# Patient Record
Sex: FEMALE | Race: WHITE | Employment: OTHER | URBAN - METROPOLITAN AREA
[De-identification: names, ages, dates, MRNs, and addresses within clinical notes are randomized per-mention and may not be internally consistent; named-entity substitution may affect disease eponyms.]

---

## 2020-08-04 NOTE — PROGRESS NOTES
Dorise Quirk    Age 76 y.o.    female    1945    [facility] [id number]    8/12/2020  Arrival Time_____________  OR Time____________40 Ranelle Semen     Procedure(s):  PHACOEMULSIFICATION OF CATARACT LEFT EYE WITH INTRAOCULAR LENS IMPLANT                      Monitor Anesthesia Care    Surgeon(s):  Lisset Velazquez, MD       Phone [phone] ([place])     [address] [place] [address] House Mario Alberto  Cell Work  _________________________________________________________________  _________________________________________________________________  _________________________________________________________________  _________________________________________________________________  _________________________________________________________________      PCP _____________________________ Phone_________________       H&P__________________Bringing    Chart            Epic  DOS     Called_______  EKG__________________Bringing    Chart            Epic  DOS     Called_______  LAB__________________ Bringing    Chart            Epic  DOS     Called_______  Cardiac Clearance_______Bringing    Chart            Epic      DOS       Called_______    Cardiologist________________________ Phone___________________________      ? Cheondoism concerns / Waiver on Chart            PAT Communications_____________  ? Pre-op Instructions Given [place]          ______________________________  ? Directions to Surgery Center                          ______________________________  ? Transportation Home_______________      _______________________________  ?  Crutches/Walker__________________        _______________________________      ________Pre-op Orders   _______Transcribed    _______Wt.  ________Pharmacy          _______SCD  ______VTE     ______Beta Blocker  ________Consent             ________TED Mart Skiff

## 2020-08-06 ENCOUNTER — OFFICE VISIT (OUTPATIENT)
Dept: PRIMARY CARE CLINIC | Age: 75
End: 2020-08-06
Payer: COMMERCIAL

## 2020-08-06 PROCEDURE — G8421 BMI NOT CALCULATED: HCPCS | Performed by: NURSE PRACTITIONER

## 2020-08-06 PROCEDURE — 99211 OFF/OP EST MAY X REQ PHY/QHP: CPT | Performed by: NURSE PRACTITIONER

## 2020-08-06 PROCEDURE — G8428 CUR MEDS NOT DOCUMENT: HCPCS | Performed by: NURSE PRACTITIONER

## 2020-08-06 NOTE — PATIENT INSTRUCTIONS

## 2020-08-06 NOTE — PROGRESS NOTES
Prosper Hodge received a viral test for COVID-19. They were educated on isolation and quarantine as appropriate. For any symptoms, they were directed to seek care from their PCP, given contact information to establish with a doctor, directed to an urgent care or the emergency room.

## 2020-08-07 RX ORDER — OLMESARTAN MEDOXOMIL 40 MG/1
40 TABLET ORAL DAILY
Status: ON HOLD | COMMUNITY
End: 2020-08-26

## 2020-08-07 RX ORDER — FOLIC ACID/MULTIVIT,IRON,MINER 0.4MG-18MG
TABLET ORAL DAILY
COMMUNITY

## 2020-08-07 RX ORDER — FLUTICASONE PROPIONATE 50 MCG
1 SPRAY, SUSPENSION (ML) NASAL DAILY
COMMUNITY

## 2020-08-07 RX ORDER — BENAZEPRIL HYDROCHLORIDE 40 MG/1
40 TABLET, FILM COATED ORAL DAILY
COMMUNITY

## 2020-08-07 RX ORDER — HYDROCHLOROTHIAZIDE 12.5 MG/1
12.5 CAPSULE, GELATIN COATED ORAL DAILY
COMMUNITY

## 2020-08-07 RX ORDER — CELECOXIB 200 MG/1
200 CAPSULE ORAL 2 TIMES DAILY
COMMUNITY

## 2020-08-07 RX ORDER — CETIRIZINE HYDROCHLORIDE 10 MG/1
10 TABLET ORAL DAILY
COMMUNITY

## 2020-08-07 RX ORDER — ASCORBIC ACID 500 MG
500 TABLET ORAL DAILY
COMMUNITY

## 2020-08-07 RX ORDER — M-VIT,TX,IRON,MINS/CALC/FOLIC 27MG-0.4MG
1 TABLET ORAL DAILY
COMMUNITY

## 2020-08-07 RX ORDER — AMLODIPINE BESYLATE 5 MG/1
5 TABLET ORAL DAILY
COMMUNITY

## 2020-08-07 RX ORDER — BUPROPION HYDROCHLORIDE 300 MG/1
300 TABLET ORAL EVERY MORNING
COMMUNITY

## 2020-08-07 RX ORDER — FLUOXETINE HYDROCHLORIDE 40 MG/1
40 CAPSULE ORAL DAILY
COMMUNITY

## 2020-08-07 NOTE — PROGRESS NOTES
Obstructive Sleep Apnea (HAMIDA) Screening     Patient:  Kari Reid    YOB: 1945      Medical Record #:  [id number]                     Date:  8/7/2020     1. Are you a loud and/or regular snorer? []  Yes       [x] No    2. Have you been observed to gasp or stop breathing during sleep? []  Yes       [x] No    3. Do you feel tired or groggy upon awakening or do you awaken with a headache?           []  Yes       [x] No    4. Are you often tired or fatigued during the wake time hours? []  Yes       [x] No    5. Do you fall asleep sitting, reading, watching TV or driving? []  Yes       [x] No    6. Do you often have problems with memory or concentration? []  Yes       [x] No    **If patient's score is ? 3 they are considered high risk for HAMIDA. An Anesthesia provider will evaluate the patient and develop a plan of care the day of surgery. Note:  If the patient's BMI is more than 35 kg m¯² , has neck circumference > 40 cm, and/or high blood pressure the risk is greater (© American Sleep Apnea Association, 2006).

## 2020-08-11 ENCOUNTER — ANESTHESIA EVENT (OUTPATIENT)
Dept: OPERATING ROOM | Age: 75
End: 2020-08-11
Payer: MEDICARE

## 2020-08-12 ENCOUNTER — ANESTHESIA (OUTPATIENT)
Dept: OPERATING ROOM | Age: 75
End: 2020-08-12
Payer: MEDICARE

## 2020-08-12 ENCOUNTER — HOSPITAL ENCOUNTER (OUTPATIENT)
Age: 75
Setting detail: OUTPATIENT SURGERY
Discharge: HOME | End: 2020-08-12
Attending: OPHTHALMOLOGY | Admitting: OPHTHALMOLOGY
Payer: MEDICARE

## 2020-08-12 VITALS
DIASTOLIC BLOOD PRESSURE: 77 MMHG | RESPIRATION RATE: 16 BRPM | WEIGHT: 192 LBS | HEART RATE: 61 BPM | TEMPERATURE: 97 F | OXYGEN SATURATION: 97 % | HEIGHT: 64 IN | SYSTOLIC BLOOD PRESSURE: 140 MMHG | BODY MASS INDEX: 32.78 KG/M2

## 2020-08-12 VITALS — DIASTOLIC BLOOD PRESSURE: 66 MMHG | SYSTOLIC BLOOD PRESSURE: 118 MMHG | OXYGEN SATURATION: 97 %

## 2020-08-12 LAB — SARS-COV-2, NAA: NOT DETECTED

## 2020-08-12 PROCEDURE — 7100000010 HC PHASE II RECOVERY - FIRST 15 MIN: Performed by: OPHTHALMOLOGY

## 2020-08-12 PROCEDURE — 3700000001 HC ADD 15 MINUTES (ANESTHESIA): Performed by: OPHTHALMOLOGY

## 2020-08-12 PROCEDURE — 6370000000 HC RX 637 (ALT 250 FOR IP): Performed by: OPHTHALMOLOGY

## 2020-08-12 PROCEDURE — 3600000003 HC SURGERY LEVEL 3 BASE: Performed by: OPHTHALMOLOGY

## 2020-08-12 PROCEDURE — 6360000002 HC RX W HCPCS: Performed by: NURSE ANESTHETIST, CERTIFIED REGISTERED

## 2020-08-12 PROCEDURE — 7100000011 HC PHASE II RECOVERY - ADDTL 15 MIN: Performed by: OPHTHALMOLOGY

## 2020-08-12 PROCEDURE — 2580000003 HC RX 258: Performed by: OPHTHALMOLOGY

## 2020-08-12 PROCEDURE — V2632 POST CHMBR INTRAOCULAR LENS: HCPCS | Performed by: OPHTHALMOLOGY

## 2020-08-12 PROCEDURE — 2709999900 HC NON-CHARGEABLE SUPPLY: Performed by: OPHTHALMOLOGY

## 2020-08-12 PROCEDURE — 2500000003 HC RX 250 WO HCPCS: Performed by: NURSE ANESTHETIST, CERTIFIED REGISTERED

## 2020-08-12 PROCEDURE — 3700000000 HC ANESTHESIA ATTENDED CARE: Performed by: OPHTHALMOLOGY

## 2020-08-12 PROCEDURE — 3600000013 HC SURGERY LEVEL 3 ADDTL 15MIN: Performed by: OPHTHALMOLOGY

## 2020-08-12 PROCEDURE — 6360000002 HC RX W HCPCS: Performed by: OPHTHALMOLOGY

## 2020-08-12 PROCEDURE — 2580000003 HC RX 258: Performed by: ANESTHESIOLOGY

## 2020-08-12 PROCEDURE — 2500000003 HC RX 250 WO HCPCS: Performed by: OPHTHALMOLOGY

## 2020-08-12 DEVICE — ACRYSOF(R) IQ ASPHERIC IOL SP ACRYLIC FOLDABLELENS WULTRASERT(TM) DELIVERY SYSTEM UV WBLUE LIGHT FILTER. 13.0MM LENGTH 6.0MM ANTERIORASYMMETRIC BICONVEX OPTIC PLANAR HAPTICS.
Type: IMPLANTABLE DEVICE | Site: EYE | Status: FUNCTIONAL
Brand: ACRYSOF ULTRASERT

## 2020-08-12 RX ORDER — MOXIFLOXACIN 5 MG/ML
SOLUTION/ DROPS OPHTHALMIC PRN
Status: DISCONTINUED | OUTPATIENT
Start: 2020-08-12 | End: 2020-08-12 | Stop reason: ALTCHOICE

## 2020-08-12 RX ORDER — TETRACAINE HYDROCHLORIDE 5 MG/ML
SOLUTION OPHTHALMIC PRN
Status: DISCONTINUED | OUTPATIENT
Start: 2020-08-12 | End: 2020-08-12 | Stop reason: ALTCHOICE

## 2020-08-12 RX ORDER — TIMOLOL MALEATE 5 MG/ML
SOLUTION/ DROPS OPHTHALMIC PRN
Status: DISCONTINUED | OUTPATIENT
Start: 2020-08-12 | End: 2020-08-12 | Stop reason: ALTCHOICE

## 2020-08-12 RX ORDER — MAGNESIUM HYDROXIDE 1200 MG/15ML
LIQUID ORAL PRN
Status: DISCONTINUED | OUTPATIENT
Start: 2020-08-12 | End: 2020-08-12 | Stop reason: ALTCHOICE

## 2020-08-12 RX ORDER — LIDOCAINE HYDROCHLORIDE 10 MG/ML
INJECTION, SOLUTION INFILTRATION; PERINEURAL PRN
Status: DISCONTINUED | OUTPATIENT
Start: 2020-08-12 | End: 2020-08-12 | Stop reason: SDUPTHER

## 2020-08-12 RX ORDER — PROPOFOL 10 MG/ML
INJECTION, EMULSION INTRAVENOUS PRN
Status: DISCONTINUED | OUTPATIENT
Start: 2020-08-12 | End: 2020-08-12 | Stop reason: SDUPTHER

## 2020-08-12 RX ORDER — SODIUM CHLORIDE, SODIUM LACTATE, POTASSIUM CHLORIDE, CALCIUM CHLORIDE 600; 310; 30; 20 MG/100ML; MG/100ML; MG/100ML; MG/100ML
INJECTION, SOLUTION INTRAVENOUS CONTINUOUS
Status: DISCONTINUED | OUTPATIENT
Start: 2020-08-12 | End: 2020-08-12 | Stop reason: HOSPADM

## 2020-08-12 RX ADMIN — SODIUM CHLORIDE, POTASSIUM CHLORIDE, SODIUM LACTATE AND CALCIUM CHLORIDE: 600; 310; 30; 20 INJECTION, SOLUTION INTRAVENOUS at 08:14

## 2020-08-12 RX ADMIN — PROPOFOL 80 MG: 10 INJECTION, EMULSION INTRAVENOUS at 09:10

## 2020-08-12 RX ADMIN — LIDOCAINE HYDROCHLORIDE 20 MG: 10 INJECTION, SOLUTION INFILTRATION; PERINEURAL at 09:10

## 2020-08-12 RX ADMIN — Medication 0.3 ML: at 08:16

## 2020-08-12 RX ADMIN — Medication 0.3 ML: at 08:25

## 2020-08-12 RX ADMIN — Medication 0.3 ML: at 08:07

## 2020-08-12 ASSESSMENT — PULMONARY FUNCTION TESTS
PIF_VALUE: 0

## 2020-08-12 ASSESSMENT — PAIN - FUNCTIONAL ASSESSMENT: PAIN_FUNCTIONAL_ASSESSMENT: 0-10

## 2020-08-12 NOTE — ANESTHESIA POSTPROCEDURE EVALUATION
Department of Anesthesiology  Postprocedure Note    Patient: Ji Marroquin  MRN: [id number]  YOB: 1945  Date of evaluation: 8/12/2020  Time:  10:05 AM     Procedure Summary     Date:  08/12/20 Room / Location:  [facility] OR [address] [place] [address]    Anesthesia Start:  3896 Anesthesia Stop:  4926    Procedure:  PHACOEMULSIFICATION OF CATARACT LEFT EYE WITH INTRAOCULAR LENS IMPLANT (Left Eye) Diagnosis:       Age-related nuclear cataract of left eye      (Age-related nuclear cataract of left eye [H25.12])    Surgeon:  Tona Sutton MD Responsible Provider:  Lawyer Estiven MD    Anesthesia Type:  general ASA Status:  3          Anesthesia Type: general    Xin Phase I: Xin Score: 10    Xin Phase II: Xin Score: 10    Last vitals: Reviewed and per EMR flowsheets. Anesthesia Post Evaluation    Comments: Postoperative Anesthesia Note    Name:    Ji Marroquin  MRN:      [id number]    Patient Vitals in the past 12 hrs:  08/12/20 0940, BP:(!) 140/77, Temp:97 °F (36.1 °C), Pulse:61, Resp:16, SpO2:97 %  08/12/20 0805, BP:120/60, Temp:97 °F (36.1 °C), Temp src:Temporal, Pulse:62, Resp:16, SpO2:98 %, Height:5' 4\" (1.626 m), Weight:192 lb (87.1 kg)     LABS:    CBC  No results found for: WBC, HGB, HCT, PLT  RENAL  No results found for: NA, K, CL, CO2, BUN, CREATININE, GLUCOSE  COAGS  No results found for: PROTIME, INR, APTT    Intake & Output:  @54GBSQ@    Nausea & Vomiting:  No    Level of Consciousness:  Awake    Pain Assessment:  Adequate analgesia    Anesthesia Complications:  No apparent anesthetic complications    SUMMARY      Vital signs stable  OK to discharge from Stage I post anesthesia care.   Care transferred from Anesthesiology department on discharge from perioperative area

## 2020-08-12 NOTE — ANESTHESIA PRE PROCEDURE
Department of Anesthesiology  Preprocedure Note       Name:  Isa Meza   Age:  76 y.o.  :  1945                                          MRN:  [id number]         Date:  2020      Surgeon: Sailaja Degree):  Renate Tomlin MD    Procedure: Procedure(s):  PHACOEMULSIFICATION OF CATARACT LEFT EYE WITH INTRAOCULAR LENS IMPLANT    Medications prior to admission:   Prior to Admission medications    Medication Sig Start Date End Date Taking?  Authorizing Provider   hydroCHLOROthiazide (MICROZIDE) 12.5 MG capsule Take 12.5 mg by mouth daily   Yes Historical Provider, MD   FLUoxetine (PROZAC) 40 MG capsule Take 40 mg by mouth daily   Yes Historical Provider, MD   buPROPion (WELLBUTRIN XL) 300 MG extended release tablet Take 300 mg by mouth every morning   Yes Historical Provider, MD   benazepril (LOTENSIN) 40 MG tablet Take 40 mg by mouth daily   Yes Historical Provider, MD   amLODIPine (NORVASC) 5 MG tablet Take 5 mg by mouth daily   Yes Historical Provider, MD   olmesartan (BENICAR) 40 MG tablet Take 40 mg by mouth daily   Yes Historical Provider, MD   celecoxib (CELEBREX) 200 MG capsule Take 200 mg by mouth 2 times daily   Yes Historical Provider, MD   fluticasone (FLONASE) 50 MCG/ACT nasal spray 1 spray by Nasal route daily   Yes Historical Provider, MD   Omega-3 Fatty Acids (OMEGA-3 FISH OIL) 1200 MG CAPS Take by mouth daily   Yes Historical Provider, MD   ascorbic acid (VITAMIN C) 500 MG tablet Take 500 mg by mouth daily   Yes Historical Provider, MD   cetirizine (ZYRTEC) 10 MG tablet Take 10 mg by mouth daily   Yes Historical Provider, MD   Multiple Vitamins-Minerals (THERAPEUTIC MULTIVITAMIN-MINERALS) tablet Take 1 tablet by mouth daily   Yes Historical Provider, MD       Current medications:    Current Facility-Administered Medications   Medication Dose Route Frequency Provider Last Rate Last Dose    bupivacaine 0.75%, phenylephrine 10%, tropicamide 1%, cyclopentolate 1%, moxifloxacin 0.5%, ketorolac 0.5% in lidocaine 2% jelly ophthalmic solution  0.3 mL Left Eye Q10 Min PRN Lory Castro MD        lactated ringers infusion   Intravenous Continuous Isaac Guillermo MD        lidocaine 1 % (PF) injection 0.1 mL  0.1 mL Intradermal Once PRN Isaac Giullermo MD         Current Outpatient Medications   Medication Sig Dispense Refill    hydroCHLOROthiazide (MICROZIDE) 12.5 MG capsule Take 12.5 mg by mouth daily      FLUoxetine (PROZAC) 40 MG capsule Take 40 mg by mouth daily      buPROPion (WELLBUTRIN XL) 300 MG extended release tablet Take 300 mg by mouth every morning      benazepril (LOTENSIN) 40 MG tablet Take 40 mg by mouth daily      amLODIPine (NORVASC) 5 MG tablet Take 5 mg by mouth daily      olmesartan (BENICAR) 40 MG tablet Take 40 mg by mouth daily      celecoxib (CELEBREX) 200 MG capsule Take 200 mg by mouth 2 times daily      fluticasone (FLONASE) 50 MCG/ACT nasal spray 1 spray by Nasal route daily      Omega-3 Fatty Acids (OMEGA-3 FISH OIL) 1200 MG CAPS Take by mouth daily      ascorbic acid (VITAMIN C) 500 MG tablet Take 500 mg by mouth daily      cetirizine (ZYRTEC) 10 MG tablet Take 10 mg by mouth daily      Multiple Vitamins-Minerals (THERAPEUTIC MULTIVITAMIN-MINERALS) tablet Take 1 tablet by mouth daily         Allergies:  No Known Allergies    Problem List:  There is no problem list on file for this patient. Past Medical History:        Diagnosis Date    Anxiety     Hypertension        Past Surgical History:        Procedure Laterality Date    BREAST ENHANCEMENT SURGERY      HAND SURGERY Right     HYSTERECTOMY      RHINOPLASTY      ROTATOR CUFF REPAIR Bilateral     TOTAL HIP ARTHROPLASTY Bilateral        Social History:    Social History     Tobacco Use    Smoking status: Never Smoker    Smokeless tobacco: Never Used   Substance Use Topics    Alcohol use:  Yes     Alcohol/week: 14.0 standard drinks     Types: 14 Glasses of wine per week     Comment: 2 glasses/night                                Counseling given: Not Answered      Vital Signs (Current):   Vitals:    08/07/20 1359   Weight: 192 lb (87.1 kg)   Height: 5' 4\" (1.626 m)                                              BP Readings from Last 3 Encounters:   No data found for BP       NPO Status:                                                                                 BMI:   Wt Readings from Last 3 Encounters:   No data found for Wt     Body mass index is 32.96 kg/m². CBC: No results found for: WBC, RBC, HGB, HCT, MCV, RDW, PLT    CMP: No results found for: NA, K, CL, CO2, BUN, CREATININE, GFRAA, AGRATIO, LABGLOM, GLUCOSE, PROT, CALCIUM, BILITOT, ALKPHOS, AST, ALT    POC Tests: No results for input(s): POCGLU, POCNA, POCK, POCCL, POCBUN, POCHEMO, POCHCT in the last 72 hours.     Coags: No results found for: PROTIME, INR, APTT    HCG (If Applicable): No results found for: PREGTESTUR, PREGSERUM, HCG, HCGQUANT     ABGs: No results found for: PHART, PO2ART, OUH3IRO, AEO6XNL, BEART, Q7IEBBLK     Type & Screen (If Applicable):  No results found for: LABABO, LABRH    Drug/Infectious Status (If Applicable):  No results found for: HIV, HEPCAB    COVID-19 Screening (If Applicable):   Lab Results   Component Value Date    COVID19 NOT DETECTED 08/06/2020         Anesthesia Evaluation  Patient summary reviewed and Nursing notes reviewed no history of anesthetic complications:   Airway: Mallampati: II     Neck ROM: full   Dental:          Pulmonary:Negative Pulmonary ROS and normal exam                               Cardiovascular:Negative CV ROS    (+) hypertension:,                   Neuro/Psych:   Negative Neuro/Psych ROS  (+) depression/anxiety             GI/Hepatic/Renal: Neg GI/Hepatic/Renal ROS       (-) hiatal hernia and GERD       Endo/Other: Negative Endo/Other ROS                    Abdominal:           Vascular:                                        Anesthesia Plan      general     ASA 3     (I discussed with the patient the risks and benefits of PIV, general anesthesia, IV Narcotics, PACU. All questions were answered the patient agrees with the plan and wishes to proceed.  )  Induction: intravenous.                         Joel Burton MD   8/12/2020

## 2020-08-12 NOTE — OP NOTE
[address] [place] [address]                                OPERATIVE REPORT    PATIENT NAME: Jag Sharif                      :        1945  MED REC NO:   [id number]                          ROOM:  ACCOUNT NO:   [de-identified]                           ADMIT DATE: 2020  PROVIDER:     Vilma Sarabia MD    DATE OF PROCEDURE:  2020    PREOPERATIVE DIAGNOSIS:  Cataract, left eye. POSTOPERATIVE DIAGNOSIS:  Cataract, left eye. OPERATION:  Phacoemulsification of the cataract of the left eye with a  posterior chamber lens implant. ANESTHESIA:  General / Monitored Anesthesia Care / Retrobulbar. A 2 mL  retrobulbar block and 10 mL modified Terrell block using a 1:1 mixture  of 0.75% Marcaine, 4% Xylocaine with epinephrine, and hyaluronidase. SURGICAL INDICATIONS:  The patient has had a painless progressive loss  of vision due to the cataractous degeneration of the lens and for that  reason, surgery is indicated. The patient is having visual problems  with current lifestyle. A new eyeglasses prescription was unable to  adequately improve functional vision. DETAILS OF PROCEDURE:  The patient was taken to the operating room and  was prepped and draped in the usual manner after obtaining satisfactory  local anesthesia. Attention was turned towards the operative eye and a lid speculum was  inserted. A 2.5 mm keratome was used to make a limbal incision at 180  degrees. Viscoat was then placed in the eye to fill the anterior  chamber and a side port incision was made with a Superblade through the  clear cornea. The incision was located about 2 o'clock to the left of  the original incision. A bent needle was then used to make a cut in the  anterior capsule and start a capsulorrhexis tear. This was then grasped  with Utrata forceps and a 360 degree tear was completed.   Mey[place] Chuck  dissection was then done with BSS to separate the capsule and the  cataract. The cataract was seen to be spinning easily within the  capsular bag and at this point, the phacoemulsification hand piece was  called for. Using a divide and conquer technique, the phacoemulsifier cut the lens  into four pieces approximately following the pattern of a cross. The  grooves were cut deeply and then the lens was cracked along these axes. The lens quarters were then rotated into the mid pupillary space and  phacoemulsified. The IA hand piece removed all the cortical material.   A Urban Taylor was used to polish the posterior capsule. Viscoat and  Provisc were used to fill the capsular bag. The incision was opened  slightly with a crescent knife and an Willis acrylic foldable lens of the  appropriate power was called for. The lens was loaded into the injector  and injected into the eye. The lead haptic was injected into the  capsular bag with the trailing haptic left in the pupillary space. Using a Sinskey hook, the lens was gently nudged into the capsular bag  and rotated into position. After noting that the alignment and  centration was adequate, the IA hand piece was called for and used to  remove all the viscoelastic material.  Miostat was then injected through  the side port incision to bring the pupil down. The wound was checked  to make sure it was water tight. At this time, 2-3 drops of timolol and  2-3 drops of Vigamox were placed on the eye. The eye was taped closed,  patched and shielded. The patient went to the recovery room in good  condition. ADDENDUM:  The phaco time was 11.07 CDE with 200 mL of fluid. The  patient had a near-clear, no-stitch surgery, axis 180. Tolerated the  procedure well and went to the recovery room in good condition.         Neville Restrepo MD    D: 08/12/2020 9:50:56       T: 08/12/2020 10:19:43     [email or web address]  Job#: [id number]     Doc#: [id number]    CC:

## 2020-08-12 NOTE — H&P
I have examined the patient and reviewed the history and physical and find no relevant changes. I have reviewed with the patient and/or family the risks, benefits, and alternatives to the procedure and they have agreed to proceed.     Ravi Boss.

## 2020-08-18 NOTE — PROGRESS NOTES
Desiree Powell    Age 76 y.o.    female    1945    [facility] [id number]    8/26/2020  Arrival Time_____________  OR Time____________40 Therese Vasquez     Procedure(s):  PHACOEMULSIFICATION OF CATARACT RIGHT EYE WITH INTRAOCULAR LENS IMPLANT                      Monitor Anesthesia Care    Surgeon(s):  Sergo Nails, MD       Phone [phone] ([place])     [address] [place] [address] House Mario Alberto  Cell Work  _________________________________________________________________  _________________________________________________________________  _________________________________________________________________  _________________________________________________________________  _________________________________________________________________      PCP _____________________________ Phone_________________       H&P__________________Bringing    Chart            Epic  DOS     Called_______  EKG__________________Bringing    Chart            Epic  DOS     Called_______  LAB__________________ Bringing    Chart            Epic  DOS     Called_______  Cardiac Clearance_______Bringing    Chart            Epic      DOS       Called_______    Cardiologist________________________ Phone___________________________      ? Christianity concerns / Waiver on Chart            PAT Communications_____________  ? Pre-op Instructions Given [place]          ______________________________  ? Directions to Surgery Center                          ______________________________  ? Transportation Home_______________      _______________________________  ?  Crutches/Walker__________________        _______________________________      ________Pre-op Orders   _______Transcribed    _______Wt.  ________Pharmacy          _______SCD  ______VTE     ______Beta Blocker  ________Consent             ________TED Deretha Trenton

## 2020-08-20 NOTE — PROGRESS NOTES
Obstructive Sleep Apnea (HAMIDA) Screening     Patient:  Inez Mccormick    YOB: 1945      Medical Record #:  [id number]                     Date:  8/20/2020     1. Are you a loud and/or regular snorer? []  Yes       [x] No    2. Have you been observed to gasp or stop breathing during sleep? []  Yes       [x] No    3. Do you feel tired or groggy upon awakening or do you awaken with a headache?           []  Yes       [] No    4. Are you often tired or fatigued during the wake time hours? []  Yes       [] No    5. Do you fall asleep sitting, reading, watching TV or driving? []  Yes       [] No    6. Do you often have problems with memory or concentration? []  Yes       [] No    **If patient's score is ? 3 they are considered high risk for HAMIDA. An Anesthesia provider will evaluate the patient and develop a plan of care the day of surgery. Note:  If the patient's BMI is more than 35 kg m¯² , has neck circumference > 40 cm, and/or high blood pressure the risk is greater (© American Sleep Apnea Association, 2006).

## 2020-08-21 ENCOUNTER — OFFICE VISIT (OUTPATIENT)
Dept: PRIMARY CARE CLINIC | Age: 75
End: 2020-08-21

## 2020-08-21 NOTE — PATIENT INSTRUCTIONS

## 2020-08-22 LAB — SARS-COV-2, NAA: NOT DETECTED

## 2020-08-26 ENCOUNTER — HOSPITAL ENCOUNTER (OUTPATIENT)
Age: 75
Setting detail: OUTPATIENT SURGERY
Discharge: HOME | End: 2020-08-26
Attending: OPHTHALMOLOGY | Admitting: OPHTHALMOLOGY
Payer: MEDICARE

## 2020-08-26 ENCOUNTER — ANESTHESIA (OUTPATIENT)
Dept: OPERATING ROOM | Age: 75
End: 2020-08-26
Payer: MEDICARE

## 2020-08-26 ENCOUNTER — ANESTHESIA EVENT (OUTPATIENT)
Dept: OPERATING ROOM | Age: 75
End: 2020-08-26
Payer: MEDICARE

## 2020-08-26 VITALS
TEMPERATURE: 97.4 F | RESPIRATION RATE: 16 BRPM | OXYGEN SATURATION: 98 % | DIASTOLIC BLOOD PRESSURE: 66 MMHG | HEIGHT: 64 IN | HEART RATE: 65 BPM | WEIGHT: 192 LBS | SYSTOLIC BLOOD PRESSURE: 129 MMHG | BODY MASS INDEX: 32.78 KG/M2

## 2020-08-26 VITALS — OXYGEN SATURATION: 100 % | DIASTOLIC BLOOD PRESSURE: 54 MMHG | SYSTOLIC BLOOD PRESSURE: 105 MMHG

## 2020-08-26 PROCEDURE — 2580000003 HC RX 258: Performed by: ANESTHESIOLOGY

## 2020-08-26 PROCEDURE — 6370000000 HC RX 637 (ALT 250 FOR IP): Performed by: OPHTHALMOLOGY

## 2020-08-26 PROCEDURE — 6360000002 HC RX W HCPCS: Performed by: NURSE ANESTHETIST, CERTIFIED REGISTERED

## 2020-08-26 PROCEDURE — 3600000003 HC SURGERY LEVEL 3 BASE: Performed by: OPHTHALMOLOGY

## 2020-08-26 PROCEDURE — 3700000001 HC ADD 15 MINUTES (ANESTHESIA): Performed by: OPHTHALMOLOGY

## 2020-08-26 PROCEDURE — 2709999900 HC NON-CHARGEABLE SUPPLY: Performed by: OPHTHALMOLOGY

## 2020-08-26 PROCEDURE — 2580000003 HC RX 258: Performed by: OPHTHALMOLOGY

## 2020-08-26 PROCEDURE — 3600000013 HC SURGERY LEVEL 3 ADDTL 15MIN: Performed by: OPHTHALMOLOGY

## 2020-08-26 PROCEDURE — 2500000003 HC RX 250 WO HCPCS: Performed by: OPHTHALMOLOGY

## 2020-08-26 PROCEDURE — 3700000000 HC ANESTHESIA ATTENDED CARE: Performed by: OPHTHALMOLOGY

## 2020-08-26 PROCEDURE — V2632 POST CHMBR INTRAOCULAR LENS: HCPCS | Performed by: OPHTHALMOLOGY

## 2020-08-26 PROCEDURE — 6360000002 HC RX W HCPCS: Performed by: OPHTHALMOLOGY

## 2020-08-26 PROCEDURE — 7100000011 HC PHASE II RECOVERY - ADDTL 15 MIN: Performed by: OPHTHALMOLOGY

## 2020-08-26 PROCEDURE — 7100000010 HC PHASE II RECOVERY - FIRST 15 MIN: Performed by: OPHTHALMOLOGY

## 2020-08-26 DEVICE — ACRYSOF(R) IQ ASPHERIC IOL SP ACRYLIC FOLDABLELENS WULTRASERT(TM) DELIVERY SYSTEM UV WBLUE LIGHT FILTER. 13.0MM LENGTH 6.0MM ANTERIORASYMMETRIC BICONVEX OPTIC PLANAR HAPTICS.
Type: IMPLANTABLE DEVICE | Site: EYE | Status: FUNCTIONAL
Brand: ACRYSOF ULTRASERT

## 2020-08-26 RX ORDER — SODIUM CHLORIDE, SODIUM LACTATE, POTASSIUM CHLORIDE, CALCIUM CHLORIDE 600; 310; 30; 20 MG/100ML; MG/100ML; MG/100ML; MG/100ML
INJECTION, SOLUTION INTRAVENOUS CONTINUOUS
Status: DISCONTINUED | OUTPATIENT
Start: 2020-08-26 | End: 2020-08-26 | Stop reason: HOSPADM

## 2020-08-26 RX ORDER — TIMOLOL MALEATE 5 MG/ML
SOLUTION/ DROPS OPHTHALMIC PRN
Status: DISCONTINUED | OUTPATIENT
Start: 2020-08-26 | End: 2020-08-26 | Stop reason: ALTCHOICE

## 2020-08-26 RX ORDER — MAGNESIUM HYDROXIDE 1200 MG/15ML
LIQUID ORAL PRN
Status: DISCONTINUED | OUTPATIENT
Start: 2020-08-26 | End: 2020-08-26 | Stop reason: ALTCHOICE

## 2020-08-26 RX ORDER — LIDOCAINE HYDROCHLORIDE 10 MG/ML
2 INJECTION, SOLUTION INFILTRATION; PERINEURAL
Status: DISCONTINUED | OUTPATIENT
Start: 2020-08-26 | End: 2020-08-26 | Stop reason: HOSPADM

## 2020-08-26 RX ORDER — MOXIFLOXACIN 5 MG/ML
SOLUTION/ DROPS OPHTHALMIC PRN
Status: DISCONTINUED | OUTPATIENT
Start: 2020-08-26 | End: 2020-08-26 | Stop reason: ALTCHOICE

## 2020-08-26 RX ORDER — PROPOFOL 10 MG/ML
INJECTION, EMULSION INTRAVENOUS PRN
Status: DISCONTINUED | OUTPATIENT
Start: 2020-08-26 | End: 2020-08-26 | Stop reason: SDUPTHER

## 2020-08-26 RX ORDER — TETRACAINE HYDROCHLORIDE 5 MG/ML
SOLUTION OPHTHALMIC PRN
Status: DISCONTINUED | OUTPATIENT
Start: 2020-08-26 | End: 2020-08-26 | Stop reason: ALTCHOICE

## 2020-08-26 RX ADMIN — Medication 0.3 ML: at 10:11

## 2020-08-26 RX ADMIN — Medication 0.3 ML: at 10:03

## 2020-08-26 RX ADMIN — PROPOFOL 100 MG: 10 INJECTION, EMULSION INTRAVENOUS at 11:03

## 2020-08-26 RX ADMIN — Medication 0.3 ML: at 09:56

## 2020-08-26 RX ADMIN — SODIUM CHLORIDE, POTASSIUM CHLORIDE, SODIUM LACTATE AND CALCIUM CHLORIDE: 600; 310; 30; 20 INJECTION, SOLUTION INTRAVENOUS at 10:01

## 2020-08-26 ASSESSMENT — PULMONARY FUNCTION TESTS
PIF_VALUE: 0
PIF_VALUE: 0
PIF_VALUE: 1
PIF_VALUE: 1
PIF_VALUE: 0
PIF_VALUE: 1
PIF_VALUE: 0
PIF_VALUE: 1
PIF_VALUE: 0

## 2020-08-26 ASSESSMENT — PAIN - FUNCTIONAL ASSESSMENT: PAIN_FUNCTIONAL_ASSESSMENT: 0-10

## 2020-08-26 NOTE — ANESTHESIA PRE PROCEDURE
Department of Anesthesiology  Preprocedure Note       Name:  Corwin Epley   Age:  76 y.o.  :  1945                                          MRN:  [id number]         Date:  2020      Surgeon: Shannon [place]):  Carlitos Fowler MD    Procedure: Procedure(s):  PHACOEMULSIFICATION OF CATARACT RIGHT EYE WITH INTRAOCULAR LENS IMPLANT    Medications prior to admission:   Prior to Admission medications    Medication Sig Start Date End Date Taking?  Authorizing Provider   hydroCHLOROthiazide (MICROZIDE) 12.5 MG capsule Take 12.5 mg by mouth daily   Yes Historical Provider, MD   FLUoxetine (PROZAC) 40 MG capsule Take 40 mg by mouth daily   Yes Historical Provider, MD   buPROPion (WELLBUTRIN XL) 300 MG extended release tablet Take 300 mg by mouth every morning   Yes Historical Provider, MD   benazepril (LOTENSIN) 40 MG tablet Take 40 mg by mouth daily   Yes Historical Provider, MD   amLODIPine (NORVASC) 5 MG tablet Take 5 mg by mouth daily   Yes Historical Provider, MD   celecoxib (CELEBREX) 200 MG capsule Take 200 mg by mouth 2 times daily   Yes Historical Provider, MD   fluticasone (FLONASE) 50 MCG/ACT nasal spray 1 spray by Nasal route daily   Yes Historical Provider, MD   Omega-3 Fatty Acids (OMEGA-3 FISH OIL) 1200 MG CAPS Take by mouth daily   Yes Historical Provider, MD   ascorbic acid (VITAMIN C) 500 MG tablet Take 500 mg by mouth daily   Yes Historical Provider, MD   cetirizine (ZYRTEC) 10 MG tablet Take 10 mg by mouth daily   Yes Historical Provider, MD   Multiple Vitamins-Minerals (THERAPEUTIC MULTIVITAMIN-MINERALS) tablet Take 1 tablet by mouth daily   Yes Historical Provider, MD       Current medications:    Current Facility-Administered Medications   Medication Dose Route Frequency Provider Last Rate Last Dose    bupivacaine 0.75%, phenylephrine 10%, tropicamide 1%, cyclopentolate 1%, moxifloxacin 0.5%, ketorolac 0.5% in lidocaine 2% jelly ophthalmic solution  0.3 mL Right Eye Q10 Min PRN Carlitos Fowler MD   0.3 mL at 08/26/20 1003    lidocaine 1 % injection 2 mL  2 mL Intradermal Once PRN Latonia Vincent MD        lactated ringers infusion   Intravenous Continuous Latonia Vincent  mL/hr at 08/26/20 1001         Allergies: Allergies   Allergen Reactions    Other Other (See Comments)     Tylenol-3 - GI upset        Problem List:  There is no problem list on file for this patient.       Past Medical History:        Diagnosis Date    Anxiety     Hypertension        Past Surgical History:        Procedure Laterality Date    BREAST ENHANCEMENT SURGERY      COLONOSCOPY      HAND SURGERY Right     HYSTERECTOMY      INTRACAPSULAR CATARACT EXTRACTION Left 8/12/2020    PHACOEMULSIFICATION OF CATARACT LEFT EYE WITH INTRAOCULAR LENS IMPLANT performed by Magda [facility] MD at [facility] Bilateral     TOTAL HIP ARTHROPLASTY Bilateral        Social History:    Social History     Tobacco Use    Smoking status: Never Smoker    Smokeless tobacco: Never Used   Substance Use Topics    Alcohol use: Yes     Comment: couple times a week                                Counseling given: Not Answered      Vital Signs (Current):   Vitals:    08/20/20 1027 08/26/20 0958   BP:  (!) 110/47   Pulse:  68   Resp:  16   Temp:  97.4 °F (36.3 °C)   TempSrc:  Temporal   SpO2:  97%   Weight: 192 lb (87.1 kg) 192 lb (87.1 kg)   Height: 5' 4\" (1.626 m) 5' 4\" (1.626 m)                                              BP Readings from Last 3 Encounters:   08/26/20 (!) 110/47   08/12/20 118/66   08/12/20 (!) 140/77       NPO Status: Time of last liquid consumption: 2130                        Time of last solid consumption: 2130                        Date of last liquid consumption: 08/25/20                        Date of last solid food consumption: 08/25/20    BMI:   Wt Readings from Last 3 Encounters:   08/26/20 192 lb (87.1 kg)   08/12/20 192 lb (87.1 kg)     Body mass index is 32.96 kg/m². CBC: No results found for: WBC, RBC, HGB, HCT, MCV, RDW, PLT    CMP: No results found for: NA, K, CL, CO2, BUN, CREATININE, GFRAA, AGRATIO, LABGLOM, GLUCOSE, PROT, CALCIUM, BILITOT, ALKPHOS, AST, ALT    POC Tests: No results for input(s): POCGLU, POCNA, POCK, POCCL, POCBUN, POCHEMO, POCHCT in the last 72 hours. Coags: No results found for: PROTIME, INR, APTT    HCG (If Applicable): No results found for: PREGTESTUR, PREGSERUM, HCG, HCGQUANT     ABGs: No results found for: PHART, PO2ART, ZZP3TRM, TDO1OUO, BEART, A0LZOKSN     Type & Screen (If Applicable):  No results found for: LABABO, LABRH    Drug/Infectious Status (If Applicable):  No results found for: HIV, HEPCAB    COVID-19 Screening (If Applicable):   Lab Results   Component Value Date    COVID19 NOT DETECTED 08/21/2020         Anesthesia Evaluation  Patient summary reviewed and Nursing notes reviewed no history of anesthetic complications:   Airway: Mallampati: II  TM distance: >3 FB   Neck ROM: full  Mouth opening: > = 3 FB Dental: normal exam         Pulmonary:Negative Pulmonary ROS                              Cardiovascular:    (+) hypertension:,                   Neuro/Psych:   Negative Neuro/Psych ROS              GI/Hepatic/Renal: Neg GI/Hepatic/Renal ROS       (-) GERD, liver disease and no renal disease       Endo/Other: Negative Endo/Other ROS       (-) diabetes mellitus               Abdominal:           Vascular: negative vascular ROS. Anesthesia Plan      MAC     ASA 2       Induction: intravenous. Anesthetic plan and risks discussed with patient. Plan discussed with CRNA.                   Bobby Orozco MD   8/26/2020

## 2020-08-26 NOTE — OP NOTE
BSS to separate the capsule and the  cataract. The cataract was seen to be spinning easily within the  capsular bag and at this point, the phacoemulsification hand piece was  called for. Using a divide and conquer technique, the phacoemulsifier cut the lens  into four pieces approximately following the pattern of a cross. The  grooves were cut deeply and then the lens was cracked along these axes. The lens quarters were then rotated into the mid pupillary space and  phacoemulsified. The IA hand piece removed all the cortical material.   A Claudia [place] was used to polish the posterior capsule. Viscoat and  Provisc were used to fill the capsular bag. The incision was opened  slightly with a crescent knife and an Willis acrylic foldable lens of the  appropriate power was called for. The lens was loaded into the injector  and injected into the eye. The lead haptic was injected into the  capsular bag with the trailing haptic left in the pupillary space. Using a Sinskey hook, the lens was gently nudged into the capsular bag  and rotated into position. After noting that the alignment and  centration was adequate, the IA hand piece was called for and used to  remove all the viscoelastic material.  Miostat was then injected through  the side port incision to bring the pupil down. The wound was checked  to make sure it was water tight. At this time, 2-3 drops of timolol and  2-3 drops of Vigamox were placed on the eye. The eye was taped closed,  patched and shielded. The patient went to the recovery room in good  condition. ADDENDUM:  The phaco time was 9.42 CDE with 200 mL of fluid. The  patient had a near-clear, no-stitch surgery, axis 180. Tolerated the  procedure well and went to the recovery room in good condition.         Haydee Florez MD    D: 08/26/2020 11:47:18       T: 08/26/2020 12:54:42     [email or web address]  Job#: [id number]     Doc#: [id number]    CC:

## 2020-08-26 NOTE — ANESTHESIA POSTPROCEDURE EVALUATION
Department of Anesthesiology  Postprocedure Note    Patient: Esther Contreras  MRN: [id number]  YOB: 1945  Date of evaluation: 8/26/2020  Time:  12:46 PM     Procedure Summary     Date:  08/26/20 Room / Location:  [address]    Anesthesia Start:  1103 Anesthesia Stop:  1134    Procedure:  PHACOEMULSIFICATION OF CATARACT RIGHT EYE WITH INTRAOCULAR LENS IMPLANT (Right Eye) Diagnosis:       Age-related nuclear cataract of right eye      (Age-related nuclear cataract of right eye [H25.11])    Surgeon:  Renate Tomlin MD Responsible Provider:  Urbano Mclaughlin MD    Anesthesia Type:  MAC ASA Status:  2          Anesthesia Type: MAC    Xin Phase I: Xin Score: 10    Xin Phase II: Xni Score: 10    Last vitals: Reviewed and per EMR flowsheets.        Anesthesia Post Evaluation    Patient location during evaluation: PACU  Level of consciousness: awake  Airway patency: patent  Nausea & Vomiting: no nausea  Complications: no  Cardiovascular status: blood pressure returned to baseline  Respiratory status: acceptable  Hydration status: euvolemic

## 2024-05-30 ENCOUNTER — APPOINTMENT (OUTPATIENT)
Dept: GENERAL RADIOLOGY | Age: 79
End: 2024-05-30
Payer: MEDICARE

## 2024-05-30 ENCOUNTER — HOSPITAL ENCOUNTER (INPATIENT)
Age: 79
LOS: 21 days | Discharge: NURSING, REHAB OR LONG-TERM CARE | End: 2024-06-21
Attending: EMERGENCY MEDICINE | Admitting: INTERNAL MEDICINE
Payer: MEDICARE

## 2024-05-30 DIAGNOSIS — S82.892A CLOSED FRACTURE OF LEFT ANKLE, INITIAL ENCOUNTER: Primary | ICD-10-CM

## 2024-05-30 DIAGNOSIS — K55.9 ISCHEMIC BOWEL DISEASE (HCC): ICD-10-CM

## 2024-05-30 DIAGNOSIS — D72.829 LEUKOCYTOSIS, UNSPECIFIED TYPE: ICD-10-CM

## 2024-05-30 DIAGNOSIS — R55 SYNCOPE, UNSPECIFIED SYNCOPE TYPE: ICD-10-CM

## 2024-05-30 DIAGNOSIS — S93.05XA DISLOCATION OF LEFT ANKLE JOINT, INITIAL ENCOUNTER: ICD-10-CM

## 2024-05-30 DIAGNOSIS — J96.01 ACUTE RESPIRATORY FAILURE WITH HYPOXIA (HCC): ICD-10-CM

## 2024-05-30 DIAGNOSIS — R09.02 HYPOXEMIA: ICD-10-CM

## 2024-05-30 DIAGNOSIS — K56.609 LARGE BOWEL OBSTRUCTION (HCC): ICD-10-CM

## 2024-05-30 PROCEDURE — 99285 EMERGENCY DEPT VISIT HI MDM: CPT

## 2024-05-30 PROCEDURE — 73610 X-RAY EXAM OF ANKLE: CPT

## 2024-05-30 PROCEDURE — 96374 THER/PROPH/DIAG INJ IV PUSH: CPT

## 2024-05-30 PROCEDURE — 6360000002 HC RX W HCPCS: Performed by: EMERGENCY MEDICINE

## 2024-05-30 RX ORDER — KETOROLAC TROMETHAMINE 30 MG/ML
15 INJECTION, SOLUTION INTRAMUSCULAR; INTRAVENOUS ONCE
Status: COMPLETED | OUTPATIENT
Start: 2024-05-30 | End: 2024-05-30

## 2024-05-30 RX ADMIN — KETOROLAC TROMETHAMINE 15 MG: 30 INJECTION, SOLUTION INTRAMUSCULAR at 22:56

## 2024-05-30 ASSESSMENT — LIFESTYLE VARIABLES
HOW MANY STANDARD DRINKS CONTAINING ALCOHOL DO YOU HAVE ON A TYPICAL DAY: 1 OR 2
HOW OFTEN DO YOU HAVE A DRINK CONTAINING ALCOHOL: 4 OR MORE TIMES A WEEK

## 2024-05-30 ASSESSMENT — PAIN DESCRIPTION - ORIENTATION: ORIENTATION: LEFT

## 2024-05-30 ASSESSMENT — PAIN DESCRIPTION - LOCATION
LOCATION: ANKLE
LOCATION: ANKLE

## 2024-05-30 ASSESSMENT — PAIN SCALES - GENERAL
PAINLEVEL_OUTOF10: 9
PAINLEVEL_OUTOF10: 9

## 2024-05-30 ASSESSMENT — PAIN - FUNCTIONAL ASSESSMENT: PAIN_FUNCTIONAL_ASSESSMENT: 0-10

## 2024-05-31 ENCOUNTER — APPOINTMENT (OUTPATIENT)
Dept: CT IMAGING | Age: 79
End: 2024-05-31
Payer: MEDICARE

## 2024-05-31 ENCOUNTER — APPOINTMENT (OUTPATIENT)
Dept: GENERAL RADIOLOGY | Age: 79
End: 2024-05-31
Payer: MEDICARE

## 2024-05-31 ENCOUNTER — APPOINTMENT (OUTPATIENT)
Age: 79
End: 2024-05-31
Payer: MEDICARE

## 2024-05-31 PROBLEM — R79.89 ELEVATED LACTIC ACID LEVEL: Status: ACTIVE | Noted: 2024-05-31

## 2024-05-31 PROBLEM — R57.9 SHOCK CIRCULATORY (HCC): Status: ACTIVE | Noted: 2024-05-31

## 2024-05-31 PROBLEM — R09.02 HYPOXEMIA: Status: ACTIVE | Noted: 2024-05-31

## 2024-05-31 PROBLEM — E87.1 HYPONATREMIA: Status: ACTIVE | Noted: 2024-05-31

## 2024-05-31 PROBLEM — K21.9 GERD (GASTROESOPHAGEAL REFLUX DISEASE): Status: ACTIVE | Noted: 2024-05-31

## 2024-05-31 PROBLEM — N17.9 AKI (ACUTE KIDNEY INJURY) (HCC): Status: ACTIVE | Noted: 2024-05-31

## 2024-05-31 PROBLEM — D72.829 LEUKOCYTOSIS: Status: ACTIVE | Noted: 2024-05-31

## 2024-05-31 PROBLEM — S82.892A CLOSED LEFT ANKLE FRACTURE, INITIAL ENCOUNTER: Status: ACTIVE | Noted: 2024-05-31

## 2024-05-31 LAB
ALBUMIN SERPL-MCNC: 2.9 G/DL (ref 3.4–5)
ALBUMIN SERPL-MCNC: 3 G/DL (ref 3.4–5)
ALBUMIN SERPL-MCNC: 3.1 G/DL (ref 3.4–5)
ALBUMIN/GLOB SERPL: 1.1 {RATIO} (ref 1.1–2.2)
ALBUMIN/GLOB SERPL: 1.2 {RATIO} (ref 1.1–2.2)
ALBUMIN/GLOB SERPL: 1.4 {RATIO} (ref 1.1–2.2)
ALP SERPL-CCNC: 43 U/L (ref 40–129)
ALP SERPL-CCNC: 44 U/L (ref 40–129)
ALP SERPL-CCNC: 50 U/L (ref 40–129)
ALT SERPL-CCNC: 24 U/L (ref 10–40)
ALT SERPL-CCNC: 27 U/L (ref 10–40)
ALT SERPL-CCNC: 40 U/L (ref 10–40)
AMORPH SED URNS QL MICRO: ABNORMAL /HPF
ANION GAP SERPL CALCULATED.3IONS-SCNC: 14 MMOL/L (ref 3–16)
ANION GAP SERPL CALCULATED.3IONS-SCNC: 17 MMOL/L (ref 3–16)
ANION GAP SERPL CALCULATED.3IONS-SCNC: 19 MMOL/L (ref 3–16)
ANION GAP SERPL CALCULATED.3IONS-SCNC: 21 MMOL/L (ref 3–16)
ANISOCYTOSIS BLD QL SMEAR: ABNORMAL
AST SERPL-CCNC: 101 U/L (ref 15–37)
AST SERPL-CCNC: 60 U/L (ref 15–37)
AST SERPL-CCNC: 70 U/L (ref 15–37)
BACTERIA URNS QL MICRO: ABNORMAL /HPF
BASOPHILS # BLD: 0 K/UL (ref 0–0.2)
BASOPHILS # BLD: 0 K/UL (ref 0–0.2)
BASOPHILS # BLD: 0.1 K/UL (ref 0–0.2)
BASOPHILS NFR BLD: 0.2 %
BASOPHILS NFR BLD: 0.4 %
BASOPHILS NFR BLD: 1.8 %
BILIRUB SERPL-MCNC: 0.8 MG/DL (ref 0–1)
BILIRUB SERPL-MCNC: 1.1 MG/DL (ref 0–1)
BILIRUB SERPL-MCNC: 1.2 MG/DL (ref 0–1)
BILIRUB UR QL STRIP.AUTO: NEGATIVE
BUN SERPL-MCNC: 14 MG/DL (ref 7–20)
BUN SERPL-MCNC: 20 MG/DL (ref 7–20)
BUN SERPL-MCNC: 25 MG/DL (ref 7–20)
BUN SERPL-MCNC: 33 MG/DL (ref 7–20)
CALCIUM SERPL-MCNC: 8.2 MG/DL (ref 8.3–10.6)
CALCIUM SERPL-MCNC: 8.5 MG/DL (ref 8.3–10.6)
CALCIUM SERPL-MCNC: 8.6 MG/DL (ref 8.3–10.6)
CALCIUM SERPL-MCNC: 9.2 MG/DL (ref 8.3–10.6)
CHLORIDE SERPL-SCNC: 89 MMOL/L (ref 99–110)
CHLORIDE SERPL-SCNC: 93 MMOL/L (ref 99–110)
CHLORIDE SERPL-SCNC: 94 MMOL/L (ref 99–110)
CHLORIDE SERPL-SCNC: 95 MMOL/L (ref 99–110)
CLARITY UR: CLEAR
CO2 SERPL-SCNC: 16 MMOL/L (ref 21–32)
CO2 SERPL-SCNC: 17 MMOL/L (ref 21–32)
CO2 SERPL-SCNC: 18 MMOL/L (ref 21–32)
CO2 SERPL-SCNC: 23 MMOL/L (ref 21–32)
COLOR UR: ABNORMAL
CORTIS AM PEAK SERPL-MCNC: 55.4 UG/DL (ref 4.3–22.4)
CREAT SERPL-MCNC: 0.9 MG/DL (ref 0.6–1.2)
CREAT SERPL-MCNC: 1.4 MG/DL (ref 0.6–1.2)
CREAT SERPL-MCNC: 1.8 MG/DL (ref 0.6–1.2)
CREAT SERPL-MCNC: 2.5 MG/DL (ref 0.6–1.2)
CREAT UR-MCNC: 53.2 MG/DL (ref 28–259)
D-DIMER QUANTITATIVE: >20 UG/ML FEU (ref 0–0.6)
DEPRECATED RDW RBC AUTO: 13.1 % (ref 12.4–15.4)
DEPRECATED RDW RBC AUTO: 13.4 % (ref 12.4–15.4)
DEPRECATED RDW RBC AUTO: 13.5 % (ref 12.4–15.4)
DEPRECATED RDW RBC AUTO: 13.6 % (ref 12.4–15.4)
ECHO AV PEAK GRADIENT: 10 MMHG
ECHO AV PEAK VELOCITY: 1.6 M/S
ECHO BSA: 1.93 M2
ECHO LA DIAMETER INDEX: 1.44 CM/M2
ECHO LA DIAMETER: 2.7 CM
ECHO LV FRACTIONAL SHORTENING: 36 % (ref 28–44)
ECHO LV INTERNAL DIMENSION DIASTOLE INDEX: 1.93 CM/M2
ECHO LV INTERNAL DIMENSION DIASTOLIC: 3.6 CM (ref 3.9–5.3)
ECHO LV INTERNAL DIMENSION SYSTOLIC INDEX: 1.23 CM/M2
ECHO LV INTERNAL DIMENSION SYSTOLIC: 2.3 CM
ECHO LV IVSD: 0.8 CM (ref 0.6–0.9)
ECHO LV MASS 2D: 85.6 G (ref 67–162)
ECHO LV MASS INDEX 2D: 45.8 G/M2 (ref 43–95)
ECHO LV POSTERIOR WALL DIASTOLIC: 0.9 CM (ref 0.6–0.9)
ECHO LV RELATIVE WALL THICKNESS RATIO: 0.5
ECHO RV FREE WALL PEAK S': 19 CM/S
ECHO RV TAPSE: 2.7 CM (ref 1.7–?)
EKG ATRIAL RATE: 112 BPM
EKG DIAGNOSIS: NORMAL
EKG P AXIS: 28 DEGREES
EKG P-R INTERVAL: 170 MS
EKG Q-T INTERVAL: 318 MS
EKG QRS DURATION: 72 MS
EKG QTC CALCULATION (BAZETT): 434 MS
EKG R AXIS: -10 DEGREES
EKG T AXIS: 49 DEGREES
EKG VENTRICULAR RATE: 112 BPM
EOSINOPHIL # BLD: 0 K/UL (ref 0–0.6)
EOSINOPHIL # BLD: 0 K/UL (ref 0–0.6)
EOSINOPHIL # BLD: 0.1 K/UL (ref 0–0.6)
EOSINOPHIL NFR BLD: 0.2 %
EOSINOPHIL NFR BLD: 0.3 %
EOSINOPHIL NFR BLD: 0.9 %
EPI CELLS #/AREA URNS HPF: ABNORMAL /HPF (ref 0–5)
GFR SERPLBLD CREATININE-BSD FMLA CKD-EPI: 19 ML/MIN/{1.73_M2}
GFR SERPLBLD CREATININE-BSD FMLA CKD-EPI: 28 ML/MIN/{1.73_M2}
GFR SERPLBLD CREATININE-BSD FMLA CKD-EPI: 38 ML/MIN/{1.73_M2}
GFR SERPLBLD CREATININE-BSD FMLA CKD-EPI: 65 ML/MIN/{1.73_M2}
GLUCOSE SERPL-MCNC: 101 MG/DL (ref 70–99)
GLUCOSE SERPL-MCNC: 101 MG/DL (ref 70–99)
GLUCOSE SERPL-MCNC: 106 MG/DL (ref 70–99)
GLUCOSE SERPL-MCNC: 99 MG/DL (ref 70–99)
GLUCOSE UR STRIP.AUTO-MCNC: NEGATIVE MG/DL
HCT VFR BLD AUTO: 26.8 % (ref 36–48)
HCT VFR BLD AUTO: 37.8 % (ref 36–48)
HCT VFR BLD AUTO: 38.3 % (ref 36–48)
HCT VFR BLD AUTO: 39.5 % (ref 36–48)
HGB BLD-MCNC: 12.6 G/DL (ref 12–16)
HGB BLD-MCNC: 12.6 G/DL (ref 12–16)
HGB BLD-MCNC: 13 G/DL (ref 12–16)
HGB BLD-MCNC: 9.2 G/DL (ref 12–16)
HGB UR QL STRIP.AUTO: NEGATIVE
INR PPP: 0.96 (ref 0.85–1.15)
KETONES UR STRIP.AUTO-MCNC: NEGATIVE MG/DL
LACTATE BLDV-SCNC: 5 MMOL/L (ref 0.4–2)
LACTATE BLDV-SCNC: 5.9 MMOL/L (ref 0.4–2)
LACTATE BLDV-SCNC: 6.6 MMOL/L (ref 0.4–2)
LEUKOCYTE ESTERASE UR QL STRIP.AUTO: ABNORMAL
LYMPHOCYTES # BLD: 0.3 K/UL (ref 1–5.1)
LYMPHOCYTES # BLD: 0.9 K/UL (ref 1–5.1)
LYMPHOCYTES # BLD: 1.2 K/UL (ref 1–5.1)
LYMPHOCYTES NFR BLD: 10.6 %
LYMPHOCYTES NFR BLD: 6.1 %
LYMPHOCYTES NFR BLD: 6.2 %
MCH RBC QN AUTO: 31 PG (ref 26–34)
MCH RBC QN AUTO: 31.1 PG (ref 26–34)
MCH RBC QN AUTO: 31.2 PG (ref 26–34)
MCH RBC QN AUTO: 31.3 PG (ref 26–34)
MCHC RBC AUTO-ENTMCNC: 33 G/DL (ref 31–36)
MCHC RBC AUTO-ENTMCNC: 33 G/DL (ref 31–36)
MCHC RBC AUTO-ENTMCNC: 33.4 G/DL (ref 31–36)
MCHC RBC AUTO-ENTMCNC: 34.2 G/DL (ref 31–36)
MCV RBC AUTO: 91.1 FL (ref 80–100)
MCV RBC AUTO: 93.5 FL (ref 80–100)
MCV RBC AUTO: 93.9 FL (ref 80–100)
MCV RBC AUTO: 94.5 FL (ref 80–100)
MICROCYTES BLD QL SMEAR: ABNORMAL
MONOCYTES # BLD: 0.5 K/UL (ref 0–1.3)
MONOCYTES # BLD: 1.3 K/UL (ref 0–1.3)
MONOCYTES # BLD: 1.4 K/UL (ref 0–1.3)
MONOCYTES NFR BLD: 12.6 %
MONOCYTES NFR BLD: 9.7 %
MONOCYTES NFR BLD: 9.8 %
MUCOUS THREADS #/AREA URNS LPF: ABNORMAL /LPF
NEUTROPHILS # BLD: 11.6 K/UL (ref 1.7–7.7)
NEUTROPHILS # BLD: 4.4 K/UL (ref 1.7–7.7)
NEUTROPHILS # BLD: 8.6 K/UL (ref 1.7–7.7)
NEUTROPHILS NFR BLD: 75.5 %
NEUTROPHILS NFR BLD: 82 %
NEUTROPHILS NFR BLD: 83.7 %
NITRITE UR QL STRIP.AUTO: NEGATIVE
OSMOLALITY SERPL: 282 MOSM/KG (ref 280–301)
PATH INTERP BLD-IMP: YES
PH UR STRIP.AUTO: 5.5 [PH] (ref 5–8)
PLATELET # BLD AUTO: 231 K/UL (ref 135–450)
PLATELET # BLD AUTO: 302 K/UL (ref 135–450)
PLATELET # BLD AUTO: 316 K/UL (ref 135–450)
PLATELET # BLD AUTO: 328 K/UL (ref 135–450)
PMV BLD AUTO: 7.6 FL (ref 5–10.5)
PMV BLD AUTO: 7.7 FL (ref 5–10.5)
PMV BLD AUTO: 7.7 FL (ref 5–10.5)
PMV BLD AUTO: 7.8 FL (ref 5–10.5)
POTASSIUM SERPL-SCNC: 3.9 MMOL/L (ref 3.5–5.1)
POTASSIUM SERPL-SCNC: 4.1 MMOL/L (ref 3.5–5.1)
PROCALCITONIN SERPL IA-MCNC: 17.31 NG/ML (ref 0–0.15)
PROT SERPL-MCNC: 5.3 G/DL (ref 6.4–8.2)
PROT SERPL-MCNC: 5.3 G/DL (ref 6.4–8.2)
PROT SERPL-MCNC: 5.7 G/DL (ref 6.4–8.2)
PROT UR STRIP.AUTO-MCNC: NEGATIVE MG/DL
PROTHROMBIN TIME: 13 SEC (ref 11.9–14.9)
RBC # BLD AUTO: 2.94 M/UL (ref 4–5.2)
RBC # BLD AUTO: 4.04 M/UL (ref 4–5.2)
RBC # BLD AUTO: 4.05 M/UL (ref 4–5.2)
RBC # BLD AUTO: 4.21 M/UL (ref 4–5.2)
RBC #/AREA URNS HPF: ABNORMAL /HPF (ref 0–4)
SLIDE REVIEW: ABNORMAL
SODIUM SERPL-SCNC: 126 MMOL/L (ref 136–145)
SODIUM SERPL-SCNC: 127 MMOL/L (ref 136–145)
SODIUM SERPL-SCNC: 131 MMOL/L (ref 136–145)
SODIUM SERPL-SCNC: 132 MMOL/L (ref 136–145)
SODIUM UR-SCNC: 43 MMOL/L
SP GR UR STRIP.AUTO: 1.01 (ref 1–1.03)
TROPONIN, HIGH SENSITIVITY: 134 NG/L (ref 0–14)
TROPONIN, HIGH SENSITIVITY: 53 NG/L (ref 0–14)
TSH SERPL DL<=0.005 MIU/L-ACNC: 2.84 UIU/ML (ref 0.27–4.2)
TSH SERPL DL<=0.005 MIU/L-ACNC: 3.59 UIU/ML (ref 0.27–4.2)
UA COMPLETE W REFLEX CULTURE PNL UR: ABNORMAL
UA DIPSTICK W REFLEX MICRO PNL UR: YES
URN SPEC COLLECT METH UR: ABNORMAL
UROBILINOGEN UR STRIP-ACNC: 0.2 E.U./DL
WBC # BLD AUTO: 11.4 K/UL (ref 4–11)
WBC # BLD AUTO: 13.9 K/UL (ref 4–11)
WBC # BLD AUTO: 5.3 K/UL (ref 4–11)
WBC # BLD AUTO: 8 K/UL (ref 4–11)
WBC #/AREA URNS HPF: ABNORMAL /HPF (ref 0–5)

## 2024-05-31 PROCEDURE — 73700 CT LOWER EXTREMITY W/O DYE: CPT

## 2024-05-31 PROCEDURE — 85610 PROTHROMBIN TIME: CPT

## 2024-05-31 PROCEDURE — 84300 ASSAY OF URINE SODIUM: CPT

## 2024-05-31 PROCEDURE — 80048 BASIC METABOLIC PNL TOTAL CA: CPT

## 2024-05-31 PROCEDURE — 84145 PROCALCITONIN (PCT): CPT

## 2024-05-31 PROCEDURE — 87040 BLOOD CULTURE FOR BACTERIA: CPT

## 2024-05-31 PROCEDURE — 74018 RADEX ABDOMEN 1 VIEW: CPT

## 2024-05-31 PROCEDURE — 2580000003 HC RX 258: Performed by: INTERNAL MEDICINE

## 2024-05-31 PROCEDURE — C9113 INJ PANTOPRAZOLE SODIUM, VIA: HCPCS | Performed by: INTERNAL MEDICINE

## 2024-05-31 PROCEDURE — 94761 N-INVAS EAR/PLS OXIMETRY MLT: CPT

## 2024-05-31 PROCEDURE — 93325 DOPPLER ECHO COLOR FLOW MAPG: CPT | Performed by: INTERNAL MEDICINE

## 2024-05-31 PROCEDURE — 99291 CRITICAL CARE FIRST HOUR: CPT | Performed by: INTERNAL MEDICINE

## 2024-05-31 PROCEDURE — 82570 ASSAY OF URINE CREATININE: CPT

## 2024-05-31 PROCEDURE — 36415 COLL VENOUS BLD VENIPUNCTURE: CPT

## 2024-05-31 PROCEDURE — 73610 X-RAY EXAM OF ANKLE: CPT

## 2024-05-31 PROCEDURE — 96375 TX/PRO/DX INJ NEW DRUG ADDON: CPT

## 2024-05-31 PROCEDURE — 80053 COMPREHEN METABOLIC PANEL: CPT

## 2024-05-31 PROCEDURE — 93308 TTE F-UP OR LMTD: CPT

## 2024-05-31 PROCEDURE — 84443 ASSAY THYROID STIM HORMONE: CPT

## 2024-05-31 PROCEDURE — 93308 TTE F-UP OR LMTD: CPT | Performed by: INTERNAL MEDICINE

## 2024-05-31 PROCEDURE — 83605 ASSAY OF LACTIC ACID: CPT

## 2024-05-31 PROCEDURE — 2500000003 HC RX 250 WO HCPCS: Performed by: INTERNAL MEDICINE

## 2024-05-31 PROCEDURE — 84484 ASSAY OF TROPONIN QUANT: CPT

## 2024-05-31 PROCEDURE — 87641 MR-STAPH DNA AMP PROBE: CPT

## 2024-05-31 PROCEDURE — 85027 COMPLETE CBC AUTOMATED: CPT

## 2024-05-31 PROCEDURE — 85379 FIBRIN DEGRADATION QUANT: CPT

## 2024-05-31 PROCEDURE — 6360000002 HC RX W HCPCS: Performed by: INTERNAL MEDICINE

## 2024-05-31 PROCEDURE — 2700000000 HC OXYGEN THERAPY PER DAY

## 2024-05-31 PROCEDURE — 81001 URINALYSIS AUTO W/SCOPE: CPT

## 2024-05-31 PROCEDURE — 71045 X-RAY EXAM CHEST 1 VIEW: CPT

## 2024-05-31 PROCEDURE — 74150 CT ABDOMEN W/O CONTRAST: CPT

## 2024-05-31 PROCEDURE — 6360000002 HC RX W HCPCS: Performed by: EMERGENCY MEDICINE

## 2024-05-31 PROCEDURE — 2580000003 HC RX 258

## 2024-05-31 PROCEDURE — 83930 ASSAY OF BLOOD OSMOLALITY: CPT

## 2024-05-31 PROCEDURE — 93321 DOPPLER ECHO F-UP/LMTD STD: CPT | Performed by: INTERNAL MEDICINE

## 2024-05-31 PROCEDURE — 85025 COMPLETE CBC W/AUTO DIFF WBC: CPT

## 2024-05-31 PROCEDURE — 93005 ELECTROCARDIOGRAM TRACING: CPT

## 2024-05-31 PROCEDURE — 93010 ELECTROCARDIOGRAM REPORT: CPT | Performed by: INTERNAL MEDICINE

## 2024-05-31 PROCEDURE — 2000000000 HC ICU R&B

## 2024-05-31 PROCEDURE — 83935 ASSAY OF URINE OSMOLALITY: CPT

## 2024-05-31 PROCEDURE — 2580000003 HC RX 258: Performed by: EMERGENCY MEDICINE

## 2024-05-31 PROCEDURE — 82533 TOTAL CORTISOL: CPT

## 2024-05-31 PROCEDURE — 2500000003 HC RX 250 WO HCPCS

## 2024-05-31 PROCEDURE — 76377 3D RENDER W/INTRP POSTPROCES: CPT

## 2024-05-31 PROCEDURE — 6370000000 HC RX 637 (ALT 250 FOR IP): Performed by: INTERNAL MEDICINE

## 2024-05-31 RX ORDER — FLUOXETINE HYDROCHLORIDE 20 MG/1
40 CAPSULE ORAL DAILY
Status: DISCONTINUED | OUTPATIENT
Start: 2024-05-31 | End: 2024-06-21 | Stop reason: HOSPADM

## 2024-05-31 RX ORDER — BUPROPION HYDROCHLORIDE 150 MG/1
300 TABLET ORAL EVERY MORNING
Status: DISCONTINUED | OUTPATIENT
Start: 2024-05-31 | End: 2024-06-21 | Stop reason: HOSPADM

## 2024-05-31 RX ORDER — OXYCODONE HYDROCHLORIDE 5 MG/1
5 TABLET ORAL EVERY 4 HOURS PRN
Status: DISCONTINUED | OUTPATIENT
Start: 2024-05-31 | End: 2024-06-02

## 2024-05-31 RX ORDER — MORPHINE SULFATE 2 MG/ML
2 INJECTION, SOLUTION INTRAMUSCULAR; INTRAVENOUS EVERY 4 HOURS PRN
Status: DISCONTINUED | OUTPATIENT
Start: 2024-05-31 | End: 2024-05-31

## 2024-05-31 RX ORDER — SODIUM CHLORIDE 9 MG/ML
INJECTION, SOLUTION INTRAVENOUS PRN
Status: DISCONTINUED | OUTPATIENT
Start: 2024-05-31 | End: 2024-06-21 | Stop reason: HOSPADM

## 2024-05-31 RX ORDER — 0.9 % SODIUM CHLORIDE 0.9 %
1000 INTRAVENOUS SOLUTION INTRAVENOUS ONCE
Status: COMPLETED | OUTPATIENT
Start: 2024-05-31 | End: 2024-05-31

## 2024-05-31 RX ORDER — PANTOPRAZOLE SODIUM 40 MG/10ML
40 INJECTION, POWDER, LYOPHILIZED, FOR SOLUTION INTRAVENOUS DAILY
Status: DISCONTINUED | OUTPATIENT
Start: 2024-05-31 | End: 2024-06-21 | Stop reason: HOSPADM

## 2024-05-31 RX ORDER — MORPHINE SULFATE 2 MG/ML
2 INJECTION, SOLUTION INTRAMUSCULAR; INTRAVENOUS PRN
Status: DISCONTINUED | OUTPATIENT
Start: 2024-05-31 | End: 2024-05-31

## 2024-05-31 RX ORDER — ONDANSETRON 4 MG/1
4 TABLET, ORALLY DISINTEGRATING ORAL EVERY 8 HOURS PRN
Status: DISCONTINUED | OUTPATIENT
Start: 2024-05-31 | End: 2024-06-21 | Stop reason: HOSPADM

## 2024-05-31 RX ORDER — ACETAMINOPHEN 325 MG/1
650 TABLET ORAL EVERY 6 HOURS PRN
Status: DISCONTINUED | OUTPATIENT
Start: 2024-05-31 | End: 2024-06-21 | Stop reason: HOSPADM

## 2024-05-31 RX ORDER — ONDANSETRON 2 MG/ML
4 INJECTION INTRAMUSCULAR; INTRAVENOUS EVERY 6 HOURS PRN
Status: DISCONTINUED | OUTPATIENT
Start: 2024-05-31 | End: 2024-06-21 | Stop reason: HOSPADM

## 2024-05-31 RX ORDER — POTASSIUM CHLORIDE 7.45 MG/ML
10 INJECTION INTRAVENOUS PRN
Status: DISCONTINUED | OUTPATIENT
Start: 2024-05-31 | End: 2024-05-31

## 2024-05-31 RX ORDER — 0.9 % SODIUM CHLORIDE 0.9 %
500 INTRAVENOUS SOLUTION INTRAVENOUS ONCE
Status: COMPLETED | OUTPATIENT
Start: 2024-05-31 | End: 2024-05-31

## 2024-05-31 RX ORDER — POLYETHYLENE GLYCOL 3350 17 G/17G
17 POWDER, FOR SOLUTION ORAL DAILY PRN
Status: DISCONTINUED | OUTPATIENT
Start: 2024-05-31 | End: 2024-06-01

## 2024-05-31 RX ORDER — SODIUM CHLORIDE 9 MG/ML
INJECTION, SOLUTION INTRAVENOUS CONTINUOUS
Status: DISCONTINUED | OUTPATIENT
Start: 2024-05-31 | End: 2024-05-31

## 2024-05-31 RX ORDER — ACETAMINOPHEN 650 MG/1
650 SUPPOSITORY RECTAL EVERY 6 HOURS PRN
Status: DISCONTINUED | OUTPATIENT
Start: 2024-05-31 | End: 2024-06-21 | Stop reason: HOSPADM

## 2024-05-31 RX ORDER — SODIUM CHLORIDE 0.9 % (FLUSH) 0.9 %
5-40 SYRINGE (ML) INJECTION EVERY 12 HOURS SCHEDULED
Status: DISCONTINUED | OUTPATIENT
Start: 2024-05-31 | End: 2024-06-21 | Stop reason: HOSPADM

## 2024-05-31 RX ORDER — SODIUM CHLORIDE 0.9 % (FLUSH) 0.9 %
5-40 SYRINGE (ML) INJECTION PRN
Status: DISCONTINUED | OUTPATIENT
Start: 2024-05-31 | End: 2024-06-21 | Stop reason: HOSPADM

## 2024-05-31 RX ORDER — KETOROLAC TROMETHAMINE 30 MG/ML
15 INJECTION, SOLUTION INTRAMUSCULAR; INTRAVENOUS EVERY 6 HOURS PRN
Status: DISPENSED | OUTPATIENT
Start: 2024-05-31 | End: 2024-06-02

## 2024-05-31 RX ORDER — MORPHINE SULFATE 4 MG/ML
4 INJECTION, SOLUTION INTRAMUSCULAR; INTRAVENOUS PRN
Status: DISCONTINUED | OUTPATIENT
Start: 2024-05-31 | End: 2024-05-31

## 2024-05-31 RX ORDER — BISACODYL 10 MG
10 SUPPOSITORY, RECTAL RECTAL DAILY PRN
Status: DISCONTINUED | OUTPATIENT
Start: 2024-05-31 | End: 2024-06-02

## 2024-05-31 RX ORDER — POTASSIUM CHLORIDE 20 MEQ/1
40 TABLET, EXTENDED RELEASE ORAL PRN
Status: DISCONTINUED | OUTPATIENT
Start: 2024-05-31 | End: 2024-05-31

## 2024-05-31 RX ORDER — METRONIDAZOLE 500 MG/100ML
500 INJECTION, SOLUTION INTRAVENOUS EVERY 8 HOURS
Status: DISCONTINUED | OUTPATIENT
Start: 2024-05-31 | End: 2024-06-02

## 2024-05-31 RX ORDER — AMLODIPINE BESYLATE 5 MG/1
5 TABLET ORAL DAILY
Status: DISCONTINUED | OUTPATIENT
Start: 2024-05-31 | End: 2024-05-31

## 2024-05-31 RX ORDER — MAGNESIUM SULFATE IN WATER 40 MG/ML
2000 INJECTION, SOLUTION INTRAVENOUS PRN
Status: DISCONTINUED | OUTPATIENT
Start: 2024-05-31 | End: 2024-05-31

## 2024-05-31 RX ORDER — ENOXAPARIN SODIUM 100 MG/ML
40 INJECTION SUBCUTANEOUS DAILY
Status: DISCONTINUED | OUTPATIENT
Start: 2024-05-31 | End: 2024-06-01

## 2024-05-31 RX ADMIN — KETOROLAC TROMETHAMINE 15 MG: 30 INJECTION, SOLUTION INTRAMUSCULAR at 19:13

## 2024-05-31 RX ADMIN — METRONIDAZOLE 500 MG: 500 INJECTION, SOLUTION INTRAVENOUS at 18:58

## 2024-05-31 RX ADMIN — SODIUM CHLORIDE 5 MCG/MIN: 9 INJECTION, SOLUTION INTRAVENOUS at 15:55

## 2024-05-31 RX ADMIN — SODIUM CHLORIDE: 9 INJECTION, SOLUTION INTRAVENOUS at 04:16

## 2024-05-31 RX ADMIN — SODIUM CHLORIDE 500 ML: 9 INJECTION, SOLUTION INTRAVENOUS at 10:12

## 2024-05-31 RX ADMIN — OXYCODONE HYDROCHLORIDE 5 MG: 5 TABLET ORAL at 21:46

## 2024-05-31 RX ADMIN — SODIUM CHLORIDE, PRESERVATIVE FREE 10 ML: 5 INJECTION INTRAVENOUS at 12:32

## 2024-05-31 RX ADMIN — MORPHINE SULFATE 2 MG: 2 INJECTION, SOLUTION INTRAMUSCULAR; INTRAVENOUS at 01:12

## 2024-05-31 RX ADMIN — SODIUM BICARBONATE: 84 INJECTION, SOLUTION INTRAVENOUS at 20:50

## 2024-05-31 RX ADMIN — SODIUM CHLORIDE 500 ML: 9 INJECTION, SOLUTION INTRAVENOUS at 17:45

## 2024-05-31 RX ADMIN — PROPOFOL 100 MG: 10 INJECTION, EMULSION INTRAVENOUS at 00:52

## 2024-05-31 RX ADMIN — ONDANSETRON 4 MG: 2 INJECTION INTRAMUSCULAR; INTRAVENOUS at 04:22

## 2024-05-31 RX ADMIN — MORPHINE SULFATE 2 MG: 2 INJECTION, SOLUTION INTRAMUSCULAR; INTRAVENOUS at 04:22

## 2024-05-31 RX ADMIN — SODIUM CHLORIDE 1000 ML: 9 INJECTION, SOLUTION INTRAVENOUS at 00:43

## 2024-05-31 RX ADMIN — PANTOPRAZOLE SODIUM 40 MG: 40 INJECTION, POWDER, FOR SOLUTION INTRAVENOUS at 18:55

## 2024-05-31 RX ADMIN — ONDANSETRON 4 MG: 2 INJECTION INTRAMUSCULAR; INTRAVENOUS at 12:30

## 2024-05-31 RX ADMIN — SODIUM CHLORIDE 1000 ML: 9 INJECTION, SOLUTION INTRAVENOUS at 12:32

## 2024-05-31 RX ADMIN — CEFEPIME 2000 MG: 2 INJECTION, POWDER, FOR SOLUTION INTRAVENOUS at 17:47

## 2024-05-31 ASSESSMENT — PAIN DESCRIPTION - DESCRIPTORS
DESCRIPTORS: ACHING
DESCRIPTORS: CRAMPING
DESCRIPTORS: ACHING;TENDER;SHARP
DESCRIPTORS: ACHING
DESCRIPTORS: ACHING

## 2024-05-31 ASSESSMENT — PAIN DESCRIPTION - LOCATION
LOCATION: ANKLE;LEG;KNEE
LOCATION: ABDOMEN
LOCATION: ANKLE;KNEE

## 2024-05-31 ASSESSMENT — PAIN SCALES - GENERAL
PAINLEVEL_OUTOF10: 10
PAINLEVEL_OUTOF10: 1
PAINLEVEL_OUTOF10: 10
PAINLEVEL_OUTOF10: 5
PAINLEVEL_OUTOF10: 10
PAINLEVEL_OUTOF10: 3

## 2024-05-31 ASSESSMENT — PAIN DESCRIPTION - ORIENTATION
ORIENTATION: RIGHT;LEFT;MID
ORIENTATION: RIGHT;LEFT
ORIENTATION: RIGHT;LEFT

## 2024-05-31 ASSESSMENT — PAIN DESCRIPTION - FREQUENCY: FREQUENCY: CONTINUOUS

## 2024-05-31 NOTE — PROGRESS NOTES
Rapid response was called around 2.20 p    Low BP/ syncope     H/o TKR 5/30/2024  Mechanical fall this morning  Fractured left fibula  Admitted to A1  Seen by orthopedic-external fixation and outpatient follow-up no procedure planned  Nephrology was consulted for hyponatremia  Patient was independent at home before surgery  History of hypertension denies congestive heart failure or CAD    She had episode of near syncope while taking her to the bathroom  Blood pressure has been low since morning  Denies chest pain, shortness of breath nausea vomiting or diarrhea now  She has been having chronic abdominal pain  Her oxygen was borderline and currently on 2 L oxygen  Has low-grade fever-100.7  No apparent cough dysuria, headache or any focal neurological symptoms    Systolic blood pressure is 70 heart rate 122 Tmax 100.7 saturating 91% 2 L oxygen    Obese  Alert oriented communicating  Bilateral air entry diminished no wheezes and rhonchi's  S1-S2 regular  Obese bowel sounds present soft diffusely tender  S/p knee replacement right lower extremity dressed  Left lower extremity splinted  Able to move all toes  No color changes    Labs reviewed  No drop in hemoglobin  Creatinine 1.4-worsened since admission  Chest x-ray-my reading no apparent congestion or pneumonia  UA pending  Stat EKG-sinus tachycardia no ST elevation-my reading      Assessment  Shock/syncope  ?  Hypovolemic less likely  ?  Cardiac versus sepsis rule out PE?    AIRAM-possibly secondary to shock    Mild anion gap acidosis-possibly secondary to AIRAM and low blood pressure for perfusion    History of right total knee replacement and left fibula fracture    Obesity    History of hypertension      Plan   Transfer to ICU Levophed critical care  Discussed with critical care-updated the condition  stat labs ordered  CBC BMP troponin proCalcitonin  Lactic acid  Echocardiogram  Urine culture pending  Blood culture  Patient received thousand 500 mL of IV fluids  since morning  Continue another 500 mL of normal saline  Official reading of chest x-ray  CTA-pulmonary embolism-pending BMP-deferred to intensivist  Fever can be due to PE as well   empirical antibiotics    Karine was updated    Spent 47 minutes of critical care time    Total CC time caring  for this pt with life threatening , unstable organ failure including direct pt contact ,management of life support system,review of data including imaging s and labs  , discussion with other team members / phys and family atleast 47 minutes excluding procedures

## 2024-05-31 NOTE — CONSULTS
Called ortho evon @ 0107  PER:  Deloris Biswas MD  RE:  ankle fracture  Responded @ 0115- MD Demond

## 2024-05-31 NOTE — CONSULTS
05/31/24  0022 05/31/24  1017   WBC 11.4* 13.9*   HGB 9.2* 12.6    328     BMP:    Recent Labs     05/31/24  0001 05/31/24  1017   * 127*   K 4.1 3.9   CL 89* 93*   CO2 23 17*   BUN 14 20   CREATININE 0.9 1.4*   GLUCOSE 99 101*     INR:   Recent Labs     05/31/24  0001   INR 0.96       Radiology:   CT ANKLE LEFT WO CONTRAST   Preliminary Result   Medial and lateral malleolar fractures as detailed above.         CT ABDOMEN WO CONTRAST Additional Contrast? None   Final Result   Moderately distended fluid-filled large bowel loops, presumably secondary to   the reported history of diarrhea.  Distal colon not evaluated on this   abdominal study.      Mild abdominal ascites, either reactive or due to mild fluid overload.      Small hiatal hernia is seen.  Fluid is seen in the distal esophagus   suggesting reflux.         XR ANKLE LEFT (MIN 3 VIEWS)   Preliminary Result   1. Interval reduction of the tibiotalar joint.   2. Redemonstration of an acute fracture of the distal tibia.   3. Possible nondisplaced fracture of the distal fibula.  Evaluation is   suboptimal due to overlying cast.         XR ANKLE LEFT (MIN 3 VIEWS)   Final Result   Anterior tibiotalar dislocation with associated comminuted appearing fracture   along the posterior aspect of the distal tibia.      Questionable bony irregularity or artifact along the neck of the talus.   Recommend follow-up views to better delineate the area      Moderate soft tissue swelling around         CT-3D RENDRING ON 3D WORKSTATN    (Results Pending)      CT OF THE LEFT ANKLE WITHOUT CONTRAST 5/31/2024 12:19 pm     TECHNIQUE:  CT of the left ankle was performed without the administration of intravenous  contrast.  Multiplanar reformatted images are provided for review. Automated  exposure control, iterative reconstruction, and/or weight based adjustment of  the mA/kV was utilized to reduce the radiation dose to as low as reasonably  achievable.    ankle dislocation.  We have gone over both surgical and nonsurgical options.  Patient and family wish to proceed with nonsurgical options which I think is very reasonable currently.  We will see her back as an outpatient to repeat x-rays.  I may have her see my foot and ankle colleague, Dr. Tonny Prajapati to see if open reduction internal fixation with ligament reconstruction is an option versus continued nonsurgical treatment.  Recommend rest ice compression elevation for bilateral lower extremities.  DVT prophylaxis  Activity level, she can be weightbearing as tolerated on the right lower extremity, nonweightbearing on the left lower extremity.  Most probably will need acute rehab versus SNF.  Okay for regular diet.        Thank you for the opportunity to consult on this patient.    Aftab Moreno MD   temporal Statement Selected

## 2024-05-31 NOTE — PROGRESS NOTES
Patient seen and evaluated    BP low following administration of 2 mg of IV morphine at 4:22 AM    Complaining of abdominal pain and distention  Agree with assessment and plan of my Colleague    NAD  S1-S2, RRR  CTA bilaterally  Abdominal distention with hypoactive bowel sounds  Awake alert, answering appropriately    CT of the abdomen pelvis without contrast ordered stat to rule out SBO versus ileus  Morphine discontinued  For ankle pain IV Toradol 15 mg every 6 hours as needed  Bolus of 500 cc normal saline given with improvement of BP, will give another bolus of 1000 cc normal saline at 999 mL/h  Nephrology consulted for hyponatremia and oliguria  Will check lactate level although does not look septic  Continue present care as ordered  Labs in a.m.

## 2024-05-31 NOTE — PROGRESS NOTES
Pt transferred to room 102 via stretcher from ER. Pt alert and oriented x 4. Oriented to room, call light. Rates her pain in her left and ankle and right leg/knee at a 1. Denies any sob, chest pain. Left piv intact and flushes well. VSS. Right knee is bandage and ace wrap. Left ankle in ortho cast. No skin issues noted on assessment. Pt questions answered. Instructed to call out for any needs. Assessment completed. Bed alarm in place. Purwick applied. Will continue to monitor.

## 2024-05-31 NOTE — CONSULTS
Yes     Alcohol/week: 2.0 standard drinks of alcohol     Types: 1 Glasses of wine, 1 Shots of liquor per week     Comment: Patient drinks two drinks a night.        Allergies   Allergen Reactions    Other Other (See Comments)     Tylenol-3 - GI upset     Thiazide-Type Diuretics Other (See Comments)     Hyponatremia               Physical Exam:  Blood pressure (!) 86/58, pulse (!) 106, temperature 98.4 °F (36.9 °C), temperature source Oral, resp. rate 25, height 1.6 m (5' 3\"), weight 83.9 kg (185 lb), SpO2 (!) 83 %.'   Constitutional:  No acute distress.   HENT:  Oropharynx is clear and moist. No thyromegaly.  Eyes:  Conjunctivae are normal. Pupils equal, round, and reactive to light. No scleral icterus.   Neck: . No tracheal deviation present. No obvious thyroid mass.   Cardiovascular: Sinus tachycardia, normal heart sounds.  No right ventricular heave. No lower extremity edema.  Pulmonary/Chest: No wheezes.  No rales.  Chest wall is not dull to percussion.  No accessory muscle usage or stridor.  Decreased breath sound intensity  Abdominal: Soft. Bowel sounds present. No distension or hernia.  Distended with distinct tenderness and some rebound in the left lower quadrant  Musculoskeletal: No cyanosis. No clubbing. No obvious joint deformity.   Lymphadenopathy: No cervical or supraclavicular adenopathy.   Skin: Skin is warm and dry. No rash or nodules on the exposed extremities.  Psychiatric: Normal mood and affect. Behavior is normal.  No anxiety.   Neurologic: Alert, awake and oriented. PERRL.  Speech fluent        Results:  CBC:   Recent Labs     05/31/24  0022 05/31/24  1017 05/31/24  1520   WBC 11.4* 13.9* 5.3   HGB 9.2* 12.6 13.0   HCT 26.8* 38.3 39.5   MCV 91.1 94.5 93.9    328 316     BMP:   Recent Labs     05/31/24  0001 05/31/24  1017 05/31/24  1520   * 127* 132*   K 4.1 3.9 3.9   CL 89* 93* 95*   CO2 23 17* 16*   BUN 14 20 25*   CREATININE 0.9 1.4* 1.8*     LIVER PROFILE:   Recent Labs      Peritoneum/Retroperitoneum: Atherosclerotic change seen in abdominal aorta. Bones/Soft Tissues: Spurring is seen in the spine     Moderately distended fluid-filled large bowel loops, presumably secondary to the reported history of diarrhea.  Distal colon not evaluated on this abdominal study. Mild abdominal ascites, either reactive or due to mild fluid overload. Small hiatal hernia is seen.  Fluid is seen in the distal esophagus suggesting reflux.     XR ANKLE LEFT (MIN 3 VIEWS)    Result Date: 5/31/2024  EXAMINATION: THREE XRAY VIEWS OF THE LEFT ANKLE 5/31/2024 12:55 am COMPARISON: May 30, 2024 HISTORY: ORDERING SYSTEM PROVIDED HISTORY: post reduction TECHNOLOGIST PROVIDED HISTORY: Reason for exam:->post reduction Reason for Exam: post redux FINDINGS: Two views of the left ankle demonstrate interval reduction of the tibiotalar joint.  Redemonstration of an acute fracture of the distal tibia.  There may be a nondisplaced fracture of the distal fibula.  Ankle mortise is grossly congruent on limited views. Overlying cast obscures fine bony details.     1. Interval reduction of the tibiotalar joint. 2. Redemonstration of an acute fracture of the distal tibia. 3. Possible nondisplaced fracture of the distal fibula.  Evaluation is suboptimal due to overlying cast.     XR ANKLE LEFT (MIN 3 VIEWS)    Result Date: 5/30/2024  EXAMINATION: THREE XRAY VIEWS OF THE LEFT ANKLE 5/30/2024 10:46 pm COMPARISON: None HISTORY: ORDERING SYSTEM PROVIDED HISTORY: injury TECHNOLOGIST PROVIDED HISTORY: Reason for exam:->injury Reason for Exam: Ankle Pain FINDINGS: The tibia is dislocated anterior to the dome of the talus.  There is an ill-defined mildly displaced cortical fracture along the posterior aspect of the distal tibia which may be comminuted.  The fibula appears intact.  There is questionable cortical irregularity along the neck of the talus.  There is moderate soft tissue swelling around the ankle.     Anterior tibiotalar

## 2024-05-31 NOTE — CONSULTS
Ortho Consult  Full note to follow  Patient admitted with acute fall s/p Rt TKR with acute left ankle fracture dislocation   Reduced in the ER  X-rays show well reduced ankle joint with small avulsion fracutres around the ankle  Will get CT of Left ankle to see if she needs ORIF   Pt comfortable with respect to her TKR and ankle pain  Mainly concerned about Abd pain and N/V  Also has had 2 episodes of syncope.    She may be Weight bearing as tolerated on the Rt LE and non weight bearing on the left LE    Aftab Moreno MD   [facility]    Cell [phone]

## 2024-05-31 NOTE — ED PROVIDER NOTES
EMERGENCY DEPARTMENT ENCOUNTER     [facility] A1 REMOTE TELEMETRY     Pt Name: Sudha Lopez   MRN: [id number]   Birthdate 1945   Date of evaluation: 5/30/2024   Provider: Deloris Biswas MD   PCP: Perla Leyva MD   Note Started: 7:24 AM EDT 5/31/24     CHIEF COMPLAINT     Chief Complaint   Patient presents with    Ankle Pain     Patient had right knee surgery yesterday and fell getting into bed. Patient is c/o left ankle pain with deformity.        HISTORY OF PRESENT ILLNESS:          Sudha Lopez is a 78 y.o. female who presents with a chief complaint of left ankle injury.  The patient had a right knee replacement surgery yesterday.  Tonight she was trying to scoot into the bed when she slid.  She immediately noticed deformity of her left lower extremity.  She denies any other injuries.    Nursing Notes were all reviewed and agreed with or any disagreements were addressed in the HPI.     ROS: Positives and Pertinent negatives as per HPI.    PAST MEDICAL HISTORY     Past medical history:  has a past medical history of Anxiety and Hypertension.    Past surgical history:  has a past surgical history that includes Hysterectomy; Breast enhancement surgery; rhinoplasty; Rotator cuff repair (Bilateral); Hand surgery (Right); Total hip arthroplasty (Bilateral); Intracapsular cataract extraction (Left, 8/12/2020); Colonoscopy; and Intracapsular cataract extraction (Right, 8/26/2020).      PHYSICAL EXAM:  ED Triage Vitals [05/30/24 2239]   BP Temp Temp Source Pulse Respirations SpO2 Height Weight - Scale   127/62 98.8 °F (37.1 °C) Oral 74 18 95 % 1.6 m (5' 3\") 83.9 kg (185 lb)        Physical Exam  Vitals and nursing note reviewed.   Constitutional:       Appearance: Normal appearance. She is well-developed. She is not ill-appearing.   HENT:      Head: Normocephalic and atraumatic.      Right Ear: External ear normal.      Left Ear: External ear normal.      Nose: Nose normal.   Eyes:      General: No  the patient's condition which required my urgent intervention.       Complications: None   CONSULTS: None   Social Determinants: None   Disposition Considerations: None      I am the primary physician of Record.     FINAL IMPRESSION    1. Closed fracture of left ankle, initial encounter    2. Dislocation of left ankle joint, initial encounter         DISPOSITION/PLAN   DISPOSITION Admitted 05/31/2024 01:55:40 AM       PATIENT REFERRED TO:   No follow-up provider specified.   DISCHARGE MEDICATIONS:   Current Discharge Medication List         DISCONTINUED MEDICATIONS:   Current Discharge Medication List               (Please note that portions of this note were completed with a voice recognition program.  Efforts were made to edit the dictations but occasionally words are mis-transcribed.)     Deloris Biswas MD (electronically signed)        Deloris Biswas MD  05/31/24 0792

## 2024-05-31 NOTE — CONSULTS
Consult received.  Full note to follow           [facility] would like to thank you for the opportunity to serve this patient. Please call with any questions or concerns.    [facility]  [address] [place] [address]  Fax: [phone]  Office: [phone]

## 2024-05-31 NOTE — H&P
V2.0  History and Physical      Name:  Sudha Lopez /Age/Sex: 1945  (78 y.o. female)   MRN & CSN:  [id number] & [id number] Encounter Date/Time: 2024 1:54 AM EDT   Location:   PCP: Perla Leyva MD       Hospital Day: 2    Assessment and Plan:   Sudha Lopez is a 78 y.o. female  presents with Closed left ankle fracture, initial encounter    Hospital Problems             Last Modified POA    * (Principal) Closed left ankle fracture, initial encounter 2024 Yes       Plan:    Mechanical fall at home.  PT OT may need placement  Left ankle fracture now reduced.  Ortho consulted for any surgical/operative needs  Right knee replacement on 2024.  No trauma no increasing pain.  Close monitoring.    Hyponatemia, acute on chronic.  Will check urine osmolarity, urine sodium, TSH, patient is on HCTZ.  Hold HCTZ, if no improvement will consult nephrology. Also on Prozac.   HTN.       DVT prophylaxis Lovenox  Full code    Disposition:     Home versus ECF in 2 to 3 days    History from:     patient    History of Present Illness:     Chief Complaint: Left ankle pain    Sudha Lopez is a 78 y.o. female with pmh of hypertension, chronic hyponatremia who presents with left ankle pain after a fall earlier today.    Patient had right knee replacement on 2024 and was recently discharged home.  Today when she tried to stand up she could not keep her balance and  fell on left side hurting her Left ankle.      In the ER, x-ray ankle showed anterior tibiotalar dislocation with associated ischemic appearing fracture along the posterior aspect of distal tibia.  This was reduced in the ER and Ortho was consulted.       Review of Systems:        Pertinent positives and negatives discussed in HPI     Objective:   No intake or output data in the 24 hours ending 24 0304   Vitals:   Vitals:    24 0107 24 0137 24 0207 24 0237   BP: (!) 115/55 (!) 129/56 (!) 130/59 (!) 131/59  for: \"ORG\"    Imaging/Diagnostics Last 24 Hours   XR ANKLE LEFT (MIN 3 VIEWS)    Result Date: 5/31/2024  1. Interval reduction of the tibiotalar joint. 2. Redemonstration of an acute fracture of the distal tibia. 3. Possible nondisplaced fracture of the distal fibula.  Evaluation is suboptimal due to overlying cast.     XR ANKLE LEFT (MIN 3 VIEWS)    Result Date: 5/30/2024  EXAMINATION: THREE XRAY VIEWS OF THE LEFT ANKLE 5/30/2024 10:46 pm COMPARISON: None HISTORY: ORDERING SYSTEM PROVIDED HISTORY: injury TECHNOLOGIST PROVIDED HISTORY: Reason for exam:->injury Reason for Exam: Ankle Pain FINDINGS: The tibia is dislocated anterior to the dome of the talus.  There is an ill-defined mildly displaced cortical fracture along the posterior aspect of the distal tibia which may be comminuted.  The fibula appears intact.  There is questionable cortical irregularity along the neck of the talus.  There is moderate soft tissue swelling around the ankle.     Anterior tibiotalar dislocation with associated comminuted appearing fracture along the posterior aspect of the distal tibia. Questionable bony irregularity or artifact along the neck of the talus. Recommend follow-up views to better delineate the area Moderate soft tissue swelling around         Electronically signed by Ehsan Shabbir, MD on 5/31/2024 at 3:04 AM

## 2024-05-31 NOTE — CONSULTS
Inpatient Note    CC: fall  Summary:   Sudha Lopez is being seen by nephrology for hyponatremia. She is a 78 y.o. female with a PMH significant for hypertension, osteoarthritis who presented to the ED 5/30/2024 after a fall. She was noted to have a displaced left ankle fracture s/p reduction in the ED. She is also s/p recent right total knee replacement 5/29/2024. She is noted to have acute on chronic hyponatremia with Na down to 126 at time of consult.    Interval History  Seen at bedside  BP 92/60, has been soft today  Cr 0.9  Na 126.  Complains of abdominal pain. Last BM was several days ago    Plan:   - check urine Na, urine osm  - check TSH, AM cortisol.  - she was on HCTZ PTA, hyponatremia likely due to HCTZ. Would stop.  - ok to trial IVF due to hypotension. Will re-check Na in the evening.    Assessment:   Hyponatremia:  - Na 126 at time of consult.  - Na in the last year has ranged 129-135  - on HCTZ 12.5 mg, celecoxib, bupropion PTA as possible contributors  - acute drop in Na due to pain from fracture and recent surgery.  - urine studies pending.    Hypertension:  - home regimen: HCTZ 12.5 mg daily, benazepril 40 mg daily, amlodipine 5 mg daily  - stop HCTZ. Would add HCTZ to allergies.    Fall:  - left ankle fracuture  - per ortho.      Ben Arevalo MD  [facility]  Office: [phone]          PE:   Vitals:    05/31/24 1007   BP: 92/60   Pulse:    Resp:    Temp:    SpO2:        General appearance: in NAD  Respiratory: Respiratory effort appears normal, bilateral equal chest rise, no wheeze, no crackles  Cardiovascular: Ausculation shows RRR mp edema  Abdomen: No visible mass, + tenderness, non distended.

## 2024-05-31 NOTE — PROGRESS NOTES
Manual BP obtained 90/62. 500 ml bolus started per orders. Patient complaining of 10/10 abdomen pain at this time. Primary RN made aware. Electronically signed by Natasha Leal RN on 5/31/2024 at 10:23 AM

## 2024-05-31 NOTE — ED NOTES
82 19 93 % -- --   05/31/24 0207 (!) 130/59 -- -- 77 14 92 % -- --   05/31/24 0137 (!) 129/56 -- -- 77 15 92 % -- --   05/31/24 0107 (!) 115/55 -- -- 78 15 98 % -- --   05/31/24 0101 (!) 111/53 -- -- 75 16 98 % -- --   05/31/24 0056 (!) 109/51 -- -- 81 20 97 % -- --   05/31/24 0051 (!) 127/52 -- -- 84 16 99 % -- --   05/31/24 0049 139/66 -- -- 84 18 100 % -- --   05/31/24 0045 124/63 -- -- 84 21 100 % -- --   05/31/24 0044 124/63 -- -- 86 16 100 % -- --   05/31/24 0038 (!) 131/48 -- -- 83 15 100 % -- --   05/31/24 0024 130/61 -- -- 81 19 97 % -- --   05/30/24 2341 (!) 125/58 98.5 °F (36.9 °C) Oral 81 16 96 % -- --   05/30/24 2239 127/62 98.8 °F (37.1 °C) Oral 74 18 95 % 1.6 m (5' 3\") 83.9 kg (185 lb)        She has the following lines:    Peripheral IV 05/30/24 Left Forearm (Active)   Site Assessment Clean, dry & intact 05/31/24 0006   Line Status Flushed;Capped;Normal saline locked 05/31/24 0006   Line Care Connections checked and tightened 05/31/24 0006   Phlebitis Assessment No symptoms 05/31/24 0006   Infiltration Assessment 0 05/31/24 0006   Dressing Status Clean, dry & intact 05/31/24 0006   Dressing Type Transparent 05/31/24 0006       External Urinary Catheter (Active)   Site Assessment Pink;Intact 05/31/24 0000   Placement Initiated 05/31/24 0000   Securement Method Other (Comment) 05/31/24 0000   Catheter Care Catheter/Wick replaced;Suction Canister/Tubing changed 05/31/24 0000   Perineal Care Yes 05/31/24 0000   Suction 40 mmgHg continuous 05/31/24 0000       She has received the following medications:    Medications   morphine (PF) injection 2 mg (2 mg IntraVENous Given 5/31/24 0112)   ketorolac (TORADOL) injection 15 mg (15 mg IntraVENous Given 5/30/24 2256)   propofol bolus 83.9 mg (100 mg IntraVENous Given 5/31/24 0052)   sodium chloride 0.9 % bolus 1,000 mL (0 mLs IntraVENous Stopped 5/31/24 0114)       She had the following images with impressions:    XR ANKLE LEFT (MIN 3 VIEWS)    Result Date:  5/31/2024  1. Interval reduction of the tibiotalar joint. 2. Redemonstration of an acute fracture of the distal tibia. 3. Possible nondisplaced fracture of the distal fibula.  Evaluation is suboptimal due to overlying cast.     XR ANKLE LEFT (MIN 3 VIEWS)    Result Date: 5/30/2024  EXAMINATION: THREE XRAY VIEWS OF THE LEFT ANKLE 5/30/2024 10:46 pm COMPARISON: None HISTORY: ORDERING SYSTEM PROVIDED HISTORY: injury TECHNOLOGIST PROVIDED HISTORY: Reason for exam:->injury Reason for Exam: Ankle Pain FINDINGS: The tibia is dislocated anterior to the dome of the talus.  There is an ill-defined mildly displaced cortical fracture along the posterior aspect of the distal tibia which may be comminuted.  The fibula appears intact.  There is questionable cortical irregularity along the neck of the talus.  There is moderate soft tissue swelling around the ankle.     Anterior tibiotalar dislocation with associated comminuted appearing fracture along the posterior aspect of the distal tibia. Questionable bony irregularity or artifact along the neck of the talus. Recommend follow-up views to better delineate the area Moderate soft tissue swelling around

## 2024-05-31 NOTE — PROGRESS NOTES
4 Eyes Skin Assessment     NAME:  Sudha Lopez  YOB: 1945  MEDICAL RECORD NUMBER:  [id number]    The patient is being assessed for  Head, Face, Ears, Shoulders, Back, Chest, Arms, Elbows, Hands, Sacrum. Buttock, Coccyx, Ischium, Legs. Feet and Heels, and Under Medical Devices     I agree that at least one RN has performed a thorough Head to Toe Skin Assessment on the patient. ALL assessment sites listed below have been assessed.      Areas assessed by both nurses: Sandra Sheriff RN and Liudmila KEY RN.    Head, Face, Ears, Shoulders, Back, Chest, Arms, Elbows, Hands, Sacrum. Buttock, Coccyx, Ischium, Legs. Feet and Heels, and Under Medical Devices         Does the Patient have a Wound? No noted wound(s)       Kirill Prevention initiated by RN: No  Wound Care Orders initiated by RN: No    Pressure Injury (Stage 3,4, Unstageable, DTI, NWPT, and Complex wounds) if present, place Wound referral order by RN under : No    New Ostomies, if present place, Ostomy referral order under : No     Nurse 1 eSignature: Electronically signed by Sandra Sheriff RN on 5/31/24 at 5:07 AM EDT    **SHARE this note so that the co-signing nurse can place an eSignature**    Nurse 2 eSignature: {Esignature:[id number]}

## 2024-05-31 NOTE — CARE COORDINATION
Case Management Assessment  Initial Evaluation    Date/Time of Evaluation: 5/31/2024 2:41 PM  Assessment Completed by: MIKE Fulton    If patient is discharged prior to next notation, then this note serves as note for discharge by case management.    Patient Name: Sudha Lopez                   YOB: 1945  Diagnosis: Closed left ankle fracture, initial encounter [S82.892A]                   Date / Time: 5/30/2024 10:33 PM    Patient Admission Status: Inpatient   Readmission Risk (Low < 19, Mod (19-27), High > 27): Readmission Risk Score: 11.5    Current PCP: Perla Leyva MD  PCP verified by CM? (P) Yes    Chart Reviewed: Yes      History Provided by: (P) Patient  Patient Orientation: (P) Alert and Oriented    Patient Cognition: (P) Alert    Hospitalization in the last 30 days (Readmission):  No    If yes, Readmission Assessment in  Navigator will be completed.    Advance Directives:      Code Status: Full Code   Patient's Primary Decision Maker is: (P) Legal Next of Kin      Discharge Planning:    Patient lives with: Alone Type of Home: House  Primary Care Giver: (P) Self  Patient Support Systems include: Children, Family Members, Friends/Neighbors   Current Financial resources: (P) None  Current community resources: (P) ECF/Home Care  Current services prior to admission: None            Current DME:              Type of Home Care services:  None    ADLS  Prior functional level: (P) Independent in ADLs/IADLs  Current functional level: (P) Assistance with the following:, Bathing, Dressing, Toileting, Cooking, Housework, Shopping, Mobility    PT AM-PAC:   /24  OT AM-PAC:   /24    Family can provide assistance at DC: (P) No  Would you like Case Management to discuss the discharge plan with any other family members/significant others, and if so, who? (P) No  Plans to Return to Present Housing: (P) Unknown at present  Other Identified Issues/Barriers to RETURNING to current housing:  weakness  Potential Assistance needed at discharge: (P) Skilled Nursing Facility            Potential DME:    Patient expects to discharge to: House  Plan for transportation at discharge: (P) Family    Financial    Payor: [facility] MEDICARE / Plan: [facility] MEDICARE ADVANTAGE / Product Type: *No Product type* /     Does insurance require precert for SNF: Yes    Potential assistance Purchasing Medications: (P) No  Meds-to-Beds request:        [id number] - [place], [place] - [address] - P [phone] - F [phone]  [address]  [facility] [address]  Phone: [phone] Fax: [phone]    [facility] [id number] [place], [place] - [address] - P [phone] - F [phone] [address]  [facility] [address]  Phone: [phone] Fax: [phone]      Notes:    Factors facilitating achievement of predicted outcomes: Motivated, Cooperative, and Pleasant    Barriers to discharge: Medical complications    Additional Case Management Notes: Referred to patient for d/c planning.  Spoke to patient.  Patient currently vomiting and upset.  Patient usually lives at home alone.  Patient just had TKR and now with ankle fx.  Discussed SNF.  List of providers in network with insurance provided including: [facility], [facility] and [facility].  CM to follow up with patient.      The Plan for Transition of Care is related to the following treatment goals of Closed left ankle fracture, initial encounter [Z87.530E]    IF APPLICABLE: The Patient and/or patient representative Sudha and her family were provided with a choice of provider and agrees with the discharge plan. Freedom of choice list with basic dialogue that supports the patient's individualized plan of care/goals and shares the quality data associated with the providers was provided to: (P) Patient   Patient Representative Name:       The Patient and/or Patient Representative Agree with the Discharge Plan?      Leilani

## 2024-06-01 ENCOUNTER — APPOINTMENT (OUTPATIENT)
Dept: CT IMAGING | Age: 79
End: 2024-06-01
Payer: MEDICARE

## 2024-06-01 ENCOUNTER — ANESTHESIA EVENT (OUTPATIENT)
Dept: ENDOSCOPY | Age: 79
End: 2024-06-01
Payer: MEDICARE

## 2024-06-01 ENCOUNTER — ANESTHESIA (OUTPATIENT)
Dept: ENDOSCOPY | Age: 79
End: 2024-06-01
Payer: MEDICARE

## 2024-06-01 ENCOUNTER — APPOINTMENT (OUTPATIENT)
Dept: GENERAL RADIOLOGY | Age: 79
End: 2024-06-01
Payer: MEDICARE

## 2024-06-01 LAB
ALBUMIN SERPL-MCNC: 2.7 G/DL (ref 3.4–5)
ANION GAP SERPL CALCULATED.3IONS-SCNC: 15 MMOL/L (ref 3–16)
ANION GAP SERPL CALCULATED.3IONS-SCNC: 17 MMOL/L (ref 3–16)
ANION GAP SERPL CALCULATED.3IONS-SCNC: 18 MMOL/L (ref 3–16)
ANTI-XA UNFRAC HEPARIN: 0.15 IU/ML (ref 0.3–0.7)
ANTI-XA UNFRAC HEPARIN: 0.21 IU/ML (ref 0.3–0.7)
APTT BLD: 47.4 SEC (ref 22.1–36.4)
BASE EXCESS BLDA CALC-SCNC: -4 MMOL/L (ref -3–3)
BASE EXCESS BLDA CALC-SCNC: -5 MMOL/L (ref -3–3)
BASE EXCESS BLDA CALC-SCNC: -8 MMOL/L (ref -3–3)
BASOPHILS # BLD: 0 K/UL (ref 0–0.2)
BASOPHILS NFR BLD: 0.1 %
BUN SERPL-MCNC: 39 MG/DL (ref 7–20)
BUN SERPL-MCNC: 48 MG/DL (ref 7–20)
BUN SERPL-MCNC: 50 MG/DL (ref 7–20)
CA-I BLD-SCNC: 0.9 MMOL/L (ref 1.12–1.32)
CA-I BLD-SCNC: 0.9 MMOL/L (ref 1.12–1.32)
CA-I BLD-SCNC: 0.97 MMOL/L (ref 1.12–1.32)
CALCIUM SERPL-MCNC: 7.6 MG/DL (ref 8.3–10.6)
CALCIUM SERPL-MCNC: 7.7 MG/DL (ref 8.3–10.6)
CALCIUM SERPL-MCNC: 8.1 MG/DL (ref 8.3–10.6)
CHLORIDE SERPL-SCNC: 94 MMOL/L (ref 99–110)
CHLORIDE SERPL-SCNC: 95 MMOL/L (ref 99–110)
CHLORIDE SERPL-SCNC: 99 MMOL/L (ref 99–110)
CK SERPL-CCNC: 1724 U/L (ref 26–192)
CO2 BLDA-SCNC: 20 MMOL/L
CO2 BLDA-SCNC: 23 MMOL/L
CO2 BLDA-SCNC: 23 MMOL/L
CO2 SERPL-SCNC: 15 MMOL/L (ref 21–32)
CO2 SERPL-SCNC: 22 MMOL/L (ref 21–32)
CO2 SERPL-SCNC: 23 MMOL/L (ref 21–32)
CREAT SERPL-MCNC: 2.5 MG/DL (ref 0.6–1.2)
CREAT SERPL-MCNC: 2.8 MG/DL (ref 0.6–1.2)
CREAT SERPL-MCNC: 2.9 MG/DL (ref 0.6–1.2)
DEPRECATED RDW RBC AUTO: 14.1 % (ref 12.4–15.4)
EOSINOPHIL # BLD: 0 K/UL (ref 0–0.6)
EOSINOPHIL NFR BLD: 0 %
GFR SERPLBLD CREATININE-BSD FMLA CKD-EPI: 16 ML/MIN/{1.73_M2}
GFR SERPLBLD CREATININE-BSD FMLA CKD-EPI: 17 ML/MIN/{1.73_M2}
GFR SERPLBLD CREATININE-BSD FMLA CKD-EPI: 19 ML/MIN/{1.73_M2}
GLUCOSE BLD-MCNC: 56 MG/DL (ref 70–99)
GLUCOSE BLD-MCNC: 66 MG/DL (ref 70–99)
GLUCOSE BLD-MCNC: 72 MG/DL (ref 70–99)
GLUCOSE BLD-MCNC: 76 MG/DL (ref 70–99)
GLUCOSE SERPL-MCNC: 61 MG/DL (ref 70–99)
GLUCOSE SERPL-MCNC: 83 MG/DL (ref 70–99)
GLUCOSE SERPL-MCNC: 89 MG/DL (ref 70–99)
HCO3 BLDA-SCNC: 18.9 MMOL/L (ref 21–29)
HCO3 BLDA-SCNC: 21.2 MMOL/L (ref 21–29)
HCO3 BLDA-SCNC: 22 MMOL/L (ref 21–29)
HCT VFR BLD AUTO: 33 % (ref 36–48)
HCT VFR BLD AUTO: 33 % (ref 36–48)
HCT VFR BLD AUTO: 34 % (ref 36–48)
HCT VFR BLD AUTO: 37.6 % (ref 36–48)
HGB BLD CALC-MCNC: 11.3 GM/DL (ref 12–16)
HGB BLD CALC-MCNC: 11.3 GM/DL (ref 12–16)
HGB BLD CALC-MCNC: 11.7 GM/DL (ref 12–16)
HGB BLD-MCNC: 12.4 G/DL (ref 12–16)
INR PPP: 2.01 (ref 0.85–1.15)
LACTATE BLD-SCNC: 2.74 MMOL/L (ref 0.4–2)
LACTATE BLD-SCNC: 3.03 MMOL/L (ref 0.4–2)
LACTATE BLD-SCNC: 3.4 MMOL/L (ref 0.4–2)
LACTATE BLDV-SCNC: 3.4 MMOL/L (ref 0.4–2)
LACTATE BLDV-SCNC: 3.6 MMOL/L (ref 0.4–2)
LACTATE BLDV-SCNC: 4.1 MMOL/L (ref 0.4–2)
LYMPHOCYTES # BLD: 0.5 K/UL (ref 1–5.1)
LYMPHOCYTES NFR BLD: 8.8 %
MCH RBC QN AUTO: 31.2 PG (ref 26–34)
MCHC RBC AUTO-ENTMCNC: 33.1 G/DL (ref 31–36)
MCV RBC AUTO: 94.3 FL (ref 80–100)
MONOCYTES # BLD: 0.5 K/UL (ref 0–1.3)
MONOCYTES NFR BLD: 8.8 %
NEUTROPHILS # BLD: 4.5 K/UL (ref 1.7–7.7)
NEUTROPHILS NFR BLD: 82.3 %
OSMOLALITY UR: 288 MOSM/KG (ref 390–1070)
PCO2 BLDA: 38.4 MM HG (ref 35–45)
PCO2 BLDA: 39.4 MM HG (ref 35–45)
PCO2 BLDA: 39.6 MM HG (ref 35–45)
PERFORMED ON: ABNORMAL
PH BLDA: 7.3 [PH] (ref 7.35–7.45)
PH BLDA: 7.34 [PH] (ref 7.35–7.45)
PH BLDA: 7.36 [PH] (ref 7.35–7.45)
PHOSPHATE SERPL-MCNC: 8.3 MG/DL (ref 2.5–4.9)
PLATELET # BLD AUTO: 247 K/UL (ref 135–450)
PMV BLD AUTO: 8.2 FL (ref 5–10.5)
PO2 BLDA: 151.2 MM HG (ref 75–108)
PO2 BLDA: 76.2 MM HG (ref 75–108)
PO2 BLDA: 93.9 MM HG (ref 75–108)
POC SAMPLE TYPE: ABNORMAL
POTASSIUM BLD-SCNC: 5 MMOL/L (ref 3.5–5.1)
POTASSIUM BLD-SCNC: 5 MMOL/L (ref 3.5–5.1)
POTASSIUM BLD-SCNC: 5.2 MMOL/L (ref 3.5–5.1)
POTASSIUM SERPL-SCNC: 4.2 MMOL/L (ref 3.5–5.1)
POTASSIUM SERPL-SCNC: 5.1 MMOL/L (ref 3.5–5.1)
POTASSIUM SERPL-SCNC: 5.1 MMOL/L (ref 3.5–5.1)
POTASSIUM SERPL-SCNC: 5.2 MMOL/L (ref 3.5–5.1)
PROTHROMBIN TIME: 22.8 SEC (ref 11.9–14.9)
RBC # BLD AUTO: 3.99 M/UL (ref 4–5.2)
SAO2 % BLDA: 94 % (ref 93–100)
SAO2 % BLDA: 97 % (ref 93–100)
SAO2 % BLDA: 99 % (ref 93–100)
SODIUM BLD-SCNC: 132 MMOL/L (ref 136–145)
SODIUM BLD-SCNC: 134 MMOL/L (ref 136–145)
SODIUM BLD-SCNC: 134 MMOL/L (ref 136–145)
SODIUM SERPL-SCNC: 132 MMOL/L (ref 136–145)
SODIUM SERPL-SCNC: 133 MMOL/L (ref 136–145)
SODIUM SERPL-SCNC: 133 MMOL/L (ref 136–145)
WBC # BLD AUTO: 5.4 K/UL (ref 4–11)

## 2024-06-01 PROCEDURE — 36620 INSERTION CATHETER ARTERY: CPT

## 2024-06-01 PROCEDURE — 3609155600 HC COLONOSCOPY WITH DECOMPRESSION: Performed by: INTERNAL MEDICINE

## 2024-06-01 PROCEDURE — 2500000003 HC RX 250 WO HCPCS: Performed by: INTERNAL MEDICINE

## 2024-06-01 PROCEDURE — 2580000003 HC RX 258: Performed by: HOSPITALIST

## 2024-06-01 PROCEDURE — C9113 INJ PANTOPRAZOLE SODIUM, VIA: HCPCS | Performed by: INTERNAL MEDICINE

## 2024-06-01 PROCEDURE — 2700000000 HC OXYGEN THERAPY PER DAY

## 2024-06-01 PROCEDURE — 6360000002 HC RX W HCPCS: Performed by: HOSPITALIST

## 2024-06-01 PROCEDURE — 36556 INSERT NON-TUNNEL CV CATH: CPT | Performed by: INTERNAL MEDICINE

## 2024-06-01 PROCEDURE — 82803 BLOOD GASES ANY COMBINATION: CPT

## 2024-06-01 PROCEDURE — 03HY32Z INSERTION OF MONITORING DEVICE INTO UPPER ARTERY, PERCUTANEOUS APPROACH: ICD-10-PCS | Performed by: INTERNAL MEDICINE

## 2024-06-01 PROCEDURE — 2500000003 HC RX 250 WO HCPCS

## 2024-06-01 PROCEDURE — 85610 PROTHROMBIN TIME: CPT

## 2024-06-01 PROCEDURE — 84132 ASSAY OF SERUM POTASSIUM: CPT

## 2024-06-01 PROCEDURE — 99222 1ST HOSP IP/OBS MODERATE 55: CPT | Performed by: SURGERY

## 2024-06-01 PROCEDURE — 2580000003 HC RX 258: Performed by: INTERNAL MEDICINE

## 2024-06-01 PROCEDURE — 85520 HEPARIN ASSAY: CPT

## 2024-06-01 PROCEDURE — 6360000002 HC RX W HCPCS: Performed by: INTERNAL MEDICINE

## 2024-06-01 PROCEDURE — 85730 THROMBOPLASTIN TIME PARTIAL: CPT

## 2024-06-01 PROCEDURE — 83605 ASSAY OF LACTIC ACID: CPT

## 2024-06-01 PROCEDURE — 6360000002 HC RX W HCPCS: Performed by: SURGERY

## 2024-06-01 PROCEDURE — 3E033XZ INTRODUCTION OF VASOPRESSOR INTO PERIPHERAL VEIN, PERCUTANEOUS APPROACH: ICD-10-PCS | Performed by: INTERNAL MEDICINE

## 2024-06-01 PROCEDURE — 85025 COMPLETE CBC W/AUTO DIFF WBC: CPT

## 2024-06-01 PROCEDURE — 5A0945A ASSISTANCE WITH RESPIRATORY VENTILATION, 24-96 CONSECUTIVE HOURS, HIGH NASAL FLOW/VELOCITY: ICD-10-PCS | Performed by: INTERNAL MEDICINE

## 2024-06-01 PROCEDURE — 2580000003 HC RX 258

## 2024-06-01 PROCEDURE — 2000000000 HC ICU R&B

## 2024-06-01 PROCEDURE — 6370000000 HC RX 637 (ALT 250 FOR IP): Performed by: INTERNAL MEDICINE

## 2024-06-01 PROCEDURE — 2580000003 HC RX 258: Performed by: ANESTHESIOLOGY

## 2024-06-01 PROCEDURE — 3700000001 HC ADD 15 MINUTES (ANESTHESIA): Performed by: INTERNAL MEDICINE

## 2024-06-01 PROCEDURE — 74176 CT ABD & PELVIS W/O CONTRAST: CPT

## 2024-06-01 PROCEDURE — 3700000000 HC ANESTHESIA ATTENDED CARE: Performed by: INTERNAL MEDICINE

## 2024-06-01 PROCEDURE — 94761 N-INVAS EAR/PLS OXIMETRY MLT: CPT

## 2024-06-01 PROCEDURE — 85014 HEMATOCRIT: CPT

## 2024-06-01 PROCEDURE — 36556 INSERT NON-TUNNEL CV CATH: CPT

## 2024-06-01 PROCEDURE — 94660 CPAP INITIATION&MGMT: CPT

## 2024-06-01 PROCEDURE — 0DJD8ZZ INSPECTION OF LOWER INTESTINAL TRACT, VIA NATURAL OR ARTIFICIAL OPENING ENDOSCOPIC: ICD-10-PCS | Performed by: INTERNAL MEDICINE

## 2024-06-01 PROCEDURE — 99291 CRITICAL CARE FIRST HOUR: CPT | Performed by: INTERNAL MEDICINE

## 2024-06-01 PROCEDURE — 36600 WITHDRAWAL OF ARTERIAL BLOOD: CPT

## 2024-06-01 PROCEDURE — 84295 ASSAY OF SERUM SODIUM: CPT

## 2024-06-01 PROCEDURE — 80069 RENAL FUNCTION PANEL: CPT

## 2024-06-01 PROCEDURE — 2709999900 HC NON-CHARGEABLE SUPPLY: Performed by: INTERNAL MEDICINE

## 2024-06-01 PROCEDURE — 36415 COLL VENOUS BLD VENIPUNCTURE: CPT

## 2024-06-01 PROCEDURE — 71045 X-RAY EXAM CHEST 1 VIEW: CPT

## 2024-06-01 PROCEDURE — 82330 ASSAY OF CALCIUM: CPT

## 2024-06-01 PROCEDURE — 82947 ASSAY GLUCOSE BLOOD QUANT: CPT

## 2024-06-01 PROCEDURE — 82550 ASSAY OF CK (CPK): CPT

## 2024-06-01 RX ORDER — THIAMINE HYDROCHLORIDE 100 MG/ML
100 INJECTION, SOLUTION INTRAMUSCULAR; INTRAVENOUS 2 TIMES DAILY
Status: DISCONTINUED | OUTPATIENT
Start: 2024-06-01 | End: 2024-06-21 | Stop reason: HOSPADM

## 2024-06-01 RX ORDER — DEXTROSE MONOHYDRATE 100 MG/ML
INJECTION, SOLUTION INTRAVENOUS CONTINUOUS
Status: DISCONTINUED | OUTPATIENT
Start: 2024-06-02 | End: 2024-06-02

## 2024-06-01 RX ORDER — HEPARIN SODIUM 10000 [USP'U]/100ML
5-30 INJECTION, SOLUTION INTRAVENOUS CONTINUOUS
Status: DISCONTINUED | OUTPATIENT
Start: 2024-06-01 | End: 2024-06-01

## 2024-06-01 RX ORDER — MORPHINE SULFATE 4 MG/ML
4 INJECTION, SOLUTION INTRAMUSCULAR; INTRAVENOUS EVERY 4 HOURS PRN
Status: DISCONTINUED | OUTPATIENT
Start: 2024-06-01 | End: 2024-06-01

## 2024-06-01 RX ORDER — DEXMEDETOMIDINE HYDROCHLORIDE 4 UG/ML
.1-1.5 INJECTION, SOLUTION INTRAVENOUS CONTINUOUS
Status: DISCONTINUED | OUTPATIENT
Start: 2024-06-01 | End: 2024-06-03

## 2024-06-01 RX ORDER — PROPOFOL 10 MG/ML
INJECTION, EMULSION INTRAVENOUS PRN
Status: DISCONTINUED | OUTPATIENT
Start: 2024-06-01 | End: 2024-06-01 | Stop reason: SDUPTHER

## 2024-06-01 RX ORDER — CALCIUM GLUCONATE 20 MG/ML
2000 INJECTION, SOLUTION INTRAVENOUS ONCE
Status: COMPLETED | OUTPATIENT
Start: 2024-06-01 | End: 2024-06-02

## 2024-06-01 RX ORDER — DEXTROSE MONOHYDRATE 100 MG/ML
INJECTION, SOLUTION INTRAVENOUS
Status: COMPLETED
Start: 2024-06-01 | End: 2024-06-01

## 2024-06-01 RX ORDER — SODIUM CHLORIDE, SODIUM LACTATE, POTASSIUM CHLORIDE, CALCIUM CHLORIDE 600; 310; 30; 20 MG/100ML; MG/100ML; MG/100ML; MG/100ML
INJECTION, SOLUTION INTRAVENOUS CONTINUOUS PRN
Status: DISCONTINUED | OUTPATIENT
Start: 2024-06-01 | End: 2024-06-01 | Stop reason: SDUPTHER

## 2024-06-01 RX ORDER — DICYCLOMINE HYDROCHLORIDE 10 MG/1
20 CAPSULE ORAL 3 TIMES DAILY PRN
Status: DISCONTINUED | OUTPATIENT
Start: 2024-06-01 | End: 2024-06-02

## 2024-06-01 RX ORDER — LIDOCAINE HYDROCHLORIDE 20 MG/ML
INJECTION, SOLUTION INFILTRATION; PERINEURAL PRN
Status: DISCONTINUED | OUTPATIENT
Start: 2024-06-01 | End: 2024-06-01 | Stop reason: SDUPTHER

## 2024-06-01 RX ORDER — HEPARIN SODIUM 10000 [USP'U]/100ML
780 INJECTION, SOLUTION INTRAVENOUS CONTINUOUS
Status: DISCONTINUED | OUTPATIENT
Start: 2024-06-01 | End: 2024-06-02

## 2024-06-01 RX ORDER — HEPARIN SODIUM 1000 [USP'U]/ML
2000 INJECTION, SOLUTION INTRAVENOUS; SUBCUTANEOUS PRN
Status: DISCONTINUED | OUTPATIENT
Start: 2024-06-01 | End: 2024-06-02

## 2024-06-01 RX ORDER — HYDROMORPHONE HYDROCHLORIDE 1 MG/ML
1 INJECTION, SOLUTION INTRAMUSCULAR; INTRAVENOUS; SUBCUTANEOUS
Status: DISCONTINUED | OUTPATIENT
Start: 2024-06-01 | End: 2024-06-02

## 2024-06-01 RX ORDER — HEPARIN SODIUM 1000 [USP'U]/ML
3900 INJECTION, SOLUTION INTRAVENOUS; SUBCUTANEOUS PRN
Status: DISCONTINUED | OUTPATIENT
Start: 2024-06-01 | End: 2024-06-02

## 2024-06-01 RX ORDER — DEXTROSE MONOHYDRATE 100 MG/ML
INJECTION, SOLUTION INTRAVENOUS PRN
Status: DISCONTINUED | OUTPATIENT
Start: 2024-06-01 | End: 2024-06-01

## 2024-06-01 RX ORDER — POLYETHYLENE GLYCOL 3350 17 G/17G
17 POWDER, FOR SOLUTION ORAL 2 TIMES DAILY
Status: DISCONTINUED | OUTPATIENT
Start: 2024-06-01 | End: 2024-06-02

## 2024-06-01 RX ADMIN — METRONIDAZOLE 500 MG: 500 INJECTION, SOLUTION INTRAVENOUS at 11:06

## 2024-06-01 RX ADMIN — KETOROLAC TROMETHAMINE 15 MG: 30 INJECTION, SOLUTION INTRAMUSCULAR at 01:06

## 2024-06-01 RX ADMIN — POLYETHYLENE GLYCOL 3350 17 G: 17 POWDER, FOR SOLUTION ORAL at 11:54

## 2024-06-01 RX ADMIN — HYDROMORPHONE HYDROCHLORIDE 0.5 MG: 1 INJECTION, SOLUTION INTRAMUSCULAR; INTRAVENOUS; SUBCUTANEOUS at 18:01

## 2024-06-01 RX ADMIN — HYDROMORPHONE HYDROCHLORIDE 0.5 MG: 1 INJECTION, SOLUTION INTRAMUSCULAR; INTRAVENOUS; SUBCUTANEOUS at 12:00

## 2024-06-01 RX ADMIN — PROPOFOL 10 MG: 10 INJECTION, EMULSION INTRAVENOUS at 15:00

## 2024-06-01 RX ADMIN — THIAMINE HYDROCHLORIDE 100 MG: 100 INJECTION, SOLUTION INTRAMUSCULAR; INTRAVENOUS at 19:55

## 2024-06-01 RX ADMIN — Medication 0.2 MCG/KG/HR: at 19:04

## 2024-06-01 RX ADMIN — VASOPRESSIN 0.03 UNITS/MIN: 20 INJECTION INTRAVENOUS at 21:41

## 2024-06-01 RX ADMIN — PROPOFOL 25 MG: 10 INJECTION, EMULSION INTRAVENOUS at 14:42

## 2024-06-01 RX ADMIN — POLYETHYLENE GLYCOL 3350 17 G: 17 POWDER, FOR SOLUTION ORAL at 19:55

## 2024-06-01 RX ADMIN — SODIUM CHLORIDE, PRESERVATIVE FREE 10 ML: 5 INJECTION INTRAVENOUS at 19:37

## 2024-06-01 RX ADMIN — THIAMINE HYDROCHLORIDE 100 MG: 100 INJECTION, SOLUTION INTRAMUSCULAR; INTRAVENOUS at 17:54

## 2024-06-01 RX ADMIN — PANTOPRAZOLE SODIUM 40 MG: 40 INJECTION, POWDER, FOR SOLUTION INTRAVENOUS at 09:23

## 2024-06-01 RX ADMIN — MORPHINE SULFATE 4 MG: 4 INJECTION, SOLUTION INTRAMUSCULAR; INTRAVENOUS at 09:23

## 2024-06-01 RX ADMIN — ENOXAPARIN SODIUM 40 MG: 100 INJECTION SUBCUTANEOUS at 11:07

## 2024-06-01 RX ADMIN — SODIUM CHLORIDE, SODIUM LACTATE, POTASSIUM CHLORIDE, AND CALCIUM CHLORIDE: .6; .31; .03; .02 INJECTION, SOLUTION INTRAVENOUS at 14:35

## 2024-06-01 RX ADMIN — SODIUM BICARBONATE 100 MEQ: 84 INJECTION, SOLUTION INTRAVENOUS at 17:46

## 2024-06-01 RX ADMIN — SODIUM BICARBONATE: 84 INJECTION, SOLUTION INTRAVENOUS at 11:45

## 2024-06-01 RX ADMIN — SODIUM BICARBONATE 100 MEQ: 84 INJECTION INTRAVENOUS at 16:45

## 2024-06-01 RX ADMIN — CEFEPIME 1000 MG: 1 INJECTION, POWDER, FOR SOLUTION INTRAMUSCULAR; INTRAVENOUS at 06:12

## 2024-06-01 RX ADMIN — DEXTROSE MONOHYDRATE 250 ML: 100 INJECTION, SOLUTION INTRAVENOUS at 21:37

## 2024-06-01 RX ADMIN — METRONIDAZOLE 500 MG: 500 INJECTION, SOLUTION INTRAVENOUS at 19:55

## 2024-06-01 RX ADMIN — SODIUM BICARBONATE: 84 INJECTION, SOLUTION INTRAVENOUS at 04:30

## 2024-06-01 RX ADMIN — CEFEPIME 1000 MG: 1 INJECTION, POWDER, FOR SOLUTION INTRAMUSCULAR; INTRAVENOUS at 18:48

## 2024-06-01 RX ADMIN — Medication 100 MEQ: at 17:46

## 2024-06-01 RX ADMIN — HEPARIN SODIUM AND DEXTROSE 780 UNITS/HR: 10000; 5 INJECTION INTRAVENOUS at 19:54

## 2024-06-01 RX ADMIN — Medication 0.4 MCG/KG/HR: at 17:16

## 2024-06-01 RX ADMIN — LIDOCAINE HYDROCHLORIDE 2 ML: 20 INJECTION, SOLUTION INFILTRATION; PERINEURAL at 14:42

## 2024-06-01 RX ADMIN — SODIUM CHLORIDE 14 MCG/MIN: 9 INJECTION, SOLUTION INTRAVENOUS at 11:44

## 2024-06-01 RX ADMIN — METHOCARBAMOL 250 MG: 100 INJECTION INTRAMUSCULAR; INTRAVENOUS at 02:20

## 2024-06-01 RX ADMIN — DICYCLOMINE HYDROCHLORIDE 20 MG: 10 CAPSULE ORAL at 06:38

## 2024-06-01 RX ADMIN — KETOROLAC TROMETHAMINE 15 MG: 30 INJECTION, SOLUTION INTRAMUSCULAR at 06:38

## 2024-06-01 RX ADMIN — SODIUM CHLORIDE: 9 INJECTION, SOLUTION INTRAVENOUS at 11:04

## 2024-06-01 RX ADMIN — METRONIDAZOLE 500 MG: 500 INJECTION, SOLUTION INTRAVENOUS at 03:06

## 2024-06-01 RX ADMIN — SODIUM CHLORIDE, PRESERVATIVE FREE 10 ML: 5 INJECTION INTRAVENOUS at 09:27

## 2024-06-01 ASSESSMENT — PAIN DESCRIPTION - DESCRIPTORS
DESCRIPTORS: ACHING;SHARP
DESCRIPTORS: SHARP;OTHER (COMMENT)
DESCRIPTORS: SHARP

## 2024-06-01 ASSESSMENT — PAIN SCALES - GENERAL
PAINLEVEL_OUTOF10: 9
PAINLEVEL_OUTOF10: 10
PAINLEVEL_OUTOF10: 9
PAINLEVEL_OUTOF10: 10
PAINLEVEL_OUTOF10: 10

## 2024-06-01 ASSESSMENT — PAIN DESCRIPTION - LOCATION
LOCATION: ABDOMEN

## 2024-06-01 ASSESSMENT — PAIN DESCRIPTION - ORIENTATION
ORIENTATION: RIGHT;LEFT;LOWER;MID;UPPER
ORIENTATION: OTHER (COMMENT)
ORIENTATION: RIGHT;LEFT;MID
ORIENTATION: RIGHT;LEFT;MID

## 2024-06-01 ASSESSMENT — PAIN DESCRIPTION - ONSET: ONSET: ON-GOING

## 2024-06-01 ASSESSMENT — PAIN DESCRIPTION - FREQUENCY
FREQUENCY: CONTINUOUS
FREQUENCY: CONTINUOUS

## 2024-06-01 NOTE — PROGRESS NOTES
compared to 05/31/2024 Pleural-parenchymal disease at the lung bases.     XR ABDOMEN (KUB) (SINGLE AP VIEW)    Result Date: 6/1/2024  EXAMINATION: ONE SUPINE XRAY VIEW(S) OF THE ABDOMEN 5/31/2024 10:04 pm COMPARISON: None. HISTORY: ORDERING SYSTEM PROVIDED HISTORY: ILEU S TECHNOLOGIST PROVIDED HISTORY: PORTABLE Reason for exam:->ILEU S Reason for Exam: ileus, abdominal pain FINDINGS: Monroe catheter is in place. No gas distended loops of the small bowel are appreciated.  Gas distended loops of colon are seen, worst in the left upper quadrant measuring up to 13 mm.  No obvious free air or pneumatosis. No acute osseous abnormality.     1. Gas distended loops of colon, worst in the left upper quadrant measuring up to 13 mm.  Findings may represent distal obstruction versus ileus. 2. No gas distended loops of small bowel appreciated.     XR ANKLE LEFT (MIN 3 VIEWS)    Result Date: 6/1/2024  EXAMINATION: THREE XRAY VIEWS OF THE LEFT ANKLE 5/31/2024 12:55 am COMPARISON: May 30, 2024 HISTORY: ORDERING SYSTEM PROVIDED HISTORY: post reduction TECHNOLOGIST PROVIDED HISTORY: Reason for exam:->post reduction Reason for Exam: post redux FINDINGS: Two views of the left ankle demonstrate interval reduction of the tibiotalar joint.  Redemonstration of an acute fracture of the distal tibia.  There may be a nondisplaced fracture of the distal fibula.  Ankle mortise is grossly congruent on limited views. Overlying cast obscures fine bony details.     1. Interval reduction of the tibiotalar joint. 2. Redemonstration of an acute fracture of the distal tibia. 3. Possible nondisplaced fracture of the distal fibula.  Evaluation is suboptimal due to overlying cast.     Echo (TTE) limited (PRN contrast/bubble/strain/3D)    Result Date: 5/31/2024    Left Ventricle: Hyperdynamic left ventricular systolic function with a visually estimated EF >65%. Left ventricle is smaller than normal. Normal wall thickness. Normal wall motion.   Right  extends slightly towards the posterior malleolus on page 39 of series 602 otherwise no large posterior malleolar component. Maintained alignment of the subtalar joint and tibiotalar joint. Soft Tissue:  Soft tissue edema compatible with fracture.   No well-defined loculated fluid collections.  Visualized flexor and extensor tendons appear grossly intact on CT. Joint:  Multilevel joint space narrowing with subchondral cysts and small osteophytes most notable at the midfoot and minimally at the tibiotalar joint.  Some chronic appearing osseous densities near the tibiotalar joint. Mild calcaneal spurring.     Medial and lateral malleolar fractures as detailed above.     CT ABDOMEN WO CONTRAST Additional Contrast? None    Result Date: 5/31/2024  EXAMINATION: CT ABDOMEN WITHOUT CONTRAST 5/31/2024 12:18 pm; 5/31/2024 12:19 pm TECHNIQUE: CT of the abdomen was performed without the administration of intravenous contrast. Multiplanar reformatted images are provided for review. Automated exposure control, iterative reconstruction, and/or weight based adjustment of the mA/kV was utilized to reduce the radiation dose to as low as reasonably achievable. COMPARISON: None. HISTORY: ORDERING SYSTEM PROVIDED HISTORY: Abdominal pain and distention TECHNOLOGIST PROVIDED HISTORY: Reason for exam:->Abdominal pain and distention Additional Contrast?->None Reason for Exam: Low abd pain with swelling. Relevant Medical/Surgical History: hysterectomy; ORDERING SYSTEM PROVIDED HISTORY: fx TECHNOLOGIST PROVIDED HISTORY: With 3D reconstruction Reason for exam:->fx Reason for Exam: fall injury. Lt ankle pain. FINDINGS: Lower Chest: Calcified breast implants are seen.  Small - moderate hiatal hernia is seen.  Fluid is seen in the distal esophagus, suggesting reflux. No pericardial effusion.  No pericardial calcification noted. De pendant opacity seen at the lung bases, likely atelectasis. Organs: Trace perisplenic fluid is seen.  Calcified

## 2024-06-01 NOTE — CONSULTS
Pharmacy to Manage Heparin Infusion per Hospital Nomogram    Dx: ACS  Weight: 65 kg AdjBW (actual body weight > 120% IBW)  Baseline aPTT: ordered    Labs:  Recent Labs     05/31/24  0001 05/31/24  0022 05/31/24  1520 05/31/24  2249 06/01/24  0417 06/01/24  1629   HGB  --    < > 13.0 12.6 12.4 11.7*   HCT  --    < > 39.5 37.8 37.6  --    PLT  --    < > 316 302 247  --    INR 0.96  --   --   --   --   --     < > = values in this interval not displayed.     Heparin (weight-based) Infusion: CAD/STEMI/NSTEMI/UA/AFib)   Will initiate heparin therapy without bolus (as ordered by MD). Will start heparin infusion at 780 units/hr (12 units/kg/hr).  Plan to check anti-Xa in 6 hours.  Goal anti-Xa 0.3 - 0.7 units/mL    Thank you for the consult!   Jeanne Stiles, PharmD, [facility] [id number]    6/2 at 0210  Anti-Xa: 0.59  Plan: Continue current heparin infusion rate of 780 units/hr  Recheck anti-Xa at 0800  Javier Jain PharmD 6/2/2024  6:14 AM    Resume Heparin per dr. Paez  Dx: AFib  Heparin 780 units/hr.  Anti-Xa at 1500  [facility] Cantu [facility], [facility]   6/3/2024 8:46 AM    6/3 at 2215  Anti-Xa: 0.10  Plan: Give 2000 units of heparin as bolus  Increase heparin infusion rate to 960 units/hr  Recheck anti-Xa at 0500  Javier Barreran PharmD 6/3/2024  10:47 PM    6/4 AT 0420  Anti-Xa: 0.28  Plan: Give 2000 units of heparin as bolus  Increase heparin infusion rate to 1140 units/hr  Recheck anti-Xa at 1130  Al Cristobal PharmD 6/4/2024  5:06 AM    Heparin infusion to resume.  Heparin 4000 units IVP bolus x 1 then Heparin at 1000 units/hr.  Anti-Xa in 6 hours.  Orders reviewed with Romi Bennett NP.  A[facility] Cantu [facility], [facility]   6/4/2024 2:21 PM    6/4 @ 2220  Anti-Xa 0.37 at 2030  No change to Heparin at this time, continue Heparin infusion at 1000 units/hr.  Recheck anti-Xa in 6 hours.  Gabriele Beck PharmD 6/4/2024 10:20 PM    6/5 at 0423  Anti-Xa: 0.30  Plan: Continue current heparin infusion rate of 1000 units/hr  Recheck anti-Xa 6/6/24  Al  Cristobal PharmD 6/5/2024  5:03 AM    6/6/2024 at 0414  Anti-Xa Unfrac Heparin   0.24   IU/mL  - Heparin _2000_ units IVP bolus and then increase Heparin infusion to _1180_ units/hr.   - Recheck Anti-Xa in 6 hours at 1100  Ruslan Contreras Pharm D.6/6/2024 4:50 AM       Restarting heparin  Recommend to restart at previous rate of 1180 units/hr  No bolus given recent hgb drop  Anti Xa 1600  Plan for DOAC tomorrow  Elizabeth Capellan, PharmD  6/7/2024 at 8:46 AM    6/7 1626  Anti-Xa = 0.29  Will increase infusion rate slightly and not give bolus given close to goal range.  Increase infusion to 1280 units/hr  Next anti-Xa 6/7 2300  Tonny Crowe, PharmD 4:57 PM EDT 6/7/24 6/8/2024 at 0034  Anti-Xa Unfrac Heparin   0.24   IU/mL  - Heparin _2000_ units IVP bolus and then increase Heparin infusion to _1460_ units/hr.   - Recheck Anti-Xa in 6 hours at 0700  Ruslan Contreras Pharm D.6/8/2024 12:56 AM    6/8/2024 at 0527  Anti-Xa Unfrac Heparin   0.93   IU/mL  - Decrease Heparin infusion to _1370_ units/hr.   - Recheck Anti-Xa in 6 hours at 1200  Ruslan Contreras Pharm D.6/8/2024 6:26 AM

## 2024-06-01 NOTE — PROGRESS NOTES
INPATIENT PULMONARY CRITICAL CARE PROGRESS NOTE      Reason for visit      hypotension       SUBJECTIVE: Patient continues to be critically ill when seen this morning, patient's requirement for pressors have gone up overnight, patient is complaining of significant abdominal distention and pain is not bearable, patient has NG tube in place, patient has a Tmax of 99.6 Fahrenheit, patient has sinus rhythm on the monitor, patient's oxygen requirements have gone up and patient was on 5 L of nasal oxygen with saturation 93% when seen, patient urine output is on the lower side, patient has a cumulative balance of +3 L, patient's glycemic control was acceptable, no other pertinent review of system of concern       Physical Exam:  Blood pressure 97/67, pulse 98, temperature 98.7 °F (37.1 °C), temperature source Bladder, resp. rate (!) 31, height 1.6 m (5' 3\"), weight 83.9 kg (185 lb), SpO2 93 %.'     Constitutional:  No acute distress.   HENT:  Oropharynx is clear and moist. No thyromegaly.  Eyes:  Conjunctivae are normal. Pupils equal, round, and reactive to light. No scleral icterus.   Neck: . No tracheal deviation present. No obvious thyroid mass.   Cardiovascular: Sinus tachycardia, normal heart sounds.  No right ventricular heave. No lower extremity edema.  Pulmonary/Chest: No wheezes.  No rales.  Chest wall is not dull to percussion.  No accessory muscle usage or stridor.  Decreased breath sound intensity  Abdominal: Soft. Bowel sounds not significantly present.  Distended distended with distinct tenderness and some rebound in the left lower quadrant  Musculoskeletal: No cyanosis. No clubbing. No obvious joint deformity.   Lymphadenopathy: No cervical or supraclavicular adenopathy.   Skin: Skin is warm and dry. No rash or nodules on the exposed extremities.  Psychiatric: Normal mood and affect. Behavior is normal.  No anxiety.   Neurologic: Alert, awake and oriented. PERRL.  Speech fluent    Results:  CBC:   Recent

## 2024-06-01 NOTE — PROGRESS NOTES
NAME:  Sudha Lopez  YOB: 1945  MEDICAL RECORD NUMBER:  [id number]    Shift Summary: Beginning of shift pt restless in bed, having R/L lower quadrant pain. Pt states no pain in either legs. Hospitalist updated on pain, urine output and questions from family and a family doctor Morgan Cha, whom requested a call from the doctor (see previous note)? Dr. Shabbir ordered doppler of LE, KUB and updated labs, updated labs below. Pt continued to have uncontrolled pain and very restless throughout the night, Dr. Shabbir aware and attempted to alleviate with different options that would not affect her already low BP d/t being on pressure support. Pt was given soap and water/CHG bath, roach care complete. Pt remained alert and oriented all night, with no sleep. POC to continue until goals met.     Family updated: Yes:  Daughter Lazara and grandson bedside at shift change, daughter Victorina/Morgan Del Toroe (who this RN was told that person was a cousin, pt ok'd update to Morgan) called for update, all questions answered.     Rhythm:  NSR/ST      Respiratory support needed (if any):  - O2 - NC - 5 lpm    Admission weight Weight - Scale: 83.9 kg (185 lb) (05/30/24 2239)    Today's weight    Wt Readings from Last 1 Encounters:   05/31/24 83.9 kg (185 lb)        Roach need assessed each shift: YES -  - continue roach r/t - strict I&O's  UOP >30ml/hr: NO - Dayshift/nightshift doctors aware.  Last documented BM (in last 48 hrs):  Patient Vitals for the past 48 hrs:   Last BM (including prior to admit) Stool Occurrence   05/31/24 0000 -- 0   05/31/24 0813 05/28/24 --                 Lines/Drains reviewed @ bedside.  Peripheral IV 05/30/24 Left Forearm (Active)   Number of days: 1       Peripheral IV 05/31/24 Right Forearm (Active)   Number of days: 0       Peripheral IV 05/31/24 Distal;Left Forearm (Active)   Number of days: 0       Urinary Catheter 05/31/24 (Active)   Number of days: 0         Drip rates at handoff:     sodium chloride      norepinephrine 14 mcg/min (05/31/24 2254)    sodium bicarbonate 75 mEq in sodium chloride 0.45 % 1,000 mL infusion 150 mL/hr at 05/31/24 2050       Lab Data:   CBC:   Recent Labs     05/31/24  1520 05/31/24 2249   WBC 5.3 8.0   HGB 13.0 12.6   HCT 39.5 37.8   MCV 93.9 93.5    302     BMP:    Recent Labs     05/31/24  1520 05/31/24 2249   * 131*   K 3.9 3.9   CO2 16* 18*   BUN 25* 33*   CREATININE 1.8* 2.5*     LIVR:   Recent Labs     05/31/24 1520 05/31/24 2249   AST 70* 101*   ALT 27 40     PT/INR:   Recent Labs     05/31/24  0001   INR 0.96       4 Eyes Skin Assessment       The patient is being assessed for  Other Shift assessment    I agree that at least one RN has performed a thorough Head to Toe Skin Assessment on the patient. ALL assessment sites listed below have been assessed.      Areas assessed by both nurses: Head, Face, Ears, Shoulders, Back, Chest, Arms, Elbows, Hands, Sacrum. Buttock, Coccyx, Ischium, Legs. Feet and Heels, and Under Medical Devices   Pt has soft cast present from fall/fx on L leg, thaddeus stocking on R leg with bandage over knee replacement site.         Does the Patient have a Wound? No noted wound(s)    Wound Care Orders : No       Kirill Prevention initiated by RN: No    Pressure Injury (Stage 3,4, Unstageable, DTI, NWPT, and Complex wounds) if present, place Wound referral order by RN under : No    New Ostomies, if present place, Ostomy referral order under : No     Nurse 1 eSignature: Electronically signed by Femi Garcia RN on 6/1/24

## 2024-06-01 NOTE — ANESTHESIA PRE PROCEDURE
last 72 hours.    Coags:   Lab Results   Component Value Date/Time    PROTIME 13.0 05/31/2024 12:01 AM    INR 0.96 05/31/2024 12:01 AM       HCG (If Applicable): No results found for: \"PREGTESTUR\", \"PREGSERUM\", \"HCG\", \"HCGQUANT\"     ABGs: No results found for: \"PHART\", \"PO2ART\", \"WGA0QCV\", \"OCD3ELG\", \"BEART\", \"Q7DNVNDG\"     Type & Screen (If Applicable):  No results found for: \"LABABO\"    Drug/Infectious Status (If Applicable):  No results found for: \"HIV\", \"HEPCAB\"    COVID-19 Screening (If Applicable):   Lab Results   Component Value Date/Time    COVID19 NOT DETECTED 08/21/2020 10:28 AM           Anesthesia Evaluation     no history of anesthetic complications:   Airway: Mallampati: III  TM distance: <3 FB   Neck ROM: limited  Mouth opening: > = 3 FB   Dental:          Pulmonary:                             ROS comment: 6 lpm n/cO2   Cardiovascular:    (+) hypertension:               ROS comment: shock     Neuro/Psych:                ROS comment: Co\nfused GI/Hepatic/Renal:   (+) hiatal hernia, GERD:, renal disease: ARF         ROS comment: Partial bowel obstruction.   Endo/Other:                     Abdominal:             Vascular: negative vascular ROS.         Other Findings:       Anesthesia Plan      MAC     ASA 4 - emergent     (Risks, benefits and alternatives of MAC anesthesia discussed with pt's daughter.  PLan for light sedation. INcreased risk of aspiration, requiring intubation and mechanical ventilation.  Questions answered.  Willing to proceed.)  Induction: intravenous.      Anesthetic plan and risks discussed with patient and child/children.                    BRAYDEN ROBINS MD   6/1/2024

## 2024-06-01 NOTE — PROCEDURES
Colonoscopy Procedure  Note          Patient: Sudha Lopez  : 1945  CSN:     Procedure: Colonoscopy with decompression.     Date:  2024    Surgeon:  YASMINE JENNINGS MD, MD    Referring Provider:      Preoperative Diagnosis:  Colonic distention.    Postoperative Diagnosis:    -Sigmoid diverticulosis/impacted stool  -Purple/dark color mucosa lining of the colon mainly in the descending colon.  Could be melanosis coli but cannot exclude underlying ischemia.    Anesthesia: Propofol sedation    EBL: <5 mL    Indications: This is a 78 y.o. year old female who presents today with colonic distention.here for decompression colonoscopy.    Procedure:   An informed consent was obtained from the patient after explanation of indications, benefits, possible risks and complications of the procedure.  The patient was then taken to the endoscopy suite, placed in the left lateral decubitus position, and the above IV anesthesia was administered.      With the patient in left lateral decubitus position the endoscope was inserted through the anorectal area into the rectum. The scope was then advanced through the length of the colon to the transverse colon.  As soon as the scope was advanced into the rectum solid stool noted.  With a lot of suction and irrigation could slowly advance the scope beyond the solid stool.  Severe diverticulosis noted in the sigmoid colon.  The scope was further advanced into the descending and then to mid transverse colon.  Unable to advance further.  Using a lot of suction and irrigation the stools were partly suctioned out and decompression was performed.  In addition also noted that the mucosal lining in the transverse and descending colon was dark purple in color.  Cannot exclude underlying ischemia.  This also could be related to melanosis coli.  The scope was withdrawn with continuous suction and irrigation and decompression was performed.  Could see some improvement in abdominal  distention.  On withdrawal of the scope Flexi-Seal was placed.  .     The patient tolerated the procedure well and was taken to the PACU in good condition.    Impression:    -Sigmoid diverticulosis/impacted stool  -Purple/dark color mucosa lining of the colon mainly in the descending colon.  Could be melanosis coli but cannot exclude underlying ischemia.    Recommendations:    -MiraLAX twice daily  -Tapwater enemas twice daily and as needed to Flexi-Seal  -Turn patient to either left or right side every 3-4 hours  -Trend lactic acid  -Discussed the findings with Dr. Jackson  -Close monitoring  -Will continue to follow    YASMINE JENNINGS MD, MD   [facility]  6/1/2024

## 2024-06-01 NOTE — PROGRESS NOTES
Daughter from [place] Lopez who is pt POA called for updates, had Morgan Cha MD on the phone as well, daughter stated Morgan was a cousin. RN bedside and confirmed with pt ok to speak to Morgan Cha, she stated yes. Updated Hospitalist Dr. Shabbir of concerns and sent Morgan's phone number [phone] as well.

## 2024-06-01 NOTE — PROGRESS NOTES
06/01/24 1636   NIV Type   NIV Started/Stopped On   Equipment Type V60   Mode Bilevel   Mask Type Full face mask   Mask Size Medium   Assessment   Pulse 89   Respirations 24   BP (!) 90/50   SpO2 94 %   Comfort Level Fair   Using Accessory Muscles No   Mask Compliance Good   Skin Assessment Clean, dry, & intact   Skin Protection for O2 Device Yes   Orientation Middle   Location Nose   Breath Sounds   Respiratory Pattern Tachypneic   Breath Sounds Bilateral Diminished;Crackles    Settings/Measurements   PIP Observed 24 cm H20   IPAP 20 cmH20   CPAP/EPAP 10 cmH2O   Vt (Measured) 650 mL   Rate Ordered 16   FiO2  100 %   I Time/ I Time % 1 s   Minute Volume (L/min) 13 Liters   Mask Leak (lpm) 27 lpm   Alarm Settings   Alarms On Y   Low Pressure (cmH2O) 8 cmH2O   High Pressure (cmH2O) 35 cmH2O   Delay Alarm 20 sec(s)   RR Low (bpm) 6   RR High (bpm) 40 br/min

## 2024-06-01 NOTE — CONSULTS
Department of General Surgery Consult    PATIENT NAME: Sudha Lopez   YOB: 1945    ADMISSION DATE: 5/30/2024 10:33 PM      TODAY'S DATE: 6/1/2024    Reason for Consult:  abd pain    Chief Complaint: same    Historian: patient    Requesting Physician:  Lacho Koehler    HISTORY OF PRESENT ILLNESS:              The patient is a 78 y.o. female who underwent right total knee replacement on 5/29/24.  At home fell on evening of 5/30 and broke her left ankle.  Was reduced in ER and admitted.  Following the ankle fracture her family reports developing abdominal pain, distention, nausea and emesis (yesterday).  Imaging showed dilated proximal colon and ascites but did not image pelvis.  No BMs since her initial operation.  Reports typically daily bms.  Has had diverticulitis in the past.  Last colonoscopy a few months ago ([facility]).  Currently on ICU in septic shock with persistent lactic acidosis and pressor requirement.    Past Medical History:        Diagnosis Date    Anxiety     Hypertension        Past Surgical History:        Procedure Laterality Date    BREAST ENHANCEMENT SURGERY      COLONOSCOPY      HAND SURGERY Right     HYSTERECTOMY (CERVIX STATUS UNKNOWN)      INTRACAPSULAR CATARACT EXTRACTION Left 8/12/2020    PHACOEMULSIFICATION OF CATARACT LEFT EYE WITH INTRAOCULAR LENS IMPLANT performed by Anuel Hay MD at [facility] OR    INTRACAPSULAR CATARACT EXTRACTION Right 8/26/2020    PHACOEMULSIFICATION OF CATARACT RIGHT EYE WITH INTRAOCULAR LENS IMPLANT performed by Anuel Hay MD at [facility] OR    RHINOPLASTY      ROTATOR CUFF REPAIR Bilateral     TOTAL HIP ARTHROPLASTY Bilateral        Current Medications:   Current Facility-Administered Medications: dicyclomine (BENTYL) capsule 20 mg, 20 mg, Oral, TID PRN  morphine sulfate (PF) injection 4 mg, 4 mg, IntraVENous, Q4H PRN  [Held by provider] buPROPion (WELLBUTRIN XL) extended release tablet 300 mg, 300 mg, Oral, QAM  [Held by    K 3.9 3.9 4.2   CL 95* 94* 99   CO2 16* 18* 15*   BUN 25* 33* 39*   CREATININE 1.8* 2.5* 2.5*   GLUCOSE 106* 101* 83     Hepatic:   Recent Labs     05/31/24  1017 05/31/24  1520 05/31/24  2249   AST 60* 70* 101*   ALT 24 27 40   BILITOT 1.2* 1.1* 0.8   ALKPHOS 50 44 43     Mag:    No results for input(s): \"MG\" in the last 72 hours.   Phos:   No results for input(s): \"PHOS\" in the last 72 hours.   INR:   Recent Labs     05/31/24  0001   INR 0.96       Radiology Review: Images personally reviewed by me.   CT - dilated fluid filled colon, pelvis not imaged, no pneumatosis or free air  AXR - increased colonic dilatation (13cm)      IMPRESSION/RECOMMENDATIONS:  79 yo with abdominal pain and colonic dilatation  Will get CT of abd/pelvis to see if due to obstruction, constipation, [place]'s, diverticulitis, volvulus.  Pending results could need operation, colonoscopic decompression or more vigorous bowel regimen.  Discussed with patient and family.  Continue abx, ng, volume resuscitation.      Electronically signed by Bertin Jackson MD     [facility]  [id number]    Addendum:  CT resulted with dilatation of colon but no obstruction or volvulus.  No pneumatosis or pneumoperitoneum.  Given recent orthopaedic operation, additional ankle fracture and need for narcotic pain meds this seems consistent with [place]'s syndrome.  Continue bowel regimen and will consult GI for input on possible decompressive colonoscopy.  Will also repeat lactic acid as elevated last night.     Addi Jackson MD

## 2024-06-01 NOTE — PROGRESS NOTES
06/01/24 1926   NIV Type   NIV Started/Stopped On   Equipment Type V60   Mode Bilevel   Mask Type Full face mask   Mask Size Medium   Assessment   Pulse 86   Respirations 16   SpO2 92 %   Comfort Level Good   Using Accessory Muscles No   Mask Compliance Good   Skin Assessment Clean, dry, & intact   Skin Protection for O2 Device Yes   Location Nose   Intervention(s) Skin Barrier   Settings/Measurements   IPAP 20 cmH20   CPAP/EPAP 10 cmH2O   Vt (Measured) 745 mL   Rate Ordered 16   FiO2  100 %   Minute Volume (L/min) 13 Liters   Mask Leak (lpm) 22 lpm   Patient's Home Machine No   Alarm Settings   Alarms On Y

## 2024-06-01 NOTE — PROGRESS NOTES
06/01/24 1548   NIV Type   $NIV $Daily Charge   NIV Started/Stopped On   Assessment   Pulse 93   Respirations 28   BP (!) 92/32   SpO2 (!) 88 %   Settings/Measurements   IPAP 20 cmH20   CPAP/EPAP 20 cmH2O   Vt (Set, mL) 10 mL   Vt (Measured) 555 mL   Rate Ordered 16   FiO2  100 %   I Time/ I Time % 1 s   Minute Volume (L/min) 20 Liters   Mask Leak (lpm) 60 lpm

## 2024-06-01 NOTE — PROGRESS NOTES
PULM/CCM     Patient underwent a CT of the abdomen/pelvis with following findings-IMPRESSION:  Diffuse colonic distention.  Cecum is dilated up to 8.3 cm.  Scattered  colonic diverticula are seen.  No discrete transition point seen.  There is  scattered nonspecific abdominal and pelvic ascites.  As instructed patient underwent urgent colonoscopy with decompression with following findings-    Impression:    -Sigmoid diverticulosis/impacted stool  -Purple/dark color mucosa lining of the colon mainly in the descending colon.  Could be melanosis coli but cannot exclude underlying ischemia.    Patient had postprocedure hypoxemia not improving with nonrebreather facemask and further recent patient was put on BiPAP with oxygen supplementation to support the breathing, nursing was requested to start the patient on pressor infusion if the patient has increased agitation with BiPAP  Patient's clinical status and options were discussed with patient's family in detail    Patient is being supported at this time with BiPAP/oxygen supplementation/IV fluids/pressors/antibiotics  Patient does not have any significant urine output at this time  Patient's NG tube output was 1.1 L in last 24 hours time      X-ray chest ordered for the patient  Lovenox changed to heparin infusion for now  Patient to have a repeat lactic acid levels to assess for any worsening hypoperfusion which will warrant the patient to require urgent evaluation for surgery  Nephrology follow is pending    Case discussed with GI/surgery/ICU team/patient's family in detail    Patient's clinical status remains precarious and critical with potential for further decompensation history monitor closely    Lacho Koehler MD      Addendum-patient's overall clinical status discussed with general surgery in detail along with possible options    Lacho Koehler MD     Isotretinoin Pregnancy And Lactation Text: This medication is Pregnancy Category X and is considered extremely dangerous during pregnancy. It is unknown if it is excreted in breast milk.

## 2024-06-01 NOTE — CONSULTS
Consultation Note    Patient Name: Sudha Lopez  : 1945  Age: 78 y.o.     Admitting Physician: Cal Clay MD   Date of Admission: 2024 10:33 PM   Primary Care Physician: Perla Leyva MD        Sudha Lopez is being seen at the request of Cal Clay MD for colonic distention.    History of Present Illness:  78 year old female with PNH for anxiety/depression. She had RTK replacement and post surgery with in 48 hrs she had a fall resulting in left ankle fracture. She was noted to abdominal pain, distention and imaging study showed colonic distention with cecum of 8.3 cm.  Patient asked    Plan diffuse abdominal pain, lactic acid is elevated.  She had recent colonoscopy in 2024 by Dr. Diego at [facility]        Past Medical History:  Past Medical History:   Diagnosis Date    Anxiety     Hypertension         Past Surgical History:  Past Surgical History:   Procedure Laterality Date    BREAST ENHANCEMENT SURGERY      COLONOSCOPY      HAND SURGERY Right     HYSTERECTOMY (CERVIX STATUS UNKNOWN)      INTRACAPSULAR CATARACT EXTRACTION Left 2020    PHACOEMULSIFICATION OF CATARACT LEFT EYE WITH INTRAOCULAR LENS IMPLANT performed by Anuel Hay MD at [facility] OR    INTRACAPSULAR CATARACT EXTRACTION Right 2020    PHACOEMULSIFICATION OF CATARACT RIGHT EYE WITH INTRAOCULAR LENS IMPLANT performed by Anuel Hay MD at [facility] OR    RHINOPLASTY      ROTATOR CUFF REPAIR Bilateral     TOTAL HIP ARTHROPLASTY Bilateral         Historical Medications:  Prior to Visit Medications    Medication Sig Taking? Authorizing Provider   FLUoxetine (PROZAC) 40 MG capsule Take 1 capsule by mouth daily  Yocasta Latham MD   buPROPion (WELLBUTRIN XL) 300 MG extended release tablet Take 1 tablet by mouth every morning  ProviderYocasta MD   benazepril (LOTENSIN) 40 MG tablet Take 1 tablet by mouth daily  Yocasta Latham MD   amLODIPine (NORVASC) 5 MG tablet Take 1 tablet by  liver anteriorly, most likely cyst or hemangioma.    GI/Bowel:    Moderate stool is seen throughout the colon. Scattered colonic diverticula  are seen. Colon is redundant..  There is diffuse colonic distention seen.  Cecum is dilated up to 8.3 cm.    No significant small bowel distention noted.    There is ill-defined edema seen within the mesentery. Scattered fluid is seen  in the mesentery.    Pelvis: Small amount of free fluid is seen within the pelvis.  No pelvic  adenopathy.  Atherosclerotic change seen in iliacs.  Overall, pelvis not well  evaluated overall secondary to streak artifact    Peritoneum/Retroperitoneum: No aortic aneurysm. Atherosclerotic change seen  in aorta.    Bones/Soft Tissues: There is mild body wall anasarca..  Spurring is seen in  the spine and hips.  Impression: Diffuse colonic distention.  Cecum is dilated up to 8.3 cm.  Scattered  colonic diverticula are seen.  No discrete transition point seen.  There is  scattered nonspecific abdominal and pelvic ascites.  No obvious change  compared to 05/31/2024    Pleural-parenchymal disease at the lung bases.  XR ABDOMEN (KUB) (SINGLE AP VIEW)  Narrative: EXAMINATION:  ONE SUPINE XRAY VIEW(S) OF THE ABDOMEN    5/31/2024 10:04 pm    COMPARISON:  None.    HISTORY:  ORDERING SYSTEM PROVIDED HISTORY: ILEU S  TECHNOLOGIST PROVIDED HISTORY:  PORTABLE  Reason for exam:->ILEU S  Reason for Exam: ileus, abdominal pain    FINDINGS:  [place] catheter is in place.    No gas distended loops of the small bowel are appreciated.  Gas distended  loops of colon are seen, worst in the left upper quadrant measuring up to 13  mm.  No obvious free air or pneumatosis.    No acute osseous abnormality.  Impression: 1. Gas distended loops of colon, worst in the left upper quadrant measuring  up to 13 mm.  Findings may represent distal obstruction versus ileus.  2. No gas distended loops of small bowel appreciated.  XR ANKLE LEFT (MIN 3 VIEWS)  Narrative: EXAMINATION:  THREE

## 2024-06-01 NOTE — PROCEDURES
Central Venous Catheter Insertion Procedure Note    Consent:    Informed consent was obtained for the procedure, including sedation.  Risks of  hemorrhage, , infection, pneumothorax and adverse drug reaction were discussed.    Indication:  Hypotension, Shock, Limited Access     Procedure:        A time out was performed. Patient positioned then site prepared with chlorhexidine, draped. Sterile technique was utilized including gown, mask, drape and gloves. Lidocaine was administered via 25 gauge needle.  18 gauge needle inserted with access of rightfemoral vein .  Wire inserted to 20 cm (  Small incision made in skin, dilator inserted over wire then removed.  Triple lumen inserted to 20 cm, secured and sterile dressing applied. Blood return from all 3 lumens confirmed.     Complications:  None  There were no changes to vital signs. Patient tolerated procedure well.       No immedicate complications.    Estimated blood loss was less than 5 mL    Lacho Koehler MD

## 2024-06-01 NOTE — PROGRESS NOTES
0755 RN notified Dr. Madrigal via perfect serve. When patient got up to go to RR at shift change using the steady after refusing the bed pan and fell forward due to being tired and weak per patient. Patient was Aox4 and conscious during the event, but keep falling/slouching forward. Will continue plan of care. CN notified and aware of ongoing situation.     0810 RN requested MD to come and assess patient. Patient had another syncopal episode and urinated during episode. No further orders at this time. Will continue plan of care.     0951 RN notified Dr. Madrigal patient's BP was 87/53 after recheck. Patient is in a lot of pain. Please advise. MD requested manual bp see chart for results and asked if BP was due to narcotics. Last dose of morphine was given at 0422. Patient then got up at shift change with first syncopal episode at 0730. Last BP check was without additional pain meds and patient is complaining of severe abdominal pain.     1022: RN notified MD patient is complaining of vomiting brown emesis and abdomen is distended. Per family patient is not at her norm. RN requested abdominal scans. MD ordered CT scan abdomen.       Will continue plan of care.     1443 RN called rapid response due to patient's change in vitals and mental status. Manager on floor and aware of ongoing RR. BP was 60/44 after 1500 ml fluid bolus. Patient transferred to 222 after RR. RN gave report to Alexsandra. Will continue plan of care.     Britney Braswell RN

## 2024-06-01 NOTE — SIGNIFICANT EVENT
Patient seen and examined at bedside per RN request.    Lab's and events from earlier in the day reviewed.  Abdomen more distended compared to yesterday, patient in mild to moderate distress.  NG tube currently clamped as patient received medicines.      Blood pressure 99/55, heart rate 108.  Had a lengthy discussion with RN, will repeat labs.  KUB with ileus/distal obstruction.  Consulted general surgery.  Lactic acid still elevated    Per family's request I spoke with family friend Dr. Morgan Cha at [phone], updated him regarding labs,  imaging and management plan.    ADDENDUM.  Repeat labs with WBC 8, downtrending lactic acid of 5, worsening kidney function with creatinine 2.5 and BUN 33.  Blood pressure remains soft with slight increase in Levophed dosing.   Patient with abdominal discomfort, I am worried she just has peripheral lines with increasing Levophed requirements.  Will avoid IV narcotics, will give dose of IV Robaxin. NG to intermittent suction, Close monitoring. Discussed with RN, will order Bentyl.

## 2024-06-01 NOTE — PROGRESS NOTES
Inpatient Note    CC: fall  Summary:   Sudha Lopez is being seen by nephrology for hyponatremia. She is a 78 y.o. female with a PMH significant for hypertension, osteoarthritis who presented to the ED 5/30/2024 after a fall. She was noted to have a displaced left ankle fracture s/p reduction in the ED. She is also s/p recent right total knee replacement 5/29/2024. She is noted to have acute on chronic hyponatremia with Na down to 126 at time of consult. She also complained of severe abdominal pain and constipation. She became hypotensive and was noted to have stool imapction and possible ischemic bowel. Due to hypotension she developed an Acute kidney injury (AIRAM).    Interval History  Seen at bedside  BP 90/50, on levo  S/p colonoscopy today, possible ischemic gut. Stool impaction.  Has NGT, 1150 mL output from NGT  Hyponatremia improved, Na 132 but now developed AIRAM  Cr 2.5, only 155 mL UO documented.  UO minimal at this point.  On bicarb infusion 125 mL/h  O2 needs worsening.    Plan:   - critically ill at this point.  - reduce IVF to 100 mL/h since she is anuric at this time,  - pressors to support circulation  - trend lactate and renal panel. May need abdominal surgery if she truly has ischemic bowel.  - at this time no acute indication for RRT but if electrolytes, fluid status worsen she may require CRRT.    Assessment:   AIRAM:  - baseline Cr 0.9  - Cr was at baseline on admission but began trending up due to hypotension and abdominal pain  - required pressors, endoscopic bowel decompression.  - AIRAM 2/2 hemodynamic insult.    Acute metabolic acidosis:  - 2/2 lactic acidosis and sepsis. Possible ischemic gut    Hyponatremia:  - Na 126 at time of consult.  - Na in the last year has ranged 129-135  - on HCTZ 12.5 mg, celecoxib, bupropion PTA as possible contributors  - acute drop in Na due to pain from fracture and recent surgery.  - urine studies

## 2024-06-01 NOTE — PROGRESS NOTES
Discussed colonoscopy findings with gen surgery: bowel ischemia is not clear. Gen surgery recommends: resuscitation with iv fluids bowel regiment to get cleaned out and decompressed. And recommended to trend labs.

## 2024-06-01 NOTE — PROGRESS NOTES
06/01/24 1548   NIV Type   $NIV $Daily Charge   NIV Started/Stopped On   Assessment   Pulse 93   Respirations 28   BP (!) 92/32   SpO2 (!) 88 %   Settings/Measurements   IPAP 20 cmH20   CPAP/EPAP 20 cmH2O   Vt (Set, mL) 10 mL   Vt (Measured) 555 mL   Rate Ordered 16   FiO2  100 %   I Time/ I Time % 1 s   Minute Volume (L/min) 20 Liters   Mask Leak (lpm) 60 lpm   Alarm Settings   Alarms On Y   Low Pressure (cmH2O) 8 cmH2O   High Pressure (cmH2O) 30 cmH2O   Delay Alarm 20 sec(s)   RR Low (bpm) 6   RR High (bpm) 40 br/min     Patient not responsive moaning on nrb mask post procedure with spo2's in 80-85%  Placed on the bipap

## 2024-06-02 ENCOUNTER — ANESTHESIA EVENT (OUTPATIENT)
Dept: OPERATING ROOM | Age: 79
End: 2024-06-02
Payer: MEDICARE

## 2024-06-02 ENCOUNTER — APPOINTMENT (OUTPATIENT)
Dept: GENERAL RADIOLOGY | Age: 79
End: 2024-06-02
Payer: MEDICARE

## 2024-06-02 ENCOUNTER — ANESTHESIA (OUTPATIENT)
Dept: OPERATING ROOM | Age: 79
End: 2024-06-02
Payer: MEDICARE

## 2024-06-02 PROBLEM — K56.609 LARGE BOWEL OBSTRUCTION (HCC): Status: ACTIVE | Noted: 2024-05-30

## 2024-06-02 PROBLEM — J95.2 ACUTE POSTOPERATIVE PULMONARY INSUFFICIENCY (HCC): Status: ACTIVE | Noted: 2024-06-02

## 2024-06-02 PROBLEM — R79.89 ELEVATED LFTS: Status: ACTIVE | Noted: 2024-06-02

## 2024-06-02 PROBLEM — Z90.49 STATUS POST LAPAROSCOPIC COLECTOMY: Status: ACTIVE | Noted: 2024-06-02

## 2024-06-02 PROBLEM — K55.9 ISCHEMIC BOWEL DISEASE (HCC): Status: ACTIVE | Noted: 2024-06-02

## 2024-06-02 LAB
ABO + RH BLD: NORMAL
ALBUMIN SERPL-MCNC: 2.1 G/DL (ref 3.4–5)
ALBUMIN SERPL-MCNC: 2.5 G/DL (ref 3.4–5)
ALBUMIN SERPL-MCNC: 2.6 G/DL (ref 3.4–5)
ANION GAP SERPL CALCULATED.3IONS-SCNC: 15 MMOL/L (ref 3–16)
ANION GAP SERPL CALCULATED.3IONS-SCNC: 15 MMOL/L (ref 3–16)
ANION GAP SERPL CALCULATED.3IONS-SCNC: 16 MMOL/L (ref 3–16)
ANION GAP SERPL CALCULATED.3IONS-SCNC: 16 MMOL/L (ref 3–16)
ANION GAP SERPL CALCULATED.3IONS-SCNC: 17 MMOL/L (ref 3–16)
ANISOCYTOSIS BLD QL SMEAR: ABNORMAL
ANTI-XA UNFRAC HEPARIN: 0.59 IU/ML (ref 0.3–0.7)
BASE EXCESS BLDA CALC-SCNC: -2 MMOL/L (ref -3–3)
BASE EXCESS BLDA CALC-SCNC: -3 MMOL/L (ref -3–3)
BASE EXCESS BLDA CALC-SCNC: -4 MMOL/L (ref -3–3)
BASE EXCESS BLDA CALC-SCNC: -4 MMOL/L (ref -3–3)
BASE EXCESS BLDA CALC-SCNC: -6 MMOL/L (ref -3–3)
BASOPHILS # BLD: 0 K/UL (ref 0–0.2)
BASOPHILS NFR BLD: 0 %
BLD GP AB SCN SERPL QL: NORMAL
BUN SERPL-MCNC: 49 MG/DL (ref 7–20)
BUN SERPL-MCNC: 52 MG/DL (ref 7–20)
BUN SERPL-MCNC: 53 MG/DL (ref 7–20)
BUN SERPL-MCNC: 54 MG/DL (ref 7–20)
BUN SERPL-MCNC: 56 MG/DL (ref 7–20)
BURR CELLS BLD QL SMEAR: ABNORMAL
CA-I BLD-SCNC: 0.91 MMOL/L (ref 1.12–1.32)
CA-I BLD-SCNC: 0.95 MMOL/L (ref 1.12–1.32)
CA-I BLD-SCNC: 0.96 MMOL/L (ref 1.12–1.32)
CA-I BLD-SCNC: 1.01 MMOL/L (ref 1.12–1.32)
CALCIUM SERPL-MCNC: 6.7 MG/DL (ref 8.3–10.6)
CALCIUM SERPL-MCNC: 7.1 MG/DL (ref 8.3–10.6)
CALCIUM SERPL-MCNC: 7.2 MG/DL (ref 8.3–10.6)
CALCIUM SERPL-MCNC: 7.3 MG/DL (ref 8.3–10.6)
CALCIUM SERPL-MCNC: 7.9 MG/DL (ref 8.3–10.6)
CHLORIDE SERPL-SCNC: 94 MMOL/L (ref 99–110)
CHLORIDE SERPL-SCNC: 97 MMOL/L (ref 99–110)
CHLORIDE SERPL-SCNC: 98 MMOL/L (ref 99–110)
CHLORIDE SERPL-SCNC: 99 MMOL/L (ref 99–110)
CHLORIDE SERPL-SCNC: 99 MMOL/L (ref 99–110)
CO2 BLDA-SCNC: 21 MMOL/L
CO2 BLDA-SCNC: 23 MMOL/L
CO2 BLDA-SCNC: 23 MMOL/L
CO2 BLDA-SCNC: 25 MMOL/L
CO2 BLDA-SCNC: 25 MMOL/L
CO2 SERPL-SCNC: 18 MMOL/L (ref 21–32)
CO2 SERPL-SCNC: 19 MMOL/L (ref 21–32)
CO2 SERPL-SCNC: 19 MMOL/L (ref 21–32)
CO2 SERPL-SCNC: 21 MMOL/L (ref 21–32)
CO2 SERPL-SCNC: 22 MMOL/L (ref 21–32)
CREAT SERPL-MCNC: 2.3 MG/DL (ref 0.6–1.2)
CREAT SERPL-MCNC: 2.5 MG/DL (ref 0.6–1.2)
CREAT SERPL-MCNC: 2.5 MG/DL (ref 0.6–1.2)
CREAT SERPL-MCNC: 2.6 MG/DL (ref 0.6–1.2)
CREAT SERPL-MCNC: 2.8 MG/DL (ref 0.6–1.2)
DEPRECATED RDW RBC AUTO: 13.7 % (ref 12.4–15.4)
EOSINOPHIL # BLD: 0 K/UL (ref 0–0.6)
EOSINOPHIL NFR BLD: 0 %
GFR SERPLBLD CREATININE-BSD FMLA CKD-EPI: 17 ML/MIN/{1.73_M2}
GFR SERPLBLD CREATININE-BSD FMLA CKD-EPI: 18 ML/MIN/{1.73_M2}
GFR SERPLBLD CREATININE-BSD FMLA CKD-EPI: 19 ML/MIN/{1.73_M2}
GFR SERPLBLD CREATININE-BSD FMLA CKD-EPI: 19 ML/MIN/{1.73_M2}
GFR SERPLBLD CREATININE-BSD FMLA CKD-EPI: 21 ML/MIN/{1.73_M2}
GLUCOSE BLD-MCNC: 102 MG/DL (ref 70–99)
GLUCOSE BLD-MCNC: 103 MG/DL (ref 70–99)
GLUCOSE BLD-MCNC: 57 MG/DL (ref 70–99)
GLUCOSE BLD-MCNC: 87 MG/DL (ref 70–99)
GLUCOSE BLD-MCNC: 92 MG/DL (ref 70–99)
GLUCOSE SERPL-MCNC: 106 MG/DL (ref 70–99)
GLUCOSE SERPL-MCNC: 192 MG/DL (ref 70–99)
GLUCOSE SERPL-MCNC: 67 MG/DL (ref 70–99)
GLUCOSE SERPL-MCNC: 75 MG/DL (ref 70–99)
GLUCOSE SERPL-MCNC: 96 MG/DL (ref 70–99)
HCO3 BLDA-SCNC: 19.7 MMOL/L (ref 21–29)
HCO3 BLDA-SCNC: 21.9 MMOL/L (ref 21–29)
HCO3 BLDA-SCNC: 22 MMOL/L (ref 21–29)
HCO3 BLDA-SCNC: 23.1 MMOL/L (ref 21–29)
HCO3 BLDA-SCNC: 23.7 MMOL/L (ref 21–29)
HCT VFR BLD AUTO: 25.9 % (ref 36–48)
HCT VFR BLD AUTO: 26 % (ref 36–48)
HCT VFR BLD AUTO: 29 % (ref 36–48)
HCT VFR BLD AUTO: 33 % (ref 36–48)
HCT VFR BLD AUTO: 33 % (ref 36–48)
HGB BLD CALC-MCNC: 11.1 GM/DL (ref 12–16)
HGB BLD CALC-MCNC: 11.3 GM/DL (ref 12–16)
HGB BLD CALC-MCNC: 8.7 GM/DL (ref 12–16)
HGB BLD CALC-MCNC: 9.8 GM/DL (ref 12–16)
HGB BLD-MCNC: 8.9 G/DL (ref 12–16)
HYPOCHROMIA BLD QL SMEAR: ABNORMAL
INR PPP: 2.19 (ref 0.85–1.15)
INR PPP: 2.27 (ref 0.85–1.15)
LACTATE BLD-SCNC: 2.33 MMOL/L (ref 0.4–2)
LACTATE BLD-SCNC: 2.41 MMOL/L (ref 0.4–2)
LACTATE BLD-SCNC: 2.52 MMOL/L (ref 0.4–2)
LACTATE BLD-SCNC: 2.63 MMOL/L (ref 0.4–2)
LACTATE BLDV-SCNC: 2.7 MMOL/L (ref 0.4–2)
LACTATE BLDV-SCNC: 3.4 MMOL/L (ref 0.4–2)
LYMPHOCYTES # BLD: 1.6 K/UL (ref 1–5.1)
LYMPHOCYTES NFR BLD: 22 %
MACROCYTES BLD QL SMEAR: ABNORMAL
MCH RBC QN AUTO: 31.4 PG (ref 26–34)
MCHC RBC AUTO-ENTMCNC: 34.3 G/DL (ref 31–36)
MCV RBC AUTO: 91.6 FL (ref 80–100)
MICROCYTES BLD QL SMEAR: ABNORMAL
MONOCYTES # BLD: 1 K/UL (ref 0–1.3)
MONOCYTES NFR BLD: 18 %
MRSA DNA SPEC QL NAA+PROBE: NORMAL
NEUTROPHILS # BLD: 2.7 K/UL (ref 1.7–7.7)
NEUTROPHILS NFR BLD: 32 %
NEUTS BAND NFR BLD MANUAL: 19 % (ref 0–7)
NEUTS VAC BLD QL SMEAR: PRESENT
OVALOCYTES BLD QL SMEAR: ABNORMAL
PATH INTERP BLD-IMP: NO
PCO2 BLDA: 32.8 MM HG (ref 35–45)
PCO2 BLDA: 38 MM HG (ref 35–45)
PCO2 BLDA: 38.4 MM HG (ref 35–45)
PCO2 BLDA: 50.6 MM HG (ref 35–45)
PCO2 BLDA: 54.2 MM HG (ref 35–45)
PERFORMED ON: ABNORMAL
PERFORMED ON: NORMAL
PH BLDA: 7.25 [PH] (ref 7.35–7.45)
PH BLDA: 7.27 [PH] (ref 7.35–7.45)
PH BLDA: 7.32 [PH] (ref 7.35–7.45)
PH BLDA: 7.37 [PH] (ref 7.35–7.45)
PH BLDA: 7.43 [PH] (ref 7.35–7.45)
PHOSPHATE SERPL-MCNC: 5.4 MG/DL (ref 2.5–4.9)
PHOSPHATE SERPL-MCNC: 6.7 MG/DL (ref 2.5–4.9)
PHOSPHATE SERPL-MCNC: 7.8 MG/DL (ref 2.5–4.9)
PLATELET # BLD AUTO: 175 K/UL (ref 135–450)
PLATELET BLD QL SMEAR: ADEQUATE
PMV BLD AUTO: 8.6 FL (ref 5–10.5)
PO2 BLDA: 127.2 MM HG (ref 75–108)
PO2 BLDA: 176.7 MM HG (ref 75–108)
PO2 BLDA: 60.4 MM HG (ref 75–108)
PO2 BLDA: 72.2 MM HG (ref 75–108)
PO2 BLDA: 90.8 MM HG (ref 75–108)
POC SAMPLE TYPE: ABNORMAL
POIKILOCYTOSIS BLD QL SMEAR: ABNORMAL
POLYCHROMASIA BLD QL SMEAR: ABNORMAL
POTASSIUM BLD-SCNC: 4.4 MMOL/L (ref 3.5–5.1)
POTASSIUM BLD-SCNC: 5 MMOL/L (ref 3.5–5.1)
POTASSIUM SERPL-SCNC: 4.1 MMOL/L (ref 3.5–5.1)
POTASSIUM SERPL-SCNC: 4.5 MMOL/L (ref 3.5–5.1)
POTASSIUM SERPL-SCNC: 4.6 MMOL/L (ref 3.5–5.1)
POTASSIUM SERPL-SCNC: 4.6 MMOL/L (ref 3.5–5.1)
POTASSIUM SERPL-SCNC: 4.9 MMOL/L (ref 3.5–5.1)
PROTHROMBIN TIME: 24.4 SEC (ref 11.9–14.9)
PROTHROMBIN TIME: 25.1 SEC (ref 11.9–14.9)
RBC # BLD AUTO: 2.83 M/UL (ref 4–5.2)
SAO2 % BLDA: 100 % (ref 93–100)
SAO2 % BLDA: 86 % (ref 93–100)
SAO2 % BLDA: 94 % (ref 93–100)
SAO2 % BLDA: 97 % (ref 93–100)
SAO2 % BLDA: 98 % (ref 93–100)
SLIDE REVIEW: ABNORMAL
SODIUM BLD-SCNC: 133 MMOL/L (ref 136–145)
SODIUM BLD-SCNC: 133 MMOL/L (ref 136–145)
SODIUM BLD-SCNC: 134 MMOL/L (ref 136–145)
SODIUM BLD-SCNC: 136 MMOL/L (ref 136–145)
SODIUM SERPL-SCNC: 131 MMOL/L (ref 136–145)
SODIUM SERPL-SCNC: 133 MMOL/L (ref 136–145)
SODIUM SERPL-SCNC: 135 MMOL/L (ref 136–145)
TARGETS BLD QL SMEAR: ABNORMAL
TOXIC GRANULES BLD QL SMEAR: PRESENT
VARIANT LYMPHS NFR BLD MANUAL: 9 % (ref 0–6)
WBC # BLD AUTO: 5.3 K/UL (ref 4–11)

## 2024-06-02 PROCEDURE — 82947 ASSAY GLUCOSE BLOOD QUANT: CPT

## 2024-06-02 PROCEDURE — 2580000003 HC RX 258: Performed by: SURGERY

## 2024-06-02 PROCEDURE — 6360000002 HC RX W HCPCS: Performed by: SURGERY

## 2024-06-02 PROCEDURE — 3600000014 HC SURGERY LEVEL 4 ADDTL 15MIN: Performed by: SURGERY

## 2024-06-02 PROCEDURE — 80069 RENAL FUNCTION PANEL: CPT

## 2024-06-02 PROCEDURE — 2580000003 HC RX 258: Performed by: ANESTHESIOLOGY

## 2024-06-02 PROCEDURE — 99291 CRITICAL CARE FIRST HOUR: CPT | Performed by: INTERNAL MEDICINE

## 2024-06-02 PROCEDURE — 83605 ASSAY OF LACTIC ACID: CPT

## 2024-06-02 PROCEDURE — 82330 ASSAY OF CALCIUM: CPT

## 2024-06-02 PROCEDURE — 2720000010 HC SURG SUPPLY STERILE: Performed by: SURGERY

## 2024-06-02 PROCEDURE — 74018 RADEX ABDOMEN 1 VIEW: CPT

## 2024-06-02 PROCEDURE — 2580000003 HC RX 258

## 2024-06-02 PROCEDURE — 99232 SBSQ HOSP IP/OBS MODERATE 35: CPT | Performed by: SURGERY

## 2024-06-02 PROCEDURE — 6360000002 HC RX W HCPCS: Performed by: ANESTHESIOLOGY

## 2024-06-02 PROCEDURE — C9113 INJ PANTOPRAZOLE SODIUM, VIA: HCPCS | Performed by: SURGERY

## 2024-06-02 PROCEDURE — 84132 ASSAY OF SERUM POTASSIUM: CPT

## 2024-06-02 PROCEDURE — 84295 ASSAY OF SERUM SODIUM: CPT

## 2024-06-02 PROCEDURE — 0BH17EZ INSERTION OF ENDOTRACHEAL AIRWAY INTO TRACHEA, VIA NATURAL OR ARTIFICIAL OPENING: ICD-10-PCS | Performed by: INTERNAL MEDICINE

## 2024-06-02 PROCEDURE — 94002 VENT MGMT INPAT INIT DAY: CPT

## 2024-06-02 PROCEDURE — 37799 UNLISTED PX VASCULAR SURGERY: CPT

## 2024-06-02 PROCEDURE — 2500000003 HC RX 250 WO HCPCS: Performed by: ANESTHESIOLOGY

## 2024-06-02 PROCEDURE — 2500000003 HC RX 250 WO HCPCS: Performed by: SURGERY

## 2024-06-02 PROCEDURE — 2000000000 HC ICU R&B

## 2024-06-02 PROCEDURE — 2500000003 HC RX 250 WO HCPCS: Performed by: INTERNAL MEDICINE

## 2024-06-02 PROCEDURE — P9045 ALBUMIN (HUMAN), 5%, 250 ML: HCPCS | Performed by: INTERNAL MEDICINE

## 2024-06-02 PROCEDURE — 2709999900 HC NON-CHARGEABLE SUPPLY: Performed by: SURGERY

## 2024-06-02 PROCEDURE — 85014 HEMATOCRIT: CPT

## 2024-06-02 PROCEDURE — 85520 HEPARIN ASSAY: CPT

## 2024-06-02 PROCEDURE — 6370000000 HC RX 637 (ALT 250 FOR IP): Performed by: INTERNAL MEDICINE

## 2024-06-02 PROCEDURE — 5A1945Z RESPIRATORY VENTILATION, 24-96 CONSECUTIVE HOURS: ICD-10-PCS | Performed by: INTERNAL MEDICINE

## 2024-06-02 PROCEDURE — 3600000004 HC SURGERY LEVEL 4 BASE: Performed by: SURGERY

## 2024-06-02 PROCEDURE — 6360000002 HC RX W HCPCS: Performed by: INTERNAL MEDICINE

## 2024-06-02 PROCEDURE — 2500000003 HC RX 250 WO HCPCS

## 2024-06-02 PROCEDURE — 3700000000 HC ANESTHESIA ATTENDED CARE: Performed by: SURGERY

## 2024-06-02 PROCEDURE — 2580000003 HC RX 258: Performed by: INTERNAL MEDICINE

## 2024-06-02 PROCEDURE — 86901 BLOOD TYPING SEROLOGIC RH(D): CPT

## 2024-06-02 PROCEDURE — 94761 N-INVAS EAR/PLS OXIMETRY MLT: CPT

## 2024-06-02 PROCEDURE — 88309 TISSUE EXAM BY PATHOLOGIST: CPT

## 2024-06-02 PROCEDURE — 2580000003 HC RX 258: Performed by: HOSPITALIST

## 2024-06-02 PROCEDURE — 71045 X-RAY EXAM CHEST 1 VIEW: CPT

## 2024-06-02 PROCEDURE — 0DTH0ZZ RESECTION OF CECUM, OPEN APPROACH: ICD-10-PCS | Performed by: SURGERY

## 2024-06-02 PROCEDURE — 85610 PROTHROMBIN TIME: CPT

## 2024-06-02 PROCEDURE — 31500 INSERT EMERGENCY AIRWAY: CPT

## 2024-06-02 PROCEDURE — 85025 COMPLETE CBC W/AUTO DIFF WBC: CPT

## 2024-06-02 PROCEDURE — A4217 STERILE WATER/SALINE, 500 ML: HCPCS | Performed by: SURGERY

## 2024-06-02 PROCEDURE — 82803 BLOOD GASES ANY COMBINATION: CPT

## 2024-06-02 PROCEDURE — 2500000003 HC RX 250 WO HCPCS: Performed by: STUDENT IN AN ORGANIZED HEALTH CARE EDUCATION/TRAINING PROGRAM

## 2024-06-02 PROCEDURE — 6360000002 HC RX W HCPCS: Performed by: HOSPITALIST

## 2024-06-02 PROCEDURE — 2700000000 HC OXYGEN THERAPY PER DAY

## 2024-06-02 PROCEDURE — 3700000001 HC ADD 15 MINUTES (ANESTHESIA): Performed by: SURGERY

## 2024-06-02 PROCEDURE — 86850 RBC ANTIBODY SCREEN: CPT

## 2024-06-02 PROCEDURE — 0D1B0Z4 BYPASS ILEUM TO CUTANEOUS, OPEN APPROACH: ICD-10-PCS | Performed by: SURGERY

## 2024-06-02 PROCEDURE — 86900 BLOOD TYPING SEROLOGIC ABO: CPT

## 2024-06-02 PROCEDURE — 44150 REMOVAL OF COLON: CPT | Performed by: SURGERY

## 2024-06-02 PROCEDURE — 2580000003 HC RX 258: Performed by: STUDENT IN AN ORGANIZED HEALTH CARE EDUCATION/TRAINING PROGRAM

## 2024-06-02 PROCEDURE — 86923 COMPATIBILITY TEST ELECTRIC: CPT

## 2024-06-02 RX ORDER — ROCURONIUM BROMIDE 10 MG/ML
INJECTION, SOLUTION INTRAVENOUS PRN
Status: DISCONTINUED | OUTPATIENT
Start: 2024-06-02 | End: 2024-06-02 | Stop reason: SDUPTHER

## 2024-06-02 RX ORDER — MAGNESIUM HYDROXIDE 1200 MG/15ML
LIQUID ORAL CONTINUOUS PRN
Status: COMPLETED | OUTPATIENT
Start: 2024-06-02 | End: 2024-06-02

## 2024-06-02 RX ORDER — POLYVINYL ALCOHOL 14 MG/ML
1 SOLUTION/ DROPS OPHTHALMIC EVERY 4 HOURS
Status: DISCONTINUED | OUTPATIENT
Start: 2024-06-02 | End: 2024-06-02

## 2024-06-02 RX ORDER — CHLORHEXIDINE GLUCONATE ORAL RINSE 1.2 MG/ML
15 SOLUTION DENTAL 2 TIMES DAILY
Status: DISCONTINUED | OUTPATIENT
Start: 2024-06-02 | End: 2024-06-03

## 2024-06-02 RX ORDER — SODIUM CHLORIDE 9 MG/ML
INJECTION, SOLUTION INTRAVENOUS CONTINUOUS PRN
Status: DISCONTINUED | OUTPATIENT
Start: 2024-06-02 | End: 2024-06-02 | Stop reason: SDUPTHER

## 2024-06-02 RX ORDER — FENTANYL CITRATE-0.9 % NACL/PF 20 MCG/2ML
50 SYRINGE (ML) INTRAVENOUS EVERY 30 MIN PRN
Status: DISCONTINUED | OUTPATIENT
Start: 2024-06-02 | End: 2024-06-03

## 2024-06-02 RX ORDER — ETOMIDATE 2 MG/ML
INJECTION INTRAVENOUS
Status: COMPLETED | OUTPATIENT
Start: 2024-06-02 | End: 2024-06-02

## 2024-06-02 RX ORDER — PROPOFOL 10 MG/ML
5-50 INJECTION, EMULSION INTRAVENOUS
Status: DISCONTINUED | OUTPATIENT
Start: 2024-06-02 | End: 2024-06-03

## 2024-06-02 RX ORDER — SODIUM CHLORIDE 9 MG/ML
INJECTION, SOLUTION INTRAVENOUS PRN
Status: COMPLETED | OUTPATIENT
Start: 2024-06-02 | End: 2024-06-15

## 2024-06-02 RX ORDER — MINERAL OIL AND WHITE PETROLATUM 150; 830 MG/G; MG/G
OINTMENT OPHTHALMIC EVERY 4 HOURS
Status: DISCONTINUED | OUTPATIENT
Start: 2024-06-02 | End: 2024-06-03

## 2024-06-02 RX ORDER — DEXTROSE MONOHYDRATE 25 G/50ML
25 INJECTION, SOLUTION INTRAVENOUS EVERY 6 HOURS PRN
Status: DISCONTINUED | OUTPATIENT
Start: 2024-06-02 | End: 2024-06-03

## 2024-06-02 RX ORDER — EPINEPHRINE 1 MG/ML
INJECTION, SOLUTION, CONCENTRATE INTRAVENOUS PRN
Status: DISCONTINUED | OUTPATIENT
Start: 2024-06-02 | End: 2024-06-02 | Stop reason: SDUPTHER

## 2024-06-02 RX ORDER — ALBUMIN, HUMAN INJ 5% 5 %
25 SOLUTION INTRAVENOUS ONCE
Status: COMPLETED | OUTPATIENT
Start: 2024-06-02 | End: 2024-06-02

## 2024-06-02 RX ORDER — PHENYLEPHRINE HCL IN 0.9% NACL 1 MG/10 ML
SYRINGE (ML) INTRAVENOUS PRN
Status: DISCONTINUED | OUTPATIENT
Start: 2024-06-02 | End: 2024-06-02 | Stop reason: SDUPTHER

## 2024-06-02 RX ORDER — FENTANYL CITRATE-0.9 % NACL/PF 10 MCG/ML
25-200 PLASTIC BAG, INJECTION (ML) INTRAVENOUS CONTINUOUS
Status: DISCONTINUED | OUTPATIENT
Start: 2024-06-02 | End: 2024-06-03

## 2024-06-02 RX ORDER — ROCURONIUM BROMIDE 10 MG/ML
INJECTION, SOLUTION INTRAVENOUS
Status: COMPLETED | OUTPATIENT
Start: 2024-06-02 | End: 2024-06-02

## 2024-06-02 RX ORDER — FENTANYL CITRATE-0.9 % NACL/PF 20 MCG/2ML
50 SYRINGE (ML) INTRAVENOUS EVERY 30 MIN PRN
Status: DISCONTINUED | OUTPATIENT
Start: 2024-06-02 | End: 2024-06-02

## 2024-06-02 RX ORDER — CALCIUM CHLORIDE 100 MG/ML
INJECTION INTRAVENOUS; INTRAVENTRICULAR PRN
Status: DISCONTINUED | OUTPATIENT
Start: 2024-06-02 | End: 2024-06-02 | Stop reason: SDUPTHER

## 2024-06-02 RX ORDER — FENTANYL CITRATE-0.9 % NACL/PF 10 MCG/ML
25-200 PLASTIC BAG, INJECTION (ML) INTRAVENOUS
Status: DISCONTINUED | OUTPATIENT
Start: 2024-06-02 | End: 2024-06-02

## 2024-06-02 RX ORDER — SODIUM CHLORIDE, SODIUM LACTATE, POTASSIUM CHLORIDE, CALCIUM CHLORIDE 600; 310; 30; 20 MG/100ML; MG/100ML; MG/100ML; MG/100ML
INJECTION, SOLUTION INTRAVENOUS CONTINUOUS PRN
Status: DISCONTINUED | OUTPATIENT
Start: 2024-06-02 | End: 2024-06-02

## 2024-06-02 RX ORDER — FUROSEMIDE 10 MG/ML
60 INJECTION INTRAMUSCULAR; INTRAVENOUS ONCE
Status: COMPLETED | OUTPATIENT
Start: 2024-06-02 | End: 2024-06-02

## 2024-06-02 RX ADMIN — ETOMIDATE 20 MG: 2 INJECTION INTRAVENOUS at 01:42

## 2024-06-02 RX ADMIN — ALBUMIN (HUMAN) 25 G: 12.5 INJECTION, SOLUTION INTRAVENOUS at 05:24

## 2024-06-02 RX ADMIN — SODIUM CHLORIDE, PRESERVATIVE FREE 10 ML: 5 INJECTION INTRAVENOUS at 19:46

## 2024-06-02 RX ADMIN — POLYVINYL ALCOHOL, POVIDONE 1 DROP: 14; 6 SOLUTION/ DROPS OPHTHALMIC at 20:45

## 2024-06-02 RX ADMIN — MINERAL OIL AND WHITE PETROLATUM: 30; 940 OINTMENT OPHTHALMIC at 20:45

## 2024-06-02 RX ADMIN — SODIUM CHLORIDE 22 MCG/MIN: 9 INJECTION, SOLUTION INTRAVENOUS at 14:34

## 2024-06-02 RX ADMIN — Medication 100 MCG: at 07:59

## 2024-06-02 RX ADMIN — THIAMINE HYDROCHLORIDE 100 MG: 100 INJECTION, SOLUTION INTRAMUSCULAR; INTRAVENOUS at 11:04

## 2024-06-02 RX ADMIN — CALCIUM GLUCONATE 2000 MG: 20 INJECTION, SOLUTION INTRAVENOUS at 00:04

## 2024-06-02 RX ADMIN — SODIUM CHLORIDE 30 MCG/MIN: 9 INJECTION, SOLUTION INTRAVENOUS at 03:12

## 2024-06-02 RX ADMIN — SODIUM CHLORIDE: 9 INJECTION, SOLUTION INTRAVENOUS at 08:04

## 2024-06-02 RX ADMIN — MEROPENEM 1000 MG: 1 INJECTION, POWDER, FOR SOLUTION INTRAVENOUS at 12:17

## 2024-06-02 RX ADMIN — SODIUM BICARBONATE 50 MEQ: 84 INJECTION, SOLUTION INTRAVENOUS at 07:45

## 2024-06-02 RX ADMIN — CALCIUM CHLORIDE 1 G: 100 INJECTION, SOLUTION INTRAVENOUS at 07:14

## 2024-06-02 RX ADMIN — MEROPENEM 500 MG: 500 INJECTION, POWDER, FOR SOLUTION INTRAVENOUS at 22:27

## 2024-06-02 RX ADMIN — Medication 75 MCG/HR: at 20:44

## 2024-06-02 RX ADMIN — CHLORHEXIDINE GLUCONATE 15 ML: 1.2 RINSE ORAL at 19:51

## 2024-06-02 RX ADMIN — EPINEPHRINE 20 MCG: 1 INJECTION, SOLUTION, CONCENTRATE INTRAVENOUS at 08:00

## 2024-06-02 RX ADMIN — MINERAL OIL AND WHITE PETROLATUM: 30; 940 OINTMENT OPHTHALMIC at 16:48

## 2024-06-02 RX ADMIN — SODIUM CHLORIDE: 9 INJECTION, SOLUTION INTRAVENOUS at 12:16

## 2024-06-02 RX ADMIN — SODIUM CHLORIDE: 9 INJECTION, SOLUTION INTRAVENOUS at 07:00

## 2024-06-02 RX ADMIN — POLYVINYL ALCOHOL, POVIDONE 1 DROP: 14; 6 SOLUTION/ DROPS OPHTHALMIC at 18:27

## 2024-06-02 RX ADMIN — PANTOPRAZOLE SODIUM 40 MG: 40 INJECTION, POWDER, FOR SOLUTION INTRAVENOUS at 11:04

## 2024-06-02 RX ADMIN — Medication 100 MCG: at 07:57

## 2024-06-02 RX ADMIN — HYDROMORPHONE HYDROCHLORIDE 1 MG: 1 INJECTION, SOLUTION INTRAMUSCULAR; INTRAVENOUS; SUBCUTANEOUS at 00:37

## 2024-06-02 RX ADMIN — DEXTROSE MONOHYDRATE: 100 INJECTION, SOLUTION INTRAVENOUS at 05:40

## 2024-06-02 RX ADMIN — SODIUM BICARBONATE 50 MEQ: 84 INJECTION INTRAVENOUS at 19:51

## 2024-06-02 RX ADMIN — CEFEPIME 1000 MG: 1 INJECTION, POWDER, FOR SOLUTION INTRAMUSCULAR; INTRAVENOUS at 04:30

## 2024-06-02 RX ADMIN — PROPOFOL 20 MCG/KG/MIN: 10 INJECTION, EMULSION INTRAVENOUS at 10:59

## 2024-06-02 RX ADMIN — SODIUM CHLORIDE: 9 INJECTION, SOLUTION INTRAVENOUS at 08:42

## 2024-06-02 RX ADMIN — VASOPRESSIN 0.03 UNITS/MIN: 20 INJECTION INTRAVENOUS at 18:20

## 2024-06-02 RX ADMIN — VASOPRESSIN 0.03 UNITS/MIN: 20 INJECTION INTRAVENOUS at 04:10

## 2024-06-02 RX ADMIN — ROCURONIUM BROMIDE 100 MG: 50 INJECTION, SOLUTION INTRAVENOUS at 01:38

## 2024-06-02 RX ADMIN — VANCOMYCIN HYDROCHLORIDE 1750 MG: 10 INJECTION, POWDER, LYOPHILIZED, FOR SOLUTION INTRAVENOUS at 06:15

## 2024-06-02 RX ADMIN — PROPOFOL 20 MCG/KG/MIN: 10 INJECTION, EMULSION INTRAVENOUS at 19:52

## 2024-06-02 RX ADMIN — SODIUM CHLORIDE, PRESERVATIVE FREE 10 ML: 5 INJECTION INTRAVENOUS at 11:05

## 2024-06-02 RX ADMIN — EPINEPHRINE 10 MCG: 1 INJECTION, SOLUTION, CONCENTRATE INTRAVENOUS at 08:12

## 2024-06-02 RX ADMIN — Medication 75 MCG/HR: at 16:50

## 2024-06-02 RX ADMIN — ROCURONIUM BROMIDE 50 MG: 50 INJECTION, SOLUTION INTRAVENOUS at 07:12

## 2024-06-02 RX ADMIN — SODIUM BICARBONATE: 84 INJECTION, SOLUTION INTRAVENOUS at 04:10

## 2024-06-02 RX ADMIN — Medication 0.4 MCG/KG/HR: at 04:09

## 2024-06-02 RX ADMIN — Medication 50 MCG/HR: at 10:58

## 2024-06-02 RX ADMIN — FUROSEMIDE 60 MG: 10 INJECTION, SOLUTION INTRAMUSCULAR; INTRAVENOUS at 16:53

## 2024-06-02 RX ADMIN — DEXTROSE MONOHYDRATE 25 G: 25 INJECTION, SOLUTION INTRAVENOUS at 22:27

## 2024-06-02 RX ADMIN — METRONIDAZOLE 500 MG: 500 INJECTION, SOLUTION INTRAVENOUS at 00:38

## 2024-06-02 RX ADMIN — SODIUM CHLORIDE 18 MCG/MIN: 9 INJECTION, SOLUTION INTRAVENOUS at 18:16

## 2024-06-02 RX ADMIN — THIAMINE HYDROCHLORIDE 100 MG: 100 INJECTION, SOLUTION INTRAMUSCULAR; INTRAVENOUS at 19:51

## 2024-06-02 RX ADMIN — SODIUM CHLORIDE: 9 INJECTION, SOLUTION INTRAVENOUS at 08:27

## 2024-06-02 ASSESSMENT — PULMONARY FUNCTION TESTS
PIF_VALUE: 26
PIF_VALUE: 26
PIF_VALUE: 27
PIF_VALUE: 21
PIF_VALUE: 32
PIF_VALUE: 27
PIF_VALUE: 39
PIF_VALUE: 24
PIF_VALUE: 23
PIF_VALUE: 35
PIF_VALUE: 23
PIF_VALUE: 26
PIF_VALUE: 24
PIF_VALUE: 26
PIF_VALUE: 24
PIF_VALUE: 29
PIF_VALUE: 23
PIF_VALUE: 26
PIF_VALUE: 23
PIF_VALUE: 24
PIF_VALUE: 21
PIF_VALUE: 23
PIF_VALUE: 29
PIF_VALUE: 25
PIF_VALUE: 32
PIF_VALUE: 22
PIF_VALUE: 27
PIF_VALUE: 23
PIF_VALUE: 21
PIF_VALUE: 21
PIF_VALUE: 23
PIF_VALUE: 28
PIF_VALUE: 23
PIF_VALUE: 21

## 2024-06-02 NOTE — PROGRESS NOTES
06/02/24 1109   Patient Observation   Pulse 64   Respirations 22   SpO2 96 %   Vent Information   Vent Mode AC/VC   Ventilator Settings   Vt (Set, mL) 450 mL   Resp Rate (Set) 22 bpm   PEEP/CPAP (cmH2O) 5   FiO2  50 %   Vent Patient Data (Readings)   Vt (Measured) 455 mL   Peak Inspiratory Pressure (cmH2O) 23 cmH2O   Rate Measured 22 br/min   Minute Volume (L/min) 10 Liters   Mean Airway Pressure (cmH2O) 10 cmH20   Plateau Pressure (cm H2O) 0 cm H2O   Driving Pressure -5   I:E Ratio 1:2.60   Flow Sensitivity 3 L/min   Vent Alarm Settings   High Pressure (cmH2O) 40 cmH2O   Low Minute Volume (lpm) 2 L/min   High Minute Volume (lpm) 20 L/min   Low Exhaled Vt (ml) 200 mL   High Exhaled Vt (ml) 1000 mL   RR High (bpm) 40 br/min   Apnea (secs) 20 secs   Additional Respiratoray Assessments   Humidification Source HME   Circuit Condensation Not drained   Ambu Bag With Mask At Bedside Yes   ETT    Placement Date/Time: 06/02/24 0147   Present on Admission/Arrival: No  Location: Oral   Secured At 22 cm   Measured From Lips   ETT Placement Center   Secured By Commercial tube tran   Site Assessment Dry   Cuff Pressure 30 cm H2O        Detail Level: Detailed Depth Of Biopsy: dermis Was A Bandage Applied: Yes Size Of Lesion In Cm: 0.4 X Size Of Lesion In Cm: 0 Biopsy Type: H and E Biopsy Method: Personna blade Anesthesia Type: 1% lidocaine with epinephrine Anesthesia Volume In Cc (Will Not Render If 0): 0.5 Hemostasis: Calin's Wound Care: Bacitracin Dressing: bandage Destruction After The Procedure: No Type Of Destruction Used: Curettage Curettage Text: The wound bed was treated with curettage after the biopsy was performed. Cryotherapy Text: The wound bed was treated with cryotherapy after the biopsy was performed. Electrodesiccation Text: The wound bed was treated with electrodesiccation after the biopsy was performed. Electrodesiccation And Curettage Text: The wound bed was treated with electrodesiccation and curettage after the biopsy was performed. Silver Nitrate Text: The wound bed was treated with silver nitrate after the biopsy was performed. Lab: 6 Lab Facility: 3 Consent: Written consent was obtained and risks were reviewed including but not limited to scarring, infection, bleeding, scabbing, incomplete removal, nerve damage and allergy to anesthesia. Post-Care Instructions: I reviewed with the patient in detail post-care instructions. Patient is to keep the biopsy site dry overnight, and then apply Vaseline twice daily and keep covered with band-aid until healed. Notification Instructions: Patient will be notified of biopsy results. However, patient instructed to call the office if not contacted within 2-3 weeks. Billing Type: Third-Party Bill Information: Selecting Yes will display possible errors in your note based on the variables you have selected. This validation is only offered as a suggestion for you. PLEASE NOTE THAT THE VALIDATION TEXT WILL BE REMOVED WHEN YOU FINALIZE YOUR NOTE. IF YOU WANT TO FAX A PRELIMINARY NOTE YOU WILL NEED TO TOGGLE THIS TO 'NO' IF YOU DO NOT WANT IT IN YOUR FAXED NOTE. Size Of Lesion In Cm: 0.3

## 2024-06-02 NOTE — PROGRESS NOTES
06/02/24 0153   Patient Observation   Pulse 88   Respirations 16   SpO2 96 %   Breath Sounds   Right Upper Lobe Clear   Right Middle Lobe Diminished   Right Lower Lobe Diminished   Left Upper Lobe Clear   Left Lower Lobe Diminished   Vent Information   Vent Mode AC/VC   Ventilator Settings   Vt (Set, mL) 450 mL   Resp Rate (Set) 16 bpm   PEEP/CPAP (cmH2O) 8   FiO2  100 %   Vent Patient Data (Readings)   Vt (Measured) 449 mL   Peak Inspiratory Pressure (cmH2O) 26 cmH2O   Rate Measured 16 br/min   Minute Volume (L/min) 7.17 Liters   Mean Airway Pressure (cmH2O) 12 cmH20   Plateau Pressure (cm H2O) 0 cm H2O   Driving Pressure -8   I:E Ratio 1:3.10   Flow Sensitivity 3 L/min   Backup Apnea On   Vent Alarm Settings   High Pressure (cmH2O) 40 cmH2O   Low Minute Volume (lpm) 2 L/min   High Minute Volume (lpm) 22 L/min   Low Exhaled Vt (ml) 200 mL   RR High (bpm) 40 br/min   Apnea (secs) 20 secs   Additional Respiratoray Assessments   Humidification Source HME   Ambu Bag With Mask At Bedside Yes   ETT    Placement Date/Time: 06/02/24 0147   Present on Admission/Arrival: No  Location: Oral   Secured At 22 cm   Measured From Lips   ETT Placement Center   Secured By Commercial tube tran

## 2024-06-02 NOTE — PROGRESS NOTES
Report given to OR RN at this time - all questions answered. Consents signed per eldest daughter Victorina Lopez and are present with the patient. Both daughters at bedside at this time and patient is alert, on the vent, calm, able to nod yes and no to questions - denies pain. ABD still extremely distended and hard. Levo attempted to be titrated down but had to titrate back up to 30mcg bc patient did not tolerate. Albumin given, Vanc to be given per OR.   Electronically signed by Arlene Marcelino RN on 6/2/2024 at 6:08 AM

## 2024-06-02 NOTE — PROGRESS NOTES
06/02/24 0334   Patient Observation   Pulse 74   Respirations 14   SpO2 95 %   Breath Sounds   Right Upper Lobe Clear   Right Middle Lobe Diminished   Right Lower Lobe Diminished   Left Upper Lobe Clear   Left Lower Lobe Diminished   Vent Information   Vent Mode AC/VC   Ventilator Settings   Vt (Set, mL) 450 mL   Resp Rate (Set) 16 bpm   PEEP/CPAP (cmH2O) 8   FiO2  100 %   Vent Patient Data (Readings)   Vt (Measured) 452 mL   Peak Inspiratory Pressure (cmH2O) 26 cmH2O   Rate Measured 16 br/min   Minute Volume (L/min) 7.23 Liters   Mean Airway Pressure (cmH2O) 12 cmH20   Plateau Pressure (cm H2O) 0 cm H2O   Driving Pressure -8   I:E Ratio 1:3.10   Flow Sensitivity 3 L/min   Backup Apnea On   Vent Alarm Settings   High Pressure (cmH2O) 40 cmH2O   Low Minute Volume (lpm) 2 L/min   High Minute Volume (lpm) 22 L/min   Low Exhaled Vt (ml) 200 mL   RR High (bpm) 40 br/min   Apnea (secs) 20 secs   Additional Respiratoray Assessments   Humidification Source HME   Ambu Bag With Mask At Bedside Yes   ETT    Placement Date/Time: 06/02/24 0147   Present on Admission/Arrival: No  Location: Oral   Secured At 22 cm   Measured From Lips   ETT Placement Left   Secured By Commercial tube tran   Site Assessment Dry   Cuff Pressure 30 cm H2O

## 2024-06-02 NOTE — PROGRESS NOTES
Patient given warm tap water enema around 0030. About 400 tap water instilled, 400 mL of light brown water came out of rectal tube. Patient combative, screaming prior to enema - dilaudid given but did not seem to make patient more comfortable. Patient bathed, linens changed. After that, she became very obtunded, not oxygenating well at all on vapo therm. Patient minimally responsive to sternal rub - only a slight grimace. Dr. Proctor notified via perfect serve. Called a rapid response - Dr. Proctor to bedside at that time to intubate patient. Patient intubated and CXR and KUB obtained. Levo requirements increased.     Started monitoring intraABD pressure via Monroe and CVP setup - reading is 21.     Dr. Proctor is going to speak with gen surgery and GI - general surgery paged first then will page GI to Esthela's cell phone number     Electronically signed by Arlene Marcelino RN on 6/2/2024 at 3:04 AM

## 2024-06-02 NOTE — PROGRESS NOTES
06/02/24 1123   Encounter Summary   Encounter Overview/Reason Family Care;Follow-up   Service Provided For Patient and family together  (Sudha appeared to be sleeping at time of encounter. Sudha's nurse was attending to her needs at time of encounter. Sudha's daughter was at bedside. AW)   Referral/Consult From Nurse  (A member of the nursing team asked  to check on Sudha's family. AW)   Support System Family members  (Sudha's daughter was at bedside. AW)   Last Encounter  06/02/24   Complexity of Encounter Low   Begin Time 1102   End Time  1108   Total Time Calculated 6 min   Assessment/Intervention/Outcome   Assessment Concerns with suffering;Stress overload   Intervention Active listening;Sustaining Presence/Ministry of presence   Outcome Engaged in conversation  ( offered ongoing support as needed for daughter as she cares for her mother. AW)

## 2024-06-02 NOTE — PROGRESS NOTES
Shift: [id number]    Admitting diagnosis: Closed fx left ankle     Presentation to hospital: 5/30 - presents with a chief complaint of left ankle injury. The patient had a right knee replacement surgery 5/29.     Surgery: yes    Nursing assessment at handoff  stable    Emergency Contact/POA: Victorina (daughter), Lazara (daughter), Dr. Mora   Family updated: yes at bedside throughout shift     Most recent vitals: BP (!) 130/48   Pulse 63   Temp 97.1 °F (36.2 °C)   Resp 14   Ht 1.6 m (5' 3\")   Wt 83.9 kg (185 lb)   SpO2 92%   BMI 32.77 kg/m²      Rhythm: NSR     NC/HFNC- Vent  Respiratory support: - No ventilator support  - Ventilator Settings:    Vt (Set, mL): 450 mL  Resp Rate (Set): 22 bpm  FiO2 : 40 %    PEEP/CPAP (cmH2O): 5       Vent days: Day 1    Increased O2 requirements: yes     Admission weight Weight - Scale: 83.9 kg (185 lb)  Today's weight   Wt Readings from Last 1 Encounters:   05/31/24 83.9 kg (185 lb)         UOP >30ml/hr: no    Monroe need assessed each shift: yes    Restraints: yes  Order current and documentation up to date?    Lines/Drains  LDA Insertion Date Discontinued Date Dressing Changes   PIV  5/31     TLC  6/1     Arterial  6/1     Monroe  5/31     Vas Cath      ETT  6/2     Surgical drains 6/2       Night Shift Hospitalist Interventions    Problem(Brief) Date Time Intervention Physician contacted                                               Drip rates at handoff:    sodium chloride      fentaNYL 50 mcg/hr (06/02/24 1222)    propofol 15 mcg/kg/min (06/02/24 1222)    dexmedeTOMIDine Stopped (06/02/24 1055)    VASOpressin 20 Units in sodium chloride 0.9 % 100 mL infusion 0.03 Units/min (06/02/24 1222)    dextrose Stopped (06/02/24 1058)    sodium chloride Stopped (06/02/24 1217)    norepinephrine 22 mcg/min (06/02/24 1434)    sodium bicarbonate 75 mEq in sodium chloride 0.45 % 1,000 mL infusion 100 mL/hr at 06/02/24 1222       Hospital Course Daily Updates:    Admit Day 3:   - a/o x4  critical care, general surgery, GI

## 2024-06-02 NOTE — PROGRESS NOTES
separation measures up to 3 mm of inferior displacement.  The medial malleolar fracture extends slightly towards the posterior malleolus on page 39 of series 602 otherwise no large posterior malleolar component. Maintained alignment of the subtalar joint and tibiotalar joint. Soft Tissue: Soft tissue edema compatible with fracture.  No well-defined loculated fluid collections.  Visualized flexor and extensor tendons appear grossly intact on CT. Joint: Multilevel joint space narrowing with subchondral cysts and small osteophytes most notable at the midfoot and minimally at the tibiotalar joint.  Some chronic appearing osseous densities near the tibiotalar joint. Mild calcaneal spurring.     Medial and lateral malleolar fractures as detailed above.     CT-3D RENDRING ON 3D WORKSTATN    Result Date: 5/31/2024  EXAMINATION: CT OF THE LEFT ANKLE WITHOUT CONTRAST   5/31/2024 12:19 pm TECHNIQUE: CT of the left ankle was performed without the administration of intravenous contrast.  Multiplanar reformatted images are provided for review. Automated exposure control, iterative reconstruction, and/or weight based adjustment of the mA/kV was utilized to reduce the radiation dose to as low as reasonably achievable. COMPARISON: None. HISTORY: ORDERING SYSTEM PROVIDED HISTORY: left ankle fracutre/dislocation TECHNOLOGIST PROVIDED HISTORY: With 3D reconstruction Reason for exam:->left ankle fracutre/dislocation Reason for exam:->with 3D reconstructions Reason for Exam: fall injury. Lt ankle pain. FINDINGS: Bones:  Acute fracture of the lateral malleolus with mild comminution. Fragment separation measures up to 8 mm with inferior displacement and anterior displacement.  Acute fracture of the medial malleolus with small comminuted fragments.  Intra-articular extension.  Fragment separation measures up to 3 mm of inferior displacement.  The medial malleolar fracture extends slightly towards the posterior malleolus on page 39 of series  change in status will be called.     Organism: No results found for: \"ORG\"      Electronically signed by Cal Clay MD on 6/2/2024 at 10:51 AM

## 2024-06-02 NOTE — SIGNIFICANT EVENT
Patient was placed on NIPPV for hypoxia increased work of breathing however early in the night she was symptomatic with nausea and grew more concerned that she will vomit in the mask, so she was switched to HFNC 40/100% in spite of being on high setting oxygen sat was low 90 subsequently became more altered and have to be intubated.  She is still requiring high vent setting FiO2 80% PEEP of 8, 2 pressors (levo and vaso) shock , oliguria AIRAM and metabolic acidosis  After intubation intra-abdominal pressure was high 21 mmHg  Her lactate is trending down however clinically her abdomen is more distended now, writer updated GI and surgery service about recent development, no further procedures planned from GI , recommend continue with conservative measurement with resuscitations enema pending further plan from the general surgery, writer spoke with the surgery endorsed that in spite of conservative measurement there is no remarkable improvement in her bowel distention, after their assessment plan to proceed with the OR after family consent  Case discussed with /patient's family in details  Patient's clinical status remains precarious and critical with potential for further decompensation

## 2024-06-02 NOTE — PROGRESS NOTES
Inpatient Note    CC: fall  Summary:   Sudha Lopez is being seen by nephrology for hyponatremia. She is a 78 y.o. female with a PMH significant for hypertension, osteoarthritis who presented to the ED 5/30/2024 after a fall. She was noted to have a displaced left ankle fracture s/p reduction in the ED. She is also s/p recent right total knee replacement 5/29/2024. She is noted to have acute on chronic hyponatremia with Na down to 126 at time of consult. She also complained of severe abdominal pain and constipation. She became hypotensive and was noted to have stool imapction and possible ischemic bowel. Due to hypotension she developed an Acute kidney injury (AIRAM).    Interval History  Seen at bedside  Worsening respiratory status overnight, was eventually intubated.  Lactate still up  Now on levo and vaso.  Some urine output 340 mL + 900 mL NGT output  Underwent emergent subtotal colectomy and ileostomy creation this morning.  Cr overall stable over the last 24 hours, 2.3 today from 2.5 yesterday but she is grossly volume overloaded.  Minimal UO since this AM  Mild acidosis. On bicarb infusion    Plan:   - quite fluid overloaded now. Will stop IVF  - challenge with lasix IV 60 mg to try to increase UO  - if Creatinine continues to worsen and UO remains low, will plan on starting CRRT by tomorrow for volume management.  - follow output from NGT and ileostomy.  - discussed with Dr. Koehler.    Assessment:   AIRAM:  - baseline Cr 0.9  - Cr was at baseline on admission but began trending up due to hypotension and abdominal pain  - required pressors, endoscopic bowel decompression.  - AIRAM 2/2 hemodynamic insult.    Acute metabolic acidosis:  - 2/2 lactic acidosis and sepsis. Possible ischemic gut    Hyponatremia:  - Na 126 at time of consult.  - Na in the last year has ranged 129-135  - on HCTZ 12.5 mg, celecoxib, bupropion PTA as possible contributors  - acute drop in Na due  to pain from fracture and recent surgery.  - urine studies pending.    Hypertension:  - home regimen: HCTZ 12.5 mg daily, benazepril 40 mg daily, amlodipine 5 mg daily  - stop HCTZ. Would add HCTZ to allergies.    Fall:  - left ankle fracuture  - recent right TKR 5/28/2024  - per ortho.    Ischemic bowel:  - s/p subtotal colectomy and ileostomy 6/2/2024      Ben Arevalo MD  [facility]  Office: [phone]          PE:   Vitals:    06/02/24 1109   BP:    Pulse: 64   Resp: 22   Temp:    SpO2: 96%       General appearance: in NAD  Respiratory: bilateral equal chest rise, no wheeze, no crackles  Cardiovascular: Ausculation shows RRR mp edema  Abdomen: No visible mass, + tenderness, + distended.

## 2024-06-02 NOTE — PROGRESS NOTES
06/02/24 0006   Oxygen Therapy/Pulse Ox   O2 Therapy Oxygen humidified   O2 Device Heated high flow cannula   O2 Flow Rate (L/min) 40 L/min   FiO2  100 %   Pulse 82   Respirations 19   SpO2 93 %   Pulse Oximeter Device Mode Continuous

## 2024-06-02 NOTE — PROGRESS NOTES
06/02/24 1515   Patient Observation   Pulse 63   Respirations 14   SpO2 92 %   Vent Information   Vent Mode AC/VC   Ventilator Settings   Vt (Set, mL) 450 mL   Resp Rate (Set) 22 bpm   PEEP/CPAP (cmH2O) 5   FiO2  40 %   Vent Patient Data (Readings)   Vt (Measured) 453 mL   Peak Inspiratory Pressure (cmH2O) 35 cmH2O   Rate Measured 22 br/min   Minute Volume (L/min) 10.1 Liters   Mean Airway Pressure (cmH2O) 12 cmH20   Plateau Pressure (cm H2O) 0 cm H2O   Driving Pressure -5   I:E Ratio 1:2.60   Flow Sensitivity 3 L/min   Vent Alarm Settings   High Pressure (cmH2O) 40 cmH2O   Low Minute Volume (lpm) 2 L/min   RR High (bpm) 40 br/min   Additional Respiratoray Assessments   Humidification Source HME   Circuit Condensation Not drained   Ambu Bag With Mask At Bedside Yes   ETT    Placement Date/Time: 06/02/24 0147   Present on Admission/Arrival: No  Location: Oral   Secured At 22 cm   Measured From Lips   ETT Placement Left   Secured By Commercial tube tran   Site Assessment Dry

## 2024-06-02 NOTE — CONSULTS
Pharmacy Note  Vancomycin Consult    Sudha Lopez is a 78 y.o. female started on Vancomycin for VAP; consult received from Dr. Proctor to manage therapy.   Allergies:  Other and Thiazide-type diuretics     Recent Labs     06/01/24 2005 06/02/24  0210   CREATININE 2.9* 2.8*       Recent Labs     05/31/24  2249 06/01/24  0417   WBC 8.0 5.4       Estimated Creatinine Clearance: 17 mL/min (A) (based on SCr of 2.8 mg/dL (H)).      Intake/Output Summary (Last 24 hours) at 6/2/2024 0551  Last data filed at 6/2/2024 0410  Gross per 24 hour   Intake 4837.46 ml   Output 440 ml   Net 4397.46 ml       Wt Readings from Last 1 Encounters:   05/31/24 83.9 kg (185 lb)         Body mass index is 32.77 kg/m².    Loading dose (critically ill or in ICU, require dialysis or renal replacement therapy): Vancomycin 25 mg/kg IVPB x 1 (maximum 2500 mg).  Maintenance dose: 10-20 mg/kg (maximum: 2000 mg/dose and 4500 mg/day) starting at the next dosing interval determined by renal function  Pulse dose: fluctuating renal function, AIRAM, ESRD  CRRT: 7.5-10 mg/kg q12h   Goal Vancomycin trough: 15-20 mcg/mL   Goal Vancomycin AUC: 400-600     Assessment/Plan:  Will initiate Vancomycin with a one time loading dose of 1750 mg x1, followed by pulse dosing. Timing of trough level will be determined based on culture results, renal function, and clinical response.   Vancomycin random level 6/3/24 0600  Thank you for the consult.  Javier Jain PharmD 6/2/2024  5:51 AM

## 2024-06-02 NOTE — PROGRESS NOTES
06/02/24 0954   Patient Observation   Pulse 68   Respirations 22   SpO2 100 %   Vent Information   Vent Mode AC/VC   Ventilator Settings   Vt (Set, mL) 450 mL   Resp Rate (Set) 22 bpm   PEEP/CPAP (cmH2O) 10   FiO2  80 %   Vent Patient Data (Readings)   Vt (Measured) 460 mL   Peak Inspiratory Pressure (cmH2O) 28 cmH2O   Rate Measured 22 br/min   Minute Volume (L/min) 10.1 Liters   Mean Airway Pressure (cmH2O) 15 cmH20   Plateau Pressure (cm H2O) 0 cm H2O   Driving Pressure -10   I:E Ratio 1:2.60   Flow Sensitivity 3 L/min   Vent Alarm Settings   High Pressure (cmH2O) 40 cmH2O   Low Minute Volume (lpm) 2 L/min   RR High (bpm) 40 br/min   Additional Respiratoray Assessments   Humidification Source HME   Circuit Condensation Not drained   Ambu Bag With Mask At Bedside Yes   ETT    Placement Date/Time: 06/02/24 0147   Present on Admission/Arrival: No  Location: Oral   Secured At 22 cm   Measured From Lips   ETT Placement Left   Secured By Commercial tube tran   Site Assessment Dry   Cuff Pressure 30 cm H2O

## 2024-06-02 NOTE — OP NOTE
Operative Note      Patient: Sudha Lopez  YOB: 1945  MRN: [id number]    Date of Procedure: 6/2/2024    Pre-Op Diagnosis Codes:     * Large bowel obstruction (HCC) [K56.609]         Post-Op Diagnosis: Same       Procedure(s):  SUBTOTAL COLECTOMY, ILEOSTOMY    Surgeon(s):  Bertin Jackson MD    Assistant:   Surgical Assistant: Ching Greenwood    Anesthesia: General    Estimated Blood Loss (mL): less than 100     Complications: None    Specimens:   ID Type Source Tests Collected by Time Destination   A : COLON Tissue Tissue SURGICAL PATHOLOGY Bertin Jackson MD 6/2/2024 0819        Implants:  * No implants in log *      Drains:   Closed/Suction Drain Left Abdomen Bulb (Active)       NG/OG/NJ/NE Tube Nasogastric Right nostril (Active)   Surrounding Skin Clean, dry & intact 06/01/24 2000   Securement device Bridle 06/01/24 2000   Status Clamped 06/01/24 2000   Placement Verified External Catheter Length;Respiratory Status 06/01/24 1600   NG/OG/NJ/NE External Measurement (cm) 50 cm 06/01/24 1600   Drainage Appearance Other (Comment) 06/01/24 1600   Free Water/Flush (mL) 180 mL 06/01/24 2000   Output (mL) 900 ml 06/02/24 0549       Ileostomy/Jejunostomy RUQ Ileostomy (Active)       Rectal Tube (Active)   Site Assessment Clean, dry & intact 06/01/24 1830   Stool Appearance Loose 06/01/24 1830   Stool Color Brown 06/01/24 1830   Stool Amount Smear 06/01/24 1830       Urinary Catheter 05/31/24 (Active)   Catheter Indications Need for fluid volume management of the critically ill patient in a critical care setting 06/01/24 2000   Site Assessment No urethral drainage 06/01/24 2000   Urine Color Brown 06/01/24 2000   Urine Appearance Cloudy 06/01/24 2000   Collection Container Standard 06/01/24 2000   Securement Method Securing device (Describe) 06/01/24 2000   Catheter Care  Perineal wipes 06/01/24 2000   Catheter Best Practices  Drainage tube clipped to bed;Catheter secured to thigh;Tamper seal intact;Bag

## 2024-06-02 NOTE — PROCEDURES
PROCEDURE NOTE  Date: 6/1/2024   Name: Sudha Lopez  YOB: 1945    Insert Arterial Line    Date/Time: 6/1/2024 11:53 PM    Performed by: Minnie Proctor MD  Authorized by: Minnie Proctor MD  Consent: Verbal consent obtained.  Consent given by: solis Lopez (Child)  Patient identity confirmed: arm band  Time out: Immediately prior to procedure a \"time out\" was called to verify the correct patient, procedure, equipment, support staff and site/side marked as required.  Indications: hemodynamic monitoring  Location: left radial  Anesthesia: local infiltration    Anesthesia:  Local Anesthetic: lidocaine 1% without epinephrine    Sedation:  Patient sedated: no    Donny's test normal: yes  Needle gauge: 20  Seldinger technique: Seldinger technique used  Number of attempts: 2  Post-procedure: line sutured  Post-procedure CMS: normal  Patient tolerance: patient tolerated the procedure well with no immediate complications

## 2024-06-02 NOTE — PROGRESS NOTES
Dr. Proctor paged GI and spoke with them - they requested patient be turned to Right side to help get air out. Patient turned to right side at this time. Dr. Proctor stated he is still awaiting a response from gen surgery. I also reviewed patient's renal labs with Dr. Proctor at this time.   Electronically signed by Arlene Marcelino RN on 6/2/2024 at 3:25 AM

## 2024-06-02 NOTE — PROGRESS NOTES
INPATIENT PULMONARY CRITICAL CARE PROGRESS NOTE      Reason for visit      hypotension       SUBJECTIVE: Patient apparently decompensated last night, patient was having increased abdominal pain and distention, patient was having worsening hypotension and patient was on IV Levophed and vasopressin infusion to maintain hemodynamics, patient was taken to the OR this morning and patient was found to have large bowel obstruction and ischemic colitis like picture and for that reason patient underwent subtotal colectomy and end ileostomy placement and was transferred back to the ICU on mechanical vent support, patient continues to be critically ill on mechanical vent support, patient was on 80% oxygen and PEEP of 10 to maintain oxygen saturation when the patient arrived from the PACU, patient was afebrile, patient continues to be on IV pressors to maintain hemodynamics, patient's glycemic control was variable and patient was having some episodes of hypoglycemia, patient urine output has been on the lower side overnight, patient was continued to be on bicarb drip when seen this morning, no other pertinent review of system could be obtained because of patient    Which was precarious and critical       Physical Exam:  Blood pressure (!) 109/37, pulse 67, temperature 97.8 °F (36.6 °C), temperature source Bladder, resp. rate 13, height 1.6 m (5' 3\"), weight 83.9 kg (185 lb), SpO2 91 %.'     Constitutional:  No acute distress on mechanical ventilator support.   HENT: Endotracheal tube present no thyromegaly.  Eyes:  Conjunctivae are normal. Pupils equal, round, and reactive to light. No scleral icterus.   Neck: . No tracheal deviation present. No obvious thyroid mass.   Cardiovascular: Sinus tachycardia, normal heart sounds.  No right ventricular heave. No lower extremity edema.  Pulmonary/Chest: No wheezes.  Scattered bilateral rales.  Chest wall is not dull to percussion.  No accessory muscle usage or stridor.  Decreased  oxygen to maintain oxygen Patient does have significant AIRAM along with hyponatremia  Patient has been given fluid bolus as per sepsis protocol  Patient was still hypotensive and for that reason IV pressors were started in the form of Levophed which can be titrated to keep mean arterial pressure more than 65 mm, patient  Patient was started on meropenem after the surgery by surgical team  No need for any vancomycin for now  Titration of antimicrobials as per clinical status and cultures  Monitor input output and BMP  Correct electrolytes on whenever necessary basis  Strict input output charting  Monitor cardiac rhythm and hemodynamics closely  Monitor for any autonomic insufficiency trend the lactic acid levels  Trend the WBC count  NG tube to low intermittent suction ordered to decompress abdomen  PUD and DVT prophylaxis      Case discussed with ICU team and general surgery    Patient clinical status remains precarious and critical and needs to monitor closely in the ICU    Critical care time spent with patient was 50 minutes exclusive of any procedures              Electronically signed by:  MODESTO CRAIG MD    6/2/2024    7:09 PM.

## 2024-06-02 NOTE — PROGRESS NOTES
06/01/24 2240   Oxygen Therapy/Pulse Ox   O2 Therapy Oxygen humidified   O2 Device Heated high flow cannula   O2 Flow Rate (L/min) 40 L/min   FiO2  100 %   Pulse 85   Respirations 17   SpO2 94 %   Pulse Oximeter Device Mode Continuous

## 2024-06-02 NOTE — PROCEDURES
PROCEDURE NOTE  Date: 6/2/2024   Name: Sudha Lopez  YOB: 1945    Intubation    Date/Time: 6/2/2024 3:07 AM    Performed by: Minnie Proctor MD  Authorized by: Minnie Proctor MD  Consent: The procedure was performed in an emergent situation.  Patient identity confirmed: arm band  Indications: respiratory failure and airway protection  Intubation method: video-assisted  Patient status: paralyzed (RSI)  Preoxygenation: BVM  Sedatives: etomidate  Paralytic: rocuronium  Laryngoscope size: You 3 and Mac 3  Tube size: 8.0 mm  Tube type: cuffed  Number of attempts: 1  Post-procedure assessment: ETCO2 monitor  Breath sounds: equal  ETT to lip: 23 cm  Tube secured with: ETT tran  Chest x-ray interpreted by me.  Comments: Patient was placed on NIPPV for hypoxia increased work of breathing however early in the night she was symptomatic with nausea and grew more concerned that she will vomit in the mask, so she was switched to HFNC 40/100% in spite of being on high setting oxygen sat was low 90 subsequently became more altered and have to be intubated

## 2024-06-02 NOTE — PROGRESS NOTES
06/02/24 1909   Patient Observation   Pulse 68   Respirations 22   SpO2 93 %   Breath Sounds   Right Upper Lobe Diminished   Right Middle Lobe Diminished   Right Lower Lobe Diminished   Left Upper Lobe Diminished   Left Lower Lobe Diminished   Vent Information   Vent Mode AC/VC   Ventilator Settings   Vt (Set, mL) 450 mL   Resp Rate (Set) 22 bpm   PEEP/CPAP (cmH2O) 5   FiO2  50 %   Vent Patient Data (Readings)   Vt (Measured) 405 mL   Peak Inspiratory Pressure (cmH2O) 21 cmH2O   Rate Measured 22 br/min   Minute Volume (L/min) 8.87 Liters   Mean Airway Pressure (cmH2O) 10 cmH20   Plateau Pressure (cm H2O) 0 cm H2O   Driving Pressure -5   I:E Ratio 1:2.60   Flow Sensitivity 3 L/min   Backup Apnea On   Vent Alarm Settings   High Pressure (cmH2O) 40 cmH2O   Low Minute Volume (lpm) 2 L/min   High Minute Volume (lpm) 20 L/min   Low Exhaled Vt (ml) 200 mL   High Exhaled Vt (ml) 1000 mL   RR High (bpm) 40 br/min   Apnea (secs) 20 secs   Additional Respiratoray Assessments   Humidification Source HME   Ambu Bag With Mask At Bedside Yes   ETT    Placement Date/Time: 06/02/24 0147   Present on Admission/Arrival: No  Location: Oral   Secured At 21 cm   Measured From Lips   ETT Placement Right   Secured By Commercial tube tran   Site Assessment Dry   Cuff Pressure 32 cm H2O

## 2024-06-02 NOTE — PROGRESS NOTES
[facility]    PATIENT NAME: Sudha Lopez     TODAY'S DATE: 6/2/2024    CHIEF COMPLAINT: abd pain    INTERVAL HISTORY/HPI:    Pt with reportedly some improvement after colonoscopic decompression with less distention and lactic acid trending down;  overnight with worse pain and distention per nursing and recently with need for urgent intubation and increased pressor requirement.  Most recent enema without significant return.  Continued low uop and ARF.     REVIEW OF SYSTEMS:  Pertinent positives and negatives as per interval history section    OBJECTIVE:  VITALS:  /80   Pulse 76   Temp 97.8 °F (36.6 °C) (Bladder)   Resp 16   Ht 1.6 m (5' 3\")   Wt 83.9 kg (185 lb)   SpO2 95%   BMI 32.77 kg/m²     INTAKE/OUTPUT:    I/O last 3 completed shifts:  In: 4664.5 [I.V.:2662.2; NG/GT:100; IV Piggyback:1902.3]  Out: 1395 [Urine:245; Emesis/NG output:1150]  I/O this shift:  In: 180 [NG/GT:180]  Out: 200 [Urine:200]    CONSTITUTIONAL:  sedated   LUNGS:  Respirations easy and unlabored,ventilated  CARD:  regular rate  ABDOMEN:  hypoactive bowel sounds, firm, distended, diffusely tender     Data:  CBC:   Recent Labs     05/31/24  1520 05/31/24  2249 06/01/24  0417 06/01/24  1629 06/01/24  2223 06/02/24  0122 06/02/24  0240   WBC 5.3 8.0 5.4  --   --   --   --    HGB 13.0 12.6 12.4   < > 11.3* 11.3* 11.1*   HCT 39.5 37.8 37.6  --   --   --   --     302 247  --   --   --   --     < > = values in this interval not displayed.     BMP:    Recent Labs     06/01/24  1843 06/01/24 2005 06/02/24  0210   * 133* 131*   K 5.2* 5.1  5.1 4.9   CL 95* 94* 94*   CO2 23 22 22   BUN 48* 50* 54*   CREATININE 2.8* 2.9* 2.8*   GLUCOSE 89 61* 96     Hepatic:   Recent Labs     05/31/24  1017 05/31/24  1520 05/31/24  2249   AST 60* 70* 101*   ALT 24 27 40   BILITOT 1.2* 1.1* 0.8   ALKPHOS 50 44 43     Mag:    No results for input(s): \"MG\" in the last 72 hours.   Phos:     Recent Labs     06/01/24 2005  06/02/24  0210   PHOS 8.3* 7.8*      INR:   Recent Labs     05/31/24  0001 06/01/24  1843   INR 0.96 2.01*       Radiology Review:  *Imaging personally reviewed by me.   AXR - dilated air filled portions of colon      ASSESSMENT AND PLAN:  79 yo with colonic pseudo obstruction vs severe constipation  With worsening septic shock, need for intubation and increased pressor requirement, along with lack of effectiveness of enema, further intervention needed to address the problem.  Discussed with GI and given possible worsened ischemia given clinical parameters and findings at prior colonoscopy, repeat attempt at colonoscopic decompression unlikely to be successful.  Given all the above recommend urgent operative intervention with possible colonic resection if ischemia encountered and decompression with ostomy.  This recommendation along with risks of operation (including bleeding, infection, and further decline despite an operation), as well as likely risk of further decline without operative intervention were discussed with her family.  Questions addressed and agreed to proceed to OR.     Electronically signed by Bertin Jackson MD     [id number]

## 2024-06-03 ENCOUNTER — HOSPITAL ENCOUNTER (INPATIENT)
Dept: VASCULAR LAB | Age: 79
Discharge: HOME | End: 2024-06-05
Attending: SURGERY
Payer: MEDICARE

## 2024-06-03 ENCOUNTER — APPOINTMENT (OUTPATIENT)
Dept: CT IMAGING | Age: 79
End: 2024-06-03
Payer: MEDICARE

## 2024-06-03 LAB
ALBUMIN SERPL-MCNC: 2.2 G/DL (ref 3.4–5)
ALBUMIN SERPL-MCNC: 2.3 G/DL (ref 3.4–5)
AMMONIA PLAS-SCNC: 31 UMOL/L (ref 11–51)
ANION GAP SERPL CALCULATED.3IONS-SCNC: 18 MMOL/L (ref 3–16)
ANION GAP SERPL CALCULATED.3IONS-SCNC: 18 MMOL/L (ref 3–16)
ANTI-XA UNFRAC HEPARIN: 0.1 IU/ML (ref 0.3–0.7)
BASE EXCESS BLDA CALC-SCNC: -1 MMOL/L (ref -3–3)
BASE EXCESS BLDA CALC-SCNC: 1 MMOL/L (ref -3–3)
BUN SERPL-MCNC: 56 MG/DL (ref 7–20)
BUN SERPL-MCNC: 56 MG/DL (ref 7–20)
CA-I BLD-SCNC: 0.92 MMOL/L (ref 1.12–1.32)
CA-I BLD-SCNC: 1.05 MMOL/L (ref 1.12–1.32)
CALCIUM SERPL-MCNC: 7.2 MG/DL (ref 8.3–10.6)
CALCIUM SERPL-MCNC: 7.5 MG/DL (ref 8.3–10.6)
CHLORIDE SERPL-SCNC: 96 MMOL/L (ref 99–110)
CHLORIDE SERPL-SCNC: 96 MMOL/L (ref 99–110)
CO2 BLDA-SCNC: 24 MMOL/L
CO2 BLDA-SCNC: 27 MMOL/L
CO2 SERPL-SCNC: 21 MMOL/L (ref 21–32)
CO2 SERPL-SCNC: 23 MMOL/L (ref 21–32)
CREAT SERPL-MCNC: 2.7 MG/DL (ref 0.6–1.2)
CREAT SERPL-MCNC: 2.8 MG/DL (ref 0.6–1.2)
DEPRECATED RDW RBC AUTO: 13.5 % (ref 12.4–15.4)
ECHO BSA: 1.93 M2
EKG ATRIAL RATE: 122 BPM
EKG DIAGNOSIS: NORMAL
EKG Q-T INTERVAL: 294 MS
EKG QRS DURATION: 76 MS
EKG QTC CALCULATION (BAZETT): 456 MS
EKG R AXIS: 29 DEGREES
EKG T AXIS: -82 DEGREES
EKG VENTRICULAR RATE: 145 BPM
GFR SERPLBLD CREATININE-BSD FMLA CKD-EPI: 17 ML/MIN/{1.73_M2}
GFR SERPLBLD CREATININE-BSD FMLA CKD-EPI: 17 ML/MIN/{1.73_M2}
GLUCOSE BLD-MCNC: 113 MG/DL (ref 70–99)
GLUCOSE BLD-MCNC: 132 MG/DL (ref 70–99)
GLUCOSE BLD-MCNC: 76 MG/DL (ref 70–99)
GLUCOSE BLD-MCNC: 85 MG/DL (ref 70–99)
GLUCOSE BLD-MCNC: 89 MG/DL (ref 70–99)
GLUCOSE SERPL-MCNC: 68 MG/DL (ref 70–99)
GLUCOSE SERPL-MCNC: 82 MG/DL (ref 70–99)
HCO3 BLDA-SCNC: 22.6 MMOL/L (ref 21–29)
HCO3 BLDA-SCNC: 25.8 MMOL/L (ref 21–29)
HCT VFR BLD AUTO: 23 % (ref 36–48)
HCT VFR BLD AUTO: 24.5 % (ref 36–48)
HCT VFR BLD AUTO: 27 % (ref 36–48)
HGB BLD CALC-MCNC: 7.9 GM/DL (ref 12–16)
HGB BLD CALC-MCNC: 9.3 GM/DL (ref 12–16)
HGB BLD-MCNC: 8.5 G/DL (ref 12–16)
LACTATE BLD-SCNC: 2.08 MMOL/L (ref 0.4–2)
LACTATE BLD-SCNC: 2.33 MMOL/L (ref 0.4–2)
MCH RBC QN AUTO: 31.5 PG (ref 26–34)
MCHC RBC AUTO-ENTMCNC: 34.6 G/DL (ref 31–36)
MCV RBC AUTO: 90.9 FL (ref 80–100)
PCO2 BLDA: 32.1 MM HG (ref 35–45)
PCO2 BLDA: 44.1 MM HG (ref 35–45)
PERFORMED ON: ABNORMAL
PERFORMED ON: NORMAL
PERFORMED ON: NORMAL
PH BLDA: 7.37 [PH] (ref 7.35–7.45)
PH BLDA: 7.46 [PH] (ref 7.35–7.45)
PHOSPHATE SERPL-MCNC: 4.7 MG/DL (ref 2.5–4.9)
PHOSPHATE SERPL-MCNC: 5 MG/DL (ref 2.5–4.9)
PLATELET # BLD AUTO: 145 K/UL (ref 135–450)
PMV BLD AUTO: 8.9 FL (ref 5–10.5)
PO2 BLDA: 104.5 MM HG (ref 75–108)
PO2 BLDA: 304.5 MM HG (ref 75–108)
POC SAMPLE TYPE: ABNORMAL
POC SAMPLE TYPE: ABNORMAL
POTASSIUM BLD-SCNC: 3.6 MMOL/L (ref 3.5–5.1)
POTASSIUM BLD-SCNC: 4.1 MMOL/L (ref 3.5–5.1)
POTASSIUM SERPL-SCNC: 4.1 MMOL/L (ref 3.5–5.1)
POTASSIUM SERPL-SCNC: 4.2 MMOL/L (ref 3.5–5.1)
RBC # BLD AUTO: 2.7 M/UL (ref 4–5.2)
SAO2 % BLDA: 100 % (ref 93–100)
SAO2 % BLDA: 98 % (ref 93–100)
SODIUM BLD-SCNC: 136 MMOL/L (ref 136–145)
SODIUM BLD-SCNC: 138 MMOL/L (ref 136–145)
SODIUM SERPL-SCNC: 135 MMOL/L (ref 136–145)
SODIUM SERPL-SCNC: 137 MMOL/L (ref 136–145)
WBC # BLD AUTO: 16.1 K/UL (ref 4–11)

## 2024-06-03 PROCEDURE — 2580000003 HC RX 258: Performed by: NURSE PRACTITIONER

## 2024-06-03 PROCEDURE — 82947 ASSAY GLUCOSE BLOOD QUANT: CPT

## 2024-06-03 PROCEDURE — 2580000003 HC RX 258: Performed by: SURGERY

## 2024-06-03 PROCEDURE — 93010 ELECTROCARDIOGRAM REPORT: CPT | Performed by: INTERNAL MEDICINE

## 2024-06-03 PROCEDURE — 82140 ASSAY OF AMMONIA: CPT

## 2024-06-03 PROCEDURE — 6360000002 HC RX W HCPCS: Performed by: STUDENT IN AN ORGANIZED HEALTH CARE EDUCATION/TRAINING PROGRAM

## 2024-06-03 PROCEDURE — 6370000000 HC RX 637 (ALT 250 FOR IP): Performed by: NURSE PRACTITIONER

## 2024-06-03 PROCEDURE — C9113 INJ PANTOPRAZOLE SODIUM, VIA: HCPCS | Performed by: SURGERY

## 2024-06-03 PROCEDURE — 83605 ASSAY OF LACTIC ACID: CPT

## 2024-06-03 PROCEDURE — 6370000000 HC RX 637 (ALT 250 FOR IP): Performed by: SURGERY

## 2024-06-03 PROCEDURE — 99291 CRITICAL CARE FIRST HOUR: CPT | Performed by: STUDENT IN AN ORGANIZED HEALTH CARE EDUCATION/TRAINING PROGRAM

## 2024-06-03 PROCEDURE — 2500000003 HC RX 250 WO HCPCS: Performed by: STUDENT IN AN ORGANIZED HEALTH CARE EDUCATION/TRAINING PROGRAM

## 2024-06-03 PROCEDURE — 2700000000 HC OXYGEN THERAPY PER DAY

## 2024-06-03 PROCEDURE — 2000000000 HC ICU R&B

## 2024-06-03 PROCEDURE — 6360000002 HC RX W HCPCS: Performed by: NURSE PRACTITIONER

## 2024-06-03 PROCEDURE — 84295 ASSAY OF SERUM SODIUM: CPT

## 2024-06-03 PROCEDURE — 94761 N-INVAS EAR/PLS OXIMETRY MLT: CPT

## 2024-06-03 PROCEDURE — 85027 COMPLETE CBC AUTOMATED: CPT

## 2024-06-03 PROCEDURE — 94003 VENT MGMT INPAT SUBQ DAY: CPT

## 2024-06-03 PROCEDURE — 6370000000 HC RX 637 (ALT 250 FOR IP): Performed by: INTERNAL MEDICINE

## 2024-06-03 PROCEDURE — 2500000003 HC RX 250 WO HCPCS: Performed by: INTERNAL MEDICINE

## 2024-06-03 PROCEDURE — 2580000003 HC RX 258: Performed by: STUDENT IN AN ORGANIZED HEALTH CARE EDUCATION/TRAINING PROGRAM

## 2024-06-03 PROCEDURE — 80069 RENAL FUNCTION PANEL: CPT

## 2024-06-03 PROCEDURE — 93005 ELECTROCARDIOGRAM TRACING: CPT | Performed by: INTERNAL MEDICINE

## 2024-06-03 PROCEDURE — 93970 EXTREMITY STUDY: CPT | Performed by: INTERNAL MEDICINE

## 2024-06-03 PROCEDURE — 84132 ASSAY OF SERUM POTASSIUM: CPT

## 2024-06-03 PROCEDURE — 93970 EXTREMITY STUDY: CPT

## 2024-06-03 PROCEDURE — 70450 CT HEAD/BRAIN W/O DYE: CPT

## 2024-06-03 PROCEDURE — 6360000002 HC RX W HCPCS: Performed by: SURGERY

## 2024-06-03 PROCEDURE — 82803 BLOOD GASES ANY COMBINATION: CPT

## 2024-06-03 PROCEDURE — 2500000003 HC RX 250 WO HCPCS: Performed by: NURSE PRACTITIONER

## 2024-06-03 PROCEDURE — 2500000003 HC RX 250 WO HCPCS: Performed by: HOSPITALIST

## 2024-06-03 PROCEDURE — 85520 HEPARIN ASSAY: CPT

## 2024-06-03 PROCEDURE — 85014 HEMATOCRIT: CPT

## 2024-06-03 PROCEDURE — 94660 CPAP INITIATION&MGMT: CPT

## 2024-06-03 PROCEDURE — 82330 ASSAY OF CALCIUM: CPT

## 2024-06-03 PROCEDURE — 99024 POSTOP FOLLOW-UP VISIT: CPT | Performed by: SURGERY

## 2024-06-03 RX ORDER — DEXTROSE MONOHYDRATE 100 MG/ML
INJECTION, SOLUTION INTRAVENOUS CONTINUOUS PRN
Status: DISCONTINUED | OUTPATIENT
Start: 2024-06-03 | End: 2024-06-21 | Stop reason: HOSPADM

## 2024-06-03 RX ORDER — HEPARIN SODIUM 1000 [USP'U]/ML
4000 INJECTION, SOLUTION INTRAVENOUS; SUBCUTANEOUS PRN
Status: DISCONTINUED | OUTPATIENT
Start: 2024-06-03 | End: 2024-06-04

## 2024-06-03 RX ORDER — HEPARIN SODIUM 1000 [USP'U]/ML
2000 INJECTION, SOLUTION INTRAVENOUS; SUBCUTANEOUS ONCE
Status: COMPLETED | OUTPATIENT
Start: 2024-06-03 | End: 2024-06-03

## 2024-06-03 RX ORDER — CALCIUM GLUCONATE 20 MG/ML
1000 INJECTION, SOLUTION INTRAVENOUS ONCE
Status: COMPLETED | OUTPATIENT
Start: 2024-06-03 | End: 2024-06-03

## 2024-06-03 RX ORDER — HEPARIN SODIUM 1000 [USP'U]/ML
3900 INJECTION, SOLUTION INTRAVENOUS; SUBCUTANEOUS PRN
Status: DISCONTINUED | OUTPATIENT
Start: 2024-06-03 | End: 2024-06-03

## 2024-06-03 RX ORDER — FENTANYL CITRATE-0.9 % NACL/PF 20 MCG/2ML
50 SYRINGE (ML) INTRAVENOUS EVERY 30 MIN PRN
Status: DISCONTINUED | OUTPATIENT
Start: 2024-06-03 | End: 2024-06-03

## 2024-06-03 RX ORDER — GLUCAGON 1 MG/ML
1 KIT INJECTION PRN
Status: DISCONTINUED | OUTPATIENT
Start: 2024-06-03 | End: 2024-06-21 | Stop reason: HOSPADM

## 2024-06-03 RX ORDER — CALCIUM GLUCONATE 20 MG/ML
2000 INJECTION, SOLUTION INTRAVENOUS ONCE
Status: COMPLETED | OUTPATIENT
Start: 2024-06-03 | End: 2024-06-03

## 2024-06-03 RX ORDER — FENTANYL CITRATE-0.9 % NACL/PF 10 MCG/ML
25-200 PLASTIC BAG, INJECTION (ML) INTRAVENOUS CONTINUOUS
Status: DISCONTINUED | OUTPATIENT
Start: 2024-06-03 | End: 2024-06-03

## 2024-06-03 RX ORDER — HEPARIN SODIUM 1000 [USP'U]/ML
2000 INJECTION, SOLUTION INTRAVENOUS; SUBCUTANEOUS PRN
Status: DISCONTINUED | OUTPATIENT
Start: 2024-06-03 | End: 2024-06-04

## 2024-06-03 RX ORDER — METOPROLOL TARTRATE 1 MG/ML
5 INJECTION, SOLUTION INTRAVENOUS EVERY 6 HOURS PRN
Status: DISCONTINUED | OUTPATIENT
Start: 2024-06-03 | End: 2024-06-21 | Stop reason: HOSPADM

## 2024-06-03 RX ORDER — HEPARIN SODIUM 10000 [USP'U]/100ML
1140 INJECTION, SOLUTION INTRAVENOUS CONTINUOUS
Status: DISCONTINUED | OUTPATIENT
Start: 2024-06-03 | End: 2024-06-04

## 2024-06-03 RX ADMIN — MINERAL OIL AND WHITE PETROLATUM: 30; 940 OINTMENT OPHTHALMIC at 01:18

## 2024-06-03 RX ADMIN — HEPARIN SODIUM AND DEXTROSE 780 UNITS/HR: 10000; 5 INJECTION INTRAVENOUS at 09:46

## 2024-06-03 RX ADMIN — HEPARIN SODIUM 2000 UNITS: 1000 INJECTION INTRAVENOUS; SUBCUTANEOUS at 22:50

## 2024-06-03 RX ADMIN — AMIODARONE HYDROCHLORIDE 150 MG: 50 INJECTION, SOLUTION INTRAVENOUS at 09:26

## 2024-06-03 RX ADMIN — MEROPENEM 500 MG: 500 INJECTION, POWDER, FOR SOLUTION INTRAVENOUS at 22:56

## 2024-06-03 RX ADMIN — AMIODARONE HYDROCHLORIDE 1 MG/MIN: 50 INJECTION, SOLUTION INTRAVENOUS at 09:41

## 2024-06-03 RX ADMIN — MEROPENEM 500 MG: 500 INJECTION, POWDER, FOR SOLUTION INTRAVENOUS at 11:46

## 2024-06-03 RX ADMIN — PANTOPRAZOLE SODIUM 40 MG: 40 INJECTION, POWDER, FOR SOLUTION INTRAVENOUS at 08:04

## 2024-06-03 RX ADMIN — MUPIROCIN: 20 OINTMENT TOPICAL at 08:04

## 2024-06-03 RX ADMIN — POLYVINYL ALCOHOL, POVIDONE 1 DROP: 14; 6 SOLUTION/ DROPS OPHTHALMIC at 09:28

## 2024-06-03 RX ADMIN — CHLORHEXIDINE GLUCONATE 15 ML: 1.2 RINSE ORAL at 08:04

## 2024-06-03 RX ADMIN — DEXTROSE MONOHYDRATE 125 ML: 100 INJECTION, SOLUTION INTRAVENOUS at 12:40

## 2024-06-03 RX ADMIN — AMIODARONE HYDROCHLORIDE 0.5 MG/MIN: 50 INJECTION, SOLUTION INTRAVENOUS at 15:16

## 2024-06-03 RX ADMIN — CALCIUM GLUCONATE 1000 MG: 20 INJECTION, SOLUTION INTRAVENOUS at 14:51

## 2024-06-03 RX ADMIN — PROPOFOL 30 MCG/KG/MIN: 10 INJECTION, EMULSION INTRAVENOUS at 03:10

## 2024-06-03 RX ADMIN — THIAMINE HYDROCHLORIDE 100 MG: 100 INJECTION, SOLUTION INTRAMUSCULAR; INTRAVENOUS at 20:19

## 2024-06-03 RX ADMIN — Medication 100 MCG/HR: at 07:18

## 2024-06-03 RX ADMIN — METOPROLOL TARTRATE 5 MG: 1 INJECTION, SOLUTION INTRAVENOUS at 22:50

## 2024-06-03 RX ADMIN — MINERAL OIL AND WHITE PETROLATUM: 30; 940 OINTMENT OPHTHALMIC at 09:28

## 2024-06-03 RX ADMIN — SODIUM CHLORIDE, PRESERVATIVE FREE 10 ML: 5 INJECTION INTRAVENOUS at 08:05

## 2024-06-03 RX ADMIN — MUPIROCIN: 20 OINTMENT TOPICAL at 20:09

## 2024-06-03 RX ADMIN — SODIUM CHLORIDE, PRESERVATIVE FREE 10 ML: 5 INJECTION INTRAVENOUS at 20:09

## 2024-06-03 RX ADMIN — POLYVINYL ALCOHOL, POVIDONE 1 DROP: 14; 6 SOLUTION/ DROPS OPHTHALMIC at 04:07

## 2024-06-03 RX ADMIN — CALCIUM GLUCONATE 2000 MG: 20 INJECTION, SOLUTION INTRAVENOUS at 04:03

## 2024-06-03 RX ADMIN — POLYVINYL ALCOHOL, POVIDONE 1 DROP: 14; 6 SOLUTION/ DROPS OPHTHALMIC at 00:53

## 2024-06-03 RX ADMIN — THIAMINE HYDROCHLORIDE 100 MG: 100 INJECTION, SOLUTION INTRAMUSCULAR; INTRAVENOUS at 08:04

## 2024-06-03 RX ADMIN — ACETAMINOPHEN 650 MG: 650 SUPPOSITORY RECTAL at 18:08

## 2024-06-03 RX ADMIN — MINERAL OIL AND WHITE PETROLATUM: 30; 940 OINTMENT OPHTHALMIC at 04:07

## 2024-06-03 RX ADMIN — SODIUM CHLORIDE: 9 INJECTION, SOLUTION INTRAVENOUS at 11:44

## 2024-06-03 RX ADMIN — VASOPRESSIN 0.03 UNITS/MIN: 20 INJECTION INTRAVENOUS at 03:57

## 2024-06-03 ASSESSMENT — PULMONARY FUNCTION TESTS
PIF_VALUE: 24
PIF_VALUE: 21
PIF_VALUE: 16
PIF_VALUE: 14
PIF_VALUE: 23
PIF_VALUE: 16
PIF_VALUE: 26
PIF_VALUE: 25
PIF_VALUE: 21
PIF_VALUE: 16
PIF_VALUE: 21
PIF_VALUE: 22
PIF_VALUE: 16
PIF_VALUE: 21
PIF_VALUE: 22
PIF_VALUE: 24

## 2024-06-03 ASSESSMENT — PAIN SCALES - WONG BAKER: WONGBAKER_NUMERICALRESPONSE: HURTS LITTLE MORE

## 2024-06-03 ASSESSMENT — PAIN SCALES - GENERAL: PAINLEVEL_OUTOF10: 3

## 2024-06-03 NOTE — CARE COORDINATION
CM update- Pt is extubated, on Bipap, off IVF and all pressors, u/o is good today. Pt is POD 1  subtotal colectomy w/ ileostomy- IV Merrem Q 12hrs for 7 days - NG w/ TF- On Amio, Heparin gtts. Will follow for DC needs

## 2024-06-03 NOTE — PROGRESS NOTES
06/03/24 0351   Patient Observation   Pulse 72   Respirations 22   SpO2 93 %   Breath Sounds   Right Upper Lobe Diminished   Right Middle Lobe Diminished   Right Lower Lobe Diminished   Left Upper Lobe Diminished   Left Lower Lobe Diminished   Vent Information   Vent Mode AC/VC   Ventilator Settings   Vt (Set, mL) 450 mL   Resp Rate (Set) 22 bpm   PEEP/CPAP (cmH2O) 5   FiO2  50 %   Vent Patient Data (Readings)   Vt (Measured) 448 mL   Peak Inspiratory Pressure (cmH2O) 21 cmH2O   Rate Measured 22 br/min   Minute Volume (L/min) 9.85 Liters   Mean Airway Pressure (cmH2O) 9.8 cmH20   Plateau Pressure (cm H2O) 0 cm H2O   Driving Pressure -5   I:E Ratio 1:2.60   Flow Sensitivity 3 L/min   Backup Apnea On   Vent Alarm Settings   High Pressure (cmH2O) 40 cmH2O   Low Minute Volume (lpm) 2 L/min   High Minute Volume (lpm) 20 L/min   Low Exhaled Vt (ml) 200 mL   High Exhaled Vt (ml) 1000 mL   RR High (bpm) 40 br/min   Apnea (secs) 20 secs   Additional Respiratoray Assessments   Humidification Source HME   Ambu Bag With Mask At Bedside Yes   ETT    Placement Date/Time: 06/02/24 0147   Present on Admission/Arrival: No  Location: Oral   Secured At 21 cm   Measured From Lips   ETT Placement Left   Secured By Commercial tube tran   Site Assessment Dry   Cuff Pressure 32 cm H2O

## 2024-06-03 NOTE — PROGRESS NOTES
Inpatient Note    CC: fall  Summary:   Sudha Lopez is being seen by nephrology for hyponatremia. She is a 78 y.o. female with a PMH significant for hypertension, osteoarthritis who presented to the ED 5/30/2024 after a fall. She was noted to have a displaced left ankle fracture s/p reduction in the ED. She is also s/p recent right total knee replacement 5/29/2024. She is noted to have acute on chronic hyponatremia with Na down to 126 at time of consult. She also complained of severe abdominal pain and constipation. She became hypotensive and was noted to have stool imapction and possible ischemic bowel. Due to hypotension she developed an Acute kidney injury (AIRAM).    Interval History  Seen at bedside  Extubated this AM  A lasix challenge of 60 mg IV was administered last evening with excellent results.  Made 2353 mL urine yesterday  Has some output from drains/NGT  Off all pressors today  /87  On 10 LPM O2  Cr stable at 2.8 over the last 24 hours  Lactate slowly improving.    Plan:   - fluid overload is improving.  - off IVF. Off all pressors today  - excellent urine output today  - creatinine pleateaued today. Expect to begin to see improvement soon  - continue supportive care    Assessment:   AIRAM:  - baseline Cr 0.9  - Cr was at baseline on admission but began trending up due to hypotension and abdominal pain  - required pressors, endoscopic bowel decompression.  - AIRAM 2/2 hemodynamic insult.    Acute metabolic acidosis:  - 2/2 lactic acidosis and sepsis. Possible ischemic gut    Hyponatremia:  - Na 126 at time of consult.  - Na in the last year has ranged 129-135  - on HCTZ 12.5 mg, celecoxib, bupropion PTA as possible contributors  - acute drop in Na due to pain from fracture and recent surgery.  - urine studies pending.    Hypertension:  - home regimen: HCTZ 12.5 mg daily, benazepril 40 mg daily, amlodipine 5 mg daily  - stop HCTZ. Would add HCTZ to

## 2024-06-03 NOTE — PROGRESS NOTES
Hospital Medicine Progress Note      Date of Admission: 5/30/2024  Hospital Day: 5    Chief Admission Complaint:  Left ankle pain     Subjective:  Weaned and extubated from vent this afternoon. She is still waking up from sedation and minimally interactive.     Telemetry (reviewed by me):  New onset Atrial Fibrillation with RVR as of 0700 today.    Presenting Admission History:       a 78-year-old female past medical history of hypertension, recent left ankle fracture now reduced, status post right knee replacement who was admitted after a mechanical fall.  Patient had a rapid response called and was transferred to ICU for hypotension, abdominal distention, lactic acidosis.     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 General Surgery recommended OR evaluation on 6/2 and she was found to have gangrenous colon, now s/p subtotal colectomy and ileostomy creation.  Post op care per gen surgery      Severe Sepsis with Septic shock 2/2 Colonic Ischemia  On mulitple prossors  Continue broad spectrum antibiotics  Renal function improving     Acute hypoxic and hypercapnic respiratory failure  Weaned and extubated  Sedation still washing out  Monitor.      Afib RVR: New onset 6/3 0700. Continue Amiodarone and Heparin drip for now. Monitor.     Recent Right TKR (Josh) followed by post-operative mechanical fall at home resulting in Left Ankle Fracture/Dislocation: Orthopedics following. Continue splint with outpatient management.       Physical Exam Performed:      General appearance: Lethargic  Respiratory:  Normal respiratory effort.   Cardiovascular:  Regular rate and rhythm.  Abdomen:  Incisions and ostomy noted, non-distended.  Musculoskelatal:  No edema  Neurologic:   Limited but non-focal  Psychiatric:  Awake but not oriented    /65   Pulse (!) 116   Temp 99.1 °F (37.3 °C) (Bladder)   Resp (!) 6   Ht 1.6 m (5' 3\")   Wt 90.2 kg (198 lb 13.7 oz)   SpO2 95%   BMI 35.23 kg/m²     Diet: Diet NPO  DVT Prophylaxis: []PPx LMWH  []SQ Heparin  []IPC/SCDs  []Eliquis  []Xarelto  []Coumadin  [x] Heparin Drip  []Other -      Code status: Full Code  PT/OT Eval Status:   [x]NOT yet ordered  []Ordered and Pending   []Seen with Recommendations for:  []Home independently  []Home w/ assist  []HHC  []SNF  []Acute Rehab    Anticipated Discharge Day/Date:  ICU    Anticipated Discharge Location: []Home  []HHC  []SNF  []Acute Rehab  []ECF  []LTAC  []Hospice  []Other -      Consults:      IP CONSULT TO ORTHOPEDIC SURGERY  IP CONSULT TO HOSPITALIST  IP CONSULT TO NEPHROLOGY  IP CONSULT TO CRITICAL CARE  IP CONSULT TO GENERAL SURGERY  IP CONSULT TO GI  IP CONSULT TO PHARMACY  IP CONSULT TO ORTHOPEDIC SURGERY      This patient has a high likelihood of being discharged tomorrow and is appropriate for A1 Discharge Unit in AM pending clinical course overnight: []Yes  [x]No    ------------------------------------------------------------------------------------------------------------------------------------------------------------------------    MDM    [x] High (any 2)    A. Problems (any 1)  [x] Acute/Chronic Illness/injury posing threat to life or bodily function:    [] Severe exacerbation of chronic illness:    ---------------------------------------------------------------------  B. Risk of Treatment (any 1)   [x] Drugs/treatments that require intensive monitoring for toxicity include:    [] IV ABX requiring serial renal monitoring for nephrotoxicity:     [] IV Narcotic analgesia for adverse drug reaction  [] IV diuresis requiring serial monitoring for renal impairment and electrolyte derangements  [] Critical electrolyte abnormalities requiring IV replacement and close serial monitoring  []

## 2024-06-03 NOTE — PROGRESS NOTES
Nephro paged at this time for orders. Running another EPOC ABG     Electronically signed by Arlene Marcelino RN on 6/3/2024 at 3:12 AM

## 2024-06-03 NOTE — CONSULTS
[facility] Ost[facility] Contin[facility] Nurse  Consult Note       NAME:  Sudha Lopez  MEDICAL RECORD NUMBER:  [id number]  AGE: 78 y.o.   GENDER: female  : 1945  TODAY'S DATE:  6/3/2024    Subjective patient intubated.  2 daughters (Diana, Lazara) and son (Paresh) present. Dtr (Lazara) willing to learn ostomy care   Reason for WOCN Evaluation and Assessment: New Ileostomy      Sudha Lopez is a 78 y.o. female referred by:   [x] Physician  [] Nursing  [] Other:     Wound Identification:  Wound Type: Surgical mid line abd staple line  Contributing Factors: edema, decreased mobility, shear force, obesity, and decreased tissue oxygenation    Stoma size:  25 = 1 inch mm x 30 mm 1 3/16 mm  Appliance size:  Coloplast 2 pience GREEN flat flange # [id number] with drainable bag # [id number] paste ring # [id number] placed around stoma.     Wound History: Went to OR for large bowel obstruction on 24.  Per Dr Jackson subtotal colectomy with ileotomy    Admitted after falling getting into bed C/O dislocated left ankle.  Recent Right knee replacement surgery prior to admission. Once admitted found bowel obstruction.     Current Wound Care Treatment:  Foam dressing in place    Patient Goal of Care:  [x] Wound Healing  [] Odor Control  [] Palliative Care  [] Pain Control   [x] Other: New ileostomy care.        PAST MEDICAL HISTORY        Diagnosis Date    Anxiety     Hypertension        PAST SURGICAL HISTORY    Past Surgical History:   Procedure Laterality Date    BREAST ENHANCEMENT SURGERY      COLONOSCOPY      HAND SURGERY Right     HYSTERECTOMY (CERVIX STATUS UNKNOWN)      INTRACAPSULAR CATARACT EXTRACTION Left 2020    PHACOEMULSIFICATION OF CATARACT LEFT EYE WITH INTRAOCULAR LENS IMPLANT performed by Anuel aHy MD at [facility] OR    INTRACAPSULAR CATARACT EXTRACTION Right 2020    PHACOEMULSIFICATION OF CATARACT RIGHT EYE WITH INTRAOCULAR LENS IMPLANT performed by Anuel Hay MD at [facility] OR    RHINOPLASTY      ROTATOR

## 2024-06-03 NOTE — PROGRESS NOTES
Pt lethargic, nonverbal, and intermittently following commands. CT head complete for AMS. Heparin gtt held.     1545: CT head results shared with family. Heparin gtt restarted at previously ordered rate.

## 2024-06-03 NOTE — CARE COORDINATION
CM was asked to speak with the family. The pt is trying SBT off of the vent. The 3 children, 2 live out of town are asking for a list of SF facilities.covered by insurance.  THey are very concerned about her getting enough therpay.  List was given.We did discuss if she qualifies the possibility of LTAC. WIll follow pt progress   Tele monitor #[id number] registered and placed on patient. Patient transport requested.

## 2024-06-03 NOTE — PROGRESS NOTES
06/03/24 1200   NIV Type   $NIV $Daily Charge   Suction Setup and Functional Yes   Mode Bilevel   Bonnet size Medium   Assessment   Pulse (!) 126   Heart Rate Source Monitor   Respirations 23   BP (!) 173/78   SpO2 94 %   Level of Consciousness 1   Comfort Level Good   Using Accessory Muscles No   Mask Compliance Good   Skin Assessment Clean, dry, & intact   Settings/Measurements   PIP Observed 12 cm H20   IPAP 12 cmH20   CPAP/EPAP 6 cmH2O   Vt (Measured) 331 mL   Rate Ordered 12   Insp Rise Time (%) 2 %   FiO2  100 %   Minute Volume (L/min) 7.1 Liters   Mask Leak (lpm) 13 lpm   Alarm Settings   Alarms On Y   Low Pressure (cmH2O) 6 cmH2O   High Pressure (cmH2O) 40 cmH2O   Delay Alarm 20 sec(s)   Apnea (secs) 20 secs   RR High (bpm) 40 br/min   Patient Observation   Observations pt extubated to bipap per order

## 2024-06-03 NOTE — PROGRESS NOTES
Contacted hospitalist Dr. Israel again - rad next set of labs and calcium is still low. Renal labs worsening. Awaiting a response.   Electronically signed by Arlene Marcelino RN on 6/3/2024 at 2:38 AM

## 2024-06-03 NOTE — PROGRESS NOTES
06/03/24 0742   Patient Observation   Pulse (!) 145   Respirations 30   SpO2 93 %   Breath Sounds   Breath Sounds Bilateral Crackles    Ventilator Settings   Vt (Set, mL) 450 mL   Resp Rate (Set) 22 bpm   PEEP/CPAP (cmH2O) 5   FiO2  50 %   Vent Patient Data (Readings)   Vt (Measured) 457 mL   Peak Inspiratory Pressure (cmH2O) 24 cmH2O   Rate Measured 22 br/min   Minute Volume (L/min) 10 Liters   Mean Airway Pressure (cmH2O) 10 cmH20   Plateau Pressure (cm H2O) 0 cm H2O   Driving Pressure -5   I:E Ratio 1:2.60   Backup Apnea On   Vent Alarm Settings   High Pressure (cmH2O) 40 cmH2O   Low Minute Volume (lpm) 2 L/min   Low Exhaled Vt (ml) 200 mL   RR High (bpm) 40 br/min   Apnea (secs) 20 secs   Additional Respiratoray Assessments   Humidification Source HME   Circuit Condensation Drained   Ambu Bag With Mask At Bedside Yes   Airway Clearance   Suction Oral   Suction Device Tom   Sputum Method Obtained Oral   Sputum Amount Small   Sputum Color/Odor Tan   Sputum Consistency Thick   ETT    Placement Date/Time: 06/02/24 0147   Present on Admission/Arrival: No  Location: Oral   Secured At 21 cm   Measured From Lips   ETT Placement Center   Secured By Commercial tube tran   Site Assessment Dry

## 2024-06-03 NOTE — PROGRESS NOTES
Multiple perfect serve messages to NP and physician - no new orders received.     Patient continues to rest, son and daughter at bedside. Stoma looking Wnl, pink, no output yet. Good UO.     Electronically signed by Arlene Marcelino RN on 6/3/2024 at 12:55 AM

## 2024-06-03 NOTE — PROGRESS NOTES
Comprehensive Nutrition Assessment    Type and Reason for Visit:  Initial    Nutrition Recommendations/Plan:   NPO     If unable to extubate and medically feasible, recommend TF initiation. Order: \"Diet: Tube feed continuous/ NPO\".  Initiate Vital AF (peptide based formula) at 10 mL/hr and as tolerated, increase by 10 mL/hr q 4 hours until goal of 40 mL/hr is met via N/G  Recommend 30 mL H20 flush q 4 hours.  Monitor IVF infusion, Na labs and need for adjustments in water flush  Recommend 1 Bottle Proteinex P2Go daily via syringe. Flush with 30 mL H20 before and after  Monitor TF tolerance (abd distention, bowel habits, N/V, cramping)  Monitor nutrition adequacy, pertinent labs, bowel habits, wt changes, and clinical progress     Malnutrition Assessment:  Malnutrition Status:  At risk for malnutrition (Comment) (06/03/24 1040)    Context:  Acute Illness     Findings of the 6 clinical characteristics of malnutrition:  Energy Intake:  Mild decrease in energy intake (Comment)  Fluid Accumulation:  Mild Generalized    Nutrition Assessment:    Initial: 77 yo F w pmh HTN p/w fall. Rapid response called d/t hypotension, abdominal distention, lactic acidosis, pt transferred to ICU and s/p emergent colectomy with ileostomy 6/2. Pt remains intubated post op. New vent. Sedated on fentanyl and propofol at 7.6 ml/hr to provide 201 kcals from lipids. Hypotensive on levo. Plan for SBT today per MD. No current plans to feed, NG in place to suction. If unable to extubate and medically feasible, recommend TF initiation. Recommendations provided as needed, will monitor.    Nutrition Related Findings:    NG. BG 57-87 x24 hrs. +22.7 L since admit. +BM 5/28, absent BS. +1 generalized edema. Na 135. Phos 5. Wound Type: Surgical Incision       Current Nutrition Intake & Therapies:    Average Meal Intake: NPO  Average Supplements Intake: NPO  Diet NPO  Current Tube Feeding (TF) Orders:  Feeding Route: Nasogastric  Formula: Peptide  Office: [phone]; Adolfo: [phone]

## 2024-06-03 NOTE — CONSULTS
Dr. Jackson caught Wound Care in [place] of ICU and updated that he placed consult for 222 new Ileostomy surgery June 2, 2024.     Wound Care introduced self to children of patient, Lazara Yoder, & Diana.  Explained she would bring down information packet shortly.   Presently patient has 23979 pouch with wafer [id number] intact with good seal.

## 2024-06-03 NOTE — PROGRESS NOTES
06/03/24 1003   Patient Observation   Pulse (!) 120   Respirations 22   SpO2 94 %   Vent Information   Vent Mode (S)  CPAP/PS   Ventilator Settings   PEEP/CPAP (cmH2O) 5   FiO2  50 %   Pressure Support (cm H2O) 10 cm H2O   Vent Patient Data (Readings)   Vt (Measured) 483 mL   Peak Inspiratory Pressure (cmH2O) 16 cmH2O   Rate Measured 15 br/min   Minute Volume (L/min) 7.01 Liters   Mean Airway Pressure (cmH2O) 7.6 cmH20   Plateau Pressure (cm H2O) 0 cm H2O   Driving Pressure -5   I:E Ratio 1:4.00   Vent Alarm Settings   High Pressure (cmH2O) 40 cmH2O   Low Minute Volume (lpm) 2 L/min   RR High (bpm) 40 br/min

## 2024-06-03 NOTE — PROGRESS NOTES
Pt in SBT since 1000. Sedation off. Following commands. Orders to extubate.     RT notified. Pt extubated to BiPAP at 1200. O2 sat 95%.

## 2024-06-03 NOTE — PROGRESS NOTES
Ortho  Patient seen and examined. Patient, chart and radiographs reviewed.  Right and left LE intact  No signs of infection or compartment syndrome  Left leg with posterior splint in place  Right LE knee incision intact w/o signs of infection   No signs of infection in either hips (both have THR in place)  Will follow peripherally   Appreciate the expert care that pt has received from the entire team   СЕРГЕЙ ALEXANDRA MD   Cell [phone]

## 2024-06-03 NOTE — PROGRESS NOTES
06/03/24 1142   Patient Observation   Pulse (!) 131   Respirations 16   SpO2 91 %   Observations ambu @ beside   Vent Information   Vent Mode CPAP/PS   Ventilator Settings   PEEP/CPAP (cmH2O) 5   FiO2  50 %   Vent Patient Data (Readings)   Vt (Measured) 514 mL   Peak Inspiratory Pressure (cmH2O) 16 cmH2O   Rate Measured 19 br/min   Minute Volume (L/min) 9.52 Liters   Mean Airway Pressure (cmH2O) 7.6 cmH20   Plateau Pressure (cm H2O) 0 cm H2O   Driving Pressure -5   I:E Ratio 1:3.20   Vent Alarm Settings   High Pressure (cmH2O) 40 cmH2O   Low Minute Volume (lpm) 2 L/min   RR High (bpm) 40 br/min   ETT    Placement Date/Time: 06/02/24 0147   Present on Admission/Arrival: No  Location: Oral   Secured At 21 cm   Measured From Lips   ETT Placement Right   Secured By Commercial tube tran   Site Assessment Dry   Cuff Pressure 30 cm H2O

## 2024-06-03 NOTE — PROGRESS NOTES
Dr. Israel notified at this time of abnormal labs - EPOC and renal panel resulted and BG was low, which I corrected with an amp of D50. Effective - repeat BG in 190's. Ca low, asked Dr. Israel for replacement and asked him to review all recent labs. Filled him in on patient's events of the day.     Dr. Moreno in earlier to see patient - requested that we keep the right knee and left ankle iced - so I filled 4 ice bags and put two on each side. One on each side of knee and ankle.     Patient's two daughters, son, and grandson at bedside earlier, all went home except eldest daughter. Had many questions - all questions answered, encouraged to call if needed anything.     Patient seems to be resting comfortably - did have to go up on sedation earlier due to patient waking up and biting tube, bucking vent. Titrations effective.     Electronically signed by Arlene Marcelino RN on 6/2/2024 at 11:09 PM

## 2024-06-03 NOTE — PROGRESS NOTES
Pulmonary & Critical Care Medicine ICU Progress Note      Events of Last 24 hours:   Pt intubated and following commands on vent.       Invasive Lines: PICC D#None   CVC D#1  Art Line D#1            Vitals:  /64   Pulse 75   Temp 98.9 °F (37.2 °C) (Bladder)   Resp 18   Ht 1.6 m (5' 3\")   Wt 83.9 kg (185 lb)   SpO2 95%   BMI 32.77 kg/m²   Tmax:  CVP: CVP (Mean): 0 mmHg      Intake/Output Summary (Last 24 hours) at 6/3/2024 0705  Last data filed at 6/3/2024 0702  Gross per 24 hour   Intake 92328.13 ml   Output 2988 ml   Net 21214.13 ml       EXAM:  Physical Exam  Constitutional:       Appearance: She is ill-appearing.      Comments: intubated   HENT:      Head: Normocephalic and atraumatic.      Nose: Nose normal.      Mouth/Throat:      Pharynx: No oropharyngeal exudate.   Eyes:      General: No scleral icterus.        Right eye: No discharge.         Left eye: No discharge.   Cardiovascular:      Rate and Rhythm: Tachycardia present. Rhythm irregular.      Heart sounds: No murmur heard.  Pulmonary:      Effort: Pulmonary effort is normal. No respiratory distress.      Breath sounds: No wheezing or rales.   Abdominal:      General: Abdomen is flat. Bowel sounds are normal. There is no distension.      Tenderness: There is no abdominal tenderness.   Musculoskeletal:         General: No swelling.      Cervical back: Normal range of motion.   Skin:     General: Skin is warm and dry.   Neurological:      Mental Status: She is alert.      Comments: Following commands on vent          Medications:  Scheduled Meds:   meropenem  500 mg IntraVENous Q12H    chlorhexidine  15 mL Mouth/Throat BID    artificial tears   Both Eyes Q4H    Polyvinyl Alcohol-Povidone PF  1 drop Both Eyes Q4H    thiamine  100 mg IntraVENous BID    [Held by provider] buPROPion  300 mg Oral QAM    [Held by provider] FLUoxetine  40 mg Oral Daily    sodium chloride flush  5-40 mL IntraVENous 2 times per day    pantoprazole  40 mg IntraVENous

## 2024-06-03 NOTE — PROGRESS NOTES
Dr. Chávez of Nephrology called me back - Calcium Gluconate ordered.     Electronically signed by Arlene Marcelino RN on 6/3/2024 at 3:55 AM

## 2024-06-03 NOTE — PROGRESS NOTES
[facility] GENERAL SURGERY    Surgery Progress Note           POD # 1    PATIENT NAME: Sudha Lopez     TODAY'S DATE: 6/3/2024    INTERVAL HISTORY:    Pt making some urine, decreased pressor requirement, afib now.     OBJECTIVE:   VITALS:  BP (!) 127/54   Pulse (!) 126   Temp 99.1 °F (37.3 °C) (Bladder)   Resp 17   Ht 1.6 m (5' 3\")   Wt 90.2 kg (198 lb 13.7 oz)   SpO2 91%   BMI 35.23 kg/m²     INTAKE/OUTPUT:    I/O last 3 completed shifts:  In: 19109.1 [I.V.:60434.2; NG/GT:180; IV Piggyback:402.9]  Out: 3988 [Urine:2603; Emesis/NG output:900; Drains:485]  I/O this shift:  In: 2988.7 [I.V.:2988.7]  Out: 825 [Urine:735; Drains:90]              CONSTITUTIONAL:  sedated  LUNGS:  ventilated  ABDOMEN:   hypoactive bowel sounds, soft, non-distended, ostomy viable, sachin serosanguinous  INCISION: clean    Data:  CBC:   Recent Labs     06/01/24  0417 06/01/24  1629 06/02/24  1000 06/02/24  2220 06/03/24  0311 06/03/24  0555   WBC 5.4  --  5.3  --   --  16.1*   HGB 12.4   < > 8.9* 8.7* 9.3* 8.5*   HCT 37.6  --  25.9*  --   --  24.5*     --  175  --   --  145    < > = values in this interval not displayed.     BMP:    Recent Labs     06/02/24  2236 06/03/24  0158 06/03/24  0922   * 135* 137   K 4.1 4.2 4.1   CL 97* 96* 96*   CO2 21 21 23   BUN 56* 56* 56*   CREATININE 2.6* 2.7* 2.8*   GLUCOSE 192* 82 68*     Hepatic:   Recent Labs     05/31/24  1520 05/31/24  2249   AST 70* 101*   ALT 27 40   BILITOT 1.1* 0.8   ALKPHOS 44 43     Mag:    No results for input(s): \"MG\" in the last 72 hours.   Phos:     Recent Labs     06/02/24  2236 06/03/24  0158 06/03/24  0922   PHOS 5.4* 5.0* 4.7      INR:   Recent Labs     06/01/24  1843 06/02/24  0515 06/02/24  1000   INR 2.01* 2.27* 2.19*         Radiology Review:  NA    ASSESSMENT AND PLAN:  78 y.o. female status post subtotal colectomy and end ileostomy  Vent wean and possible extubation  Wean pressor support as able  Continue abx one week postop  Ok to try trophic tube

## 2024-06-03 NOTE — PROGRESS NOTES
06/02/24 2351   Patient Observation   Pulse 71   Respirations 22   SpO2 93 %   Breath Sounds   Right Upper Lobe Diminished   Right Middle Lobe Diminished   Right Lower Lobe Diminished   Left Upper Lobe Diminished   Left Lower Lobe Diminished   Vent Information   Vent Mode AC/VC   Ventilator Settings   Vt (Set, mL) 450 mL   Resp Rate (Set) 22 bpm   PEEP/CPAP (cmH2O) 5   FiO2  50 %   Vent Patient Data (Readings)   Vt (Measured) 445 mL   Peak Inspiratory Pressure (cmH2O) 21 cmH2O   Rate Measured 22 br/min   Minute Volume (L/min) 9.75 Liters   Mean Airway Pressure (cmH2O) 9.9 cmH20   Plateau Pressure (cm H2O) 0 cm H2O   Driving Pressure -5   I:E Ratio 1:2.60   Flow Sensitivity 3 L/min   Backup Apnea On   Vent Alarm Settings   High Pressure (cmH2O) 40 cmH2O   Low Minute Volume (lpm) 2 L/min   High Minute Volume (lpm) 20 L/min   Low Exhaled Vt (ml) 200 mL   High Exhaled Vt (ml) 1000 mL   RR High (bpm) 40 br/min   Apnea (secs) 20 secs   Additional Respiratoray Assessments   Humidification Source HME   Ambu Bag With Mask At Bedside Yes   ETT    Placement Date/Time: 06/02/24 0147   Present on Admission/Arrival: No  Location: Oral   Secured At 21 cm   Measured From Lips   ETT Placement Center   Secured By Commercial tube tran   Site Assessment Dry   Cuff Pressure 32 cm H2O

## 2024-06-04 ENCOUNTER — APPOINTMENT (OUTPATIENT)
Dept: GENERAL RADIOLOGY | Age: 79
End: 2024-06-04
Payer: MEDICARE

## 2024-06-04 LAB
ALBUMIN SERPL-MCNC: 2.2 G/DL (ref 3.4–5)
ANION GAP SERPL CALCULATED.3IONS-SCNC: 13 MMOL/L (ref 3–16)
ANTI-XA UNFRAC HEPARIN: 0.28 IU/ML (ref 0.3–0.7)
ANTI-XA UNFRAC HEPARIN: 0.37 IU/ML (ref 0.3–0.7)
BACTERIA BLD CULT ORG #2: NORMAL
BACTERIA BLD CULT: NORMAL
BASE EXCESS BLDA CALC-SCNC: 2.3 MMOL/L (ref -3–3)
BUN SERPL-MCNC: 59 MG/DL (ref 7–20)
CALCIUM SERPL-MCNC: 7.9 MG/DL (ref 8.3–10.6)
CHLORIDE SERPL-SCNC: 97 MMOL/L (ref 99–110)
CO2 BLDA-SCNC: 28.2 MMOL/L
CO2 SERPL-SCNC: 26 MMOL/L (ref 21–32)
COHGB MFR BLDA: 3.6 % (ref 0–1.5)
CREAT SERPL-MCNC: 2.9 MG/DL (ref 0.6–1.2)
DEPRECATED RDW RBC AUTO: 14.1 % (ref 12.4–15.4)
GFR SERPLBLD CREATININE-BSD FMLA CKD-EPI: 16 ML/MIN/{1.73_M2}
GLUCOSE BLD-MCNC: 100 MG/DL (ref 70–99)
GLUCOSE SERPL-MCNC: 85 MG/DL (ref 70–99)
HCO3 BLDA-SCNC: 26.9 MMOL/L (ref 21–29)
HCT VFR BLD AUTO: 24 % (ref 36–48)
HGB BLD-MCNC: 7.7 G/DL (ref 12–16)
HGB BLDA-MCNC: 11.8 G/DL (ref 12–16)
INR PPP: 1.36 (ref 0.85–1.15)
MCH RBC QN AUTO: 29.5 PG (ref 26–34)
MCHC RBC AUTO-ENTMCNC: 32.1 G/DL (ref 31–36)
MCV RBC AUTO: 91.9 FL (ref 80–100)
METHGB MFR BLDA: 0.1 %
O2 THERAPY: ABNORMAL
PCO2 BLDA: 41.8 MMHG (ref 35–45)
PERFORMED ON: ABNORMAL
PH BLDA: 7.43 [PH] (ref 7.35–7.45)
PHOSPHATE SERPL-MCNC: 4.5 MG/DL (ref 2.5–4.9)
PLATELET # BLD AUTO: 121 K/UL (ref 135–450)
PMV BLD AUTO: 8.6 FL (ref 5–10.5)
PO2 BLDA: 106.7 MMHG (ref 75–108)
POTASSIUM SERPL-SCNC: 3.7 MMOL/L (ref 3.5–5.1)
PROTHROMBIN TIME: 16.9 SEC (ref 11.9–14.9)
RBC # BLD AUTO: 2.61 M/UL (ref 4–5.2)
SAO2 % BLDA: 99.1 %
SODIUM SERPL-SCNC: 136 MMOL/L (ref 136–145)
WBC # BLD AUTO: 37.8 K/UL (ref 4–11)

## 2024-06-04 PROCEDURE — 36415 COLL VENOUS BLD VENIPUNCTURE: CPT

## 2024-06-04 PROCEDURE — 80069 RENAL FUNCTION PANEL: CPT

## 2024-06-04 PROCEDURE — 6370000000 HC RX 637 (ALT 250 FOR IP): Performed by: SURGERY

## 2024-06-04 PROCEDURE — 2500000003 HC RX 250 WO HCPCS: Performed by: STUDENT IN AN ORGANIZED HEALTH CARE EDUCATION/TRAINING PROGRAM

## 2024-06-04 PROCEDURE — 94761 N-INVAS EAR/PLS OXIMETRY MLT: CPT

## 2024-06-04 PROCEDURE — 99291 CRITICAL CARE FIRST HOUR: CPT | Performed by: STUDENT IN AN ORGANIZED HEALTH CARE EDUCATION/TRAINING PROGRAM

## 2024-06-04 PROCEDURE — 71045 X-RAY EXAM CHEST 1 VIEW: CPT

## 2024-06-04 PROCEDURE — 94660 CPAP INITIATION&MGMT: CPT

## 2024-06-04 PROCEDURE — 2580000003 HC RX 258: Performed by: NURSE PRACTITIONER

## 2024-06-04 PROCEDURE — 82803 BLOOD GASES ANY COMBINATION: CPT

## 2024-06-04 PROCEDURE — 85027 COMPLETE CBC AUTOMATED: CPT

## 2024-06-04 PROCEDURE — 2700000000 HC OXYGEN THERAPY PER DAY

## 2024-06-04 PROCEDURE — C9113 INJ PANTOPRAZOLE SODIUM, VIA: HCPCS | Performed by: SURGERY

## 2024-06-04 PROCEDURE — 85610 PROTHROMBIN TIME: CPT

## 2024-06-04 PROCEDURE — 6360000002 HC RX W HCPCS: Performed by: NURSE PRACTITIONER

## 2024-06-04 PROCEDURE — 6360000002 HC RX W HCPCS: Performed by: SURGERY

## 2024-06-04 PROCEDURE — 85520 HEPARIN ASSAY: CPT

## 2024-06-04 PROCEDURE — 2580000003 HC RX 258: Performed by: SURGERY

## 2024-06-04 PROCEDURE — 2000000000 HC ICU R&B

## 2024-06-04 PROCEDURE — 02HV33Z INSERTION OF INFUSION DEVICE INTO SUPERIOR VENA CAVA, PERCUTANEOUS APPROACH: ICD-10-PCS | Performed by: INTERNAL MEDICINE

## 2024-06-04 PROCEDURE — 6360000002 HC RX W HCPCS: Performed by: INTERNAL MEDICINE

## 2024-06-04 PROCEDURE — 99024 POSTOP FOLLOW-UP VISIT: CPT | Performed by: SURGERY

## 2024-06-04 RX ORDER — HEPARIN SODIUM 1000 [USP'U]/ML
2000 INJECTION, SOLUTION INTRAVENOUS; SUBCUTANEOUS PRN
Status: DISCONTINUED | OUTPATIENT
Start: 2024-06-04 | End: 2024-06-21 | Stop reason: HOSPADM

## 2024-06-04 RX ORDER — HEPARIN SODIUM 1000 [USP'U]/ML
2000 INJECTION, SOLUTION INTRAVENOUS; SUBCUTANEOUS ONCE
Status: COMPLETED | OUTPATIENT
Start: 2024-06-04 | End: 2024-06-04

## 2024-06-04 RX ORDER — HEPARIN SODIUM 10000 [USP'U]/100ML
1370 INJECTION, SOLUTION INTRAVENOUS CONTINUOUS
Status: DISCONTINUED | OUTPATIENT
Start: 2024-06-04 | End: 2024-06-08

## 2024-06-04 RX ORDER — HEPARIN SODIUM 5000 [USP'U]/ML
5000 INJECTION, SOLUTION INTRAVENOUS; SUBCUTANEOUS EVERY 8 HOURS SCHEDULED
Status: DISCONTINUED | OUTPATIENT
Start: 2024-06-04 | End: 2024-06-04

## 2024-06-04 RX ORDER — HEPARIN SODIUM 1000 [USP'U]/ML
4000 INJECTION, SOLUTION INTRAVENOUS; SUBCUTANEOUS ONCE
Status: COMPLETED | OUTPATIENT
Start: 2024-06-04 | End: 2024-06-04

## 2024-06-04 RX ORDER — HEPARIN SODIUM 1000 [USP'U]/ML
4000 INJECTION, SOLUTION INTRAVENOUS; SUBCUTANEOUS PRN
Status: DISCONTINUED | OUTPATIENT
Start: 2024-06-04 | End: 2024-06-21 | Stop reason: HOSPADM

## 2024-06-04 RX ADMIN — AMIODARONE HYDROCHLORIDE 0.5 MG/MIN: 50 INJECTION, SOLUTION INTRAVENOUS at 07:19

## 2024-06-04 RX ADMIN — THIAMINE HYDROCHLORIDE 100 MG: 100 INJECTION, SOLUTION INTRAMUSCULAR; INTRAVENOUS at 20:22

## 2024-06-04 RX ADMIN — SODIUM CHLORIDE, PRESERVATIVE FREE 10 ML: 5 INJECTION INTRAVENOUS at 20:23

## 2024-06-04 RX ADMIN — MEROPENEM 500 MG: 500 INJECTION, POWDER, FOR SOLUTION INTRAVENOUS at 11:03

## 2024-06-04 RX ADMIN — SODIUM CHLORIDE, PRESERVATIVE FREE 10 ML: 5 INJECTION INTRAVENOUS at 08:22

## 2024-06-04 RX ADMIN — MUPIROCIN: 20 OINTMENT TOPICAL at 20:27

## 2024-06-04 RX ADMIN — HEPARIN SODIUM 4000 UNITS: 1000 INJECTION INTRAVENOUS; SUBCUTANEOUS at 15:01

## 2024-06-04 RX ADMIN — MEROPENEM 500 MG: 500 INJECTION, POWDER, FOR SOLUTION INTRAVENOUS at 22:48

## 2024-06-04 RX ADMIN — HEPARIN SODIUM AND DEXTROSE 1000 UNITS/HR: 10000; 5 INJECTION INTRAVENOUS at 15:01

## 2024-06-04 RX ADMIN — MUPIROCIN: 20 OINTMENT TOPICAL at 08:25

## 2024-06-04 RX ADMIN — METOPROLOL TARTRATE 5 MG: 1 INJECTION, SOLUTION INTRAVENOUS at 14:08

## 2024-06-04 RX ADMIN — PANTOPRAZOLE SODIUM 40 MG: 40 INJECTION, POWDER, FOR SOLUTION INTRAVENOUS at 08:21

## 2024-06-04 RX ADMIN — HEPARIN SODIUM 2000 UNITS: 1000 INJECTION INTRAVENOUS; SUBCUTANEOUS at 05:15

## 2024-06-04 RX ADMIN — THIAMINE HYDROCHLORIDE 100 MG: 100 INJECTION, SOLUTION INTRAMUSCULAR; INTRAVENOUS at 08:21

## 2024-06-04 RX ADMIN — ACETAMINOPHEN 650 MG: 325 TABLET ORAL at 23:19

## 2024-06-04 ASSESSMENT — PAIN DESCRIPTION - ORIENTATION: ORIENTATION: MID

## 2024-06-04 ASSESSMENT — PAIN DESCRIPTION - LOCATION: LOCATION: ABDOMEN

## 2024-06-04 ASSESSMENT — PAIN SCALES - GENERAL: PAINLEVEL_OUTOF10: 3

## 2024-06-04 ASSESSMENT — PAIN DESCRIPTION - DESCRIPTORS: DESCRIPTORS: PATIENT UNABLE TO DESCRIBE

## 2024-06-04 NOTE — CARE COORDINATION
CM update- PT remains extubated. AMio, HEparin d/c'd. Mentation in question , does not floow commands. CT head is negative, may need MRI. IV anbx for ischemic bowel. New ostomy, TF to begin per NG. Follow for recs for DC when pt is ready , need to renew orders for PT/OT.

## 2024-06-04 NOTE — PROGRESS NOTES
Pulmonary & Critical Care Medicine ICU Progress Note      Events of Last 24 hours:   Pt tracking but still not answering questions. She nods her head to pain when asked.       Invasive Lines: PICC D#None   CVC D#2  Art Line D#None            Vitals:  BP (!) 122/51   Pulse 75   Temp 98.3 °F (36.8 °C) (Bladder)   Resp 12   Ht 1.6 m (5' 3\")   Wt 89.3 kg (196 lb 13.9 oz)   SpO2 94%   BMI 34.87 kg/m²    Tmax:  CVP: CVP (Mean): 0 mmHg      Intake/Output Summary (Last 24 hours) at 6/4/2024 0706  Last data filed at 6/4/2024 0630  Gross per 24 hour   Intake 3504.45 ml   Output 3245 ml   Net 259.45 ml       EXAM:  Physical Exam  Constitutional:       Appearance: She is obese. She is ill-appearing.   HENT:      Head: Normocephalic and atraumatic.      Nose: Nose normal.      Mouth/Throat:      Pharynx: No oropharyngeal exudate.   Eyes:      General: No scleral icterus.        Right eye: No discharge.         Left eye: No discharge.   Cardiovascular:      Rate and Rhythm: Normal rate.      Heart sounds: No murmur heard.     No gallop.   Pulmonary:      Effort: Pulmonary effort is normal. No respiratory distress.      Breath sounds: No wheezing or rales.   Abdominal:      General: Abdomen is flat. There is no distension.      Tenderness: There is abdominal tenderness.   Musculoskeletal:         General: No swelling.      Cervical back: Normal range of motion.   Skin:     General: Skin is warm and dry.   Neurological:      Mental Status: She is alert.      Comments: Alert and nods head to question for pain          Medications:  Scheduled Meds:   mupirocin   Each Nostril BID    meropenem  500 mg IntraVENous Q12H    thiamine  100 mg IntraVENous BID    [Held by provider] buPROPion  300 mg Oral QAM    [Held by provider] FLUoxetine  40 mg Oral Daily    sodium chloride flush  5-40 mL IntraVENous 2 times per day    pantoprazole  40 mg IntraVENous Daily       PRN Meds:  glucose, dextrose bolus **OR** dextrose bolus, glucagon

## 2024-06-04 NOTE — PROCEDURES
PROCEDURE NOTE  Date: 6/4/2024   Name: Sudha Lopez  YOB: 1945    Central Line    Date/Time: 6/4/2024 4:07 PM    Performed by: Romi Bennett APRN - CNP  Authorized by: Romi Bennett APRN - CNP  Consent: Written consent obtained.  Risks and benefits: risks, benefits and alternatives were discussed  Consent given by: power of   Patient identity confirmed: arm band  Time out: Immediately prior to procedure a \"time out\" was called to verify the correct patient, procedure, equipment, support staff and site/side marked as required.  Indications: malfunctioning existing CVC-will not draw blood.  Anesthesia: local infiltration    Anesthesia:  Local Anesthetic: lidocaine 1% without epinephrine    Sedation:  Patient sedated: no    Preparation: skin prepped with ChloraPrep  Skin prep agent dried: skin prep agent completely dried prior to procedure  Sterile barriers: all five maximum sterile barriers used - cap, mask, sterile gown, sterile gloves, and large sterile sheet  Hand hygiene: hand hygiene performed prior to central venous catheter insertion  Location details: right femoral  Site selection rationale: attempt at right IJ unsuccessful, line was exchanged over guidewire of existing right femoral  Patient position: flat  Catheter type: triple lumen  Catheter size: 7.5 Fr  Ultrasound guidance: no  Number of attempts: 1  Successful placement: yes  Post-procedure: line sutured  Assessment: free fluid flow and blood return through all ports  Patient tolerance: patient tolerated the procedure well with no immediate complications  Comments: Patient tolerated well. EBL less than 5cc

## 2024-06-04 NOTE — PROGRESS NOTES
Physical Therapy/Occupational Therapy    PT/OT orders received, chart reviewed.  Per ICU nurse practitioner, Romi Bennett, hold PT/OT today due to pt's medical status and level of participation.  NP reports pt is very weak and lethargic, not following commands at present.  Will re-attempt PT/OT on 6/05/24.    Thank you.    Elma Valdez  PT, DPT #[id number]  Carmen Ramos, OTR/L

## 2024-06-04 NOTE — PROGRESS NOTES
06/04/24 0349   NIV Type   Mode Bilevel   Assessment   Pulse 75   Respirations 12   SpO2 93 %   Comfort Level Good   Using Accessory Muscles No   Mask Compliance Good   Skin Assessment Clean, dry, & intact   Skin Protection for O2 Device Yes   Settings/Measurements   PIP Observed 12 cm H20   IPAP 12 cmH20   CPAP/EPAP 5 cmH2O   Vt (Measured) 526 mL   Rate Ordered 12   Insp Rise Time (%) 2 %   FiO2  60 %   I Time/ I Time % 1 s   Minute Volume (L/min) 6.8 Liters   Mask Leak (lpm) 16 lpm   Patient's Home Machine No   Alarm Settings   Alarms On Y

## 2024-06-04 NOTE — PLAN OF CARE
Problem: Discharge Planning  Goal: Discharge to home or other facility with appropriate resources  Outcome: Progressing     Problem: Pain  Goal: Verbalizes/displays adequate comfort level or baseline comfort level  Outcome: Progressing     Problem: Safety - Adult  Goal: Free from fall injury  Outcome: Progressing     Problem: Safety - Medical Restraint  Goal: Remains free of injury from restraints (Restraint for Interference with Medical Device)  Description: INTERVENTIONS:  1. Determine that other, less restrictive measures have been tried or would not be effective before applying the restraint  2. Evaluate the patient's condition at the time of restraint application  3. Inform patient/family regarding the reason for restraint  4. Q2H: Monitor safety, psychosocial status, comfort, nutrition and hydration  Outcome: Progressing     Problem: Nutrition Deficit:  Goal: Optimize nutritional status  Outcome: Progressing     Problem: ABCDS Injury Assessment  Goal: Absence of physical injury  Outcome: Progressing

## 2024-06-04 NOTE — PROGRESS NOTES
06/03/24 4142   NIV Type   Mode Bilevel   Assessment   Pulse 70   Respirations 16   SpO2 97 %   Comfort Level Good   Using Accessory Muscles No   Mask Compliance Good   Skin Assessment Clean, dry, & intact   Skin Protection for O2 Device Yes   Location Nose   Breath Sounds   Right Upper Lobe Clear;Diminished   Right Middle Lobe Diminished   Right Lower Lobe Diminished   Left Upper Lobe Crackles   Left Lower Lobe Crackles   Settings/Measurements   PIP Observed 9 cm H20   IPAP 12 cmH20   CPAP/EPAP 6 cmH2O   Vt (Measured) 502 mL   Rate Ordered 12   Insp Rise Time (%) 2 %   FiO2  65 %   I Time/ I Time % 1 s   Minute Volume (L/min) 7.6 Liters   Mask Leak (lpm) 9 lpm   Patient's Home Machine No   Alarm Settings   Alarms On Y   Low Pressure (cmH2O) 6 cmH2O   High Pressure (cmH2O) 30 cmH2O   Delay Alarm 20 sec(s)   RR Low (bpm) 6   RR High (bpm) 40 br/min

## 2024-06-04 NOTE — PROGRESS NOTES
Ortho  Pt extubated  opening eyes but not following commands     Latest Reference Range & Units 06/04/24 04:20 06/04/24 08:18   Sodium 136 - 145 mmol/L 136    Potassium 3.5 - 5.1 mmol/L 3.7    Chloride 99 - 110 mmol/L 97 (L)    CARBON DIOXIDE 21 - 32 mmol/L 26    BUN,BUNPL 7 - 20 mg/dL 59 (H)    Creatinine 0.6 - 1.2 mg/dL 2.9 (H)    Anion Gap 3 - 16  13    Est, Glom Filt Rate >60  16 !    Glucose 70 - 99 mg/dL 85    Calcium 8.3 - 10.6 mg/dL 7.9 (L)    Phosphorus 2.5 - 4.9 mg/dL 4.5    Albumin 3.4 - 5.0 g/dL 2.2 (L)    WBC 4.0 - 11.0 K/uL  37.8 (H)   RBC 4.00 - 5.20 M/uL  2.61 (L)   Hemoglobin Quant 12.0 - 16.0 g/dL  7.7 (L)   Hematocrit 36.0 - 48.0 %  24.0 (L)   MCV 80.0 - 100.0 fL  91.9   MCH 26.0 - 34.0 pg  29.5   MCHC 31.0 - 36.0 g/dL  32.1   MPV 5.0 - 10.5 fL  8.6   RDW 12.4 - 15.4 %  14.1   Platelet Count 135 - 450 K/uL  121 (L)     Rt TKR   Incision without signs of infection   NVI 2 + pulses    Left LE  Spint in place   Ankle well located   NVI    Will attempt to get Continuous Passive Motion (CPM) machine for Rt TKR  Via PT    Discussed with nursing and family/daughter    Aftab Moreno MD

## 2024-06-04 NOTE — CONSULTS
Brief Nutrition Assessment    RD received consult for TF recommendations only - provider to manage. Pt extubated yesterday but not responding well. NG remains in place. Ok to start trickle TF per general surgery note. Jevity 1.5 ordered today at 10 ml/hr with 30 ml water flushes q 6 hrs per MD. TF recommendations provided. Dietitians following with nutrition assessment and recommendations in RD progress notes. Will continue to monitor per nutrition standards of care.     Nutrition Recommendations/Plan:   NPO  Continue Jevity 1.5 (standard with fiber formula) at trophic rate of 10 ml/hr via NG  As approved by MD and as tolerated, increase TF by 10 mL/hr q 4 hours until goal of 50 mL/hr is met.  Water flushes 30 mL q 6 hours per MD.  Recommend reevaluate IV fluids at this time.  Increase flush if Na+ increases greater than 145 mEq/L.  Monitor closely and correct lytes (K, Phos, Mg) before progressing TF to goal d/t risk of refeeding syndrome (hx extended inadequate nutritional intake).  Monitor for tolerance (bowel habits, N/V, cramping, abdominal exam findings: distended, firm, tense, guarded, discomfort).     Current Nutrition Intake & Therapies:    Average Meal Intake: NPO  Average Supplements Intake: NPO  Diet NPO  ADULT TUBE FEEDING; Nasogastric; Standard with Fiber; Continuous; 10; No; 30; Q 6 hours  Current Tube Feeding (TF) Orders:  Feeding Route: Nasogastric  Formula: Standard with Fiber  Schedule: Continuous  Goal TF & Flush Orders Provides: Jevity 1.5 at goal rate of 50 ml/hr x20 hrs to provide 1000 ml TV, 1500 kcals, 64 g protein, 760 ml free water. Water flushes 30 ml q 6 hrs per MD.     Anthropometric Measures:  Height: 160 cm (5' 3\")  Ideal Body Weight (IBW): 115 lbs (52 kg)       Current Body Weight: 90.2 kg (198 lb 13.7 oz),   IBW. Weight Source: Bed Scale  Current BMI (kg/m2): 35.2  Usual Body Weight: 83.9 kg (184 lb 15.5 oz) (3/11/24)  % Weight Change (Calculated): 7.5  Weight Adjustment For: No

## 2024-06-04 NOTE — PROGRESS NOTES
Patient now extubated.  NG remains.  [place].  No longer on IV infusions.  Patients eyes open, not focusing.  Unable to hear her speak.  Son in room states she has not been talking.  Current ostomy pouch intact.  Loose brown stool in ostomy bag. Mid line abd dressing intact.  No plans to change appliance today.  Will plan to change again tomorrow.       06/04/24 1117   Ileostomy/Jejunostomy RUQ Ileostomy   Placement Date: 06/02/24   Present on Admission/Arrival: No  Location: RUQ  Ostomy Type: Ileostomy   Stomal Appliance 2 piece;Flat;Clean, dry & intact   Flange Size (inches) 1.75 Inches   Stoma  Assessment Moist;Red;Swelling   Peristomal Assessment OMAIRA   Mucocutaneous Junction   (unable to assess)   Treatment   (no treatment at this time)   Stool Appearance Loose   Stool Color Brown   Stool Amount Small        06/04/24 1119   Incision 06/02/24 Abdomen Medial;Upper;Anterior   Date First Assessed/Time First Assessed: 06/02/24 0721   Present on Original Admission: No  Location: Abdomen  Incision Location Orientation: Medial;Upper;Anterior   Dressing Status Clean;Dry;Intact   Dressing Change Due 06/06/24   Incision Cleansed Not Cleansed   Dressing/Treatment Foam   Closure Staples   Incision Assessment   (not assessed, dressing intact)   Drainage Amount None (dry)   Odor None   Darlyn-incision Assessment Other (Comment)  (not assessed, dressing intact)

## 2024-06-04 NOTE — PROGRESS NOTES
Hospital Medicine Progress Note      Date of Admission: 5/30/2024  Hospital Day: 6    Chief Admission Complaint:  Left ankle pain     Subjective: Stable off vent but remains lethargic. Responds briefly to voice but doesn't really follow commands. Exam is very limited but does not appear to have any asymmetric findings.     Telemetry (reviewed by me):  Continues to have paroxysmal episodes of Atrial Fibrillation with RVR throughout the day today.     Presenting Admission History:       a 78-year-old female past medical history of hypertension, recent left ankle fracture now reduced, status post right knee replacement who was admitted after a mechanical fall.  Patient had a rapid response called and was transferred to ICU for hypotension, abdominal distention, lactic acidosis.     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 General Surgery recommended OR evaluation on 6/2 and she was found to have gangrenous colon, now s/p subtotal colectomy and ileostomy creation.  Post op care per gen surgery   NGT, trophic feeds  Wound Care     Severe Sepsis with Septic shock 2/2 Colonic Ischemia  On mulitple prossors  Continue broad spectrum antibiotics  Renal function improving     Acute hypoxic and hypercapnic respiratory failure  Weaned and extubated  Remains lethargic, sedation may still washing out  Monitor.     Acute Metabolic Encephalopathy: Suspect this could be related to slow metabolism of sedatives after intubation. However, in light of new onset PAF with frequent paroxysms, cannot fully exclude acute CVA at this time. Her exam is very limited but non-focal which would suggest sedative effect. Head CT was negative 6/3. Agree with continue observation for clinical

## 2024-06-04 NOTE — PROGRESS NOTES
Inpatient Note    CC: fall  Summary:   Sudha Lopez is being seen by nephrology for hyponatremia. She is a 78 y.o. female with a PMH significant for hypertension, osteoarthritis who presented to the ED 5/30/2024 after a fall. She was noted to have a displaced left ankle fracture s/p reduction in the ED. She is also s/p recent right total knee replacement 5/29/2024. She is noted to have acute on chronic hyponatremia with Na down to 126 at time of consult. She also complained of severe abdominal pain and constipation. She became hypotensive and was noted to have stool imapction and possible ischemic bowel. Due to hypotension she developed an Acute kidney injury (ARIAM).    Interval History  Seen at bedside  On 7 LPM O2 today, improving.  Made 2660 mL urine yesterday without diuretics  Has some output from drains/NGT  Off all pressors  /59  Cr stable at 2.9 over the last 24 hours  Lactate slowly improving.    Plan:   - fluid overload is improving.  - good urine output. Creatinine remains plateaued today.   - BP at goal  - edema stable to slowly improving.  - continue supportive care    Assessment:   AIRAM:  - baseline Cr 0.9  - Cr was at baseline on admission but began trending up due to hypotension and abdominal pain  - required pressors, endoscopic bowel decompression.  - AIRAM 2/2 hemodynamic insult.    Acute metabolic acidosis:  - 2/2 lactic acidosis and sepsis. Possible ischemic gut    Hyponatremia:  - Na 126 at time of consult.  - Na in the last year has ranged 129-135  - on HCTZ 12.5 mg, celecoxib, bupropion PTA as possible contributors  - acute drop in Na due to pain from fracture and recent surgery.  - urine studies pending.    Hypertension:  - home regimen: HCTZ 12.5 mg daily, benazepril 40 mg daily, amlodipine 5 mg daily  - stop HCTZ. Would add HCTZ to allergies.    Fall:  - left ankle fracuture  - recent right TKR 5/28/2024  - per ortho.    Ischemic bowel:  -  s/p subtotal colectomy and ileostomy 6/2/2024      Ben Arevalo MD  [facility]  Office: [phone]          PE:   Vitals:    06/04/24 1030   BP: (!) 130/59   Pulse: 78   Resp: 13   Temp:    SpO2: 96%       General appearance: in NAD  Respiratory: bilateral equal chest rise, no wheeze, no crackles  Cardiovascular: Ausculation shows RRR mp edema  Abdomen: No visible mass, + tenderness, + distended.

## 2024-06-04 NOTE — PROGRESS NOTES
[facility] GENERAL SURGERY    Surgery Progress Note           POD # 2  PATIENT NAME: Sudha Lopez     TODAY'S DATE: 6/4/2024    INTERVAL HISTORY:    Pt extubated, awake, but not responding well.   UO good. Off pressors. Amio gtt discontinued.   Ngt output remains bilious, ostomy working     OBJECTIVE:   VITALS:  BP (!) 130/59   Pulse 78   Temp 98.7 °F (37.1 °C) (Bladder)   Resp 13   Ht 1.6 m (5' 3\")   Wt 89.3 kg (196 lb 13.9 oz)   SpO2 96%   BMI 34.87 kg/m²     INTAKE/OUTPUT:    I/O last 3 completed shifts:  In: 6664.5 [I.V.:6334.9; IV Piggyback:329.5]  Out: 5455 [Urine:4545; Emesis/NG output:500; Drains:410]  I/O this shift:  In: -   Out: 150 [Stool:150]              CONSTITUTIONAL:  awake  LUNGS:  resp unlabored  ABDOMEN:   , soft, non-distended, ostomy viable - with stool, sachin serosanguinous  INCISION: clean    Data:  CBC:   Recent Labs     06/02/24  1000 06/02/24  2220 06/03/24  0555 06/03/24  1312 06/04/24  0818   WBC 5.3  --  16.1*  --  37.8*   HGB 8.9*   < > 8.5* 7.9* 7.7*   HCT 25.9*  --  24.5*  --  24.0*     --  145  --  121*    < > = values in this interval not displayed.       BMP:    Recent Labs     06/03/24  0158 06/03/24  0922 06/04/24  0420   * 137 136   K 4.2 4.1 3.7   CL 96* 96* 97*   CO2 21 23 26   BUN 56* 56* 59*   CREATININE 2.7* 2.8* 2.9*   GLUCOSE 82 68* 85       Hepatic:   No results for input(s): \"AST\", \"ALT\", \"BILITOT\", \"ALKPHOS\" in the last 72 hours.    Invalid input(s): \"ALB\"    Mag:    No results for input(s): \"MG\" in the last 72 hours.   Phos:     Recent Labs     06/03/24  0158 06/03/24  0922 06/04/24  0420   PHOS 5.0* 4.7 4.5        INR:   Recent Labs     06/01/24  1843 06/02/24  0515 06/02/24  1000   INR 2.01* 2.27* 2.19*           Radiology Review:  NA    ASSESSMENT AND PLAN:  78 y.o. female status post subtotal colectomy and end ileostomy  GI: ok for trickle tube fees  : making urine, continue with roach  Pulm: extubated  CVD: amio gtt stopped  ID: continue

## 2024-06-05 ENCOUNTER — APPOINTMENT (OUTPATIENT)
Dept: GENERAL RADIOLOGY | Age: 79
End: 2024-06-05
Payer: MEDICARE

## 2024-06-05 LAB
ALBUMIN SERPL-MCNC: 2.2 G/DL (ref 3.4–5)
ALBUMIN SERPL-MCNC: 2.4 G/DL (ref 3.4–5)
ALP SERPL-CCNC: 190 U/L (ref 40–129)
ALT SERPL-CCNC: 311 U/L (ref 10–40)
ANION GAP SERPL CALCULATED.3IONS-SCNC: 14 MMOL/L (ref 3–16)
ANTI-XA UNFRAC HEPARIN: 0.3 IU/ML (ref 0.3–0.7)
AST SERPL-CCNC: 442 U/L (ref 15–37)
BILIRUB DIRECT SERPL-MCNC: 0.4 MG/DL (ref 0–0.3)
BILIRUB INDIRECT SERPL-MCNC: 0.2 MG/DL (ref 0–1)
BILIRUB SERPL-MCNC: 0.6 MG/DL (ref 0–1)
BUN SERPL-MCNC: 62 MG/DL (ref 7–20)
CALCIUM SERPL-MCNC: 8.4 MG/DL (ref 8.3–10.6)
CHLORIDE SERPL-SCNC: 98 MMOL/L (ref 99–110)
CO2 SERPL-SCNC: 28 MMOL/L (ref 21–32)
CREAT SERPL-MCNC: 2.5 MG/DL (ref 0.6–1.2)
DEPRECATED RDW RBC AUTO: 14.9 % (ref 12.4–15.4)
GFR SERPLBLD CREATININE-BSD FMLA CKD-EPI: 19 ML/MIN/{1.73_M2}
GLUCOSE BLD-MCNC: 103 MG/DL (ref 70–99)
GLUCOSE SERPL-MCNC: 107 MG/DL (ref 70–99)
HCT VFR BLD AUTO: 22.9 % (ref 36–48)
HGB BLD-MCNC: 7.3 G/DL (ref 12–16)
LACTATE BLDV-SCNC: 1.5 MMOL/L (ref 0.4–2)
MAGNESIUM SERPL-MCNC: 2.3 MG/DL (ref 1.8–2.4)
MCH RBC QN AUTO: 29.1 PG (ref 26–34)
MCHC RBC AUTO-ENTMCNC: 32 G/DL (ref 31–36)
MCV RBC AUTO: 90.8 FL (ref 80–100)
PERFORMED ON: ABNORMAL
PHOSPHATE SERPL-MCNC: 3.8 MG/DL (ref 2.5–4.9)
PLATELET # BLD AUTO: 122 K/UL (ref 135–450)
PMV BLD AUTO: 8.1 FL (ref 5–10.5)
POTASSIUM SERPL-SCNC: 3.2 MMOL/L (ref 3.5–5.1)
PROT SERPL-MCNC: 4.9 G/DL (ref 6.4–8.2)
RBC # BLD AUTO: 2.52 M/UL (ref 4–5.2)
SODIUM SERPL-SCNC: 140 MMOL/L (ref 136–145)
WBC # BLD AUTO: 45.3 K/UL (ref 4–11)

## 2024-06-05 PROCEDURE — 6360000002 HC RX W HCPCS: Performed by: INTERNAL MEDICINE

## 2024-06-05 PROCEDURE — 6370000000 HC RX 637 (ALT 250 FOR IP): Performed by: NURSE PRACTITIONER

## 2024-06-05 PROCEDURE — 6360000002 HC RX W HCPCS: Performed by: STUDENT IN AN ORGANIZED HEALTH CARE EDUCATION/TRAINING PROGRAM

## 2024-06-05 PROCEDURE — 97530 THERAPEUTIC ACTIVITIES: CPT

## 2024-06-05 PROCEDURE — 51798 US URINE CAPACITY MEASURE: CPT

## 2024-06-05 PROCEDURE — 97535 SELF CARE MNGMENT TRAINING: CPT

## 2024-06-05 PROCEDURE — 94660 CPAP INITIATION&MGMT: CPT

## 2024-06-05 PROCEDURE — 2580000003 HC RX 258: Performed by: INTERNAL MEDICINE

## 2024-06-05 PROCEDURE — 99024 POSTOP FOLLOW-UP VISIT: CPT | Performed by: SURGERY

## 2024-06-05 PROCEDURE — 97167 OT EVAL HIGH COMPLEX 60 MIN: CPT

## 2024-06-05 PROCEDURE — 87040 BLOOD CULTURE FOR BACTERIA: CPT

## 2024-06-05 PROCEDURE — 6360000002 HC RX W HCPCS: Performed by: SURGERY

## 2024-06-05 PROCEDURE — 94761 N-INVAS EAR/PLS OXIMETRY MLT: CPT

## 2024-06-05 PROCEDURE — 83605 ASSAY OF LACTIC ACID: CPT

## 2024-06-05 PROCEDURE — 80069 RENAL FUNCTION PANEL: CPT

## 2024-06-05 PROCEDURE — 80076 HEPATIC FUNCTION PANEL: CPT

## 2024-06-05 PROCEDURE — 94669 MECHANICAL CHEST WALL OSCILL: CPT

## 2024-06-05 PROCEDURE — C1751 CATH, INF, PER/CENT/MIDLINE: HCPCS

## 2024-06-05 PROCEDURE — 2700000000 HC OXYGEN THERAPY PER DAY

## 2024-06-05 PROCEDURE — 97163 PT EVAL HIGH COMPLEX 45 MIN: CPT

## 2024-06-05 PROCEDURE — 99291 CRITICAL CARE FIRST HOUR: CPT | Performed by: STUDENT IN AN ORGANIZED HEALTH CARE EDUCATION/TRAINING PROGRAM

## 2024-06-05 PROCEDURE — 99223 1ST HOSP IP/OBS HIGH 75: CPT | Performed by: INTERNAL MEDICINE

## 2024-06-05 PROCEDURE — 2580000003 HC RX 258: Performed by: SURGERY

## 2024-06-05 PROCEDURE — 83735 ASSAY OF MAGNESIUM: CPT

## 2024-06-05 PROCEDURE — 74018 RADEX ABDOMEN 1 VIEW: CPT

## 2024-06-05 PROCEDURE — 2580000003 HC RX 258: Performed by: STUDENT IN AN ORGANIZED HEALTH CARE EDUCATION/TRAINING PROGRAM

## 2024-06-05 PROCEDURE — 85027 COMPLETE CBC AUTOMATED: CPT

## 2024-06-05 PROCEDURE — 6360000002 HC RX W HCPCS: Performed by: NURSE PRACTITIONER

## 2024-06-05 PROCEDURE — C9113 INJ PANTOPRAZOLE SODIUM, VIA: HCPCS | Performed by: SURGERY

## 2024-06-05 PROCEDURE — 36569 INSJ PICC 5 YR+ W/O IMAGING: CPT

## 2024-06-05 PROCEDURE — 94640 AIRWAY INHALATION TREATMENT: CPT

## 2024-06-05 PROCEDURE — 2000000000 HC ICU R&B

## 2024-06-05 PROCEDURE — 85520 HEPARIN ASSAY: CPT

## 2024-06-05 RX ORDER — POTASSIUM CHLORIDE 7.45 MG/ML
10 INJECTION INTRAVENOUS
Status: COMPLETED | OUTPATIENT
Start: 2024-06-05 | End: 2024-06-05

## 2024-06-05 RX ORDER — QUETIAPINE FUMARATE 25 MG/1
25 TABLET, FILM COATED ORAL NIGHTLY
Status: DISCONTINUED | OUTPATIENT
Start: 2024-06-05 | End: 2024-06-07

## 2024-06-05 RX ADMIN — MEROPENEM 500 MG: 500 INJECTION, POWDER, FOR SOLUTION INTRAVENOUS at 10:55

## 2024-06-05 RX ADMIN — MUPIROCIN: 20 OINTMENT TOPICAL at 10:41

## 2024-06-05 RX ADMIN — MEROPENEM 500 MG: 500 INJECTION, POWDER, FOR SOLUTION INTRAVENOUS at 22:33

## 2024-06-05 RX ADMIN — POTASSIUM CHLORIDE 10 MEQ: 7.46 INJECTION, SOLUTION INTRAVENOUS at 13:58

## 2024-06-05 RX ADMIN — SODIUM CHLORIDE, PRESERVATIVE FREE 10 ML: 5 INJECTION INTRAVENOUS at 22:28

## 2024-06-05 RX ADMIN — PANTOPRAZOLE SODIUM 40 MG: 40 INJECTION, POWDER, FOR SOLUTION INTRAVENOUS at 10:41

## 2024-06-05 RX ADMIN — HEPARIN SODIUM AND DEXTROSE 1000 UNITS/HR: 10000; 5 INJECTION INTRAVENOUS at 14:00

## 2024-06-05 RX ADMIN — AMIODARONE HYDROCHLORIDE 1 MG/MIN: 50 INJECTION, SOLUTION INTRAVENOUS at 19:14

## 2024-06-05 RX ADMIN — THIAMINE HYDROCHLORIDE 100 MG: 100 INJECTION, SOLUTION INTRAMUSCULAR; INTRAVENOUS at 10:41

## 2024-06-05 RX ADMIN — THIAMINE HYDROCHLORIDE 100 MG: 100 INJECTION, SOLUTION INTRAMUSCULAR; INTRAVENOUS at 22:27

## 2024-06-05 RX ADMIN — QUETIAPINE FUMARATE 25 MG: 25 TABLET ORAL at 22:27

## 2024-06-05 RX ADMIN — SODIUM CHLORIDE, PRESERVATIVE FREE 10 ML: 5 INJECTION INTRAVENOUS at 10:41

## 2024-06-05 RX ADMIN — MUPIROCIN: 20 OINTMENT TOPICAL at 22:28

## 2024-06-05 RX ADMIN — POTASSIUM CHLORIDE 10 MEQ: 7.46 INJECTION, SOLUTION INTRAVENOUS at 15:41

## 2024-06-05 ASSESSMENT — PAIN SCALES - GENERAL
PAINLEVEL_OUTOF10: 0

## 2024-06-05 ASSESSMENT — PAIN SCALES - WONG BAKER: WONGBAKER_NUMERICALRESPONSE: HURTS LITTLE MORE

## 2024-06-05 NOTE — PROGRESS NOTES
[facility] Nurse  Follow-up Progress Note       NAME:  Sudha Lopez  MEDICAL RECORD NUMBER:  [id number]  AGE:  78 y.o.   GENDER:  female  :  1945  TODAY'S DATE:  2024    Subjective: non verbal at this time    Wound Identification:  Wound Type: Surgical mid line abd staple line  Contributing Factors: edema, decreased mobility, shear force, obesity, and decreased tissue oxygenation    New ileostomy: Subtotal colectomy with ileostomy on 24 for bowel obstruction by Dr Dung Jackson.      Stoma size:  25 = 1 inch mm x 28 mm 1 1/8 inch  Appliance size:  Coloplast 2 pience GREEN flat flange # [id number] with drainable bag # [id number] paste ring # [id number] placed around stoma.      Patient Goal of Care:  [x] Wound Healing  [] Odor Control   [] Palliative Care  [] Pain Control   [x] Other: new ileostomy teaching    Objective: lying in bed.  Brace on right knee and splint on left ankle.  PT/OT here to lift patient to chair.  NG in place with tube feedings infusing.  [place]. Current ostomy pouch leaking.     BP (!) 154/63   Pulse 84   Temp 98.6 °F (37 °C) (Bladder)   Resp 14   Ht 1.6 m (5' 3\")   Wt 89.3 kg (196 lb 13.9 oz)   SpO2 91%   BMI 34.87 kg/m²   Kirill Risk Score: Kirill Scale Score: 21  Assessment: Ostomy stoma protrudes, moist, swollen.  Stool black brown loose.  Darlyn stomal skin intact.  Mid line staple line intact.   Measurements:     Incision 24 Abdomen Medial;Upper;Anterior (Active)   Wound Image    24 1140   Dressing Status New dressing applied 24   Dressing Change Due 24   Incision Cleansed Cleansed with saline 24   Dressing/Treatment Foam 24 09   Incision Length (cm) 15 24 09   Incision Width (cm) 0 cm 24   Incision Depth (cm) 0 cm 24   Incision Volume (cm^3) 0 cm^3 24   Closure Staples 24   Margins Approximated 24 1143    Incision Assessment Other (Comment) 06/05/24 0949   Drainage Amount None (dry) 06/05/24 0949   Drainage Description Thin;Serosanguinous 06/03/24 1140   Odor None 06/05/24 0949   Darlyn-incision Assessment Dry/flaky;Intact 06/05/24 0949   Number of days: 3         Ileostomy/Jejunostomy RUQ Ileostomy (Active)   Stomal Appliance Changed;2 piece;Flat 06/05/24 0949   Flange Size (inches) 1.75 Inches 06/05/24 0949   Stoma  Assessment Moist;Red;Swelling 06/05/24 0949   Peristomal Assessment Clean, dry & intact 06/05/24 0949   Mucocutaneous Junction Intact 06/05/24 0949   Treatment Pouch change;Site care;Barrier ring;Heat applied 06/05/24 0949   Stool Appearance Watery;Loose 06/05/24 0949   Stool Color Brown;Black 06/05/24 0949   Stool Amount Small 06/05/24 0949   Output (mL) 150 ml 06/05/24 0600   Number of days: 3     Intake/Output Summary (Last 24 hours) at 6/5/2024 0954  Last data filed at 6/5/2024 0600  Gross per 24 hour   Intake 535.28 ml   Output 1470 ml   Net -934.72 ml     Response to treatment:  Well tolerated by patient.     Pain Assessment:  Severity:  0 / 10  Quality of pain: N/A  Wound Pain Timing/Severity: none  Premedicated: No  Plan:   Plan of Care:    Mid abd suture line and HALLIE dressing changed. Sile cleansed with normal saline.  Foam dressing reapplied.       Plan for Ostomy Care:  Daughter here and some what observing ostomy appliance change. Current appliance removed.  SIte cleansed with warm water.  Stoma measured.  Flange cut.  Paste ring placed around stoma.  Flange placed around stoma.  Bag attached.  Warm compress applied.       Stoma Care - New ileostomy - Patient to empty appliance when 1/3 to 1/2 full with assistance of the staff. Cleanse inside and outside of the drain spout prior to rolling closed. Change appliance every 3-5 days or 1-2 times a week.  Call for problems with seal [phone]    Specialty Bed Required : Yes Isolibrium gel therapy pressure redistribution mattress with low air

## 2024-06-05 NOTE — PROGRESS NOTES
(nephro) returned call & gave permission for PICC placement, and IV K+ replacement. Pls see orders. Spoke to José Manuel in IR, will be up place PICC line in pt. Consent obtained.

## 2024-06-05 NOTE — CARE COORDINATION
[facility] - Acute Rehab Unit   After review, this patient is felt to be:       []  Appropriate for Acute Inpatient Rehab    []  Appropriate for Acute Inpatient Rehab Pending Insurance Authorization    []  Not appropriate for Acute Inpatient Rehab    [x]  Referral received and ARU reviewing patient; Evaluation ongoing.    will follow for medical and therapy progress.    Will notify DCP with further updates. Thank you for the referral.  Ching Hay RN

## 2024-06-05 NOTE — PROGRESS NOTES
06/04/24 2332   NIV Type   Mode Bilevel   Assessment   Pulse (!) 112   Respirations 16   SpO2 94 %   Comfort Level Good   Using Accessory Muscles No   Mask Compliance Good   Skin Assessment Clean, dry, & intact   Skin Protection for O2 Device Yes   Location Nose   Intervention(s) Skin Barrier   Settings/Measurements   IPAP 12 cmH20   CPAP/EPAP 5 cmH2O   Vt (Measured) 455 mL   Rate Ordered 12   FiO2  60 %   Minute Volume (L/min) 7.9 Liters   Mask Leak (lpm) 10 lpm   Patient's Home Machine No   Alarm Settings   Alarms On Y

## 2024-06-05 NOTE — PROGRESS NOTES
[facility] GENERAL SURGERY    Surgery Progress Note           POD # 3  PATIENT NAME: Sudha Lopez     TODAY'S DATE: 6/5/2024    INTERVAL HISTORY:    Pt more alert     OBJECTIVE:   VITALS:  BP (!) 154/63   Pulse 84   Temp 98.6 °F (37 °C) (Bladder)   Resp 14   Ht 1.6 m (5' 3\")   Wt 89.3 kg (196 lb 13.9 oz)   SpO2 91%   BMI 34.87 kg/m²     INTAKE/OUTPUT:    I/O last 3 completed shifts:  In: 977 [I.V.:595.4; NG/GT:182; IV Piggyback:199.6]  Out: 2880 [Urine:2250; Emesis/NG output:200; Drains:130; Stool:300]  No intake/output data recorded.              CONSTITUTIONAL:  awake  LUNGS:  resp unlabored  ABDOMEN:   , soft, non-distended, ostomy viable - with output, sachin serosanguinous  INCISION: clean    Data:  CBC:   Recent Labs     06/03/24  0555 06/03/24  1312 06/04/24  0818 06/05/24  0423   WBC 16.1*  --  37.8* 45.3*   HGB 8.5* 7.9* 7.7* 7.3*   HCT 24.5*  --  24.0* 22.9*     --  121* 122*       BMP:    Recent Labs     06/03/24 0922 06/04/24 0420 06/05/24  0828    136 140   K 4.1 3.7 3.2*   CL 96* 97* 98*   CO2 23 26 28   BUN 56* 59* 62*   CREATININE 2.8* 2.9* 2.5*   GLUCOSE 68* 85 107*       Hepatic:   Recent Labs     06/05/24 0423   *   *   BILITOT 0.6   ALKPHOS 190*       Mag:      Recent Labs     06/05/24 0423   MG 2.30      Phos:     Recent Labs     06/03/24  0922 06/04/24  0420 06/05/24  0828   PHOS 4.7 4.5 3.8        INR:   Recent Labs     06/04/24  2030   INR 1.36*           Radiology Review:  NA    ASSESSMENT AND PLAN:  78 y.o. female status post subtotal colectomy and end ileostomy  GI: increase tube feeds slowly  : making urine, continue with roach  Pulm: extubated  CVD: amio gtt stopped  ID: continue with current antibiotics - monitor leukocytosis.       Electronically signed by Bertin Jackson MD

## 2024-06-05 NOTE — CONSULTS
Electrophysiology Consultation   Date: 6/5/2024  Admit Date:  5/30/2024  Reason for Consultation: Paroxysmal atrial fibrillation  Consult Requesting Physician: Tru Carrera MD     Chief Complaint   Patient presents with    Ankle Pain     Patient had right knee surgery yesterday and fell getting into bed. Patient is c/o left ankle pain with deformity.     HPI:   Mrs. Lopez is a pleasant 78 year old female with a medical history significant for hypertension who presented from home with ankle fracture now complicated by bowel ischemia, paroxysmal atrial fibrillation/flutter, multifocal atrial tachycardia, leukocytosis, and multiorgan failure.  Patient unable to give history so her history was provided by her daughters and family friend.  Patient underwent knee replacement with Dr. Clarke and presented to [facility] on 05/31/2024 with ankle fracture the following day as she fell due to some weakness.  She complained of abdominal pain and so underwent CT scan that showed colonic dilation.  She was evaluated by GI for decompression.  Patient's clinical condition worsened and she began to suffer from respiratory distress and shock.  She underwent emergent subtotal colectomy with ileostomy.  Her post operative course has been complicated by worsening leukocytosis, renal failure and atrial arrhythmias including atrial fibrillation/flutter and multifocal atrial tachycardia.  Was was temporarily placed on amiodarone and is now on a heparin drip and off amiodarone drone. Electrophysiology was consulted to assist with atrial arrhythmia management.    Unfortunately, family was overwhelmed.  I was able to speak with family friend, who is a provider, and explained condition.    Current rhythm: Multifocal atrial tachycardia  Known history of atrial fibrillation: no  Valvular disease: No  Associated symptoms:  Unknown  Heart rate is  controlled  Previous cardioversion and/or ablation: no  History of CAD: No  History of

## 2024-06-05 NOTE — PROGRESS NOTES
06/05/24 0424   NIV Type   NIV Started/Stopped On   Equipment Type V60   Mode Bilevel   Mask Type Full face mask   Mask Size Medium   Assessment   Pulse (!) 117   Respirations 17   SpO2 94 %   Comfort Level Good   Using Accessory Muscles No   Mask Compliance Good   Skin Assessment Clean, dry, & intact   Skin Protection for O2 Device Yes   Location Nose   Intervention(s) Skin Barrier   Breath Sounds   Right Upper Lobe Diminished   Right Middle Lobe Diminished   Right Lower Lobe Diminished   Left Upper Lobe Diminished   Left Lower Lobe Diminished   Settings/Measurements   IPAP 12 cmH20   CPAP/EPAP 5 cmH2O   Vt (Measured) 557 mL   Rate Ordered 12   FiO2  60 %   Minute Volume (L/min) 9.6 Liters   Mask Leak (lpm) 5 lpm   Patient's Home Machine No   Alarm Settings   Alarms On Y

## 2024-06-05 NOTE — CARE COORDINATION
LOS 5.  Care managed by Hosp Med, Pulm, Ortho, ID, GI and Gen Surg. S/P TKR as OP, fall leading to Ankle Fx, Isch bowel w ileos. More alert today. FIDELIA kaye TF. From home alone. Will need ARU vs SNF (has list)-await recs. Long discussion regarding dispo w izabel Haile at bedside- states dtr Victorina [from [place]] is actually the POA. Continue to follow. Hellen Servin RN

## 2024-06-05 NOTE — PROGRESS NOTES
Occupational Therapy  Facility/Department: [facility] C2 CARD TELEMETRY  Occupational Therapy Initial Assessment    Name: Sudha Lopez  : 1945  MRN: [id number]  Date of Service: 2024    Discharge Recommendations:  IP Rehab, Subacute/Skilled Nursing Facility (IPR vs SNF pending progress)  OT Equipment Recommendations  Equipment Needed: No       Patient Diagnosis(es): The primary encounter diagnosis was Closed fracture of left ankle, initial encounter. Diagnoses of Dislocation of left ankle joint, initial encounter, Syncope, unspecified syncope type, Hypoxemia, Acute respiratory failure with hypoxia (HCC), Large bowel obstruction (HCC), and Leukocytosis, unspecified type were also pertinent to this visit.  Past Medical History:  has a past medical history of Anxiety and Hypertension.  Past Surgical History:  has a past surgical history that includes Hysterectomy; Breast enhancement surgery; rhinoplasty; Rotator cuff repair (Bilateral); Hand surgery (Right); Total hip arthroplasty (Bilateral); Intracapsular cataract extraction (Left, 2020); Colonoscopy; Intracapsular cataract extraction (Right, 2020); Colonoscopy (N/A, 2024); and laparotomy (N/A, 2024).       Therapy discharge recommendations are subject to collaboration from the patient’s interdisciplinary healthcare team, including MD and case management recommendations.    Barriers to Home Discharge:   [x] Steps to access home entry or bed/bath:   [x] Unable to transfer, ambulate, or propel wheelchair household distances without assist   [x] Limited available assist at home upon discharge    [] Patient or family requests d/c to post-acute facility    [x] Poor cognition/safety awareness for d/c to home alone    [x] Unable to maintain ordered weight bearing status    [] Patient with salient signs of long-standing immobility   [x] Decreased independence with ADLs   [x] Increased risk for falls   [] Other:    If pt is unable to be seen after  HEP 15x reps each by 6/10  Short Term Goal 4: Pt will complete seated grooming tasks (wash face, brush teeth) with CGA       Therapy Time   Individual Concurrent Group Co-treatment   Time In 0904         Time Out 0953         Minutes 49         Timed Code Treatment Minutes: 39 Minutes (10 minute eval)       Carmen Ramos, OT

## 2024-06-05 NOTE — PROGRESS NOTES
Pulmonary & Critical Care Medicine ICU Progress Note      Events of Last 24 hours:   Pt more awake and alert. She is answering questions appropriately. When asked how she feels, she said \"peachy.\"      Invasive Lines: PICC D#None   CVC D#3  Art Line D#None        Vitals:  BP (!) 135/50   Pulse 84   Temp 98.6 °F (37 °C) (Bladder)   Resp 14   Ht 1.6 m (5' 3\")   Wt 89.3 kg (196 lb 13.9 oz)   SpO2 91%   BMI 34.87 kg/m²    Tmax:  CVP: CVP (Mean): 0 mmHg      Intake/Output Summary (Last 24 hours) at 6/5/2024 0712  Last data filed at 6/5/2024 0600  Gross per 24 hour   Intake 535.28 ml   Output 1620 ml   Net -1084.72 ml       EXAM:  Physical Exam  Constitutional:       Appearance: She is obese. She is ill-appearing.   HENT:      Head: Normocephalic and atraumatic.      Nose: Nose normal.      Mouth/Throat:      Pharynx: No oropharyngeal exudate.   Eyes:      General: No scleral icterus.        Right eye: No discharge.         Left eye: No discharge.   Cardiovascular:      Rate and Rhythm: Normal rate.      Heart sounds: No murmur heard.     No gallop.   Pulmonary:      Effort: Pulmonary effort is normal.      Breath sounds: No wheezing.      Comments: Diminished breath sounds in all lung fields  Abdominal:      General: Abdomen is flat. Bowel sounds are normal. There is no distension.      Tenderness: There is abdominal tenderness.   Musculoskeletal:         General: No swelling.      Cervical back: Normal range of motion.   Skin:     General: Skin is warm and dry.   Neurological:      Mental Status: She is alert and oriented to person, place, and time.          Medications:  Scheduled Meds:   mupirocin   Each Nostril BID    meropenem  500 mg IntraVENous Q12H    thiamine  100 mg IntraVENous BID    [Held by provider] buPROPion  300 mg Oral QAM    [Held by provider] FLUoxetine  40 mg Oral Daily    sodium chloride flush  5-40 mL IntraVENous 2 times per day    pantoprazole  40 mg IntraVENous Daily       PRN

## 2024-06-05 NOTE — PROGRESS NOTES
Arrived to place PICC line with bedside RN Lars. Pre-procedure and timeout done with RN, discussed limitations of placement and allergies. Cleaned with chloraprep, catheter trimmed and  guidewire inserted and advanced smoothly blood was free flowing and non-pulsatile from introducer. G PICC line verified with 3CG technology with peaked P-waves (please see image below). PICC tip terminates in the SVC according to dotloop 3CG tip confirmation system. PICC was seen dropping into SVC with tip tracking technology and discernable peaked p waves were noted without negative deflection.   Please use new IV tubing when connecting to the newly placed central line.  Post procedure - reorganized pt table, placed pt in lowest position, with call light and educated on line care. Reported off to bedside RN.      Please call if you have any questions about the PICC or ML. The  will direct you to the PICC RN that is on call.      [phone]

## 2024-06-05 NOTE — PROGRESS NOTES
Inpatient Note    CC: fall  Summary:   Sudha Lopez is being seen by nephrology for hyponatremia. She is a 78 y.o. female with a PMH significant for hypertension, osteoarthritis who presented to the ED 5/30/2024 after a fall. She was noted to have a displaced left ankle fracture s/p reduction in the ED. She is also s/p recent right total knee replacement 5/29/2024. She is noted to have acute on chronic hyponatremia with Na down to 126 at time of consult. She also complained of severe abdominal pain and constipation. She became hypotensive and was noted to have stool imapction and possible ischemic bowel. Due to hypotension she developed an Acute kidney injury (AIRAM).    Interval History  Made 1.1 L urine yesterday  Creatinine slightly higher than yesterday at 2.9  Electrolytes are overall is stable  Potassium is low  Making urine  Has no roach  Intubated, lethargic, has NG tube      Plan:   Continue supportive care  Keep blood pressure 1 40-1 50 systolic in presence of AIRAM  Supplement potassium  Since she has  AIRAM okay to place PICC line    Assessment:   AIRAM:  - baseline Cr 0.9  - Cr was at baseline on admission but began trending up due to hypotension and abdominal pain  - required pressors, endoscopic bowel decompression.  - AIRAM 2/2 hemodynamic insult.    Acute metabolic acidosis:  - 2/2 lactic acidosis and sepsis. Possible ischemic gut    Hyponatremia:  - Na 126 at time of consult.  - Na in the last year has ranged 129-135  - on HCTZ 12.5 mg, celecoxib, bupropion PTA as possible contributors  - acute drop in Na due to pain from fracture and recent surgery.  - urine studies pending.    Hypertension:  - home regimen: HCTZ 12.5 mg daily, benazepril 40 mg daily, amlodipine 5 mg daily  - stop HCTZ. Would add HCTZ to allergies.    Fall:  - left ankle fracuture  - recent right TKR 5/28/2024  - per ortho.    Ischemic bowel:  - s/p subtotal colectomy and ileostomy  6/2/2024      Macario Parada MD [facility]  Office: [phone]          PE:   Vitals:    06/05/24 0445   BP:    Pulse: 79   Resp: 14   Temp:    SpO2: 94%       General appearance: in NAD  Respiratory: bilateral equal chest rise, no wheeze, no crackles  Cardiovascular: Ausculation shows RRR mp edema  Abdomen: No visible mass, + tenderness, + distended.

## 2024-06-05 NOTE — PROGRESS NOTES
Called [facility] re: K 3.2; and need for access. PIV & IJ attempted multiple times w/no success. IR requesting nephro clearance for placement of midline or PICC.

## 2024-06-05 NOTE — PROGRESS NOTES
Extremity Brace: Knee Immobilizer  Left Lower Extremity Brace:  (Splint)  Position Activity Restriction  Other position/activity restrictions: Monroe, ostomy, NG tube, R IJ, IV. R TKA 5/29, came in with L ankle fx with NWB orders     Subjective   General  Chart Reviewed: Yes  Patient assessed for rehabilitation services?: Yes  Family / Caregiver Present: Yes (daughter)  Referring Practitioner: Larry Paez MD  Referral Date : 06/04/24  General Comment  Comments: Pt in bed on arrival, agreeable to therapy.  Subjective  Subjective: Pt asked her daughter to be quiet.         Social/Functional History  Social/Functional History  Lives With: Alone  Type of Home: Condo  Home Layout: Two level, Bed/Bath upstairs  Home Access:  (1 LIBERTY)  Bathroom Shower/Tub: Tub/Shower unit  Bathroom Toilet: Standard  Bathroom Equipment: Grab bars in shower  Bathroom Accessibility: Walker accessible  Home Equipment: Walker - Rolling, Crutches (walking stick)  Has the patient had two or more falls in the past year or any fall with injury in the past year?: No (1 fall prior to admission)  ADL Assistance: Independent  Homemaking Assistance: Independent  Ambulation Assistance: Independent  Transfer Assistance: Independent  Active : Yes  Type of Occupation: nanny, part time  Leisure & Hobbies: interior design, gardening  Vision/Hearing  Vision  Vision: Impaired  Vision Exceptions: Wears glasses for reading  Hearing  Hearing: Within functional limits    Cognition   Orientation  Orientation Level: Unable to assess  Cognition  Overall Cognitive Status: Exceptions  Arousal/Alertness: Appropriate responses to stimuli  Following Commands: Impaired  Cognition Comment: Difficult to assess cognition; pt verbalizes a few words and head nods, inconsistently     Objective   Pulse: (!) 111  Respirations: 14  SpO2: 93 %  O2 Device: High flow nasal cannula (5L)  BP: (!) 154/63  MAP (Calculated): 93  BP Location: Left upper arm  BP Method:  Automatic  Patient Position: Semi fowlers        Gross Assessment  Strength: Grossly decreased, non-functional        Bed Mobility Training  Bed Mobility Training: Yes  Rolling: Total assistance;Assist X2  Transfer Training  Transfer Training: Yes  Bed to Chair: Total assistance (kerrie lift)    Exercise Treatment: Pt completes a few ankle pumps in limited range on RLE.        AM-[facility] - Mobility    AM-PAC Basic Mobility - Inpatient   How much help is needed turning from your back to your side while in a flat bed without using bedrails?: Total  How much help is needed moving from lying on your back to sitting on the side of a flat bed without using bedrails?: Total  How much help is needed moving to and from a bed to a chair?: Total  How much help is needed standing up from a chair using your arms?: Total  How much help is needed walking in hospital room?: Total  How much help is needed climbing 3-5 steps with a railing?: Total  AM-PAC Inpatient Mobility Raw Score : 6  AM-PAC Inpatient T-Scale Score : 23.55  Mobility Inpatient CMS 0-100% Score: 100  Mobility Inpatient CMS G-Code Modifier : CN    Goals  Short Term Goals  Time Frame for Short Term Goals: 1 week (6/12)  Short Term Goal 1: Pt will perform supine <> sit with MOD A.  Short Term Goal 2: Pt will perform all transfers with LRAD and MOD A.  Short Term Goal 3: Assess standing and pre-gait as appropriate and make goal.  Short Term Goal 4: Pt will perform 10 reps of BLE exercises by 6/3 (R TKA, L ankle NWB)  Patient Goals   Patient Goals : \"I want to go home\"       Education  Patient Education  Education Given To: Patient;Family  Education Provided: Role of Therapy;Orientation;Plan of Care  Education Method: Verbal  Barriers to Learning: None  Education Outcome: Verbalized understanding;Continued education needed      Therapy Time   Individual Concurrent Group Co-treatment   Time In       0904   Time Out       0954   Minutes       50   Timed Code Treatment Minutes:

## 2024-06-05 NOTE — PLAN OF CARE
Problem: Discharge Planning  Goal: Discharge to home or other facility with appropriate resources  6/4/2024 1750 by Kerry Mittal RN  Outcome: Progressing     Problem: Pain  Goal: Verbalizes/displays adequate comfort level or baseline comfort level  6/4/2024 2128 by Kinsey Patterson RN  Outcome: Progressing  6/4/2024 1750 by Kerry Mittal RN  Outcome: Progressing     Problem: Safety - Adult  Goal: Free from fall injury  6/4/2024 2128 by Kinsey Patterson RN  Outcome: Progressing  6/4/2024 1750 by Kerry Mittal RN  Outcome: Progressing     Problem: Safety - Medical Restraint  Goal: Remains free of injury from restraints (Restraint for Interference with Medical Device)  Description: INTERVENTIONS:  1. Determine that other, less restrictive measures have been tried or would not be effective before applying the restraint  2. Evaluate the patient's condition at the time of restraint application  3. Inform patient/family regarding the reason for restraint  4. Q2H: Monitor safety, psychosocial status, comfort, nutrition and hydration  6/4/2024 1750 by Kerry Mittal RN  Outcome: Progressing     Problem: Nutrition Deficit:  Goal: Optimize nutritional status  6/4/2024 1750 by Kerry Mittal RN  Outcome: Progressing     Problem: ABCDS Injury Assessment  Goal: Absence of physical injury  6/4/2024 1750 by Kerry Mittal RN  Outcome: Progressing

## 2024-06-05 NOTE — PROGRESS NOTES
While emptying patient's ostomy bag, small leak visualized around seal. Will pass onto day team to notify wound care. Will continue to monitor.

## 2024-06-05 NOTE — PROGRESS NOTES
Hospital Medicine Progress Note      Date of Admission: 5/30/2024  Hospital Day: 7    Chief Admission Complaint:  Left ankle pain     Subjective: She is more alert and responsive to questioning today. She follows some commands. Exam remains limited but non-focal. Afebrile. WBC up to 45K. She does not appear to have any abdominal pain.     Telemetry (reviewed by me):  Continues to have paroxysmal episodes of Atrial Fibrillation/Atrial tachycardias.      Presenting Admission History:       a 78-year-old female past medical history of hypertension, recent left ankle fracture now reduced, status post right knee replacement who was admitted after a mechanical fall.  Patient had a rapid response called and was transferred to ICU for hypotension, abdominal distention, lactic acidosis.     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 General Surgery recommended OR evaluation on 6/2 and she was found to have gangrenous colon, now s/p subtotal colectomy and ileostomy creation.  Post op care per gen surgery   NGT, trophic feeds  Wound Care/Ostomy care  Empiric Abx continued  In retrospect, she appears to have previously unrecognized Paroxysmal Atrial Fibrillation, thus thromboembolic event cannot be excluded as an underlying cause. Pathology from colectomy showed Ischemic Colitis, no malignancy.   Worsening Leukocytosis to 45K noted; in absence of fevers or other clinical findings, will defer repeat CT imaging for now per General Surgery. Will re-consider CT imaging pending clinical course and leukocytosis trend.     Severe Sepsis with Septic shock 2/2 Colonic Ischemia  On mulitple prossors  Continue broad spectrum antibiotics  Renal function improving  WBC monitoring as above.     []HHC  []SNF  []Acute Rehab    Anticipated Discharge Day/Date:  ICU    Anticipated Discharge Location: []Home  []HHC  []SNF  []Acute Rehab  []ECF  []LTAC  []Hospice  []Other -      Consults:      IP CONSULT TO ORTHOPEDIC SURGERY  IP CONSULT TO HOSPITALIST  IP CONSULT TO NEPHROLOGY  IP CONSULT TO CRITICAL CARE  IP CONSULT TO GENERAL SURGERY  IP CONSULT TO GI  IP CONSULT TO PHARMACY  IP CONSULT TO ORTHOPEDIC SURGERY  IP CONSULT TO DIETITIAN      This patient has a high likelihood of being discharged tomorrow and is appropriate for A1 Discharge Unit in AM pending clinical course overnight: []Yes  [x]No    ------------------------------------------------------------------------------------------------------------------------------------------------------------------------    MDM    [x] High (any 2)    A. Problems (any 1)  [x] Acute/Chronic Illness/injury posing threat to life or bodily function:    [] Severe exacerbation of chronic illness:    ---------------------------------------------------------------------  B. Risk of Treatment (any 1)   [x] Drugs/treatments that require intensive monitoring for toxicity include:    [] IV ABX requiring serial renal monitoring for nephrotoxicity:     [] IV Narcotic analgesia for adverse drug reaction  [] IV diuresis requiring serial monitoring for renal impairment and electrolyte derangements  [] Critical electrolyte abnormalities requiring IV replacement and close serial monitoring  [] Insulin - monitoring serial FSBS for Hypoglycemic adverse drug reaction  [x] Anticoagulation requiring serial monitoring of coagulation factors  [] Other  [] Change in code status:    [] Decision to escalate care:    [] Major surgery/procedure with associated risk factors:    ----------------------------------------------------------------------  C. Data (any 2)  [x] Discussed current management and discharge planning options with Case Management.  [x] Discussed management of the case with:

## 2024-06-05 NOTE — PROGRESS NOTES
Received updated call from IR, they will be unable to place PICC today. Referred to DIT.     DIT called at this time & will dispatch out to available RN

## 2024-06-06 ENCOUNTER — APPOINTMENT (OUTPATIENT)
Dept: CT IMAGING | Age: 79
End: 2024-06-06
Payer: MEDICARE

## 2024-06-06 ENCOUNTER — APPOINTMENT (OUTPATIENT)
Dept: GENERAL RADIOLOGY | Age: 79
End: 2024-06-06
Payer: MEDICARE

## 2024-06-06 LAB
ABO + RH BLD: NORMAL
ALBUMIN SERPL-MCNC: 2.3 G/DL (ref 3.4–5)
ANION GAP SERPL CALCULATED.3IONS-SCNC: 7 MMOL/L (ref 3–16)
ANTI-XA UNFRAC HEPARIN: 0.24 IU/ML (ref 0.3–0.7)
BLD GP AB SCN SERPL QL: NORMAL
BLOOD BANK DISPENSE STATUS: NORMAL
BLOOD BANK PRODUCT CODE: NORMAL
BPU ID: NORMAL
BUN SERPL-MCNC: 61 MG/DL (ref 7–20)
CALCIUM SERPL-MCNC: 7.9 MG/DL (ref 8.3–10.6)
CHLORIDE SERPL-SCNC: 103 MMOL/L (ref 99–110)
CO2 SERPL-SCNC: 31 MMOL/L (ref 21–32)
CREAT SERPL-MCNC: 1.8 MG/DL (ref 0.6–1.2)
DEPRECATED RDW RBC AUTO: 14.4 % (ref 12.4–15.4)
DESCRIPTION BLOOD BANK: NORMAL
GFR SERPLBLD CREATININE-BSD FMLA CKD-EPI: 28 ML/MIN/{1.73_M2}
GLUCOSE SERPL-MCNC: 117 MG/DL (ref 70–99)
HCT VFR BLD AUTO: 21 % (ref 36–48)
HCT VFR BLD AUTO: 21 % (ref 36–48)
HGB BLD-MCNC: 6.7 G/DL (ref 12–16)
HGB BLD-MCNC: 6.9 G/DL (ref 12–16)
MAGNESIUM SERPL-MCNC: 2 MG/DL (ref 1.8–2.4)
MCH RBC QN AUTO: 28.8 PG (ref 26–34)
MCHC RBC AUTO-ENTMCNC: 31.9 G/DL (ref 31–36)
MCV RBC AUTO: 90.4 FL (ref 80–100)
PHOSPHATE SERPL-MCNC: 3.1 MG/DL (ref 2.5–4.9)
PLATELET # BLD AUTO: 122 K/UL (ref 135–450)
PMV BLD AUTO: 8.2 FL (ref 5–10.5)
POTASSIUM SERPL-SCNC: 3.2 MMOL/L (ref 3.5–5.1)
RBC # BLD AUTO: 2.33 M/UL (ref 4–5.2)
SODIUM SERPL-SCNC: 141 MMOL/L (ref 136–145)
WBC # BLD AUTO: 39.9 K/UL (ref 4–11)

## 2024-06-06 PROCEDURE — P9016 RBC LEUKOCYTES REDUCED: HCPCS

## 2024-06-06 PROCEDURE — 6360000004 HC RX CONTRAST MEDICATION: Performed by: NURSE PRACTITIONER

## 2024-06-06 PROCEDURE — 2700000000 HC OXYGEN THERAPY PER DAY

## 2024-06-06 PROCEDURE — 2000000000 HC ICU R&B

## 2024-06-06 PROCEDURE — 86900 BLOOD TYPING SEROLOGIC ABO: CPT

## 2024-06-06 PROCEDURE — 85027 COMPLETE CBC AUTOMATED: CPT

## 2024-06-06 PROCEDURE — 85018 HEMOGLOBIN: CPT

## 2024-06-06 PROCEDURE — 36430 TRANSFUSION BLD/BLD COMPNT: CPT

## 2024-06-06 PROCEDURE — 99024 POSTOP FOLLOW-UP VISIT: CPT | Performed by: SURGERY

## 2024-06-06 PROCEDURE — 80069 RENAL FUNCTION PANEL: CPT

## 2024-06-06 PROCEDURE — C9113 INJ PANTOPRAZOLE SODIUM, VIA: HCPCS | Performed by: SURGERY

## 2024-06-06 PROCEDURE — 6360000002 HC RX W HCPCS: Performed by: SURGERY

## 2024-06-06 PROCEDURE — 6360000002 HC RX W HCPCS: Performed by: STUDENT IN AN ORGANIZED HEALTH CARE EDUCATION/TRAINING PROGRAM

## 2024-06-06 PROCEDURE — 6360000002 HC RX W HCPCS: Performed by: INTERNAL MEDICINE

## 2024-06-06 PROCEDURE — 2580000003 HC RX 258: Performed by: INTERNAL MEDICINE

## 2024-06-06 PROCEDURE — 6370000000 HC RX 637 (ALT 250 FOR IP)

## 2024-06-06 PROCEDURE — 83735 ASSAY OF MAGNESIUM: CPT

## 2024-06-06 PROCEDURE — 6370000000 HC RX 637 (ALT 250 FOR IP): Performed by: NURSE PRACTITIONER

## 2024-06-06 PROCEDURE — 85014 HEMATOCRIT: CPT

## 2024-06-06 PROCEDURE — 2580000003 HC RX 258: Performed by: STUDENT IN AN ORGANIZED HEALTH CARE EDUCATION/TRAINING PROGRAM

## 2024-06-06 PROCEDURE — 99291 CRITICAL CARE FIRST HOUR: CPT | Performed by: STUDENT IN AN ORGANIZED HEALTH CARE EDUCATION/TRAINING PROGRAM

## 2024-06-06 PROCEDURE — 2580000003 HC RX 258: Performed by: SURGERY

## 2024-06-06 PROCEDURE — 86923 COMPATIBILITY TEST ELECTRIC: CPT

## 2024-06-06 PROCEDURE — 86901 BLOOD TYPING SEROLOGIC RH(D): CPT

## 2024-06-06 PROCEDURE — 74176 CT ABD & PELVIS W/O CONTRAST: CPT

## 2024-06-06 PROCEDURE — 85520 HEPARIN ASSAY: CPT

## 2024-06-06 PROCEDURE — 86850 RBC ANTIBODY SCREEN: CPT

## 2024-06-06 PROCEDURE — 94761 N-INVAS EAR/PLS OXIMETRY MLT: CPT

## 2024-06-06 PROCEDURE — 71045 X-RAY EXAM CHEST 1 VIEW: CPT

## 2024-06-06 RX ORDER — HEPARIN SODIUM 1000 [USP'U]/ML
2000 INJECTION, SOLUTION INTRAVENOUS; SUBCUTANEOUS ONCE
Status: DISCONTINUED | OUTPATIENT
Start: 2024-06-06 | End: 2024-06-08

## 2024-06-06 RX ORDER — TRAZODONE HYDROCHLORIDE 50 MG/1
75 TABLET ORAL NIGHTLY
Status: ON HOLD | COMMUNITY

## 2024-06-06 RX ORDER — ASPIRIN 81 MG/1
81 TABLET ORAL 2 TIMES DAILY
Status: ON HOLD | COMMUNITY

## 2024-06-06 RX ORDER — SODIUM CHLORIDE 9 MG/ML
INJECTION, SOLUTION INTRAVENOUS PRN
Status: DISCONTINUED | OUTPATIENT
Start: 2024-06-06 | End: 2024-06-07

## 2024-06-06 RX ORDER — LANOLIN ALCOHOL/MO/W.PET/CERES
6 CREAM (GRAM) TOPICAL NIGHTLY PRN
Status: DISCONTINUED | OUTPATIENT
Start: 2024-06-06 | End: 2024-06-21 | Stop reason: HOSPADM

## 2024-06-06 RX ORDER — ATORVASTATIN CALCIUM 10 MG/1
10 TABLET, FILM COATED ORAL DAILY
Status: ON HOLD | COMMUNITY

## 2024-06-06 RX ORDER — TERBINAFINE HYDROCHLORIDE 250 MG/1
250 TABLET ORAL DAILY
Status: ON HOLD | COMMUNITY
End: 2024-06-20 | Stop reason: HOSPADM

## 2024-06-06 RX ORDER — LATANOPROST 50 UG/ML
1 SOLUTION/ DROPS OPHTHALMIC NIGHTLY
Status: ON HOLD | COMMUNITY

## 2024-06-06 RX ADMIN — MEROPENEM 1000 MG: 1 INJECTION, POWDER, FOR SOLUTION INTRAVENOUS at 12:32

## 2024-06-06 RX ADMIN — MEROPENEM 1000 MG: 1 INJECTION, POWDER, FOR SOLUTION INTRAVENOUS at 23:21

## 2024-06-06 RX ADMIN — Medication 6 MG: at 23:09

## 2024-06-06 RX ADMIN — SODIUM CHLORIDE, PRESERVATIVE FREE 10 ML: 5 INJECTION INTRAVENOUS at 21:54

## 2024-06-06 RX ADMIN — IOHEXOL 50 ML: 240 INJECTION, SOLUTION INTRATHECAL; INTRAVASCULAR; INTRAVENOUS; ORAL at 14:19

## 2024-06-06 RX ADMIN — PANTOPRAZOLE SODIUM 40 MG: 40 INJECTION, POWDER, FOR SOLUTION INTRAVENOUS at 08:13

## 2024-06-06 RX ADMIN — THIAMINE HYDROCHLORIDE 100 MG: 100 INJECTION, SOLUTION INTRAMUSCULAR; INTRAVENOUS at 21:54

## 2024-06-06 RX ADMIN — SODIUM CHLORIDE, PRESERVATIVE FREE 10 ML: 5 INJECTION INTRAVENOUS at 08:13

## 2024-06-06 RX ADMIN — THIAMINE HYDROCHLORIDE 100 MG: 100 INJECTION, SOLUTION INTRAMUSCULAR; INTRAVENOUS at 08:13

## 2024-06-06 RX ADMIN — MUPIROCIN: 20 OINTMENT TOPICAL at 21:54

## 2024-06-06 RX ADMIN — AMIODARONE HYDROCHLORIDE 0.5 MG/MIN: 50 INJECTION, SOLUTION INTRAVENOUS at 02:14

## 2024-06-06 RX ADMIN — HYDROMORPHONE HYDROCHLORIDE 0.5 MG: 1 INJECTION, SOLUTION INTRAMUSCULAR; INTRAVENOUS; SUBCUTANEOUS at 23:08

## 2024-06-06 RX ADMIN — QUETIAPINE FUMARATE 25 MG: 25 TABLET ORAL at 21:54

## 2024-06-06 ASSESSMENT — PAIN DESCRIPTION - LOCATION
LOCATION: LEG
LOCATION: LEG

## 2024-06-06 ASSESSMENT — PAIN DESCRIPTION - FREQUENCY: FREQUENCY: CONTINUOUS

## 2024-06-06 ASSESSMENT — PAIN SCALES - GENERAL
PAINLEVEL_OUTOF10: 0
PAINLEVEL_OUTOF10: 8
PAINLEVEL_OUTOF10: 0
PAINLEVEL_OUTOF10: 8
PAINLEVEL_OUTOF10: 8

## 2024-06-06 ASSESSMENT — PAIN DESCRIPTION - ORIENTATION
ORIENTATION: LEFT
ORIENTATION: LEFT

## 2024-06-06 ASSESSMENT — PAIN DESCRIPTION - ONSET: ONSET: ON-GOING

## 2024-06-06 ASSESSMENT — PAIN DESCRIPTION - DESCRIPTORS
DESCRIPTORS: HEAVINESS
DESCRIPTORS: HEAVINESS

## 2024-06-06 ASSESSMENT — PAIN DESCRIPTION - PAIN TYPE: TYPE: ACUTE PAIN

## 2024-06-06 NOTE — PROGRESS NOTES
[facility] Nurse  Follow-up Progress Note       NAME:  Sudha Lopez  MEDICAL RECORD NUMBER:  [id number]  AGE:  78 y.o.   GENDER:  female  :  1945  TODAY'S DATE:  2024    Subjective: My granddaughters name is Amelie   Wound Identification:  Wound Type: Surgical mid line abd staple line  Contributing Factors: edema, decreased mobility, shear force, obesity, and decreased tissue oxygenation     New ileostomy: Subtotal colectomy with ileostomy on 24 for bowel obstruction by Dr Dung Jackson.      Stoma size:  25 = 1 inch mm x 28 mm 1 1/8 inch  Appliance size:  Patient leaking due to soft abd and creases.  Changed to convex wafer and had to go up a size to fit convex wafer. Coloplast 2 pience RED convex flange # [id number] with drainable bag # [id number]. paste # [id number] placed around stoma.       Patient Goal of Care:  [x] Wound Healing  [] Odor Control   [] Palliative Care  [] Pain Control   [x] Other: new ileostomy teaching     Objective: lying in bed. HALLIE to bulb suction.  NG with bridal in place. Monroe.  Ostomy leaking.  NG leaking. More awake and bright today.    BP (!) 161/64   Pulse 81   Temp 98.1 °F (36.7 °C) (Axillary)   Resp 14   Ht 1.6 m (5' 3\")   Wt 89.7 kg (197 lb 12 oz)   SpO2 98%   BMI 35.03 kg/m²   Kirill Risk Score: Kirill Scale Score: 17  Assessment:  Stoma less swollen but measures same size. Stoma moist, red, protrudes. Stool liquid green.  Staple line intact.  HALLIE intact.   Measurements:     Incision 24 Abdomen Medial;Upper;Anterior (Active)   Wound Image    24 1140   Dressing Status New dressing applied 24 1520   Dressing Change Due 06/10/24 06/06/24 1520   Incision Cleansed Cleansed with saline 24 1520   Dressing/Treatment Foam 24 1520   Incision Length (cm) 15 24 0949   Incision Width (cm) 0 cm 24 0949   Incision Depth (cm) 0 cm 24 0949   Incision Volume (cm^3) 0 cm^3 24 0949

## 2024-06-06 NOTE — PROGRESS NOTES
Ortho  Subjective:     Post-Operative Day: 8 Status Post right Total Knee Arthroplasty and closed management of left ankle fracture dislocation   Systemic or Specific Complaints:No Complaints  Much more lucid today    Objective:     Patient Vitals for the past 24 hrs:   BP Temp Temp src Pulse Resp SpO2 Weight   06/06/24 1400 (!) 161/64 -- -- 81 14 -- --   06/06/24 1300 (!) 152/102 -- -- 82 12 -- --   06/06/24 1252 -- -- -- 79 -- 98 % --   06/06/24 1200 (!) 156/63 98.1 °F (36.7 °C) Axillary 79 12 98 % --   06/06/24 1000 (!) 150/59 -- -- 79 12 -- --   06/06/24 0900 (!) 157/65 -- -- 82 15 90 % --   06/06/24 0800 (!) 174/70 98.1 °F (36.7 °C) Axillary 76 10 95 % --   06/06/24 0643 (!) 156/58 -- -- 76 12 93 % --   06/06/24 0642 -- -- -- 76 10 -- --   06/06/24 0641 -- 98.4 °F (36.9 °C) Axillary 77 12 94 % --   06/06/24 0640 -- -- -- 79 13 -- --   06/06/24 0625 (!) 148/93 98.1 °F (36.7 °C) Axillary 79 17 95 % --   06/06/24 0600 124/78 -- -- 75 10 94 % --   06/06/24 0500 (!) 152/56 -- -- 76 10 95 % --   06/06/24 0454 -- -- -- 77 11 95 % --   06/06/24 0400 (!) 137/51 98.2 °F (36.8 °C) Oral 77 11 92 % 89.7 kg (197 lb 12 oz)   06/06/24 0300 (!) 152/62 -- -- 78 12 93 % --   06/06/24 0200 (!) 157/61 -- -- 80 10 93 % --   06/06/24 0110 (!) 129/58 -- -- 78 10 -- --   06/06/24 0105 -- -- -- 78 10 -- --   06/06/24 0100 (!) 125/55 -- -- 79 10 93 % --   06/06/24 0055 -- -- -- 80 11 -- --   06/06/24 0050 -- -- -- (!) 110 12 -- --   06/06/24 0045 -- -- -- 96 12 -- --   06/06/24 0040 -- -- -- (!) 103 13 -- --   06/06/24 0035 -- -- -- (!) 107 12 -- --   06/06/24 0030 -- -- -- (!) 103 12 -- --   06/06/24 0025 -- -- -- (!) 118 12 -- --   06/06/24 0020 -- -- -- (!) 107 13 -- --   06/06/24 0015 -- -- -- (!) 112 12 -- --   06/06/24 0010 -- -- -- (!) 118 12 -- --   06/06/24 0005 -- -- -- (!) 106 13 -- --   06/06/24 0000 (!) 151/61 98.9 °F (37.2 °C) Oral (!) 106 13 92 % --   06/05/24 2355 -- -- -- (!) 123 13 -- --   06/05/24 2350 (!) 156/72 -- --  (!) 120 12 91 % --   06/05/24 2300 (!) 151/58 -- -- 85 17 -- --   06/05/24 2200 -- -- -- 82 17 98 % --   06/05/24 2100 -- -- -- 85 15 -- --   06/05/24 2000 -- 98.8 °F (37.1 °C) Oral 82 15 95 % --   06/05/24 1902 (!) 158/68 -- -- 83 13 94 % --       General: alert, appears stated age, and cooperative   Wound: Wound clean and dry no evidence of infection.   Motion: Extension: Full Extension   DVT Exam: No evidence of DVT seen on physical exam.   Left ankle with splint in place, 2+ pulses no evidence of compartment syndrome    Data Review  CBC:   Lab Results   Component Value Date/Time    WBC 39.9 06/06/2024 04:14 AM    RBC 2.33 06/06/2024 04:14 AM    HGB 6.9 06/06/2024 05:00 AM    HCT 21.0 06/06/2024 05:00 AM     06/06/2024 04:14 AM       Assessment:     Status Post right Total Knee Arthroplasty.  Left ankle fracture dislocation (closed treatment)    Plan:     Continues current post-op course  Will obtain CPM machine-- on order..  Continue ice elevation of left LE  Followup as outpatient    Aftab Moreno MD

## 2024-06-06 NOTE — PROGRESS NOTES
Comprehensive Nutrition Assessment    Type and Reason for Visit:  Reassess    Nutrition Recommendations/Plan:   NPO   Continue TF of Jevity 1.5 (standard with fiber formula) at trophic rate of 20 ml/hr via NG per general surgery  As approved by MD and as tolerated, increase by 10 mL/hr q 4 hours until goal of 50 mL/hr.  Water flushes 30 mL q 6 hours per MD.  Recommend reevaluate IV fluids at this time.  Increase flush if Na+ increases greater than 145 mEq/L.  Monitor closely and correct lytes (K, Phos, Mg) before progressing TF to goal d/t risk of refeeding syndrome (hx extended inadequate nutritional intake).  Monitor for tolerance (bowel habits, N/V, cramping, abdominal exam findings: distended, firm, tense, guarded, discomfort).     Malnutrition Assessment:  Malnutrition Status:  At risk for malnutrition (Comment) (06/06/24 9457)    Context:  Acute Illness     Findings of the 6 clinical characteristics of malnutrition:  Energy Intake:  Mild decrease in energy intake (Comment)  Fluid Accumulation:  Moderate to Severe Extremities, Generalized    Nutrition Assessment:    Follow up: Pt extubated 6/4, remains in restraints. NG kept after extubation and trophic TF started 6/4. TF increased to 20 ml/hr yesterday however pt pulled out NG and once NG was replaced TF were resumed at 10 ml/hr. Plan to increase back to 20 ml/hr today per MD, okay for slow advance per general surgery note yesterday. Recommend advancing TF as soon as medically feasible d/t poor intakes since admit, x1 week. Recommendations provided. Will continue to monitor.    Nutrition Related Findings:    NG. BG WNL. Na 141. K 3.2. -125 ml ostomy output 6/5, +active BS. +1 generalized/RUE/LLE edema. +2 pitting weeping RLE edema. +4 pitting LUE edema. +18.9 L since admit. Wound Type: Surgical Incision       Current Nutrition Intake & Therapies:    Average Meal Intake: NPO  Average Supplements Intake: NPO  Diet NPO  ADULT TUBE FEEDING; Nasogastric; Standard

## 2024-06-06 NOTE — PLAN OF CARE
Problem: Discharge Planning  Goal: Discharge to home or other facility with appropriate resources  Outcome: Progressing  Flowsheets (Taken 6/5/2024 0800)  Discharge to home or other facility with appropriate resources:   Identify barriers to discharge with patient and caregiver   Arrange for needed discharge resources and transportation as appropriate     Problem: Pain  Goal: Verbalizes/displays adequate comfort level or baseline comfort level  Outcome: Progressing     Problem: Safety - Adult  Goal: Free from fall injury  Outcome: Progressing     Problem: Safety - Medical Restraint  Goal: Remains free of injury from restraints (Restraint for Interference with Medical Device)  Description: INTERVENTIONS:  1. Determine that other, less restrictive measures have been tried or would not be effective before applying the restraint  2. Evaluate the patient's condition at the time of restraint application  3. Inform patient/family regarding the reason for restraint  4. Q2H: Monitor safety, psychosocial status, comfort, nutrition and hydration  Outcome: Progressing  Flowsheets  Taken 6/5/2024 1800  Remains free of injury from restraints (restraint for interference with medical device):   Determine that other, less restrictive measures have been tried or would not be effective before applying the restraint   Evaluate the patient's condition at the time of restraint application  Taken 6/5/2024 1600  Remains free of injury from restraints (restraint for interference with medical device):   Determine that other, less restrictive measures have been tried or would not be effective before applying the restraint   Evaluate the patient's condition at the time of restraint application  Taken 6/5/2024 1517  Remains free of injury from restraints (restraint for interference with medical device):   Determine that other, less restrictive measures have been tried or would not be effective before applying the restraint   Evaluate the  patient's condition at the time of restraint application     Problem: Nutrition Deficit:  Goal: Optimize nutritional status  Outcome: Progressing     Problem: ABCDS Injury Assessment  Goal: Absence of physical injury  Outcome: Progressing     Problem: Skin/Tissue Integrity  Goal: Absence of new skin breakdown  Description: 1.  Monitor for areas of redness and/or skin breakdown  2.  Assess vascular access sites hourly  3.  Every 4-6 hours minimum:  Change oxygen saturation probe site  4.  Every 4-6 hours:  If on nasal continuous positive airway pressure, respiratory therapy assess nares and determine need for appliance change or resting period.  Outcome: Progressing

## 2024-06-06 NOTE — PROGRESS NOTES
New PICC line placed in left brachial arm. Explained procedure of removing the central line to patient. Right Femoral Central Line removed. Held pressure for 10-15 minutes with DStat in place, increased amount of bleeding at site. Reinforced with gauze and pressure dressing. Patient is on heparin drip. Will continue to monitor patient's groin site closely.

## 2024-06-06 NOTE — PROGRESS NOTES
Occupational/Physical Therapy      Attempted to see pt for follow up OT/PT tx. Per chart review, pt w/ critical H&H values and in need of transfusion. Hold pt this AM. Will follow up as pt condition and therapy schedule permit.       Amelie Vinson, OTR/COLLIN Cano, PT

## 2024-06-06 NOTE — CONSULTS
Patient: Sudha Lopez  [id number]  Date: 6/6/2024      Chief Complaint: ankle pain    History of Present Illness/Hospital Course:  Sudha Lopez is a 78 year old female with a past medical history significant for HTN, anxiety, and right TKA on 5/28/24 who presented to [facility] on 5/30/24 after a fall with left ankle pain. XR ankle revealed anterior tibiotalar dislocation with associate comminuted appearing fracture along the posterior aspect of the distal tibia. Orthopedic Surgery was consulted and her ankle was reduced in the ED. CT showed well reduced ankle mortise with a nondisplaced medial malleolus fracture as well as minimally displaced fibular fractures distally. Surgical versus nonsurgical options reviewed and family decided to proceed with nonsurgical option. Nephrology was consulted due to hyponatremia. On 6/1 she was noted to have a distended abdomen. KUB showed ileus/distal obstruction. NG tube was placed. General Surgery and GI were consulted. On 6/1 she underwent colonoscopy with decompression. On 6/2 she developed worsening respiratory failure and was intubated. She developed worsening abdominal distension and underwent subtotal colectomy with ileostomy. Course further notable for atrial tachycardia. Cardiology is consulted. Course also notable for leukocytosis, hypokalemia, AIRAM, and HTN. She continues to have functional deficits below her baseline. Today Sudha is seen in her room with granddaughter Amelie present and daughters via [facility]. History is limited from patient. Discussed with family that our team will follow along with her medical course and for progress with therapies.      has a past medical history of Anxiety and Hypertension.     has a past surgical history that includes Hysterectomy; Breast enhancement surgery; rhinoplasty; Rotator cuff repair (Bilateral); Hand surgery (Right); Total hip arthroplasty (Bilateral); Intracapsular cataract extraction (Left, 8/12/2020); Colonoscopy;

## 2024-06-06 NOTE — PROGRESS NOTES
Patient has not voided since pulling the roach catheter during the day. Notified Attending, verbal order given to straight cath patient. Straight Cath'd patient with another RN to assist patient tolerated well. Will continue to monitor. Vitals stable.

## 2024-06-06 NOTE — PLAN OF CARE
Problem: Discharge Planning  Goal: Discharge to home or other facility with appropriate resources  Outcome: Progressing     Problem: Pain  Goal: Verbalizes/displays adequate comfort level or baseline comfort level  Outcome: Progressing     Problem: Safety - Adult  Goal: Free from fall injury  Outcome: Progressing     Problem: Safety - Medical Restraint  Goal: Remains free of injury from restraints (Restraint for Interference with Medical Device)  Description: INTERVENTIONS:  1. Determine that other, less restrictive measures have been tried or would not be effective before applying the restraint  2. Evaluate the patient's condition at the time of restraint application  3. Inform patient/family regarding the reason for restraint  4. Q2H: Monitor safety, psychosocial status, comfort, nutrition and hydration  Outcome: Progressing  Flowsheets  Taken 6/6/2024 0800 by Yolie Leach RN  Remains free of injury from restraints (restraint for interference with medical device):   Determine that other, less restrictive measures have been tried or would not be effective before applying the restraint   Evaluate the patient's condition at the time of restraint application   Inform patient/family regarding the reason for restraint   Every 2 hours: Monitor safety, psychosocial status, comfort, nutrition and hydration  Taken 6/6/2024 0600 by Lars Mcgee RN  Remains free of injury from restraints (restraint for interference with medical device): Determine that other, less restrictive measures have been tried or would not be effective before applying the restraint  Taken 6/6/2024 0000 by Lars Mcgee RN  Remains free of injury from restraints (restraint for interference with medical device): Determine that other, less restrictive measures have been tried or would not be effective before applying the restraint

## 2024-06-06 NOTE — PROGRESS NOTES
98* 103   CO2 26  --  28 31   BUN 59*  --  62* 61*   CREATININE 2.9*  --  2.5* 1.8*   CALCIUM 7.9*  --  8.4 7.9*   MG  --  2.30  --  2.00   PHOS 4.5  --  3.8 3.1       No results for input(s): \"PROBNP\", \"TROPHS\" in the last 72 hours.    No results for input(s): \"LABA1C\" in the last 72 hours.  Recent Labs     06/05/24  0423   *   *   BILIDIR 0.4*   BILITOT 0.6   ALKPHOS 190*       Recent Labs     06/04/24 2030 06/05/24  0815   INR 1.36*  --    LACTA  --  1.5         Urine Cultures: No results found for: \"LABURIN\"  Blood Cultures:   Lab Results   Component Value Date/Time    BC No Growth after 4 days of incubation. 05/31/2024 05:29 PM     Lab Results   Component Value Date/Time    BLOODCULT2 No Growth after 4 days of incubation. 05/31/2024 05:41 PM     Organism: No results found for: \"ORG\"      Tru Carrera MD

## 2024-06-06 NOTE — PROGRESS NOTES
Inpatient Note    CC: fall  Summary:   Sudha Lopez is being seen by nephrology for hyponatremia. She is a 78 y.o. female with a PMH significant for hypertension, osteoarthritis who presented to the ED 5/30/2024 after a fall. She was noted to have a displaced left ankle fracture s/p reduction in the ED. She is also s/p recent right total knee replacement 5/29/2024. She is noted to have acute on chronic hyponatremia with Na down to 126 at time of consult. She also complained of severe abdominal pain and constipation. She became hypotensive and was noted to have stool imapction and possible ischemic bowel. Due to hypotension she developed an Acute kidney injury (AIRAM).    Interval History  Has NG tube  Extubated  Remains extubated  Has had A-fib with very high blood pressure coming down heart rate controlled now  Urine output excellent at 1.4 L      Plan:   Blood pressure high okay to bring down gradually  Given that she has A-fib and significant hemodynamics and needing medication for A-fib no other medication changes from my perspective for today      Assessment:   AIRAM:  - baseline Cr 0.9  - Cr was at baseline on admission but began trending up due to hypotension and abdominal pain  - required pressors, endoscopic bowel decompression.  - AIRAM 2/2 hemodynamic insult.    Acute metabolic acidosis:  - 2/2 lactic acidosis and sepsis. Possible ischemic gut    Hyponatremia:  - Na 126 at time of consult.  - Na in the last year has ranged 129-135  - on HCTZ 12.5 mg, celecoxib, bupropion PTA as possible contributors  - acute drop in Na due to pain from fracture and recent surgery.  - urine studies pending.    Hypertension:  - home regimen: HCTZ 12.5 mg daily, benazepril 40 mg daily, amlodipine 5 mg daily  - stop HCTZ. Would add HCTZ to allergies.    Fall:  - left ankle fracuture  - recent right TKR 5/28/2024  - per ortho.    Ischemic bowel:  - s/p subtotal colectomy and ileostomy

## 2024-06-06 NOTE — CONSENT
Informed Consent for Blood Component Transfusion Note    I have discussed with the patient and POA  the rationale for blood component transfusion; its benefits in treating or preventing fatigue, organ damage, or death; and its risk which includes mild transfusion reactions, rare risk of blood borne infection, or more serious but rare reactions. I have discussed the alternatives to transfusion, including the risk and consequences of not receiving transfusion. The patient and POA  had an opportunity to ask questions and had agreed to proceed with transfusion of blood components.    Electronically signed by Tru Carrera MD on 6/6/24 at 12:35 PM EDT

## 2024-06-06 NOTE — PROGRESS NOTES
[facility] GENERAL SURGERY    Surgery Progress Note           POD # 4  PATIENT NAME: Sudha Lopez     TODAY'S DATE: 6/6/2024    INTERVAL HISTORY:    Pt more alert, grandson at bedside. Sly TF at low rate     OBJECTIVE:   VITALS:  BP (!) 156/63   Pulse 79   Temp 98.1 °F (36.7 °C) (Axillary)   Resp 12   Ht 1.6 m (5' 3\")   Wt 89.7 kg (197 lb 12 oz)   SpO2 98%   BMI 35.03 kg/m²     INTAKE/OUTPUT:    I/O last 3 completed shifts:  In: 813 [I.V.:219.5; NG/GT:498; IV Piggyback:95.4]  Out: 2410 [Urine:2015; Drains:120; Stool:275]  I/O this shift:  In: 185 [Blood:185]  Out: 200 [Stool:200]              CONSTITUTIONAL:  awake  LUNGS:  resp unlabored  ABDOMEN: soft, non-distended, ostomy viable - with output, sachin serosanguinous  INCISION: clean    Data:  CBC:   Recent Labs     06/04/24 0818 06/05/24 0423 06/06/24  0414 06/06/24  0500   WBC 37.8* 45.3* 39.9*  --    HGB 7.7* 7.3* 6.7* 6.9*   HCT 24.0* 22.9* 21.0* 21.0*   * 122* 122*  --        BMP:    Recent Labs     06/04/24 0420 06/05/24 0828 06/06/24  0414    140 141   K 3.7 3.2* 3.2*   CL 97* 98* 103   CO2 26 28 31   BUN 59* 62* 61*   CREATININE 2.9* 2.5* 1.8*   GLUCOSE 85 107* 117*       Hepatic:   Recent Labs     06/05/24 0423   *   *   BILITOT 0.6   ALKPHOS 190*       Mag:      Recent Labs     06/05/24 0423 06/06/24  0414   MG 2.30 2.00        Phos:     Recent Labs     06/04/24 0420 06/05/24 0828 06/06/24 0414   PHOS 4.5 3.8 3.1        INR:   Recent Labs     06/04/24 2030   INR 1.36*           Radiology Review:    CT scan pending    ASSESSMENT AND PLAN:  78 y.o. female status post subtotal colectomy and end ileostomy  GI: increase tube feeds slowly - CT scan today given persistent leukocytosis without obvious source  : making urine, continue with roach  Pulm: extubated  CVD: stable  ID: continue with current antibiotics - CT scan      Electronically signed by CASPER Marquis - CHIDI     Surgery Staff    I have examined

## 2024-06-06 NOTE — PROGRESS NOTES
Pulmonary & Critical Care Medicine ICU Progress Note      Events of Last 24 hours:   Pt still answering some questions but also lethargic this morning. She is receiving 1 unit of pRBC.         Invasive Lines: PICC D#1      CVC D#None  Art Line D#None            Vitals:  BP (!) 156/58   Pulse 76   Temp 98.4 °F (36.9 °C) (Axillary)   Resp 12   Ht 1.6 m (5' 3\")   Wt 89.7 kg (197 lb 12 oz)   SpO2 93%   BMI 35.03 kg/m²    Tmax:  CVP: CVP (Mean): 0 mmHg      Intake/Output Summary (Last 24 hours) at 6/6/2024 0713  Last data filed at 6/6/2024 0600  Gross per 24 hour   Intake 630.98 ml   Output 1605 ml   Net -974.02 ml       EXAM:  Physical Exam  Constitutional:       Appearance: She is ill-appearing.   HENT:      Head: Normocephalic and atraumatic.      Nose: Nose normal.      Mouth/Throat:      Pharynx: No oropharyngeal exudate.   Eyes:      General: No scleral icterus.        Right eye: No discharge.         Left eye: No discharge.   Cardiovascular:      Rate and Rhythm: Normal rate.      Heart sounds: No murmur heard.     No gallop.   Pulmonary:      Effort: Pulmonary effort is normal.      Breath sounds: Rales present. No wheezing.   Abdominal:      General: Abdomen is flat. Bowel sounds are normal. There is no distension.      Tenderness: There is abdominal tenderness.   Musculoskeletal:         General: Swelling present.      Cervical back: Normal range of motion.   Skin:     General: Skin is warm and dry.   Neurological:      Mental Status: She is alert.      Comments: Alert to voice and answering some questions          Medications:  Scheduled Meds:   heparin (porcine)  2,000 Units IntraVENous Once    meropenem  500 mg IntraVENous Q12H    QUEtiapine  25 mg Oral Nightly    mupirocin   Each Nostril BID    thiamine  100 mg IntraVENous BID    [Held by provider] buPROPion  300 mg Oral QAM    [Held by provider] FLUoxetine  40 mg Oral Daily    sodium chloride flush  5-40 mL IntraVENous 2 times per day

## 2024-06-06 NOTE — PROGRESS NOTES
Pt became increasingly confused/agitated and pulled Ngt out. Ngt replaced, KUB obtained, critical care NP-Romi/ made aware.

## 2024-06-06 NOTE — PROGRESS NOTES
Patient's morning H/H 6.7. Notified Attending. 1 unit of PRBCs ordered. Type and Screen drawn. Consent verified in patient's chart. Patient has been in the NSR and is on amiodarone. Verbal to hold patient's heparin due to lack of access and patient no longer being in Afib.

## 2024-06-07 LAB
ALBUMIN SERPL-MCNC: 2.2 G/DL (ref 3.4–5)
ANION GAP SERPL CALCULATED.3IONS-SCNC: 8 MMOL/L (ref 3–16)
ANTI-XA UNFRAC HEPARIN: 0.29 IU/ML (ref 0.3–0.7)
BUN SERPL-MCNC: 52 MG/DL (ref 7–20)
CALCIUM SERPL-MCNC: 7.8 MG/DL (ref 8.3–10.6)
CHLORIDE SERPL-SCNC: 106 MMOL/L (ref 99–110)
CO2 SERPL-SCNC: 32 MMOL/L (ref 21–32)
CREAT SERPL-MCNC: 1.3 MG/DL (ref 0.6–1.2)
DEPRECATED RDW RBC AUTO: 14.6 % (ref 12.4–15.4)
GFR SERPLBLD CREATININE-BSD FMLA CKD-EPI: 42 ML/MIN/{1.73_M2}
GLUCOSE SERPL-MCNC: 110 MG/DL (ref 70–99)
HCT VFR BLD AUTO: 24.5 % (ref 36–48)
HCT VFR BLD AUTO: 25.6 % (ref 36–48)
HGB BLD-MCNC: 8 G/DL (ref 12–16)
HGB BLD-MCNC: 8.3 G/DL (ref 12–16)
MAGNESIUM SERPL-MCNC: 1.8 MG/DL (ref 1.8–2.4)
MCH RBC QN AUTO: 29.1 PG (ref 26–34)
MCHC RBC AUTO-ENTMCNC: 32.5 G/DL (ref 31–36)
MCV RBC AUTO: 89.5 FL (ref 80–100)
PHOSPHATE SERPL-MCNC: 2.9 MG/DL (ref 2.5–4.9)
PLATELET # BLD AUTO: 138 K/UL (ref 135–450)
PMV BLD AUTO: 8 FL (ref 5–10.5)
POTASSIUM SERPL-SCNC: 3.3 MMOL/L (ref 3.5–5.1)
RBC # BLD AUTO: 2.74 M/UL (ref 4–5.2)
REASON FOR REJECTION: NORMAL
REJECTED TEST: NORMAL
SODIUM SERPL-SCNC: 146 MMOL/L (ref 136–145)
WBC # BLD AUTO: 31.3 K/UL (ref 4–11)

## 2024-06-07 PROCEDURE — 2580000003 HC RX 258: Performed by: SURGERY

## 2024-06-07 PROCEDURE — 85018 HEMOGLOBIN: CPT

## 2024-06-07 PROCEDURE — 6360000002 HC RX W HCPCS: Performed by: SURGERY

## 2024-06-07 PROCEDURE — 6370000000 HC RX 637 (ALT 250 FOR IP): Performed by: SURGERY

## 2024-06-07 PROCEDURE — 92526 ORAL FUNCTION THERAPY: CPT

## 2024-06-07 PROCEDURE — 85520 HEPARIN ASSAY: CPT

## 2024-06-07 PROCEDURE — 92610 EVALUATE SWALLOWING FUNCTION: CPT

## 2024-06-07 PROCEDURE — 2000000000 HC ICU R&B

## 2024-06-07 PROCEDURE — 6370000000 HC RX 637 (ALT 250 FOR IP): Performed by: NURSE PRACTITIONER

## 2024-06-07 PROCEDURE — C9113 INJ PANTOPRAZOLE SODIUM, VIA: HCPCS | Performed by: SURGERY

## 2024-06-07 PROCEDURE — 6360000002 HC RX W HCPCS: Performed by: STUDENT IN AN ORGANIZED HEALTH CARE EDUCATION/TRAINING PROGRAM

## 2024-06-07 PROCEDURE — 97530 THERAPEUTIC ACTIVITIES: CPT

## 2024-06-07 PROCEDURE — 97110 THERAPEUTIC EXERCISES: CPT

## 2024-06-07 PROCEDURE — 2580000003 HC RX 258: Performed by: STUDENT IN AN ORGANIZED HEALTH CARE EDUCATION/TRAINING PROGRAM

## 2024-06-07 PROCEDURE — 85027 COMPLETE CBC AUTOMATED: CPT

## 2024-06-07 PROCEDURE — 85014 HEMATOCRIT: CPT

## 2024-06-07 PROCEDURE — 80069 RENAL FUNCTION PANEL: CPT

## 2024-06-07 PROCEDURE — 6360000002 HC RX W HCPCS: Performed by: NURSE PRACTITIONER

## 2024-06-07 PROCEDURE — 36592 COLLECT BLOOD FROM PICC: CPT

## 2024-06-07 PROCEDURE — 6370000000 HC RX 637 (ALT 250 FOR IP)

## 2024-06-07 PROCEDURE — 6370000000 HC RX 637 (ALT 250 FOR IP): Performed by: STUDENT IN AN ORGANIZED HEALTH CARE EDUCATION/TRAINING PROGRAM

## 2024-06-07 PROCEDURE — 2500000003 HC RX 250 WO HCPCS: Performed by: STUDENT IN AN ORGANIZED HEALTH CARE EDUCATION/TRAINING PROGRAM

## 2024-06-07 PROCEDURE — 83735 ASSAY OF MAGNESIUM: CPT

## 2024-06-07 PROCEDURE — 99024 POSTOP FOLLOW-UP VISIT: CPT | Performed by: SURGERY

## 2024-06-07 PROCEDURE — 6360000002 HC RX W HCPCS: Performed by: INTERNAL MEDICINE

## 2024-06-07 PROCEDURE — 97112 NEUROMUSCULAR REEDUCATION: CPT

## 2024-06-07 PROCEDURE — 99291 CRITICAL CARE FIRST HOUR: CPT | Performed by: STUDENT IN AN ORGANIZED HEALTH CARE EDUCATION/TRAINING PROGRAM

## 2024-06-07 RX ORDER — MAGNESIUM SULFATE IN WATER 40 MG/ML
2000 INJECTION, SOLUTION INTRAVENOUS ONCE
Status: COMPLETED | OUTPATIENT
Start: 2024-06-07 | End: 2024-06-07

## 2024-06-07 RX ORDER — POTASSIUM CHLORIDE 29.8 MG/ML
20 INJECTION INTRAVENOUS ONCE
Status: COMPLETED | OUTPATIENT
Start: 2024-06-07 | End: 2024-06-07

## 2024-06-07 RX ORDER — TRAZODONE HYDROCHLORIDE 50 MG/1
50 TABLET ORAL NIGHTLY
Status: DISCONTINUED | OUTPATIENT
Start: 2024-06-07 | End: 2024-06-21 | Stop reason: HOSPADM

## 2024-06-07 RX ORDER — POTASSIUM CHLORIDE 20 MEQ/1
40 TABLET, EXTENDED RELEASE ORAL PRN
Status: DISCONTINUED | OUTPATIENT
Start: 2024-06-07 | End: 2024-06-19

## 2024-06-07 RX ORDER — POTASSIUM CHLORIDE 7.45 MG/ML
10 INJECTION INTRAVENOUS PRN
Status: DISCONTINUED | OUTPATIENT
Start: 2024-06-07 | End: 2024-06-19

## 2024-06-07 RX ADMIN — MAGNESIUM SULFATE HEPTAHYDRATE 2000 MG: 40 INJECTION, SOLUTION INTRAVENOUS at 09:34

## 2024-06-07 RX ADMIN — MEROPENEM 1000 MG: 1 INJECTION, POWDER, FOR SOLUTION INTRAVENOUS at 22:55

## 2024-06-07 RX ADMIN — SODIUM CHLORIDE, PRESERVATIVE FREE 10 ML: 5 INJECTION INTRAVENOUS at 09:34

## 2024-06-07 RX ADMIN — Medication 6 MG: at 21:55

## 2024-06-07 RX ADMIN — SODIUM CHLORIDE, PRESERVATIVE FREE 10 ML: 5 INJECTION INTRAVENOUS at 20:25

## 2024-06-07 RX ADMIN — ACETAMINOPHEN 650 MG: 325 TABLET ORAL at 21:55

## 2024-06-07 RX ADMIN — HEPARIN SODIUM AND DEXTROSE 1180 UNITS/HR: 10000; 5 INJECTION INTRAVENOUS at 09:49

## 2024-06-07 RX ADMIN — HYDROMORPHONE HYDROCHLORIDE 0.5 MG: 1 INJECTION, SOLUTION INTRAMUSCULAR; INTRAVENOUS; SUBCUTANEOUS at 18:07

## 2024-06-07 RX ADMIN — THIAMINE HYDROCHLORIDE 100 MG: 100 INJECTION, SOLUTION INTRAMUSCULAR; INTRAVENOUS at 09:34

## 2024-06-07 RX ADMIN — THIAMINE HYDROCHLORIDE 100 MG: 100 INJECTION, SOLUTION INTRAMUSCULAR; INTRAVENOUS at 20:24

## 2024-06-07 RX ADMIN — HYDROMORPHONE HYDROCHLORIDE 0.5 MG: 1 INJECTION, SOLUTION INTRAMUSCULAR; INTRAVENOUS; SUBCUTANEOUS at 04:14

## 2024-06-07 RX ADMIN — POTASSIUM CHLORIDE 20 MEQ: 29.8 INJECTION, SOLUTION INTRAVENOUS at 09:30

## 2024-06-07 RX ADMIN — MEROPENEM 1000 MG: 1 INJECTION, POWDER, FOR SOLUTION INTRAVENOUS at 11:38

## 2024-06-07 RX ADMIN — ONDANSETRON 4 MG: 2 INJECTION INTRAMUSCULAR; INTRAVENOUS at 16:44

## 2024-06-07 RX ADMIN — PANTOPRAZOLE SODIUM 40 MG: 40 INJECTION, POWDER, FOR SOLUTION INTRAVENOUS at 09:34

## 2024-06-07 RX ADMIN — MUPIROCIN: 20 OINTMENT TOPICAL at 20:24

## 2024-06-07 RX ADMIN — TRAZODONE HYDROCHLORIDE 50 MG: 50 TABLET ORAL at 20:25

## 2024-06-07 RX ADMIN — METOPROLOL TARTRATE 5 MG: 1 INJECTION, SOLUTION INTRAVENOUS at 22:57

## 2024-06-07 RX ADMIN — MUPIROCIN: 20 OINTMENT TOPICAL at 09:35

## 2024-06-07 RX ADMIN — BUPROPION HYDROCHLORIDE 300 MG: 150 TABLET, EXTENDED RELEASE ORAL at 09:34

## 2024-06-07 RX ADMIN — POTASSIUM BICARBONATE 40 MEQ: 782 TABLET, EFFERVESCENT ORAL at 05:09

## 2024-06-07 RX ADMIN — FLUOXETINE HYDROCHLORIDE 40 MG: 20 CAPSULE ORAL at 09:34

## 2024-06-07 RX ADMIN — HYDROMORPHONE HYDROCHLORIDE 0.5 MG: 1 INJECTION, SOLUTION INTRAMUSCULAR; INTRAVENOUS; SUBCUTANEOUS at 22:51

## 2024-06-07 ASSESSMENT — PAIN SCALES - GENERAL
PAINLEVEL_OUTOF10: 8
PAINLEVEL_OUTOF10: 5
PAINLEVEL_OUTOF10: 7

## 2024-06-07 ASSESSMENT — PAIN DESCRIPTION - DESCRIPTORS: DESCRIPTORS: ACHING

## 2024-06-07 ASSESSMENT — PAIN DESCRIPTION - ORIENTATION: ORIENTATION: LEFT

## 2024-06-07 ASSESSMENT — PAIN DESCRIPTION - PAIN TYPE: TYPE: ACUTE PAIN

## 2024-06-07 ASSESSMENT — PAIN DESCRIPTION - LOCATION
LOCATION: ABDOMEN
LOCATION: ABDOMEN
LOCATION: BACK;LEG

## 2024-06-07 ASSESSMENT — PAIN SCALES - WONG BAKER
WONGBAKER_NUMERICALRESPONSE: HURTS A LITTLE BIT
WONGBAKER_NUMERICALRESPONSE: HURTS A LITTLE BIT

## 2024-06-07 ASSESSMENT — PAIN DESCRIPTION - FREQUENCY: FREQUENCY: CONTINUOUS

## 2024-06-07 ASSESSMENT — PAIN DESCRIPTION - ONSET: ONSET: ON-GOING

## 2024-06-07 NOTE — FLOWSHEET NOTE
CPM was delivered. Paperwork with phone number to call to have returned when discharged in in chart.  It is to be used for 2 hrs at a time 3x a day.

## 2024-06-07 NOTE — PROGRESS NOTES
Oriented to person;Oriented to place;Oriented to situation;Oriented to time  Pain: stomach pain 7-8/10, described as \"discomfort\"  Cognition  Overall Cognitive Status: WFL  Arousal/Alertness: Appropriate responses to stimuli  Following Commands: Follows one step commands with repetition  Attention Span: Attends with cues to redirect  Memory: Appears intact  Safety Judgement: Decreased awareness of need for assistance;Decreased awareness of need for safety  Problem Solving: Assistance required to generate solutions  Insights: Impaired  Initiation: Requires cues for some  Sequencing: Requires cues for all        Objective    Vitals  Vitals  Pulse: 90  Heart Rate Source: Monitor  SpO2: 93 %  O2 Device: High flow nasal cannula (4L)  BP: (!) 160/76  MAP (Calculated): 104  BP Location: Right upper arm  BP Method: Automatic  Patient Position: Semi fowlers  Bed Mobility Training  Bed Mobility Training: Yes  Rolling: Assist X2;Maximum assistance (in bed and recliner)  Supine to Sit: Maximum assistance;Assist X2 (pt manages LEs; max A x 2 for trunk)  Scooting: Maximum assistance;Assist X2 (fwd/back in chair & bed)  Balance  Sitting: Impaired  Sitting - Static: Poor (constant support)  Sitting - Dynamic: Poor (constant support)  Standing: Impaired  Standing - Static: Constant support;Poor (total A x 2)  Transfer Training  Transfer Training: Yes  Overall Level of Assistance: Assist X2;Total assistance  Interventions: Verbal cues;Safety awareness training  Sit to Stand: Maximum assistance;Assist X2  Stand to Sit: Maximum assistance;Assist X2  Bed to Chair: Total assistance;Assist X2  Gait  Gait Training: No  Wheelchair Management  Wheelchair Management: No     ADL  LE Dressing: Dependent/Total  LE Dressing Skilled Clinical Factors: don R sock at bed level  Toileting: Dependent/Total  Toileting Skilled Clinical Factors: use of purewick  Functional Mobility: Dependent/Total  Functional Mobility Skilled Clinical Factors: max Ax2  for squat pivot to chair  OT Exercises  Static Sitting Balance Exercises: seated EOB w/ CGA for 15 mins     Safety Devices  Type of Devices: Nurse notified;Left in chair;Chair alarm in place;Call light within reach;Heels elevated for pressure relief;All fall risk precautions in place  Restraints  Restraints Initially in Place: No     Patient Education  Education Given To: Patient  Education Provided: Role of Therapy;Transfer Training;Plan of Care;Precautions;Orientation;Mobility Training  Education Method: Demonstration;Verbal  Barriers to Learning: Cognition  Education Outcome: Verbalized understanding;Continued education needed    Goals  Short Term Goals  Time Frame for Short Term Goals: 6/12, unless otherwise noted- goals ongoing 6/07  Short Term Goal 1: Pt will complete seated EOB for ~ 5 minutes with Min A for balance in prep for seated ADLs  Short Term Goal 2: Pt will complete bed>chair or bed>BSC with Mod A x 2  Short Term Goal 3: Pt will complete UE HEP 15x reps each by 6/10  Short Term Goal 4: Pt will complete seated grooming tasks (wash face, brush teeth) with CGA    AM-PAC - ADL  AM-PAC Daily Activity - Inpatient   How much help is needed for putting on and taking off regular lower body clothing?: Total  How much help is needed for bathing (which includes washing, rinsing, drying)?: A Lot  How much help is needed for toileting (which includes using toilet, bedpan, or urinal)?: Total  How much help is needed for putting on and taking off regular upper body clothing?: A Lot  How much help is needed for taking care of personal grooming?: A Little  How much help for eating meals?: A Lot  AM-[facility] Inpatient Daily Activity Raw Score: 11  AM-PAC Inpatient ADL T-Scale Score : 29.04  ADL Inpatient CMS 0-100% Score: 70.42  ADL Inpatient CMS G-Code Modifier : CL    Therapy Time   Individual Concurrent Group Co-treatment   Time In       1034   Time Out       1137   Minutes       63   Timed Code Treatment Minutes: 63

## 2024-06-07 NOTE — PROGRESS NOTES
[facility] GENERAL SURGERY    Surgery Progress Note           POD # 5  PATIENT NAME: Sudha Lopez     TODAY'S DATE: 6/7/2024    INTERVAL HISTORY:    Pt up in chair, much more alert and communicative.   Only mild complaints of abd pain     OBJECTIVE:   VITALS:  BP (!) 160/76   Pulse 90   Temp 98 °F (36.7 °C) (Axillary)   Resp 18   Ht 1.6 m (5' 3\")   Wt 89.7 kg (197 lb 12 oz)   SpO2 93%   BMI 35.03 kg/m²     INTAKE/OUTPUT:    I/O last 3 completed shifts:  In: 999.7 [I.V.:208.7; Blood:185; NG/GT:606]  Out: 4155 [Urine:3000; Drains:220; Stool:935]  I/O this shift:  In: -   Out: 150 [Stool:150]              CONSTITUTIONAL:  awake, alert  LUNGS:  resp unlabored  ABDOMEN: soft, non-distended, ostomy viable - with liquid output, sachin serosanguinous, minimal abd pain - and min focal RUQ tenderness  INCISION: scant drainage on dressing    Data:  CBC:   Recent Labs     06/05/24 0423 06/06/24 0414 06/06/24  0500 06/07/24  0356   WBC 45.3* 39.9*  --  31.3*   HGB 7.3* 6.7* 6.9* 8.0*   HCT 22.9* 21.0* 21.0* 24.5*   * 122*  --  138       BMP:    Recent Labs     06/05/24 0828 06/06/24 0414 06/07/24  0356    141 146*   K 3.2* 3.2* 3.3*   CL 98* 103 106   CO2 28 31 32   BUN 62* 61* 52*   CREATININE 2.5* 1.8* 1.3*   GLUCOSE 107* 117* 110*       Hepatic:   Recent Labs     06/05/24 0423   *   *   BILITOT 0.6   ALKPHOS 190*       Mag:      Recent Labs     06/05/24 0423 06/06/24 0414 06/07/24  0356   MG 2.30 2.00 1.80        Phos:     Recent Labs     06/05/24 0828 06/06/24 0414 06/07/24  0356   PHOS 3.8 3.1 2.9        INR:   Recent Labs     06/04/24 2030   INR 1.36*           Radiology Review:    EXAMINATION:  CT OF THE ABDOMEN AND PELVIS WITHOUT CONTRAST 6/6/2024 3:50 pm    TECHNIQUE:  CT of the abdomen and pelvis was performed without the administration of  intravenous contrast. Multiplanar reformatted images are provided for review.  Automated exposure control, iterative reconstruction, and/or

## 2024-06-07 NOTE — PROGRESS NOTES
Patient: Sudha Lopez  [id number]  Date: 6/7/2024      Chief Complaint: ankle pain    History of Present Illness/Hospital Course:  Sudha Lopez is a 78 year old female with a past medical history significant for HTN, anxiety, and right TKA on 5/28/24 who presented to [facility] on 5/30/24 after a fall with left ankle pain. XR ankle revealed anterior tibiotalar dislocation with associate comminuted appearing fracture along the posterior aspect of the distal tibia. Orthopedic Surgery was consulted and her ankle was reduced in the ED. CT showed well reduced ankle mortise with a nondisplaced medial malleolus fracture as well as minimally displaced fibular fractures distally. Surgical versus nonsurgical options reviewed and family decided to proceed with nonsurgical option. Nephrology was consulted due to hyponatremia. On 6/1 she was noted to have a distended abdomen. KUB showed ileus/distal obstruction. NG tube was placed. General Surgery and GI were consulted. On 6/1 she underwent colonoscopy with decompression. On 6/2 she developed worsening respiratory failure and was intubated. She developed worsening abdominal distension and underwent subtotal colectomy with ileostomy. Course further notable for atrial tachycardia. Cardiology is consulted. Course also notable for leukocytosis, hypokalemia, AIRAM, and HTN. She continues to have functional deficits below her baseline.     Interval History:   No acute events overnight. Today Sudha is seen with daughter and friend present. She appears better and endorses feeling improved. Discussed that our team is following for rehab needs.      has a past medical history of Anxiety and Hypertension.     has a past surgical history that includes Hysterectomy; Breast enhancement surgery; rhinoplasty; Rotator cuff repair (Bilateral); Hand surgery (Right); Total hip arthroplasty (Bilateral); Intracapsular cataract extraction (Left, 8/12/2020); Colonoscopy; Intracapsular cataract  noted.       Lab Results   Component Value Date    WBC 31.3 (H) 06/07/2024    HGB 8.0 (L) 06/07/2024    HCT 24.5 (L) 06/07/2024    MCV 89.5 06/07/2024     06/07/2024     Lab Results   Component Value Date    INR 1.36 (H) 06/04/2024    INR 2.19 (H) 06/02/2024    INR 2.27 (H) 06/02/2024    PROTIME 16.9 (H) 06/04/2024    PROTIME 24.4 (H) 06/02/2024    PROTIME 25.1 (H) 06/02/2024     Lab Results   Component Value Date    CREATININE 1.3 (H) 06/07/2024    BUN 52 (H) 06/07/2024     (H) 06/07/2024    K 3.3 (L) 06/07/2024     06/07/2024    CO2 32 06/07/2024     Lab Results   Component Value Date     (H) 06/05/2024     (H) 06/05/2024    ALKPHOS 190 (H) 06/05/2024    BILITOT 0.6 06/05/2024       Most recent echocardiogram revealed EF >65%.    Most recent EKG revealed atrial fibrillation with RVR.     IMAGING    XR Chest 6/6/24  Increasing pattern of pulmonary edema with small bilateral pleural effusions.     XR ABD 6/5/24  Enteric tube terminates at the level of the body of the stomach.  Nonobstructive bowel gas pattern.  Mild dependent opacities and likely small Effusions.    CT Head WO contrast 6/3/24  No acute intracranial abnormality.  Scattered areas of small vessel ischemic change    Assessment:  1. Severe sepsis with septic shock due to colonic ischemia s/p colectomy and ileostomy  2. Acute hypoxic and hypercapnic respiratory failure   3. Acute metabolic encephalopathy    4. Atrial fibrillation with RVR  5. Recent R TKR    Recommendations:  Will follow for medical course and progress with therapies.     Thank you for this consult. Please contact me with any questions or concerns.   Toya Neal MD 6/7/2024, 2:39 PM

## 2024-06-07 NOTE — PROGRESS NOTES
No significant events to note overnight. Pt rested well. O2 weaned from 9L to 4L HFNC. Good output per EUD and ileostomy. Tolerating trophic feeds. K+ replacement given. Much more alert and oriented, remains forgetful. Bath given.

## 2024-06-07 NOTE — PLAN OF CARE
Problem: Pain  Goal: Verbalizes/displays adequate comfort level or baseline comfort level  6/7/2024 0049 by Brittany Crowder RN  Outcome: Progressing     Problem: Safety - Adult  Goal: Free from fall injury  6/7/2024 0049 by Brittany Crowder RN  Outcome: Progressing     Problem: Nutrition Deficit:  Goal: Optimize nutritional status  6/7/2024 0049 by Brittany Crowder RN  Outcome: Progressing     Problem: ABCDS Injury Assessment  Goal: Absence of physical injury  6/7/2024 0049 by Brittany Crowder RN  Outcome: Progressing     Problem: Skin/Tissue Integrity  Goal: Absence of new skin breakdown  Description: 1.  Monitor for areas of redness and/or skin breakdown  2.  Assess vascular access sites hourly  3.  Every 4-6 hours minimum:  Change oxygen saturation probe site  4.  Every 4-6 hours:  If on nasal continuous positive airway pressure, respiratory therapy assess nares and determine need for appliance change or resting period.  6/7/2024 0049 by Brittany Crowder RN  Outcome: Progressing     Problem: Safety - Medical Restraint  Goal: Remains free of injury from restraints (Restraint for Interference with Medical Device)  Description: INTERVENTIONS:  1. Determine that other, less restrictive measures have been tried or would not be effective before applying the restraint  2. Evaluate the patient's condition at the time of restraint application  3. Inform patient/family regarding the reason for restraint  4. Q2H: Monitor safety, psychosocial status, comfort, nutrition and hydration  6/7/2024 0049 by Brittany Crowder RN  Outcome: Completed

## 2024-06-07 NOTE — PLAN OF CARE
Problem: Discharge Planning  Goal: Discharge to home or other facility with appropriate resources  Outcome: Progressing     Problem: Pain  Goal: Verbalizes/displays adequate comfort level or baseline comfort level  Outcome: Progressing     Problem: Safety - Adult  Goal: Free from fall injury  Outcome: Progressing     Problem: Nutrition Deficit:  Goal: Optimize nutritional status  Outcome: Progressing     Problem: ABCDS Injury Assessment  Goal: Absence of physical injury  Outcome: Progressing     Problem: Skin/Tissue Integrity  Goal: Absence of new skin breakdown  Description: 1.  Monitor for areas of redness and/or skin breakdown  2.  Assess vascular access sites hourly  3.  Every 4-6 hours minimum:  Change oxygen saturation probe site  4.  Every 4-6 hours:  If on nasal continuous positive airway pressure, respiratory therapy assess nares and determine need for appliance change or resting period.  Outcome: Progressing

## 2024-06-07 NOTE — PROGRESS NOTES
Inpatient Note    CC: fall  Summary:   Sudha Lopez is being seen by nephrology for hyponatremia. She is a 78 y.o. female with a PMH significant for hypertension, osteoarthritis who presented to the ED 5/30/2024 after a fall. She was noted to have a displaced left ankle fracture s/p reduction in the ED. She is also s/p recent right total knee replacement 5/29/2024. She is noted to have acute on chronic hyponatremia with Na down to 126 at time of consult. She also complained of severe abdominal pain and constipation. She became hypotensive and was noted to have stool imapction and possible ischemic bowel. Due to hypotension she developed an Acute kidney injury (AIRAM).    Interval History  Seen at bedside  /99  2050 mL urine yesterday  Cr down to 1.3 today  Mildly hypernatremic today with Na 146  935 mL ostomy output.  Hemodynamically stable.      Plan:   Would benefit from enteric free water replacement. Will increase free water to 100 mL q3h to address hypernatremia  Renal function otherwise improving  Continue current management.    Assessment:   AIRAM:  - baseline Cr 0.9  - Cr was at baseline on admission but began trending up due to hypotension and abdominal pain  - required pressors, endoscopic bowel decompression.  - AIRAM 2/2 hemodynamic insult.    Acute metabolic acidosis:  - 2/2 lactic acidosis and sepsis. Possible ischemic gut    Hyponatremia:  - Na 126 at time of consult.  - Na in the last year has ranged 129-135  - on HCTZ 12.5 mg, celecoxib, bupropion PTA as possible contributors  - acute drop in Na due to pain from fracture and recent surgery.  - urine studies pending.    Hypertension:  - home regimen: HCTZ 12.5 mg daily, benazepril 40 mg daily, amlodipine 5 mg daily  - stop HCTZ. Would add HCTZ to allergies.    Fall:  - left ankle fracuture  - recent right TKR 5/28/2024  - per ortho.    Ischemic bowel:  - s/p subtotal colectomy and ileostomy  6/2/2024      Ben Arevalo MD  [facility]  Office: [phone]          PE:   Vitals:    06/07/24 0600   BP: (!) 124/99   Pulse: 85   Resp: 16   Temp:    SpO2: 97%       General appearance: in NAD  Respiratory: bilateral equal chest rise, no wheeze, no crackles  Cardiovascular: Ausculation shows RRR mp edema  Abdomen: No visible mass, + tenderness, + distended.

## 2024-06-07 NOTE — PROGRESS NOTES
Pulmonary & Critical Care Medicine ICU Progress Note      Events of Last 24 hours:   Pt's mental status much better this morning. No acute events overnight.       Invasive Lines: PICC D#1   CVC D#None  Art Line D#None            Vitals:  BP (!) 124/99   Pulse 85   Temp 98.2 °F (36.8 °C) (Axillary)   Resp 16   Ht 1.6 m (5' 3\")   Wt 89.7 kg (197 lb 12 oz)   SpO2 97%   BMI 35.03 kg/m²    Tmax:  CVP: CVP (Mean): 0 mmHg      Intake/Output Summary (Last 24 hours) at 6/7/2024 0705  Last data filed at 6/7/2024 0403  Gross per 24 hour   Intake 863.7 ml   Output 3160 ml   Net -2296.3 ml       EXAM:  Physical Exam  Constitutional:       Appearance: She is obese. She is ill-appearing.   HENT:      Head: Normocephalic and atraumatic.      Nose: Nose normal.      Mouth/Throat:      Pharynx: No oropharyngeal exudate.   Eyes:      General: No scleral icterus.        Right eye: No discharge.         Left eye: No discharge.   Cardiovascular:      Rate and Rhythm: Normal rate.      Heart sounds: No murmur heard.     No gallop.   Pulmonary:      Effort: Pulmonary effort is normal.      Breath sounds: Rales present. No wheezing.   Abdominal:      General: Abdomen is flat. Bowel sounds are normal. There is no distension.      Tenderness: There is abdominal tenderness.   Musculoskeletal:         General: Swelling present.      Cervical back: Normal range of motion.   Skin:     General: Skin is warm and dry.   Neurological:      Mental Status: She is alert and oriented to person, place, and time.          Medications:  Scheduled Meds:   heparin (porcine)  2,000 Units IntraVENous Once    meropenem  1,000 mg IntraVENous Q12H    QUEtiapine  25 mg Oral Nightly    mupirocin   Each Nostril BID    thiamine  100 mg IntraVENous BID    [Held by provider] buPROPion  300 mg Oral QAM    [Held by provider] FLUoxetine  40 mg Oral Daily    sodium chloride flush  5-40 mL IntraVENous 2 times per day    pantoprazole  40 mg IntraVENous Daily  Completed Specimen: Blood Updated: 06/01/24 1815     Blood Culture, Routine No Growth to date.  Any change in status will be called.   Narrative:     ORDER#: [id number]                          ORDERED BY: MODESTO CRAIG  SOURCE: Blood right forearm                COLLECTED:  05/31/24 17:29  ANTIBIOTICS AT MOHIT.:                      RECEIVED :  05/31/24 23:27  If child <=2 yrs old please draw pediatric bottle.~Blood Culture 1   Culture, Blood 2 [[id number]] Collected: 05/31/24 1741   Order Status: Completed Specimen: Blood Updated: 06/01/24 1815     Culture, Blood 2 No Growth to date.  Any change in status will be called.   Narrative:     ORDER#: [id number]                          ORDERED BY: MODESTO CRAIG  SOURCE: Blood Hand, Right                  COLLECTED:  05/31/24 17:41  ANTIBIOTICS AT MOHIT.:                      RECEIVED :  05/31/24 23:27  If child <=2 yrs old please draw pediatric bottle.~Blood Culture #2      Films:  CXR 6/3/24  COMPARISON:  6/1/2024     HISTORY:  ORDERING SYSTEM PROVIDED HISTORY: Intubation  TECHNOLOGIST PROVIDED HISTORY:  Reason for exam:->Intubation  Reason for Exam: intubation     FINDINGS:  Endotracheal tube tip terminates 1.1 cm above the ambar.  The nasogastric  tube is coiled within a hiatal hernia.  Stable cardiomediastinal silhouette.  Airspace opacities are identified bilaterally with small effusions.  No  pneumothorax.     No new osseous abnormality.     IMPRESSION:  1. Endotracheal tube tip terminates 1.1 cm above the ambar.  Retraction by  1-1.5 cm is recommended.  2. Nasogastric tube is coiled within a hiatal hernia.  3. Bilateral airspace opacities with small effusions.     Assessment:  Obstructive shock  Acute blood loss anemia  ICU Delirium   Large bowel obstruction s/p colectomy and ileostomy  AIRAM  A-fib   Acute hypoxic respiratory failure  Bowel ischemia  Transaminitis  Lactic acidosis  Thrombocytopenia  Ankle fracture  Mechanical fall     Plan:  - Mental status much

## 2024-06-07 NOTE — PROGRESS NOTES
Hospital Medicine Progress Note      Date of Admission: 5/30/2024  Hospital Day: 9    Chief Admission Complaint:  Left ankle pain     Subjective: More alert and oriented. NGT being removed and SLP attempted. Denies abdominal pain. .     Telemetry (reviewed by me):  Mostly SR, with brief paroxysmal Afib this AM (only 1 minute).     Presenting Admission History:       a 78-year-old female past medical history of hypertension, recent left ankle fracture now reduced, status post right knee replacement who was admitted after a mechanical fall.  Patient had a rapid response called and was transferred to ICU for hypotension, abdominal distention, lactic acidosis.     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 General Surgery recommended OR evaluation on 6/2 and she was found to have gangrenous colon, now s/p subtotal colectomy and ileostomy creation.  Post op care per gen surgery   NGT, trophic feeds  Wound Care/Ostomy care  Empiric Abx continued  In retrospect, she appears to have previously unrecognized Paroxysmal Atrial Fibrillation, thus thromboembolic event cannot be excluded as an underlying cause. Pathology from colectomy showed Ischemic Colitis, no malignancy.   Leukocytosis 45K--> 39K --> 31K. No fevers. General Surgery recommended repeat CT Abd/Pelvis which was stable other than some questionable gallbladder edema.  Discussed with GenSurg - lower suspicion for acalculous cholecystitis clinically. OK to remove NGT and advance to clears.     Severe Sepsis with Septic shock 2/2 Colonic Ischemia  On mulitple prossors  Continue broad spectrum antibiotics  Renal function improving  WBC monitoring as above.      Acute hypoxic and hypercapnic respiratory failure  Weaned

## 2024-06-07 NOTE — PROGRESS NOTES
Here to check on patient.  She is sitting up in chair (up via lift).  Still with NG tube with bridal and tube feedings.  Dr Jackson and daughter here during visit.  Updated them on stoma.  In convex wafer and now seal secured.  No need to change appliance today.  Will change again on next visit.  Patient more awake and bright.  Talking to daughter.

## 2024-06-07 NOTE — PROGRESS NOTES
Physical Therapy  Facility/Department: [facility] C2 CARD TELEMETRY  Daily Treatment Note  NAME: Sudha Lopez  : 1945  MRN: [id number]    Date of Service: 2024    Discharge Recommendations:  Patient able to tolerate 3 hours of therapy per day, IP Rehab (once medically ready)   PT Equipment Recommendations  Equipment Needed:  (continue to assess)  Other: w/c likely, possible AD.    Therapy discharge recommendations are subject to collaboration from the patient’s interdisciplinary healthcare team, including MD and case management recommendations.    Barriers to Home Discharge:   [x] Steps to access home entry or bed/bath:   [x] Unable to transfer, ambulate, or propel wheelchair household distances without assist   [] Limited available assist at home upon discharge    [] Patient or family requests d/c to post-acute facility    [] Poor cognition/safety awareness for d/c to home alone    [x] Unable to maintain ordered weight bearing status    [x] Patient with salient signs of long-standing immobility   [x] Decreased independence with ADLs   [x] Increased risk for falls   [] Other:    If pt is unable to be seen after this session, please let this note serve as discharge summary.  Please see case management note for discharge disposition.  Thank you..    Patient Diagnosis(es): The primary encounter diagnosis was Closed fracture of left ankle, initial encounter. Diagnoses of Dislocation of left ankle joint, initial encounter, Syncope, unspecified syncope type, Hypoxemia, Acute respiratory failure with hypoxia (HCC), Large bowel obstruction (HCC), and Leukocytosis, unspecified type were also pertinent to this visit.    Assessment   Assessment: Pt in bed on arrival, agreeable to therapy. Pt alert and interactive this session; CPM donned 0-90* & tolerating well. Pt educated on R TkA ther-ex & has 0-90 ext-flex actively. Attempted standing on RLE for transfer; however pt w/ difficulty achieving trunk upright for a safe

## 2024-06-07 NOTE — PROGRESS NOTES
Pt assessment completed. Alert, oriented to self and place, disoriented to time and occasionally to situation. Able to follow commands and respond appropriately to yes/no questions. SBP sustaining 170's, home norvasc not restarted, no PRN antihypertensives. Pt also endorses 8/10 pain to LLE- only analgesic ordered is tylenol for pain level 1-3. Pt also requesting sleep aid. Hospitalist notified, order obtained for PRN melatonin and dilaudid. SR per monitor, afebrile. Tolerating trophic feeds per NGT. Ileostomy draining watery black/green stool. Adequate UOP per EUD. Extremities edematous. Abdominal incision with dressing c/d/I, HALLIE to bulb suction with scant serosanguinous output. Daughter at bedside and interactive in care. Call obtained from pt's son Paresh for updates, however unable to provide information as he is not on contact list. Repositioned Q2H. Will cont to monitor.

## 2024-06-07 NOTE — PROGRESS NOTES
Speech Language Pathology  Clinical Bedside Swallow Assessment  Facility/Department: [facility] C2 CARD TELEMETRY        Recommendations:  Diet recommendation: NPO; Ice chips with SLP/RN only (oral care must be completed prior); Meds via alt means of nutrition  Instrumentation: Not clinically indicated at this time. Will continue to monitor  Risk management: upright for all intake, stay upright for at least 30 mins after intake, small bites/sips, 1:1 supervision with intake, oral care q4 hrs to reduce adverse affects in the event of aspiration, increase physical mobility as able, slow rate of intake, general GERD precautions, general aspiration precautions, and hold PO and contact SLP if s/s of aspiration or worsening respiratory status develop.      NAME:Sudha Lopez  : 1945 (78 y.o.)   MRN: [id number]  ROOM:   ADMISSION DATE: 2024  PATIENT DIAGNOSIS(ES): Closed left ankle fracture, initial encounter [S82.892A]  Chief Complaint   Patient presents with    Ankle Pain     Patient had right knee surgery yesterday and fell getting into bed. Patient is c/o left ankle pain with deformity.     Patient Active Problem List    Diagnosis Date Noted    Acute postoperative pulmonary insufficiency (HCC) 2024    Status post laparoscopic colectomy 2024    Ischemic bowel disease (HCC) 2024    Elevated LFTs 2024    Closed fracture of left ankle 2024    AIRAM (acute kidney injury) (HCC) 2024    Elevated lactic acid level 2024    Hyponatremia 2024    Shock circulatory (HCC) 2024    Leukocytosis 2024    Hypoxemia 2024    GERD (gastroesophageal reflux disease) 2024    Large bowel obstruction (HCC) 2024     Past Medical History:   Diagnosis Date    Anxiety     Hypertension      Past Surgical History:   Procedure Laterality Date    BREAST ENHANCEMENT SURGERY      COLONOSCOPY      COLONOSCOPY N/A 2024    COLONOSCOPY FLEXIBLE DECOMPRESSION  Time out Total Time / units   Swallow Eval/Tx Time  1055 1125 30 min / 2 units (DE/DT)       Signature:  Victorina Beltran M.S. CCC-SLP  Speech-language pathologist  [id number]

## 2024-06-07 NOTE — PLAN OF CARE
Bedside swallow evaluation completed this date.    Victorina Beltran M.S. CCC-SLP  Speech-language pathologist  [id number]

## 2024-06-08 LAB
ALBUMIN SERPL-MCNC: 2.5 G/DL (ref 3.4–5)
ANION GAP SERPL CALCULATED.3IONS-SCNC: 9 MMOL/L (ref 3–16)
ANTI-XA UNFRAC HEPARIN: 0.24 IU/ML (ref 0.3–0.7)
ANTI-XA UNFRAC HEPARIN: 0.93 IU/ML (ref 0.3–0.7)
BUN SERPL-MCNC: 35 MG/DL (ref 7–20)
CALCIUM SERPL-MCNC: 7.9 MG/DL (ref 8.3–10.6)
CHLORIDE SERPL-SCNC: 102 MMOL/L (ref 99–110)
CO2 SERPL-SCNC: 33 MMOL/L (ref 21–32)
CREAT SERPL-MCNC: 0.9 MG/DL (ref 0.6–1.2)
DEPRECATED RDW RBC AUTO: 14.4 % (ref 12.4–15.4)
GFR SERPLBLD CREATININE-BSD FMLA CKD-EPI: 65 ML/MIN/{1.73_M2}
GLUCOSE SERPL-MCNC: 108 MG/DL (ref 70–99)
HCT VFR BLD AUTO: 25.3 % (ref 36–48)
HCT VFR BLD AUTO: 25.3 % (ref 36–48)
HGB BLD-MCNC: 8.2 G/DL (ref 12–16)
HGB BLD-MCNC: 8.5 G/DL (ref 12–16)
MAGNESIUM SERPL-MCNC: 1.6 MG/DL (ref 1.8–2.4)
MCH RBC QN AUTO: 30.3 PG (ref 26–34)
MCHC RBC AUTO-ENTMCNC: 33.5 G/DL (ref 31–36)
MCV RBC AUTO: 90.6 FL (ref 80–100)
PHOSPHATE SERPL-MCNC: 3 MG/DL (ref 2.5–4.9)
PLATELET # BLD AUTO: 211 K/UL (ref 135–450)
PMV BLD AUTO: 8.2 FL (ref 5–10.5)
POTASSIUM SERPL-SCNC: 3.4 MMOL/L (ref 3.5–5.1)
RBC # BLD AUTO: 2.79 M/UL (ref 4–5.2)
SODIUM SERPL-SCNC: 144 MMOL/L (ref 136–145)
WBC # BLD AUTO: 31.4 K/UL (ref 4–11)

## 2024-06-08 PROCEDURE — 6370000000 HC RX 637 (ALT 250 FOR IP): Performed by: STUDENT IN AN ORGANIZED HEALTH CARE EDUCATION/TRAINING PROGRAM

## 2024-06-08 PROCEDURE — 6360000002 HC RX W HCPCS: Performed by: INTERNAL MEDICINE

## 2024-06-08 PROCEDURE — 99291 CRITICAL CARE FIRST HOUR: CPT | Performed by: STUDENT IN AN ORGANIZED HEALTH CARE EDUCATION/TRAINING PROGRAM

## 2024-06-08 PROCEDURE — 6360000002 HC RX W HCPCS: Performed by: STUDENT IN AN ORGANIZED HEALTH CARE EDUCATION/TRAINING PROGRAM

## 2024-06-08 PROCEDURE — 85018 HEMOGLOBIN: CPT

## 2024-06-08 PROCEDURE — 83550 IRON BINDING TEST: CPT

## 2024-06-08 PROCEDURE — 99024 POSTOP FOLLOW-UP VISIT: CPT | Performed by: SURGERY

## 2024-06-08 PROCEDURE — 6370000000 HC RX 637 (ALT 250 FOR IP)

## 2024-06-08 PROCEDURE — 85027 COMPLETE CBC AUTOMATED: CPT

## 2024-06-08 PROCEDURE — 82728 ASSAY OF FERRITIN: CPT

## 2024-06-08 PROCEDURE — 6370000000 HC RX 637 (ALT 250 FOR IP): Performed by: SURGERY

## 2024-06-08 PROCEDURE — 83735 ASSAY OF MAGNESIUM: CPT

## 2024-06-08 PROCEDURE — 85520 HEPARIN ASSAY: CPT

## 2024-06-08 PROCEDURE — 6370000000 HC RX 637 (ALT 250 FOR IP): Performed by: NURSE PRACTITIONER

## 2024-06-08 PROCEDURE — 2000000000 HC ICU R&B

## 2024-06-08 PROCEDURE — 6360000002 HC RX W HCPCS: Performed by: HOSPITALIST

## 2024-06-08 PROCEDURE — 94761 N-INVAS EAR/PLS OXIMETRY MLT: CPT

## 2024-06-08 PROCEDURE — 83540 ASSAY OF IRON: CPT

## 2024-06-08 PROCEDURE — 92526 ORAL FUNCTION THERAPY: CPT

## 2024-06-08 PROCEDURE — 6360000002 HC RX W HCPCS: Performed by: SURGERY

## 2024-06-08 PROCEDURE — 80069 RENAL FUNCTION PANEL: CPT

## 2024-06-08 PROCEDURE — 2500000003 HC RX 250 WO HCPCS: Performed by: STUDENT IN AN ORGANIZED HEALTH CARE EDUCATION/TRAINING PROGRAM

## 2024-06-08 PROCEDURE — 85014 HEMATOCRIT: CPT

## 2024-06-08 PROCEDURE — 2700000000 HC OXYGEN THERAPY PER DAY

## 2024-06-08 PROCEDURE — 2580000003 HC RX 258: Performed by: HOSPITALIST

## 2024-06-08 PROCEDURE — C9113 INJ PANTOPRAZOLE SODIUM, VIA: HCPCS | Performed by: SURGERY

## 2024-06-08 PROCEDURE — 2580000003 HC RX 258: Performed by: SURGERY

## 2024-06-08 RX ORDER — MAGNESIUM SULFATE IN WATER 40 MG/ML
2000 INJECTION, SOLUTION INTRAVENOUS PRN
Status: DISCONTINUED | OUTPATIENT
Start: 2024-06-08 | End: 2024-06-19

## 2024-06-08 RX ORDER — PROCHLORPERAZINE EDISYLATE 5 MG/ML
10 INJECTION INTRAMUSCULAR; INTRAVENOUS EVERY 6 HOURS PRN
Status: DISCONTINUED | OUTPATIENT
Start: 2024-06-08 | End: 2024-06-21 | Stop reason: HOSPADM

## 2024-06-08 RX ORDER — HEPARIN SODIUM 1000 [USP'U]/ML
2000 INJECTION, SOLUTION INTRAVENOUS; SUBCUTANEOUS ONCE
Status: COMPLETED | OUTPATIENT
Start: 2024-06-08 | End: 2024-06-08

## 2024-06-08 RX ORDER — AMLODIPINE BESYLATE 5 MG/1
10 TABLET ORAL DAILY
Status: DISCONTINUED | OUTPATIENT
Start: 2024-06-08 | End: 2024-06-21 | Stop reason: HOSPADM

## 2024-06-08 RX ADMIN — PROCHLORPERAZINE EDISYLATE 10 MG: 5 INJECTION INTRAMUSCULAR; INTRAVENOUS at 14:58

## 2024-06-08 RX ADMIN — HYDROMORPHONE HYDROCHLORIDE 0.5 MG: 1 INJECTION, SOLUTION INTRAMUSCULAR; INTRAVENOUS; SUBCUTANEOUS at 09:15

## 2024-06-08 RX ADMIN — PANTOPRAZOLE SODIUM 40 MG: 40 INJECTION, POWDER, FOR SOLUTION INTRAVENOUS at 09:16

## 2024-06-08 RX ADMIN — THIAMINE HYDROCHLORIDE 100 MG: 100 INJECTION, SOLUTION INTRAMUSCULAR; INTRAVENOUS at 09:14

## 2024-06-08 RX ADMIN — APIXABAN 5 MG: 5 TABLET, FILM COATED ORAL at 20:57

## 2024-06-08 RX ADMIN — APIXABAN 5 MG: 5 TABLET, FILM COATED ORAL at 09:13

## 2024-06-08 RX ADMIN — METOPROLOL TARTRATE 5 MG: 1 INJECTION, SOLUTION INTRAVENOUS at 05:20

## 2024-06-08 RX ADMIN — MEROPENEM 1000 MG: 1 INJECTION, POWDER, FOR SOLUTION INTRAVENOUS at 09:27

## 2024-06-08 RX ADMIN — ONDANSETRON 4 MG: 2 INJECTION INTRAMUSCULAR; INTRAVENOUS at 05:30

## 2024-06-08 RX ADMIN — MEROPENEM 1000 MG: 1 INJECTION, POWDER, FOR SOLUTION INTRAVENOUS at 16:11

## 2024-06-08 RX ADMIN — BUPROPION HYDROCHLORIDE 300 MG: 150 TABLET, EXTENDED RELEASE ORAL at 09:14

## 2024-06-08 RX ADMIN — MAGNESIUM SULFATE HEPTAHYDRATE 2000 MG: 40 INJECTION, SOLUTION INTRAVENOUS at 06:56

## 2024-06-08 RX ADMIN — Medication 6 MG: at 21:51

## 2024-06-08 RX ADMIN — PROCHLORPERAZINE EDISYLATE 10 MG: 5 INJECTION INTRAMUSCULAR; INTRAVENOUS at 21:03

## 2024-06-08 RX ADMIN — SODIUM CHLORIDE, PRESERVATIVE FREE 10 ML: 5 INJECTION INTRAVENOUS at 09:16

## 2024-06-08 RX ADMIN — HEPARIN SODIUM AND DEXTROSE 1460 UNITS/HR: 10000; 5 INJECTION INTRAVENOUS at 05:18

## 2024-06-08 RX ADMIN — POTASSIUM BICARBONATE 40 MEQ: 782 TABLET, EFFERVESCENT ORAL at 06:40

## 2024-06-08 RX ADMIN — FLUOXETINE HYDROCHLORIDE 40 MG: 20 CAPSULE ORAL at 09:13

## 2024-06-08 RX ADMIN — AMLODIPINE BESYLATE 10 MG: 5 TABLET ORAL at 09:13

## 2024-06-08 RX ADMIN — HYDROMORPHONE HYDROCHLORIDE 0.5 MG: 1 INJECTION, SOLUTION INTRAMUSCULAR; INTRAVENOUS; SUBCUTANEOUS at 21:53

## 2024-06-08 RX ADMIN — PROCHLORPERAZINE EDISYLATE 10 MG: 5 INJECTION INTRAMUSCULAR; INTRAVENOUS at 09:14

## 2024-06-08 RX ADMIN — HYDROMORPHONE HYDROCHLORIDE 0.5 MG: 1 INJECTION, SOLUTION INTRAMUSCULAR; INTRAVENOUS; SUBCUTANEOUS at 14:57

## 2024-06-08 RX ADMIN — TRAZODONE HYDROCHLORIDE 50 MG: 50 TABLET ORAL at 20:57

## 2024-06-08 RX ADMIN — HEPARIN SODIUM 2000 UNITS: 1000 INJECTION INTRAVENOUS; SUBCUTANEOUS at 01:16

## 2024-06-08 RX ADMIN — SODIUM CHLORIDE, PRESERVATIVE FREE 10 ML: 5 INJECTION INTRAVENOUS at 20:57

## 2024-06-08 RX ADMIN — THIAMINE HYDROCHLORIDE 100 MG: 100 INJECTION, SOLUTION INTRAMUSCULAR; INTRAVENOUS at 20:57

## 2024-06-08 ASSESSMENT — PAIN DESCRIPTION - DESCRIPTORS
DESCRIPTORS: ACHING
DESCRIPTORS: ACHING;DISCOMFORT

## 2024-06-08 ASSESSMENT — PAIN DESCRIPTION - LOCATION
LOCATION: ABDOMEN
LOCATION: GENERALIZED
LOCATION: ABDOMEN;BACK

## 2024-06-08 ASSESSMENT — PAIN SCALES - GENERAL
PAINLEVEL_OUTOF10: 7
PAINLEVEL_OUTOF10: 0
PAINLEVEL_OUTOF10: 9
PAINLEVEL_OUTOF10: 0
PAINLEVEL_OUTOF10: 7
PAINLEVEL_OUTOF10: 0
PAINLEVEL_OUTOF10: 3
PAINLEVEL_OUTOF10: 5

## 2024-06-08 ASSESSMENT — PAIN SCALES - WONG BAKER: WONGBAKER_NUMERICALRESPONSE: HURTS A LITTLE BIT

## 2024-06-08 ASSESSMENT — PAIN - FUNCTIONAL ASSESSMENT: PAIN_FUNCTIONAL_ASSESSMENT: PREVENTS OR INTERFERES WITH ALL ACTIVE AND SOME PASSIVE ACTIVITIES

## 2024-06-08 NOTE — PROGRESS NOTES
Hospitalist Progress Note      PCP: Perla Leyva MD    Date of Admission: 5/30/2024  Current Hospital Day: 10    Chief Admission Complaint: Left ankle pain    Presenting History:    78-year-old female past medical history of hypertension, recent left ankle fracture now reduced, status post right knee replacement who was admitted after a mechanical fall. Patient had a rapid response called and was transferred to ICU for hypotension, abdominal distention, lactic acidosis.     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 General Surgery recommended OR evaluation on 6/2 and she was found to have gangrenous colon, now s/p subtotal colectomy and ileostomy creation.  Post op care per gen surgery   NGT, trophic feeds  Wound Care/Ostomy care  Empiric Abx continued  In retrospect, she appears to have previously unrecognized Paroxysmal Atrial Fibrillation, thus thromboembolic event cannot be excluded as an underlying cause. Pathology from colectomy showed Ischemic Colitis, no malignancy.   Leukocytosis 45K--> 39K --> 31K. No fevers. General Surgery recommended repeat CT Abd/Pelvis which was stable other than some questionable gallbladder edema.  GenSurg - lower suspicion for acalculous cholecystitis clinically. OK to remove NGT and advance to clears.   2.  Severe sepsis with septic shock secondary to colonic ischemia on multiple pressors, continue broad-spectrum antibiotic.  3.  Acute hypoxic and hypercarbic respiratory failure wean as tolerated now patient is on nasal cannula oxygen pulmonary critical care consulted and following.  4.  Acute blood loss anemia-no signs of GI bleed  heparin on hold transfuse  prochlorperazine, potassium chloride **OR** potassium alternative oral replacement **OR** potassium chloride, diatrizoate meglumine-sodium, melatonin, HYDROmorphone, heparin (porcine), heparin (porcine), glucose, dextrose bolus **OR** dextrose bolus, glucagon (rDNA), dextrose, metoprolol, sodium chloride, sodium chloride flush, sodium chloride, ondansetron **OR** ondansetron, acetaminophen **OR** acetaminophen      Intake/Output Summary (Last 24 hours) at 6/8/2024 0851  Last data filed at 6/8/2024 0600  Gross per 24 hour   Intake 712.61 ml   Output 4400 ml   Net -3687.39 ml       Physical Exam Performed:    BP (!) 168/73   Pulse 78   Temp 98.4 °F (36.9 °C) (Oral)   Resp 14   Ht 1.6 m (5' 3\")   Wt 88.2 kg (194 lb 7.1 oz)   SpO2 95%   BMI 34.44 kg/m²     General appearance: No apparent distress, appears stated age and cooperative.  HEENT: Pupils equal, round, and reactive to light. Conjunctivae/corneas clear.  Neck: Supple, with full range of motion. No jugular venous distention. Trachea midline.  Respiratory:  Normal respiratory effort. Clear to auscultation, bilaterally without Rales/Wheezes/Rhonchi.  Cardiovascular: Regular rate and rhythm with normal S1/S2 without murmurs, rubs or gallops.  Abdomen: Soft, non-tender, non-distended with normal bowel sounds.  Musculoskeletal: No clubbing, cyanosis or edema bilaterally.  Full range of motion without deformity.  Skin: Skin color, texture, turgor normal.  No rashes or lesions.  Neurologic:  Neurovascularly intact without any focal sensory/motor deficits. Cranial nerves: II-XII intact, grossly non-focal.  Psychiatric: Alert and oriented, thought content appropriate, normal insight  Capillary Refill: Brisk,< 3 seconds   Peripheral Pulses: +2 palpable, equal bilaterally       Labs: Personally reviewed and interpreted for clinical significance    Recent Labs     06/06/24  0414 06/06/24  0500 06/07/24  0356 06/07/24 2011 06/08/24  0527   WBC 39.9*  --  31.3*  --

## 2024-06-08 NOTE — PLAN OF CARE
Problem: Discharge Planning  Goal: Discharge to home or other facility with appropriate resources  6/7/2024 2218 by Pepe King RN  Outcome: Progressing  6/7/2024 1810 by Kerry Mittal RN  Outcome: Progressing     Problem: Pain  Goal: Verbalizes/displays adequate comfort level or baseline comfort level  6/7/2024 2218 by Pepe King RN  Outcome: Progressing  6/7/2024 1810 by Kerry Mittal RN  Outcome: Progressing     Problem: Safety - Adult  Goal: Free from fall injury  6/7/2024 2218 by Pepe King RN  Outcome: Progressing  6/7/2024 1810 by Kerry Mittal RN  Outcome: Progressing     Problem: Nutrition Deficit:  Goal: Optimize nutritional status  6/7/2024 2218 by Pepe King RN  Outcome: Progressing  6/7/2024 1810 by Kerry Mittal RN  Outcome: Progressing     Problem: ABCDS Injury Assessment  Goal: Absence of physical injury  6/7/2024 2218 by Pepe King RN  Outcome: Progressing  6/7/2024 1810 by Kerry Mittal RN  Outcome: Progressing     Problem: Skin/Tissue Integrity  Goal: Absence of new skin breakdown  Description: 1.  Monitor for areas of redness and/or skin breakdown  2.  Assess vascular access sites hourly  3.  Every 4-6 hours minimum:  Change oxygen saturation probe site  4.  Every 4-6 hours:  If on nasal continuous positive airway pressure, respiratory therapy assess nares and determine need for appliance change or resting period.  6/7/2024 2218 by Pepe King RN  Outcome: Progressing  6/7/2024 1810 by Kerry Mittal RN  Outcome: Progressing

## 2024-06-08 NOTE — PROGRESS NOTES
Speech Language Pathology  Dysphagia Treatment/Follow-Up Note  Facility/Department: [facility] C2 CARD TELEMETRY    Recommendations:  Solid Consistency: N/A  Liquid Consistency: IDDSI 0 Thin Liquids(Full liquids ordered per medical team)  Medication: Meds whole with thin liquids  Instrumentation: Not clinically indicated at this time. Will continue to monitor   Risk Management: upright for all intake, stay upright for at least 30 mins after intake, small bites/sips, close supervision, oral care 2-3x/day to reduce adverse affects in the event of aspiration, increase physical mobility as able, slow rate of intake, general GERD precautions, general aspiration precautions, and hold PO and contact SLP if s/s of aspiration or worsening respiratory status develop..     NAME:Sudha Lopez  : 1945 (78 y.o.)   MRN: [id number]  ROOM: [facility]/[id number]  ADMISSION DATE: 2024  PATIENT DIAGNOSIS(ES): Closed left ankle fracture, initial encounter [O02.648V]  Allergies   Allergen Reactions    Other Other (See Comments)     Tylenol-3 - GI upset     Thiazide-Type Diuretics Other (See Comments)     Hyponatremia       DATE ONSET: 2024    Pain: The patient does not complain of pain       Current Diet: ADULT DIET; Full Liquid; Low Fat (less than or equal to 50 gm/day)      Diet Tolerance:  Patient tolerating current diet level without signs/symptoms of aspiration.     Dysphagia Treatment and Impressions:  Subjective: Pt seen in room at bedside with RN permission   Behavior / Cognition: alert and pleasant; Family present.  RN Report/Chart Review: RN reports pt took pills whole with thin liquids this day. Per surgery, pt is cleared to advance diet slowly and as nausea allows  Patient tolerance: Pt currently has no complaints re: swallow. No nausea at this time.  Pt completes oral care with pasted toothbrush after setup by SLP  Pt encouraged to assume upright position for all po intake, however no overt clinical signs of aspiration

## 2024-06-08 NOTE — PROGRESS NOTES
Pulmonary & Critical Care Medicine ICU Progress Note      Events of Last 24 hours:   Pt c/o nausea but mentation much better. Her UOP has increased.       Invasive Lines: PICC D#2      CVC D#None  Art Line D#None            Vitals:  BP (!) 168/73   Pulse 78   Temp 98.4 °F (36.9 °C) (Oral)   Resp 14   Ht 1.6 m (5' 3\")   Wt 88.2 kg (194 lb 7.1 oz)   SpO2 95%   BMI 34.44 kg/m²    Tmax:  CVP: CVP (Mean): 0 mmHg      Intake/Output Summary (Last 24 hours) at 6/8/2024 0750  Last data filed at 6/8/2024 0600  Gross per 24 hour   Intake 712.61 ml   Output 4400 ml   Net -3687.39 ml       EXAM:  Physical Exam  Constitutional:       Appearance: She is ill-appearing.   HENT:      Head: Normocephalic and atraumatic.      Nose: Nose normal.      Mouth/Throat:      Pharynx: No oropharyngeal exudate.   Eyes:      General: No scleral icterus.        Right eye: No discharge.         Left eye: No discharge.   Cardiovascular:      Rate and Rhythm: Normal rate.      Heart sounds: No murmur heard.     No gallop.   Pulmonary:      Effort: Pulmonary effort is normal. No respiratory distress.      Breath sounds: Rales present. No wheezing.   Abdominal:      General: Abdomen is flat. Bowel sounds are normal. There is no distension.      Tenderness: There is no abdominal tenderness.   Musculoskeletal:         General: No swelling.      Cervical back: Normal range of motion.   Skin:     General: Skin is warm and dry.   Neurological:      Mental Status: She is alert and oriented to person, place, and time.          Medications:  Scheduled Meds:   traZODone  50 mg Oral Nightly    heparin (porcine)  2,000 Units IntraVENous Once    meropenem  1,000 mg IntraVENous Q12H    mupirocin   Each Nostril BID    thiamine  100 mg IntraVENous BID    buPROPion  300 mg Oral QAM    FLUoxetine  40 mg Oral Daily    sodium chloride flush  5-40 mL IntraVENous 2 times per day    pantoprazole  40 mg IntraVENous Daily       PRN Meds:  magnesium sulfate,  RECEIVED :  05/31/24 23:55   Culture, Blood 1 [[id number]] Collected: 05/31/24 1729   Order Status: Completed Specimen: Blood Updated: 06/01/24 1815     Blood Culture, Routine No Growth to date.  Any change in status will be called.   Narrative:     ORDER#: [id number]                          ORDERED BY: MODESTO CRAIG  SOURCE: Blood right forearm                COLLECTED:  05/31/24 17:29  ANTIBIOTICS AT MOHIT.:                      RECEIVED :  05/31/24 23:27  If child <=2 yrs old please draw pediatric bottle.~Blood Culture 1   Culture, Blood 2 [[id number]] Collected: 05/31/24 1741   Order Status: Completed Specimen: Blood Updated: 06/01/24 1815     Culture, Blood 2 No Growth to date.  Any change in status will be called.   Narrative:     ORDER#: [id number]                          ORDERED BY: MODESTO CRAIG  SOURCE: Blood Hand, Right                  COLLECTED:  05/31/24 17:41  ANTIBIOTICS AT MOHIT.:                      RECEIVED :  05/31/24 23:27  If child <=2 yrs old please draw pediatric bottle.~Blood Culture #2      Films:  CXR 6/3/24  COMPARISON:  6/1/2024     HISTORY:  ORDERING SYSTEM PROVIDED HISTORY: Intubation  TECHNOLOGIST PROVIDED HISTORY:  Reason for exam:->Intubation  Reason for Exam: intubation     FINDINGS:  Endotracheal tube tip terminates 1.1 cm above the ambar.  The nasogastric  tube is coiled within a hiatal hernia.  Stable cardiomediastinal silhouette.  Airspace opacities are identified bilaterally with small effusions.  No  pneumothorax.     No new osseous abnormality.     IMPRESSION:  1. Endotracheal tube tip terminates 1.1 cm above the ambar.  Retraction by  1-1.5 cm is recommended.  2. Nasogastric tube is coiled within a hiatal hernia.  3. Bilateral airspace opacities with small effusions.     Assessment:  Obstructive shock  Acute blood loss anemia  ICU Delirium   Large bowel obstruction s/p colectomy and ileostomy  AIRAM  A-fib   Acute hypoxic respiratory failure  Bowel

## 2024-06-08 NOTE — PROGRESS NOTES
Inpatient Note    CC: fall  Summary:   Sudha Lopez is being seen by nephrology for hyponatremia. She is a 78 y.o. female with a PMH significant for hypertension, osteoarthritis who presented to the ED 5/30/2024 after a fall. She was noted to have a displaced left ankle fracture s/p reduction in the ED. She is also s/p recent right total knee replacement 5/29/2024. She is noted to have acute on chronic hyponatremia with Na down to 126 at time of consult. She also complained of severe abdominal pain and constipation. She became hypotensive and was noted to have stool imapction and possible ischemic bowel. Due to hypotension she developed an Acute kidney injury (AIRAM).    Interval History  The patient was seen and examined in her room  She was resting calmly in her bed  She required supplemental oxygen via nasal cannula and according to the patient nurse, she is now on 4 L/min  She appears to be quite comfortable  She also denies any new discomfort  Her urine output for yesterday was 4.1 L  Her most recent set of vital signs showed blood pressure 168/73 heart rate 78 and pulse ox 95%  She is nearly replaced on the right knee and she had a fracture on her left tibia  She might have some dependent edema  Lab work from this morning showed sodium 144 potassium 3.4 bicarb 33 BUN 35 creatinine 0.9 glucose 108 calcium 7.9 phosphorus 3    WBC 31.4 hemoglobin 8.5 and platelet was      Plan:   The patient's creatinine was 0.9 potassium of 3.4 and bicarb of 33  The patient neuropathy for yesterday was 4.1 L  According the patient, her usual body weight is 185 and her current body weight is 194.5  She does not require oxygen at home  Her blood pressure is higher  Discussed case with Dr. Paez  Now the patient on amlodipine 10 mg p.o. once daily    Given the patient had an excellent urine output, the patient's weight is still about 10 pounds heavier  Therefore, over the next few

## 2024-06-08 NOTE — PROGRESS NOTES
[facility] GENERAL SURGERY    Surgery Progress Note           POD # 6    PATIENT NAME: Sudha Lopez     TODAY'S DATE: 6/8/2024    INTERVAL HISTORY:    Pt awake alert and states she feels okay.  Denies incisional pain or right upper quadrant pain.     OBJECTIVE:   VITALS:  BP (!) 155/66   Pulse 81   Temp 98.4 °F (36.9 °C) (Oral)   Resp 13   Ht 1.6 m (5' 3\")   Wt 88.2 kg (194 lb 7.1 oz)   SpO2 95%   BMI 34.44 kg/m²     INTAKE/OUTPUT:    I/O last 3 completed shifts:  In: 1289.3 [I.V.:528.5; NG/GT:368; IV Piggyback:392.8]  Out: 6300 [Urine:5150; Drains:175; Stool:975]  I/O this shift:  In: 480 [P.O.:480]  Out: 960 [Urine:950; Drains:10]              CONSTITUTIONAL:  awake and alert  LUNGS: Unlabored  ABDOMEN:   normal bowel sounds, soft, non-distended, ostomy functional.  Nontender in the right upper quadrant  INCISION: Scant drainage from incision    Data:  CBC:   Recent Labs     06/06/24  0414 06/06/24  0500 06/07/24  0356 06/07/24 2011 06/08/24  0527   WBC 39.9*  --  31.3*  --  31.4*   HGB 6.7*   < > 8.0* 8.3* 8.5*   HCT 21.0*   < > 24.5* 25.6* 25.3*   *  --  138  --  211    < > = values in this interval not displayed.     BMP:    Recent Labs     06/06/24  0414 06/07/24  0356 06/08/24  0527    146* 144   K 3.2* 3.3* 3.4*    106 102   CO2 31 32 33*   BUN 61* 52* 35*   CREATININE 1.8* 1.3* 0.9   GLUCOSE 117* 110* 108*     Hepatic: No results for input(s): \"AST\", \"ALT\", \"BILITOT\", \"ALKPHOS\" in the last 72 hours.    Invalid input(s): \"ALB\"  Mag:      Recent Labs     06/06/24  0414 06/07/24  0356 06/08/24 0527   MG 2.00 1.80 1.60*      Phos:     Recent Labs     06/06/24  0414 06/07/24  0356 06/08/24 0527   PHOS 3.1 2.9 3.0      INR: No results for input(s): \"INR\" in the last 72 hours.      Radiology Review: N/A    ASSESSMENT AND PLAN:  78 y.o. female status post subtotal colectomy with end ileostomy.  Slowly advance diet.  Continue antibiotics and supportive care.  Given the lack of pain in

## 2024-06-08 NOTE — PLAN OF CARE
Problem: Discharge Planning  Goal: Discharge to home or other facility with appropriate resources  Outcome: Progressing  Flowsheets  Taken 6/8/2024 1800  Discharge to home or other facility with appropriate resources: Refer to discharge planning if patient needs post-hospital services based on physician order or complex needs related to functional status, cognitive ability or social support system    Problem: Pain  Goal: Verbalizes/displays adequate comfort level or baseline comfort level  Outcome: Progressing  Flowsheets (Taken 6/8/2024 1800)  Verbalizes/displays adequate comfort level or baseline comfort level:   Encourage patient to monitor pain and request assistance   Administer analgesics based on type and severity of pain and evaluate response   Consider cultural and social influences on pain and pain management   Notify Licensed Independent Practitioner if interventions unsuccessful or patient reports new pain   Implement non-pharmacological measures as appropriate and evaluate response   Assess pain using appropriate pain scale     Problem: Safety - Adult  Goal: Free from fall injury  Outcome: Progressing  Flowsheets (Taken 6/8/2024 1800)  Free From Fall Injury:   Instruct family/caregiver on patient safety   Based on caregiver fall risk screen, instruct family/caregiver to ask for assistance with transferring infant if caregiver noted to have fall risk factors     Problem: ABCDS Injury Assessment  Goal: Absence of physical injury  Outcome: Progressing  Flowsheets (Taken 6/8/2024 1800)  Absence of Physical Injury: Implement safety measures based on patient assessment     Problem: Skin/Tissue Integrity  Goal: Absence of new skin breakdown  Description: 1.  Monitor for areas of redness and/or skin breakdown  2.  Assess vascular access sites hourly  3.  Every 4-6 hours minimum:  Change oxygen saturation probe site  4.  Every 4-6 hours:  If on nasal continuous positive airway pressure, respiratory therapy

## 2024-06-09 LAB
ALBUMIN SERPL-MCNC: 2.6 G/DL (ref 3.4–5)
ANION GAP SERPL CALCULATED.3IONS-SCNC: 10 MMOL/L (ref 3–16)
BACTERIA BLD CULT ORG #2: NORMAL
BACTERIA BLD CULT: NORMAL
BUN SERPL-MCNC: 21 MG/DL (ref 7–20)
CALCIUM SERPL-MCNC: 7.4 MG/DL (ref 8.3–10.6)
CHLORIDE SERPL-SCNC: 96 MMOL/L (ref 99–110)
CO2 SERPL-SCNC: 31 MMOL/L (ref 21–32)
CREAT SERPL-MCNC: 0.7 MG/DL (ref 0.6–1.2)
DEPRECATED RDW RBC AUTO: 14.2 % (ref 12.4–15.4)
FERRITIN SERPL IA-MCNC: 736.3 NG/ML (ref 15–150)
GFR SERPLBLD CREATININE-BSD FMLA CKD-EPI: 88 ML/MIN/{1.73_M2}
GLUCOSE BLD-MCNC: 107 MG/DL (ref 70–99)
GLUCOSE SERPL-MCNC: 109 MG/DL (ref 70–99)
HCT VFR BLD AUTO: 24.6 % (ref 36–48)
HGB BLD-MCNC: 8.3 G/DL (ref 12–16)
IRON SATN MFR SERPL: 19 % (ref 15–50)
IRON SERPL-MCNC: 33 UG/DL (ref 37–145)
MAGNESIUM SERPL-MCNC: 1.4 MG/DL (ref 1.8–2.4)
MCH RBC QN AUTO: 30.1 PG (ref 26–34)
MCHC RBC AUTO-ENTMCNC: 33.7 G/DL (ref 31–36)
MCV RBC AUTO: 89.3 FL (ref 80–100)
PERFORMED ON: ABNORMAL
PHOSPHATE SERPL-MCNC: 2 MG/DL (ref 2.5–4.9)
PLATELET # BLD AUTO: 309 K/UL (ref 135–450)
PMV BLD AUTO: 7.6 FL (ref 5–10.5)
POTASSIUM SERPL-SCNC: 3.1 MMOL/L (ref 3.5–5.1)
RBC # BLD AUTO: 2.75 M/UL (ref 4–5.2)
SODIUM SERPL-SCNC: 137 MMOL/L (ref 136–145)
TIBC SERPL-MCNC: 172 UG/DL (ref 260–445)
WBC # BLD AUTO: 26.6 K/UL (ref 4–11)

## 2024-06-09 PROCEDURE — 6360000002 HC RX W HCPCS: Performed by: HOSPITALIST

## 2024-06-09 PROCEDURE — 6360000002 HC RX W HCPCS: Performed by: STUDENT IN AN ORGANIZED HEALTH CARE EDUCATION/TRAINING PROGRAM

## 2024-06-09 PROCEDURE — 2060000000 HC ICU INTERMEDIATE R&B

## 2024-06-09 PROCEDURE — 99024 POSTOP FOLLOW-UP VISIT: CPT | Performed by: SURGERY

## 2024-06-09 PROCEDURE — 6370000000 HC RX 637 (ALT 250 FOR IP): Performed by: STUDENT IN AN ORGANIZED HEALTH CARE EDUCATION/TRAINING PROGRAM

## 2024-06-09 PROCEDURE — 2700000000 HC OXYGEN THERAPY PER DAY

## 2024-06-09 PROCEDURE — 6370000000 HC RX 637 (ALT 250 FOR IP): Performed by: SURGERY

## 2024-06-09 PROCEDURE — 2580000003 HC RX 258: Performed by: SURGERY

## 2024-06-09 PROCEDURE — 2500000003 HC RX 250 WO HCPCS: Performed by: STUDENT IN AN ORGANIZED HEALTH CARE EDUCATION/TRAINING PROGRAM

## 2024-06-09 PROCEDURE — 2580000003 HC RX 258: Performed by: HOSPITALIST

## 2024-06-09 PROCEDURE — 83735 ASSAY OF MAGNESIUM: CPT

## 2024-06-09 PROCEDURE — 2580000003 HC RX 258: Performed by: STUDENT IN AN ORGANIZED HEALTH CARE EDUCATION/TRAINING PROGRAM

## 2024-06-09 PROCEDURE — 6360000002 HC RX W HCPCS: Performed by: SURGERY

## 2024-06-09 PROCEDURE — 99291 CRITICAL CARE FIRST HOUR: CPT | Performed by: STUDENT IN AN ORGANIZED HEALTH CARE EDUCATION/TRAINING PROGRAM

## 2024-06-09 PROCEDURE — 6370000000 HC RX 637 (ALT 250 FOR IP): Performed by: INTERNAL MEDICINE

## 2024-06-09 PROCEDURE — C9113 INJ PANTOPRAZOLE SODIUM, VIA: HCPCS | Performed by: SURGERY

## 2024-06-09 PROCEDURE — 80069 RENAL FUNCTION PANEL: CPT

## 2024-06-09 PROCEDURE — 6370000000 HC RX 637 (ALT 250 FOR IP): Performed by: NURSE PRACTITIONER

## 2024-06-09 PROCEDURE — 85027 COMPLETE CBC AUTOMATED: CPT

## 2024-06-09 PROCEDURE — 94761 N-INVAS EAR/PLS OXIMETRY MLT: CPT

## 2024-06-09 RX ORDER — LANOLIN ALCOHOL/MO/W.PET/CERES
400 CREAM (GRAM) TOPICAL 2 TIMES DAILY
Status: DISCONTINUED | OUTPATIENT
Start: 2024-06-09 | End: 2024-06-21 | Stop reason: HOSPADM

## 2024-06-09 RX ORDER — OXYCODONE HYDROCHLORIDE AND ACETAMINOPHEN 5; 325 MG/1; MG/1
1 TABLET ORAL EVERY 4 HOURS PRN
Status: DISCONTINUED | OUTPATIENT
Start: 2024-06-09 | End: 2024-06-21 | Stop reason: HOSPADM

## 2024-06-09 RX ADMIN — HYDROMORPHONE HYDROCHLORIDE 0.5 MG: 1 INJECTION, SOLUTION INTRAMUSCULAR; INTRAVENOUS; SUBCUTANEOUS at 08:19

## 2024-06-09 RX ADMIN — MEROPENEM 1000 MG: 1 INJECTION, POWDER, FOR SOLUTION INTRAVENOUS at 18:16

## 2024-06-09 RX ADMIN — AMLODIPINE BESYLATE 10 MG: 5 TABLET ORAL at 08:19

## 2024-06-09 RX ADMIN — SODIUM CHLORIDE, PRESERVATIVE FREE 10 ML: 5 INJECTION INTRAVENOUS at 21:14

## 2024-06-09 RX ADMIN — POTASSIUM CHLORIDE 40 MEQ: 1500 TABLET, EXTENDED RELEASE ORAL at 06:46

## 2024-06-09 RX ADMIN — SODIUM CHLORIDE: 9 INJECTION, SOLUTION INTRAVENOUS at 08:15

## 2024-06-09 RX ADMIN — MEROPENEM 1000 MG: 1 INJECTION, POWDER, FOR SOLUTION INTRAVENOUS at 01:21

## 2024-06-09 RX ADMIN — OXYCODONE AND ACETAMINOPHEN 1 TABLET: 5; 325 TABLET ORAL at 21:14

## 2024-06-09 RX ADMIN — HYDROMORPHONE HYDROCHLORIDE 0.5 MG: 1 INJECTION, SOLUTION INTRAMUSCULAR; INTRAVENOUS; SUBCUTANEOUS at 02:25

## 2024-06-09 RX ADMIN — Medication 400 MG: at 10:50

## 2024-06-09 RX ADMIN — THIAMINE HYDROCHLORIDE 100 MG: 100 INJECTION, SOLUTION INTRAMUSCULAR; INTRAVENOUS at 08:18

## 2024-06-09 RX ADMIN — OXYCODONE AND ACETAMINOPHEN 1 TABLET: 5; 325 TABLET ORAL at 14:12

## 2024-06-09 RX ADMIN — APIXABAN 5 MG: 5 TABLET, FILM COATED ORAL at 21:15

## 2024-06-09 RX ADMIN — TRAZODONE HYDROCHLORIDE 50 MG: 50 TABLET ORAL at 21:14

## 2024-06-09 RX ADMIN — THIAMINE HYDROCHLORIDE 100 MG: 100 INJECTION, SOLUTION INTRAMUSCULAR; INTRAVENOUS at 21:14

## 2024-06-09 RX ADMIN — APIXABAN 5 MG: 5 TABLET, FILM COATED ORAL at 08:18

## 2024-06-09 RX ADMIN — PROCHLORPERAZINE EDISYLATE 10 MG: 5 INJECTION INTRAMUSCULAR; INTRAVENOUS at 03:00

## 2024-06-09 RX ADMIN — Medication 400 MG: at 21:14

## 2024-06-09 RX ADMIN — FLUOXETINE HYDROCHLORIDE 40 MG: 20 CAPSULE ORAL at 08:18

## 2024-06-09 RX ADMIN — POTASSIUM PHOSPHATE, MONOBASIC POTASSIUM PHOSPHATE, DIBASIC 15 MMOL: 224; 236 INJECTION, SOLUTION, CONCENTRATE INTRAVENOUS at 10:04

## 2024-06-09 RX ADMIN — MEROPENEM 1000 MG: 1 INJECTION, POWDER, FOR SOLUTION INTRAVENOUS at 08:17

## 2024-06-09 RX ADMIN — MAGNESIUM SULFATE HEPTAHYDRATE 2000 MG: 40 INJECTION, SOLUTION INTRAVENOUS at 06:53

## 2024-06-09 RX ADMIN — METOPROLOL TARTRATE 5 MG: 1 INJECTION, SOLUTION INTRAVENOUS at 05:08

## 2024-06-09 RX ADMIN — PROCHLORPERAZINE EDISYLATE 10 MG: 5 INJECTION INTRAMUSCULAR; INTRAVENOUS at 10:03

## 2024-06-09 RX ADMIN — BUPROPION HYDROCHLORIDE 300 MG: 150 TABLET, EXTENDED RELEASE ORAL at 08:19

## 2024-06-09 RX ADMIN — SODIUM CHLORIDE, PRESERVATIVE FREE 10 ML: 5 INJECTION INTRAVENOUS at 08:18

## 2024-06-09 RX ADMIN — PANTOPRAZOLE SODIUM 40 MG: 40 INJECTION, POWDER, FOR SOLUTION INTRAVENOUS at 08:18

## 2024-06-09 ASSESSMENT — PAIN DESCRIPTION - PAIN TYPE
TYPE: ACUTE PAIN
TYPE: ACUTE PAIN;SURGICAL PAIN

## 2024-06-09 ASSESSMENT — PAIN DESCRIPTION - LOCATION
LOCATION: ABDOMEN
LOCATION: ABDOMEN;BACK

## 2024-06-09 ASSESSMENT — PAIN DESCRIPTION - ONSET
ONSET: ON-GOING
ONSET: ON-GOING

## 2024-06-09 ASSESSMENT — PAIN - FUNCTIONAL ASSESSMENT
PAIN_FUNCTIONAL_ASSESSMENT: PREVENTS OR INTERFERES SOME ACTIVE ACTIVITIES AND ADLS
PAIN_FUNCTIONAL_ASSESSMENT: ACTIVITIES ARE NOT PREVENTED
PAIN_FUNCTIONAL_ASSESSMENT: ACTIVITIES ARE NOT PREVENTED

## 2024-06-09 ASSESSMENT — PAIN SCALES - GENERAL
PAINLEVEL_OUTOF10: 2
PAINLEVEL_OUTOF10: 5
PAINLEVEL_OUTOF10: 5
PAINLEVEL_OUTOF10: 3
PAINLEVEL_OUTOF10: 8
PAINLEVEL_OUTOF10: 7
PAINLEVEL_OUTOF10: 1
PAINLEVEL_OUTOF10: 4
PAINLEVEL_OUTOF10: 3
PAINLEVEL_OUTOF10: 0

## 2024-06-09 ASSESSMENT — PAIN DESCRIPTION - DESCRIPTORS
DESCRIPTORS: BURNING
DESCRIPTORS: ACHING
DESCRIPTORS: BURNING

## 2024-06-09 ASSESSMENT — PAIN DESCRIPTION - ORIENTATION
ORIENTATION: MID

## 2024-06-09 ASSESSMENT — PAIN DESCRIPTION - FREQUENCY
FREQUENCY: CONTINUOUS
FREQUENCY: CONTINUOUS

## 2024-06-09 ASSESSMENT — PAIN SCALES - WONG BAKER: WONGBAKER_NUMERICALRESPONSE: NO HURT

## 2024-06-09 NOTE — PROGRESS NOTES
Hospitalist Progress Note      PCP: Perla Leyva MD    Date of Admission: 5/30/2024  Current Hospital Day: 11    Chief Admission Complaint:  Left ankle pain     Presenting History:    78-year-old female past medical history of hypertension, recent left ankle fracture now reduced, status post right knee replacement who was admitted after a mechanical fall. Patient had a rapid response called and was transferred to ICU for hypotension, abdominal distention, lactic acidosis.     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 General Surgery recommended OR evaluation on 6/2 and she was found to have gangrenous colon, now s/p subtotal colectomy and ileostomy creation.  Post op care per gen surgery   NGT, trophic feeds  Wound Care/Ostomy care  Empiric Abx continued  In retrospect, she appears to have previously unrecognized Paroxysmal Atrial Fibrillation, thus thromboembolic event cannot be excluded as an underlying cause. Pathology from colectomy showed Ischemic Colitis, no malignancy.   Leukocytosis 45K--> 39K --> 31K. No fevers. General Surgery recommended repeat CT Abd/Pelvis which was stable other than some questionable gallbladder edema.  GenSurg - lower suspicion for acalculous cholecystitis clinically. OK to remove NGT and advance to clears.   2.  Severe sepsis with septic shock secondary to colonic ischemia on multiple pressors, continue broad-spectrum antibiotic.  3.  Acute hypoxic and hypercarbic respiratory failure wean as tolerated now patient is on nasal cannula oxygen pulmonary critical care consulted and following.  4.  Acute blood loss anemia-no signs of GI bleed  heparin on hold

## 2024-06-09 NOTE — PROGRESS NOTES
MD Rounding Communication    With Dejan Stallings MD     Situation/ Action  Possible over sedation on current pain management plan.  Patient opioid-induced sedation scale is a 3.    Medications adjusted.  Patient informed of changes.       11:04 AM

## 2024-06-09 NOTE — PROGRESS NOTES
[facility] GENERAL SURGERY    Surgery Progress Note           POD # 7    PATIENT NAME: Sudha Lopez     TODAY'S DATE: 6/9/2024    INTERVAL HISTORY:    Pt states she feels okay.  Denies right upper quadrant pain.     OBJECTIVE:   VITALS:  BP (!) 125/53   Pulse 77   Temp 98.1 °F (36.7 °C) (Oral)   Resp 15   Ht 1.6 m (5' 3\")   Wt 85.1 kg (187 lb 9.8 oz)   SpO2 93%   BMI 33.23 kg/m²     INTAKE/OUTPUT:    I/O last 3 completed shifts:  In: 2226.3 [P.O.:720; I.V.:660.3; IV Piggyback:845.9]  Out: 5715 [Urine:5450; Drains:40; Stool:225]  I/O this shift:  In: 589.3 [P.O.:300; I.V.:23.9; IV Piggyback:265.4]  Out: 303 [Urine:300; Drains:3]              CONSTITUTIONAL:  awake and alert  LUNGS: Unlabored  ABDOMEN:   normal bowel sounds, soft, non-distended, ostomy functional  INCISION: Scant drainage from incision    Data:  CBC:   Recent Labs     06/07/24  0356 06/07/24 2011 06/08/24  0527 06/08/24  1225 06/09/24  0502   WBC 31.3*  --  31.4*  --  26.6*   HGB 8.0*   < > 8.5* 8.2* 8.3*   HCT 24.5*   < > 25.3* 25.3* 24.6*     --  211  --  309    < > = values in this interval not displayed.     BMP:    Recent Labs     06/07/24  0356 06/08/24  0527 06/09/24  0502   * 144 137   K 3.3* 3.4* 3.1*    102 96*   CO2 32 33* 31   BUN 52* 35* 21*   CREATININE 1.3* 0.9 0.7   GLUCOSE 110* 108* 109*     Hepatic: No results for input(s): \"AST\", \"ALT\", \"BILITOT\", \"ALKPHOS\" in the last 72 hours.    Invalid input(s): \"ALB\"  Mag:      Recent Labs     06/07/24  0356 06/08/24  0527 06/09/24  0502   MG 1.80 1.60* 1.40*      Phos:     Recent Labs     06/07/24  0356 06/08/24  0527 06/09/24  0502   PHOS 2.9 3.0 2.0*      INR: No results for input(s): \"INR\" in the last 72 hours.      Radiology Review: N/A    ASSESSMENT AND PLAN:  78 y.o. female status post subtotal colectomy with ileostomy.  Continue IV antibiotics and supportive care.  Advance diet.Given the lack of pain in the right upper quadrant, I think she may just have a

## 2024-06-09 NOTE — PROGRESS NOTES
Inpatient Note    CC: fall  Summary:   Sudha Lopez is being seen by nephrology for hyponatremia. She is a 78 y.o. female with a PMH significant for hypertension, osteoarthritis who presented to the ED 5/30/2024 after a fall. She was noted to have a displaced left ankle fracture s/p reduction in the ED. She is also s/p recent right total knee replacement 5/29/2024. She is noted to have acute on chronic hyponatremia with Na down to 126 at time of consult. She also complained of severe abdominal pain and constipation. She became hypotensive and was noted to have stool imapction and possible ischemic bowel. Due to hypotension she developed an Acute kidney injury (AIRAM).    Interval History  The patient was seen and examined in her room  She was awake, alert and conversant  She complains of not feeling well in general  She complains of abdominal discomfort  She looked up pale and frail  She W was not under any acute distress  Her most recent set of vital signs showed temp 98.1 blood pressure 157/67  Her urine output for yesterday was 3550 mL  Sodium 137 potassium 3.1 bicarb 31 BUN 21 creatinine 0.7 magnesium 1.4 glucose 109 calcium 7.4 phosphorus 2    WBC 26.6 hemoglobin 8.43 and platelet was 309      Plan:     The patient with multiple electrolyte abnormalities, including hypokalemia, hypomagnesemia, hypocalcemia and hypophosphatemia    This is probably mainly from poor p.o. intake  I will check the patient's 25-hydroxy vitamin D level because vitamin D deficiency will be the most common cause of low calcium and low phosphate.    The patient will need to get the magnesium for the purpose of replenish potassium improved.    The patient got 15 mmol of potassium phos   I will start patient on mag oxide at 400 mg p.o. once daily          Assessment:   AIRAM:  - baseline Cr 0.9  - Cr was at baseline on admission but began trending up due to hypotension and abdominal pain  -

## 2024-06-09 NOTE — PROGRESS NOTES
Pulmonary & Critical Care Medicine ICU Progress Note      Events of Last 24 hours:   Pt says she is not sleeping well. She has no other complaints.       Invasive Lines: PICC D#3      CVC D#None  Art Line D#None            Vitals:  BP (!) 157/67   Pulse 85   Temp 98 °F (36.7 °C) (Oral)   Resp 17   Ht 1.6 m (5' 3\")   Wt 85.1 kg (187 lb 9.8 oz)   SpO2 90%   BMI 33.23 kg/m²   Tmax:  CVP: CVP (Mean): 0 mmHg      Intake/Output Summary (Last 24 hours) at 6/9/2024 0744  Last data filed at 6/9/2024 0100  Gross per 24 hour   Intake 1353.59 ml   Output 3635 ml   Net -2281.41 ml       EXAM:  Physical Exam  Constitutional:       Appearance: She is obese. She is ill-appearing.   HENT:      Head: Normocephalic and atraumatic.      Nose: Nose normal.      Mouth/Throat:      Pharynx: No oropharyngeal exudate.   Eyes:      General: No scleral icterus.        Right eye: No discharge.         Left eye: No discharge.   Cardiovascular:      Rate and Rhythm: Normal rate.      Heart sounds: No murmur heard.     No gallop.   Pulmonary:      Effort: Pulmonary effort is normal. No respiratory distress.      Breath sounds: No wheezing or rales.   Abdominal:      General: Abdomen is flat. Bowel sounds are normal. There is no distension.      Tenderness: There is no abdominal tenderness.   Musculoskeletal:         General: No swelling.      Cervical back: Normal range of motion.   Skin:     General: Skin is warm and dry.   Neurological:      Mental Status: She is alert and oriented to person, place, and time.          Medications:  Scheduled Meds:   apixaban  5 mg Oral BID    amLODIPine  10 mg Oral Daily    meropenem  1,000 mg IntraVENous Q8H    traZODone  50 mg Oral Nightly    thiamine  100 mg IntraVENous BID    buPROPion  300 mg Oral QAM    FLUoxetine  40 mg Oral Daily    sodium chloride flush  5-40 mL IntraVENous 2 times per day    pantoprazole  40 mg IntraVENous Daily       PRN Meds:  magnesium sulfate, prochlorperazine, potassium  05/31/24 23:55   Culture, Blood 1 [[id number]] Collected: 05/31/24 1729   Order Status: Completed Specimen: Blood Updated: 06/01/24 1815     Blood Culture, Routine No Growth to date.  Any change in status will be called.   Narrative:     ORDER#: [id number]                          ORDERED BY: MODESTO CRAIG  SOURCE: Blood right forearm                COLLECTED:  05/31/24 17:29  ANTIBIOTICS AT MOHIT.:                      RECEIVED :  05/31/24 23:27  If child <=2 yrs old please draw pediatric bottle.~Blood Culture 1   Culture, Blood 2 [[id number]] Collected: 05/31/24 1741   Order Status: Completed Specimen: Blood Updated: 06/01/24 1815     Culture, Blood 2 No Growth to date.  Any change in status will be called.   Narrative:     ORDER#: [id number]                          ORDERED BY: MODESTO CRAIG  SOURCE: Blood Hand, Right                  COLLECTED:  05/31/24 17:41  ANTIBIOTICS AT MOHIT.:                      RECEIVED :  05/31/24 23:27  If child <=2 yrs old please draw pediatric bottle.~Blood Culture #2      Films:  CXR 6/3/24  COMPARISON:  6/1/2024     HISTORY:  ORDERING SYSTEM PROVIDED HISTORY: Intubation  TECHNOLOGIST PROVIDED HISTORY:  Reason for exam:->Intubation  Reason for Exam: intubation     FINDINGS:  Endotracheal tube tip terminates 1.1 cm above the ambar.  The nasogastric  tube is coiled within a hiatal hernia.  Stable cardiomediastinal silhouette.  Airspace opacities are identified bilaterally with small effusions.  No  pneumothorax.     No new osseous abnormality.     IMPRESSION:  1. Endotracheal tube tip terminates 1.1 cm above the ambar.  Retraction by  1-1.5 cm is recommended.  2. Nasogastric tube is coiled within a hiatal hernia.  3. Bilateral airspace opacities with small effusions.     Assessment:  Obstructive shock  Acute blood loss anemia  ICU Delirium   Large bowel obstruction s/p colectomy and ileostomy  AIRAM  A-fib   Acute hypoxic respiratory failure  Bowel ischemia  Transaminitis  Lactic

## 2024-06-09 NOTE — PLAN OF CARE
Problem: Discharge Planning  Goal: Discharge to home or other facility with appropriate resources  6/8/2024 2343 by Pepe King RN  Outcome: Progressing  6/8/2024 1928 by Kina Heller RN  Outcome: Progressing  Flowsheets  Taken 6/8/2024 1800  Discharge to home or other facility with appropriate resources: Refer to discharge planning if patient needs post-hospital services based on physician order or complex needs related to functional status, cognitive ability or social support system  Taken 6/8/2024 0800  Discharge to home or other facility with appropriate resources:   Refer to discharge planning if patient needs post-hospital services based on physician order or complex needs related to functional status, cognitive ability or social support system   Identify barriers to discharge with patient and caregiver     Problem: Pain  Goal: Verbalizes/displays adequate comfort level or baseline comfort level  6/8/2024 2343 by Pepe King RN  Outcome: Progressing  6/8/2024 1928 by Kina Heller RN  Outcome: Progressing  Flowsheets (Taken 6/8/2024 1800)  Verbalizes/displays adequate comfort level or baseline comfort level:   Encourage patient to monitor pain and request assistance   Administer analgesics based on type and severity of pain and evaluate response   Consider cultural and social influences on pain and pain management   Notify Licensed Independent Practitioner if interventions unsuccessful or patient reports new pain   Implement non-pharmacological measures as appropriate and evaluate response   Assess pain using appropriate pain scale     Problem: Safety - Adult  Goal: Free from fall injury  6/8/2024 2343 by Pepe King RN  Outcome: Progressing  6/8/2024 1928 by Kina Heller RN  Outcome: Progressing  Flowsheets (Taken 6/8/2024 1800)  Free From Fall Injury:   Instruct family/caregiver on patient safety   Based on caregiver fall risk screen, instruct

## 2024-06-09 NOTE — PROGRESS NOTES
Speech Therapy  Attempt Note      The pt was cleared by surgery to advance her diet. Dysphagia treatment was attempted. However, the pt declined PO trials today. Will re-attempt at another time.    Fidel Hoyt MA, CCC-SLP  SP[facility][id number]  Speech-Language Pathologist  Phone: [phone]  Speech Desk: [phone]

## 2024-06-09 NOTE — PLAN OF CARE
Problem: Discharge Planning  Goal: Discharge to home or other facility with appropriate resources  Outcome: Progressing   Planning in progress  Problem: Safety - Adult  Goal: Free from fall injury  Outcome: Progressing   Fall precautions in place  Problem: Nutrition Deficit:  Goal: Optimize nutritional status  Outcome: Progressing  Flowsheets (Taken 6/6/2024 4107 by May Dillard RD)  Nutrient intake appropriate for improving, restoring, or maintaining nutritional needs:   Assess nutritional status and recommend course of action   Recommend, monitor, and adjust tube feedings and TPN/PPN based on assessed needs   Advanced diet today.     Problem: Skin/Tissue Integrity  Goal: Absence of new skin breakdown  Description: 1.  Monitor for areas of redness and/or skin breakdown  2.  Assess vascular access sites hourly  3.  Every 4-6 hours minimum:  Change oxygen saturation probe site  4.  Every 4-6 hours:  If on nasal continuous positive airway pressure, respiratory therapy assess nares and determine need for appliance change or resting period.  Outcome: Progressing

## 2024-06-09 NOTE — FLOWSHEET NOTE
Downgrade to PCU level of care.  Transferred to C4 unit, ROOM: 0438    Report give to:  Shikha Love RN     Belongings gathered and confirmed by patient and/or family.  Sent during transfer.  Family at bedside during transfer     06/09/24 1300   Vitals   Temp 98.3 °F (36.8 °C)   Temp Source Oral   Pulse 86   Heart Rate Source Monitor   Respirations 19   /64   MAP (Calculated) 87   MAP (mmHg) 85   BP Location Right upper arm   BP Upper/Lower Upper   BP Method Automatic   Patient Position Semi fowlers   Cardiac Rhythm Sinus rhythm with PVC   Ectopy PVC   Ectopy Frequency Rare   Pain Assessment   Pain Level 5   Patient's Stated Pain Goal 3;4   Pain Location Abdomen   Pain Orientation Mid   Pain Descriptors Burning   Functional Pain Assessment Activities are not prevented   Pain Type Acute pain   Pain Frequency Continuous   Pain Onset On-going   Non-Pharmaceutical Pain Intervention(s) Rest   Multiple Pain Sites No   Opioid-Induced Sedation   POSS Score 1   RASS   Siegel Agitation Sedation Scale (RASS) 0   Oxygen Therapy   SpO2 93 %   Pulse Oximetry Type Continuous   SPO2 High Alarm Limit 100   SPO2 Low Alarm Limit POX 88   Pulse Oximeter Device Mode Continuous   Pulse Oximeter Device Location Finger   O2 Device Nasal cannula   O2 Flow Rate (L/min) 1 L/min         Left the unit at:  1440

## 2024-06-10 LAB
ALBUMIN SERPL-MCNC: 2.5 G/DL (ref 3.4–5)
ANION GAP SERPL CALCULATED.3IONS-SCNC: 11 MMOL/L (ref 3–16)
BUN SERPL-MCNC: 17 MG/DL (ref 7–20)
CALCIUM SERPL-MCNC: 7.5 MG/DL (ref 8.3–10.6)
CHLORIDE SERPL-SCNC: 98 MMOL/L (ref 99–110)
CO2 SERPL-SCNC: 29 MMOL/L (ref 21–32)
CREAT SERPL-MCNC: 0.6 MG/DL (ref 0.6–1.2)
DEPRECATED RDW RBC AUTO: 14.3 % (ref 12.4–15.4)
GFR SERPLBLD CREATININE-BSD FMLA CKD-EPI: >90 ML/MIN/{1.73_M2}
GLUCOSE SERPL-MCNC: 87 MG/DL (ref 70–99)
HCT VFR BLD AUTO: 23 % (ref 36–48)
HGB BLD-MCNC: 7.6 G/DL (ref 12–16)
MAGNESIUM SERPL-MCNC: 1.5 MG/DL (ref 1.8–2.4)
MCH RBC QN AUTO: 29.5 PG (ref 26–34)
MCHC RBC AUTO-ENTMCNC: 33.1 G/DL (ref 31–36)
MCV RBC AUTO: 89.1 FL (ref 80–100)
PHOSPHATE SERPL-MCNC: 2.1 MG/DL (ref 2.5–4.9)
PLATELET # BLD AUTO: 360 K/UL (ref 135–450)
PMV BLD AUTO: 7.6 FL (ref 5–10.5)
POTASSIUM SERPL-SCNC: 3 MMOL/L (ref 3.5–5.1)
RBC # BLD AUTO: 2.58 M/UL (ref 4–5.2)
SODIUM SERPL-SCNC: 138 MMOL/L (ref 136–145)
WBC # BLD AUTO: 20.2 K/UL (ref 4–11)

## 2024-06-10 PROCEDURE — 6360000002 HC RX W HCPCS: Performed by: SURGERY

## 2024-06-10 PROCEDURE — 97530 THERAPEUTIC ACTIVITIES: CPT

## 2024-06-10 PROCEDURE — 2580000003 HC RX 258: Performed by: HOSPITALIST

## 2024-06-10 PROCEDURE — C9113 INJ PANTOPRAZOLE SODIUM, VIA: HCPCS | Performed by: SURGERY

## 2024-06-10 PROCEDURE — 6360000002 HC RX W HCPCS: Performed by: HOSPITALIST

## 2024-06-10 PROCEDURE — 85027 COMPLETE CBC AUTOMATED: CPT

## 2024-06-10 PROCEDURE — 6370000000 HC RX 637 (ALT 250 FOR IP): Performed by: SURGERY

## 2024-06-10 PROCEDURE — 83735 ASSAY OF MAGNESIUM: CPT

## 2024-06-10 PROCEDURE — 97535 SELF CARE MNGMENT TRAINING: CPT

## 2024-06-10 PROCEDURE — 2060000000 HC ICU INTERMEDIATE R&B

## 2024-06-10 PROCEDURE — 6370000000 HC RX 637 (ALT 250 FOR IP): Performed by: STUDENT IN AN ORGANIZED HEALTH CARE EDUCATION/TRAINING PROGRAM

## 2024-06-10 PROCEDURE — 80069 RENAL FUNCTION PANEL: CPT

## 2024-06-10 PROCEDURE — 6370000000 HC RX 637 (ALT 250 FOR IP): Performed by: INTERNAL MEDICINE

## 2024-06-10 PROCEDURE — 2580000003 HC RX 258: Performed by: SURGERY

## 2024-06-10 PROCEDURE — 6370000000 HC RX 637 (ALT 250 FOR IP): Performed by: NURSE PRACTITIONER

## 2024-06-10 PROCEDURE — 94761 N-INVAS EAR/PLS OXIMETRY MLT: CPT

## 2024-06-10 PROCEDURE — 97110 THERAPEUTIC EXERCISES: CPT

## 2024-06-10 PROCEDURE — 2700000000 HC OXYGEN THERAPY PER DAY

## 2024-06-10 PROCEDURE — 99024 POSTOP FOLLOW-UP VISIT: CPT | Performed by: SURGERY

## 2024-06-10 PROCEDURE — 6360000002 HC RX W HCPCS: Performed by: INTERNAL MEDICINE

## 2024-06-10 RX ORDER — MAGNESIUM SULFATE IN WATER 40 MG/ML
2000 INJECTION, SOLUTION INTRAVENOUS ONCE
Status: COMPLETED | OUTPATIENT
Start: 2024-06-10 | End: 2024-06-10

## 2024-06-10 RX ADMIN — PANTOPRAZOLE SODIUM 40 MG: 40 INJECTION, POWDER, FOR SOLUTION INTRAVENOUS at 09:27

## 2024-06-10 RX ADMIN — DIBASIC SODIUM PHOSPHATE, MONOBASIC POTASSIUM PHOSPHATE AND MONOBASIC SODIUM PHOSPHATE 1 TABLET: 852; 155; 130 TABLET ORAL at 20:23

## 2024-06-10 RX ADMIN — DIBASIC SODIUM PHOSPHATE, MONOBASIC POTASSIUM PHOSPHATE AND MONOBASIC SODIUM PHOSPHATE 1 TABLET: 852; 155; 130 TABLET ORAL at 09:24

## 2024-06-10 RX ADMIN — THIAMINE HYDROCHLORIDE 100 MG: 100 INJECTION, SOLUTION INTRAMUSCULAR; INTRAVENOUS at 09:25

## 2024-06-10 RX ADMIN — POTASSIUM BICARBONATE 40 MEQ: 782 TABLET, EFFERVESCENT ORAL at 09:23

## 2024-06-10 RX ADMIN — APIXABAN 5 MG: 5 TABLET, FILM COATED ORAL at 20:23

## 2024-06-10 RX ADMIN — AMLODIPINE BESYLATE 10 MG: 5 TABLET ORAL at 09:25

## 2024-06-10 RX ADMIN — Medication 400 MG: at 09:24

## 2024-06-10 RX ADMIN — FLUOXETINE HYDROCHLORIDE 40 MG: 20 CAPSULE ORAL at 09:24

## 2024-06-10 RX ADMIN — MEROPENEM 1000 MG: 1 INJECTION, POWDER, FOR SOLUTION INTRAVENOUS at 18:41

## 2024-06-10 RX ADMIN — OXYCODONE AND ACETAMINOPHEN 1 TABLET: 5; 325 TABLET ORAL at 17:17

## 2024-06-10 RX ADMIN — MAGNESIUM SULFATE HEPTAHYDRATE 2000 MG: 40 INJECTION, SOLUTION INTRAVENOUS at 09:38

## 2024-06-10 RX ADMIN — Medication 400 MG: at 20:23

## 2024-06-10 RX ADMIN — SODIUM CHLORIDE, PRESERVATIVE FREE 10 ML: 5 INJECTION INTRAVENOUS at 09:26

## 2024-06-10 RX ADMIN — TRAZODONE HYDROCHLORIDE 50 MG: 50 TABLET ORAL at 20:23

## 2024-06-10 RX ADMIN — THIAMINE HYDROCHLORIDE 100 MG: 100 INJECTION, SOLUTION INTRAMUSCULAR; INTRAVENOUS at 20:22

## 2024-06-10 RX ADMIN — MEROPENEM 1000 MG: 1 INJECTION, POWDER, FOR SOLUTION INTRAVENOUS at 09:34

## 2024-06-10 RX ADMIN — OXYCODONE AND ACETAMINOPHEN 1 TABLET: 5; 325 TABLET ORAL at 03:32

## 2024-06-10 RX ADMIN — APIXABAN 5 MG: 5 TABLET, FILM COATED ORAL at 09:32

## 2024-06-10 RX ADMIN — BUPROPION HYDROCHLORIDE 300 MG: 150 TABLET, EXTENDED RELEASE ORAL at 09:25

## 2024-06-10 RX ADMIN — MEROPENEM 1000 MG: 1 INJECTION, POWDER, FOR SOLUTION INTRAVENOUS at 00:57

## 2024-06-10 ASSESSMENT — PAIN DESCRIPTION - LOCATION
LOCATION: ABDOMEN

## 2024-06-10 ASSESSMENT — PAIN SCALES - GENERAL
PAINLEVEL_OUTOF10: 7
PAINLEVEL_OUTOF10: 6
PAINLEVEL_OUTOF10: 5
PAINLEVEL_OUTOF10: 5

## 2024-06-10 ASSESSMENT — PAIN DESCRIPTION - ORIENTATION
ORIENTATION: MID;UPPER
ORIENTATION: UPPER

## 2024-06-10 ASSESSMENT — PAIN DESCRIPTION - DESCRIPTORS: DESCRIPTORS: ACHING

## 2024-06-10 NOTE — PROGRESS NOTES
Comprehensive Nutrition Assessment    Type and Reason for Visit:  Reassess    Nutrition Recommendations/Plan:   Continue low fat diet and encourage po intakes  Add ensure BID - chocolate  Monitor nutrition adequacy, pertinent labs, bowel habits, wt changes, and clinical progress     Malnutrition Assessment:  Malnutrition Status:  At risk for malnutrition (Comment) (06/10/24 7818)    Context:  Acute Illness     Findings of the 6 clinical characteristics of malnutrition:  Energy Intake:  50% or less of estimated energy requirements for 5 or more days  Fluid Accumulation:  Mild Generalized, Extremities    Nutrition Assessment:    Follow up: Trophic TF d/c 6/7 and NG removed. Diet advanced from NPO to clear liquids 6/7, full liquids 6/8, and regular low fat 6/9. SLP following. No po intakes recorded yet per EMR. Pt reports poor appetite and trying to eat a few bites of her meals. Reports nothing tastes good. RD offered ONS to promote intakes, pt agreeable to try. Pt nutritionally at risk AEB poor intakes since admit, x11 days. If po intakes do not improve, may consider alternative nutrition. Encourage intakes as tolerated, will monitor.    Nutrition Related Findings:    -375 ml ostomy output 6/9. +hypoactive BS. +1 RUE edema. +1 pitting generalized, LUE, BLE edema. K 3. Mg 1.5. Phos 2.1. Wound Type: Surgical Incision       Current Nutrition Intake & Therapies:    Average Meal Intake: Unable to assess, 1-25% (per pt)  Average Supplements Intake: None Ordered  ADULT DIET; Regular; Low Fat (less than or equal to 50 gm/day)    Anthropometric Measures:  Height: 160 cm (5' 3\")  Ideal Body Weight (IBW): 115 lbs (52 kg)       Current Body Weight: 90.6 kg (199 lb 11.8 oz),   IBW. Weight Source: Bed Scale  Current BMI (kg/m2): 35.4  Usual Body Weight: 83.9 kg (184 lb 15.5 oz) (3/11/24)  % Weight Change (Calculated): 7.5  Weight Adjustment For: No Adjustment                 BMI Categories: Obese Class 2 (BMI 35.0  -39.9)    Estimated Daily Nutrient Needs:  Energy Requirements Based On: Kcal/kg (25-30)  Weight Used for Energy Requirements: Ideal  Energy (kcal/day): 6139-2832  Weight Used for Protein Requirements: Ideal (1-1.2)  Protein (g/day): 52-62  Method Used for Fluid Requirements: 1 ml/kcal  Fluid (ml/day):      Nutrition Diagnosis:   Inadequate oral intake related to inadequate protein-energy intake as evidenced by GI abnormality, intake 0-25%    Nutrition Interventions:   Food and/or Nutrient Delivery: Continue Current Diet, Start Oral Nutrition Supplement  Nutrition Education/Counseling: No recommendation at this time  Coordination of Nutrition Care: Continue to monitor while inpatient       Goals:  Previous Goal Met: No Progress toward Goal(s)  Goals: PO intake 50% or greater, prior to discharge       Nutrition Monitoring and Evaluation:   Behavioral-Environmental Outcomes: None Identified  Food/Nutrient Intake Outcomes: Food and Nutrient Intake, Supplement Intake  Physical Signs/Symptoms Outcomes: Biochemical Data, Meal Time Behavior, Weight    Discharge Planning:    Continue current diet     SANDRA CORREA RD  Contact: Office: [phone]; Adolfo: [phone]

## 2024-06-10 NOTE — PROGRESS NOTES
[facility] GENERAL SURGERY    Surgery Progress Note           POD # 8    PATIENT NAME: Sudha Lopez     TODAY'S DATE: 6/10/2024    INTERVAL HISTORY:    Pt with improving pain, no nausea or emesis, no dysphagia     OBJECTIVE:   VITALS:  BP (!) 166/68   Pulse 98   Temp 98.3 °F (36.8 °C) (Oral)   Resp 16   Ht 1.6 m (5' 3\")   Wt 90.6 kg (199 lb 11.8 oz)   SpO2 92%   BMI 35.38 kg/m²     INTAKE/OUTPUT:    I/O last 3 completed shifts:  In: 2258.9 [P.O.:1020; I.V.:396.4; IV Piggyback:842.6]  Out: 3978 [Urine:3600; Drains:3; Stool:375]  No intake/output data recorded.              CONSTITUTIONAL:  awake and alert  LUNGS: Unlabored  ABDOMEN:   normal bowel sounds, soft, non-distended, ostomy functional, sachin serous  INCISION: Scant drainage from incision    Data:  CBC:   Recent Labs     06/08/24  0527 06/08/24  1225 06/09/24  0502 06/10/24  0625   WBC 31.4*  --  26.6* 20.2*   HGB 8.5* 8.2* 8.3* 7.6*   HCT 25.3* 25.3* 24.6* 23.0*     --  309 360       BMP:    Recent Labs     06/08/24 0527 06/09/24  0502 06/10/24  0625    137 138   K 3.4* 3.1* 3.0*    96* 98*   CO2 33* 31 29   BUN 35* 21* 17   CREATININE 0.9 0.7 0.6   GLUCOSE 108* 109* 87       Hepatic: No results for input(s): \"AST\", \"ALT\", \"BILITOT\", \"ALKPHOS\" in the last 72 hours.    Invalid input(s): \"ALB\"  Mag:      Recent Labs     06/08/24  0527 06/09/24  0502 06/10/24  0625   MG 1.60* 1.40* 1.50*        Phos:     Recent Labs     06/08/24  0527 06/09/24  0502 06/10/24  0625   PHOS 3.0 2.0* 2.1*        INR: No results for input(s): \"INR\" in the last 72 hours.      Radiology Review: N/A    ASSESSMENT AND PLAN:  78 y.o. female status post subtotal colectomy with ileostomy.    Diet as tolerated  Pain controlled  PT/OT  Leukocytosis conitnues to trend down  SNF possible this week        Electronically signed by Bertin Jackson MD

## 2024-06-10 NOTE — PROGRESS NOTES
Physical Therapy  Facility/Department: [facility] C4 PCU  Daily Treatment Note  NAME: Sudha Lopez  : 1945  MRN: [id number]    Date of Service: 6/10/2024    Discharge Recommendations:  Patient able to tolerate 3 hours of therapy per day, IP Rehab (once medically ready)      Therapy discharge recommendations are subject to collaboration from the patient’s interdisciplinary healthcare team, including MD and case management recommendations.    Barriers to Home Discharge:   [] Steps to access home entry or bed/bath:   [x] Unable to transfer, ambulate, or propel wheelchair household distances without assist   [x] Limited available assist at home upon discharge    [] Patient or family requests d/c to post-acute facility    [x] Poor cognition/safety awareness for d/c to home alone    [] Unable to maintain ordered weight bearing status    [x] Patient with salient signs of long-standing immobility   [x] Decreased independence with ADLs   [x] Increased risk for falls   [] Other:    If pt is unable to be seen after this session, please let this note serve as discharge summary.  Please see case management note for discharge disposition.  Thank you.    Patient Diagnosis(es): The primary encounter diagnosis was Closed fracture of left ankle, initial encounter. Diagnoses of Dislocation of left ankle joint, initial encounter, Syncope, unspecified syncope type, Hypoxemia, Acute respiratory failure with hypoxia (HCC), Large bowel obstruction (HCC), and Leukocytosis, unspecified type were also pertinent to this visit.    Assessment   Assessment: During this session, pt required mod assist x2 to max assist x2 for bed mobility and max assist x2 to total assist with a slide board and squat pivot for transfers. She was able to perform 2 sit to stands in a row going from the EOB to RW. Her static sitting balance was good without support, and dynamic sitting balance was fair with occasional support. Her standing balance was poor and

## 2024-06-10 NOTE — PROGRESS NOTES
Occupational Therapy  Facility/Department: [place] [address] PCU  Daily Treatment Note  NAME: Sudha Lopez  : 1945  MRN: [id number]    Date of Service: 6/10/2024    Discharge Recommendations:  IP Rehab, Patient able to tolerate 3 hours of therapy per day         Patient Diagnosis(es): The primary encounter diagnosis was Closed fracture of left ankle, initial encounter. Diagnoses of Dislocation of left ankle joint, initial encounter, Syncope, unspecified syncope type, Hypoxemia, Acute respiratory failure with hypoxia (HCC), Large bowel obstruction (HCC), and Leukocytosis, unspecified type were also pertinent to this visit.     Assessment    Assessment: Pt pleasant, motivated and agreeable. Pt Mod A x2 for bed mobility this day. completed x2 stands to RW with Max A x2 and limited ability to extend hips and trunk. Pt used slide board with Mod+Max A x2 EOB>chair. Pt became orthostatic (see vitals section) and performed squat pivot back to bed with Max A x2. Pt demos good progress with mobility. Cont per POC-- IPR requirements discussed with PT and pt agreeable to the intensity of rehab stating \"I'll do whatever I need to, to get better\"  Activity Tolerance: Patient tolerated treatment well;Patient limited by endurance  Discharge Recommendations: IP Rehab;Patient able to tolerate 3 hours of therapy per day      Plan   Occupational Therapy Plan  Times Per Week: 4-6x/week  Current Treatment Recommendations: Strengthening;ROM;Patient/Caregiver education & training;Self-Care / ADL;Equipment evaluation, education, & procurement;Functional mobility training;Safety education & training;Endurance training;Cognitive reorientation     Restrictions  Restrictions/Precautions  Restrictions/Precautions: Up as Tolerated;General Precautions;Surgical Protocols;Weight Bearing  Required Braces or Orthoses?: Yes  Lower Extremity Weight Bearing Restrictions  Right Lower Extremity Weight Bearing: Weight Bearing As Tolerated  Left Lower  connorck  Functional Mobility: Dependent/Total  Functional Mobility Skilled Clinical Factors: max Ax2 for squat pivot from chair to bed, Mod + Max A x2 for slide board EOB>chair  Skin Care: Bath wipes;Chlorhexidine wipes        Safety Devices  Type of Devices: Nurse notified;Call light within reach;Heels elevated for pressure relief;All fall risk precautions in place;Left in bed;Bed alarm in place     Patient Education  Education Given To: Patient  Education Provided: Role of Therapy;Transfer Training;Plan of Care;Precautions;Orientation;Mobility Training  Education Method: Demonstration;Verbal  Barriers to Learning: Cognition  Education Outcome: Verbalized understanding;Continued education needed    Goals  Short Term Goals  Time Frame for Short Term Goals: 6/12, unless otherwise noted- goals ongoing 6/08  Short Term Goal 1: Pt will complete seated EOB for ~ 5 minutes with Min A for balance in prep for seated ADLs  Short Term Goal 2: Pt will complete bed>chair or bed>BSC with Mod A x 2  Short Term Goal 3: Pt will complete UE HEP 15x reps each by 6/10  Short Term Goal 4: Pt will complete seated grooming tasks (wash face, brush teeth) with CGA-- GOAL MET 6/08    AM-PAC - ADL  AM-PAC Daily Activity - Inpatient   AM-PAC Inpatient Daily Activity Raw Score: 11  AM-PAC Inpatient ADL T-Scale Score : 29.04  ADL Inpatient CMS 0-100% Score: 70.42  ADL Inpatient CMS G-Code Modifier : CL    Therapy Time   Individual Concurrent Group Co-treatment   Time In       1400   Time Out       1508   Minutes       68   Timed Code Treatment Minutes: 68 Minutes       Ceci Peña OT  If pt is unable to be seen after this session, please let this note serve as discharge summary.  Please see case management note for discharge disposition.  Thank you.

## 2024-06-10 NOTE — PROGRESS NOTES
Patient: Sudha Lopez  [id number]  Date: 6/10/2024      Chief Complaint: ankle pain    History of Present Illness/Hospital Course:  Sudha Lopez is a 78 year old female with a past medical history significant for HTN, anxiety, and right TKA on 5/28/24 who presented to [facility] on 5/30/24 after a fall with left ankle pain. XR ankle revealed anterior tibiotalar dislocation with associate comminuted appearing fracture along the posterior aspect of the distal tibia. Orthopedic Surgery was consulted and her ankle was reduced in the ED. CT showed well reduced ankle mortise with a nondisplaced medial malleolus fracture as well as minimally displaced fibular fractures distally. Surgical versus nonsurgical options reviewed and family decided to proceed with nonsurgical option. Nephrology was consulted due to hyponatremia. On 6/1 she was noted to have a distended abdomen. KUB showed ileus/distal obstruction. NG tube was placed. General Surgery and GI were consulted. On 6/1 she underwent colonoscopy with decompression. On 6/2 she developed worsening respiratory failure and was intubated. She developed worsening abdominal distension and underwent subtotal colectomy with ileostomy. Course further notable for atrial tachycardia. Cardiology is consulted. Course also notable for leukocytosis, hypokalemia, AIRAM, and HTN. She continues to have functional deficits below her baseline.     Interval History:   No acute events overnight. She has been transferred out of the ICU. Today she is seen with nursing present. She appears improved. She denies acute complaints at this time. Discussed that we are following for her medical course and progress with therapies.      has a past medical history of Anxiety and Hypertension.     has a past surgical history that includes Hysterectomy; Breast enhancement surgery; rhinoplasty; Rotator cuff repair (Bilateral); Hand surgery (Right); Total hip arthroplasty (Bilateral); Intracapsular cataract

## 2024-06-10 NOTE — CARE COORDINATION
Therapy eval completed. Pt maintains IPR recs. Spoke with [facility] who states that they will start pre cert today.     Called and spoke to the Pt's Dtr Lazara via TC who is updated on plan. Will follow up tomorrow, awaiting insurance approval.

## 2024-06-10 NOTE — PROGRESS NOTES
Hospital Medicine Progress Note      Date of Admission: 5/30/2024  Hospital Day: 12    Chief Admission Complaint:  Left ankle pain     Subjective: Tolerating diet. Working with therapy.     Telemetry (reviewed by me):      Presenting Admission History:       a 78-year-old female past medical history of hypertension, recent left ankle fracture now reduced, status post right knee replacement who was admitted after a mechanical fall.  Patient had a rapid response called and was transferred to ICU for hypotension, abdominal distention, lactic acidosis.     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 General Surgery recommended OR evaluation on 6/2 and she was found to have gangrenous colon, now s/p subtotal colectomy and ileostomy creation.  In retrospect, she appears to have previously unrecognized Paroxysmal Atrial Fibrillation, thus thromboembolic event cannot be excluded as an underlying cause. Pathology from colectomy showed Ischemic Colitis, no malignancy.   Wound Care/Ostomy care  Empiric Abx continued  Leukocytosis improving  Diet as tolerated    Severe Sepsis with Septic shock 2/2 Colonic Ischemia  On mulitple prossors  Continue broad spectrum antibiotics  Renal function improved  WBC monitoring as above.      Acute hypoxic and hypercapnic respiratory failure  Weaned and extubated  Wean O2 as tolerated  Monitor.     Acute Blood Loss Anemia: No signs of GI bleeding. Holding Heparin drip. Transfused  PRBCs 6/6. Monitor.     Acute Metabolic Encephalopathy: Suspect this was related to slow metabolism of sedatives after intubation. Improved.  Monitor.      Afib RVR: New onset 6/3.  ECHO 5/31/24 showed normal LVEF without any significant valvular disease. Given  frequent paroxysms EP was consulted. AC is recommended. Continue Eliquis. Not requiring any rate control at this time.  Monitor.     Recent Right TKR (Josh) followed by post-operative mechanical fall at home resulting in Left Ankle Fracture/Dislocation: Orthopedics following. Continue splint with outpatient management.       Physical Exam Performed:      General appearance: NAD  Respiratory:  Normal respiratory effort. Clear.   Cardiovascular: Regular rate and rhythm.   Abdomen:  Incisions and ostomy noted, non-distended.  Musculoskelatal:  No edema  Neurologic:  Non-focal.   Psychiatric:  Alert and better oriented. Improved insight.     BP (!) 172/62   Pulse 95   Temp 98 °F (36.7 °C) (Oral)   Resp 16   Ht 1.6 m (5' 3\")   Wt 90.6 kg (199 lb 11.8 oz)   SpO2 95%   BMI 35.38 kg/m²     Diet: ADULT DIET; Regular; Low Fat (less than or equal to 50 gm/day)  DVT Prophylaxis: []PPx LMWH  []SQ Heparin  []IPC/SCDs  [x]Eliquis  []Xarelto  []Coumadin  [] Heparin Drip  []Other -      Code status: Full Code  PT/OT Eval Status:   [x]NOT yet ordered  []Ordered and Pending   []Seen with Recommendations for:  []Home independently  []Home w/ assist  []HHC  []SNF  []Acute Rehab    Anticipated Discharge Day/Date:  ?2 days. Possibly to ARU    Anticipated Discharge Location: []Home  []HHC  []SNF  [x]Acute Rehab  []ECF  []LTAC  []Hospice  []Other -      Consults:      IP CONSULT TO ORTHOPEDIC SURGERY  IP CONSULT TO HOSPITALIST  IP CONSULT TO NEPHROLOGY  IP CONSULT TO CRITICAL CARE  IP CONSULT TO GENERAL SURGERY  IP CONSULT TO GI  IP CONSULT TO PHARMACY  IP CONSULT TO ORTHOPEDIC SURGERY  IP CONSULT TO DIETITIAN  IP CONSULT TO CARDIOLOGY  IP CONSULT TO PHYSICAL MEDICINE REHAB      This patient has a high likelihood of being discharged tomorrow and is appropriate for A1 Discharge Unit in AM pending clinical course overnight: []Yes

## 2024-06-10 NOTE — CARE COORDINATION
[facility] - Acute Rehab Unit   After review, this patient is felt to be:       []  Appropriate for Acute Inpatient Rehab    [x]  Appropriate for Acute Inpatient Rehab Pending Insurance Authorization    []  Not appropriate for Acute Inpatient Rehab    []  Referral received and ARU reviewing patient; Evaluation ongoing.      Precert initiated 6/10 for ARU admission. Pt in agreement per  conversation with pt this afternoon. Ref#: [id number]     Will notify DCP with further updates. Thank you for the referral.     Thank you.   Mary Johnson M.A, [facility]  Clinical Liaison

## 2024-06-10 NOTE — CARE COORDINATION
Spoke with pt's Dtr Lazara via TC to discuss dc plan. Pt will likely be ready for d/c  by end of week. Pt with current IPR recs as of 6/7. Therapy to re eval today. Advised Lazara that this RN CM will call back to discuss recs once updated therapy notes are complete. We can discuss SNF placement as back up if needed. Will follow.

## 2024-06-10 NOTE — PROGRESS NOTES
Inpatient Note    CC: fall  Summary:   Sudha Lopez is being seen by nephrology for hyponatremia. She is a 78 y.o. female with a PMH significant for hypertension, osteoarthritis who presented to the ED 5/30/2024 after a fall. She was noted to have a displaced left ankle fracture s/p reduction in the ED. She is also s/p recent right total knee replacement 5/29/2024. She is noted to have acute on chronic hyponatremia with Na down to 126 at time of consult. She also complained of severe abdominal pain and constipation. She became hypotensive and was noted to have stool imapction and possible ischemic bowel. Due to hypotension she developed an Acute kidney injury (AIRAM).    Interval History    Potassium is low  Rest of the electrolytes are stable  Creatinine now normalized  NG tube is out she is alert and oriented  Magnesium low at 1.5 phosphorus 2.1    Plan:     Supplement iv mg in addition to po  K supplement  Also phosphorus supplement started        Assessment:   AIRAM:  - baseline Cr 0.9  - Cr was at baseline on admission but began trending up due to hypotension and abdominal pain  - required pressors, endoscopic bowel decompression.  - AIRAM 2/2 hemodynamic insult.    Acute metabolic acidosis:  - 2/2 lactic acidosis and sepsis. Possible ischemic gut    Hyponatremia:  - Na 126 at time of consult.  - Na in the last year has ranged 129-135  - on HCTZ 12.5 mg, celecoxib, bupropion PTA as possible contributors  - acute drop in Na due to pain from fracture and recent surgery.  - urine studies pending.    Hypertension:  - home regimen: HCTZ 12.5 mg daily, benazepril 40 mg daily, amlodipine 5 mg daily  - stop HCTZ. Would add HCTZ to allergies.    Fall:  - left ankle fracuture  - recent right TKR 5/28/2024  - per ortho.    Ischemic bowel:  - s/p subtotal colectomy and ileostomy 6/2/2024      Macario Parada MD  [facility]  Office: [phone]          PE:   Vitals:     06/10/24 0402   BP:    Pulse:    Resp: 16   Temp:    SpO2:        General appearance: in NAD  Respiratory: bilateral equal chest rise, no wheeze, no crackles  Cardiovascular: Ausculation shows RRR mp edema  Abdomen: No visible mass, + tenderness, + distended.

## 2024-06-11 LAB
ALBUMIN SERPL-MCNC: 2.5 G/DL (ref 3.4–5)
ANION GAP SERPL CALCULATED.3IONS-SCNC: 9 MMOL/L (ref 3–16)
BUN SERPL-MCNC: 18 MG/DL (ref 7–20)
CALCIUM SERPL-MCNC: 7.8 MG/DL (ref 8.3–10.6)
CHLORIDE SERPL-SCNC: 98 MMOL/L (ref 99–110)
CO2 SERPL-SCNC: 29 MMOL/L (ref 21–32)
CREAT SERPL-MCNC: 0.7 MG/DL (ref 0.6–1.2)
DEPRECATED RDW RBC AUTO: 14.5 % (ref 12.4–15.4)
GFR SERPLBLD CREATININE-BSD FMLA CKD-EPI: 88 ML/MIN/{1.73_M2}
GLUCOSE SERPL-MCNC: 93 MG/DL (ref 70–99)
HCT VFR BLD AUTO: 23.2 % (ref 36–48)
HGB BLD-MCNC: 7.5 G/DL (ref 12–16)
MAGNESIUM SERPL-MCNC: 1.6 MG/DL (ref 1.8–2.4)
MCH RBC QN AUTO: 29.1 PG (ref 26–34)
MCHC RBC AUTO-ENTMCNC: 32.4 G/DL (ref 31–36)
MCV RBC AUTO: 89.9 FL (ref 80–100)
PHOSPHATE SERPL-MCNC: 2.6 MG/DL (ref 2.5–4.9)
PLATELET # BLD AUTO: 477 K/UL (ref 135–450)
PMV BLD AUTO: 7.6 FL (ref 5–10.5)
POTASSIUM SERPL-SCNC: 3.2 MMOL/L (ref 3.5–5.1)
RBC # BLD AUTO: 2.59 M/UL (ref 4–5.2)
SODIUM SERPL-SCNC: 136 MMOL/L (ref 136–145)
WBC # BLD AUTO: 18.7 K/UL (ref 4–11)

## 2024-06-11 PROCEDURE — 97530 THERAPEUTIC ACTIVITIES: CPT

## 2024-06-11 PROCEDURE — APPNB45 APP NON BILLABLE 31-45 MINUTES: Performed by: CLINICAL NURSE SPECIALIST

## 2024-06-11 PROCEDURE — 99024 POSTOP FOLLOW-UP VISIT: CPT | Performed by: SURGERY

## 2024-06-11 PROCEDURE — 6370000000 HC RX 637 (ALT 250 FOR IP)

## 2024-06-11 PROCEDURE — 6360000002 HC RX W HCPCS: Performed by: INTERNAL MEDICINE

## 2024-06-11 PROCEDURE — 6360000002 HC RX W HCPCS: Performed by: SURGERY

## 2024-06-11 PROCEDURE — 2580000003 HC RX 258: Performed by: HOSPITALIST

## 2024-06-11 PROCEDURE — 97110 THERAPEUTIC EXERCISES: CPT

## 2024-06-11 PROCEDURE — 6370000000 HC RX 637 (ALT 250 FOR IP): Performed by: INTERNAL MEDICINE

## 2024-06-11 PROCEDURE — C9113 INJ PANTOPRAZOLE SODIUM, VIA: HCPCS | Performed by: SURGERY

## 2024-06-11 PROCEDURE — 2060000000 HC ICU INTERMEDIATE R&B

## 2024-06-11 PROCEDURE — 80069 RENAL FUNCTION PANEL: CPT

## 2024-06-11 PROCEDURE — 6370000000 HC RX 637 (ALT 250 FOR IP): Performed by: SURGERY

## 2024-06-11 PROCEDURE — 2580000003 HC RX 258: Performed by: SURGERY

## 2024-06-11 PROCEDURE — 85027 COMPLETE CBC AUTOMATED: CPT

## 2024-06-11 PROCEDURE — 6370000000 HC RX 637 (ALT 250 FOR IP): Performed by: NURSE PRACTITIONER

## 2024-06-11 PROCEDURE — 6360000002 HC RX W HCPCS: Performed by: HOSPITALIST

## 2024-06-11 PROCEDURE — 6370000000 HC RX 637 (ALT 250 FOR IP): Performed by: STUDENT IN AN ORGANIZED HEALTH CARE EDUCATION/TRAINING PROGRAM

## 2024-06-11 PROCEDURE — 83735 ASSAY OF MAGNESIUM: CPT

## 2024-06-11 RX ORDER — IBUPROFEN 400 MG/1
400 TABLET ORAL EVERY 12 HOURS PRN
Status: DISCONTINUED | OUTPATIENT
Start: 2024-06-11 | End: 2024-06-21 | Stop reason: HOSPADM

## 2024-06-11 RX ORDER — MAGNESIUM SULFATE IN WATER 40 MG/ML
4000 INJECTION, SOLUTION INTRAVENOUS ONCE
Status: COMPLETED | OUTPATIENT
Start: 2024-06-11 | End: 2024-06-11

## 2024-06-11 RX ADMIN — AMLODIPINE BESYLATE 10 MG: 5 TABLET ORAL at 08:30

## 2024-06-11 RX ADMIN — MEROPENEM 1000 MG: 1 INJECTION, POWDER, FOR SOLUTION INTRAVENOUS at 00:29

## 2024-06-11 RX ADMIN — TRAZODONE HYDROCHLORIDE 50 MG: 50 TABLET ORAL at 21:39

## 2024-06-11 RX ADMIN — OXYCODONE AND ACETAMINOPHEN 1 TABLET: 5; 325 TABLET ORAL at 00:36

## 2024-06-11 RX ADMIN — FLUOXETINE HYDROCHLORIDE 40 MG: 20 CAPSULE ORAL at 08:30

## 2024-06-11 RX ADMIN — Medication 6 MG: at 23:38

## 2024-06-11 RX ADMIN — DIBASIC SODIUM PHOSPHATE, MONOBASIC POTASSIUM PHOSPHATE AND MONOBASIC SODIUM PHOSPHATE 1 TABLET: 852; 155; 130 TABLET ORAL at 21:39

## 2024-06-11 RX ADMIN — MEROPENEM 1000 MG: 1 INJECTION, POWDER, FOR SOLUTION INTRAVENOUS at 16:33

## 2024-06-11 RX ADMIN — Medication 400 MG: at 21:39

## 2024-06-11 RX ADMIN — THIAMINE HYDROCHLORIDE 100 MG: 100 INJECTION, SOLUTION INTRAMUSCULAR; INTRAVENOUS at 21:39

## 2024-06-11 RX ADMIN — MEROPENEM 1000 MG: 1 INJECTION, POWDER, FOR SOLUTION INTRAVENOUS at 08:49

## 2024-06-11 RX ADMIN — IBUPROFEN 400 MG: 400 TABLET, FILM COATED ORAL at 13:24

## 2024-06-11 RX ADMIN — SODIUM CHLORIDE, PRESERVATIVE FREE 10 ML: 5 INJECTION INTRAVENOUS at 08:29

## 2024-06-11 RX ADMIN — Medication 400 MG: at 08:30

## 2024-06-11 RX ADMIN — APIXABAN 5 MG: 5 TABLET, FILM COATED ORAL at 08:30

## 2024-06-11 RX ADMIN — DIBASIC SODIUM PHOSPHATE, MONOBASIC POTASSIUM PHOSPHATE AND MONOBASIC SODIUM PHOSPHATE 1 TABLET: 852; 155; 130 TABLET ORAL at 08:30

## 2024-06-11 RX ADMIN — MAGNESIUM SULFATE IN WATER FOR 4000 MG: 40 INJECTION INTRAVENOUS at 08:47

## 2024-06-11 RX ADMIN — BUPROPION HYDROCHLORIDE 300 MG: 150 TABLET, EXTENDED RELEASE ORAL at 08:30

## 2024-06-11 RX ADMIN — PANTOPRAZOLE SODIUM 40 MG: 40 INJECTION, POWDER, FOR SOLUTION INTRAVENOUS at 08:29

## 2024-06-11 RX ADMIN — APIXABAN 5 MG: 5 TABLET, FILM COATED ORAL at 21:39

## 2024-06-11 RX ADMIN — THIAMINE HYDROCHLORIDE 100 MG: 100 INJECTION, SOLUTION INTRAMUSCULAR; INTRAVENOUS at 08:29

## 2024-06-11 RX ADMIN — OXYCODONE AND ACETAMINOPHEN 1 TABLET: 5; 325 TABLET ORAL at 23:38

## 2024-06-11 RX ADMIN — SODIUM CHLORIDE, PRESERVATIVE FREE 10 ML: 5 INJECTION INTRAVENOUS at 21:39

## 2024-06-11 RX ADMIN — POTASSIUM CHLORIDE 40 MEQ: 1500 TABLET, EXTENDED RELEASE ORAL at 08:29

## 2024-06-11 ASSESSMENT — PAIN SCALES - GENERAL
PAINLEVEL_OUTOF10: 6
PAINLEVEL_OUTOF10: 6
PAINLEVEL_OUTOF10: 5
PAINLEVEL_OUTOF10: 7

## 2024-06-11 ASSESSMENT — PAIN DESCRIPTION - LOCATION
LOCATION: ABDOMEN
LOCATION: BACK;GENERALIZED;ABDOMEN

## 2024-06-11 ASSESSMENT — PAIN DESCRIPTION - DESCRIPTORS: DESCRIPTORS: BURNING;SHARP

## 2024-06-11 ASSESSMENT — PAIN DESCRIPTION - PAIN TYPE: TYPE: ACUTE PAIN;SURGICAL PAIN

## 2024-06-11 NOTE — PROGRESS NOTES
Physical Therapy  Facility/Department: [facility] C4 PCU  Daily Treatment Note  NAME: Sudha Lopez  : 1945  MRN: [id number]    Date of Service: 2024    Discharge Recommendations:  Patient able to tolerate 3 hours of therapy per day, IP Rehab (once medically ready)      Therapy discharge recommendations are subject to collaboration from the patient’s interdisciplinary healthcare team, including MD and case management recommendations.    Barriers to Home Discharge:   [] Steps to access home entry or bed/bath:   [x] Unable to transfer, ambulate, or propel wheelchair household distances without assist   [x] Limited available assist at home upon discharge    [] Patient or family requests d/c to post-acute facility    [x] Poor cognition/safety awareness for d/c to home alone    [] Unable to maintain ordered weight bearing status    [x] Patient with salient signs of long-standing immobility   [x] Decreased independence with ADLs   [x] Increased risk for falls   [] Other:    If pt is unable to be seen after this session, please let this note serve as discharge summary.  Please see case management note for discharge disposition.  Thank you.    Patient Diagnosis(es): The primary encounter diagnosis was Closed fracture of left ankle, initial encounter. Diagnoses of Dislocation of left ankle joint, initial encounter, Syncope, unspecified syncope type, Hypoxemia, Acute respiratory failure with hypoxia (HCC), Large bowel obstruction (HCC), and Leukocytosis, unspecified type were also pertinent to this visit.    Assessment   Assessment: Pt required min assist x2 for bed mobility, and min assist x2 for slideboard transfer from bed to chair. She was orthostatic upon sitting in the recliner, but she recovered to 122/59 after 10 minutes. She would continue to benefit from skilled PT intervention to address her impairments and increase her functional mobility toward baseline. PT recommends discharge to [facility].   Activity  Tolerance: Patient tolerated treatment well;Treatment limited secondary to medical complications;Patient limited by endurance     Plan    Physical Therapy Plan  General Plan: 5-7 times per week  Current Treatment Recommendations: Strengthening;Balance training;Functional mobility training;Transfer training;Gait training;Wheelchair mobility training;Endurance training;Therapeutic activities;Co-Treatment;Pain management;Home exercise program;Patient/Caregiver education & training;Safety education & training     Restrictions  Restrictions/Precautions  Restrictions/Precautions: Up as Tolerated, General Precautions, Surgical Protocols, Weight Bearing  Required Braces or Orthoses?: Yes  Lower Extremity Weight Bearing Restrictions  Right Lower Extremity Weight Bearing: Weight Bearing As Tolerated  Left Lower Extremity Weight Bearing: Non Weight Bearing  Required Braces or Orthoses  Right Lower Extremity Brace: Knee Immobilizer  Left Lower Extremity Brace:  (ankle splint)  Position Activity Restriction  Other position/activity restrictions: Iliostomy, RTKA , PICC (LUE), L ankle fx (NWB orders), HALLIE drain, O2, purewick     Subjective    Subjective  Subjective: Pt is in semifowlers position in bed upon PT arrival. She is agreeable to PT Tx.  Pain: 4-5/10 R knee pain  Orientation  Overall Orientation Status: Within Functional Limits  Orientation Level: Oriented X4  Cognition  Overall Cognitive Status: WFL     Objective   Vitals  Pulse: 91  BP: 138/70  BP Location: Right upper arm  BP Method: Automatic  Patient Position: Semi fowlers  MAP (Calculated): 93  SpO2: 94 %  O2 Device: None (Room air)    BP sittin/62  BP after transfer: 99/61  BP after 10min rest: 122/59    Bed Mobility Training  Bed Mobility Training: Yes  Supine to Sit: Assist X2;Minimum assistance  Scooting: Assist X2;Minimum assistance  Balance  Sitting: Impaired  Sitting - Static: Good (unsupported)  Sitting - Dynamic: Fair (occasional)  Standing:  no

## 2024-06-11 NOTE — PROGRESS NOTES
Patient: Sudha Lopez  [id number]  Date: 6/11/2024      Chief Complaint: ankle pain    History of Present Illness/Hospital Course:  Sudha Lopez is a 78 year old female with a past medical history significant for HTN, anxiety, and right TKA on 5/28/24 who presented to [facility] on 5/30/24 after a fall with left ankle pain. XR ankle revealed anterior tibiotalar dislocation with associate comminuted appearing fracture along the posterior aspect of the distal tibia. Orthopedic Surgery was consulted and her ankle was reduced in the ED. CT showed well reduced ankle mortise with a nondisplaced medial malleolus fracture as well as minimally displaced fibular fractures distally. Surgical versus nonsurgical options reviewed and family decided to proceed with nonsurgical option. Nephrology was consulted due to hyponatremia. On 6/1 she was noted to have a distended abdomen. KUB showed ileus/distal obstruction. NG tube was placed. General Surgery and GI were consulted. On 6/1 she underwent colonoscopy with decompression. On 6/2 she developed worsening respiratory failure and was intubated. She developed worsening abdominal distension and underwent subtotal colectomy with ileostomy. Course further notable for atrial tachycardia. Cardiology is consulted. Course also notable for leukocytosis, hypokalemia, AIRAM, and HTN. She continues to have functional deficits below her baseline.     Interval History:   No acute events overnight. Today Sudha is seen without family present. She reports feeling tired from therapies today. Discussed that we are waiting on insurance approval for ARU.      has a past medical history of Anxiety and Hypertension.     has a past surgical history that includes Hysterectomy; Breast enhancement surgery; rhinoplasty; Rotator cuff repair (Bilateral); Hand surgery (Right); Total hip arthroplasty (Bilateral); Intracapsular cataract extraction (Left, 8/12/2020); Colonoscopy; Intracapsular cataract extraction

## 2024-06-11 NOTE — PROGRESS NOTES
[facility] GENERAL SURGERY    Surgery Progress Note           POD # 9    PATIENT NAME: Sudha Lopez     TODAY'S DATE: 6/11/2024    INTERVAL HISTORY:    Pt eating some breakfast this AM. With abd pain - however discussed with nursing and pt refusing pain medicine she has ordered.   Ostomy functioning     OBJECTIVE:   VITALS:  BP (!) 166/69   Pulse 88   Temp 99.1 °F (37.3 °C) (Oral)   Resp 16   Ht 1.6 m (5' 3\")   Wt 91.5 kg (201 lb 11.5 oz)   SpO2 92%   BMI 35.73 kg/m²     INTAKE/OUTPUT:    I/O last 3 completed shifts:  In: 440 [P.O.:440]  Out: 4770 [Urine:3600; Drains:20; Stool:1150]  No intake/output data recorded.              CONSTITUTIONAL:  awake and alert  LUNGS: Unlabored  ABDOMEN:   normal bowel sounds, soft, non-distended, ostomy functional, sachin serous  INCISION: dressing in tact    Data:  CBC:   Recent Labs     06/09/24  0502 06/10/24  0625 06/11/24  0605   WBC 26.6* 20.2* 18.7*   HGB 8.3* 7.6* 7.5*   HCT 24.6* 23.0* 23.2*    360 477*       BMP:    Recent Labs     06/09/24  0502 06/10/24  0625 06/11/24  0605    138 136   K 3.1* 3.0* 3.2*   CL 96* 98* 98*   CO2 31 29 29   BUN 21* 17 18   CREATININE 0.7 0.6 0.7   GLUCOSE 109* 87 93       Hepatic: No results for input(s): \"AST\", \"ALT\", \"BILITOT\", \"ALKPHOS\" in the last 72 hours.    Invalid input(s): \"ALB\"  Mag:      Recent Labs     06/09/24  0502 06/10/24  0625 06/11/24  0605   MG 1.40* 1.50* 1.60*        Phos:     Recent Labs     06/09/24  0502 06/10/24  0625 06/11/24  0605   PHOS 2.0* 2.1* 2.6       ASSESSMENT AND PLAN:  78 y.o. female status post subtotal colectomy with ileostomy.    Diet as tolerated  PT/OT  Leukocytosis conitnues to trend down    ARU looking into precert. Ok from surgery perspective to go to ARU if approved.       Electronically signed by CASPER Marquis CNP     Patient seen and agree with above and more than half of the total time was spent by me on the encounter.     Addi Jackson MD

## 2024-06-11 NOTE — PROGRESS NOTES
Occupational Therapy  Facility/Department: [facility] C4 PCU  Daily Treatment Note  NAME: Sudha Lopez  : 1945  MRN: [id number]    Date of Service: 2024    Discharge Recommendations:  IP Rehab, Patient able to tolerate 3 hours of therapy per day  OT Equipment Recommendations  Equipment Needed: No      Patient Diagnosis(es): The primary encounter diagnosis was Closed fracture of left ankle, initial encounter. Diagnoses of Dislocation of left ankle joint, initial encounter, Syncope, unspecified syncope type, Hypoxemia, Acute respiratory failure with hypoxia (HCC), Large bowel obstruction (HCC), and Leukocytosis, unspecified type were also pertinent to this visit.     Assessment    Assessment: Pt pleasant, motivated and agreeable. Pt Min A x2 for bed mobility this day.Pt used slide board with Mod+Max A x2 EOB>chair. Pt slightly orthostatic (see vitals section-- RN aware and BP stable after ~ 5 minutes in chair). Pt demos good progress with mobility. Pt needing Total A for LE dressing. Pt engaged in BUE and BLE therex and ROM. did not attempt stands this day d/t BP however per RN, she will attempt to get pt MILO hose order to assist in BP maintenance for goal progression. Cont per POC-- IPR requirements discussed with PT and pt agreeable to the intensity of rehab stating \"I'll do whatever I need to, to get better\"  Activity Tolerance: Patient tolerated treatment well;Patient limited by endurance  Discharge Recommendations: IP Rehab;Patient able to tolerate 3 hours of therapy per day  Equipment Needed: No      Plan   Occupational Therapy Plan  Times Per Week: 4-6x/week  Current Treatment Recommendations: Strengthening;ROM;Patient/Caregiver education & training;Self-Care / ADL;Equipment evaluation, education, & procurement;Functional mobility training;Safety education & training;Endurance training;Cognitive reorientation     Restrictions  Restrictions/Precautions  Restrictions/Precautions: Up as Tolerated;General

## 2024-06-11 NOTE — PROGRESS NOTES
Hospital Medicine Progress Note      Date of Admission: 5/30/2024  Hospital Day: 13    Chief Admission Complaint:  Left ankle pain     Subjective: Tolerating diet. Reports some ongoing abdominal discomfort, not well controlled with Tylenol and doesn't like the effect of Percocet. Discussed with daughter Victorina.     Telemetry (reviewed by me):  SR with no recent recurrences of Afib.     Presenting Admission History:       a 78-year-old female past medical history of hypertension, recent left ankle fracture now reduced, status post right knee replacement who was admitted after a mechanical fall.  Patient had a rapid response called and was transferred to ICU for hypotension, abdominal distention, lactic acidosis.     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 General Surgery recommended OR evaluation on 6/2 and she was found to have gangrenous colon, now s/p subtotal colectomy and ileostomy creation.  In retrospect, she appears to have previously unrecognized Paroxysmal Atrial Fibrillation, thus thromboembolic event cannot be excluded as an underlying cause. Pathology from colectomy showed Ischemic Colitis, no malignancy.   Wound Care/Ostomy care  Empiric Abx continued  Leukocytosis improving  Diet as tolerated  Discussed pain management. Not well controlled with Tylenol and doesn't like the effect of Percocet. Obviously NSAIDs are not ideal, but she is aware of the risks and adamant about trying low dose Ibuprofen. AIRAM and bleeding issues resolved so not unreasonable to try. Will give trial of Ibuprofen 400 mg to be used sparingly. Otherwise, she will need to stick with Tylenol or maybe consider Tramadol.     Severe Sepsis with Septic shock 2/2 Colonic Ischemia  On

## 2024-06-11 NOTE — PROGRESS NOTES
Inpatient Note    CC: fall  Summary:   Sudha Lopez is being seen by nephrology for hyponatremia. She is a 78 y.o. female with a PMH significant for hypertension, osteoarthritis who presented to the ED 5/30/2024 after a fall. She was noted to have a displaced left ankle fracture s/p reduction in the ED. She is also s/p recent right total knee replacement 5/29/2024. She is noted to have acute on chronic hyponatremia with Na down to 126 at time of consult. She also complained of severe abdominal pain and constipation. She became hypotensive and was noted to have stool imapction and possible ischemic bowel. Due to hypotension she developed an Acute kidney injury (AIRAM).    Interval History    Creatinine normalized  Potassium continues to be low at 3.2  Magnesium is still low at 1.6  Phosphorus getting better to 2.6    Plan:     Will supplement IV magnesium in addition to p.o. magnesium is also potassium        Assessment:   AIRAM:  - baseline Cr 0.9  - Cr was at baseline on admission but began trending up due to hypotension and abdominal pain  - required pressors, endoscopic bowel decompression.  - AIRAM 2/2 hemodynamic insult.    Acute metabolic acidosis:  - 2/2 lactic acidosis and sepsis. Possible ischemic gut    Hyponatremia:  - Na 126 at time of consult.  - Na in the last year has ranged 129-135  - on HCTZ 12.5 mg, celecoxib, bupropion PTA as possible contributors  - acute drop in Na due to pain from fracture and recent surgery.  - urine studies pending.    Hypertension:  - home regimen: HCTZ 12.5 mg daily, benazepril 40 mg daily, amlodipine 5 mg daily  - stop HCTZ. Would add HCTZ to allergies.    Fall:  - left ankle fracuture  - recent right TKR 5/28/2024  - per ortho.    Ischemic bowel:  - s/p subtotal colectomy and ileostomy 6/2/2024      Macario Parada MD  [facility] Nephrology  Office: [phone]          PE:   Vitals:    06/11/24 0600   BP: (!) 158/69   Pulse:

## 2024-06-12 LAB
ALBUMIN SERPL-MCNC: 2.4 G/DL (ref 3.4–5)
ANION GAP SERPL CALCULATED.3IONS-SCNC: 10 MMOL/L (ref 3–16)
BUN SERPL-MCNC: 21 MG/DL (ref 7–20)
CALCIUM SERPL-MCNC: 7.9 MG/DL (ref 8.3–10.6)
CHLORIDE SERPL-SCNC: 97 MMOL/L (ref 99–110)
CO2 SERPL-SCNC: 28 MMOL/L (ref 21–32)
CREAT SERPL-MCNC: 0.8 MG/DL (ref 0.6–1.2)
DEPRECATED RDW RBC AUTO: 14.2 % (ref 12.4–15.4)
GFR SERPLBLD CREATININE-BSD FMLA CKD-EPI: 75 ML/MIN/{1.73_M2}
GLUCOSE SERPL-MCNC: 89 MG/DL (ref 70–99)
HCT VFR BLD AUTO: 22.1 % (ref 36–48)
HGB BLD-MCNC: 7.3 G/DL (ref 12–16)
MCH RBC QN AUTO: 29.7 PG (ref 26–34)
MCHC RBC AUTO-ENTMCNC: 33.1 G/DL (ref 31–36)
MCV RBC AUTO: 89.9 FL (ref 80–100)
PHOSPHATE SERPL-MCNC: 3.2 MG/DL (ref 2.5–4.9)
PLATELET # BLD AUTO: 536 K/UL (ref 135–450)
PMV BLD AUTO: 7.5 FL (ref 5–10.5)
POTASSIUM SERPL-SCNC: 3.5 MMOL/L (ref 3.5–5.1)
RBC # BLD AUTO: 2.46 M/UL (ref 4–5.2)
SODIUM SERPL-SCNC: 135 MMOL/L (ref 136–145)
WBC # BLD AUTO: 15.9 K/UL (ref 4–11)

## 2024-06-12 PROCEDURE — 6370000000 HC RX 637 (ALT 250 FOR IP): Performed by: INTERNAL MEDICINE

## 2024-06-12 PROCEDURE — 97530 THERAPEUTIC ACTIVITIES: CPT

## 2024-06-12 PROCEDURE — 99024 POSTOP FOLLOW-UP VISIT: CPT | Performed by: SURGERY

## 2024-06-12 PROCEDURE — 80069 RENAL FUNCTION PANEL: CPT

## 2024-06-12 PROCEDURE — 6370000000 HC RX 637 (ALT 250 FOR IP): Performed by: SURGERY

## 2024-06-12 PROCEDURE — 6370000000 HC RX 637 (ALT 250 FOR IP): Performed by: STUDENT IN AN ORGANIZED HEALTH CARE EDUCATION/TRAINING PROGRAM

## 2024-06-12 PROCEDURE — 2580000003 HC RX 258: Performed by: SURGERY

## 2024-06-12 PROCEDURE — 6360000002 HC RX W HCPCS: Performed by: SURGERY

## 2024-06-12 PROCEDURE — 6370000000 HC RX 637 (ALT 250 FOR IP): Performed by: NURSE PRACTITIONER

## 2024-06-12 PROCEDURE — 6370000000 HC RX 637 (ALT 250 FOR IP)

## 2024-06-12 PROCEDURE — 85027 COMPLETE CBC AUTOMATED: CPT

## 2024-06-12 PROCEDURE — C9113 INJ PANTOPRAZOLE SODIUM, VIA: HCPCS | Performed by: SURGERY

## 2024-06-12 PROCEDURE — 97110 THERAPEUTIC EXERCISES: CPT

## 2024-06-12 PROCEDURE — 2060000000 HC ICU INTERMEDIATE R&B

## 2024-06-12 RX ADMIN — BUPROPION HYDROCHLORIDE 300 MG: 150 TABLET, EXTENDED RELEASE ORAL at 09:05

## 2024-06-12 RX ADMIN — TRAZODONE HYDROCHLORIDE 50 MG: 50 TABLET ORAL at 20:39

## 2024-06-12 RX ADMIN — PANTOPRAZOLE SODIUM 40 MG: 40 INJECTION, POWDER, FOR SOLUTION INTRAVENOUS at 09:05

## 2024-06-12 RX ADMIN — APIXABAN 5 MG: 5 TABLET, FILM COATED ORAL at 20:39

## 2024-06-12 RX ADMIN — Medication 400 MG: at 20:39

## 2024-06-12 RX ADMIN — APIXABAN 5 MG: 5 TABLET, FILM COATED ORAL at 10:05

## 2024-06-12 RX ADMIN — THIAMINE HYDROCHLORIDE 100 MG: 100 INJECTION, SOLUTION INTRAMUSCULAR; INTRAVENOUS at 20:39

## 2024-06-12 RX ADMIN — Medication 400 MG: at 09:05

## 2024-06-12 RX ADMIN — IBUPROFEN 400 MG: 400 TABLET, FILM COATED ORAL at 01:34

## 2024-06-12 RX ADMIN — DIBASIC SODIUM PHOSPHATE, MONOBASIC POTASSIUM PHOSPHATE AND MONOBASIC SODIUM PHOSPHATE 1 TABLET: 852; 155; 130 TABLET ORAL at 20:39

## 2024-06-12 RX ADMIN — FLUOXETINE HYDROCHLORIDE 40 MG: 20 CAPSULE ORAL at 09:05

## 2024-06-12 RX ADMIN — DIBASIC SODIUM PHOSPHATE, MONOBASIC POTASSIUM PHOSPHATE AND MONOBASIC SODIUM PHOSPHATE 1 TABLET: 852; 155; 130 TABLET ORAL at 09:05

## 2024-06-12 RX ADMIN — IBUPROFEN 400 MG: 400 TABLET, FILM COATED ORAL at 12:47

## 2024-06-12 RX ADMIN — THIAMINE HYDROCHLORIDE 100 MG: 100 INJECTION, SOLUTION INTRAMUSCULAR; INTRAVENOUS at 09:05

## 2024-06-12 RX ADMIN — AMLODIPINE BESYLATE 10 MG: 5 TABLET ORAL at 09:05

## 2024-06-12 RX ADMIN — SODIUM CHLORIDE, PRESERVATIVE FREE 10 ML: 5 INJECTION INTRAVENOUS at 20:41

## 2024-06-12 RX ADMIN — Medication 6 MG: at 20:39

## 2024-06-12 RX ADMIN — SODIUM CHLORIDE, PRESERVATIVE FREE 20 ML: 5 INJECTION INTRAVENOUS at 09:06

## 2024-06-12 ASSESSMENT — PAIN SCALES - GENERAL
PAINLEVEL_OUTOF10: 2
PAINLEVEL_OUTOF10: 2
PAINLEVEL_OUTOF10: 3
PAINLEVEL_OUTOF10: 2
PAINLEVEL_OUTOF10: 4

## 2024-06-12 ASSESSMENT — PAIN DESCRIPTION - DESCRIPTORS: DESCRIPTORS: ACHING

## 2024-06-12 ASSESSMENT — PAIN DESCRIPTION - LOCATION
LOCATION: ABDOMEN
LOCATION: GENERALIZED

## 2024-06-12 NOTE — PROGRESS NOTES
Occupational Therapy  Facility/Department: [facility] C4 PCU  Daily Treatment Note  NAME: Sudha Lopez  : 1945  MRN: [id number]    Date of Service: 2024    Discharge Recommendations:  IP Rehab, Patient able to tolerate 3 hours of therapy per day  OT Equipment Recommendations  Equipment Needed: No      Patient Diagnosis(es): The primary encounter diagnosis was Closed fracture of left ankle, initial encounter. Diagnoses of Dislocation of left ankle joint, initial encounter, Syncope, unspecified syncope type, Hypoxemia, Acute respiratory failure with hypoxia (HCC), Large bowel obstruction (HCC), and Leukocytosis, unspecified type were also pertinent to this visit.     Therapy discharge recommendations are subject to collaboration from the patient’s interdisciplinary healthcare team, including MD and case management recommendations.    Barriers to Home Discharge:   [] Steps to access home entry or bed/bath:   [x] Unable to transfer, ambulate, or propel wheelchair household distances without assist   [x] Limited available assist at home upon discharge    [] Patient or family requests d/c to post-acute facility    [] Poor cognition/safety awareness for d/c to home alone    [] Unable to maintain ordered weight bearing status    [] Patient with salient signs of long-standing immobility   [x] Decreased independence with ADLs   [x] Increased risk for falls   [] Other:    If pt is unable to be seen after this session, please let this note serve as discharge summary.  Please see case management note for discharge disposition.  Thank you.    Assessment    Assessment: Pt pleasant and agreeable to therapy. Co-tx collaboration this date to safely meet goals and will have better occupational performance outcomes with in a co-treatment than 1:1 session. Limited by pain, WBing status, and overall weakness. Able to complete bed mobility w/ min A + mod A, SB transfer w/ mod A of  2, and sit>stand w/ max A of 2. Attempted  standing 2x, then pt declining further activity. Required frequent rest breaks throughout. Continue POC and recommend IPR.   Activity Tolerance: Patient tolerated treatment well;Patient limited by endurance  Discharge Recommendations: IP Rehab;Patient able to tolerate 3 hours of therapy per day  Equipment Needed: No      Plan   Occupational Therapy Plan  Times Per Week: 4-6x/week  Current Treatment Recommendations: Strengthening;ROM;Patient/Caregiver education & training;Self-Care / ADL;Equipment evaluation, education, & procurement;Functional mobility training;Safety education & training;Endurance training;Cognitive reorientation     Restrictions  Restrictions/Precautions  Restrictions/Precautions: Up as Tolerated;General Precautions;Surgical Protocols;Weight Bearing  Required Braces or Orthoses?: Yes  Lower Extremity Weight Bearing Restrictions  Right Lower Extremity Weight Bearing: Weight Bearing As Tolerated  Left Lower Extremity Weight Bearing: Non Weight Bearing  Required Braces or Orthoses  Left Lower Extremity Brace:  (ankle splint)  Position Activity Restriction  Other position/activity restrictions: Iliostomy, RTKA 5/29, PICC (LUE), L ankle fx (NWB orders), HALLIE drain, purewick, tele    Subjective   Subjective  Subjective: Pt in bed upon arrival, agreeable to OT/PT cotx. Reports discomfort in abdomen.  Orientation  Overall Orientation Status: Within Functional Limits  Orientation Level: Oriented X4  Pain: 2/10 pain in lower stomach. More feeling in R anterior thigh.           Objective    Vitals     Bed Mobility Training  Bed Mobility Training: Yes  Interventions: Safety awareness training;Tactile cues;Verbal cues  Rolling: Minimum assistance;Moderate assistance;Assist X2 (HOB elevated, use of L bedrail. Cues for hand placement)  Supine to Sit: Minimum assistance;Moderate assistance;Assist X2 (HOB elevated. Use of L bedrail. Cues for hand placement.)  Sit to Supine:  (pt left up in chair at end of

## 2024-06-12 NOTE — PLAN OF CARE
Problem: Discharge Planning  Goal: Discharge to home or other facility with appropriate resources  Outcome: Progressing     Problem: Pain  Goal: Verbalizes/displays adequate comfort level or baseline comfort level  Outcome: Progressing     Problem: Safety - Adult  Goal: Free from fall injury  Outcome: Progressing     Problem: Nutrition Deficit:  Goal: Optimize nutritional status  Outcome: Progressing     Problem: ABCDS Injury Assessment  Goal: Absence of physical injury  Outcome: Progressing     Problem: Skin/Tissue Integrity  Goal: Absence of new skin breakdown  Outcome: Progressing

## 2024-06-12 NOTE — PROGRESS NOTES
Physical Therapy  Facility/Department: [place] [address] PCU  Daily Treatment Note  NAME: Sudha Lopez  : 1945  MRN: [id number]    Date of Service: 2024    Discharge Recommendations:  Patient able to tolerate 3 hours of therapy per day, IP Rehab      Therapy discharge recommendations are subject to collaboration from the patient’s interdisciplinary healthcare team, including MD and case management recommendations.    Barriers to Home Discharge:   [] Steps to access home entry or bed/bath:   [x] Unable to transfer, ambulate, or propel wheelchair household distances without assist   [x] Limited available assist at home upon discharge    [] Patient or family requests d/c to post-acute facility    [x] Poor cognition/safety awareness for d/c to home alone    [] Unable to maintain ordered weight bearing status    [x] Patient with salient signs of long-standing immobility   [x] Decreased independence with ADLs   [x] Increased risk for falls   [] Other:    If pt is unable to be seen after this session, please let this note serve as discharge summary.  Please see case management note for discharge disposition.  Thank you.    Patient Diagnosis(es): The primary encounter diagnosis was Closed fracture of left ankle, initial encounter. Diagnoses of Dislocation of left ankle joint, initial encounter, Syncope, unspecified syncope type, Hypoxemia, Acute respiratory failure with hypoxia (HCC), Large bowel obstruction (HCC), and Leukocytosis, unspecified type were also pertinent to this visit.    Assessment   Assessment: On this date, pt required min-mod assist x2 for bed mobility and mod-max assist x2 for transfers. She stated that she felt stronger today all over her body and has been doing her LE exercises. She required more cueing throughout treatment as compared to last session. She was treated as PT/OT co-treat today to safely mobilize and increase her functional mobility. She would benefit from skilled PT intervention to

## 2024-06-12 NOTE — PROGRESS NOTES
Hospital Medicine Progress Note      Date of Admission: 5/30/2024  Hospital Day: 14    Chief Admission Complaint:  Left ankle pain     Subjective: Tolerating diet. Less abdominal pain.     Telemetry (reviewed by me):  SR with no Afib.     Presenting Admission History:       a 78-year-old female past medical history of hypertension, recent left ankle fracture now reduced, status post right knee replacement who was admitted after a mechanical fall.  Patient had a rapid response called and was transferred to ICU for hypotension, abdominal distention, lactic acidosis.     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 General Surgery recommended OR evaluation on 6/2 and she was found to have gangrenous colon, now s/p subtotal colectomy and ileostomy creation.  In retrospect, she appears to have previously unrecognized Paroxysmal Atrial Fibrillation, thus thromboembolic event cannot be excluded as an underlying cause. Pathology from colectomy showed Ischemic Colitis, no malignancy.   Wound Care/Ostomy care  HALLIE drain removed  Empiric Abx continued  Leukocytosis continues to improve  Diet as tolerated  Discussed pain management. Not well controlled with Tylenol and doesn't like the effect of Percocet. Obviously NSAIDs are not ideal, but she is aware of the risks and adamant about trying low dose Ibuprofen. AIRAM and bleeding issues resolved so not unreasonable to try. Will give trial of Ibuprofen 400 mg to be used sparingly. Otherwise, she will need to stick with Tylenol or maybe consider Tramadol.     Severe Sepsis with Septic shock 2/2 Colonic Ischemia  On mulitple prossors  Continue broad spectrum antibiotics  Renal function improved  WBC monitoring as above.      Acute hypoxic

## 2024-06-12 NOTE — PROGRESS NOTES
Patient: Sudha Lopez  [id number]  Date: 6/12/2024      Chief Complaint: ankle pain    History of Present Illness/Hospital Course:  Sudha Lopez is a 78 year old female with a past medical history significant for HTN, anxiety, and right TKA on 5/28/24 who presented to [facility] on 5/30/24 after a fall with left ankle pain. XR ankle revealed anterior tibiotalar dislocation with associate comminuted appearing fracture along the posterior aspect of the distal tibia. Orthopedic Surgery was consulted and her ankle was reduced in the ED. CT showed well reduced ankle mortise with a nondisplaced medial malleolus fracture as well as minimally displaced fibular fractures distally. Surgical versus nonsurgical options reviewed and family decided to proceed with nonsurgical option. Nephrology was consulted due to hyponatremia. On 6/1 she was noted to have a distended abdomen. KUB showed ileus/distal obstruction. NG tube was placed. General Surgery and GI were consulted. On 6/1 she underwent colonoscopy with decompression. On 6/2 she developed worsening respiratory failure and was intubated. She developed worsening abdominal distension and underwent subtotal colectomy with ileostomy. Course further notable for atrial tachycardia. Cardiology is consulted. Course also notable for leukocytosis, hypokalemia, AIRAM, and HTN. She continues to have functional deficits below her baseline.     Interval History:   No acute events overnight. Today Sudha is seen without family present. She reports that she is feeling better every day. Discussed that we have started the appeal process.      has a past medical history of Anxiety and Hypertension.     has a past surgical history that includes Hysterectomy; Breast enhancement surgery; rhinoplasty; Rotator cuff repair (Bilateral); Hand surgery (Right); Total hip arthroplasty (Bilateral); Intracapsular cataract extraction (Left, 8/12/2020); Colonoscopy; Intracapsular cataract extraction (Right,  revealed EF >65%.    Most recent EKG revealed atrial fibrillation with RVR.     IMAGING    XR Chest 6/6/24  Increasing pattern of pulmonary edema with small bilateral pleural effusions.     XR ABD 6/5/24  Enteric tube terminates at the level of the body of the stomach.  Nonobstructive bowel gas pattern.  Mild dependent opacities and likely small Effusions.    CT Head WO contrast 6/3/24  No acute intracranial abnormality.  Scattered areas of small vessel ischemic change    Assessment:  1. Severe sepsis with septic shock due to colonic ischemia s/p colectomy and ileostomy  2. Acute hypoxic and hypercapnic respiratory failure   3. Acute metabolic encephalopathy, resolved  4. Atrial fibrillation with RVR  5. Recent R TKR  6. Left ankle fracture    Recommendations:  Appeal started for ARU.     Thank you for this consult. Please contact me with any questions or concerns.   Toya Neal MD 6/12/2024, 3:54 PM

## 2024-06-12 NOTE — PROGRESS NOTES
[facility] GENERAL SURGERY    Surgery Progress Note           POD # 10    PATIENT NAME: Sudha Lopez     TODAY'S DATE: 6/12/2024    INTERVAL HISTORY:    Pain improving, no nausea     OBJECTIVE:   VITALS:  /72   Pulse 80   Temp 98.4 °F (36.9 °C) (Oral)   Resp 16   Ht 1.6 m (5' 3\")   Wt 89.2 kg (196 lb 10.4 oz)   SpO2 95%   BMI 34.84 kg/m²     INTAKE/OUTPUT:    I/O last 3 completed shifts:  In: 1840 [P.O.:1840]  Out: 3580 [Urine:1675; Drains:30; Stool:1875]  No intake/output data recorded.              CONSTITUTIONAL:  awake and alert  LUNGS: Unlabored  ABDOMEN:   normal bowel sounds, soft, non-distended, ostomy functional, sachin serous  INCISION: dressing in tact    Data:  CBC:   Recent Labs     06/10/24  0625 06/11/24  0605 06/12/24 0612   WBC 20.2* 18.7* 15.9*   HGB 7.6* 7.5* 7.3*   HCT 23.0* 23.2* 22.1*    477* 536*       BMP:    Recent Labs     06/10/24  0625 06/11/24  0605 06/12/24 0612    136 135*   K 3.0* 3.2* 3.5   CL 98* 98* 97*   CO2 29 29 28   BUN 17 18 21*   CREATININE 0.6 0.7 0.8   GLUCOSE 87 93 89       Hepatic: No results for input(s): \"AST\", \"ALT\", \"BILITOT\", \"ALKPHOS\" in the last 72 hours.    Invalid input(s): \"ALB\"  Mag:      Recent Labs     06/10/24  0625 06/11/24 0605   MG 1.50* 1.60*        Phos:     Recent Labs     06/10/24  0625 06/11/24  0605 06/12/24 0612   PHOS 2.1* 2.6 3.2       ASSESSMENT AND PLAN:  78 y.o. female status post subtotal colectomy with ileostomy.    Diet as tolerated  PT/OT  Leukocytosis conitnues to trend down  Sachin removed    ARU looking into precert. Ok from surgery perspective to go to ARU if approved.       Electronically signed by Bertin Jackson MD

## 2024-06-12 NOTE — PROGRESS NOTES
Quiet day no complaints pain fairly well controlled.appetite fair progressing with physical therapy.

## 2024-06-12 NOTE — CARE COORDINATION
Fast appeal faxed to [facility] with pending case # [id number]. Was informed they have 72 hours to make decision. Ching Hay RN

## 2024-06-12 NOTE — CARE COORDINATION
Will start appeal when [facility] calls to say the claim has been officially denied. Ching Hay RN

## 2024-06-12 NOTE — CARE COORDINATION
Call received from insurance that they are offering P2P option to be scheduled by 6/12/24 at 12 noon. Phone number [phone] option 5. VM left for CM Ching Hay RN

## 2024-06-12 NOTE — DISCHARGE INSTRUCTIONS
[facility]    Anuel Mejia M.D.   [facility] Office      [facility] Office        [facility]               [address]                [address]  Addi Jackson M.D.              [address]           [address]          [place] [address]         [place] [address]  Dejan Stallings M.D                         [phone]        [facility]                   Austin Marques M.D.          [facility]                                                          POST-OPERATIVE INSTRUCTIONS FOLLOWING A COLON RESECTION    Call the office to schedule your post-operative appointment with your surgeon for two (2) weeks.   You will have pain medicine ordered. Take as directed.    Your incision is closed with:   White steri-strips; these will peel away in 7-10 days.   Surgical glue   Staples, these will be removed in the office during your post-op  appointment.      Midline wound care orders:  three days per week change aquacel packing to open area and cover with dry gauze.    You may shower.  Wash incisions gently, and pat them dry. Do not rub your incisions.    General guidelines for activity:   Avoid strenuous activity or lifting anything heavier than 15 pounds.    It is OK to be up walking around, and up and down stairs.   Do what is comfortable: stop and rest when you feel tired.     Drink plenty of fluids and stay on a bland diet for 2-3 days after surgery.     Do NOT drive until after your first post-operative appointment and while taking your narcotic pain medicine     Watch for signs of infection:  Excessive warmth or bright redness around your incisions  Leakage of bloody or cloudy fluid from you incisions  Fever over 100.5    If you experience constipation:  Increase your water intake  Increase your activity, walking is best.  A stool softener or mild laxative may be necessary if you

## 2024-06-13 ENCOUNTER — APPOINTMENT (OUTPATIENT)
Dept: GENERAL RADIOLOGY | Age: 79
End: 2024-06-13
Payer: MEDICARE

## 2024-06-13 LAB
ALBUMIN SERPL-MCNC: 2.5 G/DL (ref 3.4–5)
ANION GAP SERPL CALCULATED.3IONS-SCNC: 10 MMOL/L (ref 3–16)
BUN SERPL-MCNC: 18 MG/DL (ref 7–20)
CALCIUM SERPL-MCNC: 8 MG/DL (ref 8.3–10.6)
CHLORIDE SERPL-SCNC: 96 MMOL/L (ref 99–110)
CO2 SERPL-SCNC: 27 MMOL/L (ref 21–32)
CREAT SERPL-MCNC: 0.8 MG/DL (ref 0.6–1.2)
DEPRECATED RDW RBC AUTO: 14.2 % (ref 12.4–15.4)
GFR SERPLBLD CREATININE-BSD FMLA CKD-EPI: 75 ML/MIN/{1.73_M2}
GLUCOSE SERPL-MCNC: 97 MG/DL (ref 70–99)
HCT VFR BLD AUTO: 28.2 % (ref 36–48)
HGB BLD-MCNC: 9.3 G/DL (ref 12–16)
MCH RBC QN AUTO: 29.4 PG (ref 26–34)
MCHC RBC AUTO-ENTMCNC: 32.9 G/DL (ref 31–36)
MCV RBC AUTO: 89.4 FL (ref 80–100)
PHOSPHATE SERPL-MCNC: 3.7 MG/DL (ref 2.5–4.9)
PLATELET # BLD AUTO: 535 K/UL (ref 135–450)
PMV BLD AUTO: 7 FL (ref 5–10.5)
POTASSIUM SERPL-SCNC: 3.7 MMOL/L (ref 3.5–5.1)
RBC # BLD AUTO: 3.16 M/UL (ref 4–5.2)
SODIUM SERPL-SCNC: 133 MMOL/L (ref 136–145)
WBC # BLD AUTO: 12.3 K/UL (ref 4–11)

## 2024-06-13 PROCEDURE — 6370000000 HC RX 637 (ALT 250 FOR IP): Performed by: STUDENT IN AN ORGANIZED HEALTH CARE EDUCATION/TRAINING PROGRAM

## 2024-06-13 PROCEDURE — 6370000000 HC RX 637 (ALT 250 FOR IP): Performed by: NURSE PRACTITIONER

## 2024-06-13 PROCEDURE — C9113 INJ PANTOPRAZOLE SODIUM, VIA: HCPCS | Performed by: SURGERY

## 2024-06-13 PROCEDURE — 6370000000 HC RX 637 (ALT 250 FOR IP): Performed by: SURGERY

## 2024-06-13 PROCEDURE — 6360000002 HC RX W HCPCS: Performed by: SURGERY

## 2024-06-13 PROCEDURE — 97530 THERAPEUTIC ACTIVITIES: CPT

## 2024-06-13 PROCEDURE — 6370000000 HC RX 637 (ALT 250 FOR IP): Performed by: INTERNAL MEDICINE

## 2024-06-13 PROCEDURE — 80069 RENAL FUNCTION PANEL: CPT

## 2024-06-13 PROCEDURE — 85027 COMPLETE CBC AUTOMATED: CPT

## 2024-06-13 PROCEDURE — 73610 X-RAY EXAM OF ANKLE: CPT

## 2024-06-13 PROCEDURE — 2580000003 HC RX 258: Performed by: SURGERY

## 2024-06-13 PROCEDURE — 2060000000 HC ICU INTERMEDIATE R&B

## 2024-06-13 PROCEDURE — 97110 THERAPEUTIC EXERCISES: CPT

## 2024-06-13 PROCEDURE — 6370000000 HC RX 637 (ALT 250 FOR IP)

## 2024-06-13 RX ADMIN — Medication 400 MG: at 10:01

## 2024-06-13 RX ADMIN — DIBASIC SODIUM PHOSPHATE, MONOBASIC POTASSIUM PHOSPHATE AND MONOBASIC SODIUM PHOSPHATE 1 TABLET: 852; 155; 130 TABLET ORAL at 10:01

## 2024-06-13 RX ADMIN — SODIUM CHLORIDE, PRESERVATIVE FREE 10 ML: 5 INJECTION INTRAVENOUS at 10:02

## 2024-06-13 RX ADMIN — TRAZODONE HYDROCHLORIDE 50 MG: 50 TABLET ORAL at 20:22

## 2024-06-13 RX ADMIN — THIAMINE HYDROCHLORIDE 100 MG: 100 INJECTION, SOLUTION INTRAMUSCULAR; INTRAVENOUS at 10:02

## 2024-06-13 RX ADMIN — SODIUM CHLORIDE, PRESERVATIVE FREE 10 ML: 5 INJECTION INTRAVENOUS at 20:22

## 2024-06-13 RX ADMIN — THIAMINE HYDROCHLORIDE 100 MG: 100 INJECTION, SOLUTION INTRAMUSCULAR; INTRAVENOUS at 20:22

## 2024-06-13 RX ADMIN — FLUOXETINE HYDROCHLORIDE 40 MG: 20 CAPSULE ORAL at 10:01

## 2024-06-13 RX ADMIN — PANTOPRAZOLE SODIUM 40 MG: 40 INJECTION, POWDER, FOR SOLUTION INTRAVENOUS at 10:01

## 2024-06-13 RX ADMIN — APIXABAN 5 MG: 5 TABLET, FILM COATED ORAL at 20:22

## 2024-06-13 RX ADMIN — DIBASIC SODIUM PHOSPHATE, MONOBASIC POTASSIUM PHOSPHATE AND MONOBASIC SODIUM PHOSPHATE 1 TABLET: 852; 155; 130 TABLET ORAL at 20:22

## 2024-06-13 RX ADMIN — IBUPROFEN 400 MG: 400 TABLET, FILM COATED ORAL at 00:28

## 2024-06-13 RX ADMIN — Medication 6 MG: at 20:22

## 2024-06-13 RX ADMIN — BUPROPION HYDROCHLORIDE 300 MG: 150 TABLET, EXTENDED RELEASE ORAL at 10:01

## 2024-06-13 RX ADMIN — IBUPROFEN 400 MG: 400 TABLET, FILM COATED ORAL at 12:41

## 2024-06-13 RX ADMIN — Medication 400 MG: at 20:22

## 2024-06-13 RX ADMIN — APIXABAN 5 MG: 5 TABLET, FILM COATED ORAL at 10:01

## 2024-06-13 RX ADMIN — AMLODIPINE BESYLATE 10 MG: 5 TABLET ORAL at 10:01

## 2024-06-13 ASSESSMENT — PAIN SCALES - GENERAL
PAINLEVEL_OUTOF10: 7
PAINLEVEL_OUTOF10: 4
PAINLEVEL_OUTOF10: 3

## 2024-06-13 ASSESSMENT — PAIN DESCRIPTION - FREQUENCY: FREQUENCY: CONTINUOUS

## 2024-06-13 ASSESSMENT — PAIN DESCRIPTION - LOCATION
LOCATION: ABDOMEN
LOCATION: BACK
LOCATION: BACK

## 2024-06-13 ASSESSMENT — PAIN DESCRIPTION - ORIENTATION
ORIENTATION: LOWER
ORIENTATION: OTHER (COMMENT)

## 2024-06-13 ASSESSMENT — PAIN DESCRIPTION - ONSET: ONSET: ON-GOING

## 2024-06-13 ASSESSMENT — PAIN DESCRIPTION - DESCRIPTORS
DESCRIPTORS: ACHING

## 2024-06-13 NOTE — PROGRESS NOTES
[facility]  Follow-up Progress Note       NAME:  Sudha Lopez  MEDICAL RECORD NUMBER:  [id number]  AGE:  78 y.o.   GENDER:  female  :  1945  TODAY'S DATE:  2024    Subjective: I don't remember anything that happened in the ICU.  I do not remember you.   Wound Identification:  Wound Type: Surgical mid line abd staple line  Contributing Factors: edema, decreased mobility, shear force, obesity, and decreased tissue oxygenation     New ileostomy: Subtotal colectomy with ileostomy on 24 for bowel obstruction by Dr Dung Jackson.      Stoma size:  25 = 1 inch mm x 28 mm 1 1/8 inch. DO NOT THROW AWAY PATTERN.  Appliance size:  Patient leaking due to soft abd and creases and currently wrong flange.  In a flat flange and ordered convex flange.  Re-oreded staff to place convex wafer/flange. Coloplast 2 pience RED convex flange # [id number] with drainable bag # [id number]. paste # [id number] placed around stoma. Crust with light dusting of powder, rub in, seal with dabbing barrier wipe over powder. Stoma paste under stoma in crease and in naval to make a flat pouching surface. Barrier extenders until redness subsides around stoma.  Belt added to help secure pouch and prevent leaking. DO NOT THROW AWAY BELT.  If soiled it can be washed.    Patient Goal of Care:  [x] Wound Healing  [] Odor Control   [] Palliative Care  [] Pain Control   [] Other:     Objective: Patient now awake, alert, oriented.   OT just finished treatment.  Stoma flange leaking.  Abd dressing with stool on it.    BP (!) 143/71   Pulse 82   Temp 98.1 °F (36.7 °C) (Oral)   Resp 20   Ht 1.6 m (5' 3\")   Wt 81.4 kg (179 lb 7.3 oz)   SpO2 95%   BMI 31.79 kg/m²   Kirill Risk Score: Kirill Scale Score: 17  Assessment: staple line intact.  Redness around naval as stool was leaking onto suture line.  Stoma red, protrudes, moist. Stool Loose green brown. Junction intact.     Measurements:     Incision

## 2024-06-13 NOTE — PLAN OF CARE
Problem: Pain  Goal: Verbalizes/displays adequate comfort level or baseline comfort level  Outcome: Progressing  Note: Pt able to use pain scale 0-10, with complaints of chronic back pain and acute abdominal pain this shift, medicated with prn ibuprofen per pt request.       Problem: Safety - Adult  Goal: Free from fall injury  Outcome: Progressing  Note: Pt is free from falls this shift.  Bed in lowest position and locked.  Side rails up x2, non skid footwear in place.  Bed alarm activated on bed.  Call light within reach.  Pt calls out for needs and assistance.       Problem: Nutrition Deficit:  Goal: Optimize nutritional status  Outcome: Progressing  Note: Pt ate some of each meal this shift, see doc flow sheet.       Problem: Skin/Tissue Integrity  Goal: Absence of new skin breakdown  Outcome: Progressing  Note: Pt assisted with turning and repositioning.  Sacral heart in place on coccyx.  BLE elevated off bed onto pillow with heels floated off pillow.

## 2024-06-13 NOTE — CARE COORDINATION
Clinical updates faxed to [facility] fast appeals and spoke with representative who said case was received and under review. Ching Hay RN

## 2024-06-13 NOTE — CARE COORDINATION
Cm update; LOS#13; Followed by IM, Wound Care, Physical Medicine, PT/OT. Per ARU: Clinical updates faxed to [facility] fast appeals and spoke with representative who said case was received and under review .Kristen Hamilton RN

## 2024-06-13 NOTE — PROGRESS NOTES
8/26/2020); Colonoscopy (N/A, 6/1/2024); and laparotomy (N/A, 6/2/2024).     reports that she has never smoked. She has never used smokeless tobacco. She reports current alcohol use of about 2.0 standard drinks of alcohol per week. She reports that she does not use drugs.    family history is not on file.      REVIEW OF SYSTEMS:   Denies f/c, n/v, cp    Physical Examination:  Vitals: Patient Vitals for the past 24 hrs:   BP Temp Temp src Pulse Resp SpO2 Weight   06/13/24 1124 -- 98.1 °F (36.7 °C) Oral -- -- -- --   06/13/24 1102 (!) 143/71 -- -- 82 -- 95 % --   06/13/24 0928 139/74 98.2 °F (36.8 °C) Oral 80 20 97 % --   06/13/24 0436 132/63 98.2 °F (36.8 °C) Oral 79 20 93 % 81.4 kg (179 lb 7.3 oz)   06/13/24 0028 (!) 154/83 98.1 °F (36.7 °C) Oral 81 20 94 % --   06/12/24 2002 (!) 154/60 98.1 °F (36.7 °C) Oral 86 18 96 % --   06/12/24 1434 (!) 127/57 -- -- 88 16 96 % --     Mood: Stable  Const: No distress, supine in bed  ENT: Oral mucosa moist  Eyes: No discharge or injection  CV: RRR  Resp: no respiratory distress, lungs CTAB  GI: Soft, nondistended.   Neuro: Alert, appropriate. Speech fluent  Skin: No lesions or rashes noted. Clean dressing in place over right knee incision   MSK: Left foot splinted      Lab Results   Component Value Date    WBC 12.3 (H) 06/13/2024    HGB 9.3 (L) 06/13/2024    HCT 28.2 (L) 06/13/2024    MCV 89.4 06/13/2024     (H) 06/13/2024     Lab Results   Component Value Date    INR 1.36 (H) 06/04/2024    INR 2.19 (H) 06/02/2024    INR 2.27 (H) 06/02/2024    PROTIME 16.9 (H) 06/04/2024    PROTIME 24.4 (H) 06/02/2024    PROTIME 25.1 (H) 06/02/2024     Lab Results   Component Value Date    CREATININE 0.8 06/13/2024    BUN 18 06/13/2024     (L) 06/13/2024    K 3.7 06/13/2024    CL 96 (L) 06/13/2024    CO2 27 06/13/2024     Lab Results   Component Value Date     (H) 06/05/2024     (H) 06/05/2024    ALKPHOS 190 (H) 06/05/2024    BILITOT 0.6 06/05/2024       Most recent  echocardiogram revealed EF >65%.    Most recent EKG revealed atrial fibrillation with RVR.     IMAGING    XR Chest 6/6/24  Increasing pattern of pulmonary edema with small bilateral pleural effusions.     XR ABD 6/5/24  Enteric tube terminates at the level of the body of the stomach.  Nonobstructive bowel gas pattern.  Mild dependent opacities and likely small Effusions.    CT Head WO contrast 6/3/24  No acute intracranial abnormality.  Scattered areas of small vessel ischemic change    Assessment:  1. Severe sepsis with septic shock due to colonic ischemia s/p colectomy and ileostomy  2. Acute hypoxic and hypercapnic respiratory failure, resolved  3. Acute metabolic encephalopathy, resolved  4. Atrial fibrillation with RVR  5. Recent R TKR  6. Left ankle fracture    Recommendations:  Appeal pending for ARU.     Thank you for this consult. Please contact me with any questions or concerns.   Toya Neal MD 6/13/2024, 11:51 AM

## 2024-06-13 NOTE — PROGRESS NOTES
Comprehensive Nutrition Assessment    Type and Reason for Visit:  Reassess    Nutrition Recommendations/Plan:   Continue low fat diet and encourage po intakes  Continue ensures BID  - chocolate  Monitor nutrition adequacy, pertinent labs, bowel habits, wt changes, and clinical progress     Malnutrition Assessment:  Malnutrition Status:  At risk for malnutrition (Comment) (06/13/24 2116)    Context:  Acute Illness     Findings of the 6 clinical characteristics of malnutrition:  Energy Intake:  Mild decrease in energy intake (Comment)  Fluid Accumulation:  Mild Generalized, Extremities    Nutrition Assessment:    Follow up: Pt nutritionally improving AEB increased po intake from last RD assessment. Continues on low fat diet with ensure BID. PO intakes 1-100% and x1 26-50% ONS intake per EMR. Pt receiving care at time of visit. Will continue current diet and ONS and encourage intakes as tolerated. Possible d/c to ARU pending appeal approval.    Nutrition Related Findings:    -525 mL ostomy output 6/12. +active BS. +1 generalized and BUE and BLE edema. Na 133. Wound Type: Surgical Incision       Current Nutrition Intake & Therapies:    Average Meal Intake: 1-25%, 26-50%, 51-75%, %  Average Supplements Intake: 26-50%  ADULT DIET; Regular; Low Fat (less than or equal to 50 gm/day)  ADULT ORAL NUTRITION SUPPLEMENT; Breakfast, Dinner; Standard High Calorie/High Protein Oral Supplement    Anthropometric Measures:  Height: 160 cm (5' 3\")  Ideal Body Weight (IBW): 115 lbs (52 kg)       Current Body Weight: 81.4 kg (179 lb 7.3 oz),   IBW. Weight Source: Bed Scale  Current BMI (kg/m2): 31.8  Usual Body Weight: 83.9 kg (184 lb 15.5 oz) (3/11/24)  % Weight Change (Calculated): 7.5  Weight Adjustment For: No Adjustment                 BMI Categories: Obese Class 1 (BMI 30.0-34.9)    Estimated Daily Nutrient Needs:  Energy Requirements Based On: Kcal/kg (25-30)  Weight Used for Energy Requirements: Ideal  Energy (kcal/day):

## 2024-06-13 NOTE — PROGRESS NOTES
Hospital Medicine Progress Note      Date of Admission: 5/30/2024  Hospital Day: 15    Chief Admission Complaint:  Left ankle pain     Subjective: Tolerating diet. Minimal abdominal pain.     Telemetry (reviewed by me):  SR with no recurrent Afib.     Presenting Admission History:       a 78-year-old female past medical history of hypertension, recent left ankle fracture now reduced, status post right knee replacement who was admitted after a mechanical fall.  Patient had a rapid response called and was transferred to ICU for hypotension, abdominal distention, lactic acidosis.     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 General Surgery recommended OR evaluation on 6/2 and she was found to have gangrenous colon, now s/p subtotal colectomy and ileostomy creation.  In retrospect, she appears to have previously unrecognized Paroxysmal Atrial Fibrillation, thus thromboembolic event cannot be excluded as an underlying cause. Pathology from colectomy showed Ischemic Colitis, no malignancy.   Wound Care/Ostomy care  HALLIE drain removed  Empiric Abx continued  Leukocytosis nearly resolved  Tolerating diet.   Discussed pain management. Not well controlled with Tylenol and doesn't like the effect of Percocet. Obviously NSAIDs are not ideal, but she is aware of the risks and adamant about trying low dose Ibuprofen. AIRAM and bleeding issues resolved so not unreasonable to try. Will give trial of Ibuprofen 400 mg to be used sparingly. Otherwise, she will need to stick with Tylenol or maybe consider Tramadol.     Severe Sepsis with Septic shock 2/2 Colonic Ischemia  On mulitple prossors  Continue broad spectrum antibiotics  Renal function improved  WBC nearly resolved     Acute  06/12/24 0612 06/13/24  0450   WBC 18.7* 15.9* 12.3*   HGB 7.5* 7.3* 9.3*   HCT 23.2* 22.1* 28.2*   * 536* 535*       Recent Labs     06/11/24  0605 06/12/24 0612 06/13/24  0450    135* 133*   K 3.2* 3.5 3.7   CL 98* 97* 96*   CO2 29 28 27   BUN 18 21* 18   CREATININE 0.7 0.8 0.8   CALCIUM 7.8* 7.9* 8.0*   MG 1.60*  --   --    PHOS 2.6 3.2 3.7       No results for input(s): \"PROBNP\", \"TROPHS\" in the last 72 hours.    No results for input(s): \"LABA1C\" in the last 72 hours.  No results for input(s): \"AST\", \"ALT\", \"BILIDIR\", \"BILITOT\", \"ALKPHOS\" in the last 72 hours.    No results for input(s): \"INR\", \"LACTA\", \"TSH\" in the last 72 hours.      Urine Cultures: No results found for: \"LABURIN\"  Blood Cultures:   Lab Results   Component Value Date/Time    BC No Growth after 4 days of incubation. 06/05/2024 08:29 AM     Lab Results   Component Value Date/Time    BLOODCULT2 No Growth after 4 days of incubation. 06/05/2024 08:29 AM     Organism: No results found for: \"ORG\"      Tru Carrera MD

## 2024-06-13 NOTE — PROGRESS NOTES
Occupational Therapy  Facility/Department: [facility] C4 PCU  Daily Treatment Note  NAME: Sudha Lopez  : 1945  MRN: [id number]    Date of Service: 2024    Discharge Recommendations:  IP Rehab, Patient able to tolerate 3 hours of therapy per day  OT Equipment Recommendations  Equipment Needed: No    Therapy discharge recommendations are subject to collaboration from the patient’s interdisciplinary healthcare team, including MD and case management recommendations.    Barriers to Home Discharge:   [] Steps to access home entry or bed/bath:   [x] Unable to transfer, ambulate, or propel wheelchair household distances without assist   [x] Limited available assist at home upon discharge    [x] Patient or family requests d/c to post-acute facility    [] Poor cognition/safety awareness for d/c to home alone    [] Unable to maintain ordered weight bearing status    [] Patient with salient signs of long-standing immobility   [x] Decreased independence with ADLs   [x] Increased risk for falls   [] Other:    If pt is unable to be seen after this session, please let this note serve as discharge summary.  Please see case management note for discharge disposition.  Thank you.    Patient Diagnosis(es): The primary encounter diagnosis was Closed fracture of left ankle, initial encounter. Diagnoses of Dislocation of left ankle joint, initial encounter, Syncope, unspecified syncope type, Hypoxemia, Acute respiratory failure with hypoxia (HCC), Large bowel obstruction (HCC), and Leukocytosis, unspecified type were also pertinent to this visit.     Assessment    Assessment: Pt pleasant and agreeable to therapy. Pt declined transfers this date d/t fatigue and pain, but was agreeable to BUE therex and ADLs on EOB. She required modA for supine<>sit transfers and SBA-CGA to sit on EOB for ~10-15 minutes. Requires rest breaks d/t pain and fatigue. Continue OT POC and recommend IPR at d/c.  Activity Tolerance: Patient tolerated  Pt verbalized understanding    Goals  Short Term Goals  Time Frame for Short Term Goals: 6/12, unless otherwise noted- extend to 6/18 d/t extended LOS - ongoing 6/13  Short Term Goal 1: Pt will complete seated EOB for ~ 5 minutes with Min A for balance in prep for seated ADLs - GOAL MET 6/13 pt sat on EOB for 10 min with SBA-CGA  Short Term Goal 2: Pt will complete bed>chair or bed>BSC with Mod A x 2-- GOAL MET 6/11, progress goal to minAx2  Short Term Goal 3: Pt will complete UE HEP 15x reps each by 6/18  Short Term Goal 4: Pt will complete seated grooming tasks (wash face, brush teeth) with CGA-- GOAL MET 6/08, progress goal to SPV    AM-PAC - ADL  AM-PAC Daily Activity - Inpatient   How much help is needed for putting on and taking off regular lower body clothing?: Total  How much help is needed for bathing (which includes washing, rinsing, drying)?: A Lot  How much help is needed for toileting (which includes using toilet, bedpan, or urinal)?: Total  How much help is needed for putting on and taking off regular upper body clothing?: A Lot  How much help is needed for taking care of personal grooming?: A Little  How much help for eating meals?: A Little  AM-PAC Inpatient Daily Activity Raw Score: 12  AM-PAC Inpatient ADL T-Scale Score : 30.6  ADL Inpatient CMS 0-100% Score: 66.57  ADL Inpatient CMS G-Code Modifier : CL    Therapy Time   Individual Concurrent Group Co-treatment   Time In 1057         Time Out 1138         Minutes 41         Timed Code Treatment Minutes: 41 Minutes       Jeanne Bardales, OT

## 2024-06-13 NOTE — PROGRESS NOTES
/74   Pulse 80   Temp 98.2 °F (36.8 °C) (Oral)   Resp 20   Ht 1.6 m (5' 3\")   Wt 81.4 kg (179 lb 7.3 oz)   SpO2 97%   BMI 31.79 kg/m²  on room air. Pt resting quietly in bed, with complaints of chronic lower back pain \"7\", writer will administer prn ibuprofen when due per pt request.  Pt denies shortness of breath.  Lungs clear, diminished.  NSR on tele.  Mid abdominal dressing clean, dry and intact.  Ostomy in place.  Dressing to right knee and LLE clean, dry and intact.  BLE elevated off bed onto pillow with heels floated off pillow.  Pt assisted with repositioning as needed.  Pt denies any other needs at this time.  Bedside table, call light, fluids within reach.  Bed alarm activated on bed.  Pt instructed to call out for any needs and assistance.  Will continue to monitor.  Matilda Eubanks RN  6/13/2024

## 2024-06-13 NOTE — PROGRESS NOTES
Ortho followup  Doing much better  BP (!) 145/66   Pulse 87   Temp 98.8 °F (37.1 °C)   Resp 20   Ht 1.6 m (5' 3\")   Wt 81.4 kg (179 lb 7.3 oz)   SpO2 94%   BMI 31.79 kg/m²   Right knee wound healing without signs of infection   Left ankle splint in place    Will re xray left ankle 3 view ankle    Agree with ARU  WBAT right lower extremity - standard TKR protocol: push ROM and edema control  NWB left lower extremity - pt has boot at home, will ask family to bring in    Aftab Moreno MD

## 2024-06-14 LAB
ALBUMIN SERPL-MCNC: 2.4 G/DL (ref 3.4–5)
ANION GAP SERPL CALCULATED.3IONS-SCNC: 10 MMOL/L (ref 3–16)
BUN SERPL-MCNC: 14 MG/DL (ref 7–20)
CALCIUM SERPL-MCNC: 8.2 MG/DL (ref 8.3–10.6)
CHLORIDE SERPL-SCNC: 98 MMOL/L (ref 99–110)
CO2 SERPL-SCNC: 25 MMOL/L (ref 21–32)
CREAT SERPL-MCNC: 0.7 MG/DL (ref 0.6–1.2)
DEPRECATED RDW RBC AUTO: 13.7 % (ref 12.4–15.4)
GFR SERPLBLD CREATININE-BSD FMLA CKD-EPI: 88 ML/MIN/{1.73_M2}
GLUCOSE SERPL-MCNC: 119 MG/DL (ref 70–99)
HCT VFR BLD AUTO: 22.3 % (ref 36–48)
HGB BLD-MCNC: 7.3 G/DL (ref 12–16)
MCH RBC QN AUTO: 29.6 PG (ref 26–34)
MCHC RBC AUTO-ENTMCNC: 32.9 G/DL (ref 31–36)
MCV RBC AUTO: 90.1 FL (ref 80–100)
PHOSPHATE SERPL-MCNC: 3.1 MG/DL (ref 2.5–4.9)
PLATELET # BLD AUTO: 694 K/UL (ref 135–450)
PMV BLD AUTO: 7.1 FL (ref 5–10.5)
POTASSIUM SERPL-SCNC: 3.6 MMOL/L (ref 3.5–5.1)
RBC # BLD AUTO: 2.47 M/UL (ref 4–5.2)
SODIUM SERPL-SCNC: 133 MMOL/L (ref 136–145)
WBC # BLD AUTO: 14.1 K/UL (ref 4–11)

## 2024-06-14 PROCEDURE — 6370000000 HC RX 637 (ALT 250 FOR IP)

## 2024-06-14 PROCEDURE — 92526 ORAL FUNCTION THERAPY: CPT

## 2024-06-14 PROCEDURE — 85027 COMPLETE CBC AUTOMATED: CPT

## 2024-06-14 PROCEDURE — APPNB45 APP NON BILLABLE 31-45 MINUTES: Performed by: CLINICAL NURSE SPECIALIST

## 2024-06-14 PROCEDURE — 6370000000 HC RX 637 (ALT 250 FOR IP): Performed by: INTERNAL MEDICINE

## 2024-06-14 PROCEDURE — 6370000000 HC RX 637 (ALT 250 FOR IP): Performed by: STUDENT IN AN ORGANIZED HEALTH CARE EDUCATION/TRAINING PROGRAM

## 2024-06-14 PROCEDURE — 2580000003 HC RX 258: Performed by: SURGERY

## 2024-06-14 PROCEDURE — 97530 THERAPEUTIC ACTIVITIES: CPT

## 2024-06-14 PROCEDURE — 6370000000 HC RX 637 (ALT 250 FOR IP): Performed by: NURSE PRACTITIONER

## 2024-06-14 PROCEDURE — 80069 RENAL FUNCTION PANEL: CPT

## 2024-06-14 PROCEDURE — 6360000002 HC RX W HCPCS: Performed by: SURGERY

## 2024-06-14 PROCEDURE — 97110 THERAPEUTIC EXERCISES: CPT

## 2024-06-14 PROCEDURE — C9113 INJ PANTOPRAZOLE SODIUM, VIA: HCPCS | Performed by: SURGERY

## 2024-06-14 PROCEDURE — 2060000000 HC ICU INTERMEDIATE R&B

## 2024-06-14 PROCEDURE — 6370000000 HC RX 637 (ALT 250 FOR IP): Performed by: SURGERY

## 2024-06-14 PROCEDURE — 99024 POSTOP FOLLOW-UP VISIT: CPT | Performed by: SURGERY

## 2024-06-14 RX ADMIN — APIXABAN 5 MG: 5 TABLET, FILM COATED ORAL at 20:35

## 2024-06-14 RX ADMIN — APIXABAN 5 MG: 5 TABLET, FILM COATED ORAL at 08:57

## 2024-06-14 RX ADMIN — DIBASIC SODIUM PHOSPHATE, MONOBASIC POTASSIUM PHOSPHATE AND MONOBASIC SODIUM PHOSPHATE 1 TABLET: 852; 155; 130 TABLET ORAL at 08:57

## 2024-06-14 RX ADMIN — FLUOXETINE HYDROCHLORIDE 40 MG: 20 CAPSULE ORAL at 08:57

## 2024-06-14 RX ADMIN — BUPROPION HYDROCHLORIDE 300 MG: 150 TABLET, EXTENDED RELEASE ORAL at 08:56

## 2024-06-14 RX ADMIN — TRAZODONE HYDROCHLORIDE 50 MG: 50 TABLET ORAL at 20:35

## 2024-06-14 RX ADMIN — Medication 6 MG: at 20:35

## 2024-06-14 RX ADMIN — Medication 400 MG: at 20:35

## 2024-06-14 RX ADMIN — THIAMINE HYDROCHLORIDE 100 MG: 100 INJECTION, SOLUTION INTRAMUSCULAR; INTRAVENOUS at 09:00

## 2024-06-14 RX ADMIN — DIBASIC SODIUM PHOSPHATE, MONOBASIC POTASSIUM PHOSPHATE AND MONOBASIC SODIUM PHOSPHATE 1 TABLET: 852; 155; 130 TABLET ORAL at 20:35

## 2024-06-14 RX ADMIN — THIAMINE HYDROCHLORIDE 100 MG: 100 INJECTION, SOLUTION INTRAMUSCULAR; INTRAVENOUS at 20:37

## 2024-06-14 RX ADMIN — IBUPROFEN 400 MG: 400 TABLET, FILM COATED ORAL at 00:43

## 2024-06-14 RX ADMIN — IBUPROFEN 400 MG: 400 TABLET, FILM COATED ORAL at 18:05

## 2024-06-14 RX ADMIN — SODIUM CHLORIDE, PRESERVATIVE FREE 10 ML: 5 INJECTION INTRAVENOUS at 20:35

## 2024-06-14 RX ADMIN — Medication 400 MG: at 08:57

## 2024-06-14 RX ADMIN — AMLODIPINE BESYLATE 10 MG: 5 TABLET ORAL at 08:57

## 2024-06-14 RX ADMIN — PANTOPRAZOLE SODIUM 40 MG: 40 INJECTION, POWDER, FOR SOLUTION INTRAVENOUS at 09:00

## 2024-06-14 ASSESSMENT — PAIN DESCRIPTION - DESCRIPTORS
DESCRIPTORS: ACHING
DESCRIPTORS: ACHING
DESCRIPTORS: CRAMPING;ACHING

## 2024-06-14 ASSESSMENT — PAIN SCALES - GENERAL
PAINLEVEL_OUTOF10: 3
PAINLEVEL_OUTOF10: 0
PAINLEVEL_OUTOF10: 5
PAINLEVEL_OUTOF10: 2
PAINLEVEL_OUTOF10: 1

## 2024-06-14 ASSESSMENT — PAIN DESCRIPTION - ORIENTATION: ORIENTATION: RIGHT

## 2024-06-14 ASSESSMENT — PAIN SCALES - WONG BAKER
WONGBAKER_NUMERICALRESPONSE: NO HURT
WONGBAKER_NUMERICALRESPONSE: NO HURT

## 2024-06-14 ASSESSMENT — PAIN DESCRIPTION - PAIN TYPE: TYPE: ACUTE PAIN;SURGICAL PAIN

## 2024-06-14 ASSESSMENT — PAIN DESCRIPTION - FREQUENCY: FREQUENCY: CONTINUOUS

## 2024-06-14 ASSESSMENT — PAIN DESCRIPTION - LOCATION
LOCATION: ABDOMEN
LOCATION: ABDOMEN
LOCATION: KNEE

## 2024-06-14 ASSESSMENT — PAIN DESCRIPTION - ONSET: ONSET: ON-GOING

## 2024-06-14 ASSESSMENT — PAIN - FUNCTIONAL ASSESSMENT: PAIN_FUNCTIONAL_ASSESSMENT: ACTIVITIES ARE NOT PREVENTED

## 2024-06-14 NOTE — CARE COORDINATION
CM update: Call received from ARU appeal denied will speak to patient and family about their backup plan today.Kristen Hamilton RN      [phone] Call placed to daughter Lazara per patient request for back up plan since we have a second denial from insurance. Will await daughter callback she is conferring with her sister.Kristen Hamilton RN      1000: Follow-Up copy of Important Message from Medicare (IMM2) has been explained to patient and/or designated healthcare decision maker (HCDM). Pt and/or HCDM aware that patient is permitted to stay an additional 4 hours prior to discharge to consider an appeal if they feel as though they are being discharged too soon. Patient may discharge as planned if chooses to do so.  Patient/HCDM voice no other concerns or questions regarding this process. .Kristen Hamilton RN    1021 [facility] SNF list provided and asked for top three picks. Will follow.Kristen Hamilton RN    0311 Per family they are pursuing an family appeal for ARU placement. I have contacted [facility] [facility] Liaison to provide Lazara ( daughter with the reference # for the appeal). Will follow.Kristen Hamilton RN

## 2024-06-14 NOTE — PROGRESS NOTES
diet without clinical s/s of aspiration or change in respiratory status.6/14/2024 Goal met     Short-term Goals  Timeframe for Short-term Goals: 7 days, 6/14/24  Dysphagia Goals:   Goal 1:The patient will tolerate recommended diet without observed clinical signs of aspiration, 6/14/2024 Goal met  Goal 2: The patient/caregiver will demonstrate understanding of compensatory strategies for improved swallowing safety., 6/14/2024 Goal met  Goal 3: The patient will tolerate instrumental swallowing procedure, 06/14; goal discontinued; not warranted  Goal 4: The patient will tolerate repeat bedside swallowing evaluation when able. 6/14/2024 Goal met    Speech/Language/Cog Goals: N/A    Recommendations:  Solid Consistency: IDDSI 7 Regular  Liquid Consistency: IDDSI 0 Thin Liquids  Medication: Meds whole with thin liquids  Risk Management: upright for all intake and slow rate of intake.     Education: SLP edu pt re: Role of SLP, Rationale for dysphagia tx, Recommended compensatory strategies, and Aspiration precautions. Pt verbalized understanding and RN aware of recommendations           Plan:    Continued Dysphagia treatment with goals per plan of care.    Discharge Recommendations: No further SLP tx warranted at discharge    If pt discharges from hospital prior to Speech/Swallowing discharge, this note serves as tx and discharge summary.     Total Treatment Time / Charges     Time in Time out Total Time / units   Cognitive Tx         Speech Tx      Dysphagia Tx 1258 1306 8 min/1 unit     Signature:  Carmen Rasmussen M.A. CCC-SLP  Speech Language Pathologist

## 2024-06-14 NOTE — CARE COORDINATION
Notified by phone by [facility] appeals that claim was denied. Any questions call [phone].  Updated CM Ching Hay RN

## 2024-06-14 NOTE — PROGRESS NOTES
Ostomy Referral Follow-up Progress Note      NAME:  Sudha Lopez  MEDICAL RECORD NUMBER:  [id number]  AGE: 78 y.o.   GENDER:  female  :  1945  TODAY'S DATE:  2024    Subjective: my bag held on OK  no leaking    Sudha Lopez is a 78 y.o. female referred by:   [x] Physician  [x] Nursing  [] Other:     Wound Identification:  Wound Type: Surgical mid line abd staple line  Contributing Factors: edema, decreased mobility, shear force, obesity, and decreased tissue oxygenation     New ileostomy: Subtotal colectomy with ileostomy on 24 for bowel obstruction by Dr Dung Jackson.      Stoma size:  25 = 1 inch mm x 28 mm 1 1/8 inch. DO NOT THROW AWAY PATTERN.  Appliance size: No leaking since convex wafer placed yesterday. Coloplast 2 pience RED convex flange # [id number] with drainable bag # [id number]. paste # [id number] placed around stoma. Crust with light dusting of powder, rub in, seal with dabbing barrier wipe over powder. Stoma paste under stoma in crease and in naval to make a flat pouching surface. Barrier extenders until redness subsides around stoma.  Belt added to help secure pouch and prevent leaking. DO NOT THROW AWAY BELT.  If soiled it can be washed.    Objective lying in bed. Purwick in place    /73   Pulse 64   Temp 98.3 °F (36.8 °C) (Axillary)   Resp 17   Ht 1.6 m (5' 3\")   Wt 79.7 kg (175 lb 11.3 oz)   SpO2 97%   BMI 31.13 kg/m²     Kirill Risk Score Kirill Scale Score: 17    Assessment Current appliance and dressing intact.   Surgeon     Ileostomy/Jejunostomy RUQ Ileostomy (Active)   Stomal Appliance 2 piece;Convex 24 1215   Flange Size (inches) 2.25 Inches 24 1215   Stoma  Assessment Red;Moist;Protrudes 24 1215   Peristomal Assessment OMAIRA 24 1215   Mucocutaneous Junction Intact 24 1215   Treatment Pouch change;Site care;Stoma powder;Liquid skin barrier;Stoma paste;Heat applied;Ostomy belt 24 1236   Stool Appearance Loose;Watery 24 121    Stool Color Brown 06/14/24 1215   Stool Amount Small 06/14/24 1215   Output (mL) 400 ml 06/14/24 1215   Number of days: 12         Intake/Output Summary (Last 24 hours) at 6/14/2024 1335  Last data filed at 6/14/2024 1215  Gross per 24 hour   Intake 1300 ml   Output 2750 ml   Net -1450 ml       Plan:   Plan for Ostomy Care:  Dr Jackson here during visit.  Patient was denied appeal to ARU.  Patient looking at list and calling other SNF options for care.    Patient verbalized understanding of how to empty bag.  Demonstrated how to open and closed bag on her own. Then she demonstrated how to change pouch on a mode.  Able to make pattern, cut flange then place flange on model stoma.  Then placed bag on flange and closed bag.  Questions answered @ procedure.  Verbalized understanding.    Stoma Care - New ileostomy - Patient to empty appliance when 1/3 to 1/2 full with assistance of the staff. Cleanse inside and outside of the drain spout prior to rolling closed. Change appliance every 3-5 days or 1-2 times a week.  Call for problems with seal or leaking [phone] or [phone]. If don't answer leave message.     Ostomy Plan of Care  [x] Supplies/Instructions left in room  [] Patient using home supplies  [x] Brand/supplies at bedside Coloplast    Current Diet: ADULT DIET; Regular; Low Fat (less than or equal to 50 gm/day)  ADULT ORAL NUTRITION SUPPLEMENT; Breakfast, Dinner; Standard High Calorie/High Protein Oral Supplement  Dietician consult:  Yes    Discharge Plan:  Placement for patient upon discharge: intermediate care facility    Outpatient visit plan No  Supplies given Yes   Samples requested No    Referrals:  [x]  working on placement ARU denied appeal.  [] Home Health Care  [] Supplies  [] Other      Patient/Caregiver Teaching:  Written Instructions given to patient/family  Teaching provided:  [] Reviewed GI and A&P        [] Supplies  [x] Pouch emptying      [] Manipulate closure  [] Routine

## 2024-06-14 NOTE — PROGRESS NOTES
Occupational Therapy  Facility/Department: [facility] C4 PCU  Daily Treatment Note  NAME: Sudha Lopez  : 1945  MRN: [id number]    Date of Service: 2024    Discharge Recommendations:  IP Rehab, Patient able to tolerate 3 hours of therapy per day  OT Equipment Recommendations  Equipment Needed: No    Therapy discharge recommendations are subject to collaboration from the patient’s interdisciplinary healthcare team, including MD and case management recommendations.    Barriers to Home Discharge:   [] Steps to access home entry or bed/bath:   [x] Unable to transfer, ambulate, or propel wheelchair household distances without assist   [x] Limited available assist at home upon discharge    [x] Patient or family requests d/c to post-acute facility    [] Poor cognition/safety awareness for d/c to home alone    [x] Unable to maintain ordered weight bearing status    [] Patient with salient signs of long-standing immobility   [x] Decreased independence with ADLs   [x] Increased risk for falls   [] Other:    If pt is unable to be seen after this session, please let this note serve as discharge summary.  Please see case management note for discharge disposition.  Thank you.    Patient Diagnosis(es): The primary encounter diagnosis was Closed fracture of left ankle, initial encounter. Diagnoses of Dislocation of left ankle joint, initial encounter, Syncope, unspecified syncope type, Hypoxemia, Acute respiratory failure with hypoxia (HCC), Large bowel obstruction (HCC), and Leukocytosis, unspecified type were also pertinent to this visit.     Assessment    Assessment: Pt pleasant and agreeable to therapy. Co-tx collaboration this date to safely meet goals and will have better occupational performance outcomes with in a co-treatment than 1:1 session. She requires modAx2 for bed mobility and slide board transfers. She stood from chair with maxAx2 to SW.  She demo's good progress toward therapy goals and improved

## 2024-06-14 NOTE — PROGRESS NOTES
Call to floor as patient just got back to bed and ostomy is now leaking.    Went to patient room.  Instructed day shift RN (Marilu) who will be working the week end how to change the leaking ostomy appliance.  Also reviewed steps with patient.    Site cleansed with warm water.  Stoma measured.  Flange cut.  Crusted mariama stomal skin with powder and barrier film spray. Paste ring placed around stoma. Small paste strip applied to crease going to naval.  Flange placed around stoma.  Bag attached.  Warm compress applied.  Belt connected.         06/14/24 1700   Ileostomy/Jejunostomy RUQ Ileostomy   Placement Date: 06/02/24   Present on Admission/Arrival: No  Location: RUQ  Ostomy Type: Ileostomy   Stomal Appliance 2 piece;Convex;Changed   Stoma  Assessment Protrudes;Moist;Red   Peristomal Assessment Blanchable erythema   Mucocutaneous Junction Intact   Stool Appearance Loose;Watery   Stool Color Brown   Stool Amount Small   Output (mL) 100 ml

## 2024-06-14 NOTE — PROGRESS NOTES
Physical Therapy  Facility/Department: [place] [address] PCU  Daily Treatment Note  NAME: Sudha Lopez  : 1945  MRN: [id number]    Date of Service: 2024    Discharge Recommendations:  Patient able to tolerate 3 hours of therapy per day, IP Rehab      Therapy discharge recommendations are subject to collaboration from the patient’s interdisciplinary healthcare team, including MD and case management recommendations.    Barriers to Home Discharge:   [] Steps to access home entry or bed/bath:   [x] Unable to transfer, ambulate, or propel wheelchair household distances without assist   [x] Limited available assist at home upon discharge    [] Patient or family requests d/c to post-acute facility    [x] Poor cognition/safety awareness for d/c to home alone    [] Unable to maintain ordered weight bearing status    [x] Patient with salient signs of long-standing immobility   [x] Decreased independence with ADLs   [x] Increased risk for falls   [] Other:    If pt is unable to be seen after this session, please let this note serve as discharge summary.  Please see case management note for discharge disposition.  Thank you.    Patient Diagnosis(es): The primary encounter diagnosis was Closed fracture of left ankle, initial encounter. Diagnoses of Dislocation of left ankle joint, initial encounter, Syncope, unspecified syncope type, Hypoxemia, Acute respiratory failure with hypoxia (HCC), Large bowel obstruction (HCC), and Leukocytosis, unspecified type were also pertinent to this visit.    Assessment   Assessment: On this date, pt required min-mod assist x2 for bed mobility. She got to from bed to chair using a slide board and mod assist x2. She required RW and max assist x2 for sit<>stand from chair and RW and max assist x2 for standing balance. She is improving in her strength so that less assistance is required to complete SLR. Increased muscle activation and independence with exercise and intiating slideboard transfers  (Calculated): 90  SpO2: 95 %  O2 Device: None (Room air)  Comment: 130/60 sitting EOB HR 92 BPM  Patient denies pain.     Bed Mobility Training  Bed Mobility Training: Yes  Interventions: Safety awareness training;Tactile cues;Verbal cues  Rolling: Minimum assistance;Moderate assistance;Assist X2 (R and L for hygiene. Use of B handrails. Cues for hand placement.)  Supine to Sit: Moderate assistance;Adaptive equipment;Additional time;Assist X2 (HOB elevated, bed rails used. Assist with BLEs and trunk)  Scooting: Moderate assistance;Assist X2 (2x: Toward EOB. Onto slideboard.)  Balance  Sitting: Intact (arms propped for balance)  Sitting - Static: Good (unsupported)  Sitting - Dynamic: Fair (occasional)  Standing: Impaired  Standing - Static: Constant support;Poor (RW + max assist x2)  Transfer Training  Transfer Training: Yes  Interventions: Verbal cues;Safety awareness training;Manual cues  Sit to Stand: Maximum assistance;Assist X2;Adaptive equipment (x1 (3 attempts): Chair to RW. RW and max assist x2. Unable to stand completely upright first two attempts. Able to stand fully with cues for hand placement and posture. Manual cues for NWB on LLE.)  Stand to Sit: Assist X2;Maximum assistance;Adaptive equipment (x1: RW to chair. Cues for hand placement)  Sliding Board: Adaptive equipment;Assist X2;Moderate assistance (EOB to chair. Cueing for hand placement, NWB LLE, and sequencing/instruction. Pt improved at initiating transfer and maintaining non-WB on LLE. Blocked R knee to prevent forward shift.)  Gait  Gait Training: No     PT Exercises  Exercise Treatment: RLE ankle pumps, heel slides, qs/gs, SLR (AAROM due to weakness) x 10 reps each for TKA in supine.     Safety Devices  Type of Devices: All fall risk precautions in place;Gait belt;Patient at risk for falls;Call light within reach;Left in chair;Chair alarm in place     Goals  Short Term Goals  Time Frame for Short Term Goals: 1 week (6/12) -- 6/12 - Extend

## 2024-06-14 NOTE — PLAN OF CARE
Problem: Pain  Goal: Verbalizes/displays adequate comfort level or baseline comfort level  Outcome: Progressing     Problem: Safety - Adult  Goal: Free from fall injury  Outcome: Progressing     Problem: Nutrition Deficit:  Goal: Optimize nutritional status  Outcome: Progressing     Problem: ABCDS Injury Assessment  Goal: Absence of physical injury  Outcome: Progressing     Problem: Skin/Tissue Integrity  Goal: Absence of new skin breakdown  Description: 1.  Monitor for areas of redness and/or skin breakdown  2.  Assess vascular access sites hourly  3.  Every 4-6 hours minimum:  Change oxygen saturation probe site  4.  Every 4-6 hours:  If on nasal continuous positive airway pressure, respiratory therapy assess nares and determine need for appliance change or resting period.  Outcome: Progressing     Problem: Discharge Planning  Goal: Discharge to home or other facility with appropriate resources  Outcome: Not Progressing

## 2024-06-14 NOTE — PROGRESS NOTES
Hospital Medicine Progress Note      Date of Admission: 5/30/2024  Hospital Day: 16    Chief Admission Complaint:  Left ankle pain     Subjective: Tolerating diet. ARU appeal was denied.     Telemetry (reviewed by me):  SR    Presenting Admission History:       a 78-year-old female past medical history of hypertension, recent left ankle fracture now reduced, status post right knee replacement who was admitted after a mechanical fall.  Patient had a rapid response called and was transferred to ICU for hypotension, abdominal distention, lactic acidosis.     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 General Surgery recommended OR evaluation on 6/2 and she was found to have gangrenous colon, now s/p subtotal colectomy and ileostomy creation.  In retrospect, she appears to have previously unrecognized Paroxysmal Atrial Fibrillation, thus thromboembolic event cannot be excluded as an underlying cause. Pathology from colectomy showed Ischemic Colitis, no malignancy.   Wound Care/Ostomy care  HALLIE drain removed  Empiric Abx continued  Leukocytosis nearly resolved  Tolerating diet.   Discussed pain management. Not well controlled with Tylenol and doesn't like the effect of Percocet. Obviously NSAIDs are not ideal, but she is aware of the risks and adamant about trying low dose Ibuprofen. AIRAM and bleeding issues resolved so not unreasonable to try. Will give trial of Ibuprofen 400 mg to be used sparingly. Otherwise, she will need to stick with Tylenol or maybe consider Tramadol.     Severe Sepsis with Septic shock 2/2 Colonic Ischemia  On mulitple prossors  Continue broad spectrum antibiotics  Renal function improved  WBC nearly resolved     Acute hypoxic and hypercapnic

## 2024-06-14 NOTE — PROGRESS NOTES
Ortho  Pt doing well   No complaints  Visit Vitals  /73   Pulse 86   Temp 98.7 °F (37.1 °C) (Axillary)   Resp 16   Ht 1.6 m (5' 3\")   Wt 79.7 kg (175 lb 11.3 oz)   SpO2 100%   BMI 31.13 kg/m²     Bilateral LE NVI  Wound intact    Xrays left ankle      EXAMINATION:  THREE XRAY VIEWS OF THE LEFT ANKLE     6/13/2024 6:55 pm     COMPARISON:  05/31/2024     HISTORY:  ORDERING SYSTEM PROVIDED HISTORY: floowup left ankle fracture dislocation;  AP/Lateral and mortise view in splint  TECHNOLOGIST PROVIDED HISTORY:  Reason for exam:->floowup left ankle fracture dislocation; AP/Lateral and  mortise view in splint  Reason for Exam: ankle fx     FINDINGS:  There is overlying splint material obscuring detail.  The ankle mortise is  intact.  There is a questionable transverse fracture along the base of the  medial malleolus which is nondisplaced with subchondral lucency and sclerosis  around the fracture site.  There is a questionable nondisplaced fracture  along the distal tibia with a well corticated bony fragment inferiorly which  is unchanged.  There is moderate soft tissue swelling around the ankle     IMPRESSION:  Overlying splint material obscuring detail with questionable nondisplaced  fractures along the distal tibia and lateral malleolus inferiorly which were  described previously and appear unchanged.     Interval resolution of the previously seen tibiotalar subluxation.     Slowly resolving soft tissue swelling around the ankle.    A/P  Continue Rt TKR protocol with ROM excercises, WBAT  Lt ankle fracture dislocation: boot   NWB  Followup with me in 1-2 weeks    Aftab Moreno MD

## 2024-06-14 NOTE — PROGRESS NOTES
[facility] GENERAL SURGERY    Surgery Progress Note           POD # 12    PATIENT NAME: Sudha Lopez     TODAY'S DATE: 6/14/2024    INTERVAL HISTORY:    Doing okay, eating well, ostomy functioning.   Denied on appeal for ARU.      OBJECTIVE:   VITALS:  BP (!) 154/70   Pulse 81   Temp 98.1 °F (36.7 °C) (Oral)   Resp 19   Ht 1.6 m (5' 3\")   Wt 79.7 kg (175 lb 11.3 oz)   SpO2 96%   BMI 31.13 kg/m²     INTAKE/OUTPUT:    I/O last 3 completed shifts:  In: 1910 [P.O.:1900; I.V.:10]  Out: 3075 [Urine:1700; Stool:1375]  No intake/output data recorded.              CONSTITUTIONAL:  awake and alert  LUNGS: Unlabored  ABDOMEN:   normal bowel sounds, soft, non-distended, ostomy functional  INCISION: dressing in tact    Data:  CBC:   Recent Labs     06/12/24 0612 06/13/24 0450 06/14/24 0445   WBC 15.9* 12.3* 14.1*   HGB 7.3* 9.3* 7.3*   HCT 22.1* 28.2* 22.3*   * 535* 694*       BMP:    Recent Labs     06/12/24 0612 06/13/24 0450 06/14/24 0445   * 133* 133*   K 3.5 3.7 3.6   CL 97* 96* 98*   CO2 28 27 25   BUN 21* 18 14   CREATININE 0.8 0.8 0.7   GLUCOSE 89 97 119*       Hepatic: No results for input(s): \"AST\", \"ALT\", \"BILITOT\", \"ALKPHOS\" in the last 72 hours.    Invalid input(s): \"ALB\"  Mag:      No results for input(s): \"MG\" in the last 72 hours.     Phos:     Recent Labs     06/12/24 0612 06/13/24 0450 06/14/24 0445   PHOS 3.2 3.7 3.1       ASSESSMENT AND PLAN:  78 y.o. female status post subtotal colectomy with ileostomy.    Diet as tolerated  PT/OT  Leukocytosis slight trend up today    Awaiting further d/c plans for SNF    Electronically signed by CSAPER Marquis CNP     Patient seen and agree with above and more than half of the total time was spent by me on the encounter.   Ok for discharge from my standpoint.    Addi Jackson MD

## 2024-06-15 LAB
ALBUMIN SERPL-MCNC: 2.7 G/DL (ref 3.4–5)
ANION GAP SERPL CALCULATED.3IONS-SCNC: 10 MMOL/L (ref 3–16)
BUN SERPL-MCNC: 14 MG/DL (ref 7–20)
CALCIUM SERPL-MCNC: 8.3 MG/DL (ref 8.3–10.6)
CHLORIDE SERPL-SCNC: 97 MMOL/L (ref 99–110)
CO2 SERPL-SCNC: 26 MMOL/L (ref 21–32)
CREAT SERPL-MCNC: 0.7 MG/DL (ref 0.6–1.2)
DEPRECATED RDW RBC AUTO: 14.1 % (ref 12.4–15.4)
GFR SERPLBLD CREATININE-BSD FMLA CKD-EPI: 88 ML/MIN/{1.73_M2}
GLUCOSE SERPL-MCNC: 104 MG/DL (ref 70–99)
HCT VFR BLD AUTO: 20.9 % (ref 36–48)
HGB BLD-MCNC: 7 G/DL (ref 12–16)
MCH RBC QN AUTO: 29.7 PG (ref 26–34)
MCHC RBC AUTO-ENTMCNC: 33.4 G/DL (ref 31–36)
MCV RBC AUTO: 88.8 FL (ref 80–100)
PHOSPHATE SERPL-MCNC: 3.6 MG/DL (ref 2.5–4.9)
PLATELET # BLD AUTO: 669 K/UL (ref 135–450)
PMV BLD AUTO: 6.8 FL (ref 5–10.5)
POTASSIUM SERPL-SCNC: 3.7 MMOL/L (ref 3.5–5.1)
POTASSIUM SERPL-SCNC: 3.7 MMOL/L (ref 3.5–5.1)
RBC # BLD AUTO: 2.35 M/UL (ref 4–5.2)
SODIUM SERPL-SCNC: 133 MMOL/L (ref 136–145)
WBC # BLD AUTO: 13.4 K/UL (ref 4–11)

## 2024-06-15 PROCEDURE — 87077 CULTURE AEROBIC IDENTIFY: CPT

## 2024-06-15 PROCEDURE — 6360000002 HC RX W HCPCS: Performed by: SURGERY

## 2024-06-15 PROCEDURE — 87070 CULTURE OTHR SPECIMN AEROBIC: CPT

## 2024-06-15 PROCEDURE — 6370000000 HC RX 637 (ALT 250 FOR IP)

## 2024-06-15 PROCEDURE — C9113 INJ PANTOPRAZOLE SODIUM, VIA: HCPCS | Performed by: SURGERY

## 2024-06-15 PROCEDURE — 85027 COMPLETE CBC AUTOMATED: CPT

## 2024-06-15 PROCEDURE — 6370000000 HC RX 637 (ALT 250 FOR IP): Performed by: SURGERY

## 2024-06-15 PROCEDURE — 6370000000 HC RX 637 (ALT 250 FOR IP): Performed by: INTERNAL MEDICINE

## 2024-06-15 PROCEDURE — 6370000000 HC RX 637 (ALT 250 FOR IP): Performed by: STUDENT IN AN ORGANIZED HEALTH CARE EDUCATION/TRAINING PROGRAM

## 2024-06-15 PROCEDURE — 2060000000 HC ICU INTERMEDIATE R&B

## 2024-06-15 PROCEDURE — 2580000003 HC RX 258: Performed by: INTERNAL MEDICINE

## 2024-06-15 PROCEDURE — 80069 RENAL FUNCTION PANEL: CPT

## 2024-06-15 PROCEDURE — 6370000000 HC RX 637 (ALT 250 FOR IP): Performed by: NURSE PRACTITIONER

## 2024-06-15 PROCEDURE — 87186 SC STD MICRODIL/AGAR DIL: CPT

## 2024-06-15 PROCEDURE — 87205 SMEAR GRAM STAIN: CPT

## 2024-06-15 PROCEDURE — 6360000002 HC RX W HCPCS: Performed by: INTERNAL MEDICINE

## 2024-06-15 PROCEDURE — 2580000003 HC RX 258: Performed by: SURGERY

## 2024-06-15 RX ADMIN — BUPROPION HYDROCHLORIDE 300 MG: 150 TABLET, EXTENDED RELEASE ORAL at 08:34

## 2024-06-15 RX ADMIN — THIAMINE HYDROCHLORIDE 100 MG: 100 INJECTION, SOLUTION INTRAMUSCULAR; INTRAVENOUS at 08:38

## 2024-06-15 RX ADMIN — Medication 400 MG: at 21:03

## 2024-06-15 RX ADMIN — PANTOPRAZOLE SODIUM 40 MG: 40 INJECTION, POWDER, FOR SOLUTION INTRAVENOUS at 08:38

## 2024-06-15 RX ADMIN — THIAMINE HYDROCHLORIDE 100 MG: 100 INJECTION, SOLUTION INTRAMUSCULAR; INTRAVENOUS at 21:02

## 2024-06-15 RX ADMIN — ALTEPLASE 1 MG: 2.2 INJECTION, POWDER, LYOPHILIZED, FOR SOLUTION INTRAVENOUS at 12:57

## 2024-06-15 RX ADMIN — TRAZODONE HYDROCHLORIDE 50 MG: 50 TABLET ORAL at 21:02

## 2024-06-15 RX ADMIN — APIXABAN 5 MG: 5 TABLET, FILM COATED ORAL at 21:03

## 2024-06-15 RX ADMIN — SODIUM CHLORIDE, PRESERVATIVE FREE 10 ML: 5 INJECTION INTRAVENOUS at 08:52

## 2024-06-15 RX ADMIN — NYSTATIN 500000 UNITS: 100000 SUSPENSION ORAL at 17:09

## 2024-06-15 RX ADMIN — SODIUM CHLORIDE: 9 INJECTION, SOLUTION INTRAVENOUS at 14:20

## 2024-06-15 RX ADMIN — VANCOMYCIN HYDROCHLORIDE 1500 MG: 10 INJECTION, POWDER, LYOPHILIZED, FOR SOLUTION INTRAVENOUS at 14:33

## 2024-06-15 RX ADMIN — ALTEPLASE 1 MG: 2.2 INJECTION, POWDER, LYOPHILIZED, FOR SOLUTION INTRAVENOUS at 12:30

## 2024-06-15 RX ADMIN — NYSTATIN 500000 UNITS: 100000 SUSPENSION ORAL at 21:02

## 2024-06-15 RX ADMIN — IBUPROFEN 400 MG: 400 TABLET, FILM COATED ORAL at 07:09

## 2024-06-15 RX ADMIN — SODIUM CHLORIDE, PRESERVATIVE FREE 10 ML: 5 INJECTION INTRAVENOUS at 21:03

## 2024-06-15 RX ADMIN — DIBASIC SODIUM PHOSPHATE, MONOBASIC POTASSIUM PHOSPHATE AND MONOBASIC SODIUM PHOSPHATE 1 TABLET: 852; 155; 130 TABLET ORAL at 21:02

## 2024-06-15 RX ADMIN — APIXABAN 5 MG: 5 TABLET, FILM COATED ORAL at 08:34

## 2024-06-15 RX ADMIN — AMLODIPINE BESYLATE 10 MG: 5 TABLET ORAL at 08:34

## 2024-06-15 RX ADMIN — Medication 400 MG: at 08:34

## 2024-06-15 RX ADMIN — DIBASIC SODIUM PHOSPHATE, MONOBASIC POTASSIUM PHOSPHATE AND MONOBASIC SODIUM PHOSPHATE 1 TABLET: 852; 155; 130 TABLET ORAL at 08:34

## 2024-06-15 RX ADMIN — FLUOXETINE HYDROCHLORIDE 40 MG: 20 CAPSULE ORAL at 08:34

## 2024-06-15 RX ADMIN — Medication 6 MG: at 15:18

## 2024-06-15 RX ADMIN — VANCOMYCIN HYDROCHLORIDE 1500 MG: 10 INJECTION, POWDER, LYOPHILIZED, FOR SOLUTION INTRAVENOUS at 14:23

## 2024-06-15 ASSESSMENT — PAIN SCALES - GENERAL
PAINLEVEL_OUTOF10: 0
PAINLEVEL_OUTOF10: 3

## 2024-06-15 ASSESSMENT — PAIN DESCRIPTION - ORIENTATION: ORIENTATION: RIGHT;LEFT

## 2024-06-15 ASSESSMENT — PAIN DESCRIPTION - DESCRIPTORS: DESCRIPTORS: ACHING;SHARP

## 2024-06-15 ASSESSMENT — PAIN DESCRIPTION - LOCATION: LOCATION: ABDOMEN

## 2024-06-15 NOTE — PROGRESS NOTES
Hospital Medicine Progress Note      Date of Admission: 5/30/2024  Hospital Day: 17    Chief Admission Complaint:  Left ankle pain     Subjective:     Having increased discharge from abdominal incision site overnight per RN. Pt denies any fever, chills or worsening abdominal pain     Presenting Admission History:       a 78-year-old female past medical history of hypertension, recent left ankle fracture now reduced, status post right knee replacement who was admitted after a mechanical fall.  Patient had a rapid response called and was transferred to ICU for hypotension, abdominal distention, lactic acidosis.     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 General Surgery recommended OR evaluation on 6/2 and she was found to have gangrenous colon, now s/p subtotal colectomy and ileostomy creation.  In retrospect, she appears to have previously unrecognized Paroxysmal Atrial Fibrillation, thus thromboembolic event cannot be excluded as an underlying cause. Pathology from colectomy showed Ischemic Colitis, no malignancy.   Wound Care/Ostomy care  HALLIE drain removed  Empiric Abx was d/gallo  Leukocytosis improving  Tolerating diet. Having  serosanguinous drainage from surgical site  on 6/15. Notified RN to make Gen  surgery aware. Start IV vanc empirically for possible surgical site infection while awaiting further Gen surgery recs. Continue wound care        Severe Sepsis with Septic shock 2/2 Colonic Ischemia   Was on  mulitple pressors- later weaned off   Treated with empiric abxs. Later d/gallo.  Vanc resumed on 6/15 as stated above.   Renal function improved  WBC improving. Afebrile.      Acute hypoxic and hypercapnic respiratory failure  Weaned and extubated  Weaned off  O2 as tolerated  Resolved    Acute Blood Loss Anemia: No signs of GI bleeding. S/p  Transfusion. Hgb 7 today. Monitor and transfuse if <7.     Acute Metabolic Encephalopathy: Suspect this was related to slow metabolism of sedatives after intubation. Improved.  Monitor.      Afib RVR: New onset 6/3.  ECHO 5/31/24 showed normal LVEF without any significant valvular disease. Given frequent paroxysms EP was consulted. AC is recommended. Continue Eliquis. Not requiring any rate control at this time.  Monitor.     Recent Right TKR (Josh) followed by post-operative mechanical fall at home resulting in Left Ankle Fracture/Dislocation: Orthopedics following. Continue splint with outpatient management.       Physical Exam Performed:      General appearance: NAD  Respiratory:  Normal respiratory effort. Clear.   Cardiovascular: Regular rate and rhythm.   Abdomen:  Incisions and ostomy noted, non-distended. Foul smelling serosanguinous discharge from midline surgical site with staples in place   Musculoskelatal:  No edema  Neurologic:  Non-focal.   Psychiatric:  Alert and oriented    BP (!) 141/63   Pulse 81   Temp 98 °F (36.7 °C) (Axillary)   Resp 18   Ht 1.6 m (5' 3\")   Wt 77.5 kg (170 lb 13.7 oz)   SpO2 94%   BMI 30.27 kg/m²     Diet: ADULT DIET; Regular; Low Fat (less than or equal to 50 gm/day)  ADULT ORAL NUTRITION SUPPLEMENT; Breakfast, Dinner; Standard High Calorie/High Protein Oral Supplement  DVT Prophylaxis: []PPx LMWH  []SQ Heparin  []IPC/SCDs  [x]Eliquis  []Xarelto  []Coumadin  [] Heparin Drip  []Other -      Code status: Full Code  PT/OT Eval Status:   []NOT yet ordered  []Ordered and Pending   []Seen with Recommendations for:  []Home independently  []Home w/ assist  []HHC  []SNF  [x]Acute Rehab    Anticipated Discharge Day/Date:  Clinically improved and should be medically stable for DC.  ARU appeal was denied. Family was offered SNF options but they want to pursue a family appeal for the ARU.

## 2024-06-15 NOTE — CONSULTS
Pharmacy Note  Vancomycin Consult    Sudha Lopez is a 78 y.o. female started on Vancomycin to treat SSTI for 7 days; consult received from Dr. Mckeon to manage therapy.     Allergies:  Other and Thiazide-type diuretics     Recent Labs     06/13/24  0450 06/14/24  0445 06/15/24  0505   WBC 12.3* 14.1* 13.4*   CREATININE 0.8 0.7 0.7     Estimated Creatinine Clearance: 65 mL/min (based on SCr of 0.7 mg/dL).    Intake/Output Summary (Last 24 hours) at 6/15/2024 1159  Last data filed at 6/15/2024 0536  Gross per 24 hour   Intake 1404 ml   Output 2775 ml   Net -1371 ml     Wt Readings from Last 1 Encounters:   06/15/24 77.5 kg (170 lb 13.7 oz)       Body mass index is 30.27 kg/m².    Loading dose (critically ill or in ICU, require dialysis or renal replacement therapy): Vancomycin 25 mg/kg IVPB x 1 (maximum 3000 mg).  Maintenance dose: ~15 mg/kg starting at the next dosing interval determined by renal function  Pulse dose: fluctuating renal function, AIRAM, ESRD   Goal Vancomycin trough: 10-20 mcg/mL   Goal Vancomycin AUC: 400-600 mg/L.hr    Assessment/Plan:  Will initiate Vancomycin 1500 mg IV every 24 hours. Calculated  mg/L.hr. Vancomycin level ordered for 6/17 at 0600. Timing of the next level will be determined based on culture results, renal function, and clinical response.     Thank you for the consult.  Lane Foster, PharmKIERRA    6/15/2024 11:59 AM     Vancomycin Day of Therapy 2/7  Indication: SSTI  Current Dosing Method: Bayesian-Guided AUC Dosing  Therapeutic Goal: -600 mg/L*hr  Recent Labs     06/14/24  0445 06/15/24  0505 06/16/24  0538   WBC 14.1* 13.4* 10.8   CREATININE 0.7 0.7 0.7   Estimated Creatinine Clearance: 65 mL/min (based on SCr of 0.7 mg/dL).  Current Dose / Plan:   Continue Vancomycin 1500 mg IV q24h.   Kinetics predict an AUC = 490 mg/L*h with an estimated steady-state vancomycin trough = 11.7 mcg/mL.  Next Vancomycin level ordered for 6/17 at 0600.  Will continue to monitor

## 2024-06-15 NOTE — PLAN OF CARE
Let Dr Mckeon know that the surgical site is seeping serosanguineous fluid. Dr Mckeon responded to let Surgery know when they come to the floor today.

## 2024-06-15 NOTE — PLAN OF CARE
Sent secure message regarding inability to flush either of pt's lines in her central line. Requested cathflow.

## 2024-06-15 NOTE — PROGRESS NOTES
Lab called with critical hematocrit of 20.9, hgb 7.0. DO Jason notified via perfect serve. Also notified that patients Abdominal site was oozing this morning.

## 2024-06-15 NOTE — PLAN OF CARE
Culture of surgical wound fluid completed  Ostomy changed  Abdominal area cleansed and dried, new dressings applied to surgical wound on abdomen and on surgical wound on right knee.

## 2024-06-16 LAB
ALBUMIN SERPL-MCNC: 2.6 G/DL (ref 3.4–5)
ANION GAP SERPL CALCULATED.3IONS-SCNC: 12 MMOL/L (ref 3–16)
BUN SERPL-MCNC: 13 MG/DL (ref 7–20)
CALCIUM SERPL-MCNC: 8.4 MG/DL (ref 8.3–10.6)
CHLORIDE SERPL-SCNC: 98 MMOL/L (ref 99–110)
CO2 SERPL-SCNC: 23 MMOL/L (ref 21–32)
CREAT SERPL-MCNC: 0.7 MG/DL (ref 0.6–1.2)
DEPRECATED RDW RBC AUTO: 13.7 % (ref 12.4–15.4)
GFR SERPLBLD CREATININE-BSD FMLA CKD-EPI: 88 ML/MIN/{1.73_M2}
GLUCOSE SERPL-MCNC: 98 MG/DL (ref 70–99)
HCT VFR BLD AUTO: 21.8 % (ref 36–48)
HGB BLD-MCNC: 7.3 G/DL (ref 12–16)
MCH RBC QN AUTO: 29.8 PG (ref 26–34)
MCHC RBC AUTO-ENTMCNC: 33.4 G/DL (ref 31–36)
MCV RBC AUTO: 89 FL (ref 80–100)
PHOSPHATE SERPL-MCNC: 3.5 MG/DL (ref 2.5–4.9)
PLATELET # BLD AUTO: 704 K/UL (ref 135–450)
PMV BLD AUTO: 6.4 FL (ref 5–10.5)
POTASSIUM SERPL-SCNC: 3.9 MMOL/L (ref 3.5–5.1)
RBC # BLD AUTO: 2.45 M/UL (ref 4–5.2)
SODIUM SERPL-SCNC: 133 MMOL/L (ref 136–145)
WBC # BLD AUTO: 10.8 K/UL (ref 4–11)

## 2024-06-16 PROCEDURE — 6370000000 HC RX 637 (ALT 250 FOR IP): Performed by: SURGERY

## 2024-06-16 PROCEDURE — 6370000000 HC RX 637 (ALT 250 FOR IP): Performed by: INTERNAL MEDICINE

## 2024-06-16 PROCEDURE — 6360000002 HC RX W HCPCS: Performed by: SURGERY

## 2024-06-16 PROCEDURE — C9113 INJ PANTOPRAZOLE SODIUM, VIA: HCPCS | Performed by: SURGERY

## 2024-06-16 PROCEDURE — 6370000000 HC RX 637 (ALT 250 FOR IP)

## 2024-06-16 PROCEDURE — 2580000003 HC RX 258: Performed by: INTERNAL MEDICINE

## 2024-06-16 PROCEDURE — 2580000003 HC RX 258: Performed by: SURGERY

## 2024-06-16 PROCEDURE — 99024 POSTOP FOLLOW-UP VISIT: CPT | Performed by: SURGERY

## 2024-06-16 PROCEDURE — 6370000000 HC RX 637 (ALT 250 FOR IP): Performed by: STUDENT IN AN ORGANIZED HEALTH CARE EDUCATION/TRAINING PROGRAM

## 2024-06-16 PROCEDURE — 2060000000 HC ICU INTERMEDIATE R&B

## 2024-06-16 PROCEDURE — 85027 COMPLETE CBC AUTOMATED: CPT

## 2024-06-16 PROCEDURE — 80069 RENAL FUNCTION PANEL: CPT

## 2024-06-16 PROCEDURE — 6360000002 HC RX W HCPCS: Performed by: INTERNAL MEDICINE

## 2024-06-16 PROCEDURE — 6360000002 HC RX W HCPCS: Performed by: STUDENT IN AN ORGANIZED HEALTH CARE EDUCATION/TRAINING PROGRAM

## 2024-06-16 PROCEDURE — 6370000000 HC RX 637 (ALT 250 FOR IP): Performed by: NURSE PRACTITIONER

## 2024-06-16 RX ORDER — AMOXICILLIN AND CLAVULANATE POTASSIUM 875; 125 MG/1; MG/1
1 TABLET, FILM COATED ORAL EVERY 12 HOURS SCHEDULED
Status: DISCONTINUED | OUTPATIENT
Start: 2024-06-16 | End: 2024-06-21 | Stop reason: HOSPADM

## 2024-06-16 RX ADMIN — THIAMINE HYDROCHLORIDE 100 MG: 100 INJECTION, SOLUTION INTRAMUSCULAR; INTRAVENOUS at 21:25

## 2024-06-16 RX ADMIN — NYSTATIN 500000 UNITS: 100000 SUSPENSION ORAL at 21:25

## 2024-06-16 RX ADMIN — Medication 400 MG: at 21:26

## 2024-06-16 RX ADMIN — AMOXICILLIN AND CLAVULANATE POTASSIUM 1 TABLET: 875; 125 TABLET, FILM COATED ORAL at 21:25

## 2024-06-16 RX ADMIN — SODIUM CHLORIDE, PRESERVATIVE FREE 10 ML: 5 INJECTION INTRAVENOUS at 21:27

## 2024-06-16 RX ADMIN — TRAZODONE HYDROCHLORIDE 50 MG: 50 TABLET ORAL at 21:26

## 2024-06-16 RX ADMIN — DIBASIC SODIUM PHOSPHATE, MONOBASIC POTASSIUM PHOSPHATE AND MONOBASIC SODIUM PHOSPHATE 1 TABLET: 852; 155; 130 TABLET ORAL at 21:26

## 2024-06-16 RX ADMIN — OXYCODONE AND ACETAMINOPHEN 1 TABLET: 5; 325 TABLET ORAL at 21:25

## 2024-06-16 RX ADMIN — AMLODIPINE BESYLATE 10 MG: 5 TABLET ORAL at 08:36

## 2024-06-16 RX ADMIN — NYSTATIN 500000 UNITS: 100000 SUSPENSION ORAL at 13:07

## 2024-06-16 RX ADMIN — AMOXICILLIN AND CLAVULANATE POTASSIUM 1 TABLET: 875; 125 TABLET, FILM COATED ORAL at 08:36

## 2024-06-16 RX ADMIN — PANTOPRAZOLE SODIUM 40 MG: 40 INJECTION, POWDER, FOR SOLUTION INTRAVENOUS at 08:36

## 2024-06-16 RX ADMIN — FLUOXETINE HYDROCHLORIDE 40 MG: 20 CAPSULE ORAL at 08:36

## 2024-06-16 RX ADMIN — NYSTATIN 500000 UNITS: 100000 SUSPENSION ORAL at 08:29

## 2024-06-16 RX ADMIN — THIAMINE HYDROCHLORIDE 100 MG: 100 INJECTION, SOLUTION INTRAMUSCULAR; INTRAVENOUS at 08:36

## 2024-06-16 RX ADMIN — DIBASIC SODIUM PHOSPHATE, MONOBASIC POTASSIUM PHOSPHATE AND MONOBASIC SODIUM PHOSPHATE 1 TABLET: 852; 155; 130 TABLET ORAL at 08:36

## 2024-06-16 RX ADMIN — Medication 400 MG: at 08:36

## 2024-06-16 RX ADMIN — APIXABAN 5 MG: 5 TABLET, FILM COATED ORAL at 08:36

## 2024-06-16 RX ADMIN — Medication 6 MG: at 22:03

## 2024-06-16 RX ADMIN — OXYCODONE AND ACETAMINOPHEN 1 TABLET: 5; 325 TABLET ORAL at 02:03

## 2024-06-16 RX ADMIN — VANCOMYCIN HYDROCHLORIDE 1500 MG: 10 INJECTION, POWDER, LYOPHILIZED, FOR SOLUTION INTRAVENOUS at 14:29

## 2024-06-16 RX ADMIN — BUPROPION HYDROCHLORIDE 300 MG: 150 TABLET, EXTENDED RELEASE ORAL at 08:36

## 2024-06-16 RX ADMIN — NYSTATIN 500000 UNITS: 100000 SUSPENSION ORAL at 17:52

## 2024-06-16 RX ADMIN — APIXABAN 5 MG: 5 TABLET, FILM COATED ORAL at 21:25

## 2024-06-16 ASSESSMENT — PAIN SCALES - WONG BAKER: WONGBAKER_NUMERICALRESPONSE: NO HURT

## 2024-06-16 ASSESSMENT — PAIN SCALES - GENERAL
PAINLEVEL_OUTOF10: 4
PAINLEVEL_OUTOF10: 5
PAINLEVEL_OUTOF10: 2

## 2024-06-16 ASSESSMENT — PAIN DESCRIPTION - DESCRIPTORS: DESCRIPTORS: ACHING

## 2024-06-16 ASSESSMENT — PAIN - FUNCTIONAL ASSESSMENT: PAIN_FUNCTIONAL_ASSESSMENT: PREVENTS OR INTERFERES WITH ALL ACTIVE AND SOME PASSIVE ACTIVITIES

## 2024-06-16 ASSESSMENT — PAIN DESCRIPTION - LOCATION
LOCATION: ABDOMEN
LOCATION: ABDOMEN

## 2024-06-16 ASSESSMENT — PAIN DESCRIPTION - ORIENTATION: ORIENTATION: MID

## 2024-06-16 NOTE — PROGRESS NOTES
Hospital Medicine Progress Note      Date of Admission: 5/30/2024  Hospital Day: 18    Chief Admission Complaint:  Left ankle pain     Subjective:     Patient denies any new complaint.  Still having purulent discharge from surgical site .  Denies any worsening abdominal pain, fever or chills    Presenting Admission History:       a 78-year-old female past medical history of hypertension, recent left ankle fracture now reduced, status post right knee replacement who was admitted after a mechanical fall.  Patient had a rapid response called and was transferred to ICU for hypotension, abdominal distention, lactic acidosis.     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 General Surgery recommended OR evaluation on 6/2 and she was found to have gangrenous colon, now s/p subtotal colectomy and ileostomy creation.  In retrospect, she appears to have previously unrecognized Paroxysmal Atrial Fibrillation, thus thromboembolic event cannot be excluded as an underlying cause. Pathology from colectomy showed Ischemic Colitis, no malignancy.   Wound Care/Ostomy care  HALLIE drain removed  Empiric Abx was d/gallo  Leukocytosis improving  Tolerating diet.  Started having  serosanguinous drainage from surgical site  on 6/15 and started IV vanc empirically for  surgical wound site infection. Continue wound care        Severe Sepsis with Septic shock 2/2 Colonic Ischemia   Was on  mulitple pressors- later weaned off   Treated with empiric abxs. Later d/gallo.  Vanc resumed on 6/15 as stated above.   Renal function improved  WBC improved. Afebrile.      Acute hypoxic and hypercapnic respiratory failure  Weaned and extubated  Weaned off O2 as tolerated  Resolved    Acute Blood Loss Anemia: No

## 2024-06-16 NOTE — PROGRESS NOTES
[facility] GENERAL SURGERY DAILY PROGRESS NOTE    SUBJECTIVE: Awake, alert.  Asked to evaluate drainage from lower incision.     OBJECTIVE: CURRENT VITALS:  BP (!) 154/67   Pulse 81   Temp 98.2 °F (36.8 °C) (Oral)   Resp 16   Ht 1.6 m (5' 3\")   Wt 77.3 kg (170 lb 6.7 oz)   SpO2 93%   BMI 30.19 kg/m²          ABD: Soft.  ILEOSTOMY:  Functional  INCISION:  Lower incision with malodorous drainage.  Staples removed inferiorly with return of purulent fluid.  Fascia intactness is questionable.    LABS:    CBC:   Recent Labs     06/14/24 0445 06/15/24  0505 06/16/24  0538   WBC 14.1* 13.4* 10.8   RBC 2.47* 2.35* 2.45*   HGB 7.3* 7.0* 7.3*   HCT 22.3* 20.9* 21.8*   MCV 90.1 88.8 89.0   RDW 13.7 14.1 13.7   * 669* 704*     BMP:   Recent Labs     06/14/24 0445 06/15/24  0505 06/16/24  0538   * 133* 133*   K 3.6 3.7  3.7 3.9   CL 98* 97* 98*   CO2 25 26 23   PHOS 3.1 3.6 3.5   BUN 14 14 13   CREATININE 0.7 0.7 0.7       ASSESSMENT:   Post op subtotal colectomy / ileostomy  Abdominal midline wound infection      PLAN:   Local wound care  Antibiotics           BRAYDEN CHAVIRA MD

## 2024-06-16 NOTE — PLAN OF CARE
Dressing changed on mid-abdominal wound per order.   Wet to dry dressing complete. Serosanguious drainage present.

## 2024-06-16 NOTE — PLAN OF CARE
Problem: Discharge Planning  Goal: Discharge to home or other facility with appropriate resources  6/15/2024 2332 by Pepe King RN  Outcome: Progressing  6/15/2024 1500 by Bernarda Aly RN  Outcome: Progressing     Problem: Pain  Goal: Verbalizes/displays adequate comfort level or baseline comfort level  6/15/2024 2332 by Pepe King RN  Outcome: Progressing  6/15/2024 1500 by Bernarda Aly RN  Outcome: Progressing     Problem: Safety - Adult  Goal: Free from fall injury  6/15/2024 2332 by Pepe King RN  Outcome: Progressing  6/15/2024 1500 by Bernarda Aly RN  Outcome: Progressing     Problem: Nutrition Deficit:  Goal: Optimize nutritional status  6/15/2024 2332 by Pepe King RN  Outcome: Progressing  6/15/2024 1500 by Bernarda Aly RN  Outcome: Progressing     Problem: ABCDS Injury Assessment  Goal: Absence of physical injury  6/15/2024 2332 by Pepe King RN  Outcome: Progressing  6/15/2024 1500 by Bernarda Aly RN  Outcome: Progressing     Problem: Skin/Tissue Integrity  Goal: Absence of new skin breakdown  Description: 1.  Monitor for areas of redness and/or skin breakdown  2.  Assess vascular access sites hourly  3.  Every 4-6 hours minimum:  Change oxygen saturation probe site  4.  Every 4-6 hours:  If on nasal continuous positive airway pressure, respiratory therapy assess nares and determine need for appliance change or resting period.  6/15/2024 2332 by Pepe King RN  Outcome: Progressing  6/15/2024 1500 by Bernarda Aly RN  Outcome: Progressing

## 2024-06-17 LAB
ALBUMIN SERPL-MCNC: 2.7 G/DL (ref 3.4–5)
ANION GAP SERPL CALCULATED.3IONS-SCNC: 10 MMOL/L (ref 3–16)
BACTERIA SPEC AEROBE CULT: ABNORMAL
BACTERIA SPEC AEROBE CULT: ABNORMAL
BUN SERPL-MCNC: 13 MG/DL (ref 7–20)
CALCIUM SERPL-MCNC: 8.5 MG/DL (ref 8.3–10.6)
CHLORIDE SERPL-SCNC: 99 MMOL/L (ref 99–110)
CO2 SERPL-SCNC: 25 MMOL/L (ref 21–32)
CREAT SERPL-MCNC: 0.7 MG/DL (ref 0.6–1.2)
DEPRECATED RDW RBC AUTO: 13.8 % (ref 12.4–15.4)
GFR SERPLBLD CREATININE-BSD FMLA CKD-EPI: 88 ML/MIN/{1.73_M2}
GLUCOSE SERPL-MCNC: 96 MG/DL (ref 70–99)
GRAM STN SPEC: ABNORMAL
HCT VFR BLD AUTO: 22.1 % (ref 36–48)
HGB BLD-MCNC: 7.3 G/DL (ref 12–16)
MCH RBC QN AUTO: 29.3 PG (ref 26–34)
MCHC RBC AUTO-ENTMCNC: 33.2 G/DL (ref 31–36)
MCV RBC AUTO: 88.3 FL (ref 80–100)
ORGANISM: ABNORMAL
PHOSPHATE SERPL-MCNC: 4.4 MG/DL (ref 2.5–4.9)
PLATELET # BLD AUTO: 609 K/UL (ref 135–450)
PMV BLD AUTO: 6.7 FL (ref 5–10.5)
POTASSIUM SERPL-SCNC: 4 MMOL/L (ref 3.5–5.1)
RBC # BLD AUTO: 2.5 M/UL (ref 4–5.2)
SODIUM SERPL-SCNC: 134 MMOL/L (ref 136–145)
VANCOMYCIN SERPL-MCNC: 12.9 UG/ML
WBC # BLD AUTO: 9.3 K/UL (ref 4–11)

## 2024-06-17 PROCEDURE — 6370000000 HC RX 637 (ALT 250 FOR IP): Performed by: NURSE PRACTITIONER

## 2024-06-17 PROCEDURE — 97535 SELF CARE MNGMENT TRAINING: CPT

## 2024-06-17 PROCEDURE — C9113 INJ PANTOPRAZOLE SODIUM, VIA: HCPCS | Performed by: SURGERY

## 2024-06-17 PROCEDURE — 80069 RENAL FUNCTION PANEL: CPT

## 2024-06-17 PROCEDURE — 6370000000 HC RX 637 (ALT 250 FOR IP): Performed by: INTERNAL MEDICINE

## 2024-06-17 PROCEDURE — 6360000002 HC RX W HCPCS: Performed by: SURGERY

## 2024-06-17 PROCEDURE — 6370000000 HC RX 637 (ALT 250 FOR IP): Performed by: STUDENT IN AN ORGANIZED HEALTH CARE EDUCATION/TRAINING PROGRAM

## 2024-06-17 PROCEDURE — 2060000000 HC ICU INTERMEDIATE R&B

## 2024-06-17 PROCEDURE — 6370000000 HC RX 637 (ALT 250 FOR IP): Performed by: SURGERY

## 2024-06-17 PROCEDURE — 97530 THERAPEUTIC ACTIVITIES: CPT

## 2024-06-17 PROCEDURE — 99024 POSTOP FOLLOW-UP VISIT: CPT | Performed by: SURGERY

## 2024-06-17 PROCEDURE — 80202 ASSAY OF VANCOMYCIN: CPT

## 2024-06-17 PROCEDURE — 97110 THERAPEUTIC EXERCISES: CPT

## 2024-06-17 PROCEDURE — 93005 ELECTROCARDIOGRAM TRACING: CPT | Performed by: INTERNAL MEDICINE

## 2024-06-17 PROCEDURE — 2580000003 HC RX 258: Performed by: SURGERY

## 2024-06-17 PROCEDURE — 85027 COMPLETE CBC AUTOMATED: CPT

## 2024-06-17 RX ADMIN — PANTOPRAZOLE SODIUM 40 MG: 40 INJECTION, POWDER, FOR SOLUTION INTRAVENOUS at 08:29

## 2024-06-17 RX ADMIN — FLUOXETINE HYDROCHLORIDE 40 MG: 20 CAPSULE ORAL at 08:29

## 2024-06-17 RX ADMIN — NYSTATIN 500000 UNITS: 100000 SUSPENSION ORAL at 14:43

## 2024-06-17 RX ADMIN — OXYCODONE AND ACETAMINOPHEN 1 TABLET: 5; 325 TABLET ORAL at 17:01

## 2024-06-17 RX ADMIN — AMOXICILLIN AND CLAVULANATE POTASSIUM 1 TABLET: 875; 125 TABLET, FILM COATED ORAL at 20:36

## 2024-06-17 RX ADMIN — DIBASIC SODIUM PHOSPHATE, MONOBASIC POTASSIUM PHOSPHATE AND MONOBASIC SODIUM PHOSPHATE 1 TABLET: 852; 155; 130 TABLET ORAL at 20:36

## 2024-06-17 RX ADMIN — THIAMINE HYDROCHLORIDE 100 MG: 100 INJECTION, SOLUTION INTRAMUSCULAR; INTRAVENOUS at 20:40

## 2024-06-17 RX ADMIN — Medication 400 MG: at 08:29

## 2024-06-17 RX ADMIN — NYSTATIN 500000 UNITS: 100000 SUSPENSION ORAL at 20:36

## 2024-06-17 RX ADMIN — NYSTATIN 500000 UNITS: 100000 SUSPENSION ORAL at 10:39

## 2024-06-17 RX ADMIN — TRAZODONE HYDROCHLORIDE 50 MG: 50 TABLET ORAL at 20:36

## 2024-06-17 RX ADMIN — NYSTATIN 500000 UNITS: 100000 SUSPENSION ORAL at 17:01

## 2024-06-17 RX ADMIN — SODIUM CHLORIDE, PRESERVATIVE FREE 10 ML: 5 INJECTION INTRAVENOUS at 20:40

## 2024-06-17 RX ADMIN — THIAMINE HYDROCHLORIDE 100 MG: 100 INJECTION, SOLUTION INTRAMUSCULAR; INTRAVENOUS at 08:29

## 2024-06-17 RX ADMIN — SODIUM CHLORIDE, PRESERVATIVE FREE 10 ML: 5 INJECTION INTRAVENOUS at 08:29

## 2024-06-17 RX ADMIN — APIXABAN 5 MG: 5 TABLET, FILM COATED ORAL at 20:36

## 2024-06-17 RX ADMIN — AMOXICILLIN AND CLAVULANATE POTASSIUM 1 TABLET: 875; 125 TABLET, FILM COATED ORAL at 08:29

## 2024-06-17 RX ADMIN — OXYCODONE AND ACETAMINOPHEN 1 TABLET: 5; 325 TABLET ORAL at 02:06

## 2024-06-17 RX ADMIN — DIBASIC SODIUM PHOSPHATE, MONOBASIC POTASSIUM PHOSPHATE AND MONOBASIC SODIUM PHOSPHATE 1 TABLET: 852; 155; 130 TABLET ORAL at 08:29

## 2024-06-17 RX ADMIN — BUPROPION HYDROCHLORIDE 300 MG: 150 TABLET, EXTENDED RELEASE ORAL at 08:29

## 2024-06-17 RX ADMIN — Medication 400 MG: at 20:36

## 2024-06-17 RX ADMIN — APIXABAN 5 MG: 5 TABLET, FILM COATED ORAL at 08:29

## 2024-06-17 RX ADMIN — AMLODIPINE BESYLATE 10 MG: 5 TABLET ORAL at 08:29

## 2024-06-17 RX ADMIN — OXYCODONE AND ACETAMINOPHEN 1 TABLET: 5; 325 TABLET ORAL at 20:36

## 2024-06-17 ASSESSMENT — PAIN SCALES - GENERAL
PAINLEVEL_OUTOF10: 3
PAINLEVEL_OUTOF10: 0
PAINLEVEL_OUTOF10: 5
PAINLEVEL_OUTOF10: 5
PAINLEVEL_OUTOF10: 3

## 2024-06-17 NOTE — BRIEF OP NOTE
intact 06/05/24 0800   Securement device Tape 06/05/24 0800   Status Suction-low intermittent 06/05/24 0800   Placement Verified Respiratory Status;External Catheter Length 06/05/24 0800   NG/OG/NJ/NE External Measurement (cm) 50 cm 06/05/24 0800   Drainage Appearance Bile;Brown 06/05/24 0800   Tube Feeding Intake (mL) 152 ml 06/05/24 0600   Free Water/Flush (mL) 30 mL 06/05/24 0600   Output (mL) 200 ml 06/04/24 0400       [REMOVED] NG/OG/NJ/NE Tube Nasogastric 16 fr Right nostril (Removed)   Surrounding Skin Clean, dry & intact 06/07/24 0403   Securement device Bridle 06/07/24 0403   Status Continuous feeding 06/07/24 0403   Placement Verified External Catheter Length 06/07/24 0403   NG/OG/NJ/NE External Measurement (cm) 53 cm 06/07/24 0403   Drainage Appearance Bile;Yellow 06/06/24 2318   Tube Feeding Standard with Fiber 06/07/24 0403   Tube feeding/verify rate (mL/hr) 20 mL/hr 06/07/24 0403   Tube Feeding Intake (mL) 124 ml 06/07/24 0403   Free Water/Flush (mL) 30 mL 06/07/24 0403   Output (mL) 0 ml 06/07/24 0403   Action Taken Feed set changed;Tubing changed 06/05/24 1830       [REMOVED] Rectal Tube (Removed)       [REMOVED] Rectal Tube (Removed)   Site Assessment Clean, dry & intact 06/01/24 1830   Stool Appearance Loose 06/01/24 1830   Stool Color Brown 06/01/24 1830   Stool Amount Smear 06/01/24 1830       [REMOVED] Urinary Catheter 05/31/24 (Removed)   Catheter Indications Need for fluid volume management of the critically ill patient in a critical care setting 06/05/24 1100   Site Assessment No urethral drainage 06/05/24 1100   Urine Color Theresa 06/05/24 1100   Urine Appearance Sediment 06/05/24 1100   Collection Container Standard 06/05/24 1100   Securement Method Securing device (Describe) 06/05/24 1100   Catheter Care  Perineal wipes 06/05/24 1100   Catheter Best Practices  Drainage tube clipped to bed;Catheter secured to thigh;Tamper seal intact;Bag below bladder;Bag not on floor;Lack of dependent loop  Perineal wipes 06/05/24 1100   Catheter Best Practices  Drainage tube clipped to bed;Catheter secured to thigh;Tamper seal intact;Bag below bladder;Bag not on floor;Lack of dependent loop in tubing;Drainage bag less than half full 06/05/24 1100   Status Draining 06/05/24 1100   Output (mL) 155 mL 06/05/24 1330       [REMOVED] External Urinary Catheter (Removed)   Site Assessment Intact 05/31/24 0422   Placement Initiated 05/31/24 0000   Securement Method Other (Comment) 05/31/24 0000   Catheter Care Catheter/Wick replaced;Suction Canister/Tubing changed 05/31/24 0000   Perineal Care Yes 05/31/24 0000   Suction 40 mmgHg continuous 05/31/24 0000   Urine Color Yellow 05/31/24 0422   Output (mL) 300 mL 05/31/24 0422       Findings:  Infection Present At Time Of Surgery (PATOS) (choose all levels that have infection present):  No infection present  Other Findings: none    Electronically signed by Tonny Prajapati MD on 6/17/2024 at 12:41 PM

## 2024-06-17 NOTE — PROGRESS NOTES
[facility] GENERAL SURGERY DAILY PROGRESS NOTE    SUBJECTIVE: minimal pain, drainage from inferior incision opening    OBJECTIVE: CURRENT VITALS:  /67   Pulse 75   Temp 98.5 °F (36.9 °C) (Oral)   Resp 16   Ht 1.6 m (5' 3\")   Wt 77.3 kg (170 lb 6.7 oz)   SpO2 95%   BMI 30.19 kg/m²          ABD: Soft, minimal tender, nondistended, ostomy functional  Incision - purulent drainage inferior, minimal erythema  LABS:    CBC:   Recent Labs     06/15/24  0505 06/16/24  0538 06/17/24  0632   WBC 13.4* 10.8 9.3   RBC 2.35* 2.45* 2.50*   HGB 7.0* 7.3* 7.3*   HCT 20.9* 21.8* 22.1*   MCV 88.8 89.0 88.3   RDW 14.1 13.7 13.8   * 704* 609*       BMP:   Recent Labs     06/15/24  0505 06/16/24  0538 06/17/24  0632   * 133* 134*   K 3.7  3.7 3.9 4.0   CL 97* 98* 99   CO2 26 23 25   PHOS 3.6 3.5 4.4   BUN 14 13 13   CREATININE 0.7 0.7 0.7         ASSESSMENT:   Post op subtotal colectomy / ileostomy  Abdominal midline wound infection      PLAN:   Local wound care - packed with aquacel and covered with gauze  Antibiotics  Ok for discharge with additional wound care orders put in         Bertin Jackson MD

## 2024-06-17 NOTE — PROGRESS NOTES
Physical Therapy  Facility/Department: [facility] C4 PCU  Daily Treatment Note  NAME: Sudha Lopez  : 1945  MRN: [id number]    Date of Service: 2024    Discharge Recommendations:  Patient able to tolerate 3 hours of therapy per day, IP Rehab   PT Equipment Recommendations  Equipment Needed: No  Other: defer to next level of care, w/c likely, possible AD.    Patient Diagnosis(es): The primary encounter diagnosis was Closed fracture of left ankle, initial encounter. Diagnoses of Dislocation of left ankle joint, initial encounter, Syncope, unspecified syncope type, Hypoxemia, Acute respiratory failure with hypoxia (HCC), Large bowel obstruction (HCC), and Leukocytosis, unspecified type were also pertinent to this visit.    Assessment   Assessment: pt requires 2 skilled therapists to progress to higher level of function with maximal safety, PT intented to focus on slide board training to chair or w/c, pt declined transfer training, agreeable to supine therex, bed mobility, and sitting egde of bed for therex and balance training, pt will benefit from continued Acute Inpatient Skilled PT to address functional mobility deficits, cont to rec IPR at DC  Activity Tolerance: Patient tolerated treatment well;Patient limited by pain  Equipment Needed: No  Other: defer to next level of care, w/c likely, possible AD.     Plan    Physical Therapy Plan  General Plan: 5-7 times per week  Specific Instructions for Next Treatment: progress mobility as able; provide TKA handouts  Current Treatment Recommendations: Strengthening;Balance training;Functional mobility training;Transfer training;Gait training;Wheelchair mobility training;Endurance training;Therapeutic activities;Co-Treatment;Pain management;Home exercise program;Patient/Caregiver education & training;Safety education & training     Restrictions  Restrictions/Precautions  Restrictions/Precautions: Up as Tolerated, General Precautions, Surgical Protocols, Weight  hospital. -- 6/14 - Continue all unmet goals.  Short Term Goal 1: Pt will perform supine <> sit with MOD A.  Short Term Goal 2: Pt will perform all transfers with LRAD and MOD A.  Short Term Goal 3: Assess standing and pre-gait as appropriate and make goal.  Short Term Goal 4: Pt will perform 10 reps of BLE exercises by 6/3 (R TKA, L ankle NWB) -- 6/11 - goal met  Patient Goals   Patient Goals : \"I want to go home\"    Education  Patient Education  Education Given To: Patient  Education Provided: Role of Therapy;Plan of Care;Transfer Training;Precautions;Home Exercise Program;Equipment  Education Provided Comments: Disease Specific Education: Pt educated on importance of OOB mobility, prevention of complications of bedrest, and general safety during hospitalization.  Education Method: Verbal;Demonstration  Barriers to Learning: None  Education Outcome: Verbalized understanding;Continued education needed;Demonstrated understanding    AM-PAC - Mobility    AM-PAC Basic Mobility - Inpatient   How much help is needed turning from your back to your side while in a flat bed without using bedrails?: A Lot  How much help is needed moving from lying on your back to sitting on the side of a flat bed without using bedrails?: A Lot  How much help is needed moving to and from a bed to a chair?: Total  How much help is needed standing up from a chair using your arms?: Total  How much help is needed walking in hospital room?: Total  How much help is needed climbing 3-5 steps with a railing?: Total  AM-PAC Inpatient Mobility Raw Score : 8  AM-PAC Inpatient T-Scale Score : 28.52  Mobility Inpatient CMS 0-100% Score: 86.62  Mobility Inpatient CMS G-Code Modifier : CM         Therapy Time   Individual Concurrent Group Co-treatment   Time In       1138   Time Out       1209   Minutes       31   Timed Code Treatment Minutes: 31 Minutes     If pt is unable to be seen after this session, please let this note serve as discharge summary.

## 2024-06-17 NOTE — PLAN OF CARE
Problem: Discharge Planning  Goal: Discharge to home or other facility with appropriate resources  6/16/2024 2253 by Jing Butler, RN  Outcome: Progressing  Flowsheets (Taken 6/16/2024 2125)  Discharge to home or other facility with appropriate resources:   Identify barriers to discharge with patient and caregiver   Arrange for needed discharge resources and transportation as appropriate     Problem: Pain  Goal: Verbalizes/displays adequate comfort level or baseline comfort level  6/16/2024 2253 by Jing Butler, RN  Outcome: Progressing     Problem: Safety - Adult  Goal: Free from fall injury  6/16/2024 2253 by Jing Butler, RN  Outcome: Progressing     Problem: Nutrition Deficit:  Goal: Optimize nutritional status  6/16/2024 2253 by Jing Butler, RN  Outcome: Progressing     Problem: ABCDS Injury Assessment  Goal: Absence of physical injury  6/16/2024 2253 by Jing Butler, RN  Outcome: Progressing  Flowsheets (Taken 6/16/2024 2125)  Absence of Physical Injury: Implement safety measures based on patient assessment     Problem: Skin/Tissue Integrity  Goal: Absence of new skin breakdown  Description: 1.  Monitor for areas of redness and/or skin breakdown  2.  Assess vascular access sites hourly  3.  Every 4-6 hours minimum:  Change oxygen saturation probe site  4.  Every 4-6 hours:  If on nasal continuous positive airway pressure, respiratory therapy assess nares and determine need for appliance change or resting period.  6/16/2024 2253 by Jing Butler, RN  Outcome: Progressing

## 2024-06-17 NOTE — PROGRESS NOTES
Occupational Therapy  Facility/Department: [facility] C4 PCU  Daily Treatment Note  NAME: Sudha Lopez  : 1945  MRN: [id number]    Date of Service: 2024    Discharge Recommendations:  IP Rehab, Patient able to tolerate 3 hours of therapy per day  OT Equipment Recommendations  Equipment Needed: No      Patient Diagnosis(es): The primary encounter diagnosis was Closed fracture of left ankle, initial encounter. Diagnoses of Dislocation of left ankle joint, initial encounter, Syncope, unspecified syncope type, Hypoxemia, Acute respiratory failure with hypoxia (HCC), Large bowel obstruction (HCC), and Leukocytosis, unspecified type were also pertinent to this visit.     Assessment    Assessment: Pt seen for OT/PT treatment. Co-tx collaboration this date to safely meet goals and will have better occupational performance outcomes with in a co-treatment than 1:1 session. Pt completed bed mobility and sat EOB. Pt completed UE exer along with LE exer. Pt would continue to benefit from acute OT at this time to improve functional mobility and ADL independence. D/c recs for IPR when medically ready.       Activity Tolerance: Patient tolerated treatment well  Discharge Recommendations: IP Rehab;Patient able to tolerate 3 hours of therapy per day  Equipment Needed: No      Plan   Occupational Therapy Plan  Times Per Week: 4-6x/week  Current Treatment Recommendations: Strengthening;ROM;Patient/Caregiver education & training;Self-Care / ADL;Equipment evaluation, education, & procurement;Functional mobility training;Safety education & training;Endurance training;Cognitive reorientation     Restrictions  Restrictions/Precautions  Restrictions/Precautions: Up as Tolerated;General Precautions;Surgical Protocols;Weight Bearing  Required Braces or Orthoses?: No  Lower Extremity Weight Bearing Restrictions  Right Lower Extremity Weight Bearing: Weight Bearing As Tolerated  Left Lower Extremity Weight Bearing: Non Weight

## 2024-06-17 NOTE — CARE COORDINATION
CM update; LOS # 17; Followed by IM, Wound Care General Surgery, PT/OT. Patient and family have decided on facility for SNF placement 1. [facility] ( call placed to Kina for her to start review),2. [facility]. Will follow for decision.Kristen Hamilton RN    [id number] has denied stay. Second selection [facility] reviewing. Will follow.Kristen Hamilton RN

## 2024-06-17 NOTE — PROGRESS NOTES
Left message with Clare with Dr. Moreno's office concerning needing more information/order for boot for patient's left foot.     1725  Ruslan CH of Dr. Moreno's office called and gave an order for a walking boot.  Patient will still be nonweight bearing.    Ordered placed for DME to obtain walking boot.

## 2024-06-17 NOTE — PROGRESS NOTES
Report received from day shift RN. Patient resting comfortably in bed reading a book. No signs of discomfort or distress. Bed is in lowest position, wheels locked, 2/2 side rails up. Bedside table and call light within reach. White board updated. Will continue to monitor patient. Yolie Neil RN    7:55 PM  RN to bedside for vitals. Patient c/o new chest pain. Charge notified. STAT EKG ordered. Yolie Neil RN    8:06 PM  EKG SR HR 86. Patient reports pain radiating into her back. Patient repositioned and reports pain is better. Message sent to provider. Yolie Neil RN    8:45 PM  Shift assessment complete. (See findings in flowsheet). Med pass complete. (See MAR). VSS. PRN percocet given for pain. Yolie Neil RN    2:51 AM  PRN percocet given for pain. Yolie Neil RN    5:53 AM  Blood drawn per orders and sent to lab via tube system. Yolie Neil RN    [id number]  Rec'd call from lab with critical result- hgg 6.5 hct 19.2. Provider notified. Yolie Neil RN    6:54 AM  Provider paged again. Yolie Neil RN    7:00 AM  Orders rec'd for blood transfusion. Yolie eNil RN

## 2024-06-17 NOTE — PROGRESS NOTES
Hospital Medicine Progress Note      Date of Admission: 5/30/2024  Hospital Day: 19    Chief Admission Complaint:  Left ankle pain     Subjective:     No abdominal pain.     Presenting Admission History:       a 78-year-old female past medical history of hypertension, recent left ankle fracture now reduced, status post right knee replacement who was admitted after a mechanical fall.  Patient had a rapid response called and was transferred to ICU for hypotension, abdominal distention, lactic acidosis.     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 General Surgery recommended OR evaluation on 6/2 and she was found to have gangrenous colon, now s/p subtotal colectomy and ileostomy creation.  In retrospect, she appears to have previously unrecognized Paroxysmal Atrial Fibrillation, thus thromboembolic event cannot be excluded as an underlying cause. Pathology from colectomy showed Ischemic Colitis, no malignancy.   Wound Care/Ostomy care  HALLIE drain removed  Empiric Abx were completed 6/11.  Tolerating diet.  Started having serosanguinous drainage from surgical site on 6/15 and started IV vanc empirically for surgical wound site infection. Cx showed E.col. Started Augmentin.  Vancomycin discontinued.  Continue wound care per GenSurg; may need wound vac    Severe Sepsis with Septic shock 2/2 Colonic Ischemia   Was on  mulitple pressors- later weaned off   Treated with empiric abxs through 6/11.   Developed surgical wound infection 6/15 and Abx resumed as above.   Renal function improved  Leukocytosis has now completely resolved    Acute hypoxic and hypercapnic respiratory failure  Weaned and extubated  Weaned off O2 as tolerated  Resolved    Acute Blood Loss Anemia: No signs

## 2024-06-17 NOTE — DISCHARGE INSTR - COC
Continuity of Care Form    Patient Name: Sudha Lopez   :  1945  MRN:  [id number]    Admit date:  2024  Discharge date:  24     Code Status Order: Full Code   Advance Directives:     Admitting Physician:  Ehsan Shabbir, MD  PCP: Perla Leyva MD    Discharging Nurse: vidal wagner rn  Discharging Hospital Unit/Room#: 0438/0438-01  Discharging Unit Phone Number: [phone]    Emergency Contact:   Extended Emergency Contact Information  Primary Emergency Contact: solis Lopez  Home Phone: [phone]  Relation: Child  Secondary Emergency Contact: Victorina Lopez  Home Phone: [phone]  Relation: Child    Past Surgical History:  Past Surgical History:   Procedure Laterality Date    BREAST ENHANCEMENT SURGERY      COLONOSCOPY      COLONOSCOPY N/A 2024    COLONOSCOPY FLEXIBLE DECOMPRESSION performed by Marcus Naranjo MD at [facility]    HAND SURGERY Right     HYSTERECTOMY (CERVIX STATUS UNKNOWN)      INTRACAPSULAR CATARACT EXTRACTION Left 2020    PHACOEMULSIFICATION OF CATARACT LEFT EYE WITH INTRAOCULAR LENS IMPLANT performed by Anuel Hay MD at [facility] OR    INTRACAPSULAR CATARACT EXTRACTION Right 2020    PHACOEMULSIFICATION OF CATARACT RIGHT EYE WITH INTRAOCULAR LENS IMPLANT performed by Anuel Hay MD at [facility] OR    LAPAROTOMY N/A 2024    SUBTOTAL COLECTOMY, ILEOSTOMY performed by Bertin Jackson MD at [facility] OR    RHINOPLASTY      ROTATOR CUFF REPAIR Bilateral     TOTAL HIP ARTHROPLASTY Bilateral        Immunization History:   Immunization History   Administered Date(s) Administered    COVID-19, PFIZER PURPLE top, DILUTE for use, (age 12 y+), 30mcg/0.3mL 2021    Influenza Virus Vaccine 2010, 2012    Influenza Whole 2011    Pneumococcal, PCV-13, PREVNAR 13, (age 6w+), IM, 0.5mL 2018    Pneumococcal, PPSV23, PNEUMOVAX 23, (age 2y+), SC/IM, 0.5mL 2011    TDaP, ADACEL (age 10y-64y), BOOSTRIX (age 10y+), IM, 0.5mL

## 2024-06-17 NOTE — PROGRESS NOTES
per 24 hour   Intake 720 ml   Output 1725 ml   Net -1005 ml     Response to treatment:  Well tolerated by patient.     Pain Assessment:  Severity:  0 / 10  Quality of pain: N/A  Wound Pain Timing/Severity: none  Premedicated: No  Plan:   Plan of Care:    Daily change aquacel packing to open area in distal open incision and cover with dry gauze and medipore tape.    Plan for Ostomy Care:   Patient demonstrated understanding of how to empty bag into graduate cylinder.  Demonstrated how to open and closed bag on her own. Then she demonstrated how to change pouch on a mode.  Able to make pattern, cut flange. RN Crusted with powder and barrier film.  Placed paste ring around stoma and in naval. Alginate AG, passte strip then DuoDerm placed over paste strip in open area in naval. Then placed one piece flange with bag @ stoma.  Catalog marked and sample products ordered  Questions answered @ procedure.  Verbalized understanding.     Stoma Care - New ileostomy - Patient to empty appliance when 1/3 to 1/2 full with assistance of the staff. Cleanse inside and outside of the drain spout prior to rolling closed. Change appliance every 3-5 days or 1-2 times a week.  Call for problems with seal or leaking [phone] or [phone]. If don't answer leave message.     JONATHAN and bed side RN updated.    If drainage from mid incision near naval becomes greater, may request MD to utilize negative pressure wound therapy to control drainage and optimize ostomy seal.      Specialty Bed Required : Yes   [] Low Air Loss   [x] Pressure Redistribution Isoflex gel therapy pressure redistribution mattress in place.   [] Fluid Immersion  [] Bariatric  [] Total Pressure Relief  [] Other:     Current Diet: ADULT DIET; Regular; Low Fat (less than or equal to 50 gm/day)  ADULT ORAL NUTRITION SUPPLEMENT; Breakfast, Dinner; Standard High Calorie/High Protein Oral Supplement  Dietician consult:  Yes    Discharge Plan:  Placement for patient upon  discharge: intermediate care facility   Patient appropriate for Outpatient Wound Care Center: Yes  Supplies given Yes   Samples requested Yes    Call to [employer] [phone] and free samples ordered and sent to patients home:   2 piece convex flange  Lock and roll bag  One piece convex with lock and roll bag    # 60428 Adapt Paste  # 7906 Powder  # 8805 Paste rings  # 26382 Barrier extenders  # 7917 skin prep wipe barrier film  # 7760 Barrier removal wipes  # 73942 Deodorant  #    Belt medium  Curved scissors will be provided in packet kit    Email to [employer] [facility] [phone] and Free samples ordered and sent to patients home:   #   Sensura Tima convex flange (2 piece)  #   Pouch for 2 piece bag  #   1 piece with drainable bag.    # 62640 Deodorant packet  # 58140 Bravo paste seal rings  # 4247 Belt for Sensura Tima  # 160264 Adhesive remover wipes  # 321232 Barrier film wipes  # 806892 Brava Elastic barrier extender strips  # Small scissors will be provided in packet kit      Referrals:  []  following looking for placement  [] Home Health Care  [] Supplies  [] Other    Patient/Caregiver Teaching:   Written Instructions given to patient - catalog marked and placed in folder.  Reviewed crusting.  Instructed on support group with local ostomy chapter.  Teaching provided:  [] Reviewed GI and A&P        [x] Supplies  [x] Pouch emptying      [x] Manipulate closure  [x] Routine Care         [] Comment  [x] Pouch maintenanc    Level of patient/caregiver understanding able to:   [x] Indicates understanding       [x] Needs reinforcement  [] Unsuccessful      [x] Verbal Understanding  [x] Demonstrated understanding       [] No evidence of learning  [] Refused teaching         [] N/A       Electronically signed by Renae Carnes, RN, MSN, CWOCN on 6/17/2024 at 11:47 AM

## 2024-06-17 NOTE — PLAN OF CARE
Problem: Discharge Planning  Goal: Discharge to home or other facility with appropriate resources  6/17/2024 1139 by Yolie Leach RN  Outcome: Progressing  6/16/2024 2253 by Jing Butler, RN  Outcome: Progressing  Flowsheets (Taken 6/16/2024 2125)  Discharge to home or other facility with appropriate resources:   Identify barriers to discharge with patient and caregiver   Arrange for needed discharge resources and transportation as appropriate     Problem: Pain  Goal: Verbalizes/displays adequate comfort level or baseline comfort level  6/17/2024 1139 by Yolie Leach RN  Outcome: Progressing  6/16/2024 2253 by Jing Butler, RN  Outcome: Progressing     Problem: Safety - Adult  Goal: Free from fall injury  6/17/2024 1139 by Yolie Leach RN  Outcome: Progressing  6/16/2024 2253 by Jing Butler, RN  Outcome: Progressing     Problem: Nutrition Deficit:  Goal: Optimize nutritional status  6/17/2024 1139 by Yolie Leach RN  Outcome: Progressing  6/16/2024 2253 by Jing Butler, RN  Outcome: Progressing

## 2024-06-18 LAB
ABO + RH BLD: NORMAL
ALBUMIN SERPL-MCNC: 2.4 G/DL (ref 3.4–5)
ANION GAP SERPL CALCULATED.3IONS-SCNC: 11 MMOL/L (ref 3–16)
BLD GP AB SCN SERPL QL: NORMAL
BLOOD BANK DISPENSE STATUS: NORMAL
BLOOD BANK PRODUCT CODE: NORMAL
BPU ID: NORMAL
BUN SERPL-MCNC: 12 MG/DL (ref 7–20)
CALCIUM SERPL-MCNC: 7.7 MG/DL (ref 8.3–10.6)
CHLORIDE SERPL-SCNC: 99 MMOL/L (ref 99–110)
CO2 SERPL-SCNC: 24 MMOL/L (ref 21–32)
CREAT SERPL-MCNC: 0.7 MG/DL (ref 0.6–1.2)
DEPRECATED RDW RBC AUTO: 13.9 % (ref 12.4–15.4)
DESCRIPTION BLOOD BANK: NORMAL
EKG ATRIAL RATE: 84 BPM
EKG DIAGNOSIS: NORMAL
EKG P AXIS: 22 DEGREES
EKG P-R INTERVAL: 190 MS
EKG Q-T INTERVAL: 386 MS
EKG QRS DURATION: 68 MS
EKG QTC CALCULATION (BAZETT): 456 MS
EKG R AXIS: 9 DEGREES
EKG T AXIS: 15 DEGREES
EKG VENTRICULAR RATE: 84 BPM
GFR SERPLBLD CREATININE-BSD FMLA CKD-EPI: 88 ML/MIN/{1.73_M2}
GLUCOSE SERPL-MCNC: 87 MG/DL (ref 70–99)
HCT VFR BLD AUTO: 19.2 % (ref 36–48)
HCT VFR BLD AUTO: 26 % (ref 36–48)
HGB BLD-MCNC: 6.5 G/DL (ref 12–16)
HGB BLD-MCNC: 8.6 G/DL (ref 12–16)
MCH RBC QN AUTO: 30.1 PG (ref 26–34)
MCHC RBC AUTO-ENTMCNC: 34 G/DL (ref 31–36)
MCV RBC AUTO: 88.6 FL (ref 80–100)
PHOSPHATE SERPL-MCNC: 4.1 MG/DL (ref 2.5–4.9)
PLATELET # BLD AUTO: 545 K/UL (ref 135–450)
PMV BLD AUTO: 6.1 FL (ref 5–10.5)
POTASSIUM SERPL-SCNC: 3.8 MMOL/L (ref 3.5–5.1)
RBC # BLD AUTO: 2.17 M/UL (ref 4–5.2)
SODIUM SERPL-SCNC: 134 MMOL/L (ref 136–145)
WBC # BLD AUTO: 8.7 K/UL (ref 4–11)

## 2024-06-18 PROCEDURE — 6370000000 HC RX 637 (ALT 250 FOR IP): Performed by: INTERNAL MEDICINE

## 2024-06-18 PROCEDURE — 6370000000 HC RX 637 (ALT 250 FOR IP)

## 2024-06-18 PROCEDURE — C9113 INJ PANTOPRAZOLE SODIUM, VIA: HCPCS | Performed by: SURGERY

## 2024-06-18 PROCEDURE — 2580000003 HC RX 258: Performed by: SURGERY

## 2024-06-18 PROCEDURE — 86923 COMPATIBILITY TEST ELECTRIC: CPT

## 2024-06-18 PROCEDURE — 36415 COLL VENOUS BLD VENIPUNCTURE: CPT

## 2024-06-18 PROCEDURE — 99024 POSTOP FOLLOW-UP VISIT: CPT | Performed by: SURGERY

## 2024-06-18 PROCEDURE — 97110 THERAPEUTIC EXERCISES: CPT

## 2024-06-18 PROCEDURE — 6370000000 HC RX 637 (ALT 250 FOR IP): Performed by: SURGERY

## 2024-06-18 PROCEDURE — APPNB45 APP NON BILLABLE 31-45 MINUTES: Performed by: CLINICAL NURSE SPECIALIST

## 2024-06-18 PROCEDURE — 6360000002 HC RX W HCPCS: Performed by: SURGERY

## 2024-06-18 PROCEDURE — 86850 RBC ANTIBODY SCREEN: CPT

## 2024-06-18 PROCEDURE — P9016 RBC LEUKOCYTES REDUCED: HCPCS

## 2024-06-18 PROCEDURE — 6370000000 HC RX 637 (ALT 250 FOR IP): Performed by: NURSE PRACTITIONER

## 2024-06-18 PROCEDURE — 86901 BLOOD TYPING SEROLOGIC RH(D): CPT

## 2024-06-18 PROCEDURE — 86900 BLOOD TYPING SEROLOGIC ABO: CPT

## 2024-06-18 PROCEDURE — 2060000000 HC ICU INTERMEDIATE R&B

## 2024-06-18 PROCEDURE — 85027 COMPLETE CBC AUTOMATED: CPT

## 2024-06-18 PROCEDURE — 97606 NEG PRS WND THER DME>50 SQCM: CPT

## 2024-06-18 PROCEDURE — 85018 HEMOGLOBIN: CPT

## 2024-06-18 PROCEDURE — 80069 RENAL FUNCTION PANEL: CPT

## 2024-06-18 PROCEDURE — 36430 TRANSFUSION BLD/BLD COMPNT: CPT

## 2024-06-18 PROCEDURE — APPNB30 APP NON BILLABLE TIME 0-30 MINS: Performed by: CLINICAL NURSE SPECIALIST

## 2024-06-18 PROCEDURE — 6370000000 HC RX 637 (ALT 250 FOR IP): Performed by: STUDENT IN AN ORGANIZED HEALTH CARE EDUCATION/TRAINING PROGRAM

## 2024-06-18 PROCEDURE — 85014 HEMATOCRIT: CPT

## 2024-06-18 PROCEDURE — 93010 ELECTROCARDIOGRAM REPORT: CPT | Performed by: INTERNAL MEDICINE

## 2024-06-18 RX ADMIN — Medication 6 MG: at 20:26

## 2024-06-18 RX ADMIN — OXYCODONE AND ACETAMINOPHEN 1 TABLET: 5; 325 TABLET ORAL at 15:53

## 2024-06-18 RX ADMIN — FLUOXETINE HYDROCHLORIDE 40 MG: 20 CAPSULE ORAL at 09:16

## 2024-06-18 RX ADMIN — THIAMINE HYDROCHLORIDE 100 MG: 100 INJECTION, SOLUTION INTRAMUSCULAR; INTRAVENOUS at 20:23

## 2024-06-18 RX ADMIN — SODIUM CHLORIDE, PRESERVATIVE FREE 10 ML: 5 INJECTION INTRAVENOUS at 09:23

## 2024-06-18 RX ADMIN — SODIUM CHLORIDE, PRESERVATIVE FREE 10 ML: 5 INJECTION INTRAVENOUS at 20:27

## 2024-06-18 RX ADMIN — NYSTATIN 500000 UNITS: 100000 SUSPENSION ORAL at 08:29

## 2024-06-18 RX ADMIN — Medication 400 MG: at 09:16

## 2024-06-18 RX ADMIN — TRAZODONE HYDROCHLORIDE 50 MG: 50 TABLET ORAL at 20:23

## 2024-06-18 RX ADMIN — Medication 400 MG: at 20:23

## 2024-06-18 RX ADMIN — PANTOPRAZOLE SODIUM 40 MG: 40 INJECTION, POWDER, FOR SOLUTION INTRAVENOUS at 09:16

## 2024-06-18 RX ADMIN — HYDROMORPHONE HYDROCHLORIDE 0.25 MG: 1 INJECTION, SOLUTION INTRAMUSCULAR; INTRAVENOUS; SUBCUTANEOUS at 21:54

## 2024-06-18 RX ADMIN — IBUPROFEN 400 MG: 400 TABLET, FILM COATED ORAL at 09:33

## 2024-06-18 RX ADMIN — AMOXICILLIN AND CLAVULANATE POTASSIUM 1 TABLET: 875; 125 TABLET, FILM COATED ORAL at 09:16

## 2024-06-18 RX ADMIN — DIBASIC SODIUM PHOSPHATE, MONOBASIC POTASSIUM PHOSPHATE AND MONOBASIC SODIUM PHOSPHATE 1 TABLET: 852; 155; 130 TABLET ORAL at 09:16

## 2024-06-18 RX ADMIN — APIXABAN 5 MG: 5 TABLET, FILM COATED ORAL at 20:23

## 2024-06-18 RX ADMIN — THIAMINE HYDROCHLORIDE 100 MG: 100 INJECTION, SOLUTION INTRAMUSCULAR; INTRAVENOUS at 09:16

## 2024-06-18 RX ADMIN — BUPROPION HYDROCHLORIDE 300 MG: 150 TABLET, EXTENDED RELEASE ORAL at 09:15

## 2024-06-18 RX ADMIN — NYSTATIN 500000 UNITS: 100000 SUSPENSION ORAL at 15:59

## 2024-06-18 RX ADMIN — OXYCODONE AND ACETAMINOPHEN 1 TABLET: 5; 325 TABLET ORAL at 02:22

## 2024-06-18 RX ADMIN — APIXABAN 5 MG: 5 TABLET, FILM COATED ORAL at 09:16

## 2024-06-18 RX ADMIN — AMLODIPINE BESYLATE 10 MG: 5 TABLET ORAL at 09:16

## 2024-06-18 RX ADMIN — DIBASIC SODIUM PHOSPHATE, MONOBASIC POTASSIUM PHOSPHATE AND MONOBASIC SODIUM PHOSPHATE 1 TABLET: 852; 155; 130 TABLET ORAL at 20:23

## 2024-06-18 RX ADMIN — NYSTATIN 500000 UNITS: 100000 SUSPENSION ORAL at 13:46

## 2024-06-18 RX ADMIN — NYSTATIN 500000 UNITS: 100000 SUSPENSION ORAL at 20:22

## 2024-06-18 RX ADMIN — AMOXICILLIN AND CLAVULANATE POTASSIUM 1 TABLET: 875; 125 TABLET, FILM COATED ORAL at 20:22

## 2024-06-18 ASSESSMENT — PAIN SCALES - GENERAL
PAINLEVEL_OUTOF10: 4
PAINLEVEL_OUTOF10: 0
PAINLEVEL_OUTOF10: 2
PAINLEVEL_OUTOF10: 0
PAINLEVEL_OUTOF10: 1
PAINLEVEL_OUTOF10: 4
PAINLEVEL_OUTOF10: 7

## 2024-06-18 ASSESSMENT — PAIN DESCRIPTION - DESCRIPTORS
DESCRIPTORS: PRESSURE
DESCRIPTORS: ACHING;DISCOMFORT

## 2024-06-18 ASSESSMENT — PAIN DESCRIPTION - ORIENTATION
ORIENTATION: MID;LOWER
ORIENTATION: MID;LOWER

## 2024-06-18 ASSESSMENT — PAIN DESCRIPTION - LOCATION
LOCATION: ABDOMEN
LOCATION: BACK

## 2024-06-18 NOTE — PROGRESS NOTES
Hospital Medicine Progress Note      Date of Admission: 5/30/2024  Hospital Day: 20    Chief Admission Complaint:  Left ankle pain     Subjective: Transfused PRBCs this AM. No bleeding. No pain, N/V.     Presenting Admission History:       a 78-year-old female past medical history of hypertension, recent left ankle fracture now reduced, status post right knee replacement who was admitted after a mechanical fall.  Patient had a rapid response called and was transferred to ICU for hypotension, abdominal distention, lactic acidosis.     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 General Surgery recommended OR evaluation on 6/2 and she was found to have gangrenous colon, now s/p subtotal colectomy and ileostomy creation.  In retrospect, she appears to have previously unrecognized Paroxysmal Atrial Fibrillation, thus thromboembolic event cannot be excluded as an underlying cause. Pathology from colectomy showed Ischemic Colitis, no malignancy.   Wound Care/Ostomy care  HALLIE drain removed  Empiric Abx were completed 6/11.  Tolerating diet.  Started having serosanguinous drainage from surgical site on 6/15 and started IV vanc empirically for surgical wound site infection. Cx showed E.col. Started Augmentin.  Vancomycin discontinued.  Continue wound care per GenSurg; Wound vac placed    Severe Sepsis with Septic shock 2/2 Colonic Ischemia   Was on  mulitple pressors- later weaned off   Treated with empiric abxs through 6/11.   Developed surgical wound infection 6/15 and Abx resumed as above.   Renal function improved  Leukocytosis has now completely resolved    Acute hypoxic and hypercapnic respiratory failure  Weaned and extubated  Weaned off O2 as tolerated  Resolved    Acute

## 2024-06-18 NOTE — PROGRESS NOTES
[facility] GENERAL SURGERY    Surgery Progress Note           POD # 15    PATIENT NAME: Sudha Lopez     TODAY'S DATE: 6/18/2024    INTERVAL HISTORY:    Doing okay. With wound drainage.   Eating better.      OBJECTIVE:   VITALS:  /66   Pulse 83   Temp 98.5 °F (36.9 °C) (Oral)   Resp 16   Ht 1.6 m (5' 3\")   Wt 75 kg (165 lb 5.5 oz)   SpO2 97%   BMI 29.29 kg/m²     INTAKE/OUTPUT:    I/O last 3 completed shifts:  In: 120 [P.O.:120]  Out: 2600 [Urine:1450; Stool:1150]  No intake/output data recorded.              CONSTITUTIONAL:  awake and alert  LUNGS: Unlabored  ABDOMEN:   normal bowel sounds, soft, non-distended, ostomy functional  INCISION: dressing in tact, drainage this AM - see wound care notes.     Data:  CBC:   Recent Labs     06/16/24  0538 06/17/24  0632 06/18/24  0530   WBC 10.8 9.3 8.7   HGB 7.3* 7.3* 6.5*   HCT 21.8* 22.1* 19.2*   * 609* 545*       BMP:    Recent Labs     06/16/24  0538 06/17/24  0632 06/18/24  0530   * 134* 134*   K 3.9 4.0 3.8   CL 98* 99 99   CO2 23 25 24   BUN 13 13 12   CREATININE 0.7 0.7 0.7   GLUCOSE 98 96 87       Hepatic: No results for input(s): \"AST\", \"ALT\", \"BILITOT\", \"ALKPHOS\" in the last 72 hours.    Invalid input(s): \"ALB\"  Mag:      No results for input(s): \"MG\" in the last 72 hours.     Phos:     Recent Labs     06/16/24  0538 06/17/24  0632 06/18/24  0530   PHOS 3.5 4.4 4.1       ASSESSMENT AND PLAN:  78 y.o. female status post subtotal colectomy with ileostomy.    Tolerating diet.   Ostomy functioning and WOCN following    Wound infection: discussed at length with wound nurse and will place wound vac with white sponge today to assist with healing and help control the drainage.     Anemia: receiving blood today.     Dispo: SNF precert pending - case management has confirmed that facility will be able to handle the wound vac.     Electronically signed by CASPER Marquis CNP     Patient seen and agree with above and more than half of  the total time was spent by me on the encounter.   Wound vac placed.  PRBC today  Abx for wound infection  Addi Jackson MD

## 2024-06-18 NOTE — PROGRESS NOTES
Physical Therapy  Facility/Department: [facility] C4 PCU  Daily Treatment Note  NAME: Sudha oLpez  : 1945  MRN: [id number]    Date of Service: 2024    Discharge Recommendations:  Patient able to tolerate 3 hours of therapy per day, IP Rehab   PT Equipment Recommendations  Equipment Needed: No  Other: defer to next level of care, w/c likely, possible AD.    Patient Diagnosis(es): The primary encounter diagnosis was Closed fracture of left ankle, initial encounter. Diagnoses of Dislocation of left ankle joint, initial encounter, Syncope, unspecified syncope type, Hypoxemia, Acute respiratory failure with hypoxia (HCC), Large bowel obstruction (HCC), and Leukocytosis, unspecified type were also pertinent to this visit.    Assessment   Assessment: pt requires 2 skilled therapists to progress to higher level of function with maximal safety, PT refusing OOB or EOB today due to getting blood and also due to fear of falling, states she will try once she has her boot. Pt agreeable to supine ther ex X 15 reps.  Pt will benefit from continued Acute Inpatient Skilled PT to address functional mobility deficits, cont to rec IPR at DC  Activity Tolerance: Patient tolerated treatment well;Patient limited by pain  Equipment Needed: No  Other: defer to next level of care, w/c likely, possible AD.     Plan    Physical Therapy Plan  General Plan: 5-7 times per week  Specific Instructions for Next Treatment: progress mobility as able; provide TKA handouts  Current Treatment Recommendations: Strengthening;Balance training;Functional mobility training;Transfer training;Gait training;Wheelchair mobility training;Endurance training;Therapeutic activities;Co-Treatment;Pain management;Home exercise program;Patient/Caregiver education & training;Safety education & training     Restrictions  Restrictions/Precautions  Restrictions/Precautions: Up as Tolerated, General Precautions, Surgical Protocols, Weight Bearing  Required Braces or

## 2024-06-18 NOTE — CARE COORDINATION
CM update; Pre-cert started for patient to go to [place] for rehab. Will follow.Kristen Hamilton RN      4817 Received call from [facility] with Wound Care ; Wound Vac placed today. [place] is aware that this was going to happen. Will follow.Kristen Hamilton RN     97.4

## 2024-06-18 NOTE — PROGRESS NOTES
Occupational Therapy  Facility/Department: [facility] C4 PCU  Daily Treatment Note  NAME: Sudha Lopez  : 1945  MRN: [id number]    Date of Service: 2024    Discharge Recommendations:  Subacute/Skilled Nursing Facility       Therapy discharge recommendations are subject to collaboration from the patient’s interdisciplinary healthcare team, including MD and case management recommendations.  Barriers to Home Discharge:   [] Steps to access home entry or bed/bath   [x] Unable to transfer, ambulate, or propel wheelchair household distances without assist   [x] Limited available assist at home upon discharge    [x] Patient or family requests d/c to post-acute facility    [] Poor cognition/safety awareness for d/c to home alone    [x] Unable to maintain ordered weight bearing status    [] Patient with salient signs of long-standing immobility   [x] Decreased independence with ADLs   [x] Increased risk for falls   [] Other:    Patient Diagnosis(es): The primary encounter diagnosis was Closed fracture of left ankle, initial encounter. Diagnoses of Dislocation of left ankle joint, initial encounter, Syncope, unspecified syncope type, Hypoxemia, Acute respiratory failure with hypoxia (HCC), Large bowel obstruction (HCC), and Leukocytosis, unspecified type were also pertinent to this visit.     Assessment    Assessment: Pt tolerated session fair, declined any EOB/OOB activity. Pt tolerated UE therex with rest breaks, reports history of multiple injuries/surgeries to RUE limiting ROM and strength at times. Co-tx collaboration this date with PT staff to safely progress pt toward goals. Pt will have better occupational performance outcomes within a co-treatment than 1:1 session. Pt functioning below her baseline, due to barriers noted above continue to recommend SNF at d/c.   Activity Tolerance: Patient tolerated treatment well;Patient limited by pain  Discharge Recommendations: Subacute/Skilled Nursing Facility      Plan    Safety Devices  Type of Devices: Left in bed;Call light within reach (left with wound care RN)     Patient Education  Education Given To: Patient  Education Provided: Role of Therapy;Plan of Care;Home Exercise Program;Precautions  Education Provided Comments: importance of EOB/OOB repositioning, how to progress UE therex with and without resistance  Education Method: Demonstration;Verbal  Barriers to Learning: Cognition  Education Outcome: Verbalized understanding;Continued education needed    Goals  Short Term Goals  Time Frame for Short Term Goals: 6/12, unless otherwise noted- extend to 6/20 d/t extended LOS  Short Term Goal 1: Pt will complete seated EOB for ~ 5 minutes with Min A for balance in prep for seated ADLs - GOAL MET 6/13 pt sat on EOB for 10 min with SBA-CGA  Short Term Goal 2: Pt will complete bed>chair or bed>BSC with Mod A x 2-- GOAL MET 6/11, progress goal to minAx2  Short Term Goal 3: Pt will complete UE HEP 15x reps each by 6/18-- goal not met pt demos 12-15x 6/18/24  Short Term Goal 4: Pt will complete seated grooming tasks (wash face, brush teeth) with CGA-- GOAL MET 6/08, progress goal to SPV    AM-PAC - ADL  AM-PAC Daily Activity - Inpatient   How much help is needed for putting on and taking off regular lower body clothing?: Total  How much help is needed for bathing (which includes washing, rinsing, drying)?: A Lot  How much help is needed for toileting (which includes using toilet, bedpan, or urinal)?: Total  How much help is needed for putting on and taking off regular upper body clothing?: A Lot  How much help is needed for taking care of personal grooming?: A Little  How much help for eating meals?: A Little  AM-PAC Inpatient Daily Activity Raw Score: 12  AM-PAC Inpatient ADL T-Scale Score : 30.6  ADL Inpatient CMS 0-100% Score: 66.57  ADL Inpatient CMS G-Code Modifier : CL    Therapy Time   Individual Concurrent Group Co-treatment   Time In       0930   Time Out       0957   Minutes

## 2024-06-18 NOTE — PROGRESS NOTES
[facility] Wound Ostomy Continence Nurse  Follow-up Progress Note       NAME:  Sudha Lopez  MEDICAL RECORD NUMBER:  [id number]  AGE:  78 y.o.   GENDER:  female  :  1945  TODAY'S DATE:  2024    Subjective: The ostomy bag is OK but the drainage from wound is a lot and the nurses kept changing it.   Wound Identification:  Wound Type: Surgical mid line abd staple line (2open areas mid and distal). Negative pressure wound therapy dressing applied today to control drainage from distal abd staple line.  Contributing Factors: edema, decreased mobility, shear force, obesity, and decreased tissue oxygenation     New ileostomy: Subtotal colectomy with ileostomy on 24 for bowel obstruction by Dr Dung Jackson.      Stoma size:  25 = 1 inch mm x 28 mm 1 1/8 inch. DO NOT THROW AWAY PATTERN.  Appliance size: No leaking since convex wafer placed yesterday. Coloplast 2 pience RED convex flange # 93942 with drainable bag # 21738. paste # [id number] placed around stoma. Crust with light dusting of powder, rub in, seal with dabbing barrier wipe over powder. Stoma paste or paste ring under stoma in crease and @ naval to make a flat pouching surface. Barrier extenders until redness subsides around stoma.  Belt added to help secure pouch and prevent leaking. DO NOT THROW AWAY BELT.  If soiled it can be washed.    Patient Goal of Care:  [x] Wound Healing  [] Odor Control   [] Palliative Care  [] Pain Control   [x] Other: ilesotomy care    Objective: Physical therapy here finishing up treatment exercises upon WOCN arrival.  Patient receiving blood for low H & H 6.5 & 19.2. Splint on left lower leg, waiting for boot.  Remains non weight bearing on left.  Ostomy pouch intact but distal staple line    /66   Pulse 83   Temp 98.5 °F (36.9 °C) (Oral)   Resp 16   Ht 1.6 m (5' 3\")   Wt 75 kg (165 lb 5.5 oz)   SpO2 97%   BMI 29.29 kg/m²   Kirill Risk Score: Kirill Scale Score: 18  Assessment:   of the staff. Cleanse inside and outside of the drain spout prior to rolling closed. Change appliance every 3-5 days or 1-2 times a week.  Call for problems with seal or leaking [phone] or [phone]. If don't answer leave message.     Change cannister once a week or when full.    If suction fails or patient is discharged:  - remove vac dressing  - cleanse wound with normal saline   - pack wound with saline moistened gauze and change every 12 hours.     JONATHAN and bed side RN updated.    Specialty Bed Required : Yes   [] Low Air Loss   [x] Pressure Redistribution Isoflex gel therapy pressure redistribution mattress in place.   [] Fluid Immersion  [] Bariatric  [] Total Pressure Relief  [] Other:     Current Diet: ADULT DIET; Regular; Low Fat (less than or equal to 50 gm/day)  ADULT ORAL NUTRITION SUPPLEMENT; Breakfast, Dinner; Standard High Calorie/High Protein Oral Supplement  Dietician consult:  Yes    Discharge Plan:  Placement for patient upon discharge: intermediate care facility   Patient appropriate for Outpatient Wound Care Center: Yes  Supplies given Yes   Samples requested Yes  Referrals:  []  Precertification for [place] in progress.  Informed NPWT placed today along abd incision to control drainage.  [] Home Health Care  [] Supplies  [] Other    Patient/Caregiver Teaching: instructed on NPWT dressing.   Level of patient/caregiver understanding able to:   [] Indicates understanding       [] Needs reinforcement  [] Unsuccessful      [x] Verbal Understanding  [] Demonstrated understanding       [] No evidence of learning  [] Refused teaching         [] N/A       Electronically signed by Renae Carnes, RN, MSN, CWOCN on 6/18/2024 at 11:10 AM

## 2024-06-19 LAB
ACANTHOCYTES BLD QL SMEAR: ABNORMAL
ANION GAP SERPL CALCULATED.3IONS-SCNC: 10 MMOL/L (ref 3–16)
ANISOCYTOSIS BLD QL SMEAR: ABNORMAL
BASOPHILS # BLD: 0 K/UL (ref 0–0.2)
BASOPHILS NFR BLD: 0 %
BUN SERPL-MCNC: 13 MG/DL (ref 7–20)
CALCIUM SERPL-MCNC: 8.6 MG/DL (ref 8.3–10.6)
CHLORIDE SERPL-SCNC: 98 MMOL/L (ref 99–110)
CO2 SERPL-SCNC: 24 MMOL/L (ref 21–32)
CREAT SERPL-MCNC: 0.8 MG/DL (ref 0.6–1.2)
DEPRECATED RDW RBC AUTO: 14.2 % (ref 12.4–15.4)
EOSINOPHIL # BLD: 0.4 K/UL (ref 0–0.6)
EOSINOPHIL NFR BLD: 4 %
GFR SERPLBLD CREATININE-BSD FMLA CKD-EPI: 75 ML/MIN/{1.73_M2}
GLUCOSE SERPL-MCNC: 100 MG/DL (ref 70–99)
HCT VFR BLD AUTO: 24.5 % (ref 36–48)
HGB BLD-MCNC: 8.5 G/DL (ref 12–16)
HYPOCHROMIA BLD QL SMEAR: ABNORMAL
LYMPHOCYTES # BLD: 1.2 K/UL (ref 1–5.1)
LYMPHOCYTES NFR BLD: 11 %
MCH RBC QN AUTO: 30.6 PG (ref 26–34)
MCHC RBC AUTO-ENTMCNC: 34.6 G/DL (ref 31–36)
MCV RBC AUTO: 88.3 FL (ref 80–100)
METAMYELOCYTES NFR BLD MANUAL: 2 %
MONOCYTES # BLD: 1.4 K/UL (ref 0–1.3)
MONOCYTES NFR BLD: 13 %
NEUTROPHILS # BLD: 7.8 K/UL (ref 1.7–7.7)
NEUTROPHILS NFR BLD: 62 %
NEUTS BAND NFR BLD MANUAL: 8 % (ref 0–7)
OVALOCYTES BLD QL SMEAR: ABNORMAL
PATH INTERP BLD-IMP: NORMAL
PATH INTERP BLD-IMP: YES
PLATELET # BLD AUTO: 560 K/UL (ref 135–450)
PLATELET BLD QL SMEAR: ABNORMAL
PMV BLD AUTO: 6.1 FL (ref 5–10.5)
POIKILOCYTOSIS BLD QL SMEAR: ABNORMAL
POLYCHROMASIA BLD QL SMEAR: ABNORMAL
POTASSIUM SERPL-SCNC: 4.3 MMOL/L (ref 3.5–5.1)
RBC # BLD AUTO: 2.78 M/UL (ref 4–5.2)
SLIDE REVIEW: ABNORMAL
SODIUM SERPL-SCNC: 132 MMOL/L (ref 136–145)
TOXIC GRANULES BLD QL SMEAR: PRESENT
WBC # BLD AUTO: 10.9 K/UL (ref 4–11)

## 2024-06-19 PROCEDURE — 85025 COMPLETE CBC W/AUTO DIFF WBC: CPT

## 2024-06-19 PROCEDURE — 2580000003 HC RX 258: Performed by: SURGERY

## 2024-06-19 PROCEDURE — 6360000002 HC RX W HCPCS: Performed by: SURGERY

## 2024-06-19 PROCEDURE — 6370000000 HC RX 637 (ALT 250 FOR IP): Performed by: SURGERY

## 2024-06-19 PROCEDURE — C9113 INJ PANTOPRAZOLE SODIUM, VIA: HCPCS | Performed by: SURGERY

## 2024-06-19 PROCEDURE — 97110 THERAPEUTIC EXERCISES: CPT

## 2024-06-19 PROCEDURE — 97530 THERAPEUTIC ACTIVITIES: CPT

## 2024-06-19 PROCEDURE — 6370000000 HC RX 637 (ALT 250 FOR IP)

## 2024-06-19 PROCEDURE — APPNB45 APP NON BILLABLE 31-45 MINUTES: Performed by: CLINICAL NURSE SPECIALIST

## 2024-06-19 PROCEDURE — 99024 POSTOP FOLLOW-UP VISIT: CPT | Performed by: SURGERY

## 2024-06-19 PROCEDURE — 6370000000 HC RX 637 (ALT 250 FOR IP): Performed by: INTERNAL MEDICINE

## 2024-06-19 PROCEDURE — 2060000000 HC ICU INTERMEDIATE R&B

## 2024-06-19 PROCEDURE — 80048 BASIC METABOLIC PNL TOTAL CA: CPT

## 2024-06-19 PROCEDURE — 6370000000 HC RX 637 (ALT 250 FOR IP): Performed by: NURSE PRACTITIONER

## 2024-06-19 PROCEDURE — 6370000000 HC RX 637 (ALT 250 FOR IP): Performed by: STUDENT IN AN ORGANIZED HEALTH CARE EDUCATION/TRAINING PROGRAM

## 2024-06-19 RX ADMIN — Medication 400 MG: at 08:20

## 2024-06-19 RX ADMIN — BENZOCAINE: 200 SPRAY DENTAL; ORAL; PERIODONTAL at 20:09

## 2024-06-19 RX ADMIN — Medication 6 MG: at 20:14

## 2024-06-19 RX ADMIN — PANTOPRAZOLE SODIUM 40 MG: 40 INJECTION, POWDER, FOR SOLUTION INTRAVENOUS at 08:19

## 2024-06-19 RX ADMIN — HYDROMORPHONE HYDROCHLORIDE 0.25 MG: 1 INJECTION, SOLUTION INTRAMUSCULAR; INTRAVENOUS; SUBCUTANEOUS at 02:05

## 2024-06-19 RX ADMIN — SODIUM CHLORIDE, PRESERVATIVE FREE 10 ML: 5 INJECTION INTRAVENOUS at 14:26

## 2024-06-19 RX ADMIN — NYSTATIN 500000 UNITS: 100000 SUSPENSION ORAL at 17:46

## 2024-06-19 RX ADMIN — THIAMINE HYDROCHLORIDE 100 MG: 100 INJECTION, SOLUTION INTRAMUSCULAR; INTRAVENOUS at 20:06

## 2024-06-19 RX ADMIN — IBUPROFEN 400 MG: 400 TABLET, FILM COATED ORAL at 14:26

## 2024-06-19 RX ADMIN — NYSTATIN 500000 UNITS: 100000 SUSPENSION ORAL at 14:25

## 2024-06-19 RX ADMIN — HYDROMORPHONE HYDROCHLORIDE 0.25 MG: 1 INJECTION, SOLUTION INTRAMUSCULAR; INTRAVENOUS; SUBCUTANEOUS at 21:56

## 2024-06-19 RX ADMIN — APIXABAN 5 MG: 5 TABLET, FILM COATED ORAL at 08:20

## 2024-06-19 RX ADMIN — AMOXICILLIN AND CLAVULANATE POTASSIUM 1 TABLET: 875; 125 TABLET, FILM COATED ORAL at 08:20

## 2024-06-19 RX ADMIN — DIBASIC SODIUM PHOSPHATE, MONOBASIC POTASSIUM PHOSPHATE AND MONOBASIC SODIUM PHOSPHATE 1 TABLET: 852; 155; 130 TABLET ORAL at 20:06

## 2024-06-19 RX ADMIN — THIAMINE HYDROCHLORIDE 100 MG: 100 INJECTION, SOLUTION INTRAMUSCULAR; INTRAVENOUS at 08:20

## 2024-06-19 RX ADMIN — Medication 400 MG: at 20:06

## 2024-06-19 RX ADMIN — BENZOCAINE: 200 SPRAY DENTAL; ORAL; PERIODONTAL at 17:46

## 2024-06-19 RX ADMIN — TRAZODONE HYDROCHLORIDE 50 MG: 50 TABLET ORAL at 20:06

## 2024-06-19 RX ADMIN — DIBASIC SODIUM PHOSPHATE, MONOBASIC POTASSIUM PHOSPHATE AND MONOBASIC SODIUM PHOSPHATE 1 TABLET: 852; 155; 130 TABLET ORAL at 08:20

## 2024-06-19 RX ADMIN — NYSTATIN 500000 UNITS: 100000 SUSPENSION ORAL at 08:21

## 2024-06-19 RX ADMIN — BUPROPION HYDROCHLORIDE 300 MG: 150 TABLET, EXTENDED RELEASE ORAL at 08:20

## 2024-06-19 RX ADMIN — APIXABAN 5 MG: 5 TABLET, FILM COATED ORAL at 20:06

## 2024-06-19 RX ADMIN — AMOXICILLIN AND CLAVULANATE POTASSIUM 1 TABLET: 875; 125 TABLET, FILM COATED ORAL at 20:06

## 2024-06-19 RX ADMIN — OXYCODONE AND ACETAMINOPHEN 1 TABLET: 5; 325 TABLET ORAL at 08:14

## 2024-06-19 RX ADMIN — AMLODIPINE BESYLATE 10 MG: 5 TABLET ORAL at 08:20

## 2024-06-19 RX ADMIN — FLUOXETINE HYDROCHLORIDE 40 MG: 20 CAPSULE ORAL at 08:20

## 2024-06-19 RX ADMIN — SODIUM CHLORIDE, PRESERVATIVE FREE 5 ML: 5 INJECTION INTRAVENOUS at 20:34

## 2024-06-19 RX ADMIN — NYSTATIN 500000 UNITS: 100000 SUSPENSION ORAL at 20:06

## 2024-06-19 ASSESSMENT — PAIN SCALES - GENERAL
PAINLEVEL_OUTOF10: 4
PAINLEVEL_OUTOF10: 7
PAINLEVEL_OUTOF10: 4
PAINLEVEL_OUTOF10: 7
PAINLEVEL_OUTOF10: 1
PAINLEVEL_OUTOF10: 5
PAINLEVEL_OUTOF10: 7
PAINLEVEL_OUTOF10: 1

## 2024-06-19 ASSESSMENT — PAIN DESCRIPTION - LOCATION
LOCATION: ABDOMEN
LOCATION: LEG
LOCATION: ABDOMEN;FOOT
LOCATION: ABDOMEN;LEG
LOCATION: ABDOMEN;LEG

## 2024-06-19 ASSESSMENT — PAIN DESCRIPTION - ORIENTATION
ORIENTATION: RIGHT

## 2024-06-19 ASSESSMENT — PAIN SCALES - WONG BAKER: WONGBAKER_NUMERICALRESPONSE: NO HURT

## 2024-06-19 NOTE — PROGRESS NOTES
Hospital Medicine Progress Note      Date of Admission: 5/30/2024  Hospital Day: 21    Chief Admission Complaint:  Left ankle pain     Subjective:  no new c/o    Presenting Admission History:         a 78-year-old female past medical history of hypertension, recent left ankle fracture now reduced, status post right knee replacement who was admitted after a mechanical fall.  Patient had a rapid response called and was transferred to ICU for hypotension, abdominal distention, lactic acidosis.       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 General Surgery recommended OR evaluation on 6/2 and she was found to have gangrenous colon, now s/p subtotal colectomy and ileostomy creation.  In retrospect, she appears to have previously unrecognized Paroxysmal Atrial Fibrillation, thus thromboembolic event cannot be excluded as an underlying cause. Pathology from colectomy showed Ischemic Colitis, no malignancy.   Wound Care/Ostomy care  HALLIE drain removed  Empiric Abx were completed 6/11.  Tolerating diet.  Started having serosanguinous drainage from surgical site on 6/15 and started IV vanc empirically for surgical wound site infection. Cx showed E.col. Started Augmentin.  Vancomycin discontinued.  Continue wound care per GenSurg; now w/ wound vac.  Closely following mental and respiratory status as well as vitals as documented in this note and the medical record for evidence ADR due to IV Narcotic analgesia       Severe Sepsis with Septic shock 2/2 Colonic Ischemia   Was on  mulitple pressors- later weaned off   Treated with empiric abxs through 6/11.   Developed surgical wound infection 6/15 and Abx resumed as  oxyCODONE-acetaminophen, HYDROmorphone, prochlorperazine, diatrizoate meglumine-sodium, melatonin, heparin (porcine), heparin (porcine), glucose, dextrose bolus **OR** dextrose bolus, glucagon (rDNA), dextrose, metoprolol, sodium chloride flush, sodium chloride, ondansetron **OR** ondansetron, acetaminophen **OR** acetaminophen     Labs:  Personally reviewed and interpreted for clinical significance.     Recent Labs     06/17/24 0632 06/18/24 0530 06/18/24 2019 06/19/24  0510   WBC 9.3 8.7  --  10.9   HGB 7.3* 6.5* 8.6* 8.5*   HCT 22.1* 19.2* 26.0* 24.5*   * 545*  --  560*     Recent Labs     06/17/24 0632 06/18/24 0530 06/19/24  0510   * 134* 132*   K 4.0 3.8 4.3   CL 99 99 98*   CO2 25 24 24   BUN 13 12 13   CREATININE 0.7 0.7 0.8   CALCIUM 8.5 7.7* 8.6   PHOS 4.4 4.1  --      No results for input(s): \"PROBNP\", \"TROPHS\" in the last 72 hours.  No results for input(s): \"LABA1C\" in the last 72 hours.  No results for input(s): \"AST\", \"ALT\", \"BILIDIR\", \"BILITOT\", \"ALKPHOS\" in the last 72 hours.  No results for input(s): \"INR\", \"LACTA\", \"TSH\" in the last 72 hours.    Urine Cultures: No results found for: \"LABURIN\"  Blood Cultures:   Lab Results   Component Value Date/Time    BC No Growth after 4 days of incubation. 06/05/2024 08:29 AM     Lab Results   Component Value Date/Time    BLOODCULT2 No Growth after 4 days of incubation. 06/05/2024 08:29 AM     Organism:   Lab Results   Component Value Date/Time    ORG Escherichia coli 06/15/2024 06:19 PM         Anuel Davila MD

## 2024-06-19 NOTE — PROGRESS NOTES
Awake, alert, oriented. Lying in bed on CPM machine.  Lunch brought in room.  Current ostomy and NPWT dressing intact and secure. Small amount of purulent drainage since yesterday 60 ml.  Pouch emptied.  Next dressing change due 6/21/24.  Precert still pending. Will continue to follow for dressing changes. Purwick remains in place.       06/19/24 1233   Negative Pressure Wound Therapy Abdomen Medial;Upper   Placement Date/Time: 06/18/24 1105   Present on Admission/Arrival: No  Location: Abdomen  Wound Location Orientation: Medial;Upper   Wound Type Surgical   Unit Type KCI/3M rental [id number]   Dressing Type White Foam;Black Foam   Number of pieces used 4   Number of pieces removed 0   Cycle Continuous   Target Pressure (mmHg) 125   Intensity 1   Canister changed? No   Dressing Status Clean, dry & intact   Drainage Amount Small   Drainage Description Purulent   Dressing Change Due 06/21/24   Output (ml) 60 ml   Wound Assessment Other (Comment)  (did not assess, dressing in place)   Odor None   Incision 06/02/24 Abdomen Medial;Upper;Anterior   Date First Assessed/Time First Assessed: 06/02/24 0721   Present on Original Admission: No  Location: Abdomen  Incision Location Orientation: Medial;Upper;Anterior  Incision Description (Comments): 6/17/24 per CWOCN -7 cm tunnel at 1200 position at di...   Dressing Status Clean;Dry;Intact   Dressing Change Due 06/21/24   Incision Cleansed Not Cleansed   Dressing/Treatment Barrier film;Other (comment);Xeroform;Negative pressure wound therapy   Incision Assessment   (did not assess, dressing in place)   Drainage Amount Small (< 25%)   Drainage Description Purulent   Odor None        06/19/24 1236   Ileostomy/Jejunostomy RUQ Ileostomy   Placement Date: 06/02/24   Present on Admission/Arrival: No  Location: RUQ  Ostomy Type: Ileostomy   Stomal Appliance 2 piece;Convex;Clean;Dry;Intact   Flange Size (inches) 2.25 Inches   Stool Appearance Loose;Watery   Stool Color Green;Brown

## 2024-06-19 NOTE — CARE COORDINATION
CM update; Checked on pre-cert today this has gone to the Medical Doctor for review. Will follow for outcome.Kristen Hamilton RN    [id number] New PT/OT noted provided for review. Will follow for decision.Kristen Hamilton RN

## 2024-06-19 NOTE — PROGRESS NOTES
Physical Therapy  Facility/Department: [facility] C4 PCU  Daily Treatment Note  NAME: Sudha Lopez  : 1945  MRN: [id number]    Date of Service: 2024    Discharge Recommendations:  Patient able to tolerate 3 hours of therapy per day, IP Rehab   PT Equipment Recommendations  Equipment Needed: No  Other: defer to next level of care, w/c likely, possible AD.    Patient Diagnosis(es): The primary encounter diagnosis was Closed fracture of left ankle, initial encounter. Diagnoses of Dislocation of left ankle joint, initial encounter, Syncope, unspecified syncope type, Hypoxemia, Acute respiratory failure with hypoxia (HCC), Large bowel obstruction (HCC), and Leukocytosis, unspecified type were also pertinent to this visit.    Assessment   Assessment: Pt performing better today, needing SBA for bed mobility supine to sit and min/mod A of 2 sitto supine due to fatigue.  Pt performed exs EOB and supine 10-15 reps.  Pt declined transfers to chair and was quite fatigued after sitting EOB 15 minutes.  Pt is recommended for con't skilled PT and IPR at D/C.  Activity Tolerance: Patient tolerated treatment well;Patient limited by pain  Equipment Needed: No  Other: defer to next level of care, w/c likely, possible AD.     Plan    Physical Therapy Plan  General Plan: 5-7 times per week  Specific Instructions for Next Treatment: progress mobility as able; provide TKA handouts  Current Treatment Recommendations: Strengthening;Balance training;Functional mobility training;Transfer training;Gait training;Wheelchair mobility training;Endurance training;Therapeutic activities;Co-Treatment;Pain management;Home exercise program;Patient/Caregiver education & training;Safety education & training     Restrictions  Restrictions/Precautions  Restrictions/Precautions: Up as Tolerated, General Precautions, Surgical Protocols, Weight Bearing  Required Braces or Orthoses?: No  Lower Extremity Weight Bearing Restrictions  Right Lower    Minutes       41           Hellen Orozco, PTA#[id number]

## 2024-06-19 NOTE — PROGRESS NOTES
[facility] GENERAL SURGERY    Surgery Progress Note           POD # 16    PATIENT NAME: Sudha Lopez     TODAY'S DATE: 6/19/2024    INTERVAL HISTORY:    Resting comfortably.   Wound vac in place.     Awaiting precert for facility  OBJECTIVE:   VITALS:  /68   Pulse 82   Temp 97.9 °F (36.6 °C) (Oral)   Resp 18   Ht 1.6 m (5' 3\")   Wt 75 kg (165 lb 5.5 oz)   SpO2 95%   BMI 29.29 kg/m²     INTAKE/OUTPUT:    I/O last 3 completed shifts:  In: 1432.5 [P.O.:1080; Blood:352.5]  Out: 4300 [Urine:2800; Stool:1500]  No intake/output data recorded.              CONSTITUTIONAL:  awake and alert  ABDOMEN:   normal bowel sounds, soft, non-distended, ostomy functional  INCISION: wound vac in place with small amt purulent drainage.     Data:  CBC:   Recent Labs     06/17/24  0632 06/18/24 0530 06/18/24 2019 06/19/24  0510   WBC 9.3 8.7  --  10.9   HGB 7.3* 6.5* 8.6* 8.5*   HCT 22.1* 19.2* 26.0* 24.5*   * 545*  --  560*       BMP:    Recent Labs     06/17/24  0632 06/18/24  0530 06/19/24  0510   * 134* 132*   K 4.0 3.8 4.3   CL 99 99 98*   CO2 25 24 24   BUN 13 12 13   CREATININE 0.7 0.7 0.8   GLUCOSE 96 87 100*       Hepatic: No results for input(s): \"AST\", \"ALT\", \"BILITOT\", \"ALKPHOS\" in the last 72 hours.    Invalid input(s): \"ALB\"  Mag:      No results for input(s): \"MG\" in the last 72 hours.     Phos:     Recent Labs     06/17/24  0632 06/18/24  0530   PHOS 4.4 4.1       ASSESSMENT AND PLAN:  78 y.o. female status post subtotal colectomy with ileostomy.    Tolerating diet.   Ostomy functioning and WOCN following    Wound infection: wound vac, antibiotics     Anemia: appropriate rise after transfusion yesterday    Dispo: SNF precert pending     Electronically signed by CASPER Marquis CNP     Patient seen and agree with above and more than half of the total time was spent by me on the encounter.   Ok to discharge from my standpoint    Addi Jackson MD

## 2024-06-19 NOTE — PROGRESS NOTES
Occupational Therapy  Facility/Department: [facility] C4 PCU  Daily Treatment Note  NAME: Sudha Lopez  : 1945  MRN: [id number]    Date of Service: 2024    Discharge Recommendations:  Subacute/Skilled Nursing Facility       Therapy discharge recommendations are subject to collaboration from the patient’s interdisciplinary healthcare team, including MD and case management recommendations.  Barriers to Home Discharge:   [] Steps to access home entry or bed/bath   [x] Unable to transfer, ambulate, or propel wheelchair household distances without assist   [x] Limited available assist at home upon discharge    [x] Patient or family requests d/c to post-acute facility    [] Poor cognition/safety awareness for d/c to home alone    [x] Unable to maintain ordered weight bearing status    [] Patient with salient signs of long-standing immobility   [x] Decreased independence with ADLs   [x] Increased risk for falls   [] Other:    Patient Diagnosis(es): The primary encounter diagnosis was Closed fracture of left ankle, initial encounter. Diagnoses of Dislocation of left ankle joint, initial encounter, Syncope, unspecified syncope type, Hypoxemia, Acute respiratory failure with hypoxia (HCC), Large bowel obstruction (HCC), and Leukocytosis, unspecified type were also pertinent to this visit.     Assessment    Assessment: Pt tolerated OT session fair, pt demos fatigue with therex at EOB, requires frequent and extended rest breaks. Pt purchased own weights (1#), pt demos difficulty completing 10 reps of some UE therex while maintaining proper form, educated pt on importance of being able to complete without added resistance before introducing 1# weight, pt verbalized understanding. Co-tx collaboration this date with PT staff to safely progress pt toward goals. Pt will have better occupational performance outcomes within a co-treatment than 1:1 session. Pt functioning below her baseline, continue to recommend SNF at

## 2024-06-20 LAB
ALBUMIN SERPL-MCNC: 2.7 G/DL (ref 3.4–5)
ANION GAP SERPL CALCULATED.3IONS-SCNC: 11 MMOL/L (ref 3–16)
BUN SERPL-MCNC: 13 MG/DL (ref 7–20)
CALCIUM SERPL-MCNC: 8.8 MG/DL (ref 8.3–10.6)
CHLORIDE SERPL-SCNC: 97 MMOL/L (ref 99–110)
CO2 SERPL-SCNC: 22 MMOL/L (ref 21–32)
CREAT SERPL-MCNC: 0.7 MG/DL (ref 0.6–1.2)
DEPRECATED RDW RBC AUTO: 14.1 % (ref 12.4–15.4)
GFR SERPLBLD CREATININE-BSD FMLA CKD-EPI: 88 ML/MIN/{1.73_M2}
GLUCOSE SERPL-MCNC: 129 MG/DL (ref 70–99)
HCT VFR BLD AUTO: 25.4 % (ref 36–48)
HGB BLD-MCNC: 8.5 G/DL (ref 12–16)
MCH RBC QN AUTO: 30.1 PG (ref 26–34)
MCHC RBC AUTO-ENTMCNC: 33.4 G/DL (ref 31–36)
MCV RBC AUTO: 90.2 FL (ref 80–100)
PHOSPHATE SERPL-MCNC: 4.2 MG/DL (ref 2.5–4.9)
PLATELET # BLD AUTO: 516 K/UL (ref 135–450)
PMV BLD AUTO: 6.2 FL (ref 5–10.5)
POTASSIUM SERPL-SCNC: 3.8 MMOL/L (ref 3.5–5.1)
RBC # BLD AUTO: 2.82 M/UL (ref 4–5.2)
SODIUM SERPL-SCNC: 130 MMOL/L (ref 136–145)
WBC # BLD AUTO: 11 K/UL (ref 4–11)

## 2024-06-20 PROCEDURE — 97530 THERAPEUTIC ACTIVITIES: CPT

## 2024-06-20 PROCEDURE — 6370000000 HC RX 637 (ALT 250 FOR IP): Performed by: SURGERY

## 2024-06-20 PROCEDURE — 6370000000 HC RX 637 (ALT 250 FOR IP): Performed by: INTERNAL MEDICINE

## 2024-06-20 PROCEDURE — 6370000000 HC RX 637 (ALT 250 FOR IP)

## 2024-06-20 PROCEDURE — 97110 THERAPEUTIC EXERCISES: CPT

## 2024-06-20 PROCEDURE — 85027 COMPLETE CBC AUTOMATED: CPT

## 2024-06-20 PROCEDURE — 6360000002 HC RX W HCPCS: Performed by: SURGERY

## 2024-06-20 PROCEDURE — 2580000003 HC RX 258: Performed by: SURGERY

## 2024-06-20 PROCEDURE — C9113 INJ PANTOPRAZOLE SODIUM, VIA: HCPCS | Performed by: SURGERY

## 2024-06-20 PROCEDURE — 6370000000 HC RX 637 (ALT 250 FOR IP): Performed by: STUDENT IN AN ORGANIZED HEALTH CARE EDUCATION/TRAINING PROGRAM

## 2024-06-20 PROCEDURE — 2060000000 HC ICU INTERMEDIATE R&B

## 2024-06-20 PROCEDURE — 80069 RENAL FUNCTION PANEL: CPT

## 2024-06-20 PROCEDURE — 6370000000 HC RX 637 (ALT 250 FOR IP): Performed by: NURSE PRACTITIONER

## 2024-06-20 PROCEDURE — 97535 SELF CARE MNGMENT TRAINING: CPT

## 2024-06-20 RX ORDER — AMOXICILLIN AND CLAVULANATE POTASSIUM 875; 125 MG/1; MG/1
1 TABLET, FILM COATED ORAL EVERY 12 HOURS SCHEDULED
Qty: 20 TABLET | Refills: 0 | Status: ON HOLD | DISCHARGE
Start: 2024-06-20 | End: 2024-06-30

## 2024-06-20 RX ORDER — OXYCODONE HYDROCHLORIDE AND ACETAMINOPHEN 5; 325 MG/1; MG/1
1 TABLET ORAL EVERY 4 HOURS PRN
Qty: 12 TABLET | Refills: 0 | Status: SHIPPED | OUTPATIENT
Start: 2024-06-20 | End: 2024-06-20

## 2024-06-20 RX ORDER — OXYCODONE HYDROCHLORIDE AND ACETAMINOPHEN 5; 325 MG/1; MG/1
1 TABLET ORAL EVERY 4 HOURS PRN
Qty: 12 TABLET | Refills: 0 | Status: SHIPPED | OUTPATIENT
Start: 2024-06-20 | End: 2024-06-23

## 2024-06-20 RX ADMIN — BUPROPION HYDROCHLORIDE 300 MG: 150 TABLET, EXTENDED RELEASE ORAL at 09:33

## 2024-06-20 RX ADMIN — BENZOCAINE: 200 SPRAY DENTAL; ORAL; PERIODONTAL at 13:10

## 2024-06-20 RX ADMIN — BENZOCAINE: 200 SPRAY DENTAL; ORAL; PERIODONTAL at 18:26

## 2024-06-20 RX ADMIN — DIBASIC SODIUM PHOSPHATE, MONOBASIC POTASSIUM PHOSPHATE AND MONOBASIC SODIUM PHOSPHATE 1 TABLET: 852; 155; 130 TABLET ORAL at 09:32

## 2024-06-20 RX ADMIN — Medication 6 MG: at 21:58

## 2024-06-20 RX ADMIN — OXYCODONE AND ACETAMINOPHEN 1 TABLET: 5; 325 TABLET ORAL at 03:58

## 2024-06-20 RX ADMIN — NYSTATIN 500000 UNITS: 100000 SUSPENSION ORAL at 21:55

## 2024-06-20 RX ADMIN — HYDROMORPHONE HYDROCHLORIDE 0.25 MG: 1 INJECTION, SOLUTION INTRAMUSCULAR; INTRAVENOUS; SUBCUTANEOUS at 21:58

## 2024-06-20 RX ADMIN — AMOXICILLIN AND CLAVULANATE POTASSIUM 1 TABLET: 875; 125 TABLET, FILM COATED ORAL at 09:32

## 2024-06-20 RX ADMIN — NYSTATIN 500000 UNITS: 100000 SUSPENSION ORAL at 13:09

## 2024-06-20 RX ADMIN — PANTOPRAZOLE SODIUM 40 MG: 40 INJECTION, POWDER, FOR SOLUTION INTRAVENOUS at 09:33

## 2024-06-20 RX ADMIN — BENZOCAINE: 200 SPRAY DENTAL; ORAL; PERIODONTAL at 09:34

## 2024-06-20 RX ADMIN — THIAMINE HYDROCHLORIDE 100 MG: 100 INJECTION, SOLUTION INTRAMUSCULAR; INTRAVENOUS at 21:55

## 2024-06-20 RX ADMIN — TRAZODONE HYDROCHLORIDE 50 MG: 50 TABLET ORAL at 21:55

## 2024-06-20 RX ADMIN — HYDROMORPHONE HYDROCHLORIDE 0.25 MG: 1 INJECTION, SOLUTION INTRAMUSCULAR; INTRAVENOUS; SUBCUTANEOUS at 01:44

## 2024-06-20 RX ADMIN — APIXABAN 5 MG: 5 TABLET, FILM COATED ORAL at 09:32

## 2024-06-20 RX ADMIN — AMLODIPINE BESYLATE 10 MG: 5 TABLET ORAL at 09:33

## 2024-06-20 RX ADMIN — Medication 400 MG: at 09:33

## 2024-06-20 RX ADMIN — SODIUM CHLORIDE, PRESERVATIVE FREE 10 ML: 5 INJECTION INTRAVENOUS at 21:55

## 2024-06-20 RX ADMIN — OXYCODONE AND ACETAMINOPHEN 1 TABLET: 5; 325 TABLET ORAL at 13:32

## 2024-06-20 RX ADMIN — FLUOXETINE HYDROCHLORIDE 40 MG: 20 CAPSULE ORAL at 09:32

## 2024-06-20 RX ADMIN — NYSTATIN 500000 UNITS: 100000 SUSPENSION ORAL at 18:26

## 2024-06-20 RX ADMIN — AMOXICILLIN AND CLAVULANATE POTASSIUM 1 TABLET: 875; 125 TABLET, FILM COATED ORAL at 21:55

## 2024-06-20 RX ADMIN — NYSTATIN 500000 UNITS: 100000 SUSPENSION ORAL at 09:33

## 2024-06-20 RX ADMIN — Medication 400 MG: at 21:55

## 2024-06-20 RX ADMIN — DIBASIC SODIUM PHOSPHATE, MONOBASIC POTASSIUM PHOSPHATE AND MONOBASIC SODIUM PHOSPHATE 1 TABLET: 852; 155; 130 TABLET ORAL at 21:55

## 2024-06-20 RX ADMIN — SODIUM CHLORIDE, PRESERVATIVE FREE 10 ML: 5 INJECTION INTRAVENOUS at 09:37

## 2024-06-20 RX ADMIN — APIXABAN 5 MG: 5 TABLET, FILM COATED ORAL at 21:55

## 2024-06-20 RX ADMIN — THIAMINE HYDROCHLORIDE 100 MG: 100 INJECTION, SOLUTION INTRAMUSCULAR; INTRAVENOUS at 09:33

## 2024-06-20 ASSESSMENT — PAIN SCALES - GENERAL
PAINLEVEL_OUTOF10: 7
PAINLEVEL_OUTOF10: 7
PAINLEVEL_OUTOF10: 3
PAINLEVEL_OUTOF10: 6
PAINLEVEL_OUTOF10: 6
PAINLEVEL_OUTOF10: 1
PAINLEVEL_OUTOF10: 7
PAINLEVEL_OUTOF10: 3
PAINLEVEL_OUTOF10: 7

## 2024-06-20 ASSESSMENT — PAIN DESCRIPTION - LOCATION: LOCATION: KNEE

## 2024-06-20 ASSESSMENT — PAIN DESCRIPTION - ORIENTATION: ORIENTATION: RIGHT

## 2024-06-20 ASSESSMENT — PAIN SCALES - WONG BAKER: WONGBAKER_NUMERICALRESPONSE: NO HURT

## 2024-06-20 NOTE — PLAN OF CARE
Check on dressing and ostomy status. All remain secure and intact.  Pouch emptied. Plan to change NPWT - negative wound therapy dressing tomorrow if she is not discharged. Wound ostomy care to continue to follow.        06/20/24 1815   Vital Signs   Temp 98.4 °F (36.9 °C)   Temp Source Oral   Pulse 78   Respirations 18   /66   MAP (Calculated) 88   BP Location Right upper arm   BP Method Automatic   Patient Position Semi fowlers   Ileostomy/Jejunostomy RUQ Ileostomy   Placement Date: 06/02/24   Present on Admission/Arrival: No  Location: RUQ  Ostomy Type: Ileostomy   Stomal Appliance 2 piece;Convex;Clean, dry & intact   Flange Size (inches) 2.25 Inches   Stoma  Assessment OMAIRA   Peristomal Assessment OMAIRA   Stool Appearance Loose;Watery   Stool Color Green   Stool Amount Medium   Output (mL) 300 ml   Negative Pressure Wound Therapy Abdomen Medial;Upper   Placement Date/Time: 06/18/24 1105   Present on Admission/Arrival: No  Location: Abdomen  Wound Location Orientation: Medial;Upper   Dressing Status Clean, dry & intact

## 2024-06-20 NOTE — DISCHARGE SUMMARY
Discharge Summary    Name:  Sudha Lopez /Age/Sex: 1945  (78 y.o. female)   MRN & CSN:  [id number] & [id number] Admission Date/Time: 2024 10:33 PM   Attending:  Sallie Joyner MD Discharging Physician: Sallie Joyner MD       Discharge Diagnosis:  Acute colonic ischemia  Severe sepsis  Septic shock  Acute hypoxic and hypercapnic respiratory failure  Acute blood loss anemia  Acute metabolic encephalopathy  A-fib RVR  Right TKR      Discharge Exam  Physical Exam  Vitals:    24 0332 24 0428 24 0930 24 1300   BP: 129/70  (!) 152/66 114/70   Pulse: 81  77 83   Resp: 18 18 16 16   Temp: 98.2 °F (36.8 °C)  98 °F (36.7 °C)    TempSrc: Oral  Oral    SpO2: 95%  95% 98%   Weight: 74.4 kg (164 lb 0.4 oz)      Height:          General appearance:  No apparent distress  Respiratory:  Normal respiratory effort.   Cardiovascular:  Regular rate and rhythm.  Abdomen:  Soft, midline incision, wound VAC in place  Musculoskelatal: Boot present on right lower extremity  Neurologic:  Non-focal  Psychiatric:  Alert and oriented    Hospital Course:   Sudha Lopez is a 78 y.o.  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 General Surgery recommended OR evaluation on  and she was found to have gangrenous colon, now s/p subtotal colectomy and ileostomy creation.  In retrospect, she appears to have previously unrecognized Paroxysmal Atrial Fibrillation, thus thromboembolic event cannot be excluded as an underlying cause. Pathology from colectomy showed Ischemic Colitis, no malignancy.   Wound Care/Ostomy care  HALLIE drain removed  Empiric Abx were completed .  Tolerating diet.  Started having serosanguinous    Bring a paper prescription for each of these medications  oxyCODONE-acetaminophen 5-325 MG per tablet       Information about where to get these medications is not yet available    Ask your nurse or doctor about these medications  amoxicillin-clavulanate 875-125 MG per tablet  apixaban 5 MG Tabs tablet  nystatin [id number] UNIT/ML suspension         Objective Findings at Discharge:       BMP/CBC  Recent Labs     06/18/24  0530 06/18/24  2019 06/19/24  0510 06/20/24  0505   *  --  132* 130*   K 3.8  --  4.3 3.8   CL 99  --  98* 97*   CO2 24  --  24 22   BUN 12  --  13 13   CREATININE 0.7  --  0.8 0.7   WBC 8.7  --  10.9 11.0   HCT 19.2* 26.0* 24.5* 25.4*   *  --  560* 516*       IMAGING:      Additional Information: Patient seen and examined day of discharge. For more information regarding patient's care please contact [facility] medical records [phone]    Discharge Time of 35 minutes    Electronically signed by Sallie Joyner MD on 6/20/2024 at 3:39 PM

## 2024-06-20 NOTE — PROGRESS NOTES
Occupational Therapy  Facility/Department: [facility] C4 PCU  Daily Treatment Note  NAME: Sudha Lopez  : 1945  MRN: [id number]    Date of Service: 2024    Discharge Recommendations:  Subacute/Skilled Nursing Facility       Therapy discharge recommendations are subject to collaboration from the patient’s interdisciplinary healthcare team, including MD and case management recommendations.  Barriers to Home Discharge:   [] Steps to access home entry or bed/bath   [x] Unable to transfer, ambulate, or propel wheelchair household distances without assist   [x] Limited available assist at home upon discharge    [x] Patient or family requests d/c to post-acute facility    [] Poor cognition/safety awareness for d/c to home alone    [x] Unable to maintain ordered weight bearing status    [] Patient with salient signs of long-standing immobility   [x] Decreased independence with ADLs   [x] Increased risk for falls   [] Other:     Patient Diagnosis(es): The primary encounter diagnosis was Closed fracture of left ankle, initial encounter. Diagnoses of Dislocation of left ankle joint, initial encounter, Syncope, unspecified syncope type, Hypoxemia, Acute respiratory failure with hypoxia (HCC), Large bowel obstruction (HCC), and Leukocytosis, unspecified type were also pertinent to this visit.     Assessment    Assessment: Pt tolerated session well, demos good motivation for OOB activity, however declining use of sliding board, used Josiane TEE for transfer training this date. Pt grossly min A of 2 with Josiane TEE. Pt was limited at times by abdominal pain, requiring rest breaks during session. Co-tx collaboration this date with PT staff to safely progress pt toward goals. Pt will have better occupational performance outcomes within a co-treatment than 1:1 session. Pt functioning below her baseline, continue to recommend SNF at d/c.   Activity Tolerance: Patient tolerated treatment well;Patient limited by pain;Patient  6/20/24    AM-PAC - ADL  AM-PAC Daily Activity - Inpatient   How much help is needed for putting on and taking off regular lower body clothing?: Total  How much help is needed for bathing (which includes washing, rinsing, drying)?: A Lot  How much help is needed for toileting (which includes using toilet, bedpan, or urinal)?: Total  How much help is needed for putting on and taking off regular upper body clothing?: A Lot  How much help is needed for taking care of personal grooming?: A Little  How much help for eating meals?: A Little  AM-[facility] Inpatient Daily Activity Raw Score: 12  AM-PAC Inpatient ADL T-Scale Score : 30.6  ADL Inpatient CMS 0-100% Score: 66.57  ADL Inpatient CMS G-Code Modifier : CL    Therapy Time   Individual Concurrent Group Co-treatment   Time In       0959   Time Out       1040   Minutes       41           MARI Clarke/COLLIN

## 2024-06-20 NOTE — PROGRESS NOTES
Comprehensive Nutrition Assessment    Type and Reason for Visit:  Reassess    Nutrition Recommendations/Plan:   Continue low fat diet and encourage po intakes  Modify ensure to 1x/day - monitor for acceptance - vanilla  Monitor nutrition adequacy, pertinent labs, bowel habits, wt changes, and clinical progress     Malnutrition Assessment:  Malnutrition Status:  At risk for malnutrition (Comment) (06/20/24 1316)    Context:  Acute Illness     Findings of the 6 clinical characteristics of malnutrition:  Energy Intake:  Mild decrease in energy intake (Comment)  Fluid Accumulation:  Mild Generalized, Extremities    Nutrition Assessment:    Follow up: Pt continues on low fat diet with ensures BID. Limited po intakes per EMR, recent intakes % and x1 0% ONS. Pt unavailable at time of visit. Weight trending down, also -24.7 L since admit. Will decrease ONS and monitor for acceptance. Continue encouraging po intakes, will continue to monitor.    Nutrition Related Findings:    -650 mL ostomy output 6/19. +active BS. +1 generalized and BUE and BLE edema. Na 130. Wound Type: Wound Vac, Surgical Incision       Current Nutrition Intake & Therapies:    Average Meal Intake: 51-75%, %  Average Supplements Intake: 0%  ADULT DIET; Regular; Low Fat (less than or equal to 50 gm/day)  ADULT ORAL NUTRITION SUPPLEMENT; Breakfast, Dinner; Standard High Calorie/High Protein Oral Supplement    Anthropometric Measures:  Height: 160 cm (5' 3\")  Ideal Body Weight (IBW): 115 lbs (52 kg)       Current Body Weight: 74.4 kg (164 lb 0.4 oz),   IBW. Weight Source: Bed Scale  Current BMI (kg/m2): 29.1  Usual Body Weight: 83.9 kg (184 lb 15.5 oz) (3/11/24)  % Weight Change (Calculated): -11.3  Weight Adjustment For: No Adjustment                 BMI Categories: Overweight (BMI 25.0-29.9)    Estimated Daily Nutrient Needs:  Energy Requirements Based On: Kcal/kg (25-30)  Weight Used for Energy Requirements: Ideal  Energy (kcal/day):

## 2024-06-20 NOTE — PROGRESS NOTES
Physical Therapy  Facility/Department: [place] [address] PCU  Daily Treatment Note  NAME: Sudha Lopez  : 1945  MRN: [id number]    Date of Service: 2024    Discharge Recommendations:  Subacute/Skilled Nursing Facility   PT Equipment Recommendations  Equipment Needed: No  Other: defer to next level of care, w/c likely, possible AD.    Patient Diagnosis(es): The primary encounter diagnosis was Closed fracture of left ankle, initial encounter. Diagnoses of Dislocation of left ankle joint, initial encounter, Syncope, unspecified syncope type, Hypoxemia, Acute respiratory failure with hypoxia (HCC), Large bowel obstruction (HCC), and Leukocytosis, unspecified type were also pertinent to this visit.    Assessment   Assessment: Pt seen as co treat to safely progress functional mobility NWB LLE.  Pt required A of 2 for bed mobility, transfers STS and bed to chair usingthe stedy.  Pt particpated in BLE exs X 15 reps in the chair after tranfers and was left up in chair at EOS.  Pt is recommended for con't skilled PT and SNF at D/C.  Activity Tolerance: Patient tolerated treatment well;Patient limited by pain;Patient limited by endurance  Equipment Needed: No  Other: defer to next level of care, w/c likely, possible AD.     Plan    Physical Therapy Plan  General Plan: 5-7 times per week  Specific Instructions for Next Treatment: progress mobility as able; provide TKA handouts  Current Treatment Recommendations: Strengthening;Balance training;Functional mobility training;Transfer training;Gait training;Wheelchair mobility training;Endurance training;Therapeutic activities;Co-Treatment;Pain management;Home exercise program;Patient/Caregiver education & training;Safety education & training     Restrictions  Restrictions/Precautions  Restrictions/Precautions: Up as Tolerated, General Precautions, Surgical Protocols, Weight Bearing  Required Braces or Orthoses?: No  Lower Extremity Weight Bearing Restrictions  Right Lower  Extremity Weight Bearing: Weight Bearing As Tolerated  Left Lower Extremity Weight Bearing: Non Weight Bearing  Required Braces or Orthoses  Right Lower Extremity Brace: Knee Immobilizer  Left Lower Extremity Brace: Boot  Position Activity Restriction  Other position/activity restrictions: Iliostomy, RTKA 5/29, PICC (LUE), L ankle fx, purewick, tele     Subjective    Subjective  Subjective: Agreeable to getting OOB to chair using stedy  Pain: Pt reports pain in R side of abdomen, not rated.  Orientation  Overall Orientation Status: Within Functional Limits  Cognition  Overall Cognitive Status: Exceptions  Attention Span: Difficulty dividing attention  Safety Judgement: Decreased awareness of need for safety  Insights: Decreased awareness of deficits     Objective   Vitals  Vitals:    06/20/24    BP: (!) 152/66   Pulse: 77   Resp:    Temp:    SpO2: 95%          Bed Mobility Training  Bed Mobility Training: Yes  Rolling: Minimum assistance  Supine to Sit: Minimum assistance;Assist X2 (to L with HOB elevated, increased time and use of bedrail)  Scooting: Stand-by assistance;Additional time  Balance  Sitting: Impaired (required mod A with initial supine to sit due to abdominal pain, after supported rest pt demos CGA-SBA for EOB)  Sitting - Static: Good (unsupported);Fair (occasional)  Sitting - Dynamic: Fair (occasional)  Standing: Impaired (Josiane TEE)  Standing - Static: Constant support;Poor  Transfer Training  Transfer Training: Yes (Josiane TEE)  Interventions: Verbal cues;Safety awareness training;Manual cues  Sit to Stand: Minimum assistance;Assist X2 (height of bed elevated)  Stand to Sit: Minimum assistance;Assist X2  Bed to Chair: Total assistance (Josiane TEE)     PT Exercises  Exercise Treatment: BLe seated exs: TRACEY SPENCER marches, clamshells X 15 each     Safety Devices  Type of Devices: Left in chair;Chair alarm in place;Call light within reach;Nurse notified;Gait belt       Goals  Short Term Goals  Time Frame

## 2024-06-20 NOTE — CARE COORDINATION
CM update: LOS # 20 P2P offered all information sent to [phone] option #5. Today by 1530. Will follow.Kristen Hamilton RN    [id number] Attending completed MD P2P and it will be reviewed and will give us a decision.Kristen Hamilton RN

## 2024-06-21 VITALS
DIASTOLIC BLOOD PRESSURE: 78 MMHG | BODY MASS INDEX: 29.06 KG/M2 | WEIGHT: 164.02 LBS | OXYGEN SATURATION: 96 % | RESPIRATION RATE: 18 BRPM | SYSTOLIC BLOOD PRESSURE: 119 MMHG | HEIGHT: 63 IN | HEART RATE: 81 BPM | TEMPERATURE: 97.8 F

## 2024-06-21 PROCEDURE — 6370000000 HC RX 637 (ALT 250 FOR IP): Performed by: INTERNAL MEDICINE

## 2024-06-21 PROCEDURE — 97530 THERAPEUTIC ACTIVITIES: CPT

## 2024-06-21 PROCEDURE — 6360000002 HC RX W HCPCS: Performed by: SURGERY

## 2024-06-21 PROCEDURE — C9113 INJ PANTOPRAZOLE SODIUM, VIA: HCPCS | Performed by: SURGERY

## 2024-06-21 PROCEDURE — 97535 SELF CARE MNGMENT TRAINING: CPT

## 2024-06-21 PROCEDURE — 2580000003 HC RX 258: Performed by: SURGERY

## 2024-06-21 PROCEDURE — 6370000000 HC RX 637 (ALT 250 FOR IP): Performed by: SURGERY

## 2024-06-21 PROCEDURE — 97110 THERAPEUTIC EXERCISES: CPT

## 2024-06-21 PROCEDURE — 6370000000 HC RX 637 (ALT 250 FOR IP): Performed by: STUDENT IN AN ORGANIZED HEALTH CARE EDUCATION/TRAINING PROGRAM

## 2024-06-21 RX ADMIN — APIXABAN 5 MG: 5 TABLET, FILM COATED ORAL at 09:47

## 2024-06-21 RX ADMIN — NYSTATIN 500000 UNITS: 100000 SUSPENSION ORAL at 09:48

## 2024-06-21 RX ADMIN — NYSTATIN 500000 UNITS: 100000 SUSPENSION ORAL at 13:52

## 2024-06-21 RX ADMIN — IBUPROFEN 400 MG: 400 TABLET, FILM COATED ORAL at 09:47

## 2024-06-21 RX ADMIN — FLUOXETINE HYDROCHLORIDE 40 MG: 20 CAPSULE ORAL at 09:47

## 2024-06-21 RX ADMIN — HYDROMORPHONE HYDROCHLORIDE 0.25 MG: 1 INJECTION, SOLUTION INTRAMUSCULAR; INTRAVENOUS; SUBCUTANEOUS at 11:11

## 2024-06-21 RX ADMIN — PANTOPRAZOLE SODIUM 40 MG: 40 INJECTION, POWDER, FOR SOLUTION INTRAVENOUS at 09:48

## 2024-06-21 RX ADMIN — SODIUM CHLORIDE, PRESERVATIVE FREE 10 ML: 5 INJECTION INTRAVENOUS at 09:49

## 2024-06-21 RX ADMIN — HYDROMORPHONE HYDROCHLORIDE 0.25 MG: 1 INJECTION, SOLUTION INTRAMUSCULAR; INTRAVENOUS; SUBCUTANEOUS at 02:48

## 2024-06-21 RX ADMIN — BUPROPION HYDROCHLORIDE 300 MG: 150 TABLET, EXTENDED RELEASE ORAL at 09:47

## 2024-06-21 RX ADMIN — AMLODIPINE BESYLATE 10 MG: 5 TABLET ORAL at 09:47

## 2024-06-21 RX ADMIN — AMOXICILLIN AND CLAVULANATE POTASSIUM 1 TABLET: 875; 125 TABLET, FILM COATED ORAL at 09:47

## 2024-06-21 RX ADMIN — DIBASIC SODIUM PHOSPHATE, MONOBASIC POTASSIUM PHOSPHATE AND MONOBASIC SODIUM PHOSPHATE 1 TABLET: 852; 155; 130 TABLET ORAL at 09:47

## 2024-06-21 RX ADMIN — Medication 400 MG: at 09:47

## 2024-06-21 RX ADMIN — THIAMINE HYDROCHLORIDE 100 MG: 100 INJECTION, SOLUTION INTRAMUSCULAR; INTRAVENOUS at 09:48

## 2024-06-21 ASSESSMENT — PAIN SCALES - GENERAL
PAINLEVEL_OUTOF10: 6
PAINLEVEL_OUTOF10: 4

## 2024-06-21 ASSESSMENT — PAIN - FUNCTIONAL ASSESSMENT: PAIN_FUNCTIONAL_ASSESSMENT: PREVENTS OR INTERFERES SOME ACTIVE ACTIVITIES AND ADLS

## 2024-06-21 ASSESSMENT — PAIN DESCRIPTION - DESCRIPTORS: DESCRIPTORS: ACHING

## 2024-06-21 ASSESSMENT — PAIN DESCRIPTION - ORIENTATION: ORIENTATION: MID;LOWER

## 2024-06-21 ASSESSMENT — PAIN DESCRIPTION - LOCATION: LOCATION: BACK

## 2024-06-21 ASSESSMENT — PAIN DESCRIPTION - PAIN TYPE: TYPE: CHRONIC PAIN

## 2024-06-21 NOTE — CARE COORDINATION
CM update; Patient has been approved for stay at [facility]. Call placed  to Lazara (daughter) to update . Wound Care will change and measure wound prior to discharge. Will notify [facility] of plan for discharge later today.Kristen Hamilton RN

## 2024-06-21 NOTE — DISCHARGE SUMMARY
Discharge Summary    Name:  Sudha Lopez /Age/Sex: 1945  (78 y.o. female)   MRN & CSN:  [id number] & [id number] Admission Date/Time: 2024 10:33 PM   Attending:  Sallie Joyner MD Discharging Physician: Sallie Joynre MD       Discharge Diagnosis:  Acute colonic ischemia  Severe sepsis  Septic shock  Acute hypoxic and hypercapnic respiratory failure  Acute blood loss anemia  Acute metabolic encephalopathy  A-fib RVR  Right TKR      Discharge Exam  Physical Exam  Vitals:    24 0821 24 0931 24 1111 24 1348   BP: 131/67 135/71  119/78   Pulse: 80 86  81   Resp: 18  18 18   Temp: 98 °F (36.7 °C)   97.8 °F (36.6 °C)   TempSrc: Oral   Axillary   SpO2: 96% 97%  96%   Weight:       Height:          General appearance:  No apparent distress  Respiratory:  Normal respiratory effort.   Cardiovascular:  Regular rate and rhythm.  Abdomen:  Soft, midline incision, wound VAC in place  Musculoskelatal: Boot present on right lower extremity  Neurologic:  Non-focal  Psychiatric:  Alert and oriented    Hospital Course:   Sudha Lopez is a 78 y.o.  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 General Surgery recommended OR evaluation on  and she was found to have gangrenous colon, now s/p subtotal colectomy and ileostomy creation.  In retrospect, she appears to have previously unrecognized Paroxysmal Atrial Fibrillation, thus thromboembolic event cannot be excluded as an underlying cause. Pathology from colectomy showed Ischemic Colitis, no malignancy.   Wound Care/Ostomy care  HALLIE drain removed  Empiric Abx were completed .  Tolerating diet.  Started having serosanguinous drainage from surgical site on  medications  oxyCODONE-acetaminophen 5-325 MG per tablet       Information about where to get these medications is not yet available    Ask your nurse or doctor about these medications  amoxicillin-clavulanate 875-125 MG per tablet  apixaban 5 MG Tabs tablet  nystatin 228003 UNIT/ML suspension         Objective Findings at Discharge:       BMP/CBC  Recent Labs     06/18/24  2019 06/19/24  0510 06/20/24  0505   NA  --  132* 130*   K  --  4.3 3.8   CL  --  98* 97*   CO2  --  24 22   BUN  --  13 13   CREATININE  --  0.8 0.7   WBC  --  10.9 11.0   HCT 26.0* 24.5* 25.4*   PLT  --  560* 516*         IMAGING:      Additional Information: Patient seen and examined day of discharge. For more information regarding patient's care please contact [facility] records [phone]    Discharge Time of 35 minutes    Electronically signed by Sallie Joyner MD on 6/21/2024 at 2:23 PM

## 2024-06-21 NOTE — PLAN OF CARE
Problem: Discharge Planning  Goal: Discharge to home or other facility with appropriate resources  Outcome: Progressing     Problem: Pain  Goal: Verbalizes/displays adequate comfort level or baseline comfort level  Outcome: Progressing     Problem: Safety - Adult  Goal: Free from fall injury  Outcome: Progressing     Problem: Nutrition Deficit:  Goal: Optimize nutritional status  Outcome: Progressing     Problem: ABCDS Injury Assessment  Goal: Absence of physical injury  Outcome: Progressing

## 2024-06-21 NOTE — PROGRESS NOTES
Occupational Therapy  Facility/Department: [facility] C4 PCU  Daily Treatment Note  NAME: Sudha Lopez  : 1945  MRN: [id number]    Date of Service: 2024    Discharge Recommendations:  Subacute/Skilled Nursing Facility         Patient Diagnosis(es): The primary encounter diagnosis was Closed fracture of left ankle, initial encounter. Diagnoses of Dislocation of left ankle joint, initial encounter, Syncope, unspecified syncope type, Hypoxemia, Acute respiratory failure with hypoxia (HCC), Large bowel obstruction (HCC), Leukocytosis, unspecified type, and Ischemic bowel disease (HCC) were also pertinent to this visit.     Assessment    Assessment: Pt tolerated OT treatment well overall, demos improved tolerance of transfers and OOB activity. Pt grossly min A of 2 for bed mobility and transfers with use of Josiane STEDY. Pt total A for LB ADLs including don/doffing boot. Co-tx collaboration this date with PT staff to safely progress pt toward goals. Pt will have better occupational performance outcomes within a co-treatment than 1:1 session. Pt functioning below her baseline, recommend SNF at d/c.   Activity Tolerance: Patient tolerated treatment well  Discharge Recommendations: Subacute/Skilled Nursing Facility      Plan   Occupational Therapy Plan  Times Per Week: 4-6x/week     Restrictions  Restrictions/Precautions  Restrictions/Precautions: Up as Tolerated;General Precautions;Surgical Protocols;Weight Bearing  Lower Extremity Weight Bearing Restrictions  Right Lower Extremity Weight Bearing: Weight Bearing As Tolerated  Left Lower Extremity Weight Bearing: Non Weight Bearing  Required Braces or Orthoses  Right Lower Extremity Brace: Knee Immobilizer  Left Lower Extremity Brace: Boot  Position Activity Restriction  Other position/activity restrictions: Iliostomy, RTKA , PICC (LUJOSE ROBERTO), L ankle fx, taisha, tele    Subjective   Subjective  Subjective: Pt in bed, agreeable to OT treatment, reports legs feel sore

## 2024-06-21 NOTE — PROGRESS NOTES
Discharge instructions, along with upcoming appointment information and new medications reviewed with patient; pt verbalized understanding. Tele monitor removed & logged, CMU aware. PICC removed at 1710 without complication per order, transparent occlusive dressing in place. Patient dressed and personal belongings packed. Report given to EMS transport staff at 1750. Copy of AVS and updated JONATHAN in chart. Attempted to call report 2x to [facility] with provided contact number, received no response. EMS transport en route to facility.

## 2024-06-21 NOTE — PROGRESS NOTES
She has been precert approved for [facility] today.  Are you OK with her being discharged? Please advise

## 2024-06-21 NOTE — PROGRESS NOTES
Physical Therapy  Facility/Department: [facility] C4 PCU  Daily Treatment Note  NAME: Sudha Lopez  : 1945  MRN: [id number]    Date of Service: 2024    Discharge Recommendations:  Subacute/Skilled Nursing Facility   PT Equipment Recommendations  Equipment Needed: No  Other: defer to next level of care, w/c likely, possible AD.    Patient Diagnosis(es): The primary encounter diagnosis was Closed fracture of left ankle, initial encounter. Diagnoses of Dislocation of left ankle joint, initial encounter, Syncope, unspecified syncope type, Hypoxemia, Acute respiratory failure with hypoxia (HCC), Large bowel obstruction (HCC), Leukocytosis, unspecified type, and Ischemic bowel disease (HCC) were also pertinent to this visit.    Assessment   Assessment: Pt seen as co treat to safely progress functional mobility NWB LLE.  Pt required A of 2 for bed mobility, transfers STS and bed to chair usingthe stedy.  Pt particpated in BLE exs X 15 reps in the chair after tranfers and was left up in chair at EOS.  Pt is recommended for con't skilled PT and SNF at D/C.  Activity Tolerance: Patient tolerated treatment well;Patient limited by pain;Patient limited by endurance  Equipment Needed: No  Other: defer to next level of care, w/c likely, possible AD.     Plan    Physical Therapy Plan  General Plan: 5-7 times per week  Specific Instructions for Next Treatment: progress mobility as able; provide TKA handouts  Current Treatment Recommendations: Strengthening;Balance training;Functional mobility training;Transfer training;Gait training;Wheelchair mobility training;Endurance training;Therapeutic activities;Co-Treatment;Pain management;Home exercise program;Patient/Caregiver education & training;Safety education & training     Restrictions  Restrictions/Precautions  Restrictions/Precautions: Up as Tolerated, General Precautions, Surgical Protocols, Weight Bearing  Required Braces or Orthoses?: No  Lower Extremity Weight Bearing

## 2024-06-21 NOTE — CARE COORDINATION
CASE MANAGEMENT DISCHARGE SUMMARY      Discharge to: Discharge to [facility]    Precertification completed: Yes  Hospital Exemption Notification (HENS) completed: yes: # [id number]    IMM given: (date) 6/20/24    New Durable Medical Equipment ordered/agency: Provided by the facility    Transportation:       Medical Transport explained to pt/family. Pt/family voice no agency preference.    Agency used: [facility]   time: 1630   Ambulance form completed: Yes    Confirmed discharge plan with: Patient will go to [facility]. Patient will require Wound Vac which is already at [facility]     Patient: yes     Family:  yes    Name: Abdi Contact number: [phone]     Facility/Agency, name: [facility] can pull from [facility] JONATHAN/AVS  does not need to be faxed   Phone number for report to facility: [phone]     RN, name: Phyllis    Note: Discharging nurse to complete JONATHAN, reconcile AVS, and place final copy with patient's discharge packet. RN to ensure that written prescriptions for  Level II medications are sent with patient to the facility as per protocol.  .Kristen Hamilton RN

## 2024-06-21 NOTE — CARE COORDINATION
CM update; Call from daughter Vitcorina wanting to Appeal discharge. Patient is alert and oriented and is denying request for Appeal she wants to go to [place] today. Will continue with the POC for going to [facility] at 1430, per [facility].Kristen Hamilton RN

## 2024-06-21 NOTE — PROGRESS NOTES
[facility]ound Ostomy Continence Nurse  Follow-up Progress Note       NAME:  Sudha Lopez  MEDICAL RECORD NUMBER:  [id number]  AGE:  78 y.o.   GENDER:  female  :  1945  TODAY'S DATE:  2024    Subjective: I get to leave today.  The pain medication made me dizzy.   Wound Identification:  Wound Type: Surgical mid line abd staple line (2open areas mid and distal). Negative pressure wound therapy dressing applied today to control drainage from distal abd staple line.  Contributing Factors: edema, decreased mobility, shear force, obesity, and decreased tissue oxygenation     New ileostomy: Subtotal colectomy with ileostomy on 24 for bowel obstruction by Dr Dung Jackson.      Stoma size:  25 = 1 inch mm x 28 mm 1 1/8 inch. DO NOT THROW AWAY PATTERN.  Appliance size: No leaking since convex wafer placed yesterday. Coloplast 2 pience RED convex flange # [id number] with drainable bag # [id number]. paste # [id number] placed around stoma. Crust with light dusting of powder, rub in, seal with dabbing barrier wipe over powder. Stoma paste or paste ring under stoma in crease and @ naval to make a flat pouching surface. Barrier extenders until redness subsides around stoma.  Belt added to help secure pouch and prevent leaking. DO NOT THROW AWAY BELT.  If soiled it can be washed.      Patient Goal of Care:  [x] Wound Healing  [] Odor Control   [] Palliative Care  [] Pain Control   [x] Other: ileostomy care    Objective: Sitting up in chair.  Steady back to bed.  Purwick in place.  NPWT dressing intact.    /71   Pulse 86   Temp 98 °F (36.7 °C) (Oral)   Resp 18   Ht 1.6 m (5' 3\")   Wt 74.4 kg (164 lb 0.4 oz)   SpO2 97%   BMI 29.06 kg/m²   Kirill Risk Score: Kiirll Scale Score: 18  Assessment:   Measurements:     Incision 24 Abdomen Medial;Upper;Anterior (Active)   Wound Image      24 1142   Dressing Status New dressing applied 24 1142   Dressing Change Due 24

## 2024-06-21 NOTE — PROGRESS NOTES
Text to Dr Sherice Joyner:    [phone] Hospital or Facility: [facility] From: Renae Carnes RE: ARTIE MEIER 1945 RM: [id number] She has been precert approved for [facility] and they have VAC machine waiting for her. Surgery is OK for her to go today. Is she ready to go per your perspective? I will also check with Ortho (im on 5 now). If you are going to discharge I will take off her VAC dressing, photo, measure put a moist to moist. The VAC will be replaced at the [facility]. JONATHAN is done but I will update the wound information. I just need to know from you if she is definately going. If not I will replace VAC dressing today. Hope to do this before 1200 noon as [facility] boot camp this after noon. Please advise. Call me if needed. From Wound care. Welcome, I guess you are new. Need Callback: NEEDS CALLBACK WOUND CARE [facility]

## 2024-06-30 ENCOUNTER — HOSPITAL ENCOUNTER (INPATIENT)
Age: 79
LOS: 8 days | Discharge: NURSING, REHAB OR LONG-TERM CARE | DRG: 862 | End: 2024-07-08
Attending: INTERNAL MEDICINE | Admitting: INTERNAL MEDICINE
Payer: MEDICARE

## 2024-06-30 DIAGNOSIS — A49.8 ENTEROCOCCUS FAECALIS INFECTION: ICD-10-CM

## 2024-06-30 DIAGNOSIS — K65.1 INTRA-ABDOMINAL ABSCESS (HCC): ICD-10-CM

## 2024-06-30 DIAGNOSIS — A49.8 E COLI INFECTION: Primary | ICD-10-CM

## 2024-06-30 PROBLEM — T81.43XA POSTOPERATIVE INTRA-ABDOMINAL ABSCESS: Status: ACTIVE | Noted: 2024-06-30

## 2024-06-30 LAB
ALBUMIN SERPL-MCNC: 2.9 G/DL (ref 3.4–5)
ALBUMIN/GLOB SERPL: 0.6 {RATIO} (ref 1.1–2.2)
ALP SERPL-CCNC: 168 U/L (ref 40–129)
ALT SERPL-CCNC: 23 U/L (ref 10–40)
ANION GAP SERPL CALCULATED.3IONS-SCNC: 12 MMOL/L (ref 3–16)
ANISOCYTOSIS BLD QL SMEAR: ABNORMAL
AST SERPL-CCNC: 20 U/L (ref 15–37)
BASOPHILS # BLD: 0 K/UL (ref 0–0.2)
BASOPHILS NFR BLD: 0 %
BILIRUB SERPL-MCNC: 0.4 MG/DL (ref 0–1)
BUN SERPL-MCNC: 26 MG/DL (ref 7–20)
CALCIUM SERPL-MCNC: 9.8 MG/DL (ref 8.3–10.6)
CHLORIDE SERPL-SCNC: 93 MMOL/L (ref 99–110)
CO2 SERPL-SCNC: 19 MMOL/L (ref 21–32)
CREAT SERPL-MCNC: 0.9 MG/DL (ref 0.6–1.2)
DEPRECATED RDW RBC AUTO: 15.2 % (ref 12.4–15.4)
EOSINOPHIL # BLD: 0 K/UL (ref 0–0.6)
EOSINOPHIL NFR BLD: 0 %
GFR SERPLBLD CREATININE-BSD FMLA CKD-EPI: 65 ML/MIN/{1.73_M2}
GLUCOSE SERPL-MCNC: 87 MG/DL (ref 70–99)
HCT VFR BLD AUTO: 28 % (ref 36–48)
HGB BLD-MCNC: 8.7 G/DL (ref 12–16)
LACTATE BLDV-SCNC: 1.2 MMOL/L (ref 0.4–1.9)
LYMPHOCYTES # BLD: 1.6 K/UL (ref 1–5.1)
LYMPHOCYTES NFR BLD: 11 %
MACROCYTES BLD QL SMEAR: ABNORMAL
MCH RBC QN AUTO: 28.9 PG (ref 26–34)
MCHC RBC AUTO-ENTMCNC: 30.9 G/DL (ref 31–36)
MCV RBC AUTO: 93.5 FL (ref 80–100)
METAMYELOCYTES NFR BLD MANUAL: 1 %
MICROCYTES BLD QL SMEAR: ABNORMAL
MONOCYTES # BLD: 1.2 K/UL (ref 0–1.3)
MONOCYTES NFR BLD: 8 %
NEUTROPHILS # BLD: 12 K/UL (ref 1.7–7.7)
NEUTROPHILS NFR BLD: 71 %
NEUTS BAND NFR BLD MANUAL: 9 % (ref 0–7)
OVALOCYTES BLD QL SMEAR: ABNORMAL
PLATELET # BLD AUTO: 407 K/UL (ref 135–450)
PLATELET BLD QL SMEAR: ADEQUATE
PMV BLD AUTO: 6.3 FL (ref 5–10.5)
POIKILOCYTOSIS BLD QL SMEAR: ABNORMAL
POLYCHROMASIA BLD QL SMEAR: ABNORMAL
POTASSIUM SERPL-SCNC: 4.9 MMOL/L (ref 3.5–5.1)
PROT SERPL-MCNC: 7.5 G/DL (ref 6.4–8.2)
RBC # BLD AUTO: 3 M/UL (ref 4–5.2)
SLIDE REVIEW: ABNORMAL
SODIUM SERPL-SCNC: 124 MMOL/L (ref 136–145)
TOXIC GRANULES BLD QL SMEAR: PRESENT
WBC # BLD AUTO: 14.8 K/UL (ref 4–11)

## 2024-06-30 PROCEDURE — 80053 COMPREHEN METABOLIC PANEL: CPT

## 2024-06-30 PROCEDURE — 6370000000 HC RX 637 (ALT 250 FOR IP): Performed by: NURSE PRACTITIONER

## 2024-06-30 PROCEDURE — C1751 CATH, INF, PER/CENT/MIDLINE: HCPCS

## 2024-06-30 PROCEDURE — 87641 MR-STAPH DNA AMP PROBE: CPT

## 2024-06-30 PROCEDURE — 2060000000 HC ICU INTERMEDIATE R&B

## 2024-06-30 PROCEDURE — 6360000002 HC RX W HCPCS: Performed by: STUDENT IN AN ORGANIZED HEALTH CARE EDUCATION/TRAINING PROGRAM

## 2024-06-30 PROCEDURE — 36569 INSJ PICC 5 YR+ W/O IMAGING: CPT

## 2024-06-30 PROCEDURE — 83605 ASSAY OF LACTIC ACID: CPT

## 2024-06-30 PROCEDURE — 87040 BLOOD CULTURE FOR BACTERIA: CPT

## 2024-06-30 PROCEDURE — 36415 COLL VENOUS BLD VENIPUNCTURE: CPT

## 2024-06-30 PROCEDURE — 2580000003 HC RX 258: Performed by: STUDENT IN AN ORGANIZED HEALTH CARE EDUCATION/TRAINING PROGRAM

## 2024-06-30 PROCEDURE — 85025 COMPLETE CBC W/AUTO DIFF WBC: CPT

## 2024-06-30 PROCEDURE — 6370000000 HC RX 637 (ALT 250 FOR IP): Performed by: STUDENT IN AN ORGANIZED HEALTH CARE EDUCATION/TRAINING PROGRAM

## 2024-06-30 PROCEDURE — 05HB33Z INSERTION OF INFUSION DEVICE INTO RIGHT BASILIC VEIN, PERCUTANEOUS APPROACH: ICD-10-PCS | Performed by: STUDENT IN AN ORGANIZED HEALTH CARE EDUCATION/TRAINING PROGRAM

## 2024-06-30 RX ORDER — SODIUM CHLORIDE 9 MG/ML
INJECTION, SOLUTION INTRAVENOUS PRN
Status: DISCONTINUED | OUTPATIENT
Start: 2024-06-30 | End: 2024-07-08 | Stop reason: HOSPADM

## 2024-06-30 RX ORDER — ATORVASTATIN CALCIUM 10 MG/1
10 TABLET, FILM COATED ORAL DAILY
Status: DISCONTINUED | OUTPATIENT
Start: 2024-06-30 | End: 2024-07-08 | Stop reason: HOSPADM

## 2024-06-30 RX ORDER — SODIUM CHLORIDE 9 MG/ML
INJECTION, SOLUTION INTRAVENOUS CONTINUOUS
Status: ACTIVE | OUTPATIENT
Start: 2024-06-30 | End: 2024-07-02

## 2024-06-30 RX ORDER — ONDANSETRON 2 MG/ML
4 INJECTION INTRAMUSCULAR; INTRAVENOUS EVERY 6 HOURS PRN
Status: DISCONTINUED | OUTPATIENT
Start: 2024-06-30 | End: 2024-07-08 | Stop reason: HOSPADM

## 2024-06-30 RX ORDER — OXYCODONE HYDROCHLORIDE AND ACETAMINOPHEN 5; 325 MG/1; MG/1
1 TABLET ORAL EVERY 4 HOURS PRN
Status: DISCONTINUED | OUTPATIENT
Start: 2024-06-30 | End: 2024-07-08 | Stop reason: HOSPADM

## 2024-06-30 RX ORDER — METRONIDAZOLE 500 MG/100ML
500 INJECTION, SOLUTION INTRAVENOUS EVERY 8 HOURS
Status: DISCONTINUED | OUTPATIENT
Start: 2024-06-30 | End: 2024-07-04

## 2024-06-30 RX ORDER — ACETAMINOPHEN 650 MG/1
650 SUPPOSITORY RECTAL EVERY 6 HOURS PRN
Status: DISCONTINUED | OUTPATIENT
Start: 2024-06-30 | End: 2024-07-08 | Stop reason: HOSPADM

## 2024-06-30 RX ORDER — POLYETHYLENE GLYCOL 3350 17 G/17G
17 POWDER, FOR SOLUTION ORAL DAILY PRN
Status: DISCONTINUED | OUTPATIENT
Start: 2024-06-30 | End: 2024-07-08 | Stop reason: HOSPADM

## 2024-06-30 RX ORDER — SODIUM CHLORIDE 0.9 % (FLUSH) 0.9 %
5-40 SYRINGE (ML) INJECTION EVERY 12 HOURS SCHEDULED
Status: DISCONTINUED | OUTPATIENT
Start: 2024-06-30 | End: 2024-07-08 | Stop reason: HOSPADM

## 2024-06-30 RX ORDER — TRAZODONE HYDROCHLORIDE 50 MG/1
75 TABLET ORAL NIGHTLY
Status: DISCONTINUED | OUTPATIENT
Start: 2024-06-30 | End: 2024-07-08 | Stop reason: HOSPADM

## 2024-06-30 RX ORDER — POTASSIUM CHLORIDE 20 MEQ/1
40 TABLET, EXTENDED RELEASE ORAL PRN
Status: DISCONTINUED | OUTPATIENT
Start: 2024-06-30 | End: 2024-07-08 | Stop reason: HOSPADM

## 2024-06-30 RX ORDER — SODIUM CHLORIDE 0.9 % (FLUSH) 0.9 %
5-40 SYRINGE (ML) INJECTION PRN
Status: DISCONTINUED | OUTPATIENT
Start: 2024-06-30 | End: 2024-07-08 | Stop reason: HOSPADM

## 2024-06-30 RX ORDER — BUPROPION HYDROCHLORIDE 150 MG/1
300 TABLET ORAL EVERY MORNING
Status: DISCONTINUED | OUTPATIENT
Start: 2024-07-01 | End: 2024-07-08 | Stop reason: HOSPADM

## 2024-06-30 RX ORDER — POTASSIUM CHLORIDE 7.45 MG/ML
10 INJECTION INTRAVENOUS PRN
Status: DISCONTINUED | OUTPATIENT
Start: 2024-06-30 | End: 2024-07-08 | Stop reason: HOSPADM

## 2024-06-30 RX ORDER — ONDANSETRON 4 MG/1
4 TABLET, ORALLY DISINTEGRATING ORAL EVERY 8 HOURS PRN
Status: DISCONTINUED | OUTPATIENT
Start: 2024-06-30 | End: 2024-07-08 | Stop reason: HOSPADM

## 2024-06-30 RX ORDER — ACETAMINOPHEN 325 MG/1
650 TABLET ORAL EVERY 6 HOURS PRN
Status: DISCONTINUED | OUTPATIENT
Start: 2024-06-30 | End: 2024-07-08 | Stop reason: HOSPADM

## 2024-06-30 RX ORDER — MAGNESIUM SULFATE IN WATER 40 MG/ML
2000 INJECTION, SOLUTION INTRAVENOUS PRN
Status: DISCONTINUED | OUTPATIENT
Start: 2024-06-30 | End: 2024-07-08 | Stop reason: HOSPADM

## 2024-06-30 RX ADMIN — VANCOMYCIN HYDROCHLORIDE 1000 MG: 1 INJECTION, POWDER, LYOPHILIZED, FOR SOLUTION INTRAVENOUS at 23:30

## 2024-06-30 RX ADMIN — OXYCODONE HYDROCHLORIDE AND ACETAMINOPHEN 1 TABLET: 5; 325 TABLET ORAL at 20:58

## 2024-06-30 RX ADMIN — METRONIDAZOLE 500 MG: 500 INJECTION, SOLUTION INTRAVENOUS at 23:28

## 2024-06-30 RX ADMIN — OXYCODONE HYDROCHLORIDE AND ACETAMINOPHEN 1 TABLET: 5; 325 TABLET ORAL at 15:20

## 2024-06-30 RX ADMIN — ATORVASTATIN CALCIUM 10 MG: 10 TABLET, FILM COATED ORAL at 15:20

## 2024-06-30 RX ADMIN — TRAZODONE HYDROCHLORIDE 75 MG: 50 TABLET ORAL at 21:18

## 2024-06-30 RX ADMIN — METRONIDAZOLE 500 MG: 500 INJECTION, SOLUTION INTRAVENOUS at 15:05

## 2024-06-30 RX ADMIN — SODIUM CHLORIDE, PRESERVATIVE FREE 10 ML: 5 INJECTION INTRAVENOUS at 20:58

## 2024-06-30 RX ADMIN — CEFEPIME 2000 MG: 2 INJECTION, POWDER, FOR SOLUTION INTRAVENOUS at 15:08

## 2024-06-30 RX ADMIN — SODIUM CHLORIDE: 9 INJECTION, SOLUTION INTRAVENOUS at 15:07

## 2024-06-30 RX ADMIN — SODIUM CHLORIDE: 9 INJECTION, SOLUTION INTRAVENOUS at 21:04

## 2024-06-30 RX ADMIN — SODIUM CHLORIDE: 9 INJECTION, SOLUTION INTRAVENOUS at 15:04

## 2024-06-30 ASSESSMENT — PAIN SCALES - GENERAL
PAINLEVEL_OUTOF10: 3
PAINLEVEL_OUTOF10: 8
PAINLEVEL_OUTOF10: 7

## 2024-06-30 ASSESSMENT — PAIN DESCRIPTION - LOCATION
LOCATION: ABDOMEN
LOCATION: ABDOMEN

## 2024-06-30 ASSESSMENT — PAIN DESCRIPTION - DESCRIPTORS: DESCRIPTORS: ACHING

## 2024-06-30 ASSESSMENT — PAIN DESCRIPTION - ORIENTATION: ORIENTATION: MID

## 2024-06-30 NOTE — H&P
V2.0  History and Physical      Name:  Sudha Lopez /Age/Sex: 1945  (78 y.o. female)   MRN & CSN:  [id number] & [id number] Encounter Date/Time: 2024 3:22 PM EDT   Location:  0438/0438-01 PCP: Perla Leyva MD       Hospital Day: 1    History from:     patient    History of Present Illness:     Chief Complaint: Postoperative intra-abdominal abscess    Sudha Lopez is a 78 y.o. female with pmh of atrial fibrillation, and recent colonic ischemia with colectomy.  She was discharged on  rehab facility.  Patient presented to [facility] with complaints of chest/epigastric pain.  Workup was negative for cardiovascular etiology, but CT scan did reveal intra-abdominal abscess likely the underlying etiology of her complaints.  Patient was subsequently transferred to [place] as she is recent postop for continuity of care.  On arrival patient is hemodynamically stable yet does have abdominal pain.  Patient did receive dose of vancomycin and Zosyn at [place] before transport.    Assessment and Plan:   Sudha Lopez is a 78 y.o. female who presents with Postoperative intra-abdominal abscess    Intra-abdominal abscess  Patient is recently postop from colectomy.  Patient also with cellulitis surrounding abdominal wound  General surgery and infectious disease consulted  IV antibiotics: Vancomycin plus cefepime plus Flagyl  Blood culture sent  Keep patient n.p.o. for evaluation by general surgery  Wound cultures when able  Wound care  Pain consrol    Hyponatremia  Patient sodium typically runs low and discharged at 130 last admission.  Lab from [facility] was 125  IV normal saline: 75 cc/h  Monitor sodium daily  Avoid overcorrection.  Goal 6-8    Atrial fibrillation  Hold home Eliquis until cleared by general surgery    Hyperlipidemia  Continue statin    Disposition:   Current Living situation: SNF  Expected Disposition: SNF   Estimated D/C: 5 days    Diet Diet NPO Exceptions are: Sips of Water

## 2024-07-01 LAB
ALBUMIN SERPL-MCNC: 2.6 G/DL (ref 3.4–5)
ALBUMIN/GLOB SERPL: 0.6 {RATIO} (ref 1.1–2.2)
ALP SERPL-CCNC: 143 U/L (ref 40–129)
ALT SERPL-CCNC: 18 U/L (ref 10–40)
ANION GAP SERPL CALCULATED.3IONS-SCNC: 12 MMOL/L (ref 3–16)
AST SERPL-CCNC: 16 U/L (ref 15–37)
BASOPHILS # BLD: 0.1 K/UL (ref 0–0.2)
BASOPHILS NFR BLD: 0.4 %
BILIRUB SERPL-MCNC: 0.4 MG/DL (ref 0–1)
BUN SERPL-MCNC: 18 MG/DL (ref 7–20)
CALCIUM SERPL-MCNC: 9.5 MG/DL (ref 8.3–10.6)
CHLORIDE SERPL-SCNC: 96 MMOL/L (ref 99–110)
CO2 SERPL-SCNC: 19 MMOL/L (ref 21–32)
CREAT SERPL-MCNC: 0.8 MG/DL (ref 0.6–1.2)
DEPRECATED RDW RBC AUTO: 14.3 % (ref 12.4–15.4)
EOSINOPHIL # BLD: 0.1 K/UL (ref 0–0.6)
EOSINOPHIL NFR BLD: 0.3 %
GFR SERPLBLD CREATININE-BSD FMLA CKD-EPI: 75 ML/MIN/{1.73_M2}
GLUCOSE BLD-MCNC: 103 MG/DL (ref 70–99)
GLUCOSE BLD-MCNC: 104 MG/DL (ref 70–99)
GLUCOSE SERPL-MCNC: 93 MG/DL (ref 70–99)
HCT VFR BLD AUTO: 21.8 % (ref 36–48)
HGB BLD-MCNC: 7.3 G/DL (ref 12–16)
LYMPHOCYTES # BLD: 0.9 K/UL (ref 1–5.1)
LYMPHOCYTES NFR BLD: 5.3 %
MCH RBC QN AUTO: 29 PG (ref 26–34)
MCHC RBC AUTO-ENTMCNC: 33.3 G/DL (ref 31–36)
MCV RBC AUTO: 87.3 FL (ref 80–100)
MONOCYTES # BLD: 1.8 K/UL (ref 0–1.3)
MONOCYTES NFR BLD: 10.6 %
NEUTROPHILS # BLD: 13.9 K/UL (ref 1.7–7.7)
NEUTROPHILS NFR BLD: 83.4 %
PATH INTERP BLD-IMP: NO
PERFORMED ON: ABNORMAL
PERFORMED ON: ABNORMAL
PLATELET # BLD AUTO: 375 K/UL (ref 135–450)
PMV BLD AUTO: 6.6 FL (ref 5–10.5)
POTASSIUM SERPL-SCNC: 4.2 MMOL/L (ref 3.5–5.1)
PROT SERPL-MCNC: 6.8 G/DL (ref 6.4–8.2)
RBC # BLD AUTO: 2.5 M/UL (ref 4–5.2)
SODIUM SERPL-SCNC: 127 MMOL/L (ref 136–145)
SODIUM UR-SCNC: 48 MMOL/L
VANCOMYCIN SERPL-MCNC: 12 UG/ML
WBC # BLD AUTO: 16.7 K/UL (ref 4–11)

## 2024-07-01 PROCEDURE — 99223 1ST HOSP IP/OBS HIGH 75: CPT | Performed by: INTERNAL MEDICINE

## 2024-07-01 PROCEDURE — 80053 COMPREHEN METABOLIC PANEL: CPT

## 2024-07-01 PROCEDURE — 6370000000 HC RX 637 (ALT 250 FOR IP): Performed by: STUDENT IN AN ORGANIZED HEALTH CARE EDUCATION/TRAINING PROGRAM

## 2024-07-01 PROCEDURE — 6370000000 HC RX 637 (ALT 250 FOR IP): Performed by: NURSE PRACTITIONER

## 2024-07-01 PROCEDURE — 2060000000 HC ICU INTERMEDIATE R&B

## 2024-07-01 PROCEDURE — 2580000003 HC RX 258: Performed by: STUDENT IN AN ORGANIZED HEALTH CARE EDUCATION/TRAINING PROGRAM

## 2024-07-01 PROCEDURE — 6360000002 HC RX W HCPCS: Performed by: STUDENT IN AN ORGANIZED HEALTH CARE EDUCATION/TRAINING PROGRAM

## 2024-07-01 PROCEDURE — 85025 COMPLETE CBC W/AUTO DIFF WBC: CPT

## 2024-07-01 PROCEDURE — 84300 ASSAY OF URINE SODIUM: CPT

## 2024-07-01 PROCEDURE — 99024 POSTOP FOLLOW-UP VISIT: CPT | Performed by: SURGERY

## 2024-07-01 PROCEDURE — 6370000000 HC RX 637 (ALT 250 FOR IP): Performed by: INTERNAL MEDICINE

## 2024-07-01 PROCEDURE — 97606 NEG PRS WND THER DME>50 SQCM: CPT

## 2024-07-01 PROCEDURE — 80202 ASSAY OF VANCOMYCIN: CPT

## 2024-07-01 PROCEDURE — 36415 COLL VENOUS BLD VENIPUNCTURE: CPT

## 2024-07-01 RX ADMIN — OXYCODONE HYDROCHLORIDE AND ACETAMINOPHEN 1 TABLET: 5; 325 TABLET ORAL at 02:22

## 2024-07-01 RX ADMIN — OXYCODONE HYDROCHLORIDE AND ACETAMINOPHEN 1 TABLET: 5; 325 TABLET ORAL at 17:26

## 2024-07-01 RX ADMIN — METRONIDAZOLE 500 MG: 500 INJECTION, SOLUTION INTRAVENOUS at 06:19

## 2024-07-01 RX ADMIN — SODIUM CHLORIDE, PRESERVATIVE FREE 10 ML: 5 INJECTION INTRAVENOUS at 22:48

## 2024-07-01 RX ADMIN — ATORVASTATIN CALCIUM 10 MG: 10 TABLET, FILM COATED ORAL at 08:27

## 2024-07-01 RX ADMIN — OXYCODONE HYDROCHLORIDE AND ACETAMINOPHEN 1 TABLET: 5; 325 TABLET ORAL at 22:46

## 2024-07-01 RX ADMIN — TRAZODONE HYDROCHLORIDE 75 MG: 50 TABLET ORAL at 22:46

## 2024-07-01 RX ADMIN — CEFEPIME 2000 MG: 2 INJECTION, POWDER, FOR SOLUTION INTRAVENOUS at 04:07

## 2024-07-01 RX ADMIN — SODIUM CHLORIDE: 9 INJECTION, SOLUTION INTRAVENOUS at 17:19

## 2024-07-01 RX ADMIN — METRONIDAZOLE 500 MG: 500 INJECTION, SOLUTION INTRAVENOUS at 15:15

## 2024-07-01 RX ADMIN — BUPROPION HYDROCHLORIDE 300 MG: 150 TABLET, EXTENDED RELEASE ORAL at 08:27

## 2024-07-01 RX ADMIN — SODIUM CHLORIDE, PRESERVATIVE FREE 10 ML: 5 INJECTION INTRAVENOUS at 09:17

## 2024-07-01 RX ADMIN — MICONAZOLE NITRATE: 20 CREAM TOPICAL at 22:48

## 2024-07-01 RX ADMIN — OXYCODONE HYDROCHLORIDE AND ACETAMINOPHEN 1 TABLET: 5; 325 TABLET ORAL at 08:30

## 2024-07-01 RX ADMIN — METRONIDAZOLE 500 MG: 500 INJECTION, SOLUTION INTRAVENOUS at 22:50

## 2024-07-01 RX ADMIN — MICONAZOLE NITRATE: 20 CREAM TOPICAL at 15:00

## 2024-07-01 ASSESSMENT — PAIN DESCRIPTION - LOCATION
LOCATION: ABDOMEN

## 2024-07-01 ASSESSMENT — PAIN SCALES - GENERAL
PAINLEVEL_OUTOF10: 5
PAINLEVEL_OUTOF10: 7
PAINLEVEL_OUTOF10: 7
PAINLEVEL_OUTOF10: 2
PAINLEVEL_OUTOF10: 2
PAINLEVEL_OUTOF10: 5
PAINLEVEL_OUTOF10: 2
PAINLEVEL_OUTOF10: 7
PAINLEVEL_OUTOF10: 2

## 2024-07-01 ASSESSMENT — PAIN DESCRIPTION - DESCRIPTORS: DESCRIPTORS: ACHING

## 2024-07-01 ASSESSMENT — PAIN DESCRIPTION - ORIENTATION: ORIENTATION: LOWER;MID

## 2024-07-01 NOTE — FLOWSHEET NOTE
06/30/24 2053   Vital Signs   Temp 98.1 °F (36.7 °C)   Temp Source Oral   Pulse 93   Heart Rate Source Monitor   Respirations 18   BP (!) 147/71   MAP (Calculated) 96   BP Location Left upper arm   BP Method Automatic   Patient Position Semi fowlers   Oxygen Therapy   SpO2 95 %   O2 Device None (Room air)

## 2024-07-01 NOTE — CARE COORDINATION
Case Management Assessment  Initial Evaluation    Date/Time of Evaluation: 7/1/2024 2:40 PM  Assessment Completed by: Solange Ferguson RN    If patient is discharged prior to next notation, then this note serves as note for discharge by case management.    Patient Name: Sudha Lopez                   YOB: 1945  Diagnosis: Postoperative intra-abdominal abscess [T81.43XA]  Intra-abdominal abscess (HCC) [K65.1]                   Date / Time: 6/30/2024  1:45 PM    Patient Admission Status: Inpatient   Readmission Risk (Low < 19, Mod (19-27), High > 27): Readmission Risk Score: 21.2    Current PCP: Perla Leyva MD  PCP verified by CM? Yes    Chart Reviewed: Yes      History Provided by: Patient  Patient Orientation: Alert and Oriented, Person, Place, Situation, Self    Patient Cognition: Alert    Hospitalization in the last 30 days (Readmission):  Yes    If yes, Readmission Assessment in CM Navigator will be completed.    Advance Directives:      Code Status: Full Code   Patient's Primary Decision Maker is: Legal Next of Kin    Primary Decision Maker: Victorina Lopez - Child - [phone]    Discharge Planning:    Patient lives with: Other (Comment) (from SNF) Type of Home: House  Primary Care Giver: Self  Patient Support Systems include: Children   Current Financial resources: Medicare  Current community resources: None  Current services prior to admission: Skilled Nursing Facility            Current DME:              Type of Home Care services:  None    ADLS  Prior functional level: Assistance with the following:, Bathing, Dressing, Toileting, Feeding, Cooking, Housework, Shopping, Mobility  Current functional level: Mobility, Shopping, Housework, Cooking, Feeding, Toileting, Dressing, Bathing, Assistance with the following:    PT AM-PAC:   /24  OT AM-PAC:   /24    Family can provide assistance at DC: Yes  Would you like Case Management to discuss the discharge plan with any other family

## 2024-07-02 ENCOUNTER — APPOINTMENT (OUTPATIENT)
Dept: CT IMAGING | Age: 79
DRG: 862 | End: 2024-07-02
Attending: INTERNAL MEDICINE
Payer: MEDICARE

## 2024-07-02 ENCOUNTER — APPOINTMENT (OUTPATIENT)
Dept: GENERAL RADIOLOGY | Age: 79
DRG: 862 | End: 2024-07-02
Attending: INTERNAL MEDICINE
Payer: MEDICARE

## 2024-07-02 LAB
ABO + RH BLD: NORMAL
ALBUMIN SERPL-MCNC: 2.7 G/DL (ref 3.4–5)
ALBUMIN/GLOB SERPL: 0.8 {RATIO} (ref 1.1–2.2)
ALP SERPL-CCNC: 132 U/L (ref 40–129)
ALT SERPL-CCNC: 14 U/L (ref 10–40)
ANION GAP SERPL CALCULATED.3IONS-SCNC: 9 MMOL/L (ref 3–16)
AST SERPL-CCNC: 13 U/L (ref 15–37)
BASOPHILS # BLD: 0.1 K/UL (ref 0–0.2)
BASOPHILS NFR BLD: 0.5 %
BILIRUB SERPL-MCNC: <0.2 MG/DL (ref 0–1)
BLD GP AB SCN SERPL QL: NORMAL
BLOOD BANK DISPENSE STATUS: NORMAL
BLOOD BANK PRODUCT CODE: NORMAL
BPU ID: NORMAL
BUN SERPL-MCNC: 11 MG/DL (ref 7–20)
CALCIUM SERPL-MCNC: 9 MG/DL (ref 8.3–10.6)
CHLORIDE SERPL-SCNC: 100 MMOL/L (ref 99–110)
CO2 SERPL-SCNC: 21 MMOL/L (ref 21–32)
CREAT SERPL-MCNC: 0.6 MG/DL (ref 0.6–1.2)
DEPRECATED RDW RBC AUTO: 14.2 % (ref 12.4–15.4)
DESCRIPTION BLOOD BANK: NORMAL
EOSINOPHIL # BLD: 0.1 K/UL (ref 0–0.6)
EOSINOPHIL NFR BLD: 1 %
GFR SERPLBLD CREATININE-BSD FMLA CKD-EPI: >90 ML/MIN/{1.73_M2}
GLUCOSE SERPL-MCNC: 101 MG/DL (ref 70–99)
HCT VFR BLD AUTO: 20 % (ref 36–48)
HGB BLD-MCNC: 6.8 G/DL (ref 12–16)
INR PPP: 1.35 (ref 0.85–1.15)
LYMPHOCYTES # BLD: 1 K/UL (ref 1–5.1)
LYMPHOCYTES NFR BLD: 8.3 %
MAGNESIUM SERPL-MCNC: 1.6 MG/DL (ref 1.8–2.4)
MCH RBC QN AUTO: 29.7 PG (ref 26–34)
MCHC RBC AUTO-ENTMCNC: 33.9 G/DL (ref 31–36)
MCV RBC AUTO: 87.6 FL (ref 80–100)
MONOCYTES # BLD: 1.5 K/UL (ref 0–1.3)
MONOCYTES NFR BLD: 11.9 %
MRSA DNA SPEC QL NAA+PROBE: NORMAL
NEUTROPHILS # BLD: 9.7 K/UL (ref 1.7–7.7)
NEUTROPHILS NFR BLD: 78.3 %
PLATELET # BLD AUTO: 369 K/UL (ref 135–450)
PMV BLD AUTO: 6.7 FL (ref 5–10.5)
POTASSIUM SERPL-SCNC: 3.4 MMOL/L (ref 3.5–5.1)
PROT SERPL-MCNC: 6.2 G/DL (ref 6.4–8.2)
PROTHROMBIN TIME: 16.8 SEC (ref 11.9–14.9)
RBC # BLD AUTO: 2.28 M/UL (ref 4–5.2)
SODIUM SERPL-SCNC: 130 MMOL/L (ref 136–145)
VANCOMYCIN SERPL-MCNC: 14.9 UG/ML
WBC # BLD AUTO: 12.4 K/UL (ref 4–11)

## 2024-07-02 PROCEDURE — 2500000003 HC RX 250 WO HCPCS: Performed by: INTERNAL MEDICINE

## 2024-07-02 PROCEDURE — 85025 COMPLETE CBC W/AUTO DIFF WBC: CPT

## 2024-07-02 PROCEDURE — 6360000002 HC RX W HCPCS: Performed by: STUDENT IN AN ORGANIZED HEALTH CARE EDUCATION/TRAINING PROGRAM

## 2024-07-02 PROCEDURE — 6360000002 HC RX W HCPCS: Performed by: INTERNAL MEDICINE

## 2024-07-02 PROCEDURE — 86901 BLOOD TYPING SEROLOGIC RH(D): CPT

## 2024-07-02 PROCEDURE — 2580000003 HC RX 258: Performed by: STUDENT IN AN ORGANIZED HEALTH CARE EDUCATION/TRAINING PROGRAM

## 2024-07-02 PROCEDURE — 30233N1 TRANSFUSION OF NONAUTOLOGOUS RED BLOOD CELLS INTO PERIPHERAL VEIN, PERCUTANEOUS APPROACH: ICD-10-PCS | Performed by: STUDENT IN AN ORGANIZED HEALTH CARE EDUCATION/TRAINING PROGRAM

## 2024-07-02 PROCEDURE — 36430 TRANSFUSION BLD/BLD COMPNT: CPT

## 2024-07-02 PROCEDURE — 80053 COMPREHEN METABOLIC PANEL: CPT

## 2024-07-02 PROCEDURE — 86923 COMPATIBILITY TEST ELECTRIC: CPT

## 2024-07-02 PROCEDURE — 2060000000 HC ICU INTERMEDIATE R&B

## 2024-07-02 PROCEDURE — 85610 PROTHROMBIN TIME: CPT

## 2024-07-02 PROCEDURE — P9016 RBC LEUKOCYTES REDUCED: HCPCS

## 2024-07-02 PROCEDURE — APPNB45 APP NON BILLABLE 31-45 MINUTES: Performed by: CLINICAL NURSE SPECIALIST

## 2024-07-02 PROCEDURE — 99232 SBSQ HOSP IP/OBS MODERATE 35: CPT | Performed by: SURGERY

## 2024-07-02 PROCEDURE — 86900 BLOOD TYPING SEROLOGIC ABO: CPT

## 2024-07-02 PROCEDURE — 86850 RBC ANTIBODY SCREEN: CPT

## 2024-07-02 PROCEDURE — 74177 CT ABD & PELVIS W/CONTRAST: CPT

## 2024-07-02 PROCEDURE — 6370000000 HC RX 637 (ALT 250 FOR IP): Performed by: STUDENT IN AN ORGANIZED HEALTH CARE EDUCATION/TRAINING PROGRAM

## 2024-07-02 PROCEDURE — 73610 X-RAY EXAM OF ANKLE: CPT

## 2024-07-02 PROCEDURE — 6360000004 HC RX CONTRAST MEDICATION: Performed by: CLINICAL NURSE SPECIALIST

## 2024-07-02 PROCEDURE — 83735 ASSAY OF MAGNESIUM: CPT

## 2024-07-02 PROCEDURE — 6370000000 HC RX 637 (ALT 250 FOR IP): Performed by: NURSE PRACTITIONER

## 2024-07-02 PROCEDURE — 36415 COLL VENOUS BLD VENIPUNCTURE: CPT

## 2024-07-02 PROCEDURE — 99232 SBSQ HOSP IP/OBS MODERATE 35: CPT | Performed by: INTERNAL MEDICINE

## 2024-07-02 PROCEDURE — 80202 ASSAY OF VANCOMYCIN: CPT

## 2024-07-02 RX ORDER — SODIUM CHLORIDE 9 MG/ML
INJECTION, SOLUTION INTRAVENOUS PRN
Status: DISCONTINUED | OUTPATIENT
Start: 2024-07-02 | End: 2024-07-08 | Stop reason: HOSPADM

## 2024-07-02 RX ORDER — MAGNESIUM SULFATE IN WATER 40 MG/ML
2000 INJECTION, SOLUTION INTRAVENOUS ONCE
Status: COMPLETED | OUTPATIENT
Start: 2024-07-02 | End: 2024-07-02

## 2024-07-02 RX ORDER — DEXTROSE MONOHYDRATE, SODIUM CHLORIDE, AND POTASSIUM CHLORIDE 50; 1.49; 4.5 G/1000ML; G/1000ML; G/1000ML
INJECTION, SOLUTION INTRAVENOUS CONTINUOUS
Status: DISCONTINUED | OUTPATIENT
Start: 2024-07-02 | End: 2024-07-03

## 2024-07-02 RX ADMIN — POTASSIUM CHLORIDE, DEXTROSE MONOHYDRATE AND SODIUM CHLORIDE: 150; 5; 450 INJECTION, SOLUTION INTRAVENOUS at 12:49

## 2024-07-02 RX ADMIN — TRAZODONE HYDROCHLORIDE 75 MG: 50 TABLET ORAL at 21:37

## 2024-07-02 RX ADMIN — MICONAZOLE NITRATE: 20 CREAM TOPICAL at 21:38

## 2024-07-02 RX ADMIN — ATORVASTATIN CALCIUM 10 MG: 10 TABLET, FILM COATED ORAL at 08:17

## 2024-07-02 RX ADMIN — MAGNESIUM SULFATE HEPTAHYDRATE 2000 MG: 40 INJECTION, SOLUTION INTRAVENOUS at 12:50

## 2024-07-02 RX ADMIN — BUPROPION HYDROCHLORIDE 300 MG: 150 TABLET, EXTENDED RELEASE ORAL at 08:17

## 2024-07-02 RX ADMIN — OXYCODONE HYDROCHLORIDE AND ACETAMINOPHEN 1 TABLET: 5; 325 TABLET ORAL at 21:38

## 2024-07-02 RX ADMIN — CEFEPIME 2000 MG: 2 INJECTION, POWDER, FOR SOLUTION INTRAVENOUS at 06:09

## 2024-07-02 RX ADMIN — OXYCODONE HYDROCHLORIDE AND ACETAMINOPHEN 1 TABLET: 5; 325 TABLET ORAL at 03:32

## 2024-07-02 RX ADMIN — VANCOMYCIN HYDROCHLORIDE 1250 MG: 10 INJECTION, POWDER, LYOPHILIZED, FOR SOLUTION INTRAVENOUS at 03:14

## 2024-07-02 RX ADMIN — SODIUM CHLORIDE, PRESERVATIVE FREE 10 ML: 5 INJECTION INTRAVENOUS at 21:38

## 2024-07-02 RX ADMIN — METRONIDAZOLE 500 MG: 500 INJECTION, SOLUTION INTRAVENOUS at 08:02

## 2024-07-02 RX ADMIN — METRONIDAZOLE 500 MG: 500 INJECTION, SOLUTION INTRAVENOUS at 21:53

## 2024-07-02 RX ADMIN — OXYCODONE HYDROCHLORIDE AND ACETAMINOPHEN 1 TABLET: 5; 325 TABLET ORAL at 14:08

## 2024-07-02 RX ADMIN — IOPAMIDOL 75 ML: 755 INJECTION, SOLUTION INTRAVENOUS at 14:00

## 2024-07-02 RX ADMIN — CEFEPIME 2000 MG: 2 INJECTION, POWDER, FOR SOLUTION INTRAVENOUS at 21:52

## 2024-07-02 ASSESSMENT — PAIN SCALES - GENERAL
PAINLEVEL_OUTOF10: 8
PAINLEVEL_OUTOF10: 3
PAINLEVEL_OUTOF10: 2
PAINLEVEL_OUTOF10: 1
PAINLEVEL_OUTOF10: 6

## 2024-07-02 ASSESSMENT — PAIN SCALES - WONG BAKER: WONGBAKER_NUMERICALRESPONSE: HURTS A LITTLE BIT

## 2024-07-02 ASSESSMENT — PAIN DESCRIPTION - LOCATION
LOCATION: ABDOMEN

## 2024-07-02 NOTE — CARE COORDINATION
Chart review day 2 - from [facility]. Does not want to return. Family wants ARU at [place]. Last admit is was denied but per daughter, Victorina pt's insurance now straight medicare. ARU is following and will need consult if/when appropriate. Pt  to have repeat CT scan today; Perc drain may be placed. Wound vac placed yesterday. Pt was seen by ortho today and is s/p RTKA and L ankle fx/dislocation. Pt is still NWB LLE. CM will continue to follow for DCP needs.

## 2024-07-02 NOTE — ACP (ADVANCE CARE PLANNING)
Advance Care Planning     General Advance Care Planning (ACP) Conversation    Date of Conversation: 6/30/2024  Conducted with: Patient with Decision Making Capacity  Other persons present: None    Healthcare Decision Maker:    Primary Decision Maker: Victorina Lopez - Child - [phone]  Click here to complete Healthcare Decision Makers including selection of the Healthcare Decision Maker Relationship (ie \"Primary\").  Today we documented Decision Maker(s) consistent with Legal Next of Kin hierarchy.    Content/Action Overview:  Has NO ACP documents-Information provided  Reviewed DNR/DNI and patient elects Full Code (Attempt Resuscitation)      Length of Voluntary ACP Conversation in minutes:  <16 minutes (Non-Billable)    Solange Ferguson RN

## 2024-07-03 ENCOUNTER — APPOINTMENT (OUTPATIENT)
Dept: CT IMAGING | Age: 79
DRG: 862 | End: 2024-07-03
Attending: INTERNAL MEDICINE
Payer: MEDICARE

## 2024-07-03 LAB
ACANTHOCYTES BLD QL SMEAR: ABNORMAL
ALBUMIN SERPL-MCNC: 2.4 G/DL (ref 3.4–5)
ALBUMIN/GLOB SERPL: 0.6 {RATIO} (ref 1.1–2.2)
ALP SERPL-CCNC: 116 U/L (ref 40–129)
ALT SERPL-CCNC: 10 U/L (ref 10–40)
ANION GAP SERPL CALCULATED.3IONS-SCNC: 10 MMOL/L (ref 3–16)
ANISOCYTOSIS BLD QL SMEAR: ABNORMAL
AST SERPL-CCNC: 11 U/L (ref 15–37)
BASOPHILS # BLD: 0 K/UL (ref 0–0.2)
BASOPHILS NFR BLD: 0 %
BILIRUB SERPL-MCNC: <0.2 MG/DL (ref 0–1)
BUN SERPL-MCNC: 8 MG/DL (ref 7–20)
CALCIUM SERPL-MCNC: 8.6 MG/DL (ref 8.3–10.6)
CHLORIDE SERPL-SCNC: 98 MMOL/L (ref 99–110)
CO2 SERPL-SCNC: 21 MMOL/L (ref 21–32)
CREAT SERPL-MCNC: <0.5 MG/DL (ref 0.6–1.2)
DEPRECATED RDW RBC AUTO: 14.3 % (ref 12.4–15.4)
EOSINOPHIL # BLD: 0 K/UL (ref 0–0.6)
EOSINOPHIL NFR BLD: 0 %
GFR SERPLBLD CREATININE-BSD FMLA CKD-EPI: >90 ML/MIN/{1.73_M2}
GLUCOSE SERPL-MCNC: 128 MG/DL (ref 70–99)
HCT VFR BLD AUTO: 22.2 % (ref 36–48)
HGB BLD-MCNC: 7.6 G/DL (ref 12–16)
HYPOCHROMIA BLD QL SMEAR: ABNORMAL
LYMPHOCYTES # BLD: 1.1 K/UL (ref 1–5.1)
LYMPHOCYTES NFR BLD: 9 %
MACROCYTES BLD QL SMEAR: ABNORMAL
MAGNESIUM SERPL-MCNC: 1.8 MG/DL (ref 1.8–2.4)
MCH RBC QN AUTO: 29.7 PG (ref 26–34)
MCHC RBC AUTO-ENTMCNC: 34.2 G/DL (ref 31–36)
MCV RBC AUTO: 86.9 FL (ref 80–100)
MICROCYTES BLD QL SMEAR: ABNORMAL
MONOCYTES # BLD: 1.2 K/UL (ref 0–1.3)
MONOCYTES NFR BLD: 10 %
NEUTROPHILS # BLD: 9.7 K/UL (ref 1.7–7.7)
NEUTROPHILS NFR BLD: 78 %
NEUTS BAND NFR BLD MANUAL: 3 % (ref 0–7)
OVALOCYTES BLD QL SMEAR: ABNORMAL
PATH INTERP BLD-IMP: NORMAL
PATH INTERP BLD-IMP: YES
PLATELET # BLD AUTO: 356 K/UL (ref 135–450)
PLATELET BLD QL SMEAR: ADEQUATE
PMV BLD AUTO: 6.4 FL (ref 5–10.5)
POIKILOCYTOSIS BLD QL SMEAR: ABNORMAL
POLYCHROMASIA BLD QL SMEAR: ABNORMAL
POTASSIUM SERPL-SCNC: 3.2 MMOL/L (ref 3.5–5.1)
PROT SERPL-MCNC: 6.2 G/DL (ref 6.4–8.2)
RBC # BLD AUTO: 2.55 M/UL (ref 4–5.2)
SLIDE REVIEW: ABNORMAL
SODIUM SERPL-SCNC: 129 MMOL/L (ref 136–145)
VANCOMYCIN SERPL-MCNC: 12.9 UG/ML
WBC # BLD AUTO: 12 K/UL (ref 4–11)

## 2024-07-03 PROCEDURE — 6360000002 HC RX W HCPCS: Performed by: RADIOLOGY

## 2024-07-03 PROCEDURE — 99232 SBSQ HOSP IP/OBS MODERATE 35: CPT | Performed by: INTERNAL MEDICINE

## 2024-07-03 PROCEDURE — 6370000000 HC RX 637 (ALT 250 FOR IP): Performed by: STUDENT IN AN ORGANIZED HEALTH CARE EDUCATION/TRAINING PROGRAM

## 2024-07-03 PROCEDURE — C1769 GUIDE WIRE: HCPCS

## 2024-07-03 PROCEDURE — 0W9G30Z DRAINAGE OF PERITONEAL CAVITY WITH DRAINAGE DEVICE, PERCUTANEOUS APPROACH: ICD-10-PCS | Performed by: RADIOLOGY

## 2024-07-03 PROCEDURE — 6370000000 HC RX 637 (ALT 250 FOR IP): Performed by: NURSE PRACTITIONER

## 2024-07-03 PROCEDURE — 87205 SMEAR GRAM STAIN: CPT

## 2024-07-03 PROCEDURE — 2060000000 HC ICU INTERMEDIATE R&B

## 2024-07-03 PROCEDURE — 2700000000 HC OXYGEN THERAPY PER DAY

## 2024-07-03 PROCEDURE — 80053 COMPREHEN METABOLIC PANEL: CPT

## 2024-07-03 PROCEDURE — 6370000000 HC RX 637 (ALT 250 FOR IP): Performed by: INTERNAL MEDICINE

## 2024-07-03 PROCEDURE — 36415 COLL VENOUS BLD VENIPUNCTURE: CPT

## 2024-07-03 PROCEDURE — 87070 CULTURE OTHR SPECIMN AEROBIC: CPT

## 2024-07-03 PROCEDURE — 2580000003 HC RX 258: Performed by: STUDENT IN AN ORGANIZED HEALTH CARE EDUCATION/TRAINING PROGRAM

## 2024-07-03 PROCEDURE — 87077 CULTURE AEROBIC IDENTIFY: CPT

## 2024-07-03 PROCEDURE — 94761 N-INVAS EAR/PLS OXIMETRY MLT: CPT

## 2024-07-03 PROCEDURE — 80202 ASSAY OF VANCOMYCIN: CPT

## 2024-07-03 PROCEDURE — 99231 SBSQ HOSP IP/OBS SF/LOW 25: CPT | Performed by: SURGERY

## 2024-07-03 PROCEDURE — 2500000003 HC RX 250 WO HCPCS: Performed by: RADIOLOGY

## 2024-07-03 PROCEDURE — 87186 SC STD MICRODIL/AGAR DIL: CPT

## 2024-07-03 PROCEDURE — 6360000002 HC RX W HCPCS: Performed by: STUDENT IN AN ORGANIZED HEALTH CARE EDUCATION/TRAINING PROGRAM

## 2024-07-03 PROCEDURE — 2500000003 HC RX 250 WO HCPCS: Performed by: INTERNAL MEDICINE

## 2024-07-03 PROCEDURE — 97606 NEG PRS WND THER DME>50 SQCM: CPT

## 2024-07-03 PROCEDURE — 83735 ASSAY OF MAGNESIUM: CPT

## 2024-07-03 PROCEDURE — 85025 COMPLETE CBC W/AUTO DIFF WBC: CPT

## 2024-07-03 RX ORDER — LIDOCAINE HYDROCHLORIDE 10 MG/ML
INJECTION, SOLUTION EPIDURAL; INFILTRATION; INTRACAUDAL; PERINEURAL PRN
Status: COMPLETED | OUTPATIENT
Start: 2024-07-03 | End: 2024-07-03

## 2024-07-03 RX ORDER — FENTANYL CITRATE 50 UG/ML
INJECTION, SOLUTION INTRAMUSCULAR; INTRAVENOUS PRN
Status: COMPLETED | OUTPATIENT
Start: 2024-07-03 | End: 2024-07-03

## 2024-07-03 RX ORDER — MIDAZOLAM HYDROCHLORIDE 5 MG/ML
INJECTION, SOLUTION INTRAMUSCULAR; INTRAVENOUS PRN
Status: COMPLETED | OUTPATIENT
Start: 2024-07-03 | End: 2024-07-03

## 2024-07-03 RX ADMIN — OXYCODONE HYDROCHLORIDE AND ACETAMINOPHEN 1 TABLET: 5; 325 TABLET ORAL at 03:41

## 2024-07-03 RX ADMIN — POTASSIUM CHLORIDE, DEXTROSE MONOHYDRATE AND SODIUM CHLORIDE: 150; 5; 450 INJECTION, SOLUTION INTRAVENOUS at 10:33

## 2024-07-03 RX ADMIN — VANCOMYCIN HYDROCHLORIDE 1000 MG: 1 INJECTION, POWDER, LYOPHILIZED, FOR SOLUTION INTRAVENOUS at 03:47

## 2024-07-03 RX ADMIN — LIDOCAINE HYDROCHLORIDE 10 ML: 10 INJECTION, SOLUTION EPIDURAL; INFILTRATION; INTRACAUDAL; PERINEURAL at 13:28

## 2024-07-03 RX ADMIN — CEFEPIME 2000 MG: 2 INJECTION, POWDER, FOR SOLUTION INTRAVENOUS at 11:42

## 2024-07-03 RX ADMIN — OXYCODONE HYDROCHLORIDE AND ACETAMINOPHEN 1 TABLET: 5; 325 TABLET ORAL at 19:31

## 2024-07-03 RX ADMIN — MICONAZOLE NITRATE: 20 CREAM TOPICAL at 20:35

## 2024-07-03 RX ADMIN — METRONIDAZOLE 500 MG: 500 INJECTION, SOLUTION INTRAVENOUS at 06:32

## 2024-07-03 RX ADMIN — TRAZODONE HYDROCHLORIDE 75 MG: 50 TABLET ORAL at 20:35

## 2024-07-03 RX ADMIN — SODIUM CHLORIDE, PRESERVATIVE FREE 10 ML: 5 INJECTION INTRAVENOUS at 10:43

## 2024-07-03 RX ADMIN — SODIUM CHLORIDE, PRESERVATIVE FREE 10 ML: 5 INJECTION INTRAVENOUS at 20:35

## 2024-07-03 RX ADMIN — MIDAZOLAM HYDROCHLORIDE 1 MG: 5 INJECTION, SOLUTION INTRAMUSCULAR; INTRAVENOUS at 13:25

## 2024-07-03 RX ADMIN — METRONIDAZOLE 500 MG: 500 INJECTION, SOLUTION INTRAVENOUS at 15:52

## 2024-07-03 RX ADMIN — POTASSIUM BICARBONATE 40 MEQ: 782 TABLET, EFFERVESCENT ORAL at 17:51

## 2024-07-03 RX ADMIN — MIDAZOLAM HYDROCHLORIDE 1 MG: 5 INJECTION, SOLUTION INTRAMUSCULAR; INTRAVENOUS at 13:32

## 2024-07-03 RX ADMIN — FENTANYL CITRATE 100 MCG: 50 INJECTION, SOLUTION INTRAMUSCULAR; INTRAVENOUS at 13:25

## 2024-07-03 RX ADMIN — ATORVASTATIN CALCIUM 10 MG: 10 TABLET, FILM COATED ORAL at 10:43

## 2024-07-03 RX ADMIN — OXYCODONE HYDROCHLORIDE AND ACETAMINOPHEN 1 TABLET: 5; 325 TABLET ORAL at 11:43

## 2024-07-03 RX ADMIN — BUPROPION HYDROCHLORIDE 300 MG: 150 TABLET, EXTENDED RELEASE ORAL at 10:43

## 2024-07-03 ASSESSMENT — PAIN SCALES - GENERAL
PAINLEVEL_OUTOF10: 7
PAINLEVEL_OUTOF10: 4

## 2024-07-03 ASSESSMENT — PAIN DESCRIPTION - ORIENTATION
ORIENTATION: LOWER;LEFT

## 2024-07-03 ASSESSMENT — PAIN DESCRIPTION - FREQUENCY: FREQUENCY: INTERMITTENT

## 2024-07-03 ASSESSMENT — PAIN DESCRIPTION - DESCRIPTORS
DESCRIPTORS: ACHING
DESCRIPTORS: ACHING
DESCRIPTORS: OTHER (COMMENT)

## 2024-07-03 ASSESSMENT — PAIN DESCRIPTION - LOCATION
LOCATION: ABDOMEN

## 2024-07-03 NOTE — CARE COORDINATION
Cm update: LOS # 3; Followed by IM, Wound Care, Orthopedic Surgery, ID and Nephrology. Patient has wound vac, to have CT today with contrast. Patient was in SNF at [place] for rehab. Will follow for any discharge needs .Kristen Hamilton RN

## 2024-07-03 NOTE — OR NURSING
Pt arrived for image guided abscess drain insertion left . Procedure explained including the risk and benefits of the procedure. All questions answered. Pt verbalizes understanding of the procedure and states no more questions. Consent confirmed. Vital signs stable. Labs, allergies, medications, and code status reviewed. No contraindications noted.     Vital Signs  Vitals:    07/03/24 1319   BP: (!) 142/60   Pulse: 72   Resp: 16   Temp:    SpO2: 98%    (vital signs in table format)    Pre Xin Score  2 - Able to move 4 extremities voluntarily on command  2 - BP+/- 20mmHg of normal  2 - Fully awake  2 - Able to maintain oxygen saturation >92% on room air  2 - Able to breathe deeply and cough freely    Allergies  Other and Thiazide-type diuretics (allergies)    Labs  Lab Results   Component Value Date    INR 1.35 (H) 07/02/2024    PROTIME 16.8 (H) 07/02/2024     Lab Results   Component Value Date    CREATININE <0.5 (L) 07/03/2024    BUN 8 07/03/2024     (L) 07/03/2024    K 3.2 (L) 07/03/2024    CL 98 (L) 07/03/2024    CO2 21 07/03/2024     Lab Results   Component Value Date    WBC 12.0 (H) 07/03/2024    HGB 7.6 (L) 07/03/2024    HCT 22.2 (L) 07/03/2024    MCV 86.9 07/03/2024     07/03/2024

## 2024-07-03 NOTE — BRIEF OP NOTE
Brief Postoperative Note      Patient: Sudha Lopez  YOB: 1945  MRN: [id number]    Date of Procedure: 7/3/2024    Abdominal Abscess    Post-Op Diagnosis: Same       CT Guided Left Abdominal Abscess Drain Placement    Surgeon(s):  Brenden Berman MD      Anesthesia: Moderate Sedation    Estimated Blood Loss (mL): Minimal    Complications: None    Specimens:   8 cc of purulent material    Implants:  12 fr LLQ MPD      Findings:  Successful left 12 Guatemalan abdominal abscess drain placement.     Electronically signed by Brenden Berman MD on 7/3/2024 at 1:57 PM

## 2024-07-03 NOTE — OR NURSING
Image guided abscess drain insertion LLQ completed. Dr. Berman placed 12 Angolan 25 cm Angiodynamics Exodus Array multipurpose drainage catheter LOT # [id number] 01/31/27 in the LLQ. Drain/tube secured at the 19 cm line with sutures. 8 milliliters of tan colored withdrawn immediately. Specimen sent to lab for culture. Drain/tube dressing clean, dry, and intact. Pt tolerated procedure without any signs or symptoms of distress. Vital signs stable. Report called to RN. Pt transported back to [facility] in stable condition via bed by transport.     Medications  Versed: 2 mg  Fentanyl: 100 mcg    Vital Signs  Vitals:    07/03/24 1347   BP: (!) 122/57   Pulse: 70   Resp: 16   Temp:    SpO2: 100%    (vital signs in table format)    Post Xin  2 - Able to move 4 extremities voluntarily on command  2 - BP+/- 20mmHg of normal  2 - Fully awake  2 - Able to maintain oxygen saturation >92% on room air  2 - Able to breathe deeply and cough freely

## 2024-07-03 NOTE — PRE SEDATION
oxyCODONE-acetaminophen, HYDROmorphone  Home Meds:   Prior to Admission medications    Medication Sig Start Date End Date Taking? Authorizing Provider   apixaban (ELIQUIS) 5 MG TABS tablet Take 1 tablet by mouth 2 times daily 6/20/24   Sallie Joyner MD   atorvastatin (LIPITOR) 10 MG tablet Take 1 tablet by mouth daily    Yocasta Latham MD   traZODone (DESYREL) 50 MG tablet Take 1.5 tablets by mouth nightly    Yocasta Latham MD   latanoprost (XALATAN) 0.005 % ophthalmic solution Place 1 drop into both eyes nightly    Yocasta Latham MD   aspirin 81 MG EC tablet Take 1 tablet by mouth 2 times daily    Yocasta Latham MD   FLUoxetine (PROZAC) 40 MG capsule Take 1 capsule by mouth daily    Yocasta Latham MD   buPROPion (WELLBUTRIN XL) 300 MG extended release tablet Take 1 tablet by mouth every morning    Yocasta Latham MD   benazepril (LOTENSIN) 40 MG tablet Take 1 tablet by mouth daily    Yocasta Latham MD   amLODIPine (NORVASC) 5 MG tablet Take 1 tablet by mouth daily    Yocasta Latham MD   fluticasone (FLONASE) 50 MCG/ACT nasal spray 1 spray by Nasal route daily    Yocasta Latham MD   Omega-3 Fatty Acids (OMEGA-3 FISH OIL) 1200 MG CAPS Take by mouth daily    Yocasta Latham MD   ascorbic acid (VITAMIN C) 500 MG tablet Take 1 tablet by mouth daily    Yocasta Latham MD   cetirizine (ZYRTEC) 10 MG tablet Take 1 tablet by mouth daily    Yocasta Latham MD   Multiple Vitamins-Minerals (THERAPEUTIC MULTIVITAMIN-MINERALS) tablet Take 1 tablet by mouth daily    Yocasta Latham MD     Coumadin Use Last 7 Days:  no  Antiplatelet drug therapy use last 7 days: yes - asa  Other anticoagulant use last 7 days: no  Additional Medication Information:        Pre-Sedation Documentation and Exam:   I have reviewed the patient's history and review of systems.    Mallampati Airway Assessment:  Mallampati Class II - (soft palate, fauces & uvula are

## 2024-07-04 PROBLEM — A49.8 E COLI INFECTION: Status: ACTIVE | Noted: 2024-07-04

## 2024-07-04 PROBLEM — A49.8 ENTEROCOCCUS FAECALIS INFECTION: Status: ACTIVE | Noted: 2024-07-04

## 2024-07-04 LAB
ALBUMIN SERPL-MCNC: 2.4 G/DL (ref 3.4–5)
ALBUMIN/GLOB SERPL: 0.6 {RATIO} (ref 1.1–2.2)
ALP SERPL-CCNC: 109 U/L (ref 40–129)
ALT SERPL-CCNC: 14 U/L (ref 10–40)
ANION GAP SERPL CALCULATED.3IONS-SCNC: 9 MMOL/L (ref 3–16)
ANISOCYTOSIS BLD QL SMEAR: ABNORMAL
AST SERPL-CCNC: 20 U/L (ref 15–37)
BACTERIA BLD CULT ORG #2: NORMAL
BACTERIA BLD CULT: NORMAL
BASOPHILS # BLD: 0.1 K/UL (ref 0–0.2)
BASOPHILS NFR BLD: 1 %
BILIRUB SERPL-MCNC: <0.2 MG/DL (ref 0–1)
BUN SERPL-MCNC: 5 MG/DL (ref 7–20)
CALCIUM SERPL-MCNC: 9.1 MG/DL (ref 8.3–10.6)
CHLORIDE SERPL-SCNC: 100 MMOL/L (ref 99–110)
CO2 SERPL-SCNC: 23 MMOL/L (ref 21–32)
CREAT SERPL-MCNC: <0.5 MG/DL (ref 0.6–1.2)
DEPRECATED RDW RBC AUTO: 14.3 % (ref 12.4–15.4)
EOSINOPHIL # BLD: 0.1 K/UL (ref 0–0.6)
EOSINOPHIL NFR BLD: 1 %
GFR SERPLBLD CREATININE-BSD FMLA CKD-EPI: >90 ML/MIN/{1.73_M2}
GLUCOSE SERPL-MCNC: 97 MG/DL (ref 70–99)
HCT VFR BLD AUTO: 27.1 % (ref 36–48)
HGB BLD-MCNC: 9.1 G/DL (ref 12–16)
HYPOCHROMIA BLD QL SMEAR: ABNORMAL
LYMPHOCYTES # BLD: 1.6 K/UL (ref 1–5.1)
LYMPHOCYTES NFR BLD: 15 %
MACROCYTES BLD QL SMEAR: ABNORMAL
MCH RBC QN AUTO: 29.3 PG (ref 26–34)
MCHC RBC AUTO-ENTMCNC: 33.6 G/DL (ref 31–36)
MCV RBC AUTO: 87.3 FL (ref 80–100)
METAMYELOCYTES NFR BLD MANUAL: 1 %
MICROCYTES BLD QL SMEAR: ABNORMAL
MONOCYTES # BLD: 1.1 K/UL (ref 0–1.3)
MONOCYTES NFR BLD: 10 %
MYELOCYTES NFR BLD MANUAL: 1 %
NEUTROPHILS # BLD: 7.7 K/UL (ref 1.7–7.7)
NEUTROPHILS NFR BLD: 70 %
NEUTS BAND NFR BLD MANUAL: 1 % (ref 0–7)
OVALOCYTES BLD QL SMEAR: ABNORMAL
PATH INTERP BLD-IMP: NO
PLATELET # BLD AUTO: 427 K/UL (ref 135–450)
PLATELET BLD QL SMEAR: ADEQUATE
PMV BLD AUTO: 6.2 FL (ref 5–10.5)
POIKILOCYTOSIS BLD QL SMEAR: ABNORMAL
POLYCHROMASIA BLD QL SMEAR: ABNORMAL
POTASSIUM SERPL-SCNC: 4 MMOL/L (ref 3.5–5.1)
PROT SERPL-MCNC: 6.4 G/DL (ref 6.4–8.2)
RBC # BLD AUTO: 3.11 M/UL (ref 4–5.2)
SLIDE REVIEW: ABNORMAL
SODIUM SERPL-SCNC: 132 MMOL/L (ref 136–145)
WBC # BLD AUTO: 10.5 K/UL (ref 4–11)

## 2024-07-04 PROCEDURE — 6370000000 HC RX 637 (ALT 250 FOR IP): Performed by: STUDENT IN AN ORGANIZED HEALTH CARE EDUCATION/TRAINING PROGRAM

## 2024-07-04 PROCEDURE — 6370000000 HC RX 637 (ALT 250 FOR IP): Performed by: INTERNAL MEDICINE

## 2024-07-04 PROCEDURE — 6370000000 HC RX 637 (ALT 250 FOR IP): Performed by: NURSE PRACTITIONER

## 2024-07-04 PROCEDURE — 36415 COLL VENOUS BLD VENIPUNCTURE: CPT

## 2024-07-04 PROCEDURE — 99232 SBSQ HOSP IP/OBS MODERATE 35: CPT | Performed by: SURGERY

## 2024-07-04 PROCEDURE — 99232 SBSQ HOSP IP/OBS MODERATE 35: CPT | Performed by: INTERNAL MEDICINE

## 2024-07-04 PROCEDURE — 2580000003 HC RX 258: Performed by: STUDENT IN AN ORGANIZED HEALTH CARE EDUCATION/TRAINING PROGRAM

## 2024-07-04 PROCEDURE — 6360000002 HC RX W HCPCS: Performed by: STUDENT IN AN ORGANIZED HEALTH CARE EDUCATION/TRAINING PROGRAM

## 2024-07-04 PROCEDURE — 2060000000 HC ICU INTERMEDIATE R&B

## 2024-07-04 PROCEDURE — 6360000002 HC RX W HCPCS: Performed by: INTERNAL MEDICINE

## 2024-07-04 PROCEDURE — 85025 COMPLETE CBC W/AUTO DIFF WBC: CPT

## 2024-07-04 PROCEDURE — 80053 COMPREHEN METABOLIC PANEL: CPT

## 2024-07-04 PROCEDURE — 2580000003 HC RX 258: Performed by: INTERNAL MEDICINE

## 2024-07-04 RX ORDER — M-VIT,TX,IRON,MINS/CALC/FOLIC 27MG-0.4MG
1 TABLET ORAL DAILY
Status: DISCONTINUED | OUTPATIENT
Start: 2024-07-04 | End: 2024-07-08 | Stop reason: HOSPADM

## 2024-07-04 RX ORDER — FLUOXETINE HYDROCHLORIDE 20 MG/1
40 CAPSULE ORAL DAILY
Status: DISCONTINUED | OUTPATIENT
Start: 2024-07-05 | End: 2024-07-08 | Stop reason: HOSPADM

## 2024-07-04 RX ORDER — LATANOPROST 50 UG/ML
1 SOLUTION/ DROPS OPHTHALMIC NIGHTLY
Status: DISCONTINUED | OUTPATIENT
Start: 2024-07-04 | End: 2024-07-08 | Stop reason: HOSPADM

## 2024-07-04 RX ADMIN — CEFEPIME 2000 MG: 2 INJECTION, POWDER, FOR SOLUTION INTRAVENOUS at 11:44

## 2024-07-04 RX ADMIN — PIPERACILLIN AND TAZOBACTAM 3375 MG: 3; .375 INJECTION, POWDER, LYOPHILIZED, FOR SOLUTION INTRAVENOUS at 16:18

## 2024-07-04 RX ADMIN — METRONIDAZOLE 500 MG: 500 INJECTION, SOLUTION INTRAVENOUS at 08:29

## 2024-07-04 RX ADMIN — ATORVASTATIN CALCIUM 10 MG: 10 TABLET, FILM COATED ORAL at 08:26

## 2024-07-04 RX ADMIN — CEFEPIME 2000 MG: 2 INJECTION, POWDER, FOR SOLUTION INTRAVENOUS at 00:10

## 2024-07-04 RX ADMIN — OXYCODONE HYDROCHLORIDE AND ACETAMINOPHEN 1 TABLET: 5; 325 TABLET ORAL at 23:16

## 2024-07-04 RX ADMIN — METRONIDAZOLE 500 MG: 500 INJECTION, SOLUTION INTRAVENOUS at 00:10

## 2024-07-04 RX ADMIN — TRAZODONE HYDROCHLORIDE 75 MG: 50 TABLET ORAL at 20:50

## 2024-07-04 RX ADMIN — LATANOPROST 1 DROP: 50 SOLUTION OPHTHALMIC at 21:21

## 2024-07-04 RX ADMIN — OXYCODONE HYDROCHLORIDE AND ACETAMINOPHEN 1 TABLET: 5; 325 TABLET ORAL at 15:42

## 2024-07-04 RX ADMIN — PIPERACILLIN AND TAZOBACTAM 3375 MG: 3; .375 INJECTION, POWDER, LYOPHILIZED, FOR SOLUTION INTRAVENOUS at 23:33

## 2024-07-04 RX ADMIN — APIXABAN 5 MG: 5 TABLET, FILM COATED ORAL at 20:51

## 2024-07-04 RX ADMIN — Medication 1 TABLET: at 15:41

## 2024-07-04 RX ADMIN — OXYCODONE HYDROCHLORIDE AND ACETAMINOPHEN 1 TABLET: 5; 325 TABLET ORAL at 04:35

## 2024-07-04 RX ADMIN — BUPROPION HYDROCHLORIDE 300 MG: 150 TABLET, EXTENDED RELEASE ORAL at 08:26

## 2024-07-04 RX ADMIN — SODIUM CHLORIDE, PRESERVATIVE FREE 10 ML: 5 INJECTION INTRAVENOUS at 08:34

## 2024-07-04 ASSESSMENT — PAIN DESCRIPTION - ORIENTATION
ORIENTATION: RIGHT
ORIENTATION: LOWER;LEFT

## 2024-07-04 ASSESSMENT — PAIN DESCRIPTION - PAIN TYPE: TYPE: ACUTE PAIN

## 2024-07-04 ASSESSMENT — PAIN DESCRIPTION - LOCATION
LOCATION: ABDOMEN
LOCATION: LEG
LOCATION: ABDOMEN

## 2024-07-04 ASSESSMENT — PAIN DESCRIPTION - FREQUENCY: FREQUENCY: INTERMITTENT

## 2024-07-04 ASSESSMENT — PAIN SCALES - GENERAL
PAINLEVEL_OUTOF10: 5
PAINLEVEL_OUTOF10: 7

## 2024-07-04 ASSESSMENT — PAIN DESCRIPTION - ONSET: ONSET: GRADUAL

## 2024-07-04 ASSESSMENT — PAIN - FUNCTIONAL ASSESSMENT: PAIN_FUNCTIONAL_ASSESSMENT: PREVENTS OR INTERFERES SOME ACTIVE ACTIVITIES AND ADLS

## 2024-07-04 ASSESSMENT — PAIN DESCRIPTION - DESCRIPTORS: DESCRIPTORS: STABBING

## 2024-07-05 LAB
ALBUMIN SERPL-MCNC: 2.4 G/DL (ref 3.4–5)
ALBUMIN/GLOB SERPL: 0.6 {RATIO} (ref 1.1–2.2)
ALP SERPL-CCNC: 102 U/L (ref 40–129)
ALT SERPL-CCNC: 18 U/L (ref 10–40)
ANION GAP SERPL CALCULATED.3IONS-SCNC: 9 MMOL/L (ref 3–16)
ANISOCYTOSIS BLD QL SMEAR: ABNORMAL
AST SERPL-CCNC: 24 U/L (ref 15–37)
BASOPHILS # BLD: 0.1 K/UL (ref 0–0.2)
BASOPHILS NFR BLD: 1 %
BILIRUB SERPL-MCNC: <0.2 MG/DL (ref 0–1)
BUN SERPL-MCNC: 6 MG/DL (ref 7–20)
CALCIUM SERPL-MCNC: 9.2 MG/DL (ref 8.3–10.6)
CHLORIDE SERPL-SCNC: 99 MMOL/L (ref 99–110)
CO2 SERPL-SCNC: 23 MMOL/L (ref 21–32)
CREAT SERPL-MCNC: 0.6 MG/DL (ref 0.6–1.2)
DEPRECATED RDW RBC AUTO: 14.3 % (ref 12.4–15.4)
EOSINOPHIL # BLD: 0.1 K/UL (ref 0–0.6)
EOSINOPHIL NFR BLD: 1 %
GFR SERPLBLD CREATININE-BSD FMLA CKD-EPI: >90 ML/MIN/{1.73_M2}
GLUCOSE SERPL-MCNC: 97 MG/DL (ref 70–99)
HCT VFR BLD AUTO: 25.5 % (ref 36–48)
HGB BLD-MCNC: 8.7 G/DL (ref 12–16)
HYPOCHROMIA BLD QL SMEAR: ABNORMAL
LYMPHOCYTES # BLD: 1.2 K/UL (ref 1–5.1)
LYMPHOCYTES NFR BLD: 8 %
MACROCYTES BLD QL SMEAR: ABNORMAL
MAGNESIUM SERPL-MCNC: 1.5 MG/DL (ref 1.8–2.4)
MCH RBC QN AUTO: 29.8 PG (ref 26–34)
MCHC RBC AUTO-ENTMCNC: 34 G/DL (ref 31–36)
MCV RBC AUTO: 87.5 FL (ref 80–100)
MICROCYTES BLD QL SMEAR: ABNORMAL
MONOCYTES # BLD: 0.9 K/UL (ref 0–1.3)
MONOCYTES NFR BLD: 7 %
MYELOCYTES NFR BLD MANUAL: 1 %
NEUTROPHILS # BLD: 10.5 K/UL (ref 1.7–7.7)
NEUTROPHILS NFR BLD: 75 %
NEUTS BAND NFR BLD MANUAL: 6 % (ref 0–7)
OVALOCYTES BLD QL SMEAR: ABNORMAL
PLATELET # BLD AUTO: 425 K/UL (ref 135–450)
PLATELET BLD QL SMEAR: ADEQUATE
PMV BLD AUTO: 6.2 FL (ref 5–10.5)
POIKILOCYTOSIS BLD QL SMEAR: ABNORMAL
POLYCHROMASIA BLD QL SMEAR: ABNORMAL
POTASSIUM SERPL-SCNC: 3.3 MMOL/L (ref 3.5–5.1)
POTASSIUM SERPL-SCNC: 3.7 MMOL/L (ref 3.5–5.1)
PROT SERPL-MCNC: 6.3 G/DL (ref 6.4–8.2)
RBC # BLD AUTO: 2.91 M/UL (ref 4–5.2)
SLIDE REVIEW: ABNORMAL
SODIUM SERPL-SCNC: 131 MMOL/L (ref 136–145)
VARIANT LYMPHS NFR BLD MANUAL: 1 % (ref 0–6)
WBC # BLD AUTO: 12.8 K/UL (ref 4–11)

## 2024-07-05 PROCEDURE — 97167 OT EVAL HIGH COMPLEX 60 MIN: CPT

## 2024-07-05 PROCEDURE — 6360000002 HC RX W HCPCS: Performed by: STUDENT IN AN ORGANIZED HEALTH CARE EDUCATION/TRAINING PROGRAM

## 2024-07-05 PROCEDURE — 2580000003 HC RX 258: Performed by: INTERNAL MEDICINE

## 2024-07-05 PROCEDURE — 83735 ASSAY OF MAGNESIUM: CPT

## 2024-07-05 PROCEDURE — 6370000000 HC RX 637 (ALT 250 FOR IP): Performed by: INTERNAL MEDICINE

## 2024-07-05 PROCEDURE — 6370000000 HC RX 637 (ALT 250 FOR IP): Performed by: NURSE PRACTITIONER

## 2024-07-05 PROCEDURE — 97110 THERAPEUTIC EXERCISES: CPT

## 2024-07-05 PROCEDURE — 84132 ASSAY OF SERUM POTASSIUM: CPT

## 2024-07-05 PROCEDURE — 80053 COMPREHEN METABOLIC PANEL: CPT

## 2024-07-05 PROCEDURE — 97162 PT EVAL MOD COMPLEX 30 MIN: CPT

## 2024-07-05 PROCEDURE — 2060000000 HC ICU INTERMEDIATE R&B

## 2024-07-05 PROCEDURE — 85025 COMPLETE CBC W/AUTO DIFF WBC: CPT

## 2024-07-05 PROCEDURE — 36415 COLL VENOUS BLD VENIPUNCTURE: CPT

## 2024-07-05 PROCEDURE — 97530 THERAPEUTIC ACTIVITIES: CPT

## 2024-07-05 PROCEDURE — 6370000000 HC RX 637 (ALT 250 FOR IP): Performed by: STUDENT IN AN ORGANIZED HEALTH CARE EDUCATION/TRAINING PROGRAM

## 2024-07-05 PROCEDURE — 99231 SBSQ HOSP IP/OBS SF/LOW 25: CPT | Performed by: SURGERY

## 2024-07-05 PROCEDURE — 6360000002 HC RX W HCPCS: Performed by: INTERNAL MEDICINE

## 2024-07-05 PROCEDURE — 97605 NEG PRS WND THER DME<=50SQCM: CPT

## 2024-07-05 RX ADMIN — POTASSIUM BICARBONATE 40 MEQ: 782 TABLET, EFFERVESCENT ORAL at 06:11

## 2024-07-05 RX ADMIN — APIXABAN 5 MG: 5 TABLET, FILM COATED ORAL at 20:30

## 2024-07-05 RX ADMIN — LATANOPROST 1 DROP: 50 SOLUTION OPHTHALMIC at 20:41

## 2024-07-05 RX ADMIN — Medication 1 TABLET: at 08:29

## 2024-07-05 RX ADMIN — MAGNESIUM SULFATE HEPTAHYDRATE 2000 MG: 40 INJECTION, SOLUTION INTRAVENOUS at 12:31

## 2024-07-05 RX ADMIN — APIXABAN 5 MG: 5 TABLET, FILM COATED ORAL at 08:30

## 2024-07-05 RX ADMIN — FLUOXETINE HYDROCHLORIDE 40 MG: 20 CAPSULE ORAL at 08:29

## 2024-07-05 RX ADMIN — PIPERACILLIN AND TAZOBACTAM 3375 MG: 3; .375 INJECTION, POWDER, LYOPHILIZED, FOR SOLUTION INTRAVENOUS at 23:14

## 2024-07-05 RX ADMIN — PIPERACILLIN AND TAZOBACTAM 3375 MG: 3; .375 INJECTION, POWDER, LYOPHILIZED, FOR SOLUTION INTRAVENOUS at 16:59

## 2024-07-05 RX ADMIN — ATORVASTATIN CALCIUM 10 MG: 10 TABLET, FILM COATED ORAL at 08:30

## 2024-07-05 RX ADMIN — OXYCODONE HYDROCHLORIDE AND ACETAMINOPHEN 1 TABLET: 5; 325 TABLET ORAL at 12:28

## 2024-07-05 RX ADMIN — ACETAMINOPHEN 650 MG: 325 TABLET ORAL at 08:29

## 2024-07-05 RX ADMIN — OXYCODONE HYDROCHLORIDE AND ACETAMINOPHEN 1 TABLET: 5; 325 TABLET ORAL at 21:22

## 2024-07-05 RX ADMIN — PIPERACILLIN AND TAZOBACTAM 3375 MG: 3; .375 INJECTION, POWDER, LYOPHILIZED, FOR SOLUTION INTRAVENOUS at 08:31

## 2024-07-05 RX ADMIN — BUPROPION HYDROCHLORIDE 300 MG: 150 TABLET, EXTENDED RELEASE ORAL at 08:29

## 2024-07-05 RX ADMIN — TRAZODONE HYDROCHLORIDE 75 MG: 50 TABLET ORAL at 20:30

## 2024-07-05 ASSESSMENT — PAIN DESCRIPTION - LOCATION
LOCATION: ABDOMEN
LOCATION: KNEE
LOCATION: ABDOMEN

## 2024-07-05 ASSESSMENT — PAIN DESCRIPTION - ORIENTATION
ORIENTATION: LOWER
ORIENTATION: RIGHT
ORIENTATION: MID

## 2024-07-05 ASSESSMENT — PAIN SCALES - GENERAL
PAINLEVEL_OUTOF10: 8
PAINLEVEL_OUTOF10: 6
PAINLEVEL_OUTOF10: 5
PAINLEVEL_OUTOF10: 0
PAINLEVEL_OUTOF10: 5
PAINLEVEL_OUTOF10: 5

## 2024-07-05 ASSESSMENT — PAIN SCALES - WONG BAKER: WONGBAKER_NUMERICALRESPONSE: NO HURT

## 2024-07-05 NOTE — CARE COORDINATION
Chart reviewed. LOS day # 5. Admitted as inpatient. Pt is from [facility]. Pt had recent RTKR and went home. Pt fell and broke L ankle. Pt then developed colonic ischemia and is s/p new ostomy placement. Pt dc'd to [facility] and developed infection and returned. Pt was not wearing wound vac at facility. Pt with intrabdominal abscess with drain placed in IR on 7/3. On IV abx. Pt and family do not want her to return to SNF. Wish to pursue ARU if possible. Message to MD for therapy orders. Will follow.

## 2024-07-05 NOTE — FLOWSHEET NOTE
07/04/24 2023   Vital Signs   Temp 98.2 °F (36.8 °C)   Temp Source Oral   Pulse 74   Heart Rate Source Monitor   Respirations 18   /69   MAP (Calculated) 88   BP Location Left upper arm   BP Method Automatic   Patient Position Semi daysiwlers   Pain Assessment   Pain Assessment None - Denies Pain   Care Plan - Pain Goals   Verbalizes/displays adequate comfort level or baseline comfort level Encourage patient to monitor pain and request assistance   Opioid-Induced Sedation   POSS Score 1   RASS   Siegel Agitation Sedation Scale (RASS) 0   Oxygen Therapy   SpO2 97 %   Pulse Oximetry Type Intermittent   Pulse Oximeter Device Mode Intermittent   Pulse Oximeter Device Location Left;Finger   O2 Device None (Room air)   O2 Flow Rate (L/min) 0 L/min   Oxygen Therapy None (Room air)     Pt resting comfortably in bed. IV infusing as ordered. Bed alarm on, call light within reach. No needs expressed at this time. Will continue to monitor.

## 2024-07-05 NOTE — FLOWSHEET NOTE
07/04/24 2311   Vital Signs   Temp 98.5 °F (36.9 °C)   Temp Source Oral   Pulse 70   Heart Rate Source Monitor   Respirations 18   /70   MAP (Calculated) 90   BP Location Left upper arm   BP Method Automatic   Patient Position Semi fowlers   Pain Assessment   Pain Assessment 0-10   Pain Level 7   Patient's Stated Pain Goal 7   Pain Location Leg   Pain Orientation Right   Pain Descriptors Stabbing   Functional Pain Assessment Prevents or interferes some active activities and ADLs   Pain Type Acute pain   Pain Frequency Intermittent   Pain Onset Gradual   Non-Pharmaceutical Pain Intervention(s) Emotional support;Distraction;Rest   Oxygen Therapy   SpO2 94 %   Pulse Oximetry Type Intermittent   Pulse Oximeter Device Mode Intermittent   Pulse Oximeter Device Location Right;Finger   O2 Device None (Room air)   O2 Flow Rate (L/min) 0 L/min   Oxygen Therapy None (Room air)

## 2024-07-05 NOTE — CARE COORDINATION
Spoke to Pt's Dtr Victorina via TC about dc plan. Victorina states that the pt now has straight medicare as of July 1st. Victorina to bring updated insurance card to give to registration over the weekend. ARU liasion notified and aware of the above, and message to FC to check medicare system for updated insurance info. SNF choices if ARU not able to accept were discussed. Victorina is reviewing facilities and will call the w/e CM @ [phone] with her choices as back up only.  Will follow up on Monday.

## 2024-07-05 NOTE — FLOWSHEET NOTE
07/05/24 0421   Vital Signs   Temp 98.1 °F (36.7 °C)   Temp Source Oral   Pulse 74   Heart Rate Source Monitor   Respirations 18   /81   MAP (Calculated) 100   BP Location Left upper arm   BP Method Automatic   Patient Position Semi fowlers   Pain Assessment   Pain Assessment None - Denies Pain   Oxygen Therapy   SpO2 94 %   Pulse Oximetry Type Intermittent   Pulse Oximeter Device Mode Intermittent   Pulse Oximeter Device Location Right;Finger   O2 Device None (Room air)   O2 Flow Rate (L/min) 0 L/min   Oxygen Therapy None (Room air)

## 2024-07-05 NOTE — DISCHARGE INSTR - COC
Continuity of Care Form    Patient Name: Sudha Lopez   :  1945  MRN:  [id number]    Admit date:  2024  Discharge date:  ***    Code Status Order: Full Code   Advance Directives:     Admitting Physician:  Raj Madrigal MD  PCP: Perla Leyva MD    Discharging Nurse: ***  Discharging Hospital Unit/Room#: 0438/0438-01  Discharging Unit Phone Number: ***    Emergency Contact:   Extended Emergency Contact Information  Primary Emergency Contact: solis Lopez  Home Phone: [phone]  Relation: Child  Secondary Emergency Contact: Victorina Lopez  Home Phone: [phone]  Relation: Child    Past Surgical History:  Past Surgical History:   Procedure Laterality Date    BREAST ENHANCEMENT SURGERY      COLONOSCOPY      COLONOSCOPY N/A 2024    COLONOSCOPY FLEXIBLE DECOMPRESSION performed by Marcus Naranjo MD at [facility] ENDOSCOPY    CT RETROPERITONEAL PERC DRAIN  7/3/2024    CT RETROPERITONEAL PERC DRAIN 7/3/2024 Brenden Berman MD [facility] CT SCAN    HAND SURGERY Right     HYSTERECTOMY (CERVIX STATUS UNKNOWN)      INTRACAPSULAR CATARACT EXTRACTION Left 2020    PHACOEMULSIFICATION OF CATARACT LEFT EYE WITH INTRAOCULAR LENS IMPLANT performed by Anuel Hay MD at [facility] OR    INTRACAPSULAR CATARACT EXTRACTION Right 2020    PHACOEMULSIFICATION OF CATARACT RIGHT EYE WITH INTRAOCULAR LENS IMPLANT performed by Anuel Hay MD at [facility] OR    LAPAROTOMY N/A 2024    SUBTOTAL COLECTOMY, ILEOSTOMY performed by Bertin Jackson MD at [facility] OR    RHINOPLASTY      ROTATOR CUFF REPAIR Bilateral     TOTAL HIP ARTHROPLASTY Bilateral        Immunization History:   Immunization History   Administered Date(s) Administered    COVID-19, PFIZER PURPLE top, DILUTE for use, (age 12 y+), 30mcg/0.3mL 2021    Influenza Virus Vaccine 2010, 2012    Influenza Whole 2011    Pneumococcal, PCV-13, PREVNAR 13, (age 6w+), IM, 0.5mL 2018    Pneumococcal, PPSV23, PNEUMOVAX 23, (age 2y+),

## 2024-07-05 NOTE — CARE COORDINATION
[facility] - Acute Rehab Unit   After review, this patient is felt to be:       []  Appropriate for Acute Inpatient Rehab    []  Appropriate for Acute Inpatient Rehab Pending Insurance Authorization    []  Not appropriate for Acute Inpatient Rehab    [x]  Referral received and ARU reviewing patient; Evaluation ongoing.      Await therapy evaluations and will follow.  Will notify DCP with further updates. Thank you for the referral.  Ching Hay RN

## 2024-07-06 LAB
ALBUMIN SERPL-MCNC: 2.8 G/DL (ref 3.4–5)
ALBUMIN/GLOB SERPL: 0.8 {RATIO} (ref 1.1–2.2)
ALP SERPL-CCNC: 107 U/L (ref 40–129)
ALT SERPL-CCNC: 23 U/L (ref 10–40)
ANION GAP SERPL CALCULATED.3IONS-SCNC: 9 MMOL/L (ref 3–16)
ANISOCYTOSIS BLD QL SMEAR: ABNORMAL
AST SERPL-CCNC: 32 U/L (ref 15–37)
BASOPHILS # BLD: 0.1 K/UL (ref 0–0.2)
BASOPHILS NFR BLD: 1 %
BILIRUB SERPL-MCNC: <0.2 MG/DL (ref 0–1)
BUN SERPL-MCNC: 7 MG/DL (ref 7–20)
CALCIUM SERPL-MCNC: 9 MG/DL (ref 8.3–10.6)
CHLORIDE SERPL-SCNC: 98 MMOL/L (ref 99–110)
CO2 SERPL-SCNC: 25 MMOL/L (ref 21–32)
CREAT SERPL-MCNC: 0.6 MG/DL (ref 0.6–1.2)
DEPRECATED RDW RBC AUTO: 14.6 % (ref 12.4–15.4)
EOSINOPHIL # BLD: 0.3 K/UL (ref 0–0.6)
EOSINOPHIL NFR BLD: 2 %
GFR SERPLBLD CREATININE-BSD FMLA CKD-EPI: >90 ML/MIN/{1.73_M2}
GLUCOSE SERPL-MCNC: 89 MG/DL (ref 70–99)
HCT VFR BLD AUTO: 27.4 % (ref 36–48)
HGB BLD-MCNC: 9.1 G/DL (ref 12–16)
LYMPHOCYTES # BLD: 1.7 K/UL (ref 1–5.1)
LYMPHOCYTES NFR BLD: 11 %
MACROCYTES BLD QL SMEAR: ABNORMAL
MCH RBC QN AUTO: 29.2 PG (ref 26–34)
MCHC RBC AUTO-ENTMCNC: 33.4 G/DL (ref 31–36)
MCV RBC AUTO: 87.4 FL (ref 80–100)
METAMYELOCYTES NFR BLD MANUAL: 1 %
MONOCYTES # BLD: 1.4 K/UL (ref 0–1.3)
MONOCYTES NFR BLD: 11 %
MYELOCYTES NFR BLD MANUAL: 2 %
NEUTROPHILS # BLD: 9.3 K/UL (ref 1.7–7.7)
NEUTROPHILS NFR BLD: 61 %
NEUTS BAND NFR BLD MANUAL: 9 % (ref 0–7)
PATH INTERP BLD-IMP: NO
PLATELET # BLD AUTO: 502 K/UL (ref 135–450)
PMV BLD AUTO: 6.1 FL (ref 5–10.5)
POIKILOCYTOSIS BLD QL SMEAR: ABNORMAL
POLYCHROMASIA BLD QL SMEAR: ABNORMAL
POTASSIUM SERPL-SCNC: 3.8 MMOL/L (ref 3.5–5.1)
PROT SERPL-MCNC: 6.5 G/DL (ref 6.4–8.2)
RBC # BLD AUTO: 3.13 M/UL (ref 4–5.2)
SODIUM SERPL-SCNC: 132 MMOL/L (ref 136–145)
VARIANT LYMPHS NFR BLD MANUAL: 2 % (ref 0–6)
WBC # BLD AUTO: 12.8 K/UL (ref 4–11)

## 2024-07-06 PROCEDURE — 2060000000 HC ICU INTERMEDIATE R&B

## 2024-07-06 PROCEDURE — 85025 COMPLETE CBC W/AUTO DIFF WBC: CPT

## 2024-07-06 PROCEDURE — 6370000000 HC RX 637 (ALT 250 FOR IP): Performed by: INTERNAL MEDICINE

## 2024-07-06 PROCEDURE — 6370000000 HC RX 637 (ALT 250 FOR IP): Performed by: STUDENT IN AN ORGANIZED HEALTH CARE EDUCATION/TRAINING PROGRAM

## 2024-07-06 PROCEDURE — 80053 COMPREHEN METABOLIC PANEL: CPT

## 2024-07-06 PROCEDURE — 99232 SBSQ HOSP IP/OBS MODERATE 35: CPT | Performed by: SURGERY

## 2024-07-06 PROCEDURE — 2580000003 HC RX 258: Performed by: INTERNAL MEDICINE

## 2024-07-06 PROCEDURE — 6360000002 HC RX W HCPCS: Performed by: INTERNAL MEDICINE

## 2024-07-06 PROCEDURE — 6370000000 HC RX 637 (ALT 250 FOR IP): Performed by: NURSE PRACTITIONER

## 2024-07-06 RX ORDER — LIDOCAINE 4 G/G
1 PATCH TOPICAL DAILY
Status: DISCONTINUED | OUTPATIENT
Start: 2024-07-06 | End: 2024-07-08 | Stop reason: HOSPADM

## 2024-07-06 RX ADMIN — LATANOPROST 1 DROP: 50 SOLUTION OPHTHALMIC at 22:10

## 2024-07-06 RX ADMIN — OXYCODONE HYDROCHLORIDE AND ACETAMINOPHEN 1 TABLET: 5; 325 TABLET ORAL at 16:10

## 2024-07-06 RX ADMIN — APIXABAN 5 MG: 5 TABLET, FILM COATED ORAL at 08:49

## 2024-07-06 RX ADMIN — MICONAZOLE NITRATE: 20 CREAM TOPICAL at 08:50

## 2024-07-06 RX ADMIN — FLUOXETINE HYDROCHLORIDE 40 MG: 20 CAPSULE ORAL at 08:49

## 2024-07-06 RX ADMIN — Medication 1 TABLET: at 08:49

## 2024-07-06 RX ADMIN — BUPROPION HYDROCHLORIDE 300 MG: 150 TABLET, EXTENDED RELEASE ORAL at 08:49

## 2024-07-06 RX ADMIN — PIPERACILLIN AND TAZOBACTAM 3375 MG: 3; .375 INJECTION, POWDER, LYOPHILIZED, FOR SOLUTION INTRAVENOUS at 16:12

## 2024-07-06 RX ADMIN — TRAZODONE HYDROCHLORIDE 75 MG: 50 TABLET ORAL at 21:04

## 2024-07-06 RX ADMIN — OXYCODONE HYDROCHLORIDE AND ACETAMINOPHEN 1 TABLET: 5; 325 TABLET ORAL at 22:11

## 2024-07-06 RX ADMIN — PIPERACILLIN AND TAZOBACTAM 3375 MG: 3; .375 INJECTION, POWDER, LYOPHILIZED, FOR SOLUTION INTRAVENOUS at 08:51

## 2024-07-06 RX ADMIN — OXYCODONE HYDROCHLORIDE AND ACETAMINOPHEN 1 TABLET: 5; 325 TABLET ORAL at 05:05

## 2024-07-06 RX ADMIN — APIXABAN 5 MG: 5 TABLET, FILM COATED ORAL at 21:04

## 2024-07-06 RX ADMIN — ATORVASTATIN CALCIUM 10 MG: 10 TABLET, FILM COATED ORAL at 08:49

## 2024-07-06 ASSESSMENT — PAIN DESCRIPTION - PAIN TYPE
TYPE: CHRONIC PAIN
TYPE: CHRONIC PAIN

## 2024-07-06 ASSESSMENT — PAIN SCALES - GENERAL
PAINLEVEL_OUTOF10: 3
PAINLEVEL_OUTOF10: 0
PAINLEVEL_OUTOF10: 0
PAINLEVEL_OUTOF10: 7
PAINLEVEL_OUTOF10: 8
PAINLEVEL_OUTOF10: 4
PAINLEVEL_OUTOF10: 0
PAINLEVEL_OUTOF10: 0
PAINLEVEL_OUTOF10: 5

## 2024-07-06 ASSESSMENT — PAIN DESCRIPTION - ORIENTATION
ORIENTATION: MID
ORIENTATION: LOWER
ORIENTATION: LOWER
ORIENTATION: MID

## 2024-07-06 ASSESSMENT — PAIN DESCRIPTION - LOCATION
LOCATION: BACK
LOCATION: BACK
LOCATION: GENERALIZED
LOCATION: ABDOMEN

## 2024-07-06 ASSESSMENT — PAIN DESCRIPTION - DESCRIPTORS
DESCRIPTORS: ACHING

## 2024-07-06 ASSESSMENT — PAIN - FUNCTIONAL ASSESSMENT: PAIN_FUNCTIONAL_ASSESSMENT: ACTIVITIES ARE NOT PREVENTED

## 2024-07-06 ASSESSMENT — PAIN SCALES - WONG BAKER: WONGBAKER_NUMERICALRESPONSE: NO HURT

## 2024-07-06 ASSESSMENT — PAIN DESCRIPTION - FREQUENCY: FREQUENCY: CONTINUOUS

## 2024-07-06 ASSESSMENT — PAIN DESCRIPTION - ONSET: ONSET: ON-GOING

## 2024-07-06 NOTE — CONSULTS
Department of General Surgery Consult    PATIENT NAME: Sudha Lopez   YOB: 1945    ADMISSION DATE: 6/30/2024  1:45 PM      TODAY'S DATE: 7/1/2024    Reason for Consult: Intra-abdominal abscess    Chief Complaint: Epigastric pain    Historian: Patient    Requesting Practitioner:  Jovan     HISTORY OF PRESENT ILLNESS:              The patient is a 78 y.o. female who presents with chest and epigastric pain to an outside hospital.  She states she was just feeling poorly and had discomfort.  She denies nausea or vomiting.  She has been eating, and her ostomy is functional.    Past Medical History:        Diagnosis Date    Anxiety     Hypertension        Past Surgical History:        Procedure Laterality Date    BREAST ENHANCEMENT SURGERY      COLONOSCOPY      COLONOSCOPY N/A 6/1/2024    [facility]    HAND SURGERY Right     HYSTERECTOMY (CERVIX STATUS UNKNOWN)      INTRACAPSULAR CATARACT EXTRACTION Left 8/12/2020    PHACOEMULSIFICATION OF CATARACT LEFT EYE WITH INTRAOCULAR LENS IMPLANT performed by Anuel Hay MD at [facility] OR    INTRACAPSULAR CATARACT EXTRACTION Right 8/26/2020    PHACOEMULSIFICATION OF CATARACT RIGHT EYE WITH INTRAOCULAR LENS IMPLANT performed by Anuel Hay MD at [facility] OR    LAPAROTOMY N/A 6/2/2024    SUBTOTAL COLECTOMY, ILEOSTOMY performed by Bertin Jackson MD at [facility] OR    RHINOPLASTY      ROTATOR CUFF REPAIR Bilateral     TOTAL HIP ARTHROPLASTY Bilateral        Current Medications:   Current Facility-Administered Medications: [Held by provider] apixaban (ELIQUIS) tablet 5 mg, 5 mg, Oral, BID  atorvastatin (LIPITOR) tablet 10 mg, 10 mg, Oral, Daily  buPROPion (WELLBUTRIN XL) extended release tablet 300 mg, 300 mg, Oral, QAM  sodium chloride flush 0.9 % injection 5-40 mL, 5-40 mL, IntraVENous, 2 times per day  sodium chloride flush 0.9 % injection 5-40 mL, 5-40 mL, IntraVENous, 
       Ph: [phone], Fax: [phone]           [facility]               Reason for admission:                 Pain abdomen/intra-abdominal abscess    Brief Summary :     Sudha Lopez is being seen by nephrology for pain abdomen.  Found to have intra-abdominal abscess on antibiotics  Seen by surgery  Were consulted for hyponatremia and related issues  She was admitted to this hospital last month ischemic colitis requiring colectomy and ileostomy.      Interval History and plan:      She is on antibiotic  Seen by surgery  Sodium coming up  On IV fluids  Currently n.p.o.  Seen in room with daughter  Plan:  Continue with IV normal saline  Check urine sodium  Likely hypovolemic  Once she is able to admit she should also take protein supplement to help sodium and muscle mass                     Assessment :     Hyponatremia  Presented with clinical dehydration  Presented with sodium of 124  127 at the time of consult  She has history of recurrent hyponatremia in the past           Hypertension   BP: (133)/(68)  Pulse:  [97]   BP goal inpatient 130-140 systolic inpatient  Also known to have atrial fibrillation      [facility] would like to thank Raj Madrigal MD   for opportunity to serve this patient      Please call with questions at-   24 Hrs Answering service [phone] or  7 am- 5 pm via Perfect serve or cell phone  Dr.Sudhir Tal MD       HPI :     Sudha Lopez is a 78 y.o. female presented to   the hospital on 6/30/2024 with known multiple comorbidities including A-fib, was admitted to the outside hospital with epigastric pain.  CT scan was done which was negative for intra-abdominal abscess.  She was Transferred to [place] for continuity of care because recent surgery done.  She is found to have intra-abdominal abscess currently on antibiotics      She has history of ischemic bowel and she had subtotal colectomy and ileostomy on 6/2/2024    PMH/PSH/SH/Family History: 
 [facility] Contin[facility] Nurse  Consult Note       NAME:  Sudha Lopez  MEDICAL RECORD NUMBER:  [id number]  AGE: 78 y.o.   GENDER: female  : 1945  TODAY'S DATE:  2024    Subjective I just got back from Xray and the DR drained my abscess and now I have a drain put in.   Reason for WOCN Evaluation and Assessment: Ostomy near surgical site. Ostomy often leaks      Sudha Lopez is a 78 y.o. female referred by:   [] Physician  [x] Nursing existing ostomy  [] Other:     Wound Identification:  Wound Type: Surgical mid line abd staple line (2 open areas mid and distal). Negative pressure wound therapy dressing applied today to control drainage from distal abd staple line.  Contributing Factors: edema, decreased mobility, shear force, obesity    Existing ileostomy: Subtotal colectomy with ileostomy on 24 for bowel obstruction by Dr Dung Jackson.     Stoma size:  25 = 1 inch mm x 30 mm 1 3/16 inch. PLEASE DO NOT THROW AWAY PATTERN.  Appliance size: Coloplast 2 pience RED convex flange # [id number] with drainable bag # [id number]. paste ring # [id number] placed around stoma. Crust with light dusting of powder, rub in, seal with dabbing barrier wipe over powder.  Barrier extenders around stoma.  Belt added to help secure pouch and prevent leaking. DO NOT THROW AWAY BELT.  If soiled it can be washed.          Wound History: Admitted for intra-abdominal abscess - plan for percutaneous drainage tomorrow.    PMH Atrial fibrillation, and recent colonic ischemia with colectomy. She was discharged on  rehab facility. Patient presented to [facility] with complaints of chest/epigastric pain. Workup was negative for cardiovascular etiology, but CT scan did reveal intra-abdominal abscess likely the underlying etiology of her complaints. Patient was subsequently transferred to [place] as she is recent postop for continuity of care. On arrival patient is hemodynamically stable yet does have abdominal pain. Patient did 
Consult Call Back    Intra-abdominal abscess.  Arrived from bedside.  N.p.o. pending her evaluation    Who: BRAYDEN CHAVIRA  Date:6/30/2024,  Time:2:57 PM    Electronically signed by Aarti Cervantes on 6/30/24 at 2:57 PM EDT    
Consult Call Back    Intra-abdominal abscess.  Failed outpatient therapy    Who: Dr. Ted Garza  Date:6/30/2024,  Time:3:01 PM    Adalid BERMUDEZ  Electronically signed by Aarti Cervantes on 6/30/24 at 3:01 PM EDT  
Consult Placed     Who: Dr. Parada  Date:  Time:     Electronically signed by Mary Grace on 7/1/2024 at 10:48 AM    
Infectious Diseases   Consult Note      Reason for Consult: post op intra- abd abscess   Requesting Physician: Dr. Madrigal    Date of Admission: 6/30/2024  Subjective:   CHIEF COMPLAINT: abd pain    HPI:  78 y.o. female with pmh of atrial fibrillation, HTN and recent colonic ischemia with subtotal colectomy, ileostomy on 6/2/24 with Dr. Jackson.  She was discharged on 6/22 to rehab facility.  Patient initially presented to [facility] with complaints of chest/epigastric pain on 6/29.  Workup was negative for cardiovascular etiology, but CT scan did reveal intra-abdominal abscess, so she was subsequently transferred to [place] on 6/30.   She received doses of vanco and pip-tazo at [facility]. Upon transfer here, her WBC was 14.8 and temp of 99.6. she was started on cefepime, metronidazole and vanco. She was eval by gen surg and planed IR guided drain placement tomorrow.  She is tolerating PO and has ostomy function                Current abx: avnco, cefepime, flagyl          Past Surgical History:       Diagnosis Date    Anxiety     Hypertension          Procedure Laterality Date    BREAST ENHANCEMENT SURGERY      COLONOSCOPY      COLONOSCOPY N/A 6/1/2024    COLONOSCOPY FLEXIBLE DECOMPRESSION performed by Marcus Naranjo MD at [facility] ENDOSCOPY    HAND SURGERY Right     HYSTERECTOMY (CERVIX STATUS UNKNOWN)      INTRACAPSULAR CATARACT EXTRACTION Left 8/12/2020    PHACOEMULSIFICATION OF CATARACT LEFT EYE WITH INTRAOCULAR LENS IMPLANT performed by Anuel Hay MD at [facility] OR    INTRACAPSULAR CATARACT EXTRACTION Right 8/26/2020    PHACOEMULSIFICATION OF CATARACT RIGHT EYE WITH INTRAOCULAR LENS IMPLANT performed by Anuel Hay MD at [facility] OR    LAPAROTOMY N/A 6/2/2024    SUBTOTAL COLECTOMY, ILEOSTOMY performed by Bertin Jackson MD at [facility] OR    RHINOPLASTY      ROTATOR CUFF REPAIR Bilateral     TOTAL HIP ARTHROPLASTY Bilateral        Social History:    TOBACCO:   reports that she has never smoked. She 
Sudha Lopez  7/6/2024  [id number]    Chief Complaint: Postoperative intra-abdominal abscess    Subjective:   This is a 78-year-old female with past medical history including:  Past Medical History:   Diagnosis Date    Anxiety     Hypertension      Who came into the hospital with abdominal pain and found to have postoperative intra-abdominal pelvic abscess.  She is currently status post CT guided left abdominal drain placement by IR on 7/3/2024.  She continues to be limited due to pain and weakness.  She is interested in going to rehab unit when she is feeling better to be able to move around and discharge home.  Family is also agreeable to this plan.  She states that she just got on pure Medicare and is not on an  advantage plan anymore.  Will discuss with insurance admit team.    ROS: No CP, SOB, dyspnea    Objective:  Patient Vitals for the past 24 hrs:   BP Temp Temp src Pulse Resp SpO2 Height Weight   07/06/24 1203 129/72 97.2 °F (36.2 °C) Oral 72 16 -- -- --   07/06/24 0816 136/71 97.8 °F (36.6 °C) Oral 68 16 -- -- --   07/06/24 0535 -- -- -- -- 18 -- -- --   07/06/24 0516 139/75 98.6 °F (37 °C) Oral 72 16 94 % -- --   07/05/24 2327 133/72 98.2 °F (36.8 °C) Oral 77 26 92 % -- --   07/05/24 2157 -- -- -- -- -- -- 1.6 m (5' 3\") 81.2 kg (179 lb 0.2 oz)   07/05/24 2152 -- -- -- -- 22 -- -- --   07/05/24 2041 117/70 98.2 °F (36.8 °C) Oral 72 26 98 % -- --   07/05/24 1645 115/61 98.1 °F (36.7 °C) Oral 74 18 97 % -- --     Gen: No distress, pleasant.   HEENT: Normocephalic, atraumatic.   CV: No audible murmurs, well perfused extremities  Resp: No respiratory distress. No increased WOB  Abd: drain in place. Distended   Ext: + edema.   Neuro: Alert, oriented, appropriately interactive.     Laboratory data: Available via EMR.     Therapy progress:    PT    Rolling: Level of difficulty - A Little   Sit to Stand from a Chair: Level of difficulty - Total  Supine to Sit: Level of difficulty - Total     Bed to Chair: 
Creatinine Clearance: 57 mL/min (based on SCr of 0.8 mg/dL).  Recent Labs     24  1459 24  0541   WBC 14.8* 16.7*     Renal functions stable  WBC remains elevated  Predicted AUC is slightly lower at 394; Trough 10  Will hold on adjusting dose until level coming back this noon    Elizabeth Capellan RPH 2024 10:27 AM       1151  Vanc rdm = 12.0 mcg/mL  Calculated AUC is subtherapeutic  Increase Vancomycin dosing to 1250 mg q24h  Predicted ; Trough 11.2  Recheck level tomorrow 1200  Elizabeth Capellan PharmD  2024 at 1:22 PM      Day  # 3  Dx: Intra-abdominal infection   NGTD  SCr:0.6  Vanc level = 14.9 ~noon  Calculated AUC is still subtherapeutic.   Will increase dose to 1000mg q12h   - will check level on 7/3 at 14:00  redictions   [id number] 496 mg/L.hr   AUC24,ss 552 mg/L.hr   Probability of AUC24 > 400 99 %   Ctrough,ss 16 mg/L       Vancomycin Day 4  Current Dosin mg q12h    Recent Labs     24  1151 24  1202   VANCORANDOM 12.0 14.9     Recent Labs     24  0541 24  0452 24  0457   CREATININE 0.8 0.6 <0.5*     Estimated Creatinine Clearance: 91 mL/min (based on SCr of 0.5 mg/dL).  Recent Labs     24  0541 24  0452 24  0457   WBC 16.7* 12.4* 12.0*     Renal functions stable  WBC remains elevated but stable  Predicted ; Trough 13  Level this afternoon to assess further dosing    Elizabeth Capellan RPH 7/3/2024 11:07 AM

## 2024-07-07 LAB
ALBUMIN SERPL-MCNC: 2.7 G/DL (ref 3.4–5)
ALBUMIN/GLOB SERPL: 0.6 {RATIO} (ref 1.1–2.2)
ALP SERPL-CCNC: 110 U/L (ref 40–129)
ALT SERPL-CCNC: 27 U/L (ref 10–40)
ANION GAP SERPL CALCULATED.3IONS-SCNC: 10 MMOL/L (ref 3–16)
AST SERPL-CCNC: 33 U/L (ref 15–37)
BACTERIA FLD AEROBE CULT: ABNORMAL
BASOPHILS # BLD: 0.1 K/UL (ref 0–0.2)
BASOPHILS NFR BLD: 0.5 %
BILIRUB SERPL-MCNC: <0.2 MG/DL (ref 0–1)
BUN SERPL-MCNC: 8 MG/DL (ref 7–20)
CALCIUM SERPL-MCNC: 9.3 MG/DL (ref 8.3–10.6)
CHLORIDE SERPL-SCNC: 93 MMOL/L (ref 99–110)
CO2 SERPL-SCNC: 25 MMOL/L (ref 21–32)
CREAT SERPL-MCNC: 0.7 MG/DL (ref 0.6–1.2)
DEPRECATED RDW RBC AUTO: 14.9 % (ref 12.4–15.4)
EOSINOPHIL # BLD: 0.4 K/UL (ref 0–0.6)
EOSINOPHIL NFR BLD: 2.8 %
GFR SERPLBLD CREATININE-BSD FMLA CKD-EPI: 88 ML/MIN/{1.73_M2}
GLUCOSE BLD-MCNC: 125 MG/DL (ref 70–99)
GLUCOSE SERPL-MCNC: 125 MG/DL (ref 70–99)
GRAM STN SPEC: ABNORMAL
HCT VFR BLD AUTO: 29.9 % (ref 36–48)
HGB BLD-MCNC: 9.8 G/DL (ref 12–16)
LYMPHOCYTES # BLD: 1.6 K/UL (ref 1–5.1)
LYMPHOCYTES NFR BLD: 10.2 %
MCH RBC QN AUTO: 29.1 PG (ref 26–34)
MCHC RBC AUTO-ENTMCNC: 32.8 G/DL (ref 31–36)
MCV RBC AUTO: 88.5 FL (ref 80–100)
MONOCYTES # BLD: 1.5 K/UL (ref 0–1.3)
MONOCYTES NFR BLD: 9.7 %
NEUTROPHILS # BLD: 12.1 K/UL (ref 1.7–7.7)
NEUTROPHILS NFR BLD: 76.8 %
ORGANISM: ABNORMAL
PERFORMED ON: ABNORMAL
PLATELET # BLD AUTO: 546 K/UL (ref 135–450)
PMV BLD AUTO: 6 FL (ref 5–10.5)
POTASSIUM SERPL-SCNC: 3.8 MMOL/L (ref 3.5–5.1)
PROT SERPL-MCNC: 7.1 G/DL (ref 6.4–8.2)
RBC # BLD AUTO: 3.38 M/UL (ref 4–5.2)
SODIUM SERPL-SCNC: 128 MMOL/L (ref 136–145)
WBC # BLD AUTO: 15.8 K/UL (ref 4–11)

## 2024-07-07 PROCEDURE — 85025 COMPLETE CBC W/AUTO DIFF WBC: CPT

## 2024-07-07 PROCEDURE — 2580000003 HC RX 258: Performed by: INTERNAL MEDICINE

## 2024-07-07 PROCEDURE — 2060000000 HC ICU INTERMEDIATE R&B

## 2024-07-07 PROCEDURE — 6370000000 HC RX 637 (ALT 250 FOR IP): Performed by: STUDENT IN AN ORGANIZED HEALTH CARE EDUCATION/TRAINING PROGRAM

## 2024-07-07 PROCEDURE — 80053 COMPREHEN METABOLIC PANEL: CPT

## 2024-07-07 PROCEDURE — 6370000000 HC RX 637 (ALT 250 FOR IP): Performed by: NURSE PRACTITIONER

## 2024-07-07 PROCEDURE — 6360000002 HC RX W HCPCS: Performed by: INTERNAL MEDICINE

## 2024-07-07 PROCEDURE — 6370000000 HC RX 637 (ALT 250 FOR IP): Performed by: INTERNAL MEDICINE

## 2024-07-07 PROCEDURE — 99232 SBSQ HOSP IP/OBS MODERATE 35: CPT | Performed by: SURGERY

## 2024-07-07 PROCEDURE — 36415 COLL VENOUS BLD VENIPUNCTURE: CPT

## 2024-07-07 RX ADMIN — DICLOFENAC SODIUM 4 G: 10 GEL TOPICAL at 21:33

## 2024-07-07 RX ADMIN — LATANOPROST 1 DROP: 50 SOLUTION OPHTHALMIC at 20:37

## 2024-07-07 RX ADMIN — PIPERACILLIN AND TAZOBACTAM 3375 MG: 3; .375 INJECTION, POWDER, LYOPHILIZED, FOR SOLUTION INTRAVENOUS at 08:35

## 2024-07-07 RX ADMIN — PIPERACILLIN AND TAZOBACTAM 3375 MG: 3; .375 INJECTION, POWDER, LYOPHILIZED, FOR SOLUTION INTRAVENOUS at 15:40

## 2024-07-07 RX ADMIN — Medication 1 TABLET: at 08:34

## 2024-07-07 RX ADMIN — PIPERACILLIN AND TAZOBACTAM 3375 MG: 3; .375 INJECTION, POWDER, LYOPHILIZED, FOR SOLUTION INTRAVENOUS at 00:14

## 2024-07-07 RX ADMIN — OXYCODONE HYDROCHLORIDE AND ACETAMINOPHEN 1 TABLET: 5; 325 TABLET ORAL at 11:48

## 2024-07-07 RX ADMIN — TRAZODONE HYDROCHLORIDE 75 MG: 50 TABLET ORAL at 20:36

## 2024-07-07 RX ADMIN — OXYCODONE HYDROCHLORIDE AND ACETAMINOPHEN 1 TABLET: 5; 325 TABLET ORAL at 20:37

## 2024-07-07 RX ADMIN — APIXABAN 5 MG: 5 TABLET, FILM COATED ORAL at 20:37

## 2024-07-07 RX ADMIN — ATORVASTATIN CALCIUM 10 MG: 10 TABLET, FILM COATED ORAL at 08:34

## 2024-07-07 RX ADMIN — FLUOXETINE HYDROCHLORIDE 40 MG: 20 CAPSULE ORAL at 08:34

## 2024-07-07 RX ADMIN — BUPROPION HYDROCHLORIDE 300 MG: 150 TABLET, EXTENDED RELEASE ORAL at 08:34

## 2024-07-07 RX ADMIN — APIXABAN 5 MG: 5 TABLET, FILM COATED ORAL at 08:34

## 2024-07-07 ASSESSMENT — PAIN DESCRIPTION - ORIENTATION
ORIENTATION: LOWER
ORIENTATION: LEFT
ORIENTATION: LOWER
ORIENTATION: MID;LOWER

## 2024-07-07 ASSESSMENT — PAIN DESCRIPTION - DESCRIPTORS
DESCRIPTORS: ACHING

## 2024-07-07 ASSESSMENT — PAIN SCALES - GENERAL
PAINLEVEL_OUTOF10: 0
PAINLEVEL_OUTOF10: 5
PAINLEVEL_OUTOF10: 0
PAINLEVEL_OUTOF10: 4
PAINLEVEL_OUTOF10: 7
PAINLEVEL_OUTOF10: 0
PAINLEVEL_OUTOF10: 5
PAINLEVEL_OUTOF10: 0

## 2024-07-07 ASSESSMENT — PAIN DESCRIPTION - LOCATION
LOCATION: BACK

## 2024-07-07 ASSESSMENT — PAIN DESCRIPTION - ONSET
ONSET: ON-GOING
ONSET: ON-GOING

## 2024-07-07 ASSESSMENT — PAIN DESCRIPTION - FREQUENCY
FREQUENCY: CONTINUOUS
FREQUENCY: CONTINUOUS

## 2024-07-07 ASSESSMENT — PAIN DESCRIPTION - PAIN TYPE
TYPE: CHRONIC PAIN
TYPE: CHRONIC PAIN

## 2024-07-07 ASSESSMENT — PAIN - FUNCTIONAL ASSESSMENT: PAIN_FUNCTIONAL_ASSESSMENT: ACTIVITIES ARE NOT PREVENTED

## 2024-07-08 ENCOUNTER — HOSPITAL ENCOUNTER (INPATIENT)
Age: 79
DRG: 948 | End: 2024-07-08
Attending: PHYSICAL MEDICINE & REHABILITATION | Admitting: PHYSICAL MEDICINE & REHABILITATION
Payer: MEDICARE

## 2024-07-08 VITALS
HEART RATE: 76 BPM | WEIGHT: 180.34 LBS | TEMPERATURE: 98.7 F | SYSTOLIC BLOOD PRESSURE: 124 MMHG | DIASTOLIC BLOOD PRESSURE: 68 MMHG | OXYGEN SATURATION: 99 % | HEIGHT: 63 IN | BODY MASS INDEX: 31.95 KG/M2 | RESPIRATION RATE: 16 BRPM

## 2024-07-08 DIAGNOSIS — K65.1 INTRA-ABDOMINAL ABSCESS (HCC): ICD-10-CM

## 2024-07-08 DIAGNOSIS — S82.892S CLOSED FRACTURE OF LEFT ANKLE, SEQUELA: Primary | ICD-10-CM

## 2024-07-08 PROBLEM — E44.0 MODERATE MALNUTRITION (HCC): Status: ACTIVE | Noted: 2024-07-08

## 2024-07-08 PROBLEM — R53.81 DEBILITY: Status: ACTIVE | Noted: 2024-07-08

## 2024-07-08 LAB
ANION GAP SERPL CALCULATED.3IONS-SCNC: 8 MMOL/L (ref 3–16)
ANISOCYTOSIS BLD QL SMEAR: ABNORMAL
BASOPHILS # BLD: 0 K/UL (ref 0–0.2)
BASOPHILS NFR BLD: 0 %
BUN SERPL-MCNC: 7 MG/DL (ref 7–20)
CALCIUM SERPL-MCNC: 9 MG/DL (ref 8.3–10.6)
CHLORIDE SERPL-SCNC: 98 MMOL/L (ref 99–110)
CO2 SERPL-SCNC: 25 MMOL/L (ref 21–32)
CREAT SERPL-MCNC: 0.7 MG/DL (ref 0.6–1.2)
DEPRECATED RDW RBC AUTO: 14.7 % (ref 12.4–15.4)
EOSINOPHIL # BLD: 0.4 K/UL (ref 0–0.6)
EOSINOPHIL NFR BLD: 3 %
GFR SERPLBLD CREATININE-BSD FMLA CKD-EPI: 88 ML/MIN/{1.73_M2}
GLUCOSE BLD-MCNC: 104 MG/DL (ref 70–99)
GLUCOSE BLD-MCNC: 122 MG/DL (ref 70–99)
GLUCOSE BLD-MCNC: 134 MG/DL (ref 70–99)
GLUCOSE SERPL-MCNC: 97 MG/DL (ref 70–99)
HCT VFR BLD AUTO: 24.9 % (ref 36–48)
HGB BLD-MCNC: 8.5 G/DL (ref 12–16)
LYMPHOCYTES # BLD: 2 K/UL (ref 1–5.1)
LYMPHOCYTES NFR BLD: 16 %
MACROCYTES BLD QL SMEAR: ABNORMAL
MCH RBC QN AUTO: 29.8 PG (ref 26–34)
MCHC RBC AUTO-ENTMCNC: 34.1 G/DL (ref 31–36)
MCV RBC AUTO: 87.5 FL (ref 80–100)
MICROCYTES BLD QL SMEAR: ABNORMAL
MONOCYTES # BLD: 0.9 K/UL (ref 0–1.3)
MONOCYTES NFR BLD: 7 %
MYELOCYTES NFR BLD MANUAL: 4 %
NEUTROPHILS # BLD: 9.3 K/UL (ref 1.7–7.7)
NEUTROPHILS NFR BLD: 69 %
NEUTS BAND NFR BLD MANUAL: 1 % (ref 0–7)
OVALOCYTES BLD QL SMEAR: ABNORMAL
PERFORMED ON: ABNORMAL
PLATELET # BLD AUTO: 485 K/UL (ref 135–450)
PLATELET BLD QL SMEAR: ABNORMAL
PMV BLD AUTO: 5.9 FL (ref 5–10.5)
POLYCHROMASIA BLD QL SMEAR: ABNORMAL
POTASSIUM SERPL-SCNC: 3.9 MMOL/L (ref 3.5–5.1)
RBC # BLD AUTO: 2.84 M/UL (ref 4–5.2)
SLIDE REVIEW: ABNORMAL
SODIUM SERPL-SCNC: 131 MMOL/L (ref 136–145)
SPHEROCYTES BLD QL SMEAR: ABNORMAL
TOXIC GRANULES BLD QL SMEAR: PRESENT
URATE SERPL-MCNC: 2.3 MG/DL (ref 2.6–6)
WBC # BLD AUTO: 12.6 K/UL (ref 4–11)

## 2024-07-08 PROCEDURE — 6370000000 HC RX 637 (ALT 250 FOR IP): Performed by: STUDENT IN AN ORGANIZED HEALTH CARE EDUCATION/TRAINING PROGRAM

## 2024-07-08 PROCEDURE — 84550 ASSAY OF BLOOD/URIC ACID: CPT

## 2024-07-08 PROCEDURE — 97530 THERAPEUTIC ACTIVITIES: CPT

## 2024-07-08 PROCEDURE — 85025 COMPLETE CBC W/AUTO DIFF WBC: CPT

## 2024-07-08 PROCEDURE — 97605 NEG PRS WND THER DME<=50SQCM: CPT

## 2024-07-08 PROCEDURE — 2580000003 HC RX 258: Performed by: PHYSICAL MEDICINE & REHABILITATION

## 2024-07-08 PROCEDURE — 97535 SELF CARE MNGMENT TRAINING: CPT

## 2024-07-08 PROCEDURE — 2580000003 HC RX 258

## 2024-07-08 PROCEDURE — 1280000000 HC REHAB R&B

## 2024-07-08 PROCEDURE — 2580000003 HC RX 258: Performed by: INTERNAL MEDICINE

## 2024-07-08 PROCEDURE — 6360000002 HC RX W HCPCS: Performed by: INTERNAL MEDICINE

## 2024-07-08 PROCEDURE — APPNB45 APP NON BILLABLE 31-45 MINUTES: Performed by: CLINICAL NURSE SPECIALIST

## 2024-07-08 PROCEDURE — 6370000000 HC RX 637 (ALT 250 FOR IP): Performed by: PHYSICAL MEDICINE & REHABILITATION

## 2024-07-08 PROCEDURE — 80048 BASIC METABOLIC PNL TOTAL CA: CPT

## 2024-07-08 PROCEDURE — 36415 COLL VENOUS BLD VENIPUNCTURE: CPT

## 2024-07-08 PROCEDURE — 6370000000 HC RX 637 (ALT 250 FOR IP): Performed by: INTERNAL MEDICINE

## 2024-07-08 PROCEDURE — 99232 SBSQ HOSP IP/OBS MODERATE 35: CPT | Performed by: CLINICAL NURSE SPECIALIST

## 2024-07-08 PROCEDURE — 2580000003 HC RX 258: Performed by: STUDENT IN AN ORGANIZED HEALTH CARE EDUCATION/TRAINING PROGRAM

## 2024-07-08 RX ORDER — ATORVASTATIN CALCIUM 10 MG/1
10 TABLET, FILM COATED ORAL DAILY
Status: CANCELLED | OUTPATIENT
Start: 2024-07-09

## 2024-07-08 RX ORDER — ONDANSETRON 2 MG/ML
4 INJECTION INTRAMUSCULAR; INTRAVENOUS EVERY 6 HOURS PRN
Status: ACTIVE | OUTPATIENT
Start: 2024-07-08

## 2024-07-08 RX ORDER — BUPROPION HYDROCHLORIDE 150 MG/1
300 TABLET ORAL EVERY MORNING
Status: CANCELLED | OUTPATIENT
Start: 2024-07-09

## 2024-07-08 RX ORDER — SODIUM CHLORIDE 0.9 % (FLUSH) 0.9 %
5-40 SYRINGE (ML) INJECTION PRN
Status: ACTIVE | OUTPATIENT
Start: 2024-07-08

## 2024-07-08 RX ORDER — LATANOPROST 50 UG/ML
1 SOLUTION/ DROPS OPHTHALMIC NIGHTLY
Status: CANCELLED | OUTPATIENT
Start: 2024-07-08

## 2024-07-08 RX ORDER — LIDOCAINE 4 G/G
1 PATCH TOPICAL DAILY
Status: DISPENSED | OUTPATIENT
Start: 2024-07-09

## 2024-07-08 RX ORDER — SODIUM CHLORIDE 0.9 % (FLUSH) 0.9 %
5-40 SYRINGE (ML) INJECTION EVERY 12 HOURS SCHEDULED
Status: CANCELLED | OUTPATIENT
Start: 2024-07-08

## 2024-07-08 RX ORDER — ACETAMINOPHEN 325 MG/1
650 TABLET ORAL EVERY 4 HOURS PRN
Status: ACTIVE | OUTPATIENT
Start: 2024-07-08

## 2024-07-08 RX ORDER — LIDOCAINE 4 G/G
1 PATCH TOPICAL DAILY
Status: CANCELLED | OUTPATIENT
Start: 2024-07-09

## 2024-07-08 RX ORDER — M-VIT,TX,IRON,MINS/CALC/FOLIC 27MG-0.4MG
1 TABLET ORAL DAILY
Status: DISPENSED | OUTPATIENT
Start: 2024-07-09

## 2024-07-08 RX ORDER — BISACODYL 5 MG/1
5 TABLET, DELAYED RELEASE ORAL DAILY
Status: DISPENSED | OUTPATIENT
Start: 2024-07-08

## 2024-07-08 RX ORDER — POLYETHYLENE GLYCOL 3350 17 G/17G
17 POWDER, FOR SOLUTION ORAL DAILY PRN
Status: CANCELLED | OUTPATIENT
Start: 2024-07-08

## 2024-07-08 RX ORDER — POLYETHYLENE GLYCOL 3350 17 G/17G
17 POWDER, FOR SOLUTION ORAL DAILY PRN
Status: ACTIVE | OUTPATIENT
Start: 2024-07-08

## 2024-07-08 RX ORDER — ONDANSETRON 2 MG/ML
4 INJECTION INTRAMUSCULAR; INTRAVENOUS EVERY 6 HOURS PRN
Status: CANCELLED | OUTPATIENT
Start: 2024-07-08

## 2024-07-08 RX ORDER — ACETAMINOPHEN 325 MG/1
650 TABLET ORAL EVERY 4 HOURS PRN
Status: CANCELLED | OUTPATIENT
Start: 2024-07-08

## 2024-07-08 RX ORDER — SODIUM CHLORIDE 0.9 % (FLUSH) 0.9 %
5-40 SYRINGE (ML) INJECTION EVERY 12 HOURS SCHEDULED
Status: ACTIVE | OUTPATIENT
Start: 2024-07-08

## 2024-07-08 RX ORDER — BISACODYL 5 MG/1
5 TABLET, DELAYED RELEASE ORAL DAILY
Status: CANCELLED | OUTPATIENT
Start: 2024-07-08

## 2024-07-08 RX ORDER — ONDANSETRON 4 MG/1
4 TABLET, ORALLY DISINTEGRATING ORAL EVERY 8 HOURS PRN
Status: ACTIVE | OUTPATIENT
Start: 2024-07-08

## 2024-07-08 RX ORDER — ATORVASTATIN CALCIUM 10 MG/1
10 TABLET, FILM COATED ORAL DAILY
Status: DISPENSED | OUTPATIENT
Start: 2024-07-09

## 2024-07-08 RX ORDER — ONDANSETRON 4 MG/1
4 TABLET, ORALLY DISINTEGRATING ORAL EVERY 8 HOURS PRN
Status: CANCELLED | OUTPATIENT
Start: 2024-07-08

## 2024-07-08 RX ORDER — LEVOFLOXACIN 750 MG/1
750 TABLET, FILM COATED ORAL DAILY
Qty: 5 TABLET | Refills: 0 | Status: SHIPPED | OUTPATIENT
Start: 2024-07-08 | End: 2024-07-08 | Stop reason: HOSPADM

## 2024-07-08 RX ORDER — TRAZODONE HYDROCHLORIDE 50 MG/1
75 TABLET ORAL NIGHTLY
Status: CANCELLED | OUTPATIENT
Start: 2024-07-08

## 2024-07-08 RX ORDER — METRONIDAZOLE 500 MG/1
500 TABLET ORAL 2 TIMES DAILY
Qty: 10 TABLET | Refills: 0 | Status: SHIPPED | OUTPATIENT
Start: 2024-07-08 | End: 2024-07-08 | Stop reason: HOSPADM

## 2024-07-08 RX ORDER — FLUOXETINE HYDROCHLORIDE 20 MG/1
40 CAPSULE ORAL DAILY
Status: CANCELLED | OUTPATIENT
Start: 2024-07-09

## 2024-07-08 RX ORDER — BUPROPION HYDROCHLORIDE 150 MG/1
300 TABLET ORAL EVERY MORNING
Status: DISPENSED | OUTPATIENT
Start: 2024-07-09

## 2024-07-08 RX ORDER — OXYCODONE HYDROCHLORIDE AND ACETAMINOPHEN 5; 325 MG/1; MG/1
1 TABLET ORAL EVERY 4 HOURS PRN
Status: DISPENSED | OUTPATIENT
Start: 2024-07-08

## 2024-07-08 RX ORDER — FLUOXETINE HYDROCHLORIDE 20 MG/1
40 CAPSULE ORAL DAILY
Status: DISPENSED | OUTPATIENT
Start: 2024-07-09

## 2024-07-08 RX ORDER — LATANOPROST 50 UG/ML
1 SOLUTION/ DROPS OPHTHALMIC NIGHTLY
Status: DISPENSED | OUTPATIENT
Start: 2024-07-08

## 2024-07-08 RX ORDER — SODIUM CHLORIDE 0.9 % (FLUSH) 0.9 %
5-40 SYRINGE (ML) INJECTION PRN
Status: CANCELLED | OUTPATIENT
Start: 2024-07-08

## 2024-07-08 RX ORDER — OXYCODONE HYDROCHLORIDE AND ACETAMINOPHEN 5; 325 MG/1; MG/1
1 TABLET ORAL EVERY 4 HOURS PRN
Status: CANCELLED | OUTPATIENT
Start: 2024-07-08

## 2024-07-08 RX ORDER — SODIUM CHLORIDE 9 MG/ML
INJECTION, SOLUTION INTRAVENOUS
Status: COMPLETED
Start: 2024-07-08 | End: 2024-07-08

## 2024-07-08 RX ORDER — TRAZODONE HYDROCHLORIDE 50 MG/1
75 TABLET ORAL NIGHTLY
Status: DISPENSED | OUTPATIENT
Start: 2024-07-08

## 2024-07-08 RX ORDER — M-VIT,TX,IRON,MINS/CALC/FOLIC 27MG-0.4MG
1 TABLET ORAL DAILY
Status: CANCELLED | OUTPATIENT
Start: 2024-07-09

## 2024-07-08 RX ORDER — LIDOCAINE 4 G/G
1 PATCH TOPICAL DAILY
Qty: 30 EACH | Refills: 0 | Status: ON HOLD | OUTPATIENT
Start: 2024-07-09

## 2024-07-08 RX ADMIN — ATORVASTATIN CALCIUM 10 MG: 10 TABLET, FILM COATED ORAL at 09:52

## 2024-07-08 RX ADMIN — OXYCODONE HYDROCHLORIDE AND ACETAMINOPHEN 1 TABLET: 5; 325 TABLET ORAL at 01:39

## 2024-07-08 RX ADMIN — Medication 1 TABLET: at 09:52

## 2024-07-08 RX ADMIN — OXYCODONE AND ACETAMINOPHEN 1 TABLET: 5; 325 TABLET ORAL at 21:45

## 2024-07-08 RX ADMIN — SODIUM CHLORIDE: 900 INJECTION INTRAVENOUS at 15:01

## 2024-07-08 RX ADMIN — FLUOXETINE HYDROCHLORIDE 40 MG: 20 CAPSULE ORAL at 09:52

## 2024-07-08 RX ADMIN — OXYCODONE HYDROCHLORIDE AND ACETAMINOPHEN 1 TABLET: 5; 325 TABLET ORAL at 09:48

## 2024-07-08 RX ADMIN — OXYCODONE HYDROCHLORIDE AND ACETAMINOPHEN 1 TABLET: 5; 325 TABLET ORAL at 16:06

## 2024-07-08 RX ADMIN — SODIUM CHLORIDE, PRESERVATIVE FREE 10 ML: 5 INJECTION INTRAVENOUS at 20:40

## 2024-07-08 RX ADMIN — LATANOPROST 1 DROP: 50 SOLUTION OPHTHALMIC at 21:59

## 2024-07-08 RX ADMIN — PIPERACILLIN AND TAZOBACTAM 3375 MG: 3; .375 INJECTION, POWDER, LYOPHILIZED, FOR SOLUTION INTRAVENOUS at 00:28

## 2024-07-08 RX ADMIN — PIPERACILLIN AND TAZOBACTAM 3375 MG: 3; .375 INJECTION, POWDER, LYOPHILIZED, FOR SOLUTION INTRAVENOUS at 09:52

## 2024-07-08 RX ADMIN — APIXABAN 5 MG: 5 TABLET, FILM COATED ORAL at 20:41

## 2024-07-08 RX ADMIN — TRAZODONE HYDROCHLORIDE 75 MG: 50 TABLET ORAL at 20:41

## 2024-07-08 RX ADMIN — PIPERACILLIN AND TAZOBACTAM 3375 MG: 3; .375 INJECTION, POWDER, LYOPHILIZED, FOR SOLUTION INTRAVENOUS at 16:11

## 2024-07-08 RX ADMIN — BUPROPION HYDROCHLORIDE 300 MG: 150 TABLET, EXTENDED RELEASE ORAL at 09:52

## 2024-07-08 RX ADMIN — SODIUM CHLORIDE, PRESERVATIVE FREE 10 ML: 5 INJECTION INTRAVENOUS at 09:57

## 2024-07-08 ASSESSMENT — PAIN DESCRIPTION - DESCRIPTORS
DESCRIPTORS: ACHING
DESCRIPTORS: ACHING

## 2024-07-08 ASSESSMENT — PAIN DESCRIPTION - PAIN TYPE: TYPE: CHRONIC PAIN

## 2024-07-08 ASSESSMENT — PAIN SCALES - GENERAL
PAINLEVEL_OUTOF10: 0
PAINLEVEL_OUTOF10: 0
PAINLEVEL_OUTOF10: 6
PAINLEVEL_OUTOF10: 5
PAINLEVEL_OUTOF10: 1
PAINLEVEL_OUTOF10: 7
PAINLEVEL_OUTOF10: 7
PAINLEVEL_OUTOF10: 2
PAINLEVEL_OUTOF10: 5
PAINLEVEL_OUTOF10: 0
PAINLEVEL_OUTOF10: 7
PAINLEVEL_OUTOF10: 7

## 2024-07-08 ASSESSMENT — PAIN - FUNCTIONAL ASSESSMENT: PAIN_FUNCTIONAL_ASSESSMENT: ACTIVITIES ARE NOT PREVENTED

## 2024-07-08 ASSESSMENT — PAIN DESCRIPTION - FREQUENCY: FREQUENCY: CONTINUOUS

## 2024-07-08 ASSESSMENT — PAIN DESCRIPTION - LOCATION
LOCATION: BACK

## 2024-07-08 ASSESSMENT — PAIN DESCRIPTION - ORIENTATION: ORIENTATION: LEFT

## 2024-07-08 ASSESSMENT — PAIN DESCRIPTION - ONSET: ONSET: ON-GOING

## 2024-07-08 NOTE — PROGRESS NOTES
[facility]  Follow-up Progress Note       NAME:  Sudha Lopez  MEDICAL RECORD NUMBER:  [id number]  AGE:  78 y.o.   GENDER:  female  :  1945  TODAY'S DATE:  2024    Subjective: Ostomy bag leaked several times over the week end and the staff had a hard time changing it.   Wound Identification:  Wound Type: Surgical mid line abd staple line (1 open area distal incision with a tunnel 5.5 cm. Mid area not closed with slough on wound bed.). Negative pressure wound therapy dressing changed today to control drainage from distal abd staple line.    Ostomy leaking upon arrival to room.  Flat flange currently on patient.  No more convex wafers in room.     Contributing Factors: edema, decreased mobility, shear force, obesity     Existing ileostomy: Subtotal colectomy with ileostomy on 24 for bowel obstruction by Dr Dung Jackson.      Stoma size:  25 = 1 inch mm x 30 mm 1 3/16 inch. PLEASE DO NOT THROW AWAY PATTERN.  Appliance size: Coloplast 2 pience RED convex flange # [id number] with drainable bag # [id number]. paste ring # [id number] placed around stoma. Crust with light dusting of powder, rub in, seal with dabbing barrier wipe over powder.  Barrier extenders around stoma.  Belt added to help secure pouch and prevent leaking. DO NOT THROW AWAY BELT.  If soiled it can be washed.      If no convex ostomy barriers or paste rings in room, call clinical to obtain from wound/ostomy closet on unit B3.       Patient Goal of Care:  [x] Wound Healing  [] Odor Control   [] Palliative Care  [] Pain Control   [x] Other: ostomy care.    Objective: Stool contaminated wound bed.  Suctioning stool into VAC cannister (@ 100 ml noted.      /68   Pulse 76   Temp 97.6 °F (36.4 °C) (Axillary)   Resp 18   Ht 1.6 m (5' 3\")   Wt 81.8 kg (180 lb 5.4 oz)   SpO2 98%   BMI 31.95 kg/m²   Kirill Risk Score: Kirill Scale Score: 15  Assessment: Wound bed with purulent drainage.  
                               [facility]  Follow-up Progress Note       NAME:  Sudha Lopez  MEDICAL RECORD NUMBER:  [id number]  AGE:  78 y.o.   GENDER:  female  :  1945  TODAY'S DATE:  2024    Subjective: I have not been getting out of bed.  Therapy worked with me once with exercises in bed.    Wound Identification:  Wound Type: Surgical mid line abd staple line (1 open area distal incision with a tunnel 5.1 cm. Mid area not closed with slough on wound bed.). Negative pressure wound therapy dressing changed today to control drainage from distal abd staple line.  Contributing Factors: edema, decreased mobility, shear force, obesity     Existing ileostomy: Subtotal colectomy with ileostomy on 24 for bowel obstruction by Dr Dung Jackson.      Stoma size:  25 = 1 inch mm x 30 mm 1 3/16 inch. PLEASE DO NOT THROW AWAY PATTERN.  Appliance size: Coloplast 2 pience RED convex flange # [id number] with drainable bag # [id number]. paste ring # [id number] placed around stoma. Crust with light dusting of powder, rub in, seal with dabbing barrier wipe over powder.  Barrier extenders around stoma.  Belt added to help secure pouch and prevent leaking. DO NOT THROW AWAY BELT.  If soiled it can be washed.                          Patient Goal of Care:  [x] Wound Healing  [] Odor Control   [] Palliative Care  [] Pain Control   [] Other:     Objective: lying in bed.    /77   Pulse 72   Temp 98.7 °F (37.1 °C) (Oral)   Resp 18   Wt 79.4 kg (175 lb 0.7 oz)   SpO2 97%   BMI 31.01 kg/m²   Kirill Risk Score: Kirill Scale Score: 15  Assessment: wound bed with granulation tissue.  Still draining some brown/yellow drainage.  Incision line intact.  Mid in naval with yellow brown slough.     Measurements:  Negative Pressure Wound Therapy Abdomen Medial;Upper (Active)   $ Standard NPWT <=50 sq cm PER TX $ Yes 24 1230   $ Standard NPWT >50 sq cm PER TX $ Yes 24 0654   Wound Type Surgical 
                               [facility]  Follow-up Progress Note       NAME:  Sudha Lopez  MEDICAL RECORD NUMBER:  [id number]  AGE:  78 y.o.   GENDER:  female  :  1945  TODAY'S DATE:  7/3/2024    Subjective: I just went to Xray and they drained my abscess and the DR put in a drain tube.   Wound Identification:  Wound Type: Surgical mid line abd staple line (2 open areas mid and distal). Negative pressure wound therapy dressing applied today to control drainage from distal abd staple line.  Contributing Factors: edema, decreased mobility, shear force, obesity     Existing ileostomy: Subtotal colectomy with ileostomy on 24 for bowel obstruction by Dr Dung Jackson.      Stoma size:  25 = 1 inch mm x 30 mm 1 3/16 inch. PLEASE DO NOT THROW AWAY PATTERN.  Appliance size: Coloplast 2 pience RED convex flange # [id number] with drainable bag # [id number]. paste ring # [id number] placed around stoma. Crust with light dusting of powder, rub in, seal with dabbing barrier wipe over powder.  Barrier extenders around stoma.  Belt added to help secure pouch and prevent leaking. DO NOT THROW AWAY BELT.  If soiled it can be washed.             Patient Goal of Care:  [x] Wound Healing  [] Odor Control   [] Palliative Care  [] Pain Control   [x] Other: Ostomy care    Objective: Lying in bed.  Current pouch and VAC intact.    BP (!) 124/58   Pulse 72   Temp 97.9 °F (36.6 °C) (Oral)   Resp 16   Wt 76.6 kg (168 lb 14 oz)   SpO2 97%   BMI 29.91 kg/m²   Kirill Risk Score: Kirill Scale Score: 14  Assessment: Distal incision more granulation.  Darlyn wounds intact now (slight light pink).  Stool loose brown/black   Measurements:  Negative Pressure Wound Therapy Abdomen Medial;Upper (Active)   $ Standard NPWT >50 sq cm PER TX $ Yes 24 155   Wound Type Surgical 24 155   Unit Type Rental VAC # [id number] 24 155   Dressing Type White Foam;Black Foam 24 155   Number of pieces used 3 
       Ph: [phone], Fax: [phone]           [facility]               Reason for admission:                 Pain abdomen/intra-abdominal abscess    Brief Summary :     Sudha Lopez is being seen by nephrology for pain abdomen.  Found to have intra-abdominal abscess on antibiotics  Seen by surgery  Were consulted for hyponatremia and related issues  She was admitted to this hospital last month ischemic colitis requiring colectomy and ileostomy.      Interval History and plan:      Sodium 130  Potassium low at one 3.4  Plan for CT scan today  Seen in room with CHIDI Enamorado  Discussed plans  Plan:  Since he is n.p.o. and potassium is low restrict fluids to dextrose half-normal saline with potassium added                     Assessment :     Hyponatremia  Presented with clinical dehydration  Presented with sodium of 124  127 at the time of consult  She has history of recurrent hyponatremia in the past           Hypertension   BP: (101-138)/(48-67)  Pulse:  [79-83]   BP goal inpatient 130-140 systolic inpatient  Also known to have atrial fibrillation      [facility] would like to thank Raj Madrigal MD   for opportunity to serve this patient      Please call with questions at-   24 Hrs Answering service [phone] or  7 am- 5 pm via Perfect serve or cell phone  Dr.Sudhir Tal MD       HPI :     Sudha Lopez is a 78 y.o. female presented to   the hospital on 6/30/2024 with known multiple comorbidities including A-fib, was admitted to the outside hospital with epigastric pain.  CT scan was done which was negative for intra-abdominal abscess.  She was Transferred to [place] for continuity of care because recent surgery done.  She is found to have intra-abdominal abscess currently on antibiotics      She has history of ischemic bowel and she had subtotal colectomy and ileostomy on 6/2/2024    PMH/PSH/SH/Family History:     Past Medical History:   Diagnosis Date    Anxiety     
       Ph: [phone], Fax: [phone]           [facility]               Reason for admission:                 Pain abdomen/intra-abdominal abscess    Brief Summary :     Sudha Lopez is being seen by nephrology for pain abdomen.  Found to have intra-abdominal abscess on antibiotics  Seen by surgery  Were consulted for hyponatremia and related issues  She was admitted to this hospital last month ischemic colitis requiring colectomy and ileostomy.      Interval History and plan:     Seen in room  Lethargic  Daughter by bedside  Sodium low at 131 but is stable now.  Blood pressure is good  Will continue to monitor                       Assessment :     Hyponatremia  Presented with clinical dehydration  Presented with sodium of 124  127 at the time of consult  She has history of recurrent hyponatremia in the past           Hypertension   BP: (119)/(68-75)  Pulse:  [74-76]   BP goal inpatient 130-140 systolic inpatient  Also known to have atrial fibrillation      [facility] would like to thank Lake Campbell DO   for opportunity to serve this patient      Please call with questions at-   24 Hrs Answering service [phone] or  7 am- 5 pm via Perfect serve or cell phone  Dr.Sudhir Tal MD       HPI :     Sudha Lopez is a 78 y.o. female presented to   the hospital on 6/30/2024 with known multiple comorbidities including A-fib, was admitted to the outside hospital with epigastric pain.  CT scan was done which was negative for intra-abdominal abscess.  She was Transferred to [place] for continuity of care because recent surgery done.  She is found to have intra-abdominal abscess currently on antibiotics      She has history of ischemic bowel and she had subtotal colectomy and ileostomy on 6/2/2024    PMH/PSH/SH/Family History:     Past Medical History:   Diagnosis Date    Anxiety     Hypertension           Medication:     Current Facility-Administered Medications: collagenase 
       Ph: [phone], Fax: [phone]           [facility]               Reason for admission:                 Pain abdomen/intra-abdominal abscess    Brief Summary :     Sudha Lopez is being seen by nephrology for pain abdomen.  Found to have intra-abdominal abscess on antibiotics  Seen by surgery  Were consulted for hyponatremia and related issues  She was admitted to this hospital last month ischemic colitis requiring colectomy and ileostomy.      Interval History and plan:     Seen in room while getting physical therapy  Sitting up  No acute displaced  Daughter from [place] by bedside  Sodium is overall stable since yesterday  Requested to take dietary supplement which will not only help with sodium but also with muscle and improve the rehabilitation potential                     Assessment :     Hyponatremia  Presented with clinical dehydration  Presented with sodium of 124  127 at the time of consult  She has history of recurrent hyponatremia in the past           Hypertension   BP: (135-137)/(76-77)  Pulse:  [72-76]   BP goal inpatient 130-140 systolic inpatient  Also known to have atrial fibrillation      [facility] would like to thank Lake Campbell DO   for opportunity to serve this patient      Please call with questions at-   24 Hrs Answering service [phone] or  7 am- 5 pm via Perfect serve or cell phone  Dr.Sudhir Tal MD       HPI :     Sudha Lopez is a 78 y.o. female presented to   the hospital on 6/30/2024 with known multiple comorbidities including A-fib, was admitted to the outside hospital with epigastric pain.  CT scan was done which was negative for intra-abdominal abscess.  She was Transferred to [place] for continuity of care because recent surgery done.  She is found to have intra-abdominal abscess currently on antibiotics      She has history of ischemic bowel and she had subtotal colectomy and ileostomy on 6/2/2024    PMH/PSH/SH/Family History: 
       Ph: [phone], Fax: [phone]           [facility]               Reason for admission:                 Pain abdomen/intra-abdominal abscess    Brief Summary :     Sudha Lopez is being seen by nephrology for pain abdomen.  Found to have intra-abdominal abscess on antibiotics  Seen by surgery  Were consulted for hyponatremia and related issues  She was admitted to this hospital last month ischemic colitis requiring colectomy and ileostomy.      Interval History and plan:     The patient's serum sodium level was 132 yesterday  Potassium 3.8 bicarb 25 and creatinine was 0.6  Most recent blood pressure 113 /68  Reviewed her medication list and did not see any concerning medications    Looks like she has hyponatremia since May 2024 and then her low sodium level was 126 then she had appeared of normal serum sodium level in June.    Based on information available looks like she started have some bowel issues since May 2024 and she had surgical intervention.  Therefore, this may be the reason for her hyponatremia.    If her hyponatremia does not completely resolve after she has recovered from this episode of acute illness, then she should have a much more thorough evaluation of the etiology of her hyponatremia.    The concern for new onset of hyponatremia for elderly person will be paraneoplastic syndrome.    I will check a uric acid level.  If her uric acid level is low, then it could indicate SIADH type of hyponatremia then it will further strengthen the recommendation of further follow-up of on her hyponatremia.  Speech box    The patient was seen and examined in her room  She was resting company in her bed  She was very awake, alert and conversant  She has some left ankle issues which apparently was a consequence of her fall.  She has no other complaints    Her most recent set of vital signs showed temperature 98.4 heart rate 77 blood pressure 113/68    The most recent available lab works was 
       [facility] GENERAL SURGERY    Surgery Progress Note           POD #       PATIENT NAME: Sudha Lopez     TODAY'S DATE: 7/5/2024    INTERVAL HISTORY:    Pt  resting comfortably; perc drain in place.  Vac placed today, midline staples removed.     OBJECTIVE:   VITALS:  /77   Pulse 72   Temp 98.7 °F (37.1 °C) (Oral)   Resp 18   Wt 79.4 kg (175 lb 0.7 oz)   SpO2 97%   BMI 31.01 kg/m²     INTAKE/OUTPUT:    I/O last 3 completed shifts:  In: 1670 [P.O.:1570; IV Piggyback:100]  Out: 5650 [Urine:4625; Drains:50; Stool:975]  I/O this shift:  In: 600 [P.O.:600]  Out: 800 [Urine:400; Drains:100; Stool:300]              CONSTITUTIONAL:  awake and alert  LUNGS:     ABDOMEN:    , soft, non-distended,     INCISION: clean, dry, healing, purulent and scant drainage in perc drain; ostomy w/ succus    Data:  CBC:   Recent Labs     07/03/24 0457 07/04/24 0445 07/05/24  0500   WBC 12.0* 10.5 12.8*   HGB 7.6* 9.1* 8.7*   HCT 22.2* 27.1* 25.5*    427 425     BMP:    Recent Labs     07/03/24 0457 07/04/24  0445 07/05/24  0500   * 132* 131*   K 3.2* 4.0 3.3*   CL 98* 100 99   CO2 21 23 23   BUN 8 5* 6*   CREATININE <0.5* <0.5* 0.6   GLUCOSE 128* 97 97     Hepatic:   Recent Labs     07/03/24 0457 07/04/24 0445 07/05/24  0500   AST 11* 20 24   ALT 10 14 18   BILITOT <0.2 <0.2 <0.2   ALKPHOS 116 109 102     Mag:      Recent Labs     07/03/24 0457 07/05/24  0500   MG 1.80 1.50*      Phos:   No results for input(s): \"PHOS\" in the last 72 hours.   INR: No results for input(s): \"INR\" in the last 72 hours.      Radiology Review:       ASSESSMENT AND PLAN:  78 y.o. female status post subtotal colectomy end ileostomy with intra-abdominal abscess    - will check repeat CT tomorrow to update status of abscess, determine if perc drain can be removed   - cont IV abx as per ID recs   - diet as tolerated    Will follow.  Should be ok to d/c to rehab soon.        Electronically signed by FOREST REBOLLEDO MD     
    [facility]    PATIENT NAME: Sudha Lopez     TODAY'S DATE: 7/8/2024    CHIEF COMPLAINT: ostomy leaking    INTERVAL HISTORY/HPI:    Pt with ostomy leaking this AM.     REVIEW OF SYSTEMS:  Pertinent positives and negatives as per interval history section    OBJECTIVE:  VITALS:  /68   Pulse 76   Temp 97.6 °F (36.4 °C) (Axillary)   Resp 18   Ht 1.6 m (5' 3\")   Wt 81.8 kg (180 lb 5.4 oz)   SpO2 98%   BMI 31.95 kg/m²     INTAKE/OUTPUT:    I/O last 3 completed shifts:  In: 1850 [P.O.:1800; IV Piggyback:50]  Out: 3155 [Urine:2200; Drains:185; Stool:770]  No intake/output data recorded.    CONSTITUTIONAL:  awake and alert  LUNGS:  Respirations easy and unlabored  CARD:  regular rate and rhythm  ABDOMEN:  normal bowel sounds, soft, non-distended, drain with minimal drainage, but holding suction.   Midline wound with purulent drainage - see wound care notes for photos. Some skin excoriation surrounding stoma.      Data:  CBC:   Recent Labs     07/06/24  0524 07/07/24  0720 07/08/24  0530   WBC 12.8* 15.8* 12.6*   HGB 9.1* 9.8* 8.5*   HCT 27.4* 29.9* 24.9*   * 546* 485*       BMP:    Recent Labs     07/06/24  0524 07/07/24  0720 07/08/24  0530   * 128* 131*   K 3.8 3.8 3.9   CL 98* 93* 98*   CO2 25 25 25   BUN 7 8 7   CREATININE 0.6 0.7 0.7   GLUCOSE 89 125* 97       Hepatic:   Recent Labs     07/06/24  0524 07/07/24  0720   AST 32 33   ALT 23 27   BILITOT <0.2 <0.2   ALKPHOS 107 110             ASSESSMENT AND PLAN:  Intra-abdominal abscess after colectomy with ileostomy.    Continue with wound vac to midline wound.   Continue with perc drain   Continue with antibiotics.     We will arrange for follow-up CT later this week    Awaiting d/c plans for ARU     Electronically signed by CASPER Marquis - CNP     [id number]    I have personally performed a face to face diagnostic evaluation on this patient and agree with the progress note and care plan of JING Hernandez. More than half 
    [facility]    PATIENT NAME: Sudha Lopez     TODAY'S DATE: 7/4/2024    CHIEF COMPLAINT: None, feels well    INTERVAL HISTORY/HPI:    Pt states she feels better after having the drain placed.  She is tolerating a diet, and her ostomy is functional.     REVIEW OF SYSTEMS:  Pertinent positives and negatives as per interval history section    OBJECTIVE:  VITALS:  BP (!) 145/70   Pulse 65   Temp 98 °F (36.7 °C) (Oral)   Resp 18   Wt 78.2 kg (172 lb 6.4 oz)   SpO2 95%   BMI 30.54 kg/m²     INTAKE/OUTPUT:    I/O last 3 completed shifts:  In: 1266.3 [P.O.:860; I.V.:10; Blood:396.3]  Out: 3473 [Urine:2425; Drains:123; Stool:925]  I/O this shift:  In: 240 [P.O.:240]  Out: 475 [Urine:400; Stool:75]    CONSTITUTIONAL:  awake and alert  LUNGS:  Respirations easy and unlabored, clear to auscultation  CARD:  regular rate and rhythm  ABDOMEN:  normal bowel sounds, soft, non-distended, VAC in place over midline wound, drain with minimal purulent drainage    Data:  CBC:   Recent Labs     07/02/24 0452 07/03/24 0457 07/04/24 0445   WBC 12.4* 12.0* 10.5   HGB 6.8* 7.6* 9.1*   HCT 20.0* 22.2* 27.1*    356 427     BMP:    Recent Labs     07/02/24 0452 07/03/24 0457 07/04/24 0445   * 129* 132*   K 3.4* 3.2* 4.0    98* 100   CO2 21 21 23   BUN 11 8 5*   CREATININE 0.6 <0.5* <0.5*   GLUCOSE 101* 128* 97     Hepatic:   Recent Labs     07/02/24 0452 07/03/24 0457 07/04/24 0445   AST 13* 11* 20   ALT 14 10 14   BILITOT <0.2 <0.2 <0.2   ALKPHOS 132* 116 109     Mag:      Recent Labs     07/02/24 0452 07/03/24 0457   MG 1.60* 1.80      Phos:   No results for input(s): \"PHOS\" in the last 72 hours.   INR:   Recent Labs     07/02/24  0452   INR 1.35*       Radiology Review:  *Imaging personally reviewed by me.   N/A      ASSESSMENT AND PLAN:  Status post subtotal colectomy end ileostomy with intra-abdominal abscess.  Had PERC drain placed yesterday.  Continue IV antibiotics and supportive care.  
    [facility] SURGERY    PATIENT NAME: Sudha Lopez     TODAY'S DATE: 7/2/2024    CHIEF COMPLAINT: abd pain    INTERVAL HISTORY/HPI:    Pt resting in bed, seems to be doing okay - reports some abd pain. .     REVIEW OF SYSTEMS:  Pertinent positives and negatives as per interval history section    OBJECTIVE:  VITALS:  /67   Pulse 83   Temp 98.5 °F (36.9 °C) (Oral)   Resp 19   Wt 76.6 kg (168 lb 14 oz)   SpO2 97%   BMI 29.91 kg/m²     INTAKE/OUTPUT:    I/O last 3 completed shifts:  In: 25 [P.O.:25]  Out: 3015 [Urine:2450; Stool:565]  No intake/output data recorded.    CONSTITUTIONAL:  awake and alert  LUNGS:  Respirations easy and unlabored  CARD:  regular rate and rhythm  ABDOMEN:   soft, non-distended, tenderness noted midline, stoma healthy  INCISION: wound vac in place, staples in place     Data:  CBC:   Recent Labs     06/30/24  1459 07/01/24  0541 07/02/24  0452   WBC 14.8* 16.7* 12.4*   HGB 8.7* 7.3* 6.8*   HCT 28.0* 21.8* 20.0*    375 369     BMP:    Recent Labs     06/30/24  1459 07/01/24  0541 07/02/24  0452   * 127* 130*   K 4.9 4.2 3.4*   CL 93* 96* 100   CO2 19* 19* 21   BUN 26* 18 11   CREATININE 0.9 0.8 0.6   GLUCOSE 87 93 101*     Hepatic:   Recent Labs     06/30/24  1459 07/01/24  0541 07/02/24  0452   AST 20 16 13*   ALT 23 18 14   BILITOT 0.4 0.4 <0.2   ALKPHOS 168* 143* 132*     Mag:      Recent Labs     07/02/24 0452   MG 1.60*      Phos:   No results for input(s): \"PHOS\" in the last 72 hours.   INR:   Recent Labs     07/02/24 0452   INR 1.35*       Radiology Review:  *Imaging personally reviewed by me.   CT scan ordered      ASSESSMENT AND PLAN:  S/P subtotal colectomy with ileostomy now with apparent intra-abdominal abscess per CT at outside hospital.   Images were not able to be pushed into our system apparently therefore repeat CT scan has been ordered (discussed with nephrology and ok to use IV contrast, he will order IV fluids) - pt may need perc drain. 
    [facility]    PATIENT NAME: Sudha Lopez     TODAY'S DATE: 7/3/2024    CHIEF COMPLAINT: none    INTERVAL HISTORY/HPI:    Pt resting comfortably, awaiting perc drain to abscess.     OBJECTIVE:  VITALS:  BP (!) 115/50   Pulse 70   Temp 98.2 °F (36.8 °C) (Oral)   Resp 16   Wt 76.6 kg (168 lb 14 oz)   SpO2 96%   BMI 29.91 kg/m²     INTAKE/OUTPUT:    I/O last 3 completed shifts:  In: 396.3 [Blood:396.3]  Out: 2875 [Urine:1600; Stool:1275]  I/O this shift:  In: 40 [P.O.:30; I.V.:10]  Out: 25 [Stool:25]    CONSTITUTIONAL:  awake and alert  LUNGS:     ABDOMEN:  Vac in place    Data:  CBC:   Recent Labs     07/01/24  0541 07/02/24 0452 07/03/24 0457   WBC 16.7* 12.4* 12.0*   HGB 7.3* 6.8* 7.6*   HCT 21.8* 20.0* 22.2*    369 356     BMP:    Recent Labs     07/01/24  0541 07/02/24  0452 07/03/24  0457   * 130* 129*   K 4.2 3.4* 3.2*   CL 96* 100 98*   CO2 19* 21 21   BUN 18 11 8   CREATININE 0.8 0.6 <0.5*   GLUCOSE 93 101* 128*     Hepatic:   Recent Labs     07/01/24  0541 07/02/24  0452 07/03/24  0457   AST 16 13* 11*   ALT 18 14 10   BILITOT 0.4 <0.2 <0.2   ALKPHOS 143* 132* 116     Mag:      Recent Labs     07/02/24  0452 07/03/24  0457   MG 1.60* 1.80      Phos:   No results for input(s): \"PHOS\" in the last 72 hours.   INR:   Recent Labs     07/02/24 0452   INR 1.35*       Radiology Review:         ASSESSMENT AND PLAN:  S/P subtotal colectomy with ileostomy now with intra-abdominal abscess per CT    - await drain placement today   - cont IV abx   - Vac to be changed later today after drain  placed   - will follow    Electronically signed by FOREST REBOLLEDO MD     
    [facility]    PATIENT NAME: Sudha Lopez     TODAY'S DATE: 7/6/2024    CHIEF COMPLAINT: None, feels well    INTERVAL HISTORY/HPI:    Pt states she feels good.  She is tolerating a diet.  Minimal discomfort at the drain site.     REVIEW OF SYSTEMS:  Pertinent positives and negatives as per interval history section    OBJECTIVE:  VITALS:  /72   Pulse 72   Temp 97.2 °F (36.2 °C) (Oral)   Resp 16   Ht 1.6 m (5' 3\")   Wt 81.2 kg (179 lb 0.2 oz)   SpO2 94%   BMI 31.71 kg/m²     INTAKE/OUTPUT:    I/O last 3 completed shifts:  In: 1420 [P.O.:1320; IV Piggyback:100]  Out: 4315 [Urine:2400; Drains:515; Stool:1400]  No intake/output data recorded.    CONSTITUTIONAL:  awake and alert  LUNGS:  Respirations easy and unlabored  CARD:  regular rate and rhythm  ABDOMEN:  normal bowel sounds, soft, non-distended, drain with purulent drainage.  Wound with VAC    Data:  CBC:   Recent Labs     07/04/24 0445 07/05/24  0500 07/06/24 0524   WBC 10.5 12.8* 12.8*   HGB 9.1* 8.7* 9.1*   HCT 27.1* 25.5* 27.4*    425 502*     BMP:    Recent Labs     07/04/24 0445 07/05/24  0500 07/05/24  1410 07/06/24  0524   * 131*  --  132*   K 4.0 3.3* 3.7 3.8    99  --  98*   CO2 23 23  --  25   BUN 5* 6*  --  7   CREATININE <0.5* 0.6  --  0.6   GLUCOSE 97 97  --  89     Hepatic:   Recent Labs     07/04/24 0445 07/05/24  0500 07/06/24  0524   AST 20 24 32   ALT 14 18 23   BILITOT <0.2 <0.2 <0.2   ALKPHOS 109 102 107     Mag:      Recent Labs     07/05/24  0500   MG 1.50*      Phos:   No results for input(s): \"PHOS\" in the last 72 hours.   INR: No results for input(s): \"INR\" in the last 72 hours.    Radiology Review:  *Imaging personally reviewed by me.   N/A      ASSESSMENT AND PLAN:  Intra-abdominal abscess after colectomy with ileostomy.  She is doing well status post drain placement.  Continue antibiotics and supportive care.  We will arrange follow-up CT later this week     Electronically signed by 
    [facility]    PATIENT NAME: Sudha Lopez     TODAY'S DATE: 7/7/2024    CHIEF COMPLAINT: Drain leaking    INTERVAL HISTORY/HPI:    Pt states she feels good.  She is tolerating a diet.  The drain bulb will not hold suction.     REVIEW OF SYSTEMS:  Pertinent positives and negatives as per interval history section    OBJECTIVE:  VITALS:  /73   Pulse 74   Temp 98.3 °F (36.8 °C) (Oral)   Resp 16   Ht 1.6 m (5' 3\")   Wt 80.5 kg (177 lb 7.5 oz)   SpO2 96%   BMI 31.44 kg/m²     INTAKE/OUTPUT:    I/O last 3 completed shifts:  In: 720 [P.O.:720]  Out: 3510 [Urine:1900; Drains:510; Stool:1100]  No intake/output data recorded.    CONSTITUTIONAL:  awake and alert  LUNGS:  Respirations easy and unlabored  CARD:  regular rate and rhythm  ABDOMEN:  normal bowel sounds, soft, non-distended, drain with purulent drainage but not maintaining suction due to a broken connector.  This was reinforced with Tegaderm and now allows the drain to maintain suction    Data:  CBC:   Recent Labs     07/05/24  0500 07/06/24 0524 07/07/24  0720   WBC 12.8* 12.8* 15.8*   HGB 8.7* 9.1* 9.8*   HCT 25.5* 27.4* 29.9*    502* 546*     BMP:    Recent Labs     07/05/24  0500 07/05/24  1410 07/06/24  0524 07/07/24  0720   *  --  132* 128*   K 3.3* 3.7 3.8 3.8   CL 99  --  98* 93*   CO2 23  --  25 25   BUN 6*  --  7 8   CREATININE 0.6  --  0.6 0.7   GLUCOSE 97  --  89 125*     Hepatic:   Recent Labs     07/05/24  0500 07/06/24  0524 07/07/24  0720   AST 24 32 33   ALT 18 23 27   BILITOT <0.2 <0.2 <0.2   ALKPHOS 102 107 110     Mag:      Recent Labs     07/05/24  0500   MG 1.50*      Phos:   No results for input(s): \"PHOS\" in the last 72 hours.   INR: No results for input(s): \"INR\" in the last 72 hours.    Radiology Review:  *Imaging personally reviewed by me.   N/A      ASSESSMENT AND PLAN:  Intra-abdominal abscess after colectomy with ileostomy.  She is doing well status post drain placement.  Continue antibiotics and 
    V2.0    [facility] [place]      Name:  Sudha Lopez /Age/Sex: 1945  (78 y.o. female)   MRN & CSN:  [id number] & [id number] Encounter Date/Time: 2024 11:55 AM EDT   Location:  [facility]04[facility]- PCP: Perla Leyva MD     Attending:Lake Campbell DO       Hospital Day: 6    Assessment and Recommendations     Sudha Lopez is a 78 y.o. female with pmh of atrial fibrillation, and recent colonic ischemia with colectomy presented to outside hospital ED with complaint of abdominal pain and chest pain and found to have postoperative intra-abdominal pelvic abscess    Postoperative intra-abdominal abscess s/p subtotal colectomy with ileostomy s/p CT-guided left abdominal drain placement by IR on 7/3/2024  Abdominal pain secondary above  Hyponatremia, improving  Anemia with hemoglobin dropping to 6.8, s/p 1 unit PRBC transfusion  Leukocytosis likely secondary above  PAF on Eliquis   Hypomagnesemia  Hypokalemia    Plan:   Appreciate IR placing CT-guided left abdominal drain and follow-up intraoperative cultures  Continue IV Zosyn  Noted plan to repeat CT of the abdomen tomorrow per surgery  Appreciate nephrology follow-up, serum sodium improving  Appreciate GI eval recommendation  Monitor serum sodium, continue with IV fluids, nephrology consulted  continue with Wellbutrin and atorvastatin  For pain control IV Dilaudid as needed and Percocet as needed  Appreciate general surgery follow-up  Supportive care  Labs in AM              Comment: Please note this report has been produced using speech recognition software and may contain errors related to that system including errors in grammar, punctuation, and spelling, as well as words and phrases that may be inappropriate. If there are any questions or concerns please feel free to contact the dictating provider for clarification.   Diet ADULT DIET; Regular  ADULT ORAL NUTRITION SUPPLEMENT; Breakfast, PM Snack; Standard High Calorie/High Protein Oral Supplement 
    V2.0    [facility] [place]      Name:  Sudha Lopez /Age/Sex: 1945  (78 y.o. female)   MRN & CSN:  [id number] & [id number] Encounter Date/Time: 2024 11:55 AM EDT   Location:  [facility]0438- PCP: Perla Leyva MD     Attending:Lake Campbell DO       Hospital Day: 7    Assessment and Recommendations     Sudha Lopez is a 78 y.o. female with pmh of atrial fibrillation, and recent colonic ischemia with colectomy presented to outside hospital ED with complaint of abdominal pain and chest pain and found to have postoperative intra-abdominal pelvic abscess    Postoperative intra-abdominal abscess s/p subtotal colectomy with ileostomy s/p CT-guided left abdominal drain placement by IR on 7/3/2024  Abdominal pain secondary above  Hyponatremia, improving  Anemia with hemoglobin dropping to 6.8, s/p 1 unit PRBC transfusion  Leukocytosis likely secondary above  PAF on Eliquis   Hypomagnesemia  Hypokalemia    Plan:   Appreciate IR placing CT-guided left abdominal drain and follow-up intraoperative cultures  Continue IV Zosyn  Today per surgical team patient is ready to be discharged and for them to follow-up with the drain and repeat imaging if needed.  We are awaiting placement otherwise patient is ready to be discharged.  Appreciate nephrology follow-up, serum sodium improving  Appreciate GI eval recommendation  Monitor serum sodium, continue with IV fluids, nephrology consulted  continue with Wellbutrin and atorvastatin  For pain control IV Dilaudid as needed and Percocet as needed  Appreciate general surgery follow-up  Supportive care  Labs in AM              Comment: Please note this report has been produced using speech recognition software and may contain errors related to that system including errors in grammar, punctuation, and spelling, as well as words and phrases that may be inappropriate. If there are any questions or concerns please feel free to contact the dictating provider for 
    V2.0    [facility] [place]      Name:  Sudha Lopez /Age/Sex: 1945  (78 y.o. female)   MRN & CSN:  [id number] & [id number] Encounter Date/Time: 2024 11:55 AM EDT   Location:  [facility] PCP: Perla Leyva MD     Attending:Raj Madrigal MD       Hospital Day: 3    Assessment and Recommendations     Sudha Lopez is a 78 y.o. female with pmh of atrial fibrillation, and recent colonic ischemia with colectomy presented to outside hospital ED with complaint of abdominal pain and chest pain and found to have postoperative intra-abdominal pelvic abscess    Postoperative intra-abdominal abscess s/p subtotal colectomy with ileostomy  Abdominal pain secondary above  Hyponatremia  Anemia with hemoglobin dropping to 6.8  Leukocytosis likely secondary above  PAF on Eliquis which is on hold presently    Plan:   Transfuse 1 unit PRBC  Continue IV cefepime and IV vancomycin closely monitor Vanco trough for toxicity  Pharmacy consulted  Appreciate GI eval recommendation  Monitor serum sodium, continue with IV fluids, nephrology consulted  Continue to hold Eliquis   continue with Wellbutrin and atorvastatin  For pain control IV Dilaudid as needed and Percocet as needed  Appreciate general surgery follow-up  Supportive care  Her daughter who visited her yesterday morning later tested COVID-positive, will keep an eye on patient for symptoms  Labs in AM              Comment: Please note this report has been produced using speech recognition software and may contain errors related to that system including errors in grammar, punctuation, and spelling, as well as words and phrases that may be inappropriate. If there are any questions or concerns please feel free to contact the dictating provider for clarification.   Diet Diet NPO Exceptions are: Sips of Water with Meds   DVT Prophylaxis [] Lovenox, []  Heparin, [x] SCDs, [] Ambulation,  [] Eliquis, [] Xarelto   Code Status Full Code   Disposition From: 
Arrived to place midline with bedside RN Faye Pre-procedure and timeout done with RN, discussed limitations of placement and allergies. Vital signs stable. Labs, allergies, medications, and code status reviewed. No contraindications noted.    Procedure explained to pt, including the risk and benefits of the procedure. All questions answered. Pt verbalizes understanding of the procedure and states no more questions.     Pt's basilic, brachial, cephalic are all easily collapsible with no indication for a clot. Vein selected is large enough for catheter (please see image below). Pt tolerated sterile procedure well, with no difficulty accessing right basilic vein, when accessed - blood was free flowing and non-pulsatile. Guidewire, introducer, and catheter went in smoothly. ML placement verification with blood return and flushes easily.      Nurses:  OK to use Midline.    Please replace all existing IV tubing with new IV tubing prior to using the ML for current IV infusions.  Please remove any PIVs from ML arm.  All of the above may be sources of infection or an increase chance of a clot.      Post procedure - reorganized pt table, placed pt in lowest position, with call light and educated on line care. Instructed pt/RN not to use arm for at least 30min to avoid bleeding. Reported off to bedside RN.      If you have any questions please call number below and dispatch will direct you to the PICC RN that is on call.    [phone]      
BP (!) 156/66   Pulse 70   Temp 98.3 °F (36.8 °C) (Oral)   Resp 18   Wt 76.6 kg (168 lb 14 oz)   SpO2 95%   BMI 29.91 kg/m²  on room air.  Pt A&O x4, resting quietly in bed, with complaints of intermittent abdominal pain, will administer prn percocet per pt request.  Pt denies shortness of breath.  Lungs clear, diminished.  NSR on tele.  Pt turned and repositioned, zinc applied to coccyx/mariama area, new external catheter applied.  Ostomy and wound vac in place.  BLE elevated off bed onto pillow with heels floated off pillow.  Mepilex in place on left heel.  Pt remains NPO as ordered except for sips of water with a.m medications.  Pt denies any other needs at this time.  Bedside table, call light, cell phone within reach.  Pt instructed to call out for any needs and assistance.  Will continue to monitor.  Matilda Eubanks RN  7/3/2024      
Comprehensive Nutrition Assessment    Type and Reason for Visit:  Initial    Nutrition Recommendations/Plan:   Continue regular diet   Encourage po intakes  Ensure daily  Monitor po intakes, nutrition adequacy, weights, pertinent labs, BMs     Malnutrition Assessment:  Malnutrition Status:  Moderate malnutrition (07/08/24 1551)    Context:  Acute Illness     Findings of the 6 clinical characteristics of malnutrition:  Energy Intake:  75% or less of estimated energy requirements for 7 or more days  Weight Loss:  Greater than 5% over 1 month     Body Fat Loss:  No significant body fat loss     Muscle Mass Loss:  Mild muscle mass loss Temples (temporalis), Clavicles (pectoralis & deltoids)  Fluid Accumulation:  No significant fluid accumulation     Strength:  Not Performed    Nutrition Assessment:    LOS assessment. Pt with PMHx of HTN, afib, and recent colonic ischemia with colectomy admitted with c/o abdominal pain and chest pain and found to have postoperative intra-abdominal pelvic abscess. Currently on a regular diet with Ensure ONS. PO intakes % per EMR. Pt reports that her appetite has been especially reduced since admission d/t not liking the food. Pt endorses having weight loss PTA. Pt noted to have weight loss of ~14 lb x 1 month per EMR (7.2%). Encouraged po intakes. Pt favorable to Ensure daily. Pt reports that her family has been bringing in food for her as well. Bakari monitor.    Nutrition Related Findings:    Active BS. 195 ml ostomy output on 7/7. +1 pitting LLE edema. Na 131. Wound Type: Surgical Incision, Unstageable, Pressure Injury, Wound Vac       Current Nutrition Intake & Therapies:    Average Meal Intake: 26-50%, 51-75%, %  Average Supplements Intake: Unable to assess  ADULT DIET; Regular  ADULT ORAL NUTRITION SUPPLEMENT; Breakfast, PM Snack; Standard High Calorie/High Protein Oral Supplement    Anthropometric Measures:  Height: 160 cm (5' 3\")  Ideal Body Weight (IBW): 115 lbs (52 
Consult to follow once patient has been evaluated by therapies.     Electronically signed by Toya Neal MD on 7/5/2024 at 4:11 PM    
Follow up.  Patients abd negative pressure wound therapy dressing and ostomy appliance remain intact.  Did not have IR remove abd abscess yet today.  Plan for Perc drain treatment tomorrow. CT scan done today.  Receiving blood today for  H & H 6.8 & 20. Will change NPWT dressing after treatment completed in radiology in case dressing needs to be removed for the procedure.  
I've reviewed the radiology report and the images for the left ankle.  Ankle mortis is congruent with no widening. Fractures are stable without displacement. No callus formation noted as of yet.  Continue previously recommended care for left ankle.  Repeat xrays in 2 weeks    
ID Follow-up NOTE    CC:   Post op intra-abd abscess   Antibiotics: Vanco, cefepime and flagyl.     Admit Date: 6/30/2024  Hospital Day: 3    Subjective:     Patient had vac placed. Planned to have repeated CT with contrast today.       Objective:     Patient Vitals for the past 8 hrs:   BP Temp Temp src Pulse SpO2   07/02/24 0815 138/67 98.5 °F (36.9 °C) Oral 83 97 %   07/02/24 0600 (!) 101/48 98.7 °F (37.1 °C) Oral 79 92 %     I/O last 3 completed shifts:  In: 25 [P.O.:25]  Out: 3015 [Urine:2450; Stool:565]  No intake/output data recorded.    EXAM:  GENERAL: No apparent distress.    HEENT: Membranes moist, no oral lesion  NECK:  Supple, no lymphadenopathy  LUNGS: Clear b/l, no rales, no dullness  CARDIAC: RRR, no murmur appreciated  ABD:    pt with midline vac in place, there is ostomy in place with output.   EXT:  No rash, no edema, no lesions  NEURO: No focal neurologic findings  PSYCH: Orientation, sensorium, mood normal  LINES:  Peripheral iv, right arm midline        Data Review:  Lab Results   Component Value Date    WBC 12.4 (H) 07/02/2024    HGB 6.8 (LL) 07/02/2024    HCT 20.0 (LL) 07/02/2024    MCV 87.6 07/02/2024     07/02/2024     Lab Results   Component Value Date    CREATININE 0.6 07/02/2024    BUN 11 07/02/2024     (L) 07/02/2024    K 3.4 (L) 07/02/2024     07/02/2024    CO2 21 07/02/2024       Hepatic Function Panel:   Lab Results   Component Value Date/Time    ALKPHOS 132 07/02/2024 04:52 AM    ALT 14 07/02/2024 04:52 AM    AST 13 07/02/2024 04:52 AM    BILITOT <0.2 07/02/2024 04:52 AM    BILIDIR 0.4 06/05/2024 04:23 AM    IBILI 0.2 06/05/2024 04:23 AM       MICRO:  Bcx x 2 6/30: neg to date  MRSA nares 6/30: neg   ABD wound swab 6/15: light E coli  Susceptibility              Escherichia coli       BACTERIAL SUSCEPTIBILITY PANEL BY GAUTMA       ampicillin 4 mcg/mL Sensitive       ampicillin-sulbactam <=2 mcg/mL Sensitive       ceFAZolin <=4 mcg/mL Sensitive       cefepime <=0.12 
ID Follow-up NOTE    CC:   Post op intra-abd abscess   Antibiotics: Vanco, cefepime and flagyl.     Admit Date: 6/30/2024  Hospital Day: 4    Subjective:     Patient had vac placed. Planned to have IR guided aspirate today.       Objective:     Patient Vitals for the past 8 hrs:   BP Temp Temp src Pulse Resp SpO2   07/03/24 1458 (!) 124/58 97.9 °F (36.6 °C) Oral 72 16 97 %   07/03/24 1351 (!) 115/50 -- -- 70 16 96 %   07/03/24 1347 (!) 122/57 -- -- 70 16 100 %   07/03/24 1340 (!) 107/50 -- -- 67 16 100 %   07/03/24 1337 (!) 113/48 -- -- 66 16 100 %   07/03/24 1332 (!) 123/51 -- -- 68 16 100 %   07/03/24 1331 (!) 119/58 -- -- 60 16 100 %   07/03/24 1328 (!) 123/51 -- -- 69 17 100 %   07/03/24 1325 (!) 139/58 -- -- 70 15 100 %   07/03/24 1319 (!) 142/60 -- -- 72 16 98 %   07/03/24 1243 (!) 152/69 98.2 °F (36.8 °C) Oral 70 18 98 %   07/03/24 1025 (!) 156/66 98.3 °F (36.8 °C) Oral 70 18 95 %       I/O last 3 completed shifts:  In: 396.3 [Blood:396.3]  Out: 2875 [Urine:1600; Stool:1275]  I/O this shift:  In: 40 [P.O.:30; I.V.:10]  Out: 33 [Drains:8; Stool:25]    EXAM:  GENERAL: No apparent distress.    HEENT: Membranes moist, no oral lesion  NECK:  Supple, no lymphadenopathy  LUNGS: Clear b/l, no rales, no dullness  CARDIAC: RRR, no murmur appreciated  ABD:    pt with midline vac in place, there is ostomy in place with output.   EXT:  No rash, no edema, no lesions  NEURO: No focal neurologic findings  PSYCH: Orientation, sensorium, mood normal  LINES:  Peripheral iv, right arm midline        Data Review:  Lab Results   Component Value Date    WBC 12.0 (H) 07/03/2024    HGB 7.6 (L) 07/03/2024    HCT 22.2 (L) 07/03/2024    MCV 86.9 07/03/2024     07/03/2024     Lab Results   Component Value Date    CREATININE <0.5 (L) 07/03/2024    BUN 8 07/03/2024     (L) 07/03/2024    K 3.2 (L) 07/03/2024    CL 98 (L) 07/03/2024    CO2 21 07/03/2024       Hepatic Function Panel:   Lab Results   Component Value Date/Time    
Ileostomy leaked underneath wound vac. Ostomy appliance changed and wound vac dressing removed wicking material in open wound noted dressing changed adequate seal obtained. Tolerated well.  
Messaged admitting doc. Waiting orders.  
Occupational Therapy  [address]  Occupational Therapy Initial Assessment/Treatment Note    Name: Sudha Lopez  : 1945  MRN: [id number]  Date of Service: 2024    Discharge Recommendations:  IP Rehab          Patient Diagnosis(es): The primary encounter diagnosis was E coli infection. Diagnoses of Enterococcus faecalis infection and Intra-abdominal abscess (HCC) were also pertinent to this visit.  Past Medical History:  has a past medical history of Anxiety and Hypertension.  Past Surgical History:  has a past surgical history that includes Hysterectomy; Breast enhancement surgery; rhinoplasty; Rotator cuff repair (Bilateral); Hand surgery (Right); Total hip arthroplasty (Bilateral); Intracapsular cataract extraction (Left, 2020); Colonoscopy; Intracapsular cataract extraction (Right, 2020); Colonoscopy (N/A, 2024); laparotomy (N/A, 2024); and CT PERITONEAL/RETROPERITONEAL PERC DRAIN (7/3/2024).         Assessment   Performance deficits / Impairments: Decreased functional mobility ;Decreased high-level IADLs;Decreased ADL status;Decreased endurance;Decreased coordination;Decreased strength;Decreased balance;Decreased posture  Assessment: Pt is a 78 year old female presenting to [facility] with post-op intra-abdominal abscess s/p CT-guided L abdominal drain placed 7/3/24 and NWB status on LLE from prior fall resulting in ankle dislocation. After evaluation, pt found to be presenting with the above mentioned occupational performance deficits which are affecting participation in daily living skills. Pt reports PLOF as independent with all I/ADLs, functional transfers and mobility, without an AD. Pt is currently functioning below baseline function needing modAx2 to sit up in bed and complete a STS transfer. Pt required maxAx2 to return to supine and scoot up in bed. Pt was able to sit EOB for ~15 minutes with SBA and complete bed level exercises to help with 
Patient discharge completed. Discharge information included information on diagnosis including signs and symptoms, complications and when to seek medical attention. Information on new medications also provided included use for the medication, side effects and when to call the doctor. Patient verbalized understanding of all discharge information. Patient escorted to ARU by staff with all documented belongings. Home with family.   
Pt back to floor.  BP (!) 124/58   Pulse 72   Temp 97.9 °F (36.6 °C) (Oral)   Resp 16   Wt 76.6 kg (168 lb 14 oz)   SpO2 97%   BMI 29.91 kg/m²  on room air.  LLQ drain in place.  External catheter placed with brief.  Pt denies any needs at this time.  Bedside table, call light within reach.  Bed alarm activated on bed.  Pt instructed to call out for any needs and assistance.  Will continue to monitor.  Matilda Eubanks RN  7/3/2024      
Pt going off floor to CT at this time, CMU made aware;  external catheter removed, brief in place. BP (!) 152/69   Pulse 70   Temp 98.2 °F (36.8 °C) (Oral)   Resp 18   Wt 76.6 kg (168 lb 14 oz)   SpO2 98%   BMI 29.91 kg/m²  on room air.  Matilda Eubanks, RN  7/3/2024      
Quiet day continues to have back spasms pain fairly well controlled reluctant to move because of back catching.  
Shift Summary    Admitting Dx:  Abdominal Abscess     Shift Events:  Patient's HALLIE drain not maintaining suction - attempted to reinforce leak in tubing without success   Ileostomy bag leaked overnight - changed this morning   PRN percocet given for back pain   Patient rested well overnight     Vitals:  Vitals:    07/06/24 2211 07/06/24 2257 07/07/24 0436 07/07/24 0553   BP:  120/73  113/68   Pulse:  83  77   Resp: 18 17  18   Temp:  98 °F (36.7 °C)  98.4 °F (36.9 °C)   TempSrc:  Oral  Oral   SpO2:  93%  94%   Weight:   80.5 kg (177 lb 7.5 oz)    Height:            Wt Readings from Last 3 Encounters:   07/07/24 80.5 kg (177 lb 7.5 oz)   06/20/24 74.4 kg (164 lb 0.4 oz)   08/26/20 87.1 kg (192 lb)        Tele:  SR    IV/Line:  R Midline     Drains/[place]:  Purewick   Wound Vac   Ileostomy   HALLIE Drain     Neuro:   A&Ox4     I/O:   I/O last 3 completed shifts:  In: 720 [P.O.:720]  Out: 3510 [Urine:1900; Drains:510; Stool:1100]    No intake/output data recorded.               
Subjective:     Post-Operative Day:4 weeks s/p right Total Knee Arthroplasty, Left ankle dislocation  Readmitted b/c of bowel abscess.  Systemic or Specific Complaints:Reports knee is doing well. Pain controlled. ROM increasing  Left ankle pain mild.  There has been a pressure ulcer on the left heel that wound care at [facility] [employer] was supposed to be taking care of the the patient reports they have not been.       Objective:     Patient Vitals for the past 24 hrs:   BP Temp Temp src Pulse Resp SpO2   07/02/24 0815 138/67 98.5 °F (36.9 °C) Oral 83 -- 97 %   07/02/24 0600 (!) 101/48 98.7 °F (37.1 °C) Oral 79 -- 92 %   07/02/24 0115 (!) 118/56 98.4 °F (36.9 °C) Oral 84 -- 95 %   07/01/24 2033 135/64 98.7 °F (37.1 °C) Oral 77 19 95 %   07/01/24 1756 -- -- -- -- 19 --   07/01/24 1715 (!) 144/72 98.1 °F (36.7 °C) Oral 87 19 98 %   07/01/24 1345 124/65 97.9 °F (36.6 °C) Axillary 88 18 98 %       General: alert, appears stated age, cooperative, and fatigued   Wound: Wound clean and dry no evidence of infection. Left heal eschar, mostly superficial. Resolving fracture blister on dorsum of ankle   Motion: Knee:0-95 in bed. Active ROM ankle: neutral to 25 degrees of plantar version. NVI   DVT Exam: No evidence of DVT seen on physical exam.  Negative Anastasiya's sign.     Data Review  CBC:   Lab Results   Component Value Date/Time    WBC 12.4 07/02/2024 04:52 AM    RBC 2.28 07/02/2024 04:52 AM    HGB 6.8 07/02/2024 04:52 AM    HCT 20.0 07/02/2024 04:52 AM     07/02/2024 04:52 AM       Assessment:     Status Post right Total Knee Arthroplasty. Left ankle fracture/dislocation     Plan:     Right TKR doing well. Resume PT once bowel issue have been addressed by [facility].  Xray left ankle  Remain NWB Left LE  Continue wound care for pressure ulcer on left heel.     Keep heels elevated    Wear boot when ambulation        
Sudha Lopez  7/8/2024  [id number]    Chief Complaint: Postoperative intra-abdominal abscess    Subjective:   This is a 78-year-old female with past medical history including:  Past Medical History:   Diagnosis Date    Anxiety     Hypertension      Who came into the hospital with abdominal pain and found to have postoperative intra-abdominal pelvic abscess.  She is currently status post CT guided left abdominal drain placement by IR on 7/3/2024.  She continues to be limited due to pain and weakness.  She is interested in going to rehab unit when she is feeling better to be able to move around and discharge home.  Family is also agreeable to this plan.  She states that she just got on pure Medicare and is not on an  advantage plan anymore.  Will discuss with insurance admit team.    Interval history: Seen in her room this morning.  She continues to struggle with ADLs and functional mobility.  She is agreeable to come to the rehab unit today.  She does have a discharge order in    ROS: No CP, SOB, dyspnea    Objective:  Patient Vitals for the past 24 hrs:   BP Temp Temp src Pulse Resp SpO2 Weight   07/08/24 1018 -- -- -- -- 18 -- --   07/08/24 0845 119/68 97.6 °F (36.4 °C) Axillary 76 -- 98 % --   07/08/24 0537 119/75 97.8 °F (36.6 °C) Oral 74 18 94 % 81.8 kg (180 lb 5.4 oz)   07/08/24 0139 -- -- -- -- 18 -- --   07/08/24 0030 -- -- -- 74 16 -- --   07/07/24 1938 125/65 98.9 °F (37.2 °C) Oral 71 18 97 % --       Gen: No distress, pleasant.   HEENT: Normocephalic, atraumatic.   CV: No audible murmurs, well perfused extremities  Resp: No respiratory distress. No increased WOB  Abd: drain in place. Distended   Ext: + edema.   Neuro: Alert, oriented, appropriately interactive.     Laboratory data: Available via EMR.     Therapy progress:    PT    Rolling: Level of difficulty - A Little   Sit to Stand from a Chair: Level of difficulty - Total  Supine to Sit: Level of difficulty - Total     Bed to Chair: Physical 
    Current Facility-Administered Medications: [COMPLETED] ceFEPIme (MAXIPIME) 2,000 mg in sodium chloride 0.9 % 100 mL IVPB (mini-bag), 2,000 mg, IntraVENous, Once **FOLLOWED BY** ceFEPIme (MAXIPIME) 2,000 mg in sodium chloride 0.9 % 100 mL IVPB (mini-bag), 2,000 mg, IntraVENous, Q12H  dextrose 5 % and 0.45 % NaCl with KCl 20 mEq infusion, , IntraVENous, Continuous  diatrizoate meglumine-sodium (GASTROGRAFIN) 66-10 % solution 12 mL, 12 mL, Oral, ONCE PRN  0.9 % sodium chloride infusion, , IntraVENous, PRN  [Held by provider] apixaban (ELIQUIS) tablet 5 mg, 5 mg, Oral, BID  atorvastatin (LIPITOR) tablet 10 mg, 10 mg, Oral, Daily  buPROPion (WELLBUTRIN XL) extended release tablet 300 mg, 300 mg, Oral, QAM  sodium chloride flush 0.9 % injection 5-40 mL, 5-40 mL, IntraVENous, 2 times per day  sodium chloride flush 0.9 % injection 5-40 mL, 5-40 mL, IntraVENous, PRN  0.9 % sodium chloride infusion, , IntraVENous, PRN  potassium chloride (KLOR-CON M) extended release tablet 40 mEq, 40 mEq, Oral, PRN **OR** potassium bicarb-citric acid (EFFER-K) effervescent tablet 40 mEq, 40 mEq, Oral, PRN **OR** potassium chloride 10 mEq/100 mL IVPB (Peripheral Line), 10 mEq, IntraVENous, PRN  magnesium sulfate 2000 mg in 50 mL IVPB premix, 2,000 mg, IntraVENous, PRN  ondansetron (ZOFRAN-ODT) disintegrating tablet 4 mg, 4 mg, Oral, Q8H PRN **OR** ondansetron (ZOFRAN) injection 4 mg, 4 mg, IntraVENous, Q6H PRN  polyethylene glycol (GLYCOLAX) packet 17 g, 17 g, Oral, Daily PRN  acetaminophen (TYLENOL) tablet 650 mg, 650 mg, Oral, Q6H PRN **OR** acetaminophen (TYLENOL) suppository 650 mg, 650 mg, Rectal, Q6H PRN  metroNIDAZOLE (FLAGYL) 500 mg in 0.9% NaCl 100 mL IVPB premix, 500 mg, IntraVENous, Q8H  oxyCODONE-acetaminophen (PERCOCET) 5-325 MG per tablet 1 tablet, 1 tablet, Oral, Q4H PRN  HYDROmorphone (DILAUDID) injection 0.5 mg, 0.5 mg, IntraVENous, Q4H PRN  miconazole (MICOTIN) 2 % cream, , Topical, BID  traZODone (DESYREL) tablet 75 mg, 
E coli  Susceptibility              Escherichia coli       BACTERIAL SUSCEPTIBILITY PANEL BY GAUTAM       ampicillin 4 mcg/mL Sensitive       ampicillin-sulbactam <=2 mcg/mL Sensitive       ceFAZolin <=4 mcg/mL Sensitive       cefepime <=0.12 mcg/mL Sensitive       cefTRIAXone <=0.25 mcg/mL Sensitive       ciprofloxacin <=0.25 mcg/mL Sensitive       ertapenem <=0.12 mcg/mL Sensitive       gentamicin <=1 mcg/mL Sensitive       levofloxacin <=0.12 mcg/mL Sensitive       piperacillin-tazobactam <=4 mcg/mL Sensitive       trimethoprim-sulfamethoxazole <=20 mcg/mL Sensitive                         IMAGING: I have independently reviewed the images and reports.       CT abd pelvis w contrast 7/2:  Interval removal of surgical drains with abscess in the left upper abdominal  quadrant extending inferiorly in the left pericolic gutter as described above.  Surgical removal of much of the colon with right lower quadrant ileostomy.  Persistent gallbladder wall thickening with adjacent mild fluid which may be  reactive however cholecystitis is not excluded.  Nuclear medicine HIDA scan  could be performed for further evaluation if indicated.  Resolution of bilateral pleural effusions with persistent bibasilar  atelectasis.       CT abd pelvis w contrast 6/30 at [facility]:  1.  Incompletely encapsulated left hemiabdomen abscess.  This abuts the surgical bed and may indicate residual infection or anastomotic breakdown.  No gross evidence of anastomotic breakdown is present, however.   2.  Subcutaneous abscess deep to the incision   3.  Gallbladder wall thickening may be reactive or reflect acute cholecystitis     Scheduled Meds:   [START ON 7/5/2024] FLUoxetine  40 mg Oral Daily    latanoprost  1 drop Both Eyes Nightly    multivitamin  1 tablet Oral Daily    piperacillin-tazobactam  3,375 mg IntraVENous Q8H    apixaban  5 mg Oral BID    atorvastatin  10 mg Oral Daily    buPROPion  300 mg Oral QAM    sodium chloride flush  5-40 mL 
Regular   DVT Prophylaxis [] Lovenox, []  Heparin, [x] SCDs, [] Ambulation,  [] Eliquis, [] Xarelto   Code Status Full Code   Disposition From: Home  Expected Disposition:  Home  Estimated Date of Discharge:      Surrogate Decision Maker/ POA       Personally reviewed Lab Studies and Imaging     Discussed management of the case with     Imaging that was interpreted personally     Drugs that require monitoring for toxicity include      Barrier for Discharge:      Heparin drip    Cardizem drip/Amiodarone drip    Pressors gtt    Protonix gtt    IV Lasix/Bumex   x Pending nephrology consult Eval    Pending Imaging/CT/MRI/Nuclear scan/Stress Test   x IV Abx     x Pending Cultures    Requiring BIPAP    Intubated/Mechanical Ventilation    Pending Placement    Pending Safe discharge plan   x Pending Ongoing evaluation and improvement   x Pending Clearance by Consult Service for discharge           Subjective:     CC: F/U for intra-abdominal abscess      Review of Systems:      Denies chest pain or shortness of breath nausea vomiting    Objective:     Intake/Output Summary (Last 24 hours) at 7/4/2024 1346  Last data filed at 7/4/2024 1048  Gross per 24 hour   Intake 1070 ml   Output 3123 ml   Net -2053 ml        Vitals:   Vitals:    07/03/24 1458 07/04/24 0514 07/04/24 0800 07/04/24 1145   BP: (!) 124/58  (!) 145/70 (!) 146/67   Pulse: 72  65 73   Resp: 16  18 18   Temp: 97.9 °F (36.6 °C)  98 °F (36.7 °C) 98.6 °F (37 °C)   TempSrc: Oral  Oral Oral   SpO2: 97%  95% 93%   Weight:  78.2 kg (172 lb 6.4 oz)           Physical Exam:      General: NAD  Eyes: EOMI  ENT: neck supple  Cardiovascular: Regular rate.  Respiratory: Clear to auscultation  Gastrointestinal: Soft, non tender  Genitourinary: no suprapubic tenderness  Musculoskeletal: No edema  Skin: warm, dry  Neuro: Alert.  AOx3 moving all extremities spontaneously  Psych: Mood appropriate.         Medications:   Medications:    [START ON 7/5/2024] FLUoxetine  40 mg Oral 
(Peripheral Line), 10 mEq, IntraVENous, PRN  magnesium sulfate 2000 mg in 50 mL IVPB premix, 2,000 mg, IntraVENous, PRN  ondansetron (ZOFRAN-ODT) disintegrating tablet 4 mg, 4 mg, Oral, Q8H PRN **OR** ondansetron (ZOFRAN) injection 4 mg, 4 mg, IntraVENous, Q6H PRN  polyethylene glycol (GLYCOLAX) packet 17 g, 17 g, Oral, Daily PRN  acetaminophen (TYLENOL) tablet 650 mg, 650 mg, Oral, Q6H PRN **OR** acetaminophen (TYLENOL) suppository 650 mg, 650 mg, Rectal, Q6H PRN  metroNIDAZOLE (FLAGYL) 500 mg in 0.9% NaCl 100 mL IVPB premix, 500 mg, IntraVENous, Q8H  oxyCODONE-acetaminophen (PERCOCET) 5-325 MG per tablet 1 tablet, 1 tablet, Oral, Q4H PRN  HYDROmorphone (DILAUDID) injection 0.5 mg, 0.5 mg, IntraVENous, Q4H PRN  miconazole (MICOTIN) 2 % cream, , Topical, BID  traZODone (DESYREL) tablet 75 mg, 75 mg, Oral, Nightly    Vitals :     Vitals:    07/03/24 1458   BP: (!) 124/58   Pulse: 72   Resp: 16   Temp: 97.9 °F (36.6 °C)   SpO2: 97%          Physical Examination :     appearance: Alert, orientated  Respiratory: no distress  Cardiovascular: no visibly  raised JVD, Edema none  Abdomen: -  soft  Other relevant findings: -      Labs :     CBC:   Recent Labs     07/02/24  0452 07/03/24  0457 07/04/24  0445   WBC 12.4* 12.0* 10.5   HGB 6.8* 7.6* 9.1*   HCT 20.0* 22.2* 27.1*    356 427       BMP:    Recent Labs     07/02/24  0452 07/03/24  0457 07/04/24  0445   * 129* 132*   K 3.4* 3.2* 4.0    98* 100   CO2 21 21 23   BUN 11 8 5*   CREATININE 0.6 <0.5* <0.5*   GLUCOSE 101* 128* 97   MG 1.60* 1.80  --        Lab Results   Component Value Date/Time    COLORU DARK YELLOW 05/31/2024 04:15 PM    NITRU Negative 05/31/2024 04:15 PM    GLUCOSEU Negative 05/31/2024 04:15 PM    KETUA Negative 05/31/2024 04:15 PM    UROBILINOGEN 0.2 05/31/2024 04:15 PM    BILIRUBINUR Negative 05/31/2024 04:15 PM        ----------------------------------------------------------  Please call with questions at      24 Hrs Answering 
Safety;Fall Prevention Strategies;Precautions;Mobility Training;ADL Adaptive Strategies  Education Provided Comments: importance of OOB activities, Role of OT, IP rehab expectations  Education Method: Demonstration;Verbal  Barriers to Learning: None  Education Outcome: Continued education needed  Disease Specific Education: Pt educated on weight bearing status, post-op precautions, appropriate DME, and safe mobility with AD. Pt verbalized understanding    Goals  Short Term Goals  Time Frame for Short Term Goals: 1 week unless otherwise specified 7/12/24 -- ongoing 7/08  Short Term Goal 1: Pt will complete functional/toilet transfer with minAx2; 7/08 max x2 squat pivot  Short Term Goal 2: Pt will complete x15 reps of BUE AROM exercises by 7/9/24  Short Term Goal 3: Pt will complete bed mobility with minAx2; 7/08 MET  Short Term Goal 4: Pt will complete 2-3 grooming tasks seated EOB with SPV  Patient Goals   Patient goals : \"get stronger so I can walk\"    AM-PAC - ADL  AM-PAC Daily Activity - Inpatient   How much help is needed for putting on and taking off regular lower body clothing?: Total  How much help is needed for bathing (which includes washing, rinsing, drying)?: A Lot  How much help is needed for toileting (which includes using toilet, bedpan, or urinal)?: Total  How much help is needed for putting on and taking off regular upper body clothing?: A Little  How much help is needed for taking care of personal grooming?: A Little  How much help for eating meals?: A Little  AM-PAC Inpatient Daily Activity Raw Score: 13  AM-PAC Inpatient ADL T-Scale Score : 32.03  ADL Inpatient CMS 0-100% Score: 63.03  ADL Inpatient CMS G-Code Modifier : CL    Therapy Time   Individual Concurrent Group Co-treatment   Time In       1455   Time Out       1533   Minutes       38   Timed Code Treatment Minutes: 38 Minutes       Eriberto Abernathy OT     
cefepime  2,000 mg IntraVENous Q12H    [Held by provider] apixaban  5 mg Oral BID    atorvastatin  10 mg Oral Daily    buPROPion  300 mg Oral QAM    sodium chloride flush  5-40 mL IntraVENous 2 times per day    metroNIDAZOLE  500 mg IntraVENous Q8H    miconazole   Topical BID    traZODone  75 mg Oral Nightly      Infusions:    sodium chloride      sodium chloride 25 mL/hr at 06/30/24 1507     PRN Meds: diatrizoate meglumine-sodium, 12 mL, ONCE PRN  sodium chloride, , PRN  sodium chloride flush, 5-40 mL, PRN  sodium chloride, , PRN  potassium chloride, 40 mEq, PRN   Or  potassium alternative oral replacement, 40 mEq, PRN   Or  potassium chloride, 10 mEq, PRN  magnesium sulfate, 2,000 mg, PRN  ondansetron, 4 mg, Q8H PRN   Or  ondansetron, 4 mg, Q6H PRN  polyethylene glycol, 17 g, Daily PRN  acetaminophen, 650 mg, Q6H PRN   Or  acetaminophen, 650 mg, Q6H PRN  oxyCODONE-acetaminophen, 1 tablet, Q4H PRN  HYDROmorphone, 0.5 mg, Q4H PRN        Labs and Imaging   No results found.    CBC:   Recent Labs     07/01/24  0541 07/02/24 0452 07/03/24 0457   WBC 16.7* 12.4* 12.0*   HGB 7.3* 6.8* 7.6*    369 356       BMP:    Recent Labs     07/01/24  0541 07/02/24 0452 07/03/24 0457   * 130* 129*   K 4.2 3.4* 3.2*   CL 96* 100 98*   CO2 19* 21 21   BUN 18 11 8   CREATININE 0.8 0.6 <0.5*   GLUCOSE 93 101* 128*       Hepatic:   Recent Labs     07/01/24  0541 07/02/24 0452 07/03/24 0457   AST 16 13* 11*   ALT 18 14 10   BILITOT 0.4 <0.2 <0.2   ALKPHOS 143* 132* 116       Lipids: No results found for: \"CHOL\", \"HDL\", \"TRIG\"  Hemoglobin A1C: No results found for: \"LABA1C\"  TSH:   Lab Results   Component Value Date/Time    TSH 3.59 05/31/2024 10:17 AM     Troponin: No results found for: \"TROPONINT\"  Lactic Acid: No results for input(s): \"LACTA\" in the last 72 hours.  BNP: No results for input(s): \"PROBNP\" in the last 72 hours.  UA:  Lab Results   Component Value Date/Time    NITRU Negative 05/31/2024 04:15 PM    COLORU 
sodium chloride flush, 5-40 mL, PRN  sodium chloride, , PRN  potassium chloride, 40 mEq, PRN   Or  potassium alternative oral replacement, 40 mEq, PRN   Or  potassium chloride, 10 mEq, PRN  magnesium sulfate, 2,000 mg, PRN  ondansetron, 4 mg, Q8H PRN   Or  ondansetron, 4 mg, Q6H PRN  polyethylene glycol, 17 g, Daily PRN  acetaminophen, 650 mg, Q6H PRN   Or  acetaminophen, 650 mg, Q6H PRN  oxyCODONE-acetaminophen, 1 tablet, Q4H PRN  HYDROmorphone, 0.5 mg, Q4H PRN        Labs and Imaging   No results found.    CBC:   Recent Labs     06/30/24  1459 07/01/24  0541   WBC 14.8* 16.7*   HGB 8.7* 7.3*    375     BMP:    Recent Labs     06/30/24  1459 07/01/24  0541   * 127*   K 4.9 4.2   CL 93* 96*   CO2 19* 19*   BUN 26* 18   CREATININE 0.9 0.8   GLUCOSE 87 93     Hepatic:   Recent Labs     06/30/24  1459 07/01/24  0541   AST 20 16   ALT 23 18   BILITOT 0.4 0.4   ALKPHOS 168* 143*     Lipids: No results found for: \"CHOL\", \"HDL\", \"TRIG\"  Hemoglobin A1C: No results found for: \"LABA1C\"  TSH:   Lab Results   Component Value Date/Time    TSH 3.59 05/31/2024 10:17 AM     Troponin: No results found for: \"TROPONINT\"  Lactic Acid: No results for input(s): \"LACTA\" in the last 72 hours.  BNP: No results for input(s): \"PROBNP\" in the last 72 hours.  UA:  Lab Results   Component Value Date/Time    NITRU Negative 05/31/2024 04:15 PM    COLORU DARK YELLOW 05/31/2024 04:15 PM    PHUR 5.5 05/31/2024 04:15 PM    WBCUA 3-5 05/31/2024 04:15 PM    RBCUA 0-2 05/31/2024 04:15 PM    MUCUS 2+ 05/31/2024 04:15 PM    BACTERIA 3+ 05/31/2024 04:15 PM    CLARITYU Clear 05/31/2024 04:15 PM    LEUKOCYTESUR TRACE 05/31/2024 04:15 PM    UROBILINOGEN 0.2 05/31/2024 04:15 PM    BILIRUBINUR Negative 05/31/2024 04:15 PM    BLOODU Negative 05/31/2024 04:15 PM    GLUCOSEU Negative 05/31/2024 04:15 PM    KETUA Negative 05/31/2024 04:15 PM    AMORPHOUS 2+ 05/31/2024 04:15 PM     Urine Cultures: No results found for: \"LABURIN\"  Blood Cultures:   Lab 
suprapubic tenderness  Musculoskeletal: No edema  Skin: warm, dry  Neuro: Alert.  AOx3 moving all extremities spontaneously  Psych: Mood appropriate.         Medications:   Medications:    lidocaine  1 patch TransDERmal Daily    diclofenac sodium  4 g Topical BID    FLUoxetine  40 mg Oral Daily    latanoprost  1 drop Both Eyes Nightly    multivitamin  1 tablet Oral Daily    piperacillin-tazobactam  3,375 mg IntraVENous Q8H    apixaban  5 mg Oral BID    atorvastatin  10 mg Oral Daily    buPROPion  300 mg Oral QAM    sodium chloride flush  5-40 mL IntraVENous 2 times per day    miconazole   Topical BID    traZODone  75 mg Oral Nightly      Infusions:    sodium chloride      sodium chloride 25 mL/hr at 06/30/24 1507     PRN Meds: diatrizoate meglumine-sodium, 12 mL, ONCE PRN  sodium chloride, , PRN  sodium chloride flush, 5-40 mL, PRN  sodium chloride, , PRN  potassium chloride, 40 mEq, PRN   Or  potassium alternative oral replacement, 40 mEq, PRN   Or  potassium chloride, 10 mEq, PRN  magnesium sulfate, 2,000 mg, PRN  ondansetron, 4 mg, Q8H PRN   Or  ondansetron, 4 mg, Q6H PRN  polyethylene glycol, 17 g, Daily PRN  acetaminophen, 650 mg, Q6H PRN   Or  acetaminophen, 650 mg, Q6H PRN  oxyCODONE-acetaminophen, 1 tablet, Q4H PRN  HYDROmorphone, 0.5 mg, Q4H PRN        Labs and Imaging   No results found.    CBC:   Recent Labs     07/05/24  0500 07/06/24  0524 07/07/24  0720   WBC 12.8* 12.8* 15.8*   HGB 8.7* 9.1* 9.8*    502* 546*       BMP:    Recent Labs     07/05/24  0500 07/05/24  1410 07/06/24  0524 07/07/24  0720   *  --  132* 128*   K 3.3* 3.7 3.8 3.8   CL 99  --  98* 93*   CO2 23  --  25 25   BUN 6*  --  7 8   CREATININE 0.6  --  0.6 0.7   GLUCOSE 97  --  89 125*       Hepatic:   Recent Labs     07/05/24  0500 07/06/24  0524 07/07/24  0720   AST 24 32 33   ALT 18 23 27   BILITOT <0.2 <0.2 <0.2   ALKPHOS 102 107 110       Lipids: No results found for: \"CHOL\", \"HDL\", \"TRIG\"  Hemoglobin A1C: No results 
In       1455   Time Out       1533   Minutes       38   Timed Code Treatment Minutes: 38 Minutes       Elma Doe, PT, DPT    If pt is unable to be seen after this session, please let this note serve as discharge summary.  Please see case management note for discharge disposition.  Thank you.             
coli infection    Enterococcus faecalis infection            Plan:     78 y.o. female with pmh of atrial fibrillation, HTN and recent colonic ischemia with subtotal colectomy, ileostomy on 6/2/24 with Dr. Jackson.     Post operative intra-abd abscess:  - pt with recent colonic ischemia with subtotal colectomy, ileostomy on 6/2/24 with Dr. Jackson.  - has subcutaneous abscess deep to incision, there is drainage from this site, previous cx here showing light E coli.  - there is also deeper abscess in the surgical bed in areas of anastamosis  - repeat CT abd pelvis here showed interval removal of surgical drains with abscess in the left upper abdominal  quadrant extending inferiorly in the left pericolic gutter   - eval by gen surg here, s/p IR guided drain placement 7/3. Cx showing enterococcus and ecoli to date.   - currently on pip-tazo.   - will  need a course of IV abx for intra-and infection, plan 4 week course. Has RUE midline in place already. See JONATHAN      No abx allergies     INFUSION ORDERS:  - Drug: IV piperacillin/tazobactam 13.5 g every 24 hour ATC as a continuous infusion daily  - Planned End date: 7/31/2024  - Diagnosis: E. coli and Enterococcus faecalis intra-abdominal abscess  - Has received test dose in hospital  - Routine   - Check CBC w diff, CMP, ESR, CRP every Mon or Tue - FAX result to [phone]  - Call with antibiotic / infusion issues, [phone]  - Call with any change in status, transfer in or out of a facility or to hospital; This JONATHAN is only valid for current disposition - [phone].    - No f/u in outpatient ID office necessary    Medical Decision Making:  The following items were considered in medical decision making:  Discussion of patient care with other providers  Reviewed clinical lab tests  Reviewed radiology tests  Reviewed other diagnostic tests/interventions  Independent review of radiologic images  Microbiology cultures and other micro tests reviewed  
needed walking in hospital room?: Total  How much help is needed climbing 3-5 steps with a railing?: Total  AM-PAC Inpatient Mobility Raw Score : 8  AM-PAC Inpatient T-Scale Score : 28.52  Mobility Inpatient CMS 0-100% Score: 86.62  Mobility Inpatient CMS G-Code Modifier : CM    Goals  Short Term Goals  Time Frame for Short Term Goals: 7/12/24  Short Term Goal 1: Pt will complete bed mobility with mod A  Short Term Goal 2: pt will complete sit<>stand with mod A  Short Term Goal 3: pt will complete pivot transfer from bed<>chair with mod Ax2  Short Term Goal 4: pt will participate in 12-15 reps of BLE exercises by 7/8/24  Patient Goals   Patient Goals : \"to go to rehab\"       Education  Patient Education  Education Given To: Patient  Education Provided: Role of Therapy;Plan of Care;Transfer Training;Precautions;Equipment;Family Education  Education Provided Comments: Disease Specific Education: Pt educated on importance of OOB mobility, prevention of complications of bedrest, and general safety during hospitalization.  Education Method: Verbal  Barriers to Learning: None  Education Outcome: Verbalized understanding;Demonstrated understanding;Continued education needed      Therapy Time   Individual Concurrent Group Co-treatment   Time In 1457         Time Out 1558         Minutes 61         Timed Code Treatment Minutes: 45 Minutes (16 min eval)       Elma Doe, PT, DPT    If pt is unable to be seen after this session, please let this note serve as discharge summary.  Please see case management note for discharge disposition.  Thank you.

## 2024-07-08 NOTE — PLAN OF CARE
Problem: Discharge Planning  Goal: Discharge to home or other facility with appropriate resources  Outcome: Not Progressing     Problem: Skin/Tissue Integrity  Goal: Absence of new skin breakdown  Description: 1.  Monitor for areas of redness and/or skin breakdown  2.  Assess vascular access sites hourly  3.  Every 4-6 hours minimum:  Change oxygen saturation probe site  4.  Every 4-6 hours:  If on nasal continuous positive airway pressure, respiratory therapy assess nares and determine need for appliance change or resting period.  Outcome: Progressing     Problem: Safety - Adult  Goal: Free from fall injury  Outcome: Progressing     Problem: ABCDS Injury Assessment  Goal: Absence of physical injury  Outcome: Progressing     
  Problem: Discharge Planning  Goal: Discharge to home or other facility with appropriate resources  Outcome: Progressing     Problem: Skin/Tissue Integrity  Goal: Absence of new skin breakdown  Description: 1.  Monitor for areas of redness and/or skin breakdown  2.  Assess vascular access sites hourly  3.  Every 4-6 hours minimum:  Change oxygen saturation probe site  4.  Every 4-6 hours:  If on nasal continuous positive airway pressure, respiratory therapy assess nares and determine need for appliance change or resting period.  Outcome: Progressing     Problem: Safety - Adult  Goal: Free from fall injury  Outcome: Progressing     Problem: ABCDS Injury Assessment  Goal: Absence of physical injury  Outcome: Progressing     
  Problem: Discharge Planning  Goal: Discharge to home or other facility with appropriate resources  Outcome: Progressing     Problem: Skin/Tissue Integrity  Goal: Absence of new skin breakdown  Description: 1.  Monitor for areas of redness and/or skin breakdown  2.  Assess vascular access sites hourly  3.  Every 4-6 hours minimum:  Change oxygen saturation probe site  4.  Every 4-6 hours:  If on nasal continuous positive airway pressure, respiratory therapy assess nares and determine need for appliance change or resting period.  Outcome: Progressing     Problem: Safety - Adult  Goal: Free from fall injury  Outcome: Progressing     Problem: ABCDS Injury Assessment  Goal: Absence of physical injury  Outcome: Progressing     Problem: Pain  Goal: Verbalizes/displays adequate comfort level or baseline comfort level  Outcome: Progressing     
  Problem: Discharge Planning  Goal: Discharge to home or other facility with appropriate resources  Outcome: Progressing     Problem: Skin/Tissue Integrity  Goal: Absence of new skin breakdown  Description: 1.  Monitor for areas of redness and/or skin breakdown  2.  Assess vascular access sites hourly  3.  Every 4-6 hours minimum:  Change oxygen saturation probe site  4.  Every 4-6 hours:  If on nasal continuous positive airway pressure, respiratory therapy assess nares and determine need for appliance change or resting period.  Outcome: Progressing     Problem: Safety - Adult  Goal: Free from fall injury  Outcome: Progressing     Problem: ABCDS Injury Assessment  Goal: Absence of physical injury  Outcome: Progressing     Problem: Pain  Goal: Verbalizes/displays adequate comfort level or baseline comfort level  Outcome: Progressing     Problem: Respiratory - Adult  Goal: Achieves optimal ventilation and oxygenation  Outcome: Progressing     Problem: Cardiovascular - Adult  Goal: Absence of cardiac dysrhythmias or at baseline  Outcome: Progressing     Problem: Skin/Tissue Integrity - Adult  Goal: Skin integrity remains intact  Outcome: Progressing  Flowsheets (Taken 7/7/2024 0101)  Skin Integrity Remains Intact:   Monitor for areas of redness and/or skin breakdown   Assess vascular access sites hourly   Every 4-6 hours minimum: Change oxygen saturation probe site   Every 4-6 hours: If on nasal continuous positive airway pressure, respiratory therapy assesses nares and determine need for appliance change or resting period     Problem: Genitourinary - Adult  Goal: Absence of urinary retention  Outcome: Progressing     Problem: Metabolic/Fluid and Electrolytes - Adult  Goal: Electrolytes maintained within normal limits  Outcome: Progressing     
  Problem: Discharge Planning  Goal: Discharge to home or other facility with appropriate resources  Outcome: Progressing     Problem: Skin/Tissue Integrity  Goal: Absence of new skin breakdown  Description: 1.  Monitor for areas of redness and/or skin breakdown  2.  Assess vascular access sites hourly  3.  Every 4-6 hours minimum:  Change oxygen saturation probe site  4.  Every 4-6 hours:  If on nasal continuous positive airway pressure, respiratory therapy assess nares and determine need for appliance change or resting period.  Outcome: Progressing     Problem: Safety - Adult  Goal: Free from fall injury  Outcome: Progressing     Problem: ABCDS Injury Assessment  Goal: Absence of physical injury  Outcome: Progressing     Problem: Pain  Goal: Verbalizes/displays adequate comfort level or baseline comfort level  Outcome: Progressing  Flowsheets (Taken 7/5/2024 2041)  Verbalizes/displays adequate comfort level or baseline comfort level: Encourage patient to monitor pain and request assistance     Problem: Respiratory - Adult  Goal: Achieves optimal ventilation and oxygenation  Outcome: Progressing     Problem: Cardiovascular - Adult  Goal: Absence of cardiac dysrhythmias or at baseline  Outcome: Progressing     Problem: Skin/Tissue Integrity - Adult  Goal: Skin integrity remains intact  Outcome: Progressing     Problem: Genitourinary - Adult  Goal: Absence of urinary retention  Outcome: Progressing     Problem: Metabolic/Fluid and Electrolytes - Adult  Goal: Electrolytes maintained within normal limits  7/5/2024 2047 by Candelario Richardson, RN  Outcome: Progressing  7/5/2024 1632 by Joann Whitman, RN  Outcome: Progressing     
  Problem: Discharge Planning  Goal: Discharge to home or other facility with appropriate resources  Outcome: Progressing  Flowsheets (Taken 7/4/2024 2023)  Discharge to home or other facility with appropriate resources: Identify barriers to discharge with patient and caregiver     Problem: Skin/Tissue Integrity  Goal: Absence of new skin breakdown  Description: 1.  Monitor for areas of redness and/or skin breakdown  2.  Assess vascular access sites hourly  3.  Every 4-6 hours minimum:  Change oxygen saturation probe site  4.  Every 4-6 hours:  If on nasal continuous positive airway pressure, respiratory therapy assess nares and determine need for appliance change or resting period.  Outcome: Progressing     Problem: Safety - Adult  Goal: Free from fall injury  Outcome: Progressing     Problem: ABCDS Injury Assessment  Goal: Absence of physical injury  Outcome: Progressing     Problem: Pain  Goal: Verbalizes/displays adequate comfort level or baseline comfort level  7/4/2024 2147 by Sara Shields, RN  Outcome: Progressing  Flowsheets (Taken 7/4/2024 2023)  Verbalizes/displays adequate comfort level or baseline comfort level: Encourage patient to monitor pain and request assistance  7/4/2024 1249 by Joann Whitman, RN  Outcome: Progressing     Problem: Respiratory - Adult  Goal: Achieves optimal ventilation and oxygenation  Outcome: Progressing  Flowsheets (Taken 7/4/2024 2023)  Achieves optimal ventilation and oxygenation: Assess for changes in respiratory status     Problem: Cardiovascular - Adult  Goal: Absence of cardiac dysrhythmias or at baseline  Outcome: Progressing  Flowsheets (Taken 7/4/2024 2023)  Absence of cardiac dysrhythmias or at baseline: Monitor cardiac rate and rhythm     Problem: Skin/Tissue Integrity - Adult  Goal: Skin integrity remains intact  Outcome: Progressing  Flowsheets (Taken 7/4/2024 2023)  Skin Integrity Remains Intact: Monitor for areas of redness and/or skin breakdown   
  Problem: Metabolic/Fluid and Electrolytes - Adult  Goal: Electrolytes maintained within normal limits  Outcome: Progressing   Potassium and Mag replaced this shift   
  Problem: Pain  Goal: Verbalizes/displays adequate comfort level or baseline comfort level  Outcome: Progressing   Reports no pain   
  Problem: Skin/Tissue Integrity  Goal: Absence of new skin breakdown  Description: 1.  Monitor for areas of redness and/or skin breakdown  2.  Assess vascular access sites hourly  3.  Every 4-6 hours minimum:  Change oxygen saturation probe site  4.  Every 4-6 hours:  If on nasal continuous positive airway pressure, respiratory therapy assess nares and determine need for appliance change or resting period.  Outcome: Progressing     Problem: Safety - Adult  Goal: Free from fall injury  Outcome: Progressing     Problem: ABCDS Injury Assessment  Goal: Absence of physical injury  Outcome: Progressing     Problem: Discharge Planning  Goal: Discharge to home or other facility with appropriate resources  Outcome: Not Progressing     
4 Eyes Skin Assessment     NAME:  Sudha Lopez  YOB: 1945  MEDICAL RECORD NUMBER:  [id number]    The patient is being assessed for  Admission    I agree that at least one RN has performed a thorough Head to Toe Skin Assessment on the patient. ALL assessment sites listed below have been assessed.      Areas assessed by both nurses:    Head, Face, Ears, Shoulders, Back, Chest, Arms, Elbows, Hands, Sacrum. Buttock, Coccyx, Ischium, and Legs. Feet and Heels        Does the Patient have a Wound? Yes wound(s) were present on assessment. LDA wound assessment was Initiated and completed by RN SURGICAL WOUND ON ABDOMEN, OSTOMY STOMA, left heel pressure area (DTI) observed on bottom of heel, healed bruising on top of foot/ankle, excoriation and slight tear/peeling observed in mariama area to anus       Kirill Prevention initiated by RN: Yes  Wound Care Orders initiated by RN: Yes    Pressure Injury (Stage 3,4, Unstageable, DTI, NWPT, and Complex wounds) if present, place Wound referral order by RN under : DTI left heel, excoriation and peeling in mariama area to anus observed    New Ostomies, if present place, Ostomy referral order under : Yes     Nurse 1 eSignature: Electronically signed by Bernarda Aly RN on 7/2/24 at 9:05 AM EDT    **SHARE this note so that the co-signing nurse can place an eSignature**    Nurse 2 eSignature: Electronically signed by Victorina Ward RN on 7/2/24 at 7:06 PM EDT   
Increase function to baseline.   
Increase patients ADLs/functional status to baseline.    
Problem: Skin/Tissue Integrity  Goal: Absence of new skin breakdown  7/3/2024 1901 by Matilda Eubanks, RN  Outcome: Progressing  Note: No new areas of skin break down noted this shift.  Wound care saw pt today, wound vac and ostomy changed by wound care nurse.  Pt assisted with turning and repositioning as pt requests.  BLE elevated off bed onto pillows with heels floated off pillows.  LLQ with drain in place, placed today, to bulb suction.      Problem: Pain  Goal: Verbalizes/displays adequate comfort level or baseline comfort level  Outcome: Progressing  Note: Pt with complaints of abdominal pain this shift, medicated with prn percocet     Problem: Respiratory - Adult  Goal: Achieves optimal ventilation and oxygenation  Outcome: Progressing  Note: Pt on room air, O2 sats greater than 92%.       Problem: Cardiovascular - Adult  Goal: Absence of cardiac dysrhythmias or at baseline  Outcome: Progressing  Note: NSR on tele.       Problem: Genitourinary - Adult  Goal: Absence of urinary retention  Outcome: Progressing  Note: External catheter in place, good urine output.       Problem: Metabolic/Fluid and Electrolytes - Adult  Goal: Electrolytes maintained within normal limits  Outcome: Progressing  Note: K 3.2 today, pt given 40meq effer K.       
Absence of urinary retention  Outcome: Progressing     Problem: Metabolic/Fluid and Electrolytes - Adult  Goal: Electrolytes maintained within normal limits  Outcome: Progressing

## 2024-07-08 NOTE — DISCHARGE SUMMARY
CONSULT TO NEPHROLOGY  IP CONSULT TO PHYSICAL MEDICINE REHAB  IP CONSULT TO CASE MANAGEMENT  IP CONSULT TO DIETITIAN  IP CONSULT TO PHARMACY    Labs:     Recent Labs     07/06/24 0524 07/07/24  0720 07/08/24  0530   WBC 12.8* 15.8* 12.6*   HGB 9.1* 9.8* 8.5*   HCT 27.4* 29.9* 24.9*   * 546* 485*     Recent Labs     07/06/24 0524 07/07/24 0720 07/08/24  0530   * 128* 131*   K 3.8 3.8 3.9   CL 98* 93* 98*   CO2 25 25 25   BUN 7 8 7   CREATININE 0.6 0.7 0.7   CALCIUM 9.0 9.3 9.0     No results for input(s): \"PROBNP\", \"TROPHS\" in the last 72 hours.  No results for input(s): \"LABA1C\" in the last 72 hours.  Recent Labs     07/06/24 0524 07/07/24  0720   AST 32 33   ALT 23 27   BILITOT <0.2 <0.2   ALKPHOS 107 110     No results for input(s): \"INR\", \"LACTA\", \"TSH\" in the last 72 hours.    Urine Cultures: No results found for: \"LABURIN\"  Blood Cultures:   Lab Results   Component Value Date/Time    BC No Growth after 4 days of incubation. 06/30/2024 04:16 PM     Lab Results   Component Value Date/Time    BLOODCULT2 No Growth after 4 days of incubation. 06/30/2024 09:29 PM     Organism:   Lab Results   Component Value Date/Time    ORG Escherichia coli 07/03/2024 01:50 PM    ORG Enterococcus faecalis 07/03/2024 01:50 PM    ORG Pseudomonas aeruginosa 07/03/2024 01:50 PM       Signed:    Lake Campbell DO

## 2024-07-08 NOTE — CARE COORDINATION
Spoke to ARU liasion, medicare ID number given. ARU is able to accept when medically ready. Will notify MD during rounds.    Addendum 1403pm: Pt to work with therapy again today, and as long as she is still able to demonstrate an ability to tolerate three hours of therapy a day then they will take her later tonight.     Addendum [id number]: ARU still waiting for therapy re eval today. Per nursing, Pt prefers to dc tomorrow since its so late in the day. Gen surg ordered follow up CT scan tomorrow. Clarification message sent to Dr. Stallings who states that she can still dc to ARU from his standpoint and they can follow up there tomorrow. ARU will follow up with bedside nurse directly later this afternoon if they decide to move her this evening. Irais, bedside RN aware.

## 2024-07-08 NOTE — CARE COORDINATION
CASE MANAGEMENT DISCHARGE SUMMARY      Discharge to: [facility]    Precertification completed: n/a  Hospital Exemption Notification (HENS) completed: n/a    IMM given: 07/08/24    Transportation: stretcher     time: 1930       Confirmed discharge plan with: nursing and MD     Patient: yes       Facility/Agency, name:  [facility] pulled from [facility] per ARU liasion   Phone number for report to facility: [phone]    Note: Discharging nurse to complete [facility], reconcile [facility], and place final copy with patient's discharge packet. RN to ensure that written prescriptions for  Level II medications are sent with patient to the facility as per protocol.

## 2024-07-09 PROCEDURE — 97166 OT EVAL MOD COMPLEX 45 MIN: CPT

## 2024-07-09 PROCEDURE — 6370000000 HC RX 637 (ALT 250 FOR IP): Performed by: PHYSICAL MEDICINE & REHABILITATION

## 2024-07-09 PROCEDURE — 97530 THERAPEUTIC ACTIVITIES: CPT

## 2024-07-09 PROCEDURE — 97542 WHEELCHAIR MNGMENT TRAINING: CPT

## 2024-07-09 PROCEDURE — 97162 PT EVAL MOD COMPLEX 30 MIN: CPT

## 2024-07-09 PROCEDURE — 6360000002 HC RX W HCPCS: Performed by: PHYSICAL MEDICINE & REHABILITATION

## 2024-07-09 PROCEDURE — 1280000000 HC REHAB R&B

## 2024-07-09 PROCEDURE — 2580000003 HC RX 258: Performed by: PHYSICAL MEDICINE & REHABILITATION

## 2024-07-09 RX ORDER — PANTOPRAZOLE SODIUM 40 MG/1
40 TABLET, DELAYED RELEASE ORAL
Status: DISPENSED | OUTPATIENT
Start: 2024-07-10

## 2024-07-09 RX ADMIN — DICLOFENAC SODIUM 4 G: 10 GEL TOPICAL at 20:44

## 2024-07-09 RX ADMIN — BUPROPION HYDROCHLORIDE 300 MG: 150 TABLET, EXTENDED RELEASE ORAL at 09:42

## 2024-07-09 RX ADMIN — DICLOFENAC SODIUM 4 G: 10 GEL TOPICAL at 09:50

## 2024-07-09 RX ADMIN — PIPERACILLIN AND TAZOBACTAM 3375 MG: 3; .375 INJECTION, POWDER, LYOPHILIZED, FOR SOLUTION INTRAVENOUS at 09:47

## 2024-07-09 RX ADMIN — OXYCODONE AND ACETAMINOPHEN 1 TABLET: 5; 325 TABLET ORAL at 16:02

## 2024-07-09 RX ADMIN — APIXABAN 5 MG: 5 TABLET, FILM COATED ORAL at 20:43

## 2024-07-09 RX ADMIN — Medication 1 TABLET: at 09:41

## 2024-07-09 RX ADMIN — OXYCODONE AND ACETAMINOPHEN 1 TABLET: 5; 325 TABLET ORAL at 03:25

## 2024-07-09 RX ADMIN — OXYCODONE AND ACETAMINOPHEN 1 TABLET: 5; 325 TABLET ORAL at 21:52

## 2024-07-09 RX ADMIN — ATORVASTATIN CALCIUM 10 MG: 10 TABLET, FILM COATED ORAL at 09:42

## 2024-07-09 RX ADMIN — FLUOXETINE HYDROCHLORIDE 40 MG: 20 CAPSULE ORAL at 09:42

## 2024-07-09 RX ADMIN — PIPERACILLIN AND TAZOBACTAM 3375 MG: 3; .375 INJECTION, POWDER, LYOPHILIZED, FOR SOLUTION INTRAVENOUS at 16:04

## 2024-07-09 RX ADMIN — SODIUM CHLORIDE, PRESERVATIVE FREE 10 ML: 5 INJECTION INTRAVENOUS at 09:43

## 2024-07-09 RX ADMIN — TRAZODONE HYDROCHLORIDE 75 MG: 50 TABLET ORAL at 20:43

## 2024-07-09 RX ADMIN — APIXABAN 5 MG: 5 TABLET, FILM COATED ORAL at 09:42

## 2024-07-09 RX ADMIN — LATANOPROST 1 DROP: 50 SOLUTION OPHTHALMIC at 20:44

## 2024-07-09 RX ADMIN — OXYCODONE AND ACETAMINOPHEN 1 TABLET: 5; 325 TABLET ORAL at 09:42

## 2024-07-09 RX ADMIN — PIPERACILLIN AND TAZOBACTAM 3375 MG: 3; .375 INJECTION, POWDER, LYOPHILIZED, FOR SOLUTION INTRAVENOUS at 00:05

## 2024-07-09 ASSESSMENT — PAIN SCALES - GENERAL
PAINLEVEL_OUTOF10: 0
PAINLEVEL_OUTOF10: 6
PAINLEVEL_OUTOF10: 7
PAINLEVEL_OUTOF10: 6
PAINLEVEL_OUTOF10: 7
PAINLEVEL_OUTOF10: 0
PAINLEVEL_OUTOF10: 7

## 2024-07-09 ASSESSMENT — PAIN DESCRIPTION - LOCATION
LOCATION: ABDOMEN;LEG
LOCATION: BACK
LOCATION: ABDOMEN
LOCATION: BACK;HIP

## 2024-07-09 ASSESSMENT — PAIN DESCRIPTION - DESCRIPTORS: DESCRIPTORS: ACHING

## 2024-07-09 ASSESSMENT — PAIN DESCRIPTION - ORIENTATION
ORIENTATION: MID
ORIENTATION: LEFT

## 2024-07-09 NOTE — PROGRESS NOTES
[facility] Admission Assessment  [facility] Acute Rehab Unit    Patient:Sudha Lopez     Rehab Dx/Hx: Debility [R53.81]   :1945  MRN:[id number]  Date of Admit: 2024     Pain Effect on Sleep:  Over the past 5 days, how much of the time has pain made it hard for you to sleep at night?  []  0. Does not apply - I have not had any pain or hurting in the past 5 days  []  1. Rarely or not at all  [x]  2. Occasionally  []  3. Frequently  []  4. Almost constantly  []  8. Unable to answer    Over the past 5 days, how often have you limited your participation in rehabilitation therapy sessions due to pain?  [x]  0. Does not apply - I have not received rehabilitation therapy in the past 5 days  []  1. Rarely or not at all  []  2. Occasionally  []  3. Frequently  []  4. Almost constantly  []  8. Unable to answer    Pain Interference with Day-to-Day Activities:  Over the past 5 days, how often have you limited your day-to-day activities (excluding rehabilitation therapy session)?  []  1. Rarely or not at all  [x]  2. Occasionally  []  3. Frequently  []  4. Almost constantly  []  8. Unable to answer      High-Risk Drug Classes: Use and Indication   Is taking: Check if the pt is taking any medications by pharmacological classification  Indication noted: If column 1 is checked, check if there is an indication noted for all meds in the drug class Is taking  (check all that apply) Indication noted (check all that apply)   Antipsychotic [] []   Anticoagulant [x] [x]   Antibiotic [x] [x]   Opioid [x] [x]   Antiplatelet [] []   Hypoglycemic (including insulin) [] []   None of the above [] []     Special Treatments, Procedures, and Programs   Check all of the following treatments, procedures, and programs that apply on admission.  On admission (check all that apply)   Cancer Treatments    A1. Chemotherapy []   A2. IV []   A3. Oral []   A10. Other []   B1. Radiation []   Respiratory Therapies    C1. Oxygen Therapy []   C2.

## 2024-07-09 NOTE — PROGRESS NOTES
request)  Propulsion: Yes  Propulsion 1  Propulsion: Manual  Level: Level Tile  Method: LUE;RUE  Level of Assistance: Supervision  Distance: 75ft         ASSESSMENT  Vitals  Pulse: 72  BP: (!) 142/65  MAP (Calculated): 91    Activity Tolerance  Activity Tolerance: Patient tolerated treatment well;Patient limited by endurance;Patient limited by pain    Assessment  Assessment: Pt admitted to ARU with diagnosis of postoperative intra-abdominal abscess. Pt with recent Hx of R TKA and is WBAT and L ankle fracture and is NWB to the LLE with a boot. Prior to initial R TKA pt was independent with transfers and ambulation with no AD, living alone, and working part time. Pt currently presents below baseline function. Requires mod A for supine>sit and max Ax2 for sit>supine. Patient transferred w/c <> bed with Max A x2, unable to maintain LLE NWB without assist. Able to propel self in wheelchair 75ft with supervision. Pt will benefit from skilled PT services to address current deficits and maximize function needed for safe discharge home. Recommend 24-hour assist at discharge with  PT.  Therapy Prognosis: Good  Decision Making: Medium Complexity  Barriers to Learning: None  Discharge Recommendations: 24 hour supervision or assist;Home with Home health PT    CLINICAL IMPRESSION   Pt admitted to ARU with diagnosis of postoperative intra-abdominal abscess. Pt with recent Hx of R TKA and is WBAT and L ankle fracture and is NWB to the LLE with a boot. Prior to initial R TKA pt was independent with transfers and ambulation with no AD, living alone, and working part time. Pt currently presents below baseline function. Requires mod A for supine>sit and max Ax2 for sit>supine. Patient transferred w/c <> bed with Max A x2, unable to maintain LLE NWB without assist. Able to propel self in wheelchair 75ft with supervision. Pt will benefit from skilled PT services to address current deficits and maximize function needed for safe  discharge home. Recommend 24-hour assist at discharge with  PT.    GOALS  Patient Goals   Patient Goals : \"To get lower/upper body strength and mobility, and to be independent for home\"  Short Term Goals  Time Frame for Short Term Goals: 10 days (7/17)  Short Term Goal 1: Patient will perform bed mobility with CGA  Short Term Goal 2: Patient will perform sit to stand with Mod A  Short Term Goal 3: Patient will perform pivot transfer bed <> chair with Mod A x1  Short Term Goal 4: Patient will ambulate 5ft in parallel bars while maintaining LLE NWB, with 2-person assist  Short Term Goal 5: Patient will self propel wheelchair 150ft with supervision  Long Term Goals  Time Frame for Long Term Goals : 20 days (7/27)  Long Term Goal 1: Patient will be Mod I for bed mobility  Long Term Goal 2: Patient will be Mod I for transfers  Long Term Goal 3: Patient will be independent with wheelchair mobility 150ft  Long Term Goal 4: Continue to assess for ambulation LTG    PLAN OF CARE  Frequency: 1-2 treatment sessions per day, 5-7 days per week  Physical Therapy Plan  General Plan: 5-7 times per week  Days Per Week: 5 Days  Hours Per Day: 1.5 hours  Therapy Duration: 3 Weeks  Current Treatment Recommendations: ROM;Balance training;Functional mobility training;Home exercise program;Therapeutic activities;Co-Treatment;Safety education & training;Patient/Caregiver education & training;Transfer training;Endurance training;Pain management;Strengthening;Wheelchair mobility training;Gait training;Stair training;ADL/Self-care training;IADL training;Neuromuscular re-education  Safety Devices  Type of Devices: All ulises prominences offloaded;Call light within reach;Gait belt;All fall risk precautions in place;Bed alarm in place;Left in bed;Patient at risk for falls  Restraints  Restraints Initially in Place: No    EDUCATION  Education  Education Given To: Patient  Education Provided: Role of Therapy;ADL Function;Plan of Care;Mobility

## 2024-07-09 NOTE — PROGRESS NOTES
Spoke to bed side RN (Lennie) Ostomy pouch and wound VAC dressing intact. Orders for dressing change already in chart.  Wound/ostomy to see patient again tomorrow 7/10/24 to change VAC dressing.

## 2024-07-09 NOTE — H&P
Department of Physical Medicine & Rehabilitation  History & Physical      Patient Identification:  Sudha Lopez  : 1945  Admit date: 2024   Attending provider: Beck Graves DO        Primary care provider: Perla Leyva MD     Chief Complaint: Debility     History of Present Illness/Hospital Course:  This is a 78-year-old female with past medical history including:  Past Medical History        Past Medical History:   Diagnosis Date    Anxiety      Hypertension           Who came into the hospital with abdominal pain and found to have postoperative intra-abdominal pelvic abscess.  She did have a knee replacement on 2024 and was discharged home.  She tried to stand up on this and lost her balance and fell.  Ankle x-ray showed anterior tibiotalar dislocation.  She is currently status post CT guided left abdominal drain placement by IR on 7/3/2024.  She continues to be limited due to pain and weakness.  She is interested in going to rehab unit when she is feeling better to be able to move around and discharge home.  Family is also agreeable to this plan.  She states that she just got on pure Medicare and is not on an  advantage plan anymore.  Will discuss with insurance admit team.    Prior Level of Function:  Independent for mobility, ADLs, and IADLs    Current Level of Function:  Mod assist     Pertinent Social History:  Support: family in town  Home set-up: 2 level     Past Medical History:   Diagnosis Date    Anxiety     Hypertension      Fall in past year: Yes    Past Surgical History:   Procedure Laterality Date    BREAST ENHANCEMENT SURGERY      COLONOSCOPY      COLONOSCOPY N/A 2024    COLONOSCOPY FLEXIBLE DECOMPRESSION performed by Marcus Naranjo MD at [facility]    CT RETROPERITONEAL PERC DRAIN  7/3/2024    CT RETROPERITONEAL PERC DRAIN 7/3/2024 Brenden Berman MD [facility] CT SCAN    HAND SURGERY Right     HYSTERECTOMY (CERVIX STATUS UNKNOWN)      INTRACAPSULAR CATARACT  Q8H PRN Beck Graves DO        Or    ondansetron (ZOFRAN) injection 4 mg  4 mg IntraVENous Q6H PRN Beck Graves DO        sodium chloride flush 0.9 % injection 5-40 mL  5-40 mL IntraVENous 2 times per day Beck Graves DO   10 mL at 07/09/24 0943    sodium chloride flush 0.9 % injection 5-40 mL  5-40 mL IntraVENous PRN Beck Graves DO             REVIEW OF SYSTEMS:   CONSTITUTIONAL: negative for fevers, chills, diaphoresis, appetite change, night sweats, unexpected weight change, positive for +fatigue.    EYES: negative for blurred vision, eye discharge, visual disturbance and icterus.   HEENT: negative for hearing loss, tinnitus, ear drainage, sinus pressure, nasal congestion, epistaxis and snoring.   RESPIRATORY: Negative for hemoptysis, cough, sputum production.   CARDIOVASCULAR: negative for chest pain, palpitations, exertional chest pressure/discomfort, syncope, edema   GASTROINTESTINAL: negative for nausea, vomiting, diarrhea, blood in stool, positive for abdominal pain, constipation.   GENITOURINARY: negative for frequency, dysuria, urinary incontinence, decreased urine volume, and hematuria.   HEMATOLOGIC/LYMPHATIC: negative for easy bruising, bleeding and lymphadenopathy.   ALLERGIC/IMMUNOLOGIC: negative for recurrent infections, angioedema, anaphylaxis and drug reactions.   ENDOCRINE: negative for weight changes and diabetic symptoms including polyuria, polydipsia and polyphagia.   MUSCULOSKELETAL: negative for pain, joint swelling, decreased range of motion.   NEUROLOGICAL: negative for headaches, slurred speech, unilateral weakness.   PSYCHIATRIC/BEHAVIORAL: negative for hallucinations, behavioral problems, confusion and agitation.     All pertinent positives are noted in the HPI.    Physical Examination:  Vitals: Patient Vitals for the past 24 hrs:   BP Temp Temp src Pulse Resp SpO2 Height   07/09/24 1326 (!) 142/65 -- -- 72 -- -- --   07/09/24 1251 -- -- -- -- -- -- 1.6 m (5' 3\")  but not limited to physical, occupational, respiratory, and speech, nutritional services, wound care, and prosthetics and orthotics. Given the patients complex condition and risk of further medical complications, rehabilitation services cannot be safely provided at a lower level of care such as a skilled nursing facility. All of the goals listed below were reviewed with the patient and he/she is in agreement.    I have compared the patients medical and functional status at the time of the preadmission screening and the same on this date, and there are no significant changes.    By signing this document, I acknowledge that I have personally performed a full physical examination on this patient within 24 hours of admission to this inpatient rehabilitation facility and have determined the patient to be able to tolerate the above course of treatment at an intensive level for a reasonable period of time. I will be completing a detailed individualized  Plan of Care for this patient by day four of the patients stay based upon the Preadmission Screen, this Post-Admission Evaluation, and the therapy evaluations.     Barriers: Decreased functional mobility, medical comorbidities  Services Required: PT, OT  Goals: mod I  Prognosis: Good  Anticipated Dispo: home  ELOS: TBD    Rehabilitation Diagnosis:   16.0, Debility (non-cardiac, non-pulmonary      Assessment and Plan:  Debility  -Decreased functional mobility and ADLs  -Increased sedentary hospital stay  -PT/OT    Intra-abdominal abscess  -Status post subtotal colectomy with ileostomy status post CT-guided left abdominal drain placement by IR on 7/3/2024 and antibiotic treatment with IV Zosyn.   -Monitor abdominal pain     Anemia  -Monitor hemoglobin    PAF on Eliquis        Impairments: Decreased functional mobility, Decreased ADLs    Bladder - high risk retention - Monitor PVRs, SC prn >300cc    Bowel - high risk constipation - colace BID, PRN miralax and MoM. follow bowel

## 2024-07-09 NOTE — ACP (ADVANCE CARE PLANNING)
ACP conversation had by PALUO Ferguson RN on 6/30/24 see note below:    General Advance Care Planning (ACP) Conversation     Date of Conversation: 6/30/2024  Conducted with: Patient with Decision Making Capacity  Other persons present: None     Healthcare Decision Maker:    Primary Decision Maker: Victorina Lopez - Child - [phone]  Click here to complete Healthcare Decision Makers including selection of the Healthcare Decision Maker Relationship (ie \"Primary\").  Today we documented Decision Maker(s) consistent with Legal Next of Kin hierarchy.     Content/Action Overview:  Has NO ACP documents-Information provided  Reviewed DNR/DNI and patient elects Full Code (Attempt Resuscitation)        Length of Voluntary ACP Conversation in minutes:  <16 minutes (Non-Billable)     Solange Ferguson RN

## 2024-07-09 NOTE — CARE COORDINATION
Case Management ARU Admission Assessment   [facility]    Patient:Sudha Lopez     Rehab Dx/Hx: Debility [R53.81]   :1945  MRN:[id number]  Date of Admit: 2024  Room #: 0164/0164-01       Objective:  reviewed chart, complete initial assessment. CM acknowledged social work/CM consult for discharge planning.         Family Present: no   Primary : [facility] Decision Maker:   Primary Decision Maker: Victorina Lopez - Child - [phone]   Current PCP:  Perla Leyva MD       Admit date:  2024   Insurance: [facility] MEDICARE    Precert required for SNF:  [] No       [x] Yes   3 Night stay required: [x] No       [] Yes   Admitting diagnosis: Debility [R53.81]       Current home situation: Lives alone in a 2 level condo with level entry.   DME at home: [x] Walker     [x] Cane       [] RTS        [] BSC      [] Shower Chair        [] O2       [] HHN     [] CPAP       [] BiPap      [] Hospital Bed       [] W/C        [x] Other:crutches    Medical concerns requiring training: Lower Extremity Weight Bearing Restrictions  Right Lower Extremity Weight Bearing: Weight Bearing As Tolerated  Left Lower Extremity Weight Bearing: Non Weight Bearing  Position Activity Restriction  Other position/activity restrictions: HALLIE drain, ostomy, wound vac    Medication concerns:  Require financial assistance with meds? [x] No       [] If yes, please explain:   [] Yes              Services prior to admission: [facility]   Preference for HHC or OP Therapy: [] Home health       [] Outpatient       [x] No preference   Patient's goal(s): Go home   Working or volunteering PTA? retired       Transportation needs: Adult children can assist   Has lack of transportation kept you from medical appointments, meetings, work, or ADL's? [] Yes, it has kept me from medical appointments or from getting my meds  [] Yes, It has kept me from non-medical meetings, appointments, work,          [] None of the above   Preferred Language: english     [facility] on chart for MD Signature.     Signature: Jaki Loco RN

## 2024-07-09 NOTE — PROGRESS NOTES
NURSING ASSESSMENT: ARU ADMISSION  [facility]    Patient:Sudha Lopez     Rehab Dx/Hx: Debility [R53.81]   :1945  MRN:[id number]  Date of Admit: 2024  Room #: 0164/0164-01    Subjective:   Patient admitted to room 164 @ from C4 via bed.   Alert and oriented x4.  Oriented to room and call light system. Oriented to rehab routine and therapy schedules. Informed about care conferences and ordering of meals with PCA.    Drug / Medication Review:   Medications were reviewed by RN at time of admission  [x]  No potential or actual clinically significant medication issues were noted.   []  Potential or actual clinically significant medication issues were found and MD was notified. (Specified below if applicable)   []  Allergy to medication   []  Drug interactions (drug/drug, drug/food, drug/disease interactions)   []  Duplicate drug   []  Omission (drug missing from prescribed regimen)   []  Non adherence   []  Adverse reaction   []  Wrong patient, drug, dose, route, time error   []  Ineffective drug therapy    Section GG: Rolling Right and Left   []  Code 06, Independent: if the patient completes the activity by themself with no assistance from a helper.   []  Code 05, Setup or clean up assist: if the helper sets up or cleans up; patient completes activity   []  Code 04, Supervision or touching assist: If the helper provides verbal cues and/or touching/steadying and/or contact guard assistance as patient completes activity   []  Code 03, Partial/Moderate Assist: If the helper does LESS THAN HALF the effort. [place] lifts, holds, or supports trunk or limbs, but provides less than half the effort.   [x]  Code 02, Substantial/Maximal Assist: if the helper does MORE THAN HALF the effort. [place] lifts or holds trunk or limbs and provides more than half the effort.  []  Code 01,Dependent: If the helper does ALL of the effort. Patient does none of the effort to complete the activity; or the  assistance of two or more helpers is required for the patient to complete the activity   []  Code 07 Patient Refused   []  Code 09: Not applicable  []  Code 88: Not attempted due to medical condition or safety concerns: if the activity was not attempted due to medical condition or safety concerns, but the patient could perform the activity prior to the current illness, exacerbation, or injury.          Patient Mood Interview    Symptom Presence  0. No (enter 0 in column 2)  1. Yes (enter 0-3 in column 2)  9. No response (leave column 2 blank  Symptom Frequency  0. Never or 1 day  1. 2-6 days (several days)  2. 7-11 days (half or more days)  3. 12-14 days (nearly everyday) 1. Symptom Presence 2. Symptom Frequency    Enter scores in boxes   Little interest or pleasure in doing things   1 1   Feeling down, depressed, or hopeless   1 1   If either A or B is coded 2 or 3, CONTINUE asking the questions below.  If not, END the interview.     Trouble falling or staying asleep, or sleeping too much   1 1   Feeling tired or having little energy   1 1   Poor appetite or overeating   1 1   Feeling bad about yourself - or that you are a failure or have let yourself or your family down   0 0   Trouble concentrating on things, such as reading the newspaper or watching television   0 0   Moving or speaking so slowly that other people could have noticed.  Or the opposite- being so fidgety or restless that you have been moving around a lot more than usual.   0 0   Thoughts that you would be better off dead, or of hurting yourself in some way.   0 0   Total Severity: Add scores for all frequency responses in column 2    5   Social isolation (from [place])  How often do you feel lonely or isolated from those around you?   [x] 0. Never  [] 1. Rarely  [] 2. Sometimes  [] 3. Often  [] 4. Always  [] 7. Patient declines to respond  [] 8. Patient unable to respond       Admission BIMS:  Number of word repeated after first

## 2024-07-09 NOTE — PLAN OF CARE
ARU PATIENT TREATMENT PLAN  [facility]  [address] [place]  [address]  [phone]    Sudha Lopez    : 1945  [id number]  MRN: [id number]   PHYSICIAN:  Beck Graves DO  Primary Problem    Patient Active Problem List   Diagnosis    Closed fracture of left ankle    AIRAM (acute kidney injury) (HCC)    Elevated lactic acid level    Hyponatremia    Shock circulatory (HCC)    Leukocytosis    Hypoxemia    GERD (gastroesophageal reflux disease)    Large bowel obstruction (HCC)    Acute postoperative pulmonary insufficiency (HCC)    Status post laparoscopic colectomy    Ischemic bowel disease (HCC)    Elevated LFTs    Postoperative intra-abdominal abscess    Intra-abdominal abscess (HCC)    E coli infection    Enterococcus faecalis infection    Moderate malnutrition (HCC)    Debility       Rehabilitation Diagnosis:     Debility [R53.81]       ADMIT DATE:2024    Patient Goals: \"build my strength, be more mobile, feel safe doing things IND\"     Admitting Impairments: Pt. Admitted in debility resulting in Decreased functional mobility ;Decreased safe awareness;Decreased balance;Decreased ADL status;Decreased endurance;Decreased high-level IADLs;Decreased strength   Barriers: Decreased functional mobility, medical comorbidities   Participation: Fair     CARE PLAN     NURSING:  Sudha Lopez while on this unit will:     [x] Be continent of bowel and bladder     [x] Have an adequate number of bowel movements  [x] Urinate with no urinary retention >300ml in bladder  [] Complete bladder protocol with roach removal  [x] Maintain O2 SATs at _92__%  [x] Have pain managed while on ARU       [x] Be pain free by discharge   [x] Have no skin breakdown while on ARU  [x] Have improved skin integrity via wound measurements  [x] Have no signs/symptoms of infection at the wound site  [x] Be free from injury during hospitalization   [x] Complete education with patient/family with    Toileting Hygiene CARE Score: 1 Discharge Goal: Independent   Shower/Bathe Self CARE Score: 2 Discharge Goal: Supervision or touching assistance   Upper Body Dressing CARE Score: 4 Discharge Goal: Independent   Lower Body Dressing       On/Off Footwear CARE Score: 1 Discharge Goal: Independent   Roll Left & Right CARE Score: 3 Discharge Goal: Independent   Sit to Lying  CARE Score: 2 Discharge Goal: Independent   Lying to Sitting EOB CARE Score: 3 Discharge Goal: Independent   Sit to Stand CARE Score: 1 Discharge Goal: Independent   Chair/Bed to Chair Transfer CARE Score: 1 Discharge Goal: Independent   Toilet Transfer CARE Score: 88 Discharge Goal: Supervision or touching assistance   Car Transfer CARE Score: 88 Discharge Goal: Supervision or touching assistance   Walk 10 Feet CARE Score: 88 Discharge Goal: Supervision or touching assistance   Walk 50 Feet, 2 Turns CARE Score: 88 Discharge Goal: Supervision or touching assistance   Walk 150 Feet CARE Score: 88 Discharge Goal: Dependent   Walk 10 Feet, Uneven Surface CARE Score: 88 Discharge Goal: Supervision or touching assistance   1 Step (Curb) CARE Score: 88 Discharge Goal: Partial/moderate assistance   4 Steps CARE Score: 88 Discharge Goal: Dependent   12 Steps CARE Score: 88 Discharge Goal: Dependent    Object CARE Score: 88 Discharge Goal: Supervision or touching assistance   Wheel 50 feet, 2 turns CARE Score: 4 Discharge Goal: Independent   Wheel 150 Feet CARE Score: 2 Discharge Goal: Independent          Sudha Lopez will be seen a minimum of 3 hours of therapy per day, a minimum of 5 out of 7 days per week.    [] In this rare instance due to the nature of this patient's medical involvement, this patient will be seen 15 hours per week (900 minutes within a 7 day period).    Treatments may include therapeutic exercises, gait training, neuromuscular re-ed, transfer training, community reintegration, bed mobility, w/c mobility and training, self

## 2024-07-09 NOTE — FLOWSHEET NOTE
07/09/24 0810   Vital Signs   Temp 98.6 °F (37 °C)   Pulse 75   Heart Rate Source Monitor   BP (!) 108/50   MAP (Calculated) 69   BP Location Left upper arm   BP Method Automatic   Patient Position Semi fowlers   Oxygen Therapy   SpO2 95 %   O2 Device None (Room air)       Lab Results   Component Value Date/Time    WBC 12.6 (H) 07/08/2024 05:30 AM    HGB 8.5 (L) 07/08/2024 05:30 AM    HCT 24.9 (L) 07/08/2024 05:30 AM     (H) 07/08/2024 05:30 AM     (L) 07/08/2024 05:30 AM    K 3.9 07/08/2024 05:30 AM    BUN 7 07/08/2024 05:30 AM    CREATININE 0.7 07/08/2024 05:30 AM    MG 1.50 (L) 07/05/2024 05:00 AM        AM Assessment completed.  Patient in bed.  Awake, Alert and oriented. Respirations easy unlabored.    Pain/Discomfort is being managed with PRN analgesics per MD orders (See MAR). Patient is able to express and rate pain using numerical scale.  Plan of care, education and safety measures reviewed and mutually agreed upon with the patient.   Calls appropriately. Needed items including call light in reach and exit alarm in place.

## 2024-07-09 NOTE — CONSULTS
Pharmacy to review and update home med list per Dr. Graves :     - Pt.was admitted to [facility] from 5/30/24- 6/21/24 , during this hospitalization following changes were made in her home meds:     - Hydrochlorothiazide was d/c'd due to hyponatremia ( by Nephrologist )  - Eliquis was started due to new dx of Afib   - celebrex was d/c'd , not sure if pt.was on celebrex, it seems like pt.was filled 90 days supply of Mobic 15mg in May post TKR .  - 10 days course of augmentin was prescribed for intra-abdominal infection along with 3 days supply of percocet and nystatin oral liquid.       -current BP meds reviewed with Dr. Parada, continue to hold benazepril, HCTZ, and aldactone .  Leyla Nava/[facility]. 7/9/24 4:10 PM EDT      - p

## 2024-07-09 NOTE — PLAN OF CARE
Problem: Safety - Adult  Goal: Free from fall injury  Outcome: Progressing  Flowsheets (Taken 7/8/2024 2212)  Free From Fall Injury: Instruct family/caregiver on patient safety     Problem: Pain  Goal: Verbalizes/displays adequate comfort level or baseline comfort level  Outcome: Progressing  Flowsheets (Taken 7/8/2024 2217)  Verbalizes/displays adequate comfort level or baseline comfort level:   Encourage patient to monitor pain and request assistance   Administer analgesics based on type and severity of pain and evaluate response   Consider cultural and social influences on pain and pain management   Assess pain using appropriate pain scale   Implement non-pharmacological measures as appropriate and evaluate response   Notify Licensed Independent Practitioner if interventions unsuccessful or patient reports new pain     Problem: Skin/Tissue Integrity  Goal: Absence of new skin breakdown  Description: 1.  Monitor for areas of redness and/or skin breakdown  2.  Assess vascular access sites hourly  3.  Every 4-6 hours minimum:  Change oxygen saturation probe site  4.  Every 4-6 hours:  If on nasal continuous positive airway pressure, respiratory therapy assess nares and determine need for appliance change or resting period.  Outcome: Progressing

## 2024-07-09 NOTE — CONSULTS
Comprehensive Nutrition Assessment    Type and Reason for Visit:  Initial, Consult    Nutrition Recommendations/Plan:   Continue general diet   Increase Ensure to BID - requests vanilla/chocolate   Encourage PO intakes as tolerated   Menu provided   Allow family to bring in meals/snacks  Monitor nutrition adequacy, pertinent labs, bowel habits, wt changes, and clinical progress     Malnutrition Assessment:  Malnutrition Status:  Moderate malnutrition (07/09/24 1259)    Context:  Acute Illness     Findings of the 6 clinical characteristics of malnutrition:  Energy Intake:  75% or less of estimated energy requirements for 7 or more days  Weight Loss:  Greater than 5% over 1 month     Muscle Mass Loss:  Mild muscle mass loss Temples (temporalis), Clavicles (pectoralis & deltoids)    Nutrition Assessment:    New ARU pt admitted with debility. Hx of HTN, Afib, colonic ischemia s/p colectomy wtih ileostomy. Consulted for oral nutrition supplements. Pt nutritionally at risk AEB decreased appetite and wounds. Pt reports appetite remains lower than normal. States does not like the food, but attempting to take bites of everything provided. Menu to bedside to assist with meal ordering. Favorable to current ONS, RD to increase to BID. Pt also states family bringing in food occasionally. Will continue to monitor.    Nutrition Related Findings:    Na 131. Active BS. BM on 7/8. 9.1% weight loss in 1 month. Wound Type: Pressure Injury, Unstageable, Wound Vac, Surgical Incision       Current Nutrition Intake & Therapies:    Average Meal Intake: 26-50%, 51-75%, %  Average Supplements Intake: Unable to assess  ADULT DIET; Regular  ADULT ORAL NUTRITION SUPPLEMENT; Breakfast, PM Snack; Standard High Calorie/High Protein Oral Supplement    Anthropometric Measures:  Height: 160 cm (5' 3\")  Ideal Body Weight (IBW): 115 lbs (52 kg)       Current Body Weight: 81.6 kg (180 lb), 156.5 % IBW. Weight Source: Bed Scale  Current BMI (kg/m2):

## 2024-07-09 NOTE — PATIENT CARE CONFERENCE
[facility]  Inpatient Rehabilitation  Weekly Team Conference Note    Date: 7/10/2024  Patient Name: Sudha Lopez        MRN: [id number]    : 1945  (78 y.o.)  Gender: female   Referring Practitioner: Beck Graves DO         Interventions to be utilized toward barriers to discharge, per discipline:  NURSING  Nursing observed barriers to dc: Upper extremity weakness and Lower extremity weakness  Nursing interventions: medication management, wound care, education  Family Education: No  Fall Risk:  Yes    Stay with me?: Yes    PHYSICAL THERAPY  Physical therapy observed barriers to dc:    Baseline: Independent, working part-time, driving   Current level: Mod-MaxA for bed mobility, MaxA x2 for transfers, currently non-ambulatory. Supervision for wheelchair mobility   Barriers to DC: burden of care, generalized weakness, ability to maintain NWB LLE, pain, lives alone   Needs in order to achieve dc home/next level of care: Supervision-independent for mobility for FFSU, family training,  PT. DME: hospital bed, manual wheelchair, possible transfer board      Physical therapy interventions:   Current Treatment Recommendations: ROM, Balance training, Functional mobility training, Home exercise program, Therapeutic activities, Co-Treatment, Safety education & training, Patient/Caregiver education & training, Transfer training, Endurance training, Pain management, Strengthening, Wheelchair mobility training, Gait training, Stair training, ADL/Self-care training, IADL training, Neuromuscular re-education    PT Goals:            Short Term Goals  Time Frame for Short Term Goals: 10 days ()  Short Term Goal 1: Patient will perform bed mobility with CGA  Short Term Goal 2: Patient will perform sit to stand with Mod A  Short Term Goal 3: Patient will perform pivot transfer bed <> chair with Mod A x1  Short Term Goal 4: Patient will ambulate 5ft in parallel bars while maintaining LLE NWB, with  2-person assist  Short Term Goal 5: Patient will self propel wheelchair 150ft with supervision            Long Term Goals  Time Frame for Long Term Goals : 20 days (7/27)  Long Term Goal 1: Patient will be Mod I for bed mobility  Long Term Goal 2: Patient will be Mod I for transfers  Long Term Goal 3: Patient will be independent with wheelchair mobility 150ft  Long Term Goal 4: Continue to assess for ambulation LTG    PT Assessment:    Recommendation:   Pt admitted to ARU with diagnosis of postoperative intra-abdominal abscess. Pt with recent Hx of R TKA and is WBAT and L ankle fracture and is NWB to the LLE with a boot. Prior to initial R TKA pt was independent with transfers and ambulation with no AD, living alone, and working part time. Pt currently presents below baseline function. Requires mod A for supine>sit and max Ax2 for sit>supine. Patient transferred w/c <> bed with Max A x2, unable to maintain LLE NWB without assist. Able to propel self in wheelchair 75ft with supervision. Pt will benefit from skilled PT services to address current deficits and maximize function needed for safe discharge home. Recommend 24-hour assist at discharge with  PT.  Therapy Prognosis: Good  Decision Making: Medium Complexity  Barriers to Learning: None  Discharge Recommendations: 24 hour supervision or assist;Home with Home health PT    Assessment  Assessment: Pt admitted to ARU with diagnosis of postoperative intra-abdominal abscess. Pt with recent Hx of R TKA and is WBAT and L ankle fracture and is NWB to the LLE with a boot. Prior to initial R TKA pt was independent with transfers and ambulation with no AD, living alone, and working part time. Pt currently presents below baseline function. Requires mod A for supine>sit and max Ax2 for sit>supine. Patient transferred w/c <> bed with Max A x2, unable to maintain LLE NWB without assist. Able to propel self in wheelchair 75ft with supervision. Pt will benefit from skilled PT

## 2024-07-09 NOTE — PROGRESS NOTES
Occupational Therapy  Facility/Department: [facility]  Rehabilitation Occupational Therapy Evaluation       Date: 24  Patient Name: Sudha Lopez       Room: 0164/0164-01  MRN: [id number]  Account: [id number]   : 1945  (78 y.o.) Gender: female     Referring Practitioner: Ely  Diagnosis: Debility       Restrictions  Restrictions/Precautions: Fall Risk;Up as Tolerated;Weight Bearing  Other position/activity restrictions: HALLIE drain, ostomy, wound vac  Right Lower Extremity Weight Bearing: Weight Bearing As Tolerated  Left Lower Extremity Weight Bearing: Non Weight Bearing  Required Braces or Orthoses?: No    Subjective   Pt semi-supine in bed and agreeable to OT evaluation.           Vitals  Temp: 98.6 °F (37 °C)  Pulse: 72  Respirations: 16  BP: (!) 142/65  Height: 160 cm (5' 3\")  Oxygen Therapy  SpO2: 95 %  O2 Device: None (Room air)  Level of Consciousness: Alert (0)    Objective     Cognition  Overall Cognitive Status: WFL  Orientation  Overall Orientation Status: Within Normal Limits  Orientation Level: Oriented X4   Perception  Overall Perceptual Status: WFL  Sensation  Overall Sensation Status: WFL   ROM  LUE AROM (degrees)  LUE AROM : WFL  RUE AROM (degrees)  RUE AROM : WFL  LUE Strength  Gross LUE Strength: WNL  RUE Strength  Gross RUE Strength: WNL  Fine Motor Skills  Coordination  Movements Are Fluid And Coordinated: Yes        Functional Mobility  Transfers  Sit Pivot Transfers: Maximum assistance (EOB > Wc)    Social/Functional History  Lives With: Alone  Type of Home: Condo  Home Layout: Two level;Bed/Bath upstairs;1/2 bath on main level;Laundry in basement  Home Access: Level entry  Bathroom Shower/Tub: Tub/Shower unit  Bathroom Toilet: Standard  Bathroom Equipment: Grab bars in shower  Bathroom Accessibility: Accessible  Home Equipment: Cane;Crutches;Walker - Rolling  Has the patient had two or more falls in the past year or any fall with injury in the past year?: Yes (~4 d/t LOB)  ADL  Impairments: Decreased functional mobility ;Decreased safe awareness;Decreased balance;Decreased ADL status;Decreased endurance;Decreased high-level IADLs;Decreased strength  Assessment: Per MD, \"This is a 78-year-old female Who came into the hospital with abdominal pain and found to have postoperative intra-abdominal pelvic abscess.  She is currently status post CT guided left abdominal drain placement by IR on 7/3/2024.  She continues to be limited due to pain and weakness.  She is interested in going to rehab unit when she is feeling better to be able to move around and discharge home.  Family is also agreeable to this plan.  She states that she just got on pure Medicare and is not on an  advantage plan anymore.  Will discuss with insurance admit team.\"     Pt with extensive PMH, especially in the past few months. PLOF includes IND in A/IADLs, part time nanny, active , and active lifestyle. Pt with limited mobility since R TKA in 5/2024. PTA, pt lived in [address] [facility] with level entry, bed/bath on second floor. Currently, pt requires mod A for bed mobility, max A for sit pivot with difficulty maintaining NWB in LLE, max A for LB bathing, DEP for toileting, SBA for UB ADLs. Pt would benefit from skilled OT while in IPR d/t functional deficits below baseline. Recommend HHOT following d/c.  Treatment Diagnosis: Debility  Prognosis: Fair  Decision Making: Medium Complexity  Discharge Recommendations: Home with Home health OT;Continue to assess pending progress  Occupational Therapy Plan  Times Per Week: 5-7x/week  Current Treatment Recommendations: Strengthening;Functional mobility training;Endurance training;Balance training;Gait training;Self-Care / ADL;Patient/Caregiver education & training;Safety education & training;Co-Treatment;Home management training       Therapy Time   Individual Concurrent Group Co-treatment   Time In 0800         Time Out 0930         Minutes 90         Timed Code Treatment Minutes:

## 2024-07-10 LAB
ANION GAP SERPL CALCULATED.3IONS-SCNC: 8 MMOL/L (ref 3–16)
ANISOCYTOSIS BLD QL SMEAR: ABNORMAL
BASOPHILS # BLD: 0.1 K/UL (ref 0–0.2)
BASOPHILS NFR BLD: 1 %
BUN SERPL-MCNC: 7 MG/DL (ref 7–20)
CALCIUM SERPL-MCNC: 9.5 MG/DL (ref 8.3–10.6)
CHLORIDE SERPL-SCNC: 98 MMOL/L (ref 99–110)
CO2 SERPL-SCNC: 25 MMOL/L (ref 21–32)
CREAT SERPL-MCNC: 0.7 MG/DL (ref 0.6–1.2)
DEPRECATED RDW RBC AUTO: 15.2 % (ref 12.4–15.4)
EOSINOPHIL # BLD: 0.7 K/UL (ref 0–0.6)
EOSINOPHIL NFR BLD: 6 %
GFR SERPLBLD CREATININE-BSD FMLA CKD-EPI: 88 ML/MIN/{1.73_M2}
GLUCOSE SERPL-MCNC: 91 MG/DL (ref 70–99)
HCT VFR BLD AUTO: 25 % (ref 36–48)
HGB BLD-MCNC: 8.5 G/DL (ref 12–16)
HYPOCHROMIA BLD QL SMEAR: ABNORMAL
LYMPHOCYTES # BLD: 1.8 K/UL (ref 1–5.1)
LYMPHOCYTES NFR BLD: 16 %
MACROCYTES BLD QL SMEAR: ABNORMAL
MCH RBC QN AUTO: 30.2 PG (ref 26–34)
MCHC RBC AUTO-ENTMCNC: 34 G/DL (ref 31–36)
MCV RBC AUTO: 89 FL (ref 80–100)
MICROCYTES BLD QL SMEAR: ABNORMAL
MONOCYTES # BLD: 1.3 K/UL (ref 0–1.3)
MONOCYTES NFR BLD: 11 %
MYELOCYTES NFR BLD MANUAL: 2 %
NEUTROPHILS # BLD: 7.6 K/UL (ref 1.7–7.7)
NEUTROPHILS NFR BLD: 64 %
OVALOCYTES BLD QL SMEAR: ABNORMAL
PATH INTERP BLD-IMP: NO
PLATELET # BLD AUTO: 473 K/UL (ref 135–450)
PLATELET BLD QL SMEAR: ABNORMAL
PMV BLD AUTO: 6 FL (ref 5–10.5)
POIKILOCYTOSIS BLD QL SMEAR: ABNORMAL
POLYCHROMASIA BLD QL SMEAR: ABNORMAL
POTASSIUM SERPL-SCNC: 3.9 MMOL/L (ref 3.5–5.1)
RBC # BLD AUTO: 2.81 M/UL (ref 4–5.2)
SLIDE REVIEW: ABNORMAL
SODIUM SERPL-SCNC: 131 MMOL/L (ref 136–145)
SPHEROCYTES BLD QL SMEAR: ABNORMAL
TOXIC GRANULES BLD QL SMEAR: PRESENT
WBC # BLD AUTO: 11.5 K/UL (ref 4–11)

## 2024-07-10 PROCEDURE — 97530 THERAPEUTIC ACTIVITIES: CPT

## 2024-07-10 PROCEDURE — 36415 COLL VENOUS BLD VENIPUNCTURE: CPT

## 2024-07-10 PROCEDURE — 80048 BASIC METABOLIC PNL TOTAL CA: CPT

## 2024-07-10 PROCEDURE — 6360000002 HC RX W HCPCS: Performed by: PHYSICAL MEDICINE & REHABILITATION

## 2024-07-10 PROCEDURE — 2580000003 HC RX 258: Performed by: PHARMACIST

## 2024-07-10 PROCEDURE — 6360000002 HC RX W HCPCS: Performed by: PHARMACIST

## 2024-07-10 PROCEDURE — 6370000000 HC RX 637 (ALT 250 FOR IP): Performed by: PHYSICAL MEDICINE & REHABILITATION

## 2024-07-10 PROCEDURE — 97535 SELF CARE MNGMENT TRAINING: CPT

## 2024-07-10 PROCEDURE — 97605 NEG PRS WND THER DME<=50SQCM: CPT

## 2024-07-10 PROCEDURE — 85025 COMPLETE CBC W/AUTO DIFF WBC: CPT

## 2024-07-10 PROCEDURE — 97110 THERAPEUTIC EXERCISES: CPT

## 2024-07-10 PROCEDURE — 2580000003 HC RX 258: Performed by: PHYSICAL MEDICINE & REHABILITATION

## 2024-07-10 PROCEDURE — 1280000000 HC REHAB R&B

## 2024-07-10 RX ADMIN — APIXABAN 5 MG: 5 TABLET, FILM COATED ORAL at 20:59

## 2024-07-10 RX ADMIN — OXYCODONE AND ACETAMINOPHEN 1 TABLET: 5; 325 TABLET ORAL at 07:26

## 2024-07-10 RX ADMIN — Medication 1 TABLET: at 07:42

## 2024-07-10 RX ADMIN — PANTOPRAZOLE SODIUM 40 MG: 40 TABLET, DELAYED RELEASE ORAL at 05:54

## 2024-07-10 RX ADMIN — TRAZODONE HYDROCHLORIDE 75 MG: 50 TABLET ORAL at 20:59

## 2024-07-10 RX ADMIN — PIPERACILLIN AND TAZOBACTAM 3375 MG: 3; .375 INJECTION, POWDER, LYOPHILIZED, FOR SOLUTION INTRAVENOUS at 07:49

## 2024-07-10 RX ADMIN — PIPERACILLIN AND TAZOBACTAM 3375 MG: 3; .375 INJECTION, POWDER, LYOPHILIZED, FOR SOLUTION INTRAVENOUS at 00:35

## 2024-07-10 RX ADMIN — DICLOFENAC SODIUM 4 G: 10 GEL TOPICAL at 21:01

## 2024-07-10 RX ADMIN — PIPERACILLIN AND TAZOBACTAM 3375 MG: 3; .375 INJECTION, POWDER, LYOPHILIZED, FOR SOLUTION INTRAVENOUS at 16:25

## 2024-07-10 RX ADMIN — SODIUM CHLORIDE, PRESERVATIVE FREE 10 ML: 5 INJECTION INTRAVENOUS at 07:43

## 2024-07-10 RX ADMIN — OXYCODONE AND ACETAMINOPHEN 1 TABLET: 5; 325 TABLET ORAL at 15:42

## 2024-07-10 RX ADMIN — SODIUM CHLORIDE, PRESERVATIVE FREE 1 ML: 5 INJECTION INTRAVENOUS at 20:55

## 2024-07-10 RX ADMIN — OXYCODONE AND ACETAMINOPHEN 1 TABLET: 5; 325 TABLET ORAL at 21:37

## 2024-07-10 RX ADMIN — LATANOPROST 1 DROP: 50 SOLUTION OPHTHALMIC at 21:01

## 2024-07-10 RX ADMIN — ATORVASTATIN CALCIUM 10 MG: 10 TABLET, FILM COATED ORAL at 07:43

## 2024-07-10 RX ADMIN — FLUOXETINE HYDROCHLORIDE 40 MG: 20 CAPSULE ORAL at 07:43

## 2024-07-10 RX ADMIN — APIXABAN 5 MG: 5 TABLET, FILM COATED ORAL at 07:42

## 2024-07-10 RX ADMIN — COLLAGENASE SANTYL: 250 OINTMENT TOPICAL at 13:28

## 2024-07-10 RX ADMIN — PIPERACILLIN AND TAZOBACTAM 3375 MG: 3; .375 INJECTION, POWDER, LYOPHILIZED, FOR SOLUTION INTRAVENOUS at 23:34

## 2024-07-10 RX ADMIN — BUPROPION HYDROCHLORIDE 300 MG: 150 TABLET, EXTENDED RELEASE ORAL at 07:43

## 2024-07-10 ASSESSMENT — PAIN SCALES - GENERAL
PAINLEVEL_OUTOF10: 7
PAINLEVEL_OUTOF10: 7

## 2024-07-10 ASSESSMENT — PAIN DESCRIPTION - LOCATION
LOCATION: BACK
LOCATION: BACK;HIP

## 2024-07-10 ASSESSMENT — PAIN DESCRIPTION - ORIENTATION: ORIENTATION: LEFT

## 2024-07-10 ASSESSMENT — PAIN DESCRIPTION - DESCRIPTORS
DESCRIPTORS: ACHING
DESCRIPTORS: ACHING

## 2024-07-10 NOTE — PROGRESS NOTES
Physical Therapy  Facility/Department: [facility]  Rehabilitation Physical Therapy Treatment Note    NAME: Sudha Lopez  : 1945 (78 y.o.)  MRN: [id number]  CODE STATUS: Full Code    Date of Service: 7/10/24       Restrictions:  Restrictions/Precautions: Fall Risk, Up as Tolerated, Weight Bearing  Lower Extremity Weight Bearing Restrictions  Right Lower Extremity Weight Bearing: Weight Bearing As Tolerated  Left Lower Extremity Weight Bearing: Non Weight Bearing  Position Activity Restriction  Other position/activity restrictions: HALLIE drain, ostomy, wound vac     SUBJECTIVE  Subjective  Subjective: Pt agreeable to PT session  Pain: Reports mild back pain       OBJECTIVE  Cognition  Overall Cognitive Status: WFL  Orientation  Overall Orientation Status: Within Normal Limits  Orientation Level: Oriented X4    PT Exercises  Exercise Treatment: Supine AROM BLE: AP, heel slides (VC to limit shear bilateral heels), hip abduction/adduction, SLR      ASSESSMENT/PROGRESS TOWARDS GOALS      Assessment  Assessment: Patient seen for 30 minute co-treat with OT d/t assist levels and medical complexity. Session time limited by need for immediate wound care for ostomy. Patient able to perform supine LE AROM with minimal right knee pain noted. Recommend continued skilled PT services focusing on general strengthening, tolerance to upright positioning and balance to progress functional mobility needed for safe discharge. Recommend 24-7 care with HH PT at [place].  Activity Tolerance: Patient tolerated treatment well;Treatment limited secondary to medical complications  Discharge Recommendations: 24 hour supervision or assist;Home with Home health PT      Addendum Second Session: (Mikal Perez, PT, DPT)    Pt found in bed, agreeable to therapy, reports 4/10 pain in LLQ of abdomen     Vitals assessed in supine:   75 bpm, 97% on RA, 133/59    Assisted pt with changing briefs and gown in bed due to it being soiled from ostomy leakage

## 2024-07-10 NOTE — PROGRESS NOTES
Occupational Therapy  Facility/Department: [facility]  Rehabilitation Occupational Therapy Daily Treatment Note    Date: 7/10/24  Patient Name: Sudha Lopez       Room: 0164/0164-01  MRN: [id number]  Account: [id number]   : 1945  (78 y.o.) Gender: female                    Past Medical History:  has a past medical history of Anxiety and Hypertension.  Past Surgical History:   has a past surgical history that includes Hysterectomy; Breast enhancement surgery; rhinoplasty; Rotator cuff repair (Bilateral); Hand surgery (Right); Total hip arthroplasty (Bilateral); Intracapsular cataract extraction (Left, 2020); Colonoscopy; Intracapsular cataract extraction (Right, 2020); Colonoscopy (N/A, 2024); laparotomy (N/A, 2024); and CT PERITONEAL/RETROPERITONEAL PERC DRAIN (7/3/2024).    Restrictions  Restrictions/Precautions: Fall Risk, Up as Tolerated, Weight Bearing  Other position/activity restrictions: HALLIE drain, ostomy, wound vac  Right Lower Extremity Weight Bearing: Weight Bearing As Tolerated  Left Lower Extremity Weight Bearing: Non Weight Bearing  Required Braces or Orthoses?: No    Subjective  Subjective: Pt semi-supine in bed and agreeable to OT tx. Denies pain at rest. /65, HR 75, SPO2 97%  Restrictions/Precautions: Fall Risk;Up as Tolerated;Weight Bearing             Objective     Cognition  Overall Cognitive Status: WFL  Orientation  Overall Orientation Status: Within Normal Limits  Orientation Level: Oriented X4         ADL  Grooming/Oral Hygiene  Assistance Level: Set-up  Skilled Clinical Factors: semi-supine in bed  Toileting  Assistance Level: Dependent  Skilled Clinical Factors: purewick and ostomy                    Assessment  Assessment  Assessment: First Session: Co-tx collaboration this date to safely meet goals and will have better occupational performance outcomes with in a co-treatment than 1:1 session. Pt agreeable to OT/PT cotx. Therapy limited by medical

## 2024-07-10 NOTE — PROGRESS NOTES
Department of Physical Medicine & Rehabilitation  Progress Note    Patient Identification:  Sudha Lopez  [id number]  : 1945  Admit date: 2024    Chief Complaint: Debility    Subjective:   No acute events overnight. Patient seen this am.  She did better than expected in therapy today.  She feels like she is making progress daily.  She is mod assist for most ADLs.  She continues to be limited in her weightbearing precautions.  Will confirm with Ortho since he for weightbearing orders.  Team conference today.  Plan for discharge on 2024.    ROS: No f/c, n/v, cp     Objective:  Patient Vitals for the past 24 hrs:   BP Temp Temp src Pulse Resp SpO2   07/10/24 1505 135/68 -- -- 75 -- 97 %   07/10/24 0727 136/65 -- Oral 75 16 97 %   07/10/24 0726 -- -- -- -- 16 --   24 2039 137/65 98.5 °F (36.9 °C) Oral 73 16 93 %   24 1632 -- -- -- -- 16 --     Const: Alert. No distress, pleasant.   HEENT: Normocephalic, atraumatic. Normal sclera/conjunctiva. MMM.   CV: Regular rate and rhythm.   Resp: No respiratory distress. Lungs CTAB.   Abd: post procedure distended   Ext: + edema.   Neuro: Alert, oriented, appropriately interactive.   Psych: Cooperative, appropriate mood and affect    Laboratory data: Available via EMR.   Last 24 hour lab  Recent Results (from the past 24 hour(s))   Basic Metabolic Panel w/ Reflex to MG    Collection Time: 07/10/24  5:45 AM   Result Value Ref Range    Sodium 131 (L) 136 - 145 mmol/L    Potassium reflex Magnesium 3.9 3.5 - 5.1 mmol/L    Chloride 98 (L) 99 - 110 mmol/L    CO2 25 21 - 32 mmol/L    Anion Gap 8 3 - 16    Glucose 91 70 - 99 mg/dL    BUN 7 7 - 20 mg/dL    Creatinine 0.7 0.6 - 1.2 mg/dL    Est, Glom Filt Rate 88 >60    Calcium 9.5 8.3 - 10.6 mg/dL   CBC auto differential    Collection Time: 07/10/24  5:45 AM   Result Value Ref Range    WBC 11.5 (H) 4.0 - 11.0 K/uL    RBC 2.81 (L) 4.00 - 5.20 M/uL    Hemoglobin 8.5 (L) 12.0 - 16.0 g/dL    Hematocrit 25.0 (L)  today on the patient and discussed directly with the patient utilizing their entire treatment team. Please see separate team note for details. Total treatment time for today's care >50 min. >50% of time spent counseling with patient and coordinating care.           ALEXI BautitsaPLisandroH  PM&R  7/10/2024  3:29 PM

## 2024-07-10 NOTE — PROGRESS NOTES
Physical Therapy  Facility/Department: [facility]  Rehabilitation Physical Therapy Treatment Note    NAME: Sudha Lopez  : 1945 (78 y.o.)  MRN: [id number]  CODE STATUS: Full Code    Date of Service: 7/10/24       Restrictions:  Restrictions/Precautions: Fall Risk, Up as Tolerated, Weight Bearing  Lower Extremity Weight Bearing Restrictions  Right Lower Extremity Weight Bearing: Weight Bearing As Tolerated  Left Lower Extremity Weight Bearing: Non Weight Bearing  Required Braces or Orthoses  Left Lower Extremity Brace: Boot  LLE Brace Type: CAM boot  Position Activity Restriction  Other position/activity restrictions: HALLIE drain, ostomy, wound vac     SUBJECTIVE  Subjective  Subjective: Pt agreeable to PT session  Pain: No reports of pain at start of session        OBJECTIVE  Cognition  Overall Cognitive Status: WFL  Orientation  Overall Orientation Status: Within Normal Limits  Orientation Level: Oriented X4    Functional Mobility  Sit to Supine  Assistance Level: Minimal assistance  Skilled Clinical Factors: MinAx1 with HOB elevated, towards L with bed rail use  Supine to Sit  Assistance Level:  (in chair at end of session)  Scooting  Assistance Level: Contact guard assist  Skilled Clinical Factors: towards EOB  Transfers  Surface: From bed;Wheelchair  Additional Factors: Set-up;Increased time to complete  Sit to Stand  Assistance Level: Moderate assistance;Maximum assistance;Requires x 2 assistance  Skilled Clinical Factors: x3 trials from elevated EOB - ModAx2 to RW for first two stands, MaxAx2 to RW for third stand, pt with poor adherence to WB restriction with LLE during first trial.  Stand to Sit  Assistance Level: Moderate assistance  Bed To/From Chair  Technique:  (squat pivot)  Assistance Level: Minimal assistance;Moderate assistance;Requires x 2 assistance  Skilled Clinical Factors: MinAx1+ModAx1 via squat pivot       ASSESSMENT/PROGRESS TOWARDS GOALS  Vital Signs  Comment: After 3 STS from EOB pt  with reports of feeling woozy BP was 115/72 after further 2 min seated rest /74    Assessment  Assessment: Patient seen for co-treat with OT this afternoon d/t assist levels and medical complexity. PT demos good progress toward goals. She completes bed mobility with min A and 3 STS from elevated bed height with mod Ax2 for the first 2 and max Ax2 for the third due to fatigue. Pt requires max verbal and tactile cues to maintain NWB precautions to L LE. Pt completes squat pivot from bed>WC with min+mod A. Recommend continued skilled PT services focusing on general strengthening, tolerance to upright positioning and balance to progress functional mobility needed for safe discharge. Recommend 24-7 care with HH PT at [place].  Activity Tolerance: Patient tolerated treatment well  Discharge Recommendations: 24 hour supervision or assist;Home with Home health PT    Goals  Patient Goals   Patient Goals : \"To get lower/upper body strength and mobility, and to be independent for home\"  Short Term Goals  Time Frame for Short Term Goals: 10 days (7/17)  Short Term Goal 1: Patient will perform bed mobility with CGA  Short Term Goal 2: Patient will perform sit to stand with Mod A  Short Term Goal 3: Patient will perform pivot transfer bed <> chair with Mod A x1  Short Term Goal 4: Patient will ambulate 5ft in parallel bars while maintaining LLE NWB, with 2-person assist  Short Term Goal 5: Patient will self propel wheelchair 150ft with supervision  Long Term Goals  Time Frame for Long Term Goals : 20 days (7/27)  Long Term Goal 1: Patient will be Mod I for bed mobility  Long Term Goal 2: Patient will be Mod I for transfers  Long Term Goal 3: Patient will be independent with wheelchair mobility 150ft  Long Term Goal 4: Continue to assess for ambulation LTG    PLAN OF CARE/SAFETY  Physical Therapy Plan  General Plan: 5-7 times per week  Current Treatment Recommendations: ROM;Balance training;Functional mobility training;Home

## 2024-07-10 NOTE — CONSULTS
[facility] Nurse  Follow-up Progress Note       NAME:  Sudha Lopez  MEDICAL RECORD NUMBER:  [id number]  AGE:  78 y.o.   GENDER:  female  :  1945  TODAY'S DATE:  7/10/2024    Subjective: Just started leaking when therapy here.   Wound Identification:  Wound Type: Surgical mid line abd incision line with 1 open area at distal incision with a tunnel @ 5.5 cm from 600 pm pointing toward 12 am. Mid area (naval) closed with slough on wound bed. Negative pressure wound therapy dressing changed today to control drainage from distal abd staple line.     Ostomy leaking upon arrival to room. Patient now in ARU for rehab.  Will continue to follow for wound and ostomy needs.      Contributing Factors: edema, decreased mobility, shear force, obesity     Existing ileostomy: Subtotal colectomy with ileostomy on 24 for bowel obstruction by Dr Dung Jackson. Status post CT guided left abdominal drain placement by IR on 7/3/2024 for abscess.      Stoma size:  25 = 1 inch mm x 30 mm 1 3/16 inch. PLEASE DO NOT THROW AWAY PATTERN.  Appliance size: Coloplast 2 pience RED convex flange # [id number] with drainable bag # [id number]. paste ring # [id number] placed around stoma. Crust with light dusting of powder, rub in, seal with dabbing barrier wipe over powder. Santyl on slough mid incision then barrier paste strip and hydrocolloid to cover naval to make flat pouching surface.  Barrier extenders around stoma.  Belt added to help secure pouch and prevent leaking. DO NOT THROW AWAY BELT.  If soiled it can be washed.   If no convex ostomy barriers or paste rings/strips in room, call clinical to obtain from wound/ostomy closet on unit B3.         Patient Goal of Care:  [x] Wound Healing  [] Odor Control   [] Palliative Care  [] Pain Control   [x] Other: Ostomy care    Objective:    /65   Pulse 75   Temp 98.5 °F (36.9 °C) (Oral)   Resp 16   Ht 1.6 m (5' 3\")   SpO2 97%   BMI 31.95  therapy dressing (NPWT) changed to disal abd incision. Current Abd incision dressing removed. Site irriaged and cleansed with normal saline since the incision line was slightly contaminated with stool from ostomy leaking. Prepped mariama wound with paste strips, VAC drape. 1 white sponges applied into incision line (distal tunnel that goes from 600pm-1200 am). 1 black sponge on top. Santyl to Mid area with slough, covered with hydrocolloid dressing. Connected to "[employer] # [id number] VAC at 125 mmHg low continuous suction. Plan to change 3 times a week.      Plan for Ostomy Care:   Ileostomy appliance changed. Current pouch removed. Skin cleansed with warm water.  Pat dry.  RN Crusted with powder and barrier film. Paste ring @ stoma. Santyl on slough mid incision then barrier paste strip and hydrocolloid to cover naval to make flat pouching surface.  Then placed two piece flange with bag attached @ stoma.  Pouch placed over naval dressing as the open area in naval needed to be covered by the hydrocolloid dressing and this area over lasped where the stoma barrier would lay. Barrier extenders placed. Stoam Belt added.     Stoma Care - New ileostomy - Patient to empty appliance when 1/3 to 1/2 full with assistance of the staff. Cleanse inside and outside of the drain spout prior to rolling closed. Change appliance every 3-5 days or 1-2 times a week.  Call for problems with seal or leaking [phone] or [phone]. If don't answer leave message.      Specialty Bed Required : Yes   [] Low Air Loss   [x] Pressure Redistribution Isoflex gel therapy pressure redistribution mattress in place.   [] Fluid Immersion  [] Bariatric  [] Total Pressure Relief  [] Other:     Current Diet: ADULT DIET; Regular  ADULT ORAL NUTRITION SUPPLEMENT; Breakfast, Dinner; Standard High Calorie/High Protein Oral Supplement  Dietician consult:  Yes    Discharge Plan:  Placement for patient upon discharge unknown at this time   Patient appropriate

## 2024-07-10 NOTE — PLAN OF CARE
Problem: Pain  Goal: Verbalizes/displays adequate comfort level or baseline comfort level  Outcome: Progressing  Flowsheets (Taken 7/9/2024 2247)  Verbalizes/displays adequate comfort level or baseline comfort level:   Encourage patient to monitor pain and request assistance   Administer analgesics based on type and severity of pain and evaluate response   Consider cultural and social influences on pain and pain management   Assess pain using appropriate pain scale   Implement non-pharmacological measures as appropriate and evaluate response   Notify Licensed Independent Practitioner if interventions unsuccessful or patient reports new pain     Problem: Skin/Tissue Integrity  Goal: Absence of new skin breakdown  Description: 1.  Monitor for areas of redness and/or skin breakdown  2.  Assess vascular access sites hourly  3.  Every 4-6 hours minimum:  Change oxygen saturation probe site  4.  Every 4-6 hours:  If on nasal continuous positive airway pressure, respiratory therapy assess nares and determine need for appliance change or resting period.  Outcome: Progressing

## 2024-07-10 NOTE — PROGRESS NOTES
Occupational Therapy  Facility/Department: [facility]  Rehabilitation Occupational Therapy Daily Treatment Note    Date: 7/10/24  Patient Name: Sudha Lopez       Room: 0164/0164-01  MRN: [id number]  Account: [id number]   : 1945  (78 y.o.) Gender: female                    Past Medical History:  has a past medical history of Anxiety and Hypertension.  Past Surgical History:   has a past surgical history that includes Hysterectomy; Breast enhancement surgery; rhinoplasty; Rotator cuff repair (Bilateral); Hand surgery (Right); Total hip arthroplasty (Bilateral); Intracapsular cataract extraction (Left, 2020); Colonoscopy; Intracapsular cataract extraction (Right, 2020); Colonoscopy (N/A, 2024); laparotomy (N/A, 2024); and CT PERITONEAL/RETROPERITONEAL PERC DRAIN (7/3/2024).    Restrictions  Restrictions/Precautions: Fall Risk, Up as Tolerated, Weight Bearing  Other position/activity restrictions: HALLIE drain, ostomy, wound vac  Right Lower Extremity Weight Bearing: Weight Bearing As Tolerated  Left Lower Extremity Weight Bearing: Non Weight Bearing  Required Braces or Orthoses  Left Lower Extremity Brace: Boot  LLE Brace Type: CAM boot  Required Braces or Orthoses?: Yes    Subjective  Subjective: Pt supine in bed at start of session. Agreeable to OT/PT cotx. Denies pain.  Restrictions/Precautions: Fall Risk;Up as Tolerated;Weight Bearing           Vitals:    07/10/24 1505   BP: 135/68   Pulse: 75   Resp: 16   Temp:    SpO2: 97%   After 3 STS from EOB pt with reports of feeling woozy BP was 115/72 after further 2 min seated rest /74     Objective     Cognition  Overall Cognitive Status: WFL  Orientation  Overall Orientation Status: Within Normal Limits  Orientation Level: Oriented X4         ADL   None completed this session.      Sit to Supine  Assistance Level: Minimal assistance  Skilled Clinical Factors: MinAx1 with HOB elevated, towards L with bed rail use  Supine to Sit  Assistance

## 2024-07-10 NOTE — PLAN OF CARE
Problem: Safety - Adult  Goal: Free from fall injury  Outcome: Progressing     Problem: Pain  Goal: Verbalizes/displays adequate comfort level or baseline comfort level  7/10/2024 0121 by Eden Bruce RN  Outcome: Progressing  7/9/2024 2247 by Elsi Carcamo RN  Outcome: Progressing  Flowsheets (Taken 7/9/2024 2247)  Verbalizes/displays adequate comfort level or baseline comfort level:   Encourage patient to monitor pain and request assistance   Administer analgesics based on type and severity of pain and evaluate response   Consider cultural and social influences on pain and pain management   Assess pain using appropriate pain scale   Implement non-pharmacological measures as appropriate and evaluate response   Notify Licensed Independent Practitioner if interventions unsuccessful or patient reports new pain     Problem: Skin/Tissue Integrity  Goal: Absence of new skin breakdown  Description: 1.  Monitor for areas of redness and/or skin breakdown  2.  Assess vascular access sites hourly  3.  Every 4-6 hours minimum:  Change oxygen saturation probe site  4.  Every 4-6 hours:  If on nasal continuous positive airway pressure, respiratory therapy assess nares and determine need for appliance change or resting period.  7/10/2024 0121 by Eden Bruce RN  Outcome: Progressing  7/9/2024 2247 by Elsi Carcamo RN  Outcome: Progressing

## 2024-07-11 ENCOUNTER — APPOINTMENT (OUTPATIENT)
Dept: CT IMAGING | Age: 79
DRG: 948 | End: 2024-07-11
Attending: PHYSICAL MEDICINE & REHABILITATION
Payer: MEDICARE

## 2024-07-11 PROCEDURE — 1280000000 HC REHAB R&B

## 2024-07-11 PROCEDURE — 2580000003 HC RX 258: Performed by: PHARMACIST

## 2024-07-11 PROCEDURE — 97110 THERAPEUTIC EXERCISES: CPT

## 2024-07-11 PROCEDURE — 97530 THERAPEUTIC ACTIVITIES: CPT

## 2024-07-11 PROCEDURE — 6360000004 HC RX CONTRAST MEDICATION: Performed by: SURGERY

## 2024-07-11 PROCEDURE — 2580000003 HC RX 258: Performed by: PHYSICAL MEDICINE & REHABILITATION

## 2024-07-11 PROCEDURE — 6360000002 HC RX W HCPCS: Performed by: PHARMACIST

## 2024-07-11 PROCEDURE — 6370000000 HC RX 637 (ALT 250 FOR IP): Performed by: PHYSICAL MEDICINE & REHABILITATION

## 2024-07-11 PROCEDURE — 97535 SELF CARE MNGMENT TRAINING: CPT

## 2024-07-11 PROCEDURE — 74177 CT ABD & PELVIS W/CONTRAST: CPT

## 2024-07-11 RX ADMIN — PIPERACILLIN AND TAZOBACTAM 3375 MG: 3; .375 INJECTION, POWDER, LYOPHILIZED, FOR SOLUTION INTRAVENOUS at 08:28

## 2024-07-11 RX ADMIN — APIXABAN 5 MG: 5 TABLET, FILM COATED ORAL at 21:52

## 2024-07-11 RX ADMIN — DICLOFENAC SODIUM 4 G: 10 GEL TOPICAL at 10:41

## 2024-07-11 RX ADMIN — LATANOPROST 1 DROP: 50 SOLUTION OPHTHALMIC at 21:53

## 2024-07-11 RX ADMIN — TRAZODONE HYDROCHLORIDE 75 MG: 50 TABLET ORAL at 21:52

## 2024-07-11 RX ADMIN — PIPERACILLIN AND TAZOBACTAM 3375 MG: 3; .375 INJECTION, POWDER, LYOPHILIZED, FOR SOLUTION INTRAVENOUS at 18:09

## 2024-07-11 RX ADMIN — APIXABAN 5 MG: 5 TABLET, FILM COATED ORAL at 09:02

## 2024-07-11 RX ADMIN — Medication 1 TABLET: at 09:02

## 2024-07-11 RX ADMIN — MICONAZOLE NITRATE: 20 CREAM TOPICAL at 21:54

## 2024-07-11 RX ADMIN — OXYCODONE AND ACETAMINOPHEN 1 TABLET: 5; 325 TABLET ORAL at 05:11

## 2024-07-11 RX ADMIN — ATORVASTATIN CALCIUM 10 MG: 10 TABLET, FILM COATED ORAL at 10:40

## 2024-07-11 RX ADMIN — PANTOPRAZOLE SODIUM 40 MG: 40 TABLET, DELAYED RELEASE ORAL at 05:11

## 2024-07-11 RX ADMIN — BUPROPION HYDROCHLORIDE 300 MG: 150 TABLET, EXTENDED RELEASE ORAL at 09:02

## 2024-07-11 RX ADMIN — OXYCODONE AND ACETAMINOPHEN 1 TABLET: 5; 325 TABLET ORAL at 16:50

## 2024-07-11 RX ADMIN — COLLAGENASE SANTYL: 250 OINTMENT TOPICAL at 10:41

## 2024-07-11 RX ADMIN — OXYCODONE AND ACETAMINOPHEN 1 TABLET: 5; 325 TABLET ORAL at 11:32

## 2024-07-11 RX ADMIN — IOPAMIDOL 75 ML: 755 INJECTION, SOLUTION INTRAVENOUS at 17:36

## 2024-07-11 RX ADMIN — MICONAZOLE NITRATE: 20 CREAM TOPICAL at 10:41

## 2024-07-11 RX ADMIN — DICLOFENAC SODIUM 4 G: 10 GEL TOPICAL at 21:54

## 2024-07-11 RX ADMIN — OXYCODONE AND ACETAMINOPHEN 1 TABLET: 5; 325 TABLET ORAL at 21:52

## 2024-07-11 RX ADMIN — BISACODYL 5 MG: 5 TABLET, COATED ORAL at 09:02

## 2024-07-11 RX ADMIN — FLUOXETINE HYDROCHLORIDE 40 MG: 20 CAPSULE ORAL at 09:02

## 2024-07-11 RX ADMIN — SODIUM CHLORIDE, PRESERVATIVE FREE 10 ML: 5 INJECTION INTRAVENOUS at 21:53

## 2024-07-11 ASSESSMENT — PAIN DESCRIPTION - ORIENTATION
ORIENTATION: LOWER;LEFT
ORIENTATION: LOWER

## 2024-07-11 ASSESSMENT — PAIN SCALES - GENERAL
PAINLEVEL_OUTOF10: 7
PAINLEVEL_OUTOF10: 7
PAINLEVEL_OUTOF10: 0
PAINLEVEL_OUTOF10: 7

## 2024-07-11 ASSESSMENT — PAIN DESCRIPTION - DESCRIPTORS
DESCRIPTORS: ACHING
DESCRIPTORS: ACHING

## 2024-07-11 ASSESSMENT — PAIN DESCRIPTION - LOCATION
LOCATION: ABDOMEN
LOCATION: BACK

## 2024-07-11 ASSESSMENT — PAIN SCALES - WONG BAKER
WONGBAKER_NUMERICALRESPONSE: NO HURT
WONGBAKER_NUMERICALRESPONSE: NO HURT

## 2024-07-11 NOTE — PROGRESS NOTES
Department of Physical Medicine & Rehabilitation  Progress Note    Patient Identification:  Sudha Lopez  [id number]  : 1945  Admit date: 2024    Chief Complaint: Debility    Subjective:   Seen in her room this morning.  No new complaints overnight.  She is still waiting for Ortho to respond to her weightbearing issues.  She did have some trouble sleeping overnight with some nursing problems but is overall feeling like she is making progress in therapy.  Continues to be motivated for therapy.    ROS: No f/c, n/v, cp     Objective:  Patient Vitals for the past 24 hrs:   BP Temp Temp src Pulse Resp SpO2   24 0901 (!) 124/54 98.4 °F (36.9 °C) Oral 73 17 94 %   07/10/24 205 (!) 148/73 -- Oral 71 18 96 %   07/10/24 1542 -- -- -- -- 16 --   07/10/24 1505 135/68 -- -- 75 -- 97 %       Const: Alert. No distress, pleasant.   HEENT: Normocephalic, atraumatic. Normal sclera/conjunctiva. MMM.   CV: Regular rate and rhythm.   Resp: No respiratory distress. Lungs CTAB.   Abd: post procedure distended   Ext: + edema.   Neuro: Alert, oriented, appropriately interactive.   Psych: Cooperative, appropriate mood and affect    Laboratory data: Available via EMR.   Last 24 hour lab  No results found for this or any previous visit (from the past 24 hour(s)).      Therapy progress:  PT  Required Braces or Orthoses  Left Lower Extremity Brace: Boot  LLE Brace Type: CAM boot  Position Activity Restriction  Other position/activity restrictions: HALLIE drain, ostomy, wound vac  Objective     Sit to Stand: 2 Person Assistance, Maximum Assistance  Stand to Sit: 2 Person Assistance, Maximum Assistance  Bed to Chair: 2 Person Assistance, Maximum assistance  Assistance: Unable to assess  OT        Assessment        SLP          Body mass index is 31.95 kg/m².    Assessment and Plan:  Debility  -Decreased functional mobility and ADLs  -Increased sedentary hospital stay  -PT/OT     Intra-abdominal abscess  -Status post subtotal

## 2024-07-11 NOTE — PROGRESS NOTES
Occupational Therapy  Facility/Department: [facility]  Rehabilitation Occupational Therapy Daily Treatment Note    Date: 24  Patient Name: Sudha Lopez       Room: 0164/0164-01  MRN: [id number]  Account: [id number]   : 1945  (78 y.o.) Gender: female                    Past Medical History:  has a past medical history of Anxiety and Hypertension.  Past Surgical History:   has a past surgical history that includes Hysterectomy; Breast enhancement surgery; rhinoplasty; Rotator cuff repair (Bilateral); Hand surgery (Right); Total hip arthroplasty (Bilateral); Intracapsular cataract extraction (Left, 2020); Colonoscopy; Intracapsular cataract extraction (Right, 2020); Colonoscopy (N/A, 2024); laparotomy (N/A, 2024); and CT PERITONEAL/RETROPERITONEAL PERC DRAIN (7/3/2024).    Restrictions  Restrictions/Precautions: Fall Risk, Up as Tolerated, Weight Bearing  Other position/activity restrictions: HALLIE drain, ostomy, wound vac  Right Lower Extremity Weight Bearing: Weight Bearing As Tolerated  Left Lower Extremity Weight Bearing: Non Weight Bearing  Required Braces or Orthoses  Left Lower Extremity Brace: Boot  LLE Brace Type: CAM boot  Required Braces or Orthoses?: Yes    Subjective  Subjective: Pt supine in bed at start of session. Agreeable to OT/PT cotx. /54, HR 73, SPO2 94%  Restrictions/Precautions: Fall Risk;Up as Tolerated;Weight Bearing             Objective     Cognition  Overall Cognitive Status: WFL  Orientation  Overall Orientation Status: Within Normal Limits  Orientation Level: Oriented X4         ADL  Lower Extremity Dressing  Assistance Level: Maximum assistance  Putting On/Taking Off Footwear  Assistance Level: Dependent  Skilled Clinical Factors: don L CAM boot  Toileting  Assistance Level: Dependent  Skilled Clinical Factors: purewick and ostomy  Toilet Transfers  Technique: Stand pivot  Equipment: Drop-arm bedside commode  Additional Factors: Increased time to  complete;Cues for hand placement;Set-up;Verbal cues  Assistance Level: Maximum assistance;Requires x 2 assistance  Skilled Clinical Factors: stand pivot from wc <> BSC with vbc's for hand placement on grab bars and to maintain NWB          Functional Mobility  Device: Wheelchair  Activity: To/From therapy gym  Assistance Level: Dependent  Skilled Clinical Factors: assist to push wc <> therapy gym  Bed Mobility  Overall Assistance Level: Minimal Assistance  Additional Factors: Set-up;Verbal cues;Increased time to complete;Head of bed raised;With handrails  Sit to Supine  Assistance Level: Minimal assistance  Skilled Clinical Factors: MinAx1 with HOB elevated, towards L with bed rail use  Transfers  Surface: Wheelchair;From bed;Bedside commode  Additional Factors: Set-up;Increased time to complete;Verbal cues;Hand placement cues  Device: Walker  Sit to Stand  Assistance Level: Moderate assistance;Maximum assistance;Requires x 2 assistance  Skilled Clinical Factors: x2 in // bars with mod-max A x2. Pt putting 25-50% of weight through LLE, all weight during second attempt (unable to clear wc). PT held LLE off ground and max A x2 to stand  Stand to Sit  Assistance Level: Moderate assistance;Requires x 2 assistance  Stand Pivot  Assistance Level: Moderate assistance;Maximum assistance;Requires x 2 assistance  Skilled Clinical Factors: EOB > wc   OT Exercises  Static Standing Balance Exercises: x2in // bars standing ~ 1-1:30 mins with min A x2 for standing balance and assist to maintain NWB in LLE. Pt placing ~50-75% of weight through LLE.     Assessment  Assessment  Assessment: Co-tx collaboration this date to safely meet goals and will have better occupational performance outcomes with in a co-treatment than 1:1 session. Pt tolerated therapy well, completing all fxl transfers with mod-max A x2. Pt with difficulty maintaining NWB in LLE, provided vb and tactile cues to keep LE off ground. Pt stood x2  in // bars with  training;Wheelchair mobility training;Equipment evaluation, education, & procurement;Co-Treatment    Goals  Short Term Goals  Time Frame for Short Term Goals: 10 days (7/17/24)  Short Term Goal 1: Pt will complete fxl/toilet transfer min A  Short Term Goal 2: Pt will complete LB dressing mod A  Short Term Goal 3: Pt will complete 1-2 grooming tasks in stance at sink with min A  Short Term Goal 4: Pt will complete TB bathing min A and AE  Long Term Goals  Time Frame for Long Term Goals : 20 days (7/27/24)  Long Term Goal 1: Pt will complete fxl/toilet transfer CGA  Long Term Goal 2: Pt will complete LB dressing SBA  Long Term Goal 3: Pt will complete 1-2 grooming tasks in stance at sink with CGA  Long Term Goal 4: Pt will complete TB bathing SBA and AE  Long Term Goal 5: Pt will complete simple IADL SPV                   Therapy Time   Individual Concurrent Group Co-treatment   Time In 1330     0900   Time Out 1400     1000   Minutes 30     60   Timed Code Treatment Minutes: 90 Minutes       Marcela Shook, OT

## 2024-07-11 NOTE — PROGRESS NOTES
Physical Therapy  Facility/Department: [facility]  Rehabilitation Physical Therapy Treatment Note    NAME: Sudha Lopez  : 1945 (78 y.o.)  MRN: [id number]  CODE STATUS: Full Code    Date of Service: 24       Restrictions:  Restrictions/Precautions: Fall Risk, Up as Tolerated, Weight Bearing  Lower Extremity Weight Bearing Restrictions  Right Lower Extremity Weight Bearing: Weight Bearing As Tolerated  Left Lower Extremity Weight Bearing: Non Weight Bearing  Required Braces or Orthoses  Left Lower Extremity Brace: Boot  LLE Brace Type: CAM boot  Position Activity Restriction  Other position/activity restrictions: HALLIE drain, ostomy, wound vac     SUBJECTIVE  Subjective  Subjective: Pt agreeable to PT session, supine in bed on arrival  Pain: denies pain at rest         OBJECTIVE  Cognition  Overall Cognitive Status: WFL  Orientation  Overall Orientation Status: Within Normal Limits  Orientation Level: Oriented X4    Functional Mobility  Roll Left  Assistance Level: Stand by assist  Skilled Clinical Factors: HOB flat, uses HR  Roll Right  Assistance Level: Stand by assist  Skilled Clinical Factors: HOB flat, uses HR  Supine to Sit  Assistance Level: Requires x 2 assistance;Moderate assistance  Skilled Clinical Factors: Mod A x2, assist with LE and trunk  Sit to Stand  Assistance Level: Moderate assistance;Maximum assistance;Requires x 2 assistance  Skilled Clinical Factors: Mod-Max A x2 from EOB and w/c up to RW and // bars  Stand to Sit  Assistance Level: Moderate assistance;Maximum assistance;Requires x 2 assistance  Skilled Clinical Factors: with RW, assist with eccentric control  Bed To/From Chair  Technique: Stand pivot  Assistance Level: Moderate assistance;Maximum assistance;Requires x 2 assistance  Skilled Clinical Factors: Mod-Max A x2 for SPT for x1 rep from EOB > w/c with RW. Mod-Max A x2 for SPT x2 rep from w/c to toilet (raised) with RW. Cues given throughout for sequencing, safety, and to

## 2024-07-11 NOTE — CARE COORDINATION
Weekly team care conference with interdisciplinary team on: 7/10/24    Chart reviewed for length of stay. IP day # 3  Unit: ARU   Diagnosis and current status as per MD progress: Debility   Consultants Following: Orthopedic Surgery,  Wound Care  Planned Discharge Date: 7/23/24  Durable medical equipment needed: Hospital bed, manual w/c, RW, TTB, drop arm BSC if going home.  Discharge Plan: 24 hour supervision or assist;Home with Home health     CM spoke to Victorina (daughter) via telephone with update team conference DCP including discharge date of 7/23/24, therapy recommendations of 24 hour supervision or assist;Home with Home health and DME needed if home is the discharge plan. Victorina has concerns with dc date and that the patient is not able to return home and does not have 24 hr support. CM discussed Care EveliaLisandro Prajapati stated she has been speaking to [employer]. CM acknowledged. CM explained that SNF would be the option d/t patient has skilled needs still including wound vac, IV ABX, ileostomy care as well as continued therapy needs.     Jaki Loco RN

## 2024-07-12 ENCOUNTER — APPOINTMENT (OUTPATIENT)
Dept: GENERAL RADIOLOGY | Age: 79
DRG: 948 | End: 2024-07-12
Attending: PHYSICAL MEDICINE & REHABILITATION
Payer: MEDICARE

## 2024-07-12 LAB
ANION GAP SERPL CALCULATED.3IONS-SCNC: 10 MMOL/L (ref 3–16)
BASOPHILS # BLD: 0.1 K/UL (ref 0–0.2)
BASOPHILS NFR BLD: 0.7 %
BUN SERPL-MCNC: 9 MG/DL (ref 7–20)
CALCIUM SERPL-MCNC: 9.2 MG/DL (ref 8.3–10.6)
CHLORIDE SERPL-SCNC: 101 MMOL/L (ref 99–110)
CO2 SERPL-SCNC: 25 MMOL/L (ref 21–32)
CREAT SERPL-MCNC: 0.7 MG/DL (ref 0.6–1.2)
DEPRECATED RDW RBC AUTO: 15.3 % (ref 12.4–15.4)
EOSINOPHIL # BLD: 0.3 K/UL (ref 0–0.6)
EOSINOPHIL NFR BLD: 2 %
GFR SERPLBLD CREATININE-BSD FMLA CKD-EPI: 88 ML/MIN/{1.73_M2}
GLUCOSE SERPL-MCNC: 99 MG/DL (ref 70–99)
HCT VFR BLD AUTO: 25.4 % (ref 36–48)
HGB BLD-MCNC: 8.5 G/DL (ref 12–16)
LYMPHOCYTES # BLD: 1.4 K/UL (ref 1–5.1)
LYMPHOCYTES NFR BLD: 9.4 %
MCH RBC QN AUTO: 29.9 PG (ref 26–34)
MCHC RBC AUTO-ENTMCNC: 33.5 G/DL (ref 31–36)
MCV RBC AUTO: 89.1 FL (ref 80–100)
MONOCYTES # BLD: 1.3 K/UL (ref 0–1.3)
MONOCYTES NFR BLD: 8.5 %
NEUTROPHILS # BLD: 12.1 K/UL (ref 1.7–7.7)
NEUTROPHILS NFR BLD: 79.4 %
PLATELET # BLD AUTO: 471 K/UL (ref 135–450)
PMV BLD AUTO: 6.2 FL (ref 5–10.5)
POTASSIUM SERPL-SCNC: 3.9 MMOL/L (ref 3.5–5.1)
RBC # BLD AUTO: 2.85 M/UL (ref 4–5.2)
SODIUM SERPL-SCNC: 136 MMOL/L (ref 136–145)
WBC # BLD AUTO: 15.3 K/UL (ref 4–11)

## 2024-07-12 PROCEDURE — 80048 BASIC METABOLIC PNL TOTAL CA: CPT

## 2024-07-12 PROCEDURE — 6370000000 HC RX 637 (ALT 250 FOR IP): Performed by: PHYSICAL MEDICINE & REHABILITATION

## 2024-07-12 PROCEDURE — 2580000003 HC RX 258: Performed by: PHARMACIST

## 2024-07-12 PROCEDURE — 36415 COLL VENOUS BLD VENIPUNCTURE: CPT

## 2024-07-12 PROCEDURE — 6360000002 HC RX W HCPCS: Performed by: PHARMACIST

## 2024-07-12 PROCEDURE — 73600 X-RAY EXAM OF ANKLE: CPT

## 2024-07-12 PROCEDURE — 2580000003 HC RX 258: Performed by: PHYSICAL MEDICINE & REHABILITATION

## 2024-07-12 PROCEDURE — 97535 SELF CARE MNGMENT TRAINING: CPT

## 2024-07-12 PROCEDURE — 97530 THERAPEUTIC ACTIVITIES: CPT

## 2024-07-12 PROCEDURE — 85025 COMPLETE CBC W/AUTO DIFF WBC: CPT

## 2024-07-12 PROCEDURE — 73610 X-RAY EXAM OF ANKLE: CPT

## 2024-07-12 PROCEDURE — 97605 NEG PRS WND THER DME<=50SQCM: CPT

## 2024-07-12 PROCEDURE — 1280000000 HC REHAB R&B

## 2024-07-12 PROCEDURE — 97110 THERAPEUTIC EXERCISES: CPT

## 2024-07-12 RX ADMIN — PANTOPRAZOLE SODIUM 40 MG: 40 TABLET, DELAYED RELEASE ORAL at 05:56

## 2024-07-12 RX ADMIN — SODIUM CHLORIDE, PRESERVATIVE FREE 10 ML: 5 INJECTION INTRAVENOUS at 18:17

## 2024-07-12 RX ADMIN — FLUOXETINE HYDROCHLORIDE 40 MG: 20 CAPSULE ORAL at 08:29

## 2024-07-12 RX ADMIN — OXYCODONE AND ACETAMINOPHEN 1 TABLET: 5; 325 TABLET ORAL at 22:09

## 2024-07-12 RX ADMIN — BISACODYL 5 MG: 5 TABLET, COATED ORAL at 08:29

## 2024-07-12 RX ADMIN — OXYCODONE AND ACETAMINOPHEN 1 TABLET: 5; 325 TABLET ORAL at 13:04

## 2024-07-12 RX ADMIN — COLLAGENASE SANTYL: 250 OINTMENT TOPICAL at 08:31

## 2024-07-12 RX ADMIN — LATANOPROST 1 DROP: 50 SOLUTION OPHTHALMIC at 20:33

## 2024-07-12 RX ADMIN — PIPERACILLIN AND TAZOBACTAM 3375 MG: 3; .375 INJECTION, POWDER, LYOPHILIZED, FOR SOLUTION INTRAVENOUS at 10:53

## 2024-07-12 RX ADMIN — ATORVASTATIN CALCIUM 10 MG: 10 TABLET, FILM COATED ORAL at 08:29

## 2024-07-12 RX ADMIN — PIPERACILLIN AND TAZOBACTAM 3375 MG: 3; .375 INJECTION, POWDER, LYOPHILIZED, FOR SOLUTION INTRAVENOUS at 02:34

## 2024-07-12 RX ADMIN — Medication 1 TABLET: at 08:29

## 2024-07-12 RX ADMIN — OXYCODONE AND ACETAMINOPHEN 1 TABLET: 5; 325 TABLET ORAL at 16:46

## 2024-07-12 RX ADMIN — APIXABAN 5 MG: 5 TABLET, FILM COATED ORAL at 08:29

## 2024-07-12 RX ADMIN — PIPERACILLIN AND TAZOBACTAM 3375 MG: 3; .375 INJECTION, POWDER, LYOPHILIZED, FOR SOLUTION INTRAVENOUS at 18:20

## 2024-07-12 RX ADMIN — APIXABAN 5 MG: 5 TABLET, FILM COATED ORAL at 20:32

## 2024-07-12 RX ADMIN — BUPROPION HYDROCHLORIDE 300 MG: 150 TABLET, EXTENDED RELEASE ORAL at 08:29

## 2024-07-12 RX ADMIN — TRAZODONE HYDROCHLORIDE 75 MG: 50 TABLET ORAL at 20:32

## 2024-07-12 RX ADMIN — DICLOFENAC SODIUM 4 G: 10 GEL TOPICAL at 08:31

## 2024-07-12 RX ADMIN — OXYCODONE AND ACETAMINOPHEN 1 TABLET: 5; 325 TABLET ORAL at 05:09

## 2024-07-12 RX ADMIN — MICONAZOLE NITRATE: 20 CREAM TOPICAL at 08:30

## 2024-07-12 RX ADMIN — DICLOFENAC SODIUM 4 G: 10 GEL TOPICAL at 20:33

## 2024-07-12 ASSESSMENT — PAIN DESCRIPTION - DESCRIPTORS
DESCRIPTORS: ACHING

## 2024-07-12 ASSESSMENT — PAIN SCALES - GENERAL
PAINLEVEL_OUTOF10: 7
PAINLEVEL_OUTOF10: 6
PAINLEVEL_OUTOF10: 7
PAINLEVEL_OUTOF10: 5

## 2024-07-12 ASSESSMENT — PAIN DESCRIPTION - ORIENTATION
ORIENTATION: LEFT;LOWER
ORIENTATION: LOWER
ORIENTATION: RIGHT

## 2024-07-12 ASSESSMENT — PAIN DESCRIPTION - LOCATION
LOCATION: KNEE
LOCATION: BACK;ABDOMEN
LOCATION: BACK
LOCATION: BACK
LOCATION: BACK;LEG

## 2024-07-12 NOTE — PROGRESS NOTES
[facility] Ostomy Contin[facility] Nurse  Follow-up Progress Note       NAME:  Sudha Lopez  MEDICAL RECORD NUMBER:  [id number]  AGE:  78 y.o.   GENDER:  female  :  1945  TODAY'S DATE:  2024    Subjective: I am leaking again.  Therapy here and placed therapy on hold to change ostomy   Wound Identification:  Wound Type: Surgical mid line abd incision line now connected to distal incision with a tunnel @8.5 cm from 600 pm pointing toward 12 am. Mid area (naval) open again with slough removed on wound bed from the santyl that was used. Negative pressure wound therapy dressing changed today to control drainage from incision line with undermining.     Existing ileostomy: Subtotal colectomy with ileostomy on 24 for bowel obstruction by Dr Dung Jackson. Status post CT guided left abdominal drain placement by IR on 7/3/2024 for abscess.      Stoma size:  25 = 1 inch mm x 30 mm 1 3/16 inch. PLEASE DO NOT THROW AWAY PATTERN.  Appliance size: Coloplast 2 pience RED convex flange # [id number] with drainable bag # [id number]. paste ring # [id number] placed around stoma. Crust with light dusting of powder, rub in, seal with dabbing barrier wipe over powder. mid incision barrier paste strip to cover naval to make flat pouching surface.  Barrier extenders around stoma.  Belt added to help secure pouch and prevent leaking. DO NOT THROW AWAY BELT.  If soiled it can be washed.   If no convex ostomy barriers or paste rings/strips in room, call clinical to obtain from wound/ostomy closet on unit B3.                           Patient Goal of Care:  [x] Wound Healing  [] Odor Control   [] Palliative Care  [] Pain Control   [x] Other:     Objective: Lying in bed.    /72   Pulse 82   Temp 98.1 °F (36.7 °C) (Oral)   Resp 18   Ht 1.6 m (5' 3\")   SpO2 97%   BMI 31.95 kg/m²   Kirill Risk Score: Kirill Scale Score: 17  Assessment: 2 open areas mid and distal incision now meet.  Stoma intact.  Darlyn  stomal skin healed again and pink.    Measurements:  Negative Pressure Wound Therapy Abdomen Medial;Upper (Active)   $ Standard NPWT <=50 sq cm PER TX $ Yes 07/12/24 0949   $ Standard NPWT >50 sq cm PER TX $ Yes 07/03/24 1554   Wound Type Surgical 07/10/24 0943   Unit Type Rental VAC # [id number] 07/12/24 0949   Dressing Type White Foam;Black Foam 07/12/24 0949   Number of pieces used 4 07/12/24 0949   Number of pieces removed 3 07/12/24 0949   Cycle Continuous 07/12/24 0949   Target Pressure (mmHg) 125 07/12/24 0949   Intensity 1 07/12/24 0949   Canister changed? No 07/12/24 0949   Dressing Status New dressing applied 07/12/24 0949   Dressing Changed Changed/New 07/12/24 0949   Drainage Amount Small 07/12/24 0949   Drainage Description Purulent 07/12/24 0949   Dressing Change Due 07/15/24 07/12/24 0949   Output (ml) 50 ml 07/11/24 1334   Wound Assessment Granulation tissue 07/12/24 0949   Darlyn-wound Assessment Dry/flaky;Intact 07/12/24 0949   Shape linear, 2 openings 07/12/24 0949   Odor None 07/12/24 0949   Number of days: 10       Wound Heel Left;Posterior (Active)   Wound Image   07/12/24 0949   Wound Etiology Pressure Unstageable 07/12/24 0949   Dressing Status New dressing applied 07/12/24 0949   Wound Cleansed Cleansed with saline 07/12/24 0949   Dressing/Treatment Betadine swabs/povidone iodine 07/12/24 0949   Offloading for Diabetic Foot Ulcers Offloading ordered 07/12/24 0949   Dressing Change Due 07/13/24 07/12/24 0949   Wound Length (cm) 5.5 cm 07/12/24 0949   Wound Width (cm) 3.5 cm 07/12/24 0949   Wound Depth (cm) 0 cm 07/12/24 0949   Wound Surface Area (cm^2) 19.25 cm^2 07/12/24 0949   Change in Wound Size % (l*w) -113.89 07/12/24 0949   Wound Volume (cm^3) 0 cm^3 07/12/24 0949   Distance Tunneling (cm) 0 cm 07/12/24 0949   Tunneling Position ___ O'Clock 0 07/12/24 0949   Undermining Starts ___ O'Clock 0 07/12/24 0949   Undermining Ends___ O'Clock 0 07/12/24 0949   Undermining Maxium Distance (cm) 0

## 2024-07-12 NOTE — PROGRESS NOTES
Occupational Therapy  Facility/Department: [facility]  Rehabilitation Occupational Therapy Daily Treatment Note    Date: 24  Patient Name: Sudha Lopez       Room: 0164/0164-01  MRN: [id number]  Account: [id number]   : 1945  (78 y.o.) Gender: female                    Past Medical History:  has a past medical history of Anxiety and Hypertension.  Past Surgical History:   has a past surgical history that includes Hysterectomy; Breast enhancement surgery; rhinoplasty; Rotator cuff repair (Bilateral); Hand surgery (Right); Total hip arthroplasty (Bilateral); Intracapsular cataract extraction (Left, 2020); Colonoscopy; Intracapsular cataract extraction (Right, 2020); Colonoscopy (N/A, 2024); laparotomy (N/A, 2024); and CT PERITONEAL/RETROPERITONEAL PERC DRAIN (7/3/2024).    Restrictions  Restrictions/Precautions: Fall Risk, Up as Tolerated, Weight Bearing  Other position/activity restrictions: HALLIE drain, ostomy, wound vac  Right Lower Extremity Weight Bearing: Weight Bearing As Tolerated  Left Lower Extremity Weight Bearing: Non Weight Bearing  Required Braces or Orthoses  Left Lower Extremity Brace: Boot  LLE Brace Type: CAM boot  Required Braces or Orthoses?: Yes    Subjective  Subjective: Pt semi-supine in bed, agreeable to OT tx. /72, HR 82, SPO2 97%  Restrictions/Precautions: Fall Risk;Up as Tolerated;Weight Bearing             Objective     Cognition  Overall Cognitive Status: Exceptions  Arousal/Alertness: Appears intact  Following Commands: Follows one step commands consistently  Attention Span: Appears intact  Memory: Appears intact  Safety Judgement: Decreased awareness of need for safety  Problem Solving: Decreased awareness of errors;Assistance required to correct errors made;Assistance required to implement solutions  Insights: Decreased awareness of deficits  Initiation: Does not require cues  Sequencing: Does not require cues  Orientation  Overall Orientation Status:  Heavy Duty Drop Arm Commode;Grab Bars - toilet  Safety Devices  Safety Devices in place: Yes  Type of devices: All fall risk precautions in place;Gait belt;Nurse notified;Left in bed;Call light within reach  Restraints  Initially in place: No    Second Session:  Pt semi-supine in bed, agreeable to OT tx. Vitals WFL, denies pain at rest. Reports fatigue.  Incont bladder, DEP for hygiene and brief exchange while supine in bed.  SBA for bed mobility supine <> sit  Pt able to thread BLE into pants with reacher and inc time. Log roll in bed to pull over hips with assist for thoroughness.   Setup A to wash hair while supine in bed.     Patient Education  Education  Education Given To: Patient  Education Provided: Role of Therapy;ADL Function;Plan of Care;Transfer Training;Precautions;Mobility Training;Safety  Education Provided Comments: OT vs PT, bed mobility, BUE ex  Education Method: Verbal;Demonstration  Barriers to Learning: None  Education Outcome: Verbalized understanding;Continued education needed    Plan  Occupational Therapy Plan  Times Per Week: 5-7x/week  Current Treatment Recommendations: Strengthening;Balance training;Functional mobility training;Endurance training;Safety education & training;Self-Care / ADL;Home management training;Wheelchair mobility training;Equipment evaluation, education, & procurement;Co-Treatment;Patient/Caregiver education & training    Goals  Patient Goals   Patient goals : \"build my strength, be more mobile, feel safe doing things IND\"  Short Term Goals  Time Frame for Short Term Goals: 10 days (7/17/24)  Short Term Goal 1: Pt will complete fxl/toilet transfer min A  Short Term Goal 2: Pt will complete LB dressing mod A  Short Term Goal 3: Pt will complete 1-2 grooming tasks in stance at sink with min A  Short Term Goal 4: Pt will complete TB bathing min A and AE  Long Term Goals  Time Frame for Long Term Goals : 20 days (7/27/24)  Long Term Goal 1: Pt will complete fxl/toilet

## 2024-07-12 NOTE — PROGRESS NOTES
Physical Therapy  Facility/Department: [facility]  Rehabilitation Physical Therapy Treatment Note    NAME: Sudha Lopez  : 1945 (78 y.o.)  MRN: [id number]  CODE STATUS: Full Code    Date of Service: 24       Restrictions:  Restrictions/Precautions: Fall Risk, Up as Tolerated, Weight Bearing  Lower Extremity Weight Bearing Restrictions  Right Lower Extremity Weight Bearing: Weight Bearing As Tolerated  Left Lower Extremity Weight Bearing: Non Weight Bearing  Required Braces or Orthoses  Left Lower Extremity Brace: Boot  LLE Brace Type: CAM boot  Position Activity Restriction  Other position/activity restrictions: HALLIE drain, ostomy, wound vac     SUBJECTIVE  Subjective  Subjective: Pt resting bed upon arrival and agreeable to PT/OT cotx session  Pain: Pt reports 4/10 pain in back        OBJECTIVE  Cognition  Overall Cognitive Status: Exceptions  Arousal/Alertness: Appears intact  Following Commands: Follows one step commands consistently  Attention Span: Appears intact  Memory: Appears intact  Safety Judgement: Decreased awareness of need for safety  Problem Solving: Decreased awareness of errors;Assistance required to correct errors made;Assistance required to implement solutions  Insights: Decreased awareness of deficits  Initiation: Does not require cues  Sequencing: Requires cues for some  Orientation  Overall Orientation Status: Within Normal Limits  Orientation Level: Oriented X4    Functional Mobility  Supine to Sit  Assistance Level: Contact guard assist  Skilled Clinical Factors: HOBflas with us of L bed rail, increased time  Scooting  Assistance Level: Stand by assist  Skilled Clinical Factors: To scoot hips towards EOB and laterally  Balance  Sitting Balance: Supervision  Sit to Stand  Assistance Level: Maximum assistance;Requires x 2 assistance  Skilled Clinical Factors: 3 stands from elevated bed with max Ax2, 1 stand from standard height bed with mod Ax2 with increased cues for weight shift  pivot transfer bed <> chair with Mod A x1  Short Term Goal 4: Patient will ambulate 5ft in parallel bars while maintaining LLE NWB, with 2-person assist  Short Term Goal 5: Patient will self propel wheelchair 150ft with supervision  Additional Goals?: No  Long Term Goals  Time Frame for Long Term Goals : 20 days (7/27)  Long Term Goal 1: Patient will be Mod I for bed mobility  Long Term Goal 2: Patient will be Mod I for transfers  Long Term Goal 3: Patient will be independent with wheelchair mobility 150ft  Long Term Goal 4: Continue to assess for ambulation LTG    PLAN OF CARE/SAFETY  Physical Therapy Plan  General Plan: 5-7 times per week  Current Treatment Recommendations: ROM;Balance training;Functional mobility training;Home exercise program;Therapeutic activities;Co-Treatment;Safety education & training;Patient/Caregiver education & training;Transfer training;Endurance training;Pain management;Strengthening;Wheelchair mobility training;Gait training;Stair training;ADL/Self-care training;IADL training;Neuromuscular re-education  Safety Devices  Type of Devices: All ulises prominences offloaded;All fall risk precautions in place;Patient at risk for falls;Nurse notified;Left in chair;Call light within reach;Chair alarm in place;Gait belt  Restraints  Restraints Initially in Place: No    EDUCATION  Education  Education Given To: Patient  Education Provided: Safety;Role of Therapy;Transfer Training;Mobility Training  Education Provided Comments: safety with transfers, safety with equipment, importance of sequencing and hand placement with transfers, purpose of maintaining LLE NWB for optimal healing  Education Method: Verbal  Barriers to Learning: None  Education Outcome: Verbalized understanding;Continued education needed      Therapy Time   Individual Concurrent Group Co-treatment   Time In      1500   Time Out      1538   Minutes      38     Timed Code Treatment Minutes: 38 Minutes       Elma Doe, PT,  07/12/24 at 4:06 PM

## 2024-07-12 NOTE — PLAN OF CARE
Problem: Safety - Adult  Goal: Free from fall injury  7/12/2024 0855 by James Hudson RN  Outcome: Progressing  7/12/2024 0148 by Sandra Sheriff RN  Outcome: Progressing     Problem: Pain  Goal: Verbalizes/displays adequate comfort level or baseline comfort level  7/12/2024 0855 by James Hudson, RN  Outcome: Progressing  7/12/2024 0148 by Sandra Sheriff RN  Outcome: Progressing

## 2024-07-12 NOTE — PROGRESS NOTES
Occupational Therapy  Facility/Department: [facility]  Rehabilitation Occupational Therapy Daily Treatment Note    Date: 24  Patient Name: Sudha Lopez       Room: 0164/0164-01  MRN: [id number]  Account: [id number]   : 1945  (78 y.o.) Gender: female                    Past Medical History:  has a past medical history of Anxiety and Hypertension.  Past Surgical History:   has a past surgical history that includes Hysterectomy; Breast enhancement surgery; rhinoplasty; Rotator cuff repair (Bilateral); Hand surgery (Right); Total hip arthroplasty (Bilateral); Intracapsular cataract extraction (Left, 2020); Colonoscopy; Intracapsular cataract extraction (Right, 2020); Colonoscopy (N/A, 2024); laparotomy (N/A, 2024); and CT PERITONEAL/RETROPERITONEAL PERC DRAIN (7/3/2024).    Restrictions  Restrictions/Precautions: Fall Risk, Up as Tolerated, Weight Bearing  Other position/activity restrictions: HALLIE drain, ostomy, wound vac  Right Lower Extremity Weight Bearing: Weight Bearing As Tolerated  Left Lower Extremity Weight Bearing: Non Weight Bearing  Required Braces or Orthoses  Left Lower Extremity Brace: Boot  LLE Brace Type: CAM boot  Required Braces or Orthoses?: Yes    Subjective  Subjective: Pt supine in bed at start of session. Agreeable to OT/PT cotx. Reports pain in back but does not rate numerically.  Restrictions/Precautions: Fall Risk;Up as Tolerated;Weight Bearing             Objective     Cognition  Overall Cognitive Status: Exceptions  Arousal/Alertness: Appears intact  Following Commands: Follows one step commands consistently  Attention Span: Appears intact  Memory: Appears intact  Safety Judgement: Decreased awareness of need for safety  Problem Solving: Decreased awareness of errors;Assistance required to correct errors made;Assistance required to implement solutions  Insights: Decreased awareness of deficits  Initiation: Does not require cues  Sequencing: Requires cues for  1: Pt will complete fxl/toilet transfer min A  Short Term Goal 2: Pt will complete LB dressing mod A  Short Term Goal 3: Pt will complete 1-2 grooming tasks in stance at sink with min A  Short Term Goal 4: Pt will complete TB bathing min A and AE  Long Term Goals  Time Frame for Long Term Goals : 20 days (7/27/24)  Long Term Goal 1: Pt will complete fxl/toilet transfer CGA  Long Term Goal 2: Pt will complete LB dressing SBA  Long Term Goal 3: Pt will complete 1-2 grooming tasks in stance at sink with CGA  Long Term Goal 4: Pt will complete TB bathing SBA and AE  Long Term Goal 5: Pt will complete simple IADL SPV    Therapy Time   Individual Concurrent Group Co-treatment   Time In 1330     1500   Time Out 1400     1538   Minutes 30     38   Timed Code Treatment Minutes: 38 Minutes       Johnna Weinstein, OT

## 2024-07-12 NOTE — PROGRESS NOTES
Pt awake resting in bed. Is alert and oriented. [place] on. HALLIE drain on left side intact with scant output. Ostomy intact with soft brown stool. Wound vac in place with no leaks. Tolerates a regular diet. Pt remains on RA. VSS. Left hand piv intact with intermittent antibiotics. Gets up with x 2 with walker and gait belt. Calls out appropriately. Resting calm and comfortably. Will continue to monitor.

## 2024-07-12 NOTE — PROGRESS NOTES
Department of Physical Medicine & Rehabilitation  Progress Note    Patient Identification:  Sudha Lopez  [id number]  : 1945  Admit date: 2024    Chief Complaint: Debility    Subjective:   Seen in her room this morning.  No new complaints overnight.  She is working hard in therapy and feels like she is making some progress.  She slept well last night.  She did not have any nursing problems overnight.  Continues to be very motivated.    ROS: No f/c, n/v, cp     Objective:  Patient Vitals for the past 24 hrs:   BP Temp Temp src Pulse Resp SpO2   24 1334 -- -- -- -- 18 --   24 1143 116/72 -- -- 82 -- 97 %   24 0815 116/72 98.1 °F (36.7 °C) Oral 82 18 97 %   24 0509 -- -- -- -- 16 --   24 2222 -- -- -- -- 16 --   24 2152 -- -- -- -- 16 --   24 2115 128/72 98 °F (36.7 °C) Oral 82 16 97 %       Const: Alert. No distress, pleasant.   HEENT: Normocephalic, atraumatic. Normal sclera/conjunctiva. MMM.   CV: Regular rate and rhythm.   Resp: No respiratory distress. Lungs CTAB.   Abd: post procedure distended   Ext: + edema.   Neuro: Alert, oriented, appropriately interactive.   Psych: Cooperative, appropriate mood and affect    Laboratory data: Available via EMR.   Last 24 hour lab  Recent Results (from the past 24 hour(s))   CBC auto differential    Collection Time: 24  6:53 AM   Result Value Ref Range    WBC 15.3 (H) 4.0 - 11.0 K/uL    RBC 2.85 (L) 4.00 - 5.20 M/uL    Hemoglobin 8.5 (L) 12.0 - 16.0 g/dL    Hematocrit 25.4 (L) 36.0 - 48.0 %    MCV 89.1 80.0 - 100.0 fL    MCH 29.9 26.0 - 34.0 pg    MCHC 33.5 31.0 - 36.0 g/dL    RDW 15.3 12.4 - 15.4 %    Platelets 471 (H) 135 - 450 K/uL    MPV 6.2 5.0 - 10.5 fL    Neutrophils % 79.4 %    Lymphocytes % 9.4 %    Monocytes % 8.5 %    Eosinophils % 2.0 %    Basophils % 0.7 %    Neutrophils Absolute 12.1 (H) 1.7 - 7.7 K/uL    Lymphocytes Absolute 1.4 1.0 - 5.1 K/uL    Monocytes Absolute 1.3 0.0 - 1.3 K/uL    Eosinophils  Absolute 0.3 0.0 - 0.6 K/uL    Basophils Absolute 0.1 0.0 - 0.2 K/uL   Basic Metabolic Panel w/ Reflex to MG    Collection Time: 07/12/24  6:54 AM   Result Value Ref Range    Sodium 136 136 - 145 mmol/L    Potassium reflex Magnesium 3.9 3.5 - 5.1 mmol/L    Chloride 101 99 - 110 mmol/L    CO2 25 21 - 32 mmol/L    Anion Gap 10 3 - 16    Glucose 99 70 - 99 mg/dL    BUN 9 7 - 20 mg/dL    Creatinine 0.7 0.6 - 1.2 mg/dL    Est, Glom Filt Rate 88 >60    Calcium 9.2 8.3 - 10.6 mg/dL         Therapy progress:  PT  Required Braces or Orthoses  Left Lower Extremity Brace: Boot  LLE Brace Type: CAM boot  Position Activity Restriction  Other position/activity restrictions: HALLIE drain, ostomy, wound vac  Objective     Sit to Stand: 2 Person Assistance, Maximum Assistance  Stand to Sit: 2 Person Assistance, Maximum Assistance  Bed to Chair: 2 Person Assistance, Maximum assistance  Assistance: Unable to assess  OT  PT Equipment Recommendations  Equipment Needed: No  Other: Defer     Assessment        SLP          Body mass index is 31.95 kg/m².    Assessment and Plan:  Debility  -Decreased functional mobility and ADLs  -Increased sedentary hospital stay  -PT/OT     Intra-abdominal abscess  -Status post subtotal colectomy with ileostomy status post CT-guided left abdominal drain placement by IR on 7/3/2024 and antibiotic treatment with IV Zosyn.   -Monitor abdominal pain      Anemia  -Monitor hemoglobin     PAF on Eliquis    Rt total knee replacement  Continue Rt TKR protocol with ROM excercises, WBAT  Lt ankle fracture dislocation: boot   NWB    Ortho consult placed awaiting recs.      Rehab Progress: Improving  Anticipated Dispo: home  Services/DME: TBD  ELOS: 7/23/2024          Beck Graves D.O. M.P.H  PM&R  7/12/2024  2:29 PM

## 2024-07-12 NOTE — PROGRESS NOTES
Physical Therapy  Facility/Department: [facility]  Rehabilitation Physical Therapy Treatment Note    NAME: Sudha Lopez  : 1945 (78 y.o.)  MRN: [id number]  CODE STATUS: Full Code    Date of Service: 24       Restrictions:  Restrictions/Precautions: Fall Risk, Up as Tolerated, Weight Bearing  Lower Extremity Weight Bearing Restrictions  Right Lower Extremity Weight Bearing: Weight Bearing As Tolerated  Left Lower Extremity Weight Bearing: Non Weight Bearing  Required Braces or Orthoses  Left Lower Extremity Brace: Boot  LLE Brace Type: CAM boot  Position Activity Restriction  Other position/activity restrictions: HALLIE drain, ostomy, wound vac     SUBJECTIVE  Subjective  Subjective: Pt resting bed upon arrival and agreeable to PT/OT cotx session  Pain: denies pain at rest          OBJECTIVE  Cognition  Overall Cognitive Status: Exceptions  Arousal/Alertness: Appears intact  Following Commands: Follows one step commands consistently  Attention Span: Appears intact  Memory: Appears intact  Safety Judgement: Decreased awareness of need for safety  Problem Solving: Decreased awareness of errors;Assistance required to correct errors made;Assistance required to implement solutions  Insights: Decreased awareness of deficits  Initiation: Does not require cues  Sequencing: Does not require cues  Orientation  Overall Orientation Status: Within Normal Limits  Orientation Level: Oriented X4    Functional Mobility  Sit to Supine  Assistance Level: Stand by assist  Skilled Clinical Factors: increased time, HOB flat  Supine to Sit  Assistance Level: Stand by assist  Skilled Clinical Factors: HOB elevated, use of rail, increased time  Scooting  Assistance Level: Stand by assist  Skilled Clinical Factors: to scoot hips to EOB  Balance  Sitting Balance: Supervision        PT Exercises  Exercise Treatment: Supine BLE exercises: ankle pumps x 15, quad sets x 15, glute sets x 15, heel slides x 10      Addendum: 2nd Session  Pt  strength and mobility, and to be independent for home\"  Short Term Goals  Time Frame for Short Term Goals: 10 days (7/17)  Short Term Goal 1: Patient will perform bed mobility with CGA  Short Term Goal 2: Patient will perform sit to stand with Mod A  Short Term Goal 3: Patient will perform pivot transfer bed <> chair with Mod A x1  Short Term Goal 4: Patient will ambulate 5ft in parallel bars while maintaining LLE NWB, with 2-person assist  Short Term Goal 5: Patient will self propel wheelchair 150ft with supervision  Long Term Goals  Time Frame for Long Term Goals : 20 days (7/27)  Long Term Goal 1: Patient will be Mod I for bed mobility  Long Term Goal 2: Patient will be Mod I for transfers  Long Term Goal 3: Patient will be independent with wheelchair mobility 150ft  Long Term Goal 4: Continue to assess for ambulation LTG    PLAN OF CARE/SAFETY  Physical Therapy Plan  General Plan: 5-7 times per week  Current Treatment Recommendations: ROM;Balance training;Functional mobility training;Home exercise program;Therapeutic activities;Co-Treatment;Safety education & training;Patient/Caregiver education & training;Transfer training;Endurance training;Pain management;Strengthening;Wheelchair mobility training;Gait training;Stair training;ADL/Self-care training;IADL training;Neuromuscular re-education  Safety Devices  Type of Devices: All ulises prominences offloaded;All fall risk precautions in place;Bed alarm in place;Patient at risk for falls;Left in bed;Nurse notified    EDUCATION  Education  Education Given To: Patient  Education Provided: Safety;Role of Therapy;Transfer Training;Mobility Training  Education Method: Verbal  Barriers to Learning: None  Education Outcome: Verbalized understanding;Continued education needed        Therapy Time   Individual Concurrent Group Co-treatment   Time In       0900   Time Out       0930   Minutes       30     Timed Code Treatment Minutes: 30 Minutes       Second Session Therapy

## 2024-07-12 NOTE — PLAN OF CARE
Problem: Discharge Planning  Goal: Discharge to home or other facility with appropriate resources  Outcome: Progressing     Problem: Safety - Adult  Goal: Free from fall injury  Outcome: Progressing     Problem: Pain  Goal: Verbalizes/displays adequate comfort level or baseline comfort level  Outcome: Progressing     Problem: Skin/Tissue Integrity  Goal: Absence of new skin breakdown  Description: 1.  Monitor for areas of redness and/or skin breakdown  2.  Assess vascular access sites hourly  3.  Every 4-6 hours minimum:  Change oxygen saturation probe site  4.  Every 4-6 hours:  If on nasal continuous positive airway pressure, respiratory therapy assess nares and determine need for appliance change or resting period.  Outcome: Progressing     Problem: Nutrition Deficit:  Goal: Optimize nutritional status  Outcome: Progressing      23.6

## 2024-07-13 PROCEDURE — 97530 THERAPEUTIC ACTIVITIES: CPT

## 2024-07-13 PROCEDURE — 97110 THERAPEUTIC EXERCISES: CPT

## 2024-07-13 PROCEDURE — 2580000003 HC RX 258: Performed by: PHARMACIST

## 2024-07-13 PROCEDURE — 6360000002 HC RX W HCPCS: Performed by: PHARMACIST

## 2024-07-13 PROCEDURE — 97535 SELF CARE MNGMENT TRAINING: CPT

## 2024-07-13 PROCEDURE — 97112 NEUROMUSCULAR REEDUCATION: CPT

## 2024-07-13 PROCEDURE — 6370000000 HC RX 637 (ALT 250 FOR IP): Performed by: PHYSICAL MEDICINE & REHABILITATION

## 2024-07-13 PROCEDURE — 1280000000 HC REHAB R&B

## 2024-07-13 PROCEDURE — 97542 WHEELCHAIR MNGMENT TRAINING: CPT

## 2024-07-13 RX ADMIN — DICLOFENAC SODIUM 4 G: 10 GEL TOPICAL at 21:17

## 2024-07-13 RX ADMIN — PIPERACILLIN AND TAZOBACTAM 3375 MG: 3; .375 INJECTION, POWDER, LYOPHILIZED, FOR SOLUTION INTRAVENOUS at 09:47

## 2024-07-13 RX ADMIN — PANTOPRAZOLE SODIUM 40 MG: 40 TABLET, DELAYED RELEASE ORAL at 06:42

## 2024-07-13 RX ADMIN — BISACODYL 5 MG: 5 TABLET, COATED ORAL at 09:04

## 2024-07-13 RX ADMIN — ATORVASTATIN CALCIUM 10 MG: 10 TABLET, FILM COATED ORAL at 09:04

## 2024-07-13 RX ADMIN — OXYCODONE AND ACETAMINOPHEN 1 TABLET: 5; 325 TABLET ORAL at 22:10

## 2024-07-13 RX ADMIN — PIPERACILLIN AND TAZOBACTAM 3375 MG: 3; .375 INJECTION, POWDER, LYOPHILIZED, FOR SOLUTION INTRAVENOUS at 17:34

## 2024-07-13 RX ADMIN — Medication 1 TABLET: at 09:03

## 2024-07-13 RX ADMIN — TRAZODONE HYDROCHLORIDE 75 MG: 50 TABLET ORAL at 21:15

## 2024-07-13 RX ADMIN — FLUOXETINE HYDROCHLORIDE 40 MG: 20 CAPSULE ORAL at 09:04

## 2024-07-13 RX ADMIN — APIXABAN 5 MG: 5 TABLET, FILM COATED ORAL at 21:15

## 2024-07-13 RX ADMIN — BUPROPION HYDROCHLORIDE 300 MG: 150 TABLET, EXTENDED RELEASE ORAL at 09:04

## 2024-07-13 RX ADMIN — OXYCODONE AND ACETAMINOPHEN 1 TABLET: 5; 325 TABLET ORAL at 17:59

## 2024-07-13 RX ADMIN — APIXABAN 5 MG: 5 TABLET, FILM COATED ORAL at 09:42

## 2024-07-13 RX ADMIN — LATANOPROST 1 DROP: 50 SOLUTION OPHTHALMIC at 21:25

## 2024-07-13 RX ADMIN — COLLAGENASE SANTYL: 250 OINTMENT TOPICAL at 09:07

## 2024-07-13 RX ADMIN — OXYCODONE AND ACETAMINOPHEN 1 TABLET: 5; 325 TABLET ORAL at 06:56

## 2024-07-13 RX ADMIN — PIPERACILLIN AND TAZOBACTAM 3375 MG: 3; .375 INJECTION, POWDER, LYOPHILIZED, FOR SOLUTION INTRAVENOUS at 02:03

## 2024-07-13 ASSESSMENT — PAIN - FUNCTIONAL ASSESSMENT
PAIN_FUNCTIONAL_ASSESSMENT: INTOLERABLE, UNABLE TO DO ANY ACTIVE OR PASSIVE ACTIVITIES
PAIN_FUNCTIONAL_ASSESSMENT: INTOLERABLE, UNABLE TO DO ANY ACTIVE OR PASSIVE ACTIVITIES

## 2024-07-13 ASSESSMENT — PAIN DESCRIPTION - ONSET: ONSET: ON-GOING

## 2024-07-13 ASSESSMENT — PAIN SCALES - WONG BAKER: WONGBAKER_NUMERICALRESPONSE: NO HURT

## 2024-07-13 ASSESSMENT — PAIN DESCRIPTION - LOCATION
LOCATION: BACK;ABDOMEN
LOCATION: ABDOMEN
LOCATION: ABDOMEN;BACK

## 2024-07-13 ASSESSMENT — PAIN SCALES - GENERAL
PAINLEVEL_OUTOF10: 7
PAINLEVEL_OUTOF10: 7
PAINLEVEL_OUTOF10: 5
PAINLEVEL_OUTOF10: 0
PAINLEVEL_OUTOF10: 0
PAINLEVEL_OUTOF10: 5

## 2024-07-13 ASSESSMENT — PAIN DESCRIPTION - ORIENTATION
ORIENTATION: MID
ORIENTATION: LOWER

## 2024-07-13 ASSESSMENT — PAIN DESCRIPTION - FREQUENCY: FREQUENCY: CONTINUOUS

## 2024-07-13 ASSESSMENT — PAIN DESCRIPTION - DESCRIPTORS
DESCRIPTORS: ACHING
DESCRIPTORS: ACHING

## 2024-07-13 ASSESSMENT — PAIN DESCRIPTION - PAIN TYPE: TYPE: ACUTE PAIN;CHRONIC PAIN

## 2024-07-13 NOTE — PROGRESS NOTES
deficits  Initiation: Does not require cues  Sequencing: Requires cues for some        Objective    Vitals   Bp 117/56, HR 70, O2 96% on RA    Bed Mobility Training  Bed Mobility Training: Yes  Transfer Training  Transfer Training: Yes  Sit to Stand: Moderate assistance;Assist X2  Stand to Sit: Moderate assistance;Assist X2    ADL  Feeding: Setup  Grooming: Setup (oral care)  UE Dressing: Stand by assistance  LE Dressing: Dependent/Total  LE Dressing Skilled Clinical Factors: don L camo boot + R sock and briefs  Toileting: Dependent/Total  Toileting Skilled Clinical Factors:  DEP to change brief during a.m session.        Safety Devices  Type of Devices: All fall risk precautions in place;Patient at risk for falls;Nurse notified;Call light within reach;Gait belt;Left in bed;Bed alarm in place;All ulises prominences offloaded  Restraints  Restraints Initially in Place: No     Patient Education  Education Given To: Patient  Education Provided: Role of Therapy;Transfer Training;Equipment;Plan of Care;Home Exercise Program;IADL Safety;Fall Prevention Strategies;Precautions;Mobility Training;ADL Adaptive Strategies  Education Provided Comments: importance of OOB activities, Role of OT, IP rehab expectations  Education Method: Demonstration;Verbal  Barriers to Learning: None  Education Outcome: Continued education needed    Addendum: Second Session  Assessment: pt seen as cotx with OT due to medical complexity and need for x2 person assist for mobility progressions.    Dynamic balance activity using RUE to through axes x 6 trials while maintaining TTWB to LLE (per patient was given verbal orders for wb'ing progressing (order not noted in chart). CGA for balance.     Transfer training:               - stand pivot transfer from WC to toilet using grab bars  (drop arm BSC on top of toilet for height and transfers)with mod Ax1 (+CGA for safety) towards L side               - stand pivot from toilet back to WC towards R with  grab bar and WC armrest with mod Ax1               - squat pivot from WC to bed using bed rail towards R   ADLs   - Seated to wash and style hair with foam cleanser vai set-up.  - Set-up and increased time for toileting hygiene while seated on toilet.  Bed mobility   - sit to supine with SBA once CAM boot was removed, scoots up in bed in supine with SBA using BUE's and RLE         Vitals assessed in sitting (pt reporting dizziness)              89 bpm, 96% on RA, 118/64  Pt with all needs within reach including lunch.   Goals  Short Term Goals  Time Frame for Short Term Goals: 10 days (7/17/24)  Short Term Goal 1: Pt will complete fxl/toilet transfer min A  Short Term Goal 2: Pt will complete LB dressing mod A  Short Term Goal 3: Pt will complete 1-2 grooming tasks in stance at sink with min A  Short Term Goal 4: Pt will complete TB bathing min A and AE  Long Term Goals  Time Frame for Long Term Goals : 20 days (7/27/24)  Long Term Goal 1: Pt will complete fxl/toilet transfer CGA  Long Term Goal 2: Pt will complete LB dressing SBA  Long Term Goal 3: Pt will complete 1-2 grooming tasks in stance at sink with CGA  Long Term Goal 4: Pt will complete TB bathing SBA and AE  Long Term Goal 5: Pt will complete simple IADL SPV  Patient Goals   Patient goals : \"build my strength, be more mobile, feel safe doing things IND\"         Therapy Time   Individual Concurrent Group Co-treatment   Time In 0830         Time Out 0900         Minutes 30         Timed Code Treatment Minutes: 30 Minutes    Second Session Therapy Time:    Individual Concurrent Group Co-treatment   Time In     1030   Time Out     1140   Minutes     70      Timed Code Treatment Minutes:  70  Total Treatment Minutes:  100        ZULEIMA Erazo/COLLIN

## 2024-07-13 NOTE — PROGRESS NOTES
Physical Therapy  Facility/Department: [facility]  Rehabilitation Physical Therapy Treatment Note    NAME: Sudha Lopez  : 1945 (78 y.o.)  MRN: [id number]  CODE STATUS: Full Code    Date of Service: 24       Restrictions:  Restrictions/Precautions: Fall Risk, Up as Tolerated, Weight Bearing  Lower Extremity Weight Bearing Restrictions  Right Lower Extremity Weight Bearing: Weight Bearing As Tolerated  Left Lower Extremity Weight Bearing: Non Weight Bearing  Required Braces or Orthoses  Left Lower Extremity Brace: Boot  LLE Brace Type: CAM boot  Position Activity Restriction  Other position/activity restrictions: HALLIE drain, ostomy, wound vac     SUBJECTIVE  Subjective  Subjective: pt found in bed, agreeable to therapy  Pain: reports 5/10 pain in back and lower abdomen          OBJECTIVE  Cognition  Overall Cognitive Status: Exceptions  Arousal/Alertness: Appears intact  Following Commands: Follows one step commands consistently  Attention Span: Appears intact  Memory: Appears intact  Safety Judgement: Decreased awareness of need for safety  Problem Solving: Decreased awareness of errors;Assistance required to correct errors made;Assistance required to implement solutions  Insights: Decreased awareness of deficits  Initiation: Does not require cues  Sequencing: Requires cues for some  Orientation  Overall Orientation Status: Within Normal Limits  Orientation Level: Oriented X4    Functional Mobility  Roll Left  Assistance Level: Stand by assist  Skilled Clinical Factors: HOB flat, uses HR  Roll Right  Assistance Level: Stand by assist  Skilled Clinical Factors: HOB flat, uses HR  Sit to Supine  Assistance Level: Stand by assist  Skilled Clinical Factors: increased time, HOB flat  Supine to Sit  Assistance Level: Stand by assist  Skilled Clinical Factors: HOB slight elevated  Scooting  Assistance Level: Stand by assist  Skilled Clinical Factors: up in bed in supine, and EOB  Balance  Sitting Balance:  care while resting LE's    - STS with min + mod A x2 with reaching out to therapist hand with LUE x5 reps before becoming fatigued and requiring seated rest     Transfer training:    - stand pivot transfer from WC to toilet using grab bars with mod Ax1 (+CGA for safety) towards L side    - stand pivot from toilet back to WC towards R with grab bar and WC armrest with mod Ax1    - squat pivot from WC to bed using bed rail towards R     Vitals assessed in sitting (pt reporting dizziness)   89 bpm, 96% on RA, 118/64    Pt performs sit to supine with SBA once CAM boot was removed, scoots up in bed in supine with SBA using BUE's and RLE     Pt left in bed, call light and other needs left within reach, alarm on       Goals  Patient Goals   Patient Goals : \"To get lower/upper body strength and mobility, and to be independent for home\"  Short Term Goals  Time Frame for Short Term Goals: 10 days (7/17)  Short Term Goal 1: Patient will perform bed mobility with CGA - MET 7/13, SBA  Short Term Goal 2: Patient will perform sit to stand with Mod A  Short Term Goal 3: Patient will perform pivot transfer bed <> chair with Mod A x1  Short Term Goal 4: Patient will ambulate 5ft in parallel bars while maintaining LLE NWB, with 2-person assist  Short Term Goal 5: Patient will self propel wheelchair 150ft with supervision  Additional Goals?: No  Long Term Goals  Time Frame for Long Term Goals : 20 days (7/27)  Long Term Goal 1: Patient will be Mod I for bed mobility  Long Term Goal 2: Patient will be Mod I for transfers  Long Term Goal 3: Patient will be independent with wheelchair mobility 150ft  Long Term Goal 4: Continue to assess for ambulation LTG    PLAN OF CARE/SAFETY  Physical Therapy Plan  General Plan: 5-7 times per week  Days Per Week: 5 Days  Hours Per Day: 1.5 hours  Therapy Duration: 3 Weeks  Current Treatment Recommendations: ROM;Balance training;Functional mobility training;Home exercise program;Therapeutic

## 2024-07-13 NOTE — PROGRESS NOTES
Department of Physical Medicine & Rehabilitation  Progress Note    Patient Identification:  Sudha Lopez  [id number]  : 1945  Admit date: 2024    Chief Complaint: Debility    Subjective:   Seen in room this morning.  No new complaints overnight.  She is making progress daily of therapy and is happy with her care so far.  She continues to have lower body weakness.  She is worried about her planned discharge.    ROS: No f/c, n/v, cp     Objective:  Patient Vitals for the past 24 hrs:   BP Temp Temp src Pulse Resp SpO2   24 0730 (!) 117/56 97.7 °F (36.5 °C) Oral 70 16 96 %   24 0726 -- -- -- -- 16 --   24 0656 -- -- -- -- 16 --   24 134/70 -- -- 73 16 98 %   24 1716 -- -- -- -- 18 --   24 1503 122/70 -- -- 71 -- 96 %   24 1334 -- -- -- -- 18 --   24 1143 116/72 -- -- 82 -- 97 %       Const: Alert. No distress, pleasant.   HEENT: Normocephalic, atraumatic. Normal sclera/conjunctiva. MMM.   CV: Regular rate and rhythm.   Resp: No respiratory distress. Lungs CTAB.   Abd: post procedure distended   Ext: + edema.   Neuro: Alert, oriented, appropriately interactive.   Psych: Cooperative, appropriate mood and affect    Laboratory data: Available via EMR.   Last 24 hour lab  No results found for this or any previous visit (from the past 24 hour(s)).        Therapy progress:  PT  Required Braces or Orthoses  Left Lower Extremity Brace: Boot  LLE Brace Type: CAM boot  Position Activity Restriction  Other position/activity restrictions: HALLIE drain, ostomy, wound vac  Objective     Sit to Stand: 2 Person Assistance, Maximum Assistance  Stand to Sit: 2 Person Assistance, Maximum Assistance  Bed to Chair: 2 Person Assistance, Maximum assistance  Assistance: Unable to assess  OT  PT Equipment Recommendations  Equipment Needed: No  Other: Defer     Assessment        SLP          Body mass index is 31.95 kg/m².    Assessment and Plan:  Debility  -Decreased functional  mobility and ADLs  -Increased sedentary hospital stay  -PT/OT     Intra-abdominal abscess  -Status post subtotal colectomy with ileostomy status post CT-guided left abdominal drain placement by IR on 7/3/2024 and antibiotic treatment with IV Zosyn.   -Monitor abdominal pain      Anemia  -Monitor hemoglobin     PAF on Eliquis    Rt total knee replacement  Continue Rt TKR protocol with ROM excercises, WBAT  Lt ankle fracture dislocation: boot   NWB    Ortho consult placed awaiting recs.      Rehab Progress: Improving  Anticipated Dispo: home  Services/DME: TBD  ELOS: 7/23/2024          ALEXI Bautista.P.H  PM&R  7/13/2024  10:41 AM

## 2024-07-13 NOTE — PROGRESS NOTES
Performed wound care left heel. Cleansed with saline, dried, applied santyl, covered in rolled guaze.

## 2024-07-14 VITALS
DIASTOLIC BLOOD PRESSURE: 67 MMHG | OXYGEN SATURATION: 96 % | WEIGHT: 158.29 LBS | BODY MASS INDEX: 28.05 KG/M2 | HEART RATE: 79 BPM | SYSTOLIC BLOOD PRESSURE: 131 MMHG | RESPIRATION RATE: 14 BRPM | TEMPERATURE: 97.9 F | HEIGHT: 63 IN

## 2024-07-14 PROCEDURE — 2580000003 HC RX 258: Performed by: PHARMACIST

## 2024-07-14 PROCEDURE — 2580000003 HC RX 258: Performed by: PHYSICAL MEDICINE & REHABILITATION

## 2024-07-14 PROCEDURE — 6370000000 HC RX 637 (ALT 250 FOR IP): Performed by: PHYSICAL MEDICINE & REHABILITATION

## 2024-07-14 PROCEDURE — 1280000000 HC REHAB R&B

## 2024-07-14 PROCEDURE — 6360000002 HC RX W HCPCS: Performed by: PHARMACIST

## 2024-07-14 RX ORDER — OMEPRAZOLE 20 MG/1
20 CAPSULE, DELAYED RELEASE ORAL DAILY
Status: ON HOLD | COMMUNITY

## 2024-07-14 RX ADMIN — OXYCODONE AND ACETAMINOPHEN 1 TABLET: 5; 325 TABLET ORAL at 14:16

## 2024-07-14 RX ADMIN — SODIUM CHLORIDE, PRESERVATIVE FREE 10 ML: 5 INJECTION INTRAVENOUS at 14:15

## 2024-07-14 RX ADMIN — PIPERACILLIN AND TAZOBACTAM 3375 MG: 3; .375 INJECTION, POWDER, LYOPHILIZED, FOR SOLUTION INTRAVENOUS at 10:13

## 2024-07-14 RX ADMIN — DICLOFENAC SODIUM 4 G: 10 GEL TOPICAL at 21:09

## 2024-07-14 RX ADMIN — APIXABAN 5 MG: 5 TABLET, FILM COATED ORAL at 21:07

## 2024-07-14 RX ADMIN — PIPERACILLIN AND TAZOBACTAM 3375 MG: 3; .375 INJECTION, POWDER, LYOPHILIZED, FOR SOLUTION INTRAVENOUS at 17:39

## 2024-07-14 RX ADMIN — APIXABAN 5 MG: 5 TABLET, FILM COATED ORAL at 10:05

## 2024-07-14 RX ADMIN — PIPERACILLIN AND TAZOBACTAM 3375 MG: 3; .375 INJECTION, POWDER, LYOPHILIZED, FOR SOLUTION INTRAVENOUS at 02:25

## 2024-07-14 RX ADMIN — COLLAGENASE SANTYL: 250 OINTMENT TOPICAL at 10:17

## 2024-07-14 RX ADMIN — PANTOPRAZOLE SODIUM 40 MG: 40 TABLET, DELAYED RELEASE ORAL at 06:14

## 2024-07-14 RX ADMIN — ATORVASTATIN CALCIUM 10 MG: 10 TABLET, FILM COATED ORAL at 10:05

## 2024-07-14 RX ADMIN — OXYCODONE AND ACETAMINOPHEN 1 TABLET: 5; 325 TABLET ORAL at 22:08

## 2024-07-14 RX ADMIN — TRAZODONE HYDROCHLORIDE 75 MG: 50 TABLET ORAL at 21:07

## 2024-07-14 RX ADMIN — Medication 1 TABLET: at 10:05

## 2024-07-14 RX ADMIN — LATANOPROST 1 DROP: 50 SOLUTION OPHTHALMIC at 21:09

## 2024-07-14 RX ADMIN — BUPROPION HYDROCHLORIDE 300 MG: 150 TABLET, EXTENDED RELEASE ORAL at 10:04

## 2024-07-14 RX ADMIN — FLUOXETINE HYDROCHLORIDE 40 MG: 20 CAPSULE ORAL at 10:05

## 2024-07-14 ASSESSMENT — PAIN DESCRIPTION - LOCATION
LOCATION: ABDOMEN;BACK
LOCATION: ABDOMEN

## 2024-07-14 ASSESSMENT — PAIN SCALES - GENERAL
PAINLEVEL_OUTOF10: 0
PAINLEVEL_OUTOF10: 0
PAINLEVEL_OUTOF10: 7
PAINLEVEL_OUTOF10: 0
PAINLEVEL_OUTOF10: 7

## 2024-07-14 ASSESSMENT — PAIN - FUNCTIONAL ASSESSMENT
PAIN_FUNCTIONAL_ASSESSMENT: ACTIVITIES ARE NOT PREVENTED
PAIN_FUNCTIONAL_ASSESSMENT: ACTIVITIES ARE NOT PREVENTED

## 2024-07-14 ASSESSMENT — PAIN DESCRIPTION - ORIENTATION
ORIENTATION: MID
ORIENTATION: MID

## 2024-07-14 ASSESSMENT — PAIN DESCRIPTION - DESCRIPTORS
DESCRIPTORS: ACHING
DESCRIPTORS: ACHING

## 2024-07-14 NOTE — PLAN OF CARE
Problem: Discharge Planning  Goal: Discharge to home or other facility with appropriate resources  Outcome: Progressing  Flowsheets (Taken 7/13/2024 2000)  Discharge to home or other facility with appropriate resources: Identify barriers to discharge with patient and caregiver     Problem: Safety - Adult  Goal: Free from fall injury  7/13/2024 2255 by Ermelinda Rutledge RN  Outcome: Progressing  Flowsheets (Taken 7/13/2024 2254)  Free From Fall Injury: Instruct family/caregiver on patient safety  7/13/2024 1014 by Rosaline Chaney RN  Outcome: Progressing     Problem: Pain  Goal: Verbalizes/displays adequate comfort level or baseline comfort level  7/13/2024 2255 by Ermelinda Rutledge RN  Outcome: Progressing  Flowsheets (Taken 7/13/2024 2043)  Verbalizes/displays adequate comfort level or baseline comfort level: Assess pain using appropriate pain scale  7/13/2024 1014 by Rosaline Chaney RN  Outcome: Progressing     Problem: Skin/Tissue Integrity  Goal: Absence of new skin breakdown  Description: 1.  Monitor for areas of redness and/or skin breakdown  2.  Assess vascular access sites hourly  3.  Every 4-6 hours minimum:  Change oxygen saturation probe site  4.  Every 4-6 hours:  If on nasal continuous positive airway pressure, respiratory therapy assess nares and determine need for appliance change or resting period.  Outcome: Progressing  Note: Specialty mattress, assist pt. With turning q2h & PRN.      Problem: Nutrition Deficit:  Goal: Optimize nutritional status  Outcome: Progressing  Flowsheets (Taken 7/9/2024 1251 by Shante Cabrera, MS, RD, LD)  Nutrient intake appropriate for improving, restoring, or maintaining nutritional needs:   Assess nutritional status and recommend course of action   Monitor oral intake, labs, and treatment plans   Recommend appropriate diets, oral nutritional supplements, and vitamin/mineral supplements     Problem: ABCDS Injury Assessment  Goal: Absence of physical injury  Outcome:

## 2024-07-14 NOTE — PROGRESS NOTES
Despite numerous attempts to seal leak in ostomy bag, this RN removed soiled wound vac dressing and ostomy. Abdominal wound was cleaned and a wet to dry dressing was placed; a new ostomy bag was put on. No signs of leakage at the time of this writing. Wound care RN to see pt tomorrow.

## 2024-07-14 NOTE — PROGRESS NOTES
Department of Physical Medicine & Rehabilitation  Progress Note    Patient Identification:  Sudha Lopez  [id number]  : 1945  Admit date: 2024    Chief Complaint: Debility    Subjective:   Seen in her room this morning with multiple issues overnight due to leakage of her and abdomen.  Surgery will be in 2 help her tomorrow.  She is otherwise feeling well and wants to participate more therapy.  Nursing is acting as wound care nurse today.  ROS: No f/c, n/v, cp     Objective:  Patient Vitals for the past 24 hrs:   BP Temp Temp src Pulse Resp SpO2   24 0859 132/62 98.2 °F (36.8 °C) Oral 75 18 97 %   24 2210 -- -- -- -- 14 --   24 2043 (!) 119/56 98.2 °F (36.8 °C) Oral 74 14 96 %   24 1759 -- -- -- -- 16 --       Const: Alert. No distress, pleasant.   HEENT: Normocephalic, atraumatic. Normal sclera/conjunctiva. MMM.   CV: Regular rate and rhythm.   Resp: No respiratory distress. Lungs CTAB.   Abd: post procedure distended   Ext: + edema.   Neuro: Alert, oriented, appropriately interactive.   Psych: Cooperative, appropriate mood and affect    Laboratory data: Available via EMR.   Last 24 hour lab  No results found for this or any previous visit (from the past 24 hour(s)).        Therapy progress:  PT  Required Braces or Orthoses  Left Lower Extremity Brace: Boot  LLE Brace Type: CAM boot  Position Activity Restriction  Other position/activity restrictions: HALLIE drain, ostomy, wound vac  Objective     Sit to Stand: 2 Person Assistance, Maximum Assistance  Stand to Sit: 2 Person Assistance, Maximum Assistance  Bed to Chair: 2 Person Assistance, Maximum assistance  Assistance: Unable to assess  OT  PT Equipment Recommendations  Equipment Needed: No  Other: Defer     Assessment        SLP          Body mass index is 31.95 kg/m².    Assessment and Plan:  Debility  -Decreased functional mobility and ADLs  -Increased sedentary hospital stay  -PT/OT     Intra-abdominal abscess  -Status post  subtotal colectomy with ileostomy status post CT-guided left abdominal drain placement by IR on 7/3/2024 and antibiotic treatment with IV Zosyn.   -Monitor abdominal pain      Anemia  -Monitor hemoglobin     PAF on Eliquis    Rt total knee replacement  Continue Rt TKR protocol with ROM excercises, WBAT  Lt ankle fracture dislocation: boot   NWB    Ortho consult placed awaiting recs.      Rehab Progress: Improving  Anticipated Dispo: home  Services/DME: TBD  ELOS: 7/23/2024          ALEXI Bautista.P.H  PM&R  7/14/2024  1:56 PM

## 2024-07-15 LAB
ANION GAP SERPL CALCULATED.3IONS-SCNC: 8 MMOL/L (ref 3–16)
BASOPHILS # BLD: 0.1 K/UL (ref 0–0.2)
BASOPHILS NFR BLD: 1 %
BUN SERPL-MCNC: 12 MG/DL (ref 7–20)
CALCIUM SERPL-MCNC: 9.1 MG/DL (ref 8.3–10.6)
CHLORIDE SERPL-SCNC: 103 MMOL/L (ref 99–110)
CO2 SERPL-SCNC: 24 MMOL/L (ref 21–32)
CREAT SERPL-MCNC: 0.6 MG/DL (ref 0.6–1.2)
DEPRECATED RDW RBC AUTO: 15.9 % (ref 12.4–15.4)
EOSINOPHIL # BLD: 0.3 K/UL (ref 0–0.6)
EOSINOPHIL NFR BLD: 3.5 %
GFR SERPLBLD CREATININE-BSD FMLA CKD-EPI: >90 ML/MIN/{1.73_M2}
GLUCOSE SERPL-MCNC: 97 MG/DL (ref 70–99)
HCT VFR BLD AUTO: 26 % (ref 36–48)
HGB BLD-MCNC: 8.7 G/DL (ref 12–16)
LYMPHOCYTES # BLD: 1.8 K/UL (ref 1–5.1)
LYMPHOCYTES NFR BLD: 18.2 %
MCH RBC QN AUTO: 29.9 PG (ref 26–34)
MCHC RBC AUTO-ENTMCNC: 33.6 G/DL (ref 31–36)
MCV RBC AUTO: 89.2 FL (ref 80–100)
MONOCYTES # BLD: 1.1 K/UL (ref 0–1.3)
MONOCYTES NFR BLD: 10.9 %
NEUTROPHILS # BLD: 6.6 K/UL (ref 1.7–7.7)
NEUTROPHILS NFR BLD: 66.4 %
PLATELET # BLD AUTO: 414 K/UL (ref 135–450)
PMV BLD AUTO: 6.5 FL (ref 5–10.5)
POTASSIUM SERPL-SCNC: 3.6 MMOL/L (ref 3.5–5.1)
RBC # BLD AUTO: 2.92 M/UL (ref 4–5.2)
SODIUM SERPL-SCNC: 135 MMOL/L (ref 136–145)
WBC # BLD AUTO: 9.9 K/UL (ref 4–11)

## 2024-07-15 PROCEDURE — 6370000000 HC RX 637 (ALT 250 FOR IP): Performed by: PHYSICAL MEDICINE & REHABILITATION

## 2024-07-15 PROCEDURE — 36415 COLL VENOUS BLD VENIPUNCTURE: CPT

## 2024-07-15 PROCEDURE — 97110 THERAPEUTIC EXERCISES: CPT

## 2024-07-15 PROCEDURE — 1280000000 HC REHAB R&B

## 2024-07-15 PROCEDURE — 97535 SELF CARE MNGMENT TRAINING: CPT

## 2024-07-15 PROCEDURE — 97530 THERAPEUTIC ACTIVITIES: CPT

## 2024-07-15 PROCEDURE — 6360000002 HC RX W HCPCS: Performed by: PHARMACIST

## 2024-07-15 PROCEDURE — 80048 BASIC METABOLIC PNL TOTAL CA: CPT

## 2024-07-15 PROCEDURE — 97606 NEG PRS WND THER DME>50 SQCM: CPT

## 2024-07-15 PROCEDURE — 2580000003 HC RX 258: Performed by: PHYSICAL MEDICINE & REHABILITATION

## 2024-07-15 PROCEDURE — 85025 COMPLETE CBC W/AUTO DIFF WBC: CPT

## 2024-07-15 PROCEDURE — 2580000003 HC RX 258: Performed by: PHARMACIST

## 2024-07-15 RX ADMIN — DICLOFENAC SODIUM 4 G: 10 GEL TOPICAL at 20:43

## 2024-07-15 RX ADMIN — APIXABAN 5 MG: 5 TABLET, FILM COATED ORAL at 20:38

## 2024-07-15 RX ADMIN — PIPERACILLIN AND TAZOBACTAM 3375 MG: 3; .375 INJECTION, POWDER, FOR SOLUTION INTRAVENOUS at 18:42

## 2024-07-15 RX ADMIN — OXYCODONE AND ACETAMINOPHEN 1 TABLET: 5; 325 TABLET ORAL at 10:32

## 2024-07-15 RX ADMIN — ATORVASTATIN CALCIUM 10 MG: 10 TABLET, FILM COATED ORAL at 10:32

## 2024-07-15 RX ADMIN — SODIUM CHLORIDE, PRESERVATIVE FREE 10 ML: 5 INJECTION INTRAVENOUS at 20:39

## 2024-07-15 RX ADMIN — PANTOPRAZOLE SODIUM 40 MG: 40 TABLET, DELAYED RELEASE ORAL at 06:08

## 2024-07-15 RX ADMIN — BUPROPION HYDROCHLORIDE 300 MG: 150 TABLET, EXTENDED RELEASE ORAL at 10:32

## 2024-07-15 RX ADMIN — BISACODYL 5 MG: 5 TABLET, COATED ORAL at 10:32

## 2024-07-15 RX ADMIN — APIXABAN 5 MG: 5 TABLET, FILM COATED ORAL at 10:32

## 2024-07-15 RX ADMIN — OXYCODONE AND ACETAMINOPHEN 1 TABLET: 5; 325 TABLET ORAL at 20:38

## 2024-07-15 RX ADMIN — COLLAGENASE SANTYL: 250 OINTMENT TOPICAL at 10:31

## 2024-07-15 RX ADMIN — PIPERACILLIN AND TAZOBACTAM 3375 MG: 3; .375 INJECTION, POWDER, FOR SOLUTION INTRAVENOUS at 11:36

## 2024-07-15 RX ADMIN — Medication 1 TABLET: at 10:32

## 2024-07-15 RX ADMIN — SODIUM CHLORIDE, PRESERVATIVE FREE 10 ML: 5 INJECTION INTRAVENOUS at 10:32

## 2024-07-15 RX ADMIN — FLUOXETINE HYDROCHLORIDE 40 MG: 20 CAPSULE ORAL at 10:32

## 2024-07-15 RX ADMIN — PIPERACILLIN AND TAZOBACTAM 3375 MG: 3; .375 INJECTION, POWDER, FOR SOLUTION INTRAVENOUS at 03:23

## 2024-07-15 RX ADMIN — MICONAZOLE NITRATE: 20 CREAM TOPICAL at 10:32

## 2024-07-15 RX ADMIN — TRAZODONE HYDROCHLORIDE 75 MG: 50 TABLET ORAL at 20:38

## 2024-07-15 RX ADMIN — LATANOPROST 1 DROP: 50 SOLUTION OPHTHALMIC at 20:43

## 2024-07-15 ASSESSMENT — PAIN DESCRIPTION - DESCRIPTORS
DESCRIPTORS: ACHING
DESCRIPTORS: ACHING

## 2024-07-15 ASSESSMENT — PAIN DESCRIPTION - LOCATION
LOCATION: ABDOMEN;BACK
LOCATION: BACK

## 2024-07-15 ASSESSMENT — PAIN SCALES - GENERAL
PAINLEVEL_OUTOF10: 5
PAINLEVEL_OUTOF10: 7

## 2024-07-15 ASSESSMENT — PAIN DESCRIPTION - ORIENTATION
ORIENTATION: LOWER
ORIENTATION: LOWER

## 2024-07-15 NOTE — CARE COORDINATION
Weekly team care conference with interdisciplinary team on: 7/10/24     Chart reviewed for length of stay. IP day #7  Unit: ARU  Diagnosis and current status as per MD progress: Debility  Consultants Following: Ortho  Planned Discharge Date: 7/23  Durable medical equipment needed: TTB, BSC, reacher etc  Discharge Plan: Evolving. Has HHC recs, lives alone. Has ileostomy and wound vac. Discussed dispo w pt at bedside. Previously at House- prefers not to return. Would be interested in SNF at [facility] if could qualify under medical needs.  Call placed and referral made. Suggested pt make a back up choice- provided w list. CM following.     CM following. Hellen Servin RN

## 2024-07-15 NOTE — PROGRESS NOTES
Physical Therapy  Facility/Department: [facility]  Rehabilitation Physical Therapy Treatment Note    NAME: Sudha Lopez  : 1945 (78 y.o.)  MRN: [id number]  CODE STATUS: Full Code    Date of Service: 7/15/24       Restrictions:  Restrictions/Precautions: Fall Risk, Up as Tolerated, Weight Bearing  Lower Extremity Weight Bearing Restrictions  Right Lower Extremity Weight Bearing: Weight Bearing As Tolerated  Left Lower Extremity Weight Bearing: Toe Touch Weight Bearing  Required Braces or Orthoses  Left Lower Extremity Brace: Boot  LLE Brace Type: CAM boot  Position Activity Restriction  Other position/activity restrictions: HALLIE drain, ostomy, wound vac, \"Touch down with minimal weight bearing on operative left leg just for balance.\" per orders from [facility]     SUBJECTIVE  Subjective  Subjective: pt found in bed, politely declines OOB activity  Pain: 5/10 back pain        OBJECTIVE  Cognition  Overall Cognitive Status: Exceptions  Arousal/Alertness: Appears intact  Following Commands: Follows one step commands consistently  Attention Span: Appears intact  Memory: Appears intact  Safety Judgement: Decreased awareness of need for safety  Problem Solving: Decreased awareness of errors;Assistance required to correct errors made;Assistance required to implement solutions  Insights: Decreased awareness of deficits  Initiation: Does not require cues  Sequencing: Requires cues for some  Orientation  Overall Orientation Status: Within Normal Limits  Orientation Level: Oriented X4    Functional Mobility  Sit to Supine  Assistance Level: Supervision  Skilled Clinical Factors: increased time, HOB flat  Supine to Sit  Assistance Level: Supervision  Skilled Clinical Factors: HOB slight elevated  Scooting  Assistance Level: Stand by assist      PT Exercises  Exercise Treatment: while EOB, pt completed 20 reps of BLE seated ankle pumps (RLE only), LAQ, marches, hip abduction with TKE      ASSESSMENT/PROGRESS TOWARDS  GOALS  Vital Signs  Pulse: 73  Heart Rate Source: Monitor  BP: 128/60  BP Location: Right upper arm  MAP (Calculated): 83  SpO2: 95 %  O2 Device: None (Room air)    Assessment  Assessment: pt found in bed, politely declines OOB activity this pm. agreeable and tolerated seated ther ex without complaints. SBA -supv for bed mobility. continue to rec home with 24/7 and HHPT. DME: TBD  Activity Tolerance: Patient tolerated treatment well;Patient limited by pain  Discharge Recommendations: 24 hour supervision or assist;Home with Home health PT  PT Equipment Recommendations  Equipment Needed: No  Other: Defer    Goals  Patient Goals   Patient Goals : \"To get lower/upper body strength and mobility, and to be independent for home\"  Short Term Goals  Time Frame for Short Term Goals: 10 days (7/17)  Short Term Goal 1: Patient will perform bed mobility with CGA - MET 7/13, SBA  Short Term Goal 2: Patient will perform sit to stand with Mod A  Short Term Goal 3: Patient will perform pivot transfer bed <> chair with Mod A x1  Short Term Goal 4: Patient will ambulate 5ft in parallel bars while maintaining LLE NWB, with 2-person assist  Short Term Goal 5: Patient will self propel wheelchair 150ft with supervision  Long Term Goals  Time Frame for Long Term Goals : 20 days (7/27)  Long Term Goal 1: Patient will be Mod I for bed mobility  Long Term Goal 2: Patient will be Mod I for transfers  Long Term Goal 3: Patient will be independent with wheelchair mobility 150ft  Long Term Goal 4: Continue to assess for ambulation LTG    PLAN OF CARE/SAFETY  Physical Therapy Plan  General Plan: 5-7 times per week  Current Treatment Recommendations: ROM;Balance training;Functional mobility training;Home exercise program;Therapeutic activities;Co-Treatment;Safety education & training;Patient/Caregiver education & training;Transfer training;Endurance training;Pain management;Strengthening;Wheelchair mobility training;Gait training;Stair

## 2024-07-15 NOTE — PLAN OF CARE
Problem: Safety - Adult  Goal: Free from fall injury  7/15/2024 1053 by James Hudson RN  Outcome: Progressing  7/14/2024 2140 by Ermelinda Rutledge RN  Outcome: Progressing  Flowsheets (Taken 7/14/2024 2140)  Free From Fall Injury: Instruct family/caregiver on patient safety     Problem: Pain  Goal: Verbalizes/displays adequate comfort level or baseline comfort level  7/15/2024 1053 by James Hudson RN  Outcome: Progressing  7/14/2024 2140 by Ermelinda Rutledge RN  Outcome: Progressing  Flowsheets (Taken 7/14/2024 2000)  Verbalizes/displays adequate comfort level or baseline comfort level: Assess pain using appropriate pain scale     Problem: Skin/Tissue Integrity  Goal: Absence of new skin breakdown  Description: 1.  Monitor for areas of redness and/or skin breakdown  2.  Assess vascular access sites hourly  3.  Every 4-6 hours minimum:  Change oxygen saturation probe site  4.  Every 4-6 hours:  If on nasal continuous positive airway pressure, respiratory therapy assess nares and determine need for appliance change or resting period.  7/15/2024 1053 by James Hudson RN  Outcome: Progressing  7/14/2024 2140 by Ermelinda Rutledge RN  Outcome: Progressing  Note: Assist pt. With turning q2h & PRN, dressing changes done per order.

## 2024-07-15 NOTE — PROGRESS NOTES
Occupational Therapy  Facility/Department: [facility]  Rehabilitation Occupational Therapy Daily Treatment Note    Date: 7/15/24  Patient Name: Sudha Lopez       Room: 0164/0164-01  MRN: [id number]  Account: [id number]   : 1945  (78 y.o.) Gender: female                    Past Medical History:  has a past medical history of Anxiety and Hypertension.  Past Surgical History:   has a past surgical history that includes Hysterectomy; Breast enhancement surgery; rhinoplasty; Rotator cuff repair (Bilateral); Hand surgery (Right); Total hip arthroplasty (Bilateral); Intracapsular cataract extraction (Left, 2020); Colonoscopy; Intracapsular cataract extraction (Right, 2020); Colonoscopy (N/A, 2024); laparotomy (N/A, 2024); and CT PERITONEAL/RETROPERITONEAL PERC DRAIN (7/3/2024).    Restrictions  Restrictions/Precautions: Fall Risk, Up as Tolerated, Weight Bearing  Other position/activity restrictions: HALLIE drain, ostomy, wound vac  Right Lower Extremity Weight Bearing: Weight Bearing As Tolerated  Left Lower Extremity Weight Bearing: Non Weight Bearing  Required Braces or Orthoses  Left Lower Extremity Brace: Boot  LLE Brace Type: CAM boot  Required Braces or Orthoses?: Yes    Subjective  Subjective: Pt semi-supine in bed and agreeable to OT tx. \"A little bit in my back\" /59, HR 76, SPO2 96%  Restrictions/Precautions: Fall Risk;Up as Tolerated;Weight Bearing             Objective     Cognition  Overall Cognitive Status: Exceptions  Arousal/Alertness: Appears intact  Following Commands: Follows one step commands consistently  Attention Span: Appears intact  Memory: Appears intact  Safety Judgement: Decreased awareness of need for safety  Problem Solving: Decreased awareness of errors;Assistance required to correct errors made;Assistance required to implement solutions  Insights: Decreased awareness of deficits  Initiation: Does not require cues  Sequencing: Requires cues for  in bed and agreeable to OT/PT cotx. Co-tx collaboration this date to safely meet goals and will have better occupational performance outcomes with in a co-treatment than 1:1 session. /60. Per pt report, WB status updated to TTWB, however recent MD note states NWB on 7/14. MD and RN notified, per ortho states TTWB following x-ray 7/12. Orders in chart not changed at time of session.  DEP for toileting and brief exchange in bed  DEP for LB dressing seated EOB and to don/doff L CAM boot  Min+mod A x2 sit pivot from EOB > wc   Self-propelled wc > therapy gym with inc time  Stood x3 in // bars with min + CGA x2. Pt stood ~2min , 0:30 sec, and 1 min with CGA for standing balance.   Stood x1 with RW, min +mod A x2; vb's for hand placement  Min Ax2 stand pivot wc <> EOB with assist to manage RW.     Patient Education  Education  Education Given To: Patient  Education Provided: Role of Therapy;Plan of Care;Transfer Training;Precautions;Mobility Training;Safety;ADL Function  Education Provided Comments: EOB ADLs, IPR schedule, POC  Education Method: Verbal  Barriers to Learning: None  Education Outcome: Verbalized understanding;Continued education needed    Plan  Occupational Therapy Plan  Times Per Week: 5-7x/week  Current Treatment Recommendations: Strengthening;Balance training;Functional mobility training;Endurance training;Safety education & training;Self-Care / ADL;Home management training;Wheelchair mobility training;Equipment evaluation, education, & procurement;Co-Treatment;Patient/Caregiver education & training    Goals  Patient Goals   Patient goals : \"build my strength, be more mobile, feel safe doing things IND\"  Short Term Goals  Time Frame for Short Term Goals: 10 days (7/17/24)  Short Term Goal 1: Pt will complete fxl/toilet transfer min A  Short Term Goal 2: Pt will complete LB dressing mod A  Short Term Goal 3: Pt will complete 1-2 grooming tasks in stance at sink with min A  Short Term Goal 4: Pt will

## 2024-07-15 NOTE — PLAN OF CARE
Problem: Discharge Planning  Goal: Discharge to home or other facility with appropriate resources  Outcome: Progressing  Flowsheets (Taken 7/14/2024 2128)  Discharge to home or other facility with appropriate resources: Identify barriers to discharge with patient and caregiver     Problem: Safety - Adult  Goal: Free from fall injury  Outcome: Progressing  Flowsheets (Taken 7/14/2024 2140)  Free From Fall Injury: Instruct family/caregiver on patient safety     Problem: Pain  Goal: Verbalizes/displays adequate comfort level or baseline comfort level  Outcome: Progressing  Flowsheets (Taken 7/14/2024 2000)  Verbalizes/displays adequate comfort level or baseline comfort level: Assess pain using appropriate pain scale     Problem: Skin/Tissue Integrity  Goal: Absence of new skin breakdown  Description: 1.  Monitor for areas of redness and/or skin breakdown  2.  Assess vascular access sites hourly  3.  Every 4-6 hours minimum:  Change oxygen saturation probe site  4.  Every 4-6 hours:  If on nasal continuous positive airway pressure, respiratory therapy assess nares and determine need for appliance change or resting period.  Outcome: Progressing  Note: Assist pt. With turning q2h & PRN, dressing changes done per order.      Problem: Nutrition Deficit:  Goal: Optimize nutritional status  Outcome: Progressing  Flowsheets (Taken 7/9/2024 1251 by Shante Cabrera, MS, RD, LD)  Nutrient intake appropriate for improving, restoring, or maintaining nutritional needs:   Assess nutritional status and recommend course of action   Monitor oral intake, labs, and treatment plans   Recommend appropriate diets, oral nutritional supplements, and vitamin/mineral supplements     Problem: ABCDS Injury Assessment  Goal: Absence of physical injury  Outcome: Progressing  Flowsheets (Taken 7/14/2024 2140)  Absence of Physical Injury: Implement safety measures based on patient assessment     Problem: Neurosensory - Adult  Goal: Achieves stable or  improved neurological status  Outcome: Progressing  Flowsheets (Taken 7/14/2024 2128)  Achieves stable or improved neurological status: Assess for and report changes in neurological status     Problem: Respiratory - Adult  Goal: Achieves optimal ventilation and oxygenation  Outcome: Progressing  Flowsheets (Taken 7/14/2024 2128)  Achieves optimal ventilation and oxygenation: Assess for changes in respiratory status     Problem: Skin/Tissue Integrity - Adult  Goal: Skin integrity remains intact  Outcome: Progressing  Flowsheets (Taken 7/14/2024 2128)  Skin Integrity Remains Intact: Monitor for areas of redness and/or skin breakdown     Problem: Musculoskeletal - Adult  Goal: Return mobility to safest level of function  Outcome: Progressing  Flowsheets (Taken 7/14/2024 2128)  Return Mobility to Safest Level of Function: Assess patient stability and activity tolerance for standing, transferring and ambulating with or without assistive devices     Problem: Gastrointestinal - Adult  Goal: Minimal or absence of nausea and vomiting  Outcome: Progressing  Flowsheets (Taken 7/14/2024 2128)  Minimal or absence of nausea and vomiting: Provide nonpharmacologic comfort measures as appropriate     Problem: Genitourinary - Adult  Goal: Absence of urinary retention  Outcome: Progressing  Flowsheets (Taken 7/14/2024 2128)  Absence of urinary retention: Assess patient’s ability to void and empty bladder

## 2024-07-15 NOTE — PROGRESS NOTES
Department of Physical Medicine & Rehabilitation  Progress Note    Patient Identification:  Sudha Lopez  [id number]  : 1945  Admit date: 2024    Chief Complaint: Debility    Subjective:   No acute events overnight. Today Sudha is seen in her room, resting in bed. She reports that her pain is improving every day. She notes that she still has some lower back and abdominal pain.     Reviewed labs.     ROS: No f/c, n/v, cp     Objective:  Patient Vitals for the past 24 hrs:   BP Temp Temp src Pulse Resp SpO2 Height Weight   07/15/24 1340 128/60 -- -- 73 -- 95 % -- --   07/15/24 1102 -- -- -- -- 18 -- -- --   07/15/24 1030 128/60 98.2 °F (36.8 °C) Oral 73 18 95 % -- --   24 2315 -- -- -- -- -- -- 1.6 m (5' 3\") 71.8 kg (158 lb 4.6 oz)   24 2208 -- -- -- -- 14 -- -- --   24 2000 131/67 97.9 °F (36.6 °C) Oral 79 14 96 % -- --   24 1446 -- -- -- -- 16 -- -- --   24 1416 -- -- -- -- 18 -- -- --     Const: Alert. No distress, pleasant.   HEENT: Normocephalic, atraumatic. Normal sclera/conjunctiva. MMM.   CV: extremities well perfused  Resp: No respiratory distress. Lungs CTAB.   Abd: post procedure distended   Ext: + edema.   Neuro: Alert, oriented, appropriately interactive.   Psych: Cooperative, appropriate mood and affect    Laboratory data: Available via EMR.   Last 24 hour lab  Recent Results (from the past 24 hour(s))   Basic Metabolic Panel w/ Reflex to MG    Collection Time: 07/15/24  6:34 AM   Result Value Ref Range    Sodium 135 (L) 136 - 145 mmol/L    Potassium reflex Magnesium 3.6 3.5 - 5.1 mmol/L    Chloride 103 99 - 110 mmol/L    CO2 24 21 - 32 mmol/L    Anion Gap 8 3 - 16    Glucose 97 70 - 99 mg/dL    BUN 12 7 - 20 mg/dL    Creatinine 0.6 0.6 - 1.2 mg/dL    Est, Glom Filt Rate >90 >60    Calcium 9.1 8.3 - 10.6 mg/dL   CBC auto differential    Collection Time: 07/15/24  6:34 AM   Result Value Ref Range    WBC 9.9 4.0 - 11.0 K/uL    RBC 2.92 (L) 4.00 - 5.20 M/uL

## 2024-07-15 NOTE — PROGRESS NOTES
Intact 07/15/24 1014   Treatment Pouch change;Site care;Stoma powder;Liquid skin barrier;Barrier ring;Heat applied;Ostomy belt 07/15/24 1014   Stool Appearance Loose;Watery 07/15/24 1014   Stool Color Green;Brown 07/15/24 1014   Stool Amount Small 07/15/24 1014   Output (mL) 100 ml 07/15/24 1014   Number of days: 43     Intake/Output Summary (Last 24 hours) at 7/15/2024 1016  Last data filed at 7/15/2024 1014  Gross per 24 hour   Intake 480 ml   Output 1875 ml   Net -1395 ml     Response to treatment:  Well tolerated by patient.     Pain Assessment:  Severity:  0 / 10  Quality of pain: N/A  Wound Pain Timing/Severity: none  Premedicated: No  Plan:   Plan of Care: Wound Heel Left;Posterior-Dressing/Treatment: Dry dressing    Negative pressure wound therapy dressing (NPWT) reapplied tfrom medial tunneled to distal abd incision. Current Abd incision dressing removed. Site irriaged and cleansed with normal saline. Prepped mariama wound with stoma powder, barrier film,  paste strips, VAC drape. 2 white sponges applied into incision line (distal & medial tunnel that goes from 600pm-1200 am). 2 black sponge on top.  Connected to ELERTStal # [id number] VAC at 125 mmHg low continuous suction. Plan to change 3 times a week.      Plan for Ostomy Care:   Ileostomy appliance changed. Current pouch removed. Skin cleansed with warm water.  Pat dry.  RN Crusted with powder and barrier film. Paste ring @ stoma. Barrier paste strip and VAC drape to cover naval to make flat pouching surface.  Then placed one piece flange with bag attached @ stoma.  Pouch placed over naval dressing as the open area in naval needed to be covered by the dressing and this area over lasped where the stoma barrier would lay. Barrier extenders placed. Stoma Belt added.     Stoma Care - New ileostomy - Patient to empty appliance when 1/3 to 1/2 full with assistance of the staff. Cleanse inside and outside of the drain spout prior to rolling closed. Change appliance  every 3-5 days or 1-2 times a week.  Call for problems with seal or leaking [phone] or [phone]. If don't answer leave message.        Specialty Bed Required : Yes   [] Low Air Loss   [x] Pressure Redistribution Isoflex gel therapy pressure redistribution mattress in place.   [] Fluid Immersion  [] Bariatric  [] Total Pressure Relief  [] Other:     Current Diet: ADULT DIET; Regular  ADULT ORAL NUTRITION SUPPLEMENT; Breakfast, Dinner; Standard High Calorie/High Protein Oral Supplement  Dietician consult:  Yes    Discharge Plan:  Placement for patient upon discharge: intermediate care facility   Patient appropriate for Outpatient Wound Care Center: Yes  Supplies given Yes     Referrals:  []  following   [] Home Health Care  [] Supplies  [] Other    Patient/Caregiver Teaching:  Written Instructions given to patient/family  Teaching provided:  [] Reviewed GI and A&P        [x] Supplies  [x] Pouch emptying      [x] Manipulate closure  [x] Routine Care         [] Comment  [x] Pouch maintenance           Level of patient/caregiver understanding able to:   [] Indicates understanding       [] Needs reinforcement  [] Unsuccessful      [x] Verbal Understanding  [] Demonstrated understanding       [] No evidence of learning  [] Refused teaching         [] N/A       Electronically signed by Renae Carnes, RN, MSN, CWOCN on 7/15/2024 at 10:16 AM

## 2024-07-16 PROCEDURE — 1280000000 HC REHAB R&B

## 2024-07-16 PROCEDURE — 6370000000 HC RX 637 (ALT 250 FOR IP): Performed by: PHYSICAL MEDICINE & REHABILITATION

## 2024-07-16 PROCEDURE — 97110 THERAPEUTIC EXERCISES: CPT

## 2024-07-16 PROCEDURE — 97530 THERAPEUTIC ACTIVITIES: CPT

## 2024-07-16 PROCEDURE — 97606 NEG PRS WND THER DME>50 SQCM: CPT

## 2024-07-16 PROCEDURE — 2580000003 HC RX 258: Performed by: PHYSICAL MEDICINE & REHABILITATION

## 2024-07-16 PROCEDURE — 6360000002 HC RX W HCPCS: Performed by: PHARMACIST

## 2024-07-16 PROCEDURE — 2580000003 HC RX 258: Performed by: PHARMACIST

## 2024-07-16 RX ADMIN — BUPROPION HYDROCHLORIDE 300 MG: 150 TABLET, EXTENDED RELEASE ORAL at 08:52

## 2024-07-16 RX ADMIN — SODIUM CHLORIDE, PRESERVATIVE FREE 10 ML: 5 INJECTION INTRAVENOUS at 09:04

## 2024-07-16 RX ADMIN — COLLAGENASE SANTYL: 250 OINTMENT TOPICAL at 09:03

## 2024-07-16 RX ADMIN — LATANOPROST 1 DROP: 50 SOLUTION OPHTHALMIC at 19:54

## 2024-07-16 RX ADMIN — ATORVASTATIN CALCIUM 10 MG: 10 TABLET, FILM COATED ORAL at 08:52

## 2024-07-16 RX ADMIN — PIPERACILLIN AND TAZOBACTAM 3375 MG: 3; .375 INJECTION, POWDER, FOR SOLUTION INTRAVENOUS at 09:18

## 2024-07-16 RX ADMIN — APIXABAN 5 MG: 5 TABLET, FILM COATED ORAL at 08:52

## 2024-07-16 RX ADMIN — PIPERACILLIN AND TAZOBACTAM 3375 MG: 3; .375 INJECTION, POWDER, FOR SOLUTION INTRAVENOUS at 01:49

## 2024-07-16 RX ADMIN — Medication 1 TABLET: at 08:52

## 2024-07-16 RX ADMIN — SODIUM CHLORIDE, PRESERVATIVE FREE 10 ML: 5 INJECTION INTRAVENOUS at 19:53

## 2024-07-16 RX ADMIN — FLUOXETINE HYDROCHLORIDE 40 MG: 20 CAPSULE ORAL at 08:52

## 2024-07-16 RX ADMIN — TRAZODONE HYDROCHLORIDE 75 MG: 50 TABLET ORAL at 19:50

## 2024-07-16 RX ADMIN — APIXABAN 5 MG: 5 TABLET, FILM COATED ORAL at 19:51

## 2024-07-16 RX ADMIN — OXYCODONE AND ACETAMINOPHEN 1 TABLET: 5; 325 TABLET ORAL at 12:43

## 2024-07-16 RX ADMIN — OXYCODONE AND ACETAMINOPHEN 1 TABLET: 5; 325 TABLET ORAL at 21:45

## 2024-07-16 RX ADMIN — PIPERACILLIN AND TAZOBACTAM 3375 MG: 3; .375 INJECTION, POWDER, FOR SOLUTION INTRAVENOUS at 18:45

## 2024-07-16 RX ADMIN — BISACODYL 5 MG: 5 TABLET, COATED ORAL at 08:52

## 2024-07-16 RX ADMIN — DICLOFENAC SODIUM 4 G: 10 GEL TOPICAL at 19:54

## 2024-07-16 RX ADMIN — PANTOPRAZOLE SODIUM 40 MG: 40 TABLET, DELAYED RELEASE ORAL at 06:33

## 2024-07-16 RX ADMIN — DICLOFENAC SODIUM 4 G: 10 GEL TOPICAL at 09:02

## 2024-07-16 RX ADMIN — MICONAZOLE NITRATE: 20 CREAM TOPICAL at 09:02

## 2024-07-16 ASSESSMENT — PAIN DESCRIPTION - FREQUENCY
FREQUENCY: CONTINUOUS
FREQUENCY: CONTINUOUS

## 2024-07-16 ASSESSMENT — PAIN DESCRIPTION - ORIENTATION
ORIENTATION: LOWER

## 2024-07-16 ASSESSMENT — PAIN - FUNCTIONAL ASSESSMENT
PAIN_FUNCTIONAL_ASSESSMENT: PREVENTS OR INTERFERES SOME ACTIVE ACTIVITIES AND ADLS
PAIN_FUNCTIONAL_ASSESSMENT: PREVENTS OR INTERFERES SOME ACTIVE ACTIVITIES AND ADLS

## 2024-07-16 ASSESSMENT — PAIN DESCRIPTION - PAIN TYPE
TYPE: ACUTE PAIN
TYPE: ACUTE PAIN

## 2024-07-16 ASSESSMENT — PAIN DESCRIPTION - LOCATION
LOCATION: BACK

## 2024-07-16 ASSESSMENT — PAIN SCALES - GENERAL
PAINLEVEL_OUTOF10: 7
PAINLEVEL_OUTOF10: 8
PAINLEVEL_OUTOF10: 7
PAINLEVEL_OUTOF10: 7
PAINLEVEL_OUTOF10: 6
PAINLEVEL_OUTOF10: 5

## 2024-07-16 ASSESSMENT — PAIN DESCRIPTION - DESCRIPTORS
DESCRIPTORS: ACHING;DISCOMFORT;SORE
DESCRIPTORS: ACHING
DESCRIPTORS: ACHING;DISCOMFORT;SORE
DESCRIPTORS: ACHING

## 2024-07-16 ASSESSMENT — PAIN DESCRIPTION - ONSET
ONSET: ON-GOING
ONSET: ON-GOING

## 2024-07-16 NOTE — PROGRESS NOTES
Physical Therapy  Facility/Department: [facility]  Rehabilitation Physical Therapy Treatment Note    NAME: Sudha Lopez  : 1945 (78 y.o.)  MRN: [id number]  CODE STATUS: Full Code    Date of Service: 24       Restrictions:  Restrictions/Precautions: Fall Risk, Up as Tolerated, Weight Bearing  Lower Extremity Weight Bearing Restrictions  Right Lower Extremity Weight Bearing: Weight Bearing As Tolerated  Left Lower Extremity Weight Bearing: Toe Touch Weight Bearing  Required Braces or Orthoses  Left Lower Extremity Brace: Boot  LLE Brace Type: CAM boot  Position Activity Restriction  Other position/activity restrictions: HALLIE drain, ostomy, wound vac, \"Touch down with minimal weight bearing on operative left leg just for balance.\" per orders from [facility]dy     SUBJECTIVE  Subjective  Subjective: pt found resting in bed, agreeable to therapy  Pain: reports 5/10 pain in back            OBJECTIVE  Cognition  Overall Cognitive Status: Exceptions  Arousal/Alertness: Appears intact  Following Commands: Follows one step commands consistently  Attention Span: Appears intact  Memory: Appears intact  Safety Judgement: Decreased awareness of need for safety;Decreased awareness of need for assistance  Problem Solving: Decreased awareness of errors;Assistance required to correct errors made;Assistance required to implement solutions  Insights: Decreased awareness of deficits  Initiation: Does not require cues  Sequencing: Requires cues for some  Orientation  Overall Orientation Status: Within Normal Limits  Orientation Level: Oriented X4    Functional Mobility  Supine to Sit  Assistance Level: Supervision  Skilled Clinical Factors: HOB relatively flat, verbal cues for sequencing  Scooting  Assistance Level: Stand by assist  Skilled Clinical Factors: in bed, WC  Balance  Sitting Balance: Supervision  Standing Balance  Activity: in // bars  Transfers  Surface: From bed;Wheelchair  Additional Factors: Verbal cues;Hand

## 2024-07-16 NOTE — PROGRESS NOTES
Department of Physical Medicine & Rehabilitation  Progress Note    Patient Identification:  Sudha Lopez  [id number]  : 1945  Admit date: 2024    Chief Complaint: Debility    Subjective:   No acute events overnight. Today Sudha is seen with nursing present. She reports that she is sleeping well overnight. She denies acute complaints at this time. She is concerned about being able to take care of herself at home and is interested in SNF.     Reviewed labs.     ROS: No f/c, n/v, cp     Objective:  Patient Vitals for the past 24 hrs:   BP Temp Temp src Pulse Resp SpO2   24 1005 (!) 125/59 -- -- 70 -- 95 %   24 0830 (!) 122/58 97.7 °F (36.5 °C) Oral 76 16 96 %   07/15/24 2035 124/60 98.1 °F (36.7 °C) Oral 72 16 96 %   07/15/24 1340 128/60 -- -- 73 -- 95 %     Const: Alert. No distress, pleasant.   HEENT: Normocephalic, atraumatic. Normal sclera/conjunctiva. MMM.   CV: extremities well perfused  Resp: No respiratory distress.   Abd: post procedure distended   Ext: + edema.   Neuro: Alert, oriented, appropriately interactive.   Psych: Cooperative, appropriate mood and affect    Laboratory data: Available via EMR.   Last 24 hour lab  No results found for this or any previous visit (from the past 24 hour(s)).        Therapy progress:  PT  Required Braces or Orthoses  Left Lower Extremity Brace: Boot  LLE Brace Type: CAM boot  Position Activity Restriction  Other position/activity restrictions: HALLIE drain, ostomy, wound vac, \"Touch down with minimal weight bearing on operative left leg just for balance.\" per orders from rajwinder  Objective     Sit to Stand: 2 Person Assistance, Maximum Assistance  Stand to Sit: 2 Person Assistance, Maximum Assistance  Bed to Chair: 2 Person Assistance, Maximum assistance  Assistance: Unable to assess  OT  PT Equipment Recommendations  Equipment Needed: No  Other: Defer     Assessment        SLP          Body mass index is 28.04 kg/m².    Assessment and  Plan:  Debility  -Decreased functional mobility and ADLs  -Increased sedentary hospital stay  -PT/OT     Intra-abdominal abscess  -Status post subtotal colectomy with ileostomy status post CT-guided left abdominal drain placement by IR on 7/3/2024 and antibiotic treatment with IV Zosyn through 7/31  -Monitor abdominal pain      Anemia  - Hb stable  -Monitor and transfuse for Hb<7     PAF  - on Eliquis    Rt total knee replacement  - Continue Rt TKR protocol with ROM excercises, WBAT    Lt ankle fracture dislocation  - boot   - Touch down with minimal weight bearing on operative left leg just for balance per Ortho    Rehab Progress: SBA -supv for bed mobility   Anticipated Dispo: home  Services/DME: TBD  ELOS: 7/23/2024    Toya eNal MD  PM&R  7/16/2024  11:47 AM

## 2024-07-16 NOTE — PLAN OF CARE
Problem: Discharge Planning  Goal: Discharge to home or other facility with appropriate resources  7/15/2024 2036 by Jyothi Garza RN  Outcome: Progressing  Flowsheets (Taken 7/14/2024 2128 by Ermelinda Rutledge, RN)  Discharge to home or other facility with appropriate resources: Identify barriers to discharge with patient and caregiver     Problem: Safety - Adult  Goal: Free from fall injury  7/15/2024 2036 by Jyothi Garza RN  Outcome: Progressing  Flowsheets (Taken 7/14/2024 2140 by Ermelinda Rutledge, RN)  Free From Fall Injury: Instruct family/caregiver on patient safety     Problem: Pain  Goal: Verbalizes/displays adequate comfort level or baseline comfort level  7/15/2024 2036 by Jyothi Garza RN  Outcome: Progressing  Flowsheets (Taken 7/15/2024 2036)  Verbalizes/displays adequate comfort level or baseline comfort level:   Encourage patient to monitor pain and request assistance   Assess pain using appropriate pain scale   Administer analgesics based on type and severity of pain and evaluate response   Implement non-pharmacological measures as appropriate and evaluate response     Problem: Pain  Goal: Verbalizes/displays adequate comfort level or baseline comfort level  7/15/2024 1053 by James Hudson RN  Outcome: Progressing     Problem: Skin/Tissue Integrity  Goal: Absence of new skin breakdown  Description: 1.  Monitor for areas of redness and/or skin breakdown  2.  Assess vascular access sites hourly  3.  Every 4-6 hours minimum:  Change oxygen saturation probe site  4.  Every 4-6 hours:  If on nasal continuous positive airway pressure, respiratory therapy assess nares and determine need for appliance change or resting period.  7/15/2024 2036 by Jyothi Garza, RN  Outcome: Progressing     Problem: Nutrition Deficit:  Goal: Optimize nutritional status  7/15/2024 2036 by Jyothi Garza, RN  Outcome: Progressing  Flowsheets (Taken 7/9/2024 1251 by Shante Cabrera, MS, RD, LD)  Nutrient intake appropriate for  improving, restoring, or maintaining nutritional needs:   Assess nutritional status and recommend course of action   Monitor oral intake, labs, and treatment plans   Recommend appropriate diets, oral nutritional supplements, and vitamin/mineral supplements     Problem: ABCDS Injury Assessment  Goal: Absence of physical injury  7/15/2024 2036 by Jyothi Garza RN  Outcome: Progressing  Flowsheets (Taken 7/14/2024 2140 by Ermelinda Rutledge, RN)  Absence of Physical Injury: Implement safety measures based on patient assessment     Problem: Neurosensory - Adult  Goal: Achieves stable or improved neurological status  7/15/2024 2036 by Jyothi Garza RN  Outcome: Progressing  Flowsheets (Taken 7/14/2024 2128 by Ermelinda Rutledge, RN)  Achieves stable or improved neurological status: Assess for and report changes in neurological status     Problem: Respiratory - Adult  Goal: Achieves optimal ventilation and oxygenation  7/15/2024 2036 by Jyothi Garza RN  Outcome: Progressing  Flowsheets (Taken 7/15/2024 2036)  Achieves optimal ventilation and oxygenation:   Assess for changes in respiratory status   Assess for changes in mentation and behavior   Position to facilitate oxygenation and minimize respiratory effort     Problem: Skin/Tissue Integrity - Adult  Goal: Skin integrity remains intact  7/15/2024 2036 by Jyothi Garza RN  Outcome: Progressing  Flowsheets (Taken 7/15/2024 2036)  Skin Integrity Remains Intact:   Monitor for areas of redness and/or skin breakdown   Assess vascular access sites hourly     Problem: Musculoskeletal - Adult  Goal: Return mobility to safest level of function  7/15/2024 1053 by James Hudson, RN  Outcome: Progressing     Problem: Gastrointestinal - Adult  Goal: Minimal or absence of nausea and vomiting  7/15/2024 1053 by James Hudson, RN  Outcome: Progressing

## 2024-07-16 NOTE — PROGRESS NOTES
Occupational Therapy  Facility/Department: [facility]  Rehabilitation Occupational Therapy Daily Treatment Note    Date: 24  Patient Name: Sudha Lopez       Room: 0164/0164-01  MRN: [id number]  Account: [id number]   : 1945  (78 y.o.) Gender: female                    Past Medical History:  has a past medical history of Anxiety and Hypertension.  Past Surgical History:   has a past surgical history that includes Hysterectomy; Breast enhancement surgery; rhinoplasty; Rotator cuff repair (Bilateral); Hand surgery (Right); Total hip arthroplasty (Bilateral); Intracapsular cataract extraction (Left, 2020); Colonoscopy; Intracapsular cataract extraction (Right, 2020); Colonoscopy (N/A, 2024); laparotomy (N/A, 2024); and CT PERITONEAL/RETROPERITONEAL PERC DRAIN (7/3/2024).    Restrictions  Restrictions/Precautions: Fall Risk, Up as Tolerated, Weight Bearing  Other position/activity restrictions: HALLIE drain, ostomy, wound vac, \"Touch down with minimal weight bearing on operative left leg just for balance.\" per orders from [facility]  Right Lower Extremity Weight Bearing: Weight Bearing As Tolerated  Left Lower Extremity Weight Bearing: Toe Touch Weight Bearing  Required Braces or Orthoses  Left Lower Extremity Brace: Boot  LLE Brace Type: CAM boot  Required Braces or Orthoses?: Yes    Subjective     Restrictions/Precautions: Fall Risk;Up as Tolerated;Weight Bearing             Objective     Cognition  Overall Cognitive Status: Exceptions  Arousal/Alertness: Appears intact  Following Commands: Follows one step commands consistently  Attention Span: Appears intact  Memory: Appears intact  Safety Judgement: Decreased awareness of need for safety;Decreased awareness of need for assistance  Problem Solving: Decreased awareness of errors;Assistance required to correct errors made;Assistance required to implement solutions  Insights: Decreased awareness of deficits  Initiation: Does not require  x3 in // bars with min Ax2. Pt able to amb forward, unable to amb backwards. Pt with 1 c/o dizziness following first stand, symptoms resolved after ~2 mins. Continue OT POC.  Activity Tolerance: Patient tolerated treatment well  Discharge Recommendations: Home with Home health OT;Continue to assess pending progress  OT Equipment Recommendations  Equipment Needed: Yes  Mobility Devices: ADL Assistive Devices  ADL Assistive Devices: Transfer Tub Bench;Hand-held Shower;Sock-Aid Hard;Reacher;Long-handled Shoe Horn;Long-handled Sponge;Toileting - Drop Arm Commode, Heavy Duty Drop Arm Commode;Grab Bars - toilet  Safety Devices  Safety Devices in place: Yes  Type of devices: All fall risk precautions in place;Gait belt;Nurse notified;Call light within reach  Restraints  Initially in place: No    Second Session:  Pt semi-supine in bed, reports difficulty with seal on ostomy bag again. Requests bed level activities to prevent leaking.  2x20 BUE ex with 1.5# dowel gracy and 2# med ball: elbow flex/ext, chest press, trunk rotations, int/ext rotation, arm circles forward/backward. Pt limited in BUE movement d/t prior shoulder injuries.       Patient Education  Education  Education Given To: Patient  Education Provided: Role of Therapy;Plan of Care;Transfer Training;Precautions;Mobility Training;Safety;ADL Function;Energy Conservation;Fall Prevention Strategies  Education Provided Comments: POC, weight shifting, building endurance in BLE for transfers, hand placement for STS  Education Method: Verbal  Barriers to Learning: None  Education Outcome: Verbalized understanding;Continued education needed    Plan  Occupational Therapy Plan  Times Per Week: 5-7x/week  Current Treatment Recommendations: Strengthening;Balance training;Functional mobility training;Endurance training;Safety education & training;Self-Care / ADL;Home management training;Wheelchair mobility training;Equipment evaluation, education, &

## 2024-07-16 NOTE — PROGRESS NOTES
Occupational Therapy  Facility/Department: [facility]  Rehabilitation Occupational Therapy Daily Treatment Note    Date: 24  Patient Name: Sudha Lopez       Room: 0164/0164-01  MRN: [id number]  Account: [id number]   : 1945  (78 y.o.) Gender: female                    Past Medical History:  has a past medical history of Anxiety and Hypertension.  Past Surgical History:   has a past surgical history that includes Hysterectomy; Breast enhancement surgery; rhinoplasty; Rotator cuff repair (Bilateral); Hand surgery (Right); Total hip arthroplasty (Bilateral); Intracapsular cataract extraction (Left, 2020); Colonoscopy; Intracapsular cataract extraction (Right, 2020); Colonoscopy (N/A, 2024); laparotomy (N/A, 2024); and CT PERITONEAL/RETROPERITONEAL PERC DRAIN (7/3/2024).    Restrictions  Restrictions/Precautions: Fall Risk, Up as Tolerated, Weight Bearing  Other position/activity restrictions: HALLIE drain, ostomy, wound vac, \"Touch down with minimal weight bearing on operative left leg just for balance.\" per orders from [facility]  Right Lower Extremity Weight Bearing: Weight Bearing As Tolerated  Left Lower Extremity Weight Bearing: Toe Touch Weight Bearing  Required Braces or Orthoses  Left Lower Extremity Brace: Boot  LLE Brace Type: CAM boot  Required Braces or Orthoses?: Yes    Subjective  Subjective: Pt seated in w/c at start of session. Agreeable to OT/PT cotx. Reports 6/10 pain in back.  Restrictions/Precautions: Fall Risk;Up as Tolerated;Weight Bearing             Objective   Vital Signs  Pulse: 70  BP: (!) 125/59  MAP (Calculated): 81  SpO2: 95 %  O2 Device: None (Room air)    Cognition  Overall Cognitive Status: Exceptions  Arousal/Alertness: Appears intact  Following Commands: Follows one step commands consistently  Attention Span: Appears intact  Memory: Appears intact  Safety Judgement: Decreased awareness of need for safety;Decreased awareness of need for assistance  Problem

## 2024-07-16 NOTE — PROGRESS NOTES
Physical Therapy  Facility/Department: [facility]  Rehabilitation Physical Therapy Treatment Note    NAME: Sudha Lopez  : 1945 (78 y.o.)  MRN: [id number]  CODE STATUS: Full Code    Date of Service: 24       Restrictions:  Restrictions/Precautions: Fall Risk, Up as Tolerated, Weight Bearing  Lower Extremity Weight Bearing Restrictions  Right Lower Extremity Weight Bearing: Weight Bearing As Tolerated  Left Lower Extremity Weight Bearing: Toe Touch Weight Bearing  Required Braces or Orthoses  Left Lower Extremity Brace: Boot  LLE Brace Type: CAM boot  Position Activity Restriction  Other position/activity restrictions: HALLIE drain, ostomy, wound vac, \"Touch down with minimal weight bearing on operative left leg just for balance.\" per orders from [facility]     SUBJECTIVE  Subjective  Subjective: pt found resting in bed, agreeable to therapy  Pain: reports extrene pain in back 7/10; nurse notified and pain meds given at beginning of session        OBJECTIVE  Cognition  Overall Cognitive Status: Exceptions  Arousal/Alertness: Appears intact  Following Commands: Follows one step commands consistently  Attention Span: Appears intact  Memory: Appears intact  Safety Judgement: Decreased awareness of need for safety;Decreased awareness of need for assistance  Problem Solving: Decreased awareness of errors;Assistance required to correct errors made;Assistance required to implement solutions  Insights: Decreased awareness of deficits  Initiation: Does not require cues  Sequencing: Requires cues for some  Orientation  Overall Orientation Status: Within Normal Limits  Orientation Level: Oriented X4    Functional Mobility  Sit to Supine  Assistance Level: Supervision  Skilled Clinical Factors: increased time, HOB flat  Supine to Sit  Assistance Level: Supervision  Skilled Clinical Factors: HOB flat, verbal cues for sequencing  Standing Balance  Time: x1 minute duration  Activity: in // bars with B UE support on bars.

## 2024-07-16 NOTE — PROGRESS NOTES
Comprehensive Nutrition Assessment    Type and Reason for Visit:  Reassess    Nutrition Recommendations/Plan:   Continue general diet   Modify ONS to Magic Cups - monitor acceptance   Encourage PO intakes as tolerated   Monitor nutrition adequacy, pertinent labs, bowel habits, wt changes, and clinical progress     Malnutrition Assessment:  Malnutrition Status:  Moderate malnutrition (07/09/24 1259)    Context:  Acute Illness     Findings of the 6 clinical characteristics of malnutrition:  Energy Intake:  75% or less of estimated energy requirements for 7 or more days  Weight Loss:  Greater than 5% over 1 month     Muscle Mass Loss:  Mild muscle mass loss Temples (temporalis), Clavicles (pectoralis & deltoids)    Nutrition Assessment:    Follow up: Pt unavailable this date, with other providers. Ileostomy leaking today and over the weekend. PO intakes improving since admission, now %. 0% ONS recorded in EMR, RD to trial other ONS for higher protein needs with wounds and wound vac. Weights labile in EMR. 22 lb weight loss in 6 days, question accuracy of weights. Will monitor further nutritional needs.    Nutrition Related Findings:    Na 135. Active BS. +Ileostomy with output. Trace BUE and BLE edema. Wound Type: Pressure Injury, Unstageable, Wound Vac, Surgical Incision       Current Nutrition Intake & Therapies:    Average Meal Intake: 26-50%, %  Average Supplements Intake: 0%  ADULT DIET; Regular  ADULT ORAL NUTRITION SUPPLEMENT; Breakfast, Dinner; Standard High Calorie/High Protein Oral Supplement    Anthropometric Measures:  Height: 160 cm (5' 3\")  Ideal Body Weight (IBW): 115 lbs (52 kg)       Current Body Weight: 71.7 kg (158 lb), 156.5 % IBW. Weight Source: Bed Scale  Current BMI (kg/m2): 28  Usual Body Weight: 89.8 kg (198 lb) (bed scale 6/3/24)  % Weight Change (Calculated): -9.1                    BMI Categories: Overweight (BMI 25.0-29.9)    Estimated Daily Nutrient Needs:  Energy Requirements

## 2024-07-16 NOTE — PATIENT CARE CONFERENCE
[facility]  Inpatient Rehabilitation  Weekly Team Conference Note    Date: 2024  Patient Name: Sudha Lopez        MRN: [id number]    : 1945  (78 y.o.)  Gender: female   Referring Practitioner: Beck Graves DO         Interventions to be utilized toward barriers to discharge, per discipline:  NURSING  Nursing observed barriers to dc: Pain, Decreased motivation, Decreased endurance, Upper extremity weakness, Lower extremity weakness, Skin Care, Wound Care, ileostomy care/managment and Medication managment  Nursing interventions: Assist with ADLs, medication management, wound vac management, IV ABT, ileostomy care/management  Family Education: No  Fall Risk:  Yes    Stay with me?: No    PHYSICAL THERAPY  Physical therapy observed barriers to dc:    Baseline: IND with ambulation, IND with transfers, lives alone in two level condo with typical flight to get to second level, level entry to get inside condo.    Current level: supv for bed mobility, Min A x1 for SPT with RW, Min A x2 for STS transfers, ambulates 5 ft in // bars with Min A, cues to maintain TTWB on LLE    Barriers to DC: TTWB on LLE, two story condo with flight of stairs, decreased endurance, lives alone    Needs in order to achieve dc home/next level of care:  supv, SNF depending on progressions, CTA for AD       Physical therapy interventions:   Current Treatment Recommendations: ROM, Balance training, Functional mobility training, Home exercise program, Therapeutic activities, Co-Treatment, Safety education & training, Patient/Caregiver education & training, Transfer training, Endurance training, Pain management, Strengthening, Wheelchair mobility training, Gait training, Stair training, ADL/Self-care training, IADL training, Neuromuscular re-education    PT Goals:            Short Term Goals  Time Frame for Short Term Goals: 10 days ()  Short Term Goal 1: Patient will perform bed mobility with CGA - MET  Co-Treatment, Patient/Caregiver education & training    OT Goals:  Patient Goals   Patient goals : \"build my strength, be more mobile, feel safe doing things IND\"  Short Term Goals  Time Frame for Short Term Goals: 10 days (7/17/24)  Short Term Goal 1: Pt will complete fxl/toilet transfer min A  Short Term Goal 2: Pt will complete LB dressing mod A  Short Term Goal 3: Pt will complete 1-2 grooming tasks in stance at sink with min A  Short Term Goal 4: Pt will complete TB bathing min A and AE  Long Term Goals  Time Frame for Long Term Goals : 20 days (7/27/24)  Long Term Goal 1: Pt will complete fxl/toilet transfer CGA  Long Term Goal 2: Pt will complete LB dressing SBA  Long Term Goal 3: Pt will complete 1-2 grooming tasks in stance at sink with CGA  Long Term Goal 4: Pt will complete TB bathing SBA and AE  Long Term Goal 5: Pt will complete simple IADL SPV    OT Assessment:  Co-tx collaboration this date to safely meet goals and will have better occupational performance outcomes with in a co-treatment than 1:1 session. Pt limited during first half hour of session d/t ostomy bag leaking. Pt max A for LB dressing while seated EOB. Pt completed stand pivot with RW and mod Ax2 to manage IV pole, RW, and wound vac. Provided vbc's for pt to turn all the way with RW prior to sitting down. Ptstood x3 in // bars with min Ax2. Pt able to amb forward, unable to amb backwards. Pt with 1 c/o dizziness following first stand, symptoms resolved after ~2 mins. Continue OT POC.     SPEECH THERAPY (intentionally left blank if not actively being seen by this service):  Speech therapy observed barriers to dc:        NUTRITION  Weight - Scale: 71.8 kg (158 lb 4.6 oz) / Body mass index is 28.04 kg/m².  Diet Order: ADULT DIET; Regular  ADULT ORAL NUTRITION SUPPLEMENT; Lunch, Dinner; Frozen Oral Supplement  PO Meals Eaten (%): 51 - 75%  Malnutrition Status: Moderate malnutrition  Nutrition Education/Counseling: No recommendation at this

## 2024-07-16 NOTE — CARE COORDINATION
CM update: LOS # 8; Call placed to admissions at [facility] to follow up on referral made yesterday. All contact information left for a return call. Will follow.Kristen Hamilton RN      1521 Return call received and I have been asked to fax over the face sheet so they can review for possible acceptance. Will follow.Kristen Hamilton RN      9531 Received call back from [facility] they have declined admission.Kristen Hamilton RN

## 2024-07-16 NOTE — PROGRESS NOTES
Patient's Ileostomy leaking into mid line incision and out to sides and underneath.  Removed old dressing of both NPWT and Ileosotmy.    Cleansed with moist wash cloth and applied spout around stoma  Red convex wafer # [id number] with barrier strip to seal inner edges.  Attached pouch Red [id number].    Barrier extenders added to sides.  New dressing to NPWT 1 white sponge into wound and covered with black covering areas. Suction applied.  Continuous 125 mmHg .

## 2024-07-16 NOTE — PROGRESS NOTES
Received patient on bed, alert and oriented x4. Vitals are stable. Take pills whole. Presence of ostomy on the RUQ. Complaints of abdo and back pain 7/10, PRN percocet given.    Call bell within reach. Pure wick in place. Calls out appropriately. Maintained a calm and comfortable environment. Shift assessment updated and documented.

## 2024-07-16 NOTE — PROGRESS NOTES
07/16/24 1027   Ileostomy/Jejunostomy RUQ Ileostomy   Placement Date: 06/02/24   Present on Admission/Arrival: No  Location: RUQ  Ostomy Type: Ileostomy   Stomal Appliance 2 piece   Stoma  Assessment Protrudes;Red   Peristomal Assessment Red   Mucocutaneous Junction Intact   Treatment Pouch change;Site care   Stool Appearance Soft   Stool Color Brown   Stool Amount Small   Output (mL) 150 ml     Wound Care in unit & asked to change appliance to Ileostomy due to leakage.  After cleaning area. Provided site care of stoma powder and dabbed with barrier film.  Then applied Adhesive paste (Nu-Hope) over crust.  Placed Yamilet wafer [id number] Red then pouch roll drainable straight down # [id number] click.     NPWT continues to suction.  Added extra barrier vac drape to side with stoma.  Requested call if Vac alarm starts or ostomy leaks.

## 2024-07-17 ENCOUNTER — APPOINTMENT (OUTPATIENT)
Dept: GENERAL RADIOLOGY | Age: 79
DRG: 948 | End: 2024-07-17
Attending: PHYSICAL MEDICINE & REHABILITATION
Payer: MEDICARE

## 2024-07-17 PROCEDURE — 97530 THERAPEUTIC ACTIVITIES: CPT

## 2024-07-17 PROCEDURE — 2580000003 HC RX 258: Performed by: PHYSICAL MEDICINE & REHABILITATION

## 2024-07-17 PROCEDURE — 2580000003 HC RX 258: Performed by: PHARMACIST

## 2024-07-17 PROCEDURE — 6370000000 HC RX 637 (ALT 250 FOR IP): Performed by: PHYSICAL MEDICINE & REHABILITATION

## 2024-07-17 PROCEDURE — 97606 NEG PRS WND THER DME>50 SQCM: CPT

## 2024-07-17 PROCEDURE — 97110 THERAPEUTIC EXERCISES: CPT

## 2024-07-17 PROCEDURE — 72100 X-RAY EXAM L-S SPINE 2/3 VWS: CPT

## 2024-07-17 PROCEDURE — 1280000000 HC REHAB R&B

## 2024-07-17 PROCEDURE — 6360000002 HC RX W HCPCS: Performed by: PHARMACIST

## 2024-07-17 PROCEDURE — 97535 SELF CARE MNGMENT TRAINING: CPT

## 2024-07-17 RX ORDER — ACETAMINOPHEN 650 MG
TABLET, EXTENDED RELEASE ORAL DAILY
Status: DISCONTINUED | OUTPATIENT
Start: 2024-07-17 | End: 2024-07-23 | Stop reason: HOSPADM

## 2024-07-17 RX ADMIN — APIXABAN 5 MG: 5 TABLET, FILM COATED ORAL at 09:04

## 2024-07-17 RX ADMIN — ATORVASTATIN CALCIUM 10 MG: 10 TABLET, FILM COATED ORAL at 09:04

## 2024-07-17 RX ADMIN — SODIUM CHLORIDE, PRESERVATIVE FREE 10 ML: 5 INJECTION INTRAVENOUS at 09:27

## 2024-07-17 RX ADMIN — TRAZODONE HYDROCHLORIDE 75 MG: 50 TABLET ORAL at 21:42

## 2024-07-17 RX ADMIN — OXYCODONE AND ACETAMINOPHEN 1 TABLET: 5; 325 TABLET ORAL at 21:42

## 2024-07-17 RX ADMIN — Medication 1 TABLET: at 09:04

## 2024-07-17 RX ADMIN — PIPERACILLIN AND TAZOBACTAM 3375 MG: 3; .375 INJECTION, POWDER, FOR SOLUTION INTRAVENOUS at 22:09

## 2024-07-17 RX ADMIN — BUPROPION HYDROCHLORIDE 300 MG: 150 TABLET, EXTENDED RELEASE ORAL at 09:04

## 2024-07-17 RX ADMIN — PIPERACILLIN AND TAZOBACTAM 3375 MG: 3; .375 INJECTION, POWDER, FOR SOLUTION INTRAVENOUS at 01:40

## 2024-07-17 RX ADMIN — DICLOFENAC SODIUM 4 G: 10 GEL TOPICAL at 21:45

## 2024-07-17 RX ADMIN — OXYCODONE AND ACETAMINOPHEN 1 TABLET: 5; 325 TABLET ORAL at 15:48

## 2024-07-17 RX ADMIN — OXYCODONE AND ACETAMINOPHEN 1 TABLET: 5; 325 TABLET ORAL at 09:04

## 2024-07-17 RX ADMIN — FLUOXETINE HYDROCHLORIDE 40 MG: 20 CAPSULE ORAL at 09:04

## 2024-07-17 RX ADMIN — SODIUM CHLORIDE, PRESERVATIVE FREE 10 ML: 5 INJECTION INTRAVENOUS at 21:00

## 2024-07-17 RX ADMIN — APIXABAN 5 MG: 5 TABLET, FILM COATED ORAL at 21:42

## 2024-07-17 RX ADMIN — PANTOPRAZOLE SODIUM 40 MG: 40 TABLET, DELAYED RELEASE ORAL at 05:55

## 2024-07-17 RX ADMIN — BISACODYL 5 MG: 5 TABLET, COATED ORAL at 09:04

## 2024-07-17 RX ADMIN — LATANOPROST 1 DROP: 50 SOLUTION OPHTHALMIC at 21:45

## 2024-07-17 RX ADMIN — PIPERACILLIN AND TAZOBACTAM 3375 MG: 3; .375 INJECTION, POWDER, FOR SOLUTION INTRAVENOUS at 12:22

## 2024-07-17 ASSESSMENT — PAIN DESCRIPTION - LOCATION
LOCATION: BACK
LOCATION: BACK;ABDOMEN
LOCATION: BACK

## 2024-07-17 ASSESSMENT — PAIN DESCRIPTION - ORIENTATION
ORIENTATION: LEFT;LOWER
ORIENTATION: LOWER
ORIENTATION: LOWER

## 2024-07-17 ASSESSMENT — PAIN DESCRIPTION - FREQUENCY: FREQUENCY: CONTINUOUS

## 2024-07-17 ASSESSMENT — PAIN DESCRIPTION - PAIN TYPE: TYPE: ACUTE PAIN

## 2024-07-17 ASSESSMENT — PAIN DESCRIPTION - DESCRIPTORS
DESCRIPTORS: ACHING;PENETRATING
DESCRIPTORS: ACHING;PENETRATING
DESCRIPTORS: DULL;ACHING

## 2024-07-17 ASSESSMENT — PAIN SCALES - GENERAL
PAINLEVEL_OUTOF10: 6
PAINLEVEL_OUTOF10: 3
PAINLEVEL_OUTOF10: 0
PAINLEVEL_OUTOF10: 7
PAINLEVEL_OUTOF10: 0
PAINLEVEL_OUTOF10: 3
PAINLEVEL_OUTOF10: 7

## 2024-07-17 ASSESSMENT — PAIN DESCRIPTION - ONSET: ONSET: ON-GOING

## 2024-07-17 NOTE — CARE COORDINATION
Weekly team care conference with interdisciplinary team on:  7/17/24  ?   Chart reviewed for length of stay. IP day # 9 Unit:  [facility]  Diagnosis and current status as per MD progress:  Debility  Consultants Following: Wound Care; General surgery , Dietician  Planned Discharge Date:  7/23/24  Durable medical equipment needed:  TBD  Discharge Plan:  Home with HHC vs SNF placement    .Kristen Hamilton RN

## 2024-07-17 NOTE — PROGRESS NOTES
[facility] Nurse  Follow-up Progress Note       NAME:  Sudha Lopez  MEDICAL RECORD NUMBER:  [id number]  AGE:  78 y.o.   GENDER:  female  :  1945  TODAY'S DATE:  2024    Subjective: Leaking again.     Wound Identification:  Wound Type: Surgical mid line abd incision line continued to be connected to distal incision with a tunnel @ 8.5 cm from 600 pm pointing toward 12 am. Mid area (naval) open and with deep crevice. Negative pressure wound therapy dressing re-applied due to leakage of stool on dressing and NPWT dressing. Left heel unstagable (not witnessed today).     @ 24 hours wear time of current pouch - plan to change treatment again today in effort to secure seal.    Existing ileostomy: Subtotal colectomy with ileostomy on 24 for bowel obstruction by Dr Dung Jackson. Status post CT guided left abdominal drain placement by IR on 7/3/2024 for abscess.      Stoma size:  25 = 1 inch mm x 30 mm 1 3/16 inch. PLEASE DO NOT THROW AWAY PATTERN.   Appliance size: Changed to Yamilet 2 piece RED deep convex flange # [id number] with lock and roll bag # [id number] applied in hopes to assist from leaking. Crust with light dusting of powder, rub in, seal with dabbing barrier wipe over powder. [employer] Paste ring # [id number] placed around stoma. Paste in 300 & 900 pm positions. Mid incision barrier paste strip to cover naval to make flat pouching surface.  Barrier extenders around stoma.  Belt added to help secure pouch and prevent leaking. DO NOT THROW AWAY BELT.  If soiled it can be washed.   If no convex ostomy barriers or paste rings/strips in room, call clinical to obtain from wound/ostomy closet on unit B3.         Patient Goal of Care:  [x] Wound Healing  [] Odor Control   [] Palliative Care  [] Pain Control   [x] Other: prevent leaking of ostomy pouch    Objective: Stool thicker today but not going into pouch, sitting below pouch under flange.     /72

## 2024-07-17 NOTE — PROGRESS NOTES
Brief Nutrition Assessment    Nutrition Recommendations/Plan:   Continue general diet   Continue Magic Cups - monitor acceptance   Monitor nutrition adequacy, pertinent labs, bowel habits, wt changes, and clinical progress     Nutrition Assessment:    Visited with pt today. Pt reports appetite improving this admission. Reports taste improving, helping PO intakes. Intakes improving per EMR. Encouraged pt to try Magic Cups, pt agreeable. Will monitor acceptance to new ONS. Will monitor further nutritional needs.     Current Nutrition Intake & Therapies:    Average Meal Intake: 26-50%, %  Average Supplements Intake: 0%  ADULT DIET; Regular  ADULT ORAL NUTRITION SUPPLEMENT; Lunch, Dinner; Frozen Oral Supplement    Anthropometric Measures:  Height: 160 cm (5' 3\")  Ideal Body Weight (IBW): 115 lbs (52 kg)       Current Body Weight: 71.7 kg (158 lb), 156.5 % IBW. Weight Source: Bed Scale  Current BMI (kg/m2): 28  Usual Body Weight: 89.8 kg (198 lb) (bed scale 6/3/24)  % Weight Change (Calculated): -9.1                    BMI Categories: Overweight (BMI 25.0-29.9)    Estimated Daily Nutrient Needs:  Energy Requirements Based On: Kcal/kg (25-30)  Weight Used for Energy Requirements: Ideal  Energy (kcal/day): 9278-7321 kcal  Weight Used for Protein Requirements: Ideal (1.2-1.5 g/kg)  Protein (g/day): 62-78 g  Method Used for Fluid Requirements: 1 ml/kcal  Fluid (ml/day): 1249-7720 mL    Shante Cabrera, MS, RD, LD  Contact: [phone]

## 2024-07-17 NOTE — PROGRESS NOTES
with plastic bag under LEs to reduce friction x 10 reps; performed B UE scaption with shoulder retraction x 10 reps, ER with red tband x 15 reps , white weight bar for bicep curls with over and under hand  x 10 reps each, 2lb weight ball hold and turn with breathing x 5 reps with each hand on top  PROM Exercises: B ankle DF stretch x 3 x 20secs each  Postural Correction Exercises: at end of session placed pillow at foot to facilitate L ankle DF as pt's foot rests in extreme PF      ASSESSMENT/PROGRESS TOWARDS GOALS       Assessment  Assessment: pt agreeable to supine therex due to seal leak on wound vac currently, unable to long sit in bed, pt tolerated all therex including B UE therex per pt request, focused on B rotator cuff issues, pt receptive to all therex and performed without issue, pt lacks L ankle DF approx 10 degrees, focused on stretching as well as strengthening for ant tib  Activity Tolerance: Patient tolerated treatment well  Discharge Recommendations: 24 hour supervision or assist;Home with Home health PT      Addendum Second Session: (Mikal Perez, PT, DPT)   Pt found in bed with OT on arrival, agreeable to therapy   Reporting 4-5/10 low back pain     Performed supine to sit with supv towards EOB while OT helped pt with donning pants     Vitals assessed in sitting EOB:    90 bpm, 96% on RA, 123/59    STS from EOB with mod-max Ax2 up to RW - assisted with pulling up and tieing pants     Min Ax2 for SPT with RW towards R from bed to WC     Wheeled pt into bathroom, performed SPT with grab bars (no RW) with min Ax2 towards the L from WC to toilet     STS on toilet with grab bar and WC armrest with CGA x2 for doffing pants, pt uses restroom and performs mariama care independently    STS from toilet with grab bar and WC armrest again with CGAx2 to pull up pants, min Ax2 for SPT back to WC towards the R with grab bars     WC mobility:    Pt propels WC with BUE while holding LLE in knee extension (per pt  Patient will be independent with wheelchair mobility 150ft  Long Term Goal 4: Continue to assess for ambulation LTG    PLAN OF CARE/SAFETY  Physical Therapy Plan  General Plan: 5-7 times per week  Days Per Week: 5 Days  Hours Per Day: 1.5 hours  Therapy Duration: 3 Weeks  Current Treatment Recommendations: ROM;Balance training;Functional mobility training;Home exercise program;Therapeutic activities;Co-Treatment;Safety education & training;Patient/Caregiver education & training;Transfer training;Endurance training;Pain management;Strengthening;Wheelchair mobility training;Gait training;Stair training;ADL/Self-care training;IADL training;Neuromuscular re-education  Safety Devices  Type of Devices: All fall risk precautions in place;Patient at risk for falls;Nurse notified;Call light within reach;Gait belt;Left in bed;Bed alarm in place    EDUCATION  Education  Education Given To: Patient  Education Provided: Safety;Role of Therapy;Transfer Training;Mobility Training  Education Provided Comments: safety with transfers, importance of energy conservation and sequencing, importance of maintaining TTWB on LLE  Education Method: Verbal  Barriers to Learning: None  Education Outcome: Verbalized understanding;Continued education needed        Therapy Time   Individual Concurrent Group Co-treatment   Time In 0930         Time Out 1000         Minutes 30           Timed Code Treatment Minutes: 30 Minutes       Victorina Paul PT, 07/17/24 at 11:50 AM       Second Session Therapy Time: Mikal Perez PT, DPT for second session only    Individual Concurrent Group Co-treatment   Time In      1400   Time Out      1500   Minutes      60     Timed Code Treatment Minutes:  60    Total Treatment Minutes:  90

## 2024-07-17 NOTE — PLAN OF CARE
Pain is currently being managed with PO medications, see MAR. Staff assist with repositioning when needed and pillow support is provided for comfort. Patient is satisfied with current pain interventions.

## 2024-07-17 NOTE — PROGRESS NOTES
Occupational Therapy  Facility/Department: [facility]  Rehabilitation Occupational Therapy Daily Treatment Note    Date: 24  Patient Name: Sudha Lopez       Room: 0164/0164-01  MRN: [id number]  Account: [id number]   : 1945  (78 y.o.) Gender: female                    Past Medical History:  has a past medical history of Anxiety and Hypertension.  Past Surgical History:   has a past surgical history that includes Hysterectomy; Breast enhancement surgery; rhinoplasty; Rotator cuff repair (Bilateral); Hand surgery (Right); Total hip arthroplasty (Bilateral); Intracapsular cataract extraction (Left, 2020); Colonoscopy; Intracapsular cataract extraction (Right, 2020); Colonoscopy (N/A, 2024); laparotomy (N/A, 2024); and CT PERITONEAL/RETROPERITONEAL PERC DRAIN (7/3/2024).    Restrictions  Restrictions/Precautions: Fall Risk, Up as Tolerated, Weight Bearing  Other position/activity restrictions: HALLIE drain, ostomy, wound vac, \"Touch down with minimal weight bearing on operative left leg just for balance.\" per orders from [facility]  Right Lower Extremity Weight Bearing: Weight Bearing As Tolerated  Left Lower Extremity Weight Bearing: Toe Touch Weight Bearing  Required Braces or Orthoses  Left Lower Extremity Brace: Boot  LLE Brace Type: CAM boot  Required Braces or Orthoses?: Yes    Subjective  Subjective: Pt semi-supine in bed, agreeable to OT tx. 5/10 pain in back. /59.  Restrictions/Precautions: Fall Risk;Up as Tolerated;Weight Bearing             Objective     Cognition  Overall Cognitive Status: Exceptions  Arousal/Alertness: Appears intact  Following Commands: Follows one step commands consistently  Attention Span: Appears intact  Memory: Appears intact  Safety Judgement: Decreased awareness of need for safety;Decreased awareness of need for assistance  Problem Solving: Decreased awareness of errors;Assistance required to correct errors made;Assistance required to implement  Education  Education  Education Given To: Patient  Education Provided: Role of Therapy;Plan of Care;Transfer Training;Precautions;Mobility Training;Safety;ADL Function;Energy Conservation;Fall Prevention Strategies  Education Provided Comments: POC, weight shifting, building endurance in BUE for transfers, hand placement for STS with RW and grab bars,  Education Method: Verbal  Barriers to Learning: None  Education Outcome: Verbalized understanding;Continued education needed    Plan  Occupational Therapy Plan  Times Per Week: 5-7x/week  Current Treatment Recommendations: Strengthening;Balance training;Functional mobility training;Endurance training;Safety education & training;Self-Care / ADL;Home management training;Wheelchair mobility training;Equipment evaluation, education, & procurement;Co-Treatment;Patient/Caregiver education & training    Goals  Patient Goals   Patient goals : \"build my strength, be more mobile, feel safe doing things IND\"  Short Term Goals  Time Frame for Short Term Goals: 10 days (7/17/24)  Short Term Goal 1: Pt will complete fxl/toilet transfer min A- Not Met, pt min Ax2  Short Term Goal 2: Pt will complete LB dressing mod A- MET 7/17  Short Term Goal 3: Pt will complete 1-2 grooming tasks in stance at sink with min A- Not Met  Short Term Goal 4: Pt will complete TB bathing min A and AE- MET 7/17 pt setup A  Long Term Goals  Time Frame for Long Term Goals : 20 days (7/27/24)  Long Term Goal 1: Pt will complete fxl/toilet transfer CGA  Long Term Goal 2: Pt will complete LB dressing SBA  Long Term Goal 3: Pt will complete 1-2 grooming tasks in stance at sink with CGA  Long Term Goal 4: Pt will complete TB bathing SBA and AE  Long Term Goal 5: Pt will complete simple IADL SPV                 Therapy Time   Individual Concurrent Group Co-treatment   Time In 1330     1400   Time Out 1400     1500   Minutes 30     60   Timed Code Treatment Minutes: 90 Minutes       Marcela Shook, OT

## 2024-07-17 NOTE — CARE COORDINATION
CM update; Received call from daughter Victorina today requesting explanation of denial at [facility]. Explained she would have to contact them because we only receive denial or acceptance. Explained I will be asking patient for two more choices today and will complete those referrals, Family also has medical requests that I will  review in meeting with DR Neal. Will follow.Kristen Hamilton RN

## 2024-07-17 NOTE — PROGRESS NOTES
Department of Physical Medicine & Rehabilitation  Progress Note    Patient Identification:  Sudha Lopez  [id number]  : 1945  Admit date: 2024    Chief Complaint: Debility    Subjective:   No acute events overnight. Today Sudha is seen in her room with nursing present. She reports feeling well. She does not think she will be able to care for herself at home and is interested in SNF. Nursing noting that she is having back pain. Will obtain XR lumbar spine. Chronic issue per therapy.     Reviewed labs.     ROS: No f/c, n/v, cp     Objective:  Patient Vitals for the past 24 hrs:   BP Temp Temp src Pulse Resp SpO2   24 0934 -- -- -- -- 16 --   24 0900 130/72 97.8 °F (36.6 °C) Oral 70 16 96 %   24 1930 126/67 98.9 °F (37.2 °C) Oral 72 18 98 %     Const: Alert. No distress, pleasant.   HEENT: Normocephalic, atraumatic. Normal sclera/conjunctiva. MMM.   CV: extremities well perfused  Resp: No respiratory distress. On room air  Abd: post procedure distended   Ext: + edema.   Neuro: Alert, oriented, appropriately interactive.   Psych: Cooperative, appropriate mood and affect    Laboratory data: Available via EMR.   Last 24 hour lab  No results found for this or any previous visit (from the past 24 hour(s)).        Therapy progress:  PT  Required Braces or Orthoses  Left Lower Extremity Brace: Boot  LLE Brace Type: CAM boot  Position Activity Restriction  Other position/activity restrictions: HALLIE drain, ostomy, wound vac, \"Touch down with minimal weight bearing on operative left leg just for balance.\" per orders from rajwinder  Objective     Sit to Stand: 2 Person Assistance, Maximum Assistance  Stand to Sit: 2 Person Assistance, Maximum Assistance  Bed to Chair: 2 Person Assistance, Maximum assistance  Assistance: Unable to assess  OT  PT Equipment Recommendations  Equipment Needed: No  Other: Defer     Assessment        SLP          Body mass index is 28.04 kg/m².    Assessment and

## 2024-07-18 LAB
ANION GAP SERPL CALCULATED.3IONS-SCNC: 12 MMOL/L (ref 3–16)
BASOPHILS # BLD: 0.1 K/UL (ref 0–0.2)
BASOPHILS NFR BLD: 1.1 %
BUN SERPL-MCNC: 15 MG/DL (ref 7–20)
CALCIUM SERPL-MCNC: 9 MG/DL (ref 8.3–10.6)
CHLORIDE SERPL-SCNC: 101 MMOL/L (ref 99–110)
CO2 SERPL-SCNC: 22 MMOL/L (ref 21–32)
CREAT SERPL-MCNC: 0.6 MG/DL (ref 0.6–1.2)
CRP SERPL-MCNC: 4.9 MG/L (ref 0–5.1)
DEPRECATED RDW RBC AUTO: 15.9 % (ref 12.4–15.4)
EOSINOPHIL # BLD: 0.4 K/UL (ref 0–0.6)
EOSINOPHIL NFR BLD: 3.9 %
ERYTHROCYTE [SEDIMENTATION RATE] IN BLOOD BY WESTERGREN METHOD: 49 MM/HR (ref 0–30)
GFR SERPLBLD CREATININE-BSD FMLA CKD-EPI: >90 ML/MIN/{1.73_M2}
GLUCOSE SERPL-MCNC: 101 MG/DL (ref 70–99)
HCT VFR BLD AUTO: 26.2 % (ref 36–48)
HGB BLD-MCNC: 8.9 G/DL (ref 12–16)
LYMPHOCYTES # BLD: 1.9 K/UL (ref 1–5.1)
LYMPHOCYTES NFR BLD: 17.3 %
MCH RBC QN AUTO: 30.3 PG (ref 26–34)
MCHC RBC AUTO-ENTMCNC: 33.8 G/DL (ref 31–36)
MCV RBC AUTO: 89.8 FL (ref 80–100)
MONOCYTES # BLD: 1.2 K/UL (ref 0–1.3)
MONOCYTES NFR BLD: 11.3 %
NEUTROPHILS # BLD: 7.2 K/UL (ref 1.7–7.7)
NEUTROPHILS NFR BLD: 66.4 %
PLATELET # BLD AUTO: 400 K/UL (ref 135–450)
PMV BLD AUTO: 6.6 FL (ref 5–10.5)
POTASSIUM SERPL-SCNC: 3.8 MMOL/L (ref 3.5–5.1)
RBC # BLD AUTO: 2.92 M/UL (ref 4–5.2)
SODIUM SERPL-SCNC: 135 MMOL/L (ref 136–145)
WBC # BLD AUTO: 10.8 K/UL (ref 4–11)

## 2024-07-18 PROCEDURE — 86140 C-REACTIVE PROTEIN: CPT

## 2024-07-18 PROCEDURE — 97530 THERAPEUTIC ACTIVITIES: CPT

## 2024-07-18 PROCEDURE — 80048 BASIC METABOLIC PNL TOTAL CA: CPT

## 2024-07-18 PROCEDURE — 2580000003 HC RX 258: Performed by: PHARMACIST

## 2024-07-18 PROCEDURE — 97112 NEUROMUSCULAR REEDUCATION: CPT

## 2024-07-18 PROCEDURE — 85025 COMPLETE CBC W/AUTO DIFF WBC: CPT

## 2024-07-18 PROCEDURE — 97542 WHEELCHAIR MNGMENT TRAINING: CPT

## 2024-07-18 PROCEDURE — 6370000000 HC RX 637 (ALT 250 FOR IP): Performed by: PHYSICAL MEDICINE & REHABILITATION

## 2024-07-18 PROCEDURE — 85652 RBC SED RATE AUTOMATED: CPT

## 2024-07-18 PROCEDURE — 97535 SELF CARE MNGMENT TRAINING: CPT

## 2024-07-18 PROCEDURE — 2580000003 HC RX 258: Performed by: PHYSICAL MEDICINE & REHABILITATION

## 2024-07-18 PROCEDURE — 6370000000 HC RX 637 (ALT 250 FOR IP): Performed by: STUDENT IN AN ORGANIZED HEALTH CARE EDUCATION/TRAINING PROGRAM

## 2024-07-18 PROCEDURE — 6360000002 HC RX W HCPCS: Performed by: PHARMACIST

## 2024-07-18 PROCEDURE — 1280000000 HC REHAB R&B

## 2024-07-18 PROCEDURE — 36415 COLL VENOUS BLD VENIPUNCTURE: CPT

## 2024-07-18 RX ORDER — CYCLOBENZAPRINE HCL 10 MG
10 TABLET ORAL 3 TIMES DAILY
Status: DISCONTINUED | OUTPATIENT
Start: 2024-07-18 | End: 2024-07-23 | Stop reason: HOSPADM

## 2024-07-18 RX ADMIN — PIPERACILLIN AND TAZOBACTAM 3375 MG: 3; .375 INJECTION, POWDER, FOR SOLUTION INTRAVENOUS at 16:35

## 2024-07-18 RX ADMIN — CYCLOBENZAPRINE 10 MG: 10 TABLET, FILM COATED ORAL at 16:16

## 2024-07-18 RX ADMIN — DICLOFENAC SODIUM 4 G: 10 GEL TOPICAL at 21:18

## 2024-07-18 RX ADMIN — FLUOXETINE HYDROCHLORIDE 40 MG: 20 CAPSULE ORAL at 08:46

## 2024-07-18 RX ADMIN — Medication: at 08:46

## 2024-07-18 RX ADMIN — BUPROPION HYDROCHLORIDE 300 MG: 150 TABLET, EXTENDED RELEASE ORAL at 08:46

## 2024-07-18 RX ADMIN — SODIUM CHLORIDE, PRESERVATIVE FREE 10 ML: 5 INJECTION INTRAVENOUS at 08:48

## 2024-07-18 RX ADMIN — CYCLOBENZAPRINE 10 MG: 10 TABLET, FILM COATED ORAL at 21:17

## 2024-07-18 RX ADMIN — BISACODYL 5 MG: 5 TABLET, COATED ORAL at 08:46

## 2024-07-18 RX ADMIN — PANTOPRAZOLE SODIUM 40 MG: 40 TABLET, DELAYED RELEASE ORAL at 05:23

## 2024-07-18 RX ADMIN — CYCLOBENZAPRINE 10 MG: 10 TABLET, FILM COATED ORAL at 09:50

## 2024-07-18 RX ADMIN — LATANOPROST 1 DROP: 50 SOLUTION OPHTHALMIC at 21:18

## 2024-07-18 RX ADMIN — TRAZODONE HYDROCHLORIDE 75 MG: 50 TABLET ORAL at 21:17

## 2024-07-18 RX ADMIN — APIXABAN 5 MG: 5 TABLET, FILM COATED ORAL at 21:17

## 2024-07-18 RX ADMIN — Medication 1 TABLET: at 08:45

## 2024-07-18 RX ADMIN — OXYCODONE AND ACETAMINOPHEN 1 TABLET: 5; 325 TABLET ORAL at 08:45

## 2024-07-18 RX ADMIN — PIPERACILLIN AND TAZOBACTAM 3375 MG: 3; .375 INJECTION, POWDER, FOR SOLUTION INTRAVENOUS at 05:26

## 2024-07-18 RX ADMIN — ATORVASTATIN CALCIUM 10 MG: 10 TABLET, FILM COATED ORAL at 08:46

## 2024-07-18 RX ADMIN — SODIUM CHLORIDE, PRESERVATIVE FREE 10 ML: 5 INJECTION INTRAVENOUS at 21:23

## 2024-07-18 RX ADMIN — APIXABAN 5 MG: 5 TABLET, FILM COATED ORAL at 08:45

## 2024-07-18 ASSESSMENT — PAIN DESCRIPTION - PAIN TYPE
TYPE: ACUTE PAIN

## 2024-07-18 ASSESSMENT — PAIN - FUNCTIONAL ASSESSMENT
PAIN_FUNCTIONAL_ASSESSMENT: PREVENTS OR INTERFERES SOME ACTIVE ACTIVITIES AND ADLS

## 2024-07-18 ASSESSMENT — PAIN DESCRIPTION - ORIENTATION
ORIENTATION: LOWER

## 2024-07-18 ASSESSMENT — PAIN DESCRIPTION - LOCATION
LOCATION: BACK

## 2024-07-18 ASSESSMENT — PAIN DESCRIPTION - ONSET
ONSET: ON-GOING

## 2024-07-18 ASSESSMENT — PAIN DESCRIPTION - FREQUENCY
FREQUENCY: CONTINUOUS

## 2024-07-18 ASSESSMENT — PAIN SCALES - GENERAL
PAINLEVEL_OUTOF10: 0
PAINLEVEL_OUTOF10: 4
PAINLEVEL_OUTOF10: 3
PAINLEVEL_OUTOF10: 5
PAINLEVEL_OUTOF10: 6
PAINLEVEL_OUTOF10: 4

## 2024-07-18 ASSESSMENT — PAIN DESCRIPTION - DESCRIPTORS
DESCRIPTORS: ACHING;SHARP
DESCRIPTORS: ACHING
DESCRIPTORS: ACHING

## 2024-07-18 NOTE — PLAN OF CARE
Problem: Pain  Goal: Verbalizes/displays adequate comfort level or baseline comfort level  Outcome: Progressing     Problem: Gastrointestinal - Adult  Goal: Minimal or absence of nausea and vomiting  Outcome: Progressing     Problem: Genitourinary - Adult  Goal: Absence of urinary retention  Outcome: Progressing

## 2024-07-18 NOTE — PROGRESS NOTES
Occupational Therapy  Facility/Department: [facility]  Rehabilitation Occupational Therapy Daily Treatment Note    Date: 24  Patient Name: Sudha Lopez       Room: 0164/0164-01  MRN: [id number]  Account: [id number]   : 1945  (78 y.o.) Gender: female                    Past Medical History:  has a past medical history of Anxiety and Hypertension.  Past Surgical History:   has a past surgical history that includes Hysterectomy; Breast enhancement surgery; rhinoplasty; Rotator cuff repair (Bilateral); Hand surgery (Right); Total hip arthroplasty (Bilateral); Intracapsular cataract extraction (Left, 2020); Colonoscopy; Intracapsular cataract extraction (Right, 2020); Colonoscopy (N/A, 2024); laparotomy (N/A, 2024); and CT PERITONEAL/RETROPERITONEAL PERC DRAIN (7/3/2024).    Restrictions  Restrictions/Precautions: Fall Risk, Up as Tolerated, Weight Bearing  Other position/activity restrictions: IV, ostomy, wound vac, \"Touch down with minimal weight bearing on operative left leg just for balance.\" per orders from [facility]  Right Lower Extremity Weight Bearing: Weight Bearing As Tolerated  Left Lower Extremity Weight Bearing: Toe Touch Weight Bearing  Required Braces or Orthoses  Left Lower Extremity Brace: Boot  LLE Brace Type: CAM boot  Required Braces or Orthoses?: Yes    Subjective  Subjective: Pt semi-supine in bed, agreeable to OT tx. 5/10 pain in back.  Restrictions/Precautions: Fall Risk;Up as Tolerated;Weight Bearing             Objective     Cognition  Overall Cognitive Status: Exceptions  Arousal/Alertness: Appears intact  Following Commands: Follows one step commands consistently  Attention Span: Appears intact  Memory: Appears intact  Safety Judgement: Decreased awareness of need for safety;Decreased awareness of need for assistance  Problem Solving: Decreased awareness of errors;Assistance required to correct errors made;Assistance required to implement solutions  Insights:

## 2024-07-18 NOTE — CARE COORDINATION
CM update; Referral called to Kina at [facility]. They are reviewing for possible SNF admission. Will follow.Kristen Hamilton RN    2075 Call placed to Pastora at [facility]. Will review and get back on acceptance or denial.Kristen Hamilton RN    1016 [facility] declined cannot take anyone that is a two person assist for transfer at this time.Team updated.Kristen Hamilton RN    1349 Received call from [facility] they are unable to accept. New referral called to [facility] and [facility].All contact information left on  for return call. Will follow.Kristen Hamilton RN    5703; Patient updated and call placed to daughter, Victorina, to update. Left  with all contact numbers for call back.Kristen Hamilton RN      2100 Received call from [facility] they will follow patient for potential bed available early next week.Kristen Hamilton RN

## 2024-07-18 NOTE — PROGRESS NOTES
Physical Therapy  Facility/Department: [facility]  Rehabilitation Physical Therapy Treatment Note    NAME: Sudha Lopez  : 1945 (78 y.o.)  MRN: [id number]  CODE STATUS: Full Code    Date of Service: 24       Restrictions:  Restrictions/Precautions: Fall Risk, Up as Tolerated, Weight Bearing  Lower Extremity Weight Bearing Restrictions  Right Lower Extremity Weight Bearing: Weight Bearing As Tolerated  Left Lower Extremity Weight Bearing: Toe Touch Weight Bearing  Required Braces or Orthoses  Left Lower Extremity Brace: Boot  LLE Brace Type: CAM boot  Position Activity Restriction  Other position/activity restrictions: IV, ostomy, wound vac, \"Touch down with minimal weight bearing on operative left leg just for balance.\" per orders from purdy     SUBJECTIVE  Subjective  Subjective: pt in bed, agreeable to PT/OT co-tx  Pain: not mentioned throughout session        Post Treatment Pain Screening : not mentioned          OBJECTIVE  Cognition  Overall Cognitive Status: Exceptions  Arousal/Alertness: Appears intact  Following Commands: Follows one step commands consistently  Attention Span: Appears intact  Memory: Appears intact  Safety Judgement: Decreased awareness of need for safety;Decreased awareness of need for assistance  Problem Solving: Decreased awareness of errors;Assistance required to correct errors made;Assistance required to implement solutions  Insights: Decreased awareness of deficits  Initiation: Does not require cues  Sequencing: Requires cues for some  Orientation  Overall Orientation Status: Within Normal Limits  Orientation Level: Oriented X4    Functional Mobility  Roll Left  Assistance Level: Stand by assist  Skilled Clinical Factors: cues for log roll to prevent back pain  Sit to Supine  Assistance Level: Supervision  Skilled Clinical Factors: increased time, HOB flat  Supine to Sit  Assistance Level: Supervision  Skilled Clinical Factors: verbal cues to shift R shoulder to left to  cues to use R UE and use R LE or pull back with L LE to avoid veering R ; pt able to propel on uneven pavers outside with supervision      Neuromuscular Education  Neuromuscular Comments: see balance section             ASSESSMENT/PROGRESS TOWARDS GOALS       Assessment  Assessment: pt required 2 skilled therapists to progress to higher level of function with maximal safety, PT focus on standing tolerance and transfer training, pt tolerated bed mobility training, transfer training, and balance training with appropriate rest breaks, pt progressing toward meeting PT goals as evidenced by decreased need for assist with transfers, pt will benefit from continued Skilled PT to address functional mobility deficits  Activity Tolerance: Patient tolerated treatment well  Discharge Recommendations: 24 hour supervision or assist;Home with Home health PT    Goals  Patient Goals   Patient Goals : \"To get lower/upper body strength and mobility, and to be independent for home\"  Short Term Goals  Time Frame for Short Term Goals: 10 days (7/17)  Short Term Goal 1: Patient will perform bed mobility with CGA - MET 7/13, SBA  Short Term Goal 2: Patient will perform sit to stand with Mod A  Short Term Goal 3: Patient will perform pivot transfer bed <> chair with Mod A x1 - MET 7/16 - Min A x1  Short Term Goal 4: Patient will ambulate 5ft in parallel bars while maintaining LLE NWB, with 2-person assist - MET 7/16, 5 ft in // bars, min A + CGA  Short Term Goal 5: Patient will self propel wheelchair 150ft with supervision - MET 7/16 supv  Long Term Goals  Time Frame for Long Term Goals : 20 days (7/27)  Long Term Goal 1: Patient will be Mod I for bed mobility  Long Term Goal 2: Patient will be Mod I for transfers  Long Term Goal 3: Patient will be independent with wheelchair mobility 150ft  Long Term Goal 4: Continue to assess for ambulation LTG    PLAN OF CARE/SAFETY  Physical Therapy Plan  General Plan: 5-7 times per week  Days Per Week:

## 2024-07-18 NOTE — PROGRESS NOTES
Department of Physical Medicine & Rehabilitation  Progress Note    Patient Identification:  Sudha Lopez  [id number]  : 1945  Admit date: 2024    Chief Complaint: Debility    Subjective:   No acute events overnight. Today Sudha is seen in her room. Reviewed results of XR lumbar spine, showing arthritis. She continues to have low back pain. Discussed trying flexeril. Discussed that General Surgery performed her surgery and that we do not have a colorectal specialist here. Discussed that she will follow up with General Surgery but could talk with them or her PCP about possible referral to Colorectal.     Reviewed labs.     ROS: No f/c, n/v, cp     Objective:  Patient Vitals for the past 24 hrs:   BP Temp Temp src Pulse Resp SpO2   24 0915 -- -- -- -- 16 --   24 0845 -- -- -- -- 16 --   24 0843 109/60 98.4 °F (36.9 °C) Oral 80 16 97 %   24 2212 -- -- -- -- 16 --   24 2130 135/63 98.4 °F (36.9 °C) Oral 83 16 94 %   24 1618 -- -- -- -- 16 --   24 1548 -- -- -- -- 16 --     Const: Alert. No distress, pleasant.   HEENT: Normocephalic, atraumatic. Normal sclera/conjunctiva. MMM.   CV: extremities well perfused  Resp: No respiratory distress. On room air  Abd: nondistended  Ext: + edema.   Neuro: Alert, oriented, appropriately interactive. Speech fluent  Psych: Cooperative, appropriate mood and affect    Laboratory data: Available via EMR.   Last 24 hour lab  Recent Results (from the past 24 hour(s))   Basic Metabolic Panel w/ Reflex to MG    Collection Time: 24  5:22 AM   Result Value Ref Range    Sodium 135 (L) 136 - 145 mmol/L    Potassium reflex Magnesium 3.8 3.5 - 5.1 mmol/L    Chloride 101 99 - 110 mmol/L    CO2 22 21 - 32 mmol/L    Anion Gap 12 3 - 16    Glucose 101 (H) 70 - 99 mg/dL    BUN 15 7 - 20 mg/dL    Creatinine 0.6 0.6 - 1.2 mg/dL    Est, Glom Filt Rate >90 >60    Calcium 9.0 8.3 - 10.6 mg/dL   CBC auto differential    Collection Time: 24  5:22  AM   Result Value Ref Range    WBC 10.8 4.0 - 11.0 K/uL    RBC 2.92 (L) 4.00 - 5.20 M/uL    Hemoglobin 8.9 (L) 12.0 - 16.0 g/dL    Hematocrit 26.2 (L) 36.0 - 48.0 %    MCV 89.8 80.0 - 100.0 fL    MCH 30.3 26.0 - 34.0 pg    MCHC 33.8 31.0 - 36.0 g/dL    RDW 15.9 (H) 12.4 - 15.4 %    Platelets 400 135 - 450 K/uL    MPV 6.6 5.0 - 10.5 fL    Neutrophils % 66.4 %    Lymphocytes % 17.3 %    Monocytes % 11.3 %    Eosinophils % 3.9 %    Basophils % 1.1 %    Neutrophils Absolute 7.2 1.7 - 7.7 K/uL    Lymphocytes Absolute 1.9 1.0 - 5.1 K/uL    Monocytes Absolute 1.2 0.0 - 1.3 K/uL    Eosinophils Absolute 0.4 0.0 - 0.6 K/uL    Basophils Absolute 0.1 0.0 - 0.2 K/uL   Sedimentation Rate    Collection Time: 07/18/24  5:22 AM   Result Value Ref Range    Sed Rate, Automated 49 (H) 0 - 30 mm/Hr   C-Reactive Protein    Collection Time: 07/18/24  5:22 AM   Result Value Ref Range    CRP 4.9 0.0 - 5.1 mg/L           Therapy progress:  PT  Required Braces or Orthoses  Left Lower Extremity Brace: Boot  LLE Brace Type: CAM boot  Position Activity Restriction  Other position/activity restrictions: IV, ostomy, wound vac, \"Touch down with minimal weight bearing on operative left leg just for balance.\" per orders from rajwinder  Objective     Sit to Stand: 2 Person Assistance, Maximum Assistance  Stand to Sit: 2 Person Assistance, Maximum Assistance  Bed to Chair: 2 Person Assistance, Maximum assistance  Assistance: Unable to assess  OT  PT Equipment Recommendations  Equipment Needed: No  Other: Defer     Assessment        SLP          Body mass index is 28.04 kg/m².    Assessment and Plan:  Debility  -Decreased functional mobility and ADLs  -Increased sedentary hospital stay  -PT/OT     Intra-abdominal abscess  -Status post subtotal colectomy with ileostomy status post CT-guided left abdominal drain placement by IR on 7/3/2024 and antibiotic treatment with IV Zosyn through 7/31  -Monitor abdominal pain      Anemia  - Hb stable  - Monitor and

## 2024-07-19 PROCEDURE — 1280000000 HC REHAB R&B

## 2024-07-19 PROCEDURE — 2580000003 HC RX 258: Performed by: PHYSICAL MEDICINE & REHABILITATION

## 2024-07-19 PROCEDURE — 97530 THERAPEUTIC ACTIVITIES: CPT

## 2024-07-19 PROCEDURE — 6370000000 HC RX 637 (ALT 250 FOR IP): Performed by: STUDENT IN AN ORGANIZED HEALTH CARE EDUCATION/TRAINING PROGRAM

## 2024-07-19 PROCEDURE — 6360000002 HC RX W HCPCS: Performed by: PHARMACIST

## 2024-07-19 PROCEDURE — 6370000000 HC RX 637 (ALT 250 FOR IP): Performed by: PHYSICAL MEDICINE & REHABILITATION

## 2024-07-19 PROCEDURE — 2580000003 HC RX 258: Performed by: PHARMACIST

## 2024-07-19 PROCEDURE — 97606 NEG PRS WND THER DME>50 SQCM: CPT

## 2024-07-19 PROCEDURE — 97116 GAIT TRAINING THERAPY: CPT

## 2024-07-19 PROCEDURE — 97110 THERAPEUTIC EXERCISES: CPT

## 2024-07-19 PROCEDURE — 97112 NEUROMUSCULAR REEDUCATION: CPT

## 2024-07-19 RX ADMIN — APIXABAN 5 MG: 5 TABLET, FILM COATED ORAL at 20:20

## 2024-07-19 RX ADMIN — TRAZODONE HYDROCHLORIDE 75 MG: 50 TABLET ORAL at 20:20

## 2024-07-19 RX ADMIN — PIPERACILLIN AND TAZOBACTAM 3375 MG: 3; .375 INJECTION, POWDER, FOR SOLUTION INTRAVENOUS at 00:01

## 2024-07-19 RX ADMIN — Medication: at 11:56

## 2024-07-19 RX ADMIN — BISACODYL 5 MG: 5 TABLET, COATED ORAL at 09:34

## 2024-07-19 RX ADMIN — PANTOPRAZOLE SODIUM 40 MG: 40 TABLET, DELAYED RELEASE ORAL at 05:42

## 2024-07-19 RX ADMIN — APIXABAN 5 MG: 5 TABLET, FILM COATED ORAL at 09:34

## 2024-07-19 RX ADMIN — CYCLOBENZAPRINE 10 MG: 10 TABLET, FILM COATED ORAL at 14:04

## 2024-07-19 RX ADMIN — BUPROPION HYDROCHLORIDE 300 MG: 150 TABLET, EXTENDED RELEASE ORAL at 09:34

## 2024-07-19 RX ADMIN — SODIUM CHLORIDE, PRESERVATIVE FREE 10 ML: 5 INJECTION INTRAVENOUS at 09:37

## 2024-07-19 RX ADMIN — CYCLOBENZAPRINE 10 MG: 10 TABLET, FILM COATED ORAL at 20:20

## 2024-07-19 RX ADMIN — OXYCODONE AND ACETAMINOPHEN 1 TABLET: 5; 325 TABLET ORAL at 20:20

## 2024-07-19 RX ADMIN — DICLOFENAC SODIUM 4 G: 10 GEL TOPICAL at 20:26

## 2024-07-19 RX ADMIN — Medication 1 TABLET: at 09:34

## 2024-07-19 RX ADMIN — PIPERACILLIN AND TAZOBACTAM 3375 MG: 3; .375 INJECTION, POWDER, FOR SOLUTION INTRAVENOUS at 17:11

## 2024-07-19 RX ADMIN — ATORVASTATIN CALCIUM 10 MG: 10 TABLET, FILM COATED ORAL at 09:34

## 2024-07-19 RX ADMIN — CYCLOBENZAPRINE 10 MG: 10 TABLET, FILM COATED ORAL at 09:34

## 2024-07-19 RX ADMIN — LATANOPROST 1 DROP: 50 SOLUTION OPHTHALMIC at 20:21

## 2024-07-19 RX ADMIN — FLUOXETINE HYDROCHLORIDE 40 MG: 20 CAPSULE ORAL at 09:34

## 2024-07-19 RX ADMIN — PIPERACILLIN AND TAZOBACTAM 3375 MG: 3; .375 INJECTION, POWDER, FOR SOLUTION INTRAVENOUS at 08:28

## 2024-07-19 ASSESSMENT — PAIN SCALES - GENERAL
PAINLEVEL_OUTOF10: 5
PAINLEVEL_OUTOF10: 6
PAINLEVEL_OUTOF10: 5
PAINLEVEL_OUTOF10: 7

## 2024-07-19 ASSESSMENT — PAIN DESCRIPTION - DESCRIPTORS
DESCRIPTORS: ACHING
DESCRIPTORS: ACHING

## 2024-07-19 ASSESSMENT — PAIN DESCRIPTION - ORIENTATION
ORIENTATION: LOWER
ORIENTATION: LOWER;LEFT
ORIENTATION: LOWER;LEFT

## 2024-07-19 ASSESSMENT — PAIN DESCRIPTION - LOCATION
LOCATION: BACK

## 2024-07-19 NOTE — PROGRESS NOTES
Occupational Therapy  Facility/Department: [facility]  Rehabilitation Occupational Therapy Daily Treatment Note    Date: 24  Patient Name: Sudha Lopez       Room: 0164/0164-01  MRN: [id number]  Account: [id number]   : 1945  (78 y.o.) Gender: female     Past Medical History:  has a past medical history of Anxiety and Hypertension.  Past Surgical History:   has a past surgical history that includes Hysterectomy; Breast enhancement surgery; rhinoplasty; Rotator cuff repair (Bilateral); Hand surgery (Right); Total hip arthroplasty (Bilateral); Intracapsular cataract extraction (Left, 2020); Colonoscopy; Intracapsular cataract extraction (Right, 2020); Colonoscopy (N/A, 2024); laparotomy (N/A, 2024); and CT PERITONEAL/RETROPERITONEAL PERC DRAIN (7/3/2024).    Restrictions  Restrictions/Precautions: Fall Risk, Up as Tolerated, Weight Bearing  Other position/activity restrictions: IV, ostomy, wound vac, \"Touch down with minimal weight bearing on operative left leg just for balance.\" per orders from [facility]  Right Lower Extremity Weight Bearing: Weight Bearing As Tolerated  Left Lower Extremity Weight Bearing: Toe Touch Weight Bearing  Required Braces or Orthoses  Left Lower Extremity Brace: Boot  LLE Brace Type: CAM boot  Required Braces or Orthoses?: Yes    Subjective  Subjective: Pt in bed upon OT/PT arrival with RN present, agreeable to tx.  Restrictions/Precautions: Fall Risk;Up as Tolerated;Weight Bearing    Objective    ADL  Lower Extremity Dressing  Assistance Level: Moderate assistance  Skilled Clinical Factors: Assist to thread LLE into pants + Min A to manage over hips while in stance with RW. Pt able to thread RLE while seated EOB    Functional Mobility  Device: Wheelchair  Activity: To/From therapy gym  Assistance Level: Supervision  Skilled Clinical Factors: shana propelled w/c room>therapy gym primarily BUEs, ocassional use of RLE to navigate turns  Sit to Stand  Assistance  progressed with fxl transfers this session, requiring initial Mod A x1 to stand but progressed to CGA x1 by EOS. Pt completed dynamic reaching while standing in // with 1 UE support on bar to work on standing balance required for ADLs. Pt able to maintain TTWB px during fxl mobility. She requires cues for anterior weight shift during sit to stands. Continuing to recommend SNF @ d/c 2/2 limited assist available @ home for pt. Continue POC.    Second Session Assessment: Pt tolerates session well. Pt performs BUE ex to incr strength for ADLs and fxl transfers. Pt completes bed mobility with SBA. Pt sits EOB during exercises with SPV. Cont OT POC.     Assessment  Activity Tolerance: Patient tolerated treatment well  Discharge Recommendations: Subacute/Skilled Nursing Facility  Factors Affecting Discharge: Pt with limited assist at home, will require SPV and assist at home.  OT Equipment Recommendations  Equipment Needed: No  Other: defer to next level of care  Safety Devices  Safety Devices in place: Yes  Type of devices: All fall risk precautions in place;Gait belt;Call light within reach;Left in chair  Restraints  Initially in place: No    Patient Education  Education  Education Given To: Patient  Education Provided: Role of Therapy;Plan of Care;Safety;Precautions  Education Method: Verbal  Barriers to Learning: None  Education Outcome: Verbalized understanding;Continued education needed;Demonstrated understanding    Plan  Occupational Therapy Plan  Times Per Week: 5-7x/week  Current Treatment Recommendations: Strengthening;Balance training;Functional mobility training;Endurance training;Safety education & training;Self-Care / ADL;Home management training;Wheelchair mobility training;Equipment evaluation, education, & procurement;Co-Treatment;Patient/Caregiver education & training    Goals  Patient Goals   Patient goals : \"build my strength, be more mobile, feel safe doing things IND\"  Short Term Goals  Time Frame for

## 2024-07-19 NOTE — PROGRESS NOTES
[facility] Nurse  Follow-up Progress Note       NAME:  Sudha Lopez  MEDICAL RECORD NUMBER:  [id number]  AGE:  78 y.o.   GENDER:  female  :  1945  TODAY'S DATE:  2024    Subjective: No leaks   Wound Identification:  Wound Type: Surgical mid line abd incision line continued to be connected to distal incision with a tunnel @ 8.5 cm from 600 pm pointing toward 12 am. Mid area (naval) open and with deep crevice. Negative pressure wound therapy dressing re-applied due to leakage of stool on dressing and NPWT dressing. Left heel unstagable (not witnessed today).     48 hours wear time with current [employer] pouching system - Seal has remained secure.     Existing ileostomy: Subtotal colectomy with ileostomy on 24 for bowel obstruction by Dr Dung Jackson. Status post CT guided left abdominal drain placement by IR on 7/3/2024 for abscess.      Stoma size:  25 = 1 inch mm x 30 mm 1 3/16 inch. PLEASE DO NOT THROW AWAY PATTERN.   Appliance size: Changed to Yamilet 2 piece RED deep convex flange # 90729 with lock and roll bag # [id number] applied in hopes to assist from leaking. Crust with light dusting of powder, rub in, seal with dabbing barrier wipe over powder. [employer] Paste ring # [id number] placed around flange before placing on stoma. Small amt Paste in 300 & 900 pm positions. Mid incision barrier paste strip to cover naval to make flat pouching surface.  Barrier extenders around stoma.  Belt added to help secure pouch and prevent leaking. DO NOT THROW AWAY BELT.  If soiled it can be washed.   If no convex ostomy barriers or paste rings/strips in room, call clinical to obtain from wound/ostomy closet on unit B3.         Patient Goal of Care:  [x] Wound Healing  [] Odor Control   [] Palliative Care  [] Pain Control   [x] Other: secure ileostomy seal    Objective: lying in bed.  Current appliance intact.  Still wearing pruwick and depends    /76   Pulse  patient/caregiver understanding able to:   [x] Indicates understanding       [x] Needs reinforcement  [] Unsuccessful      [] Verbal Understanding  [x] Demonstrated understanding       [] No evidence of learning  [] Refused teaching         [] N/A       Electronically signed by Renae Carnes, RN, MSN, CWOCN on 7/19/2024 at 9:31 AM

## 2024-07-19 NOTE — PROGRESS NOTES
Physical Therapy  Facility/Department: [facility]  Rehabilitation Physical Therapy Treatment Note    NAME: Sudha Lopez  : 1945 (78 y.o.)  MRN: [id number]  CODE STATUS: Full Code    Date of Service: 24       Restrictions:  Restrictions/Precautions: Fall Risk, Up as Tolerated, Weight Bearing  Lower Extremity Weight Bearing Restrictions  Right Lower Extremity Weight Bearing: Weight Bearing As Tolerated  Left Lower Extremity Weight Bearing: Toe Touch Weight Bearing  Required Braces or Orthoses  Left Lower Extremity Brace: Boot  LLE Brace Type: CAM boot  Position Activity Restriction  Other position/activity restrictions: IV, ostomy, wound vac, \"Touch down with minimal weight bearing on operative left leg just for balance.\" per orders from rajwinder     SUBJECTIVE  Subjective  Subjective: pt in bed, agreeable to PT/OT co-tx  Pain: not mentioned throughout session        Post Treatment Pain Screening : tolerable          OBJECTIVE  Cognition  Safety Judgement: Decreased awareness of need for safety;Decreased awareness of need for assistance  Problem Solving: Decreased awareness of errors;Assistance required to correct errors made;Assistance required to implement solutions  Insights: Decreased awareness of deficits  Sequencing: Requires cues for some  Orientation  Overall Orientation Status: Within Normal Limits  Orientation Level: Oriented X4    Functional Mobility  Roll Left  Assistance Level: Supervision  Skilled Clinical Factors: cues for log roll to prevent back pain  Supine to Sit  Assistance Level: Supervision  Skilled Clinical Factors: verbal cues to shift R shoulder to left to facilitate transfer  Scooting  Assistance Level: Supervision  Skilled Clinical Factors: to eob, 3 scoots to get R foot on floor  Balance  Sitting Balance: Supervision  Standing Balance: Contact guard assistance  Standing Balance  Activity: standing with unilateral support on // bar while reaching for squig and placing on mirror      Assessment  Assessment: pt required 2 skilled therapists to progress to higher level of function with maximal safety, PT focus on gait training, pt tolerated bed mobility training, transfer training, gait training in // bars, and balance training in // bars with appropriate rest breaks, pt progressing toward meeting PT goals as evidenced by decreased need for assist with transfers and increased gait distance to 8ft x 3 reps, pt progressed from Min A to CGA with transfers, requires Min A when fatigued, required CGA for balance activity reaching across body and behind back, pt unable to walk backward due to dizziness,  pt will benefit from continued Skilled PT to address functional mobility deficits  Activity Tolerance: Patient tolerated treatment well  Discharge Recommendations: 24 hour supervision or assist;Home with Home health PT  PT Equipment Recommendations  Other: will need RW if does not own    Goals  Patient Goals   Patient Goals : \"To get lower/upper body strength and mobility, and to be independent for home\"  Short Term Goals  Time Frame for Short Term Goals: 10 days (7/17)  Short Term Goal 1: Patient will perform bed mobility with CGA - MET 7/13, SBA  Short Term Goal 2: Patient will perform sit to stand with Mod A  Short Term Goal 3: Patient will perform pivot transfer bed <> chair with Mod A x1 - MET 7/16 - Min A x1  Short Term Goal 4: Patient will ambulate 5ft in parallel bars while maintaining LLE NWB, with 2-person assist - MET 7/16, 5 ft in // bars, min A + CGA  Short Term Goal 5: Patient will self propel wheelchair 150ft with supervision - MET 7/16 supv  Long Term Goals  Time Frame for Long Term Goals : 20 days (7/27)  Long Term Goal 1: Patient will be Mod I for bed mobility  Long Term Goal 2: Patient will be Mod I for transfers  Long Term Goal 3: Patient will be independent with wheelchair mobility 150ft  Long Term Goal 4: Continue to assess for ambulation LTG    PLAN OF CARE/SAFETY  Physical

## 2024-07-19 NOTE — PROGRESS NOTES
RN completed hourly rounds and found pt kicked off offloading boot with heels resting on bed. RN educated pt on importance of offloading heels and the DTI on L heel could turn into a pressure sore very easy. Pt verbalized understanding, but continued kicking boot off.

## 2024-07-19 NOTE — CARE COORDINATION
CM update: LOS # 11; [facility] is following for possible placement early next week.Kristen Hamilton RN     no

## 2024-07-19 NOTE — DISCHARGE INSTR - COC
Continuity of Care Form    Patient Name: Sudha Lopez   :  1945  MRN:  [id number]    Admit date:  2024  Discharge date:  ***    Code Status Order: Full Code   Advance Directives:     Admitting Physician:  eBck Graves DO  PCP: Perla Leyva MD    Discharging Nurse: ***  Discharging Hospital Unit/Room#: 0164/0164-01  Discharging Unit Phone Number: ***    Emergency Contact:   Extended Emergency Contact Information  Primary Emergency Contact: solis Lopez  Home Phone: [phone]  Relation: Child  Secondary Emergency Contact: Victorina Lopez  Home Phone: [phone]  Relation: Child    Past Surgical History:  Past Surgical History:   Procedure Laterality Date    BREAST ENHANCEMENT SURGERY      COLONOSCOPY      COLONOSCOPY N/A 2024    COLONOSCOPY FLEXIBLE DECOMPRESSION performed by Marcus Naranjo MD at [facility]    CT RETROPERITONEAL PERC DRAIN  7/3/2024    CT RETROPERITONEAL PERC DRAIN 7/3/2024 Brenden Berman MD [facility] CT SCAN    HAND SURGERY Right     HYSTERECTOMY (CERVIX STATUS UNKNOWN)      INTRACAPSULAR CATARACT EXTRACTION Left 2020    PHACOEMULSIFICATION OF CATARACT LEFT EYE WITH INTRAOCULAR LENS IMPLANT performed by Anuel Hay MD at [facility] OR    INTRACAPSULAR CATARACT EXTRACTION Right 2020    PHACOEMULSIFICATION OF CATARACT RIGHT EYE WITH INTRAOCULAR LENS IMPLANT performed by Anuel Hay MD at [facility] OR    LAPAROTOMY N/A 2024    SUBTOTAL COLECTOMY, ILEOSTOMY performed by Bertin Jackson MD at [facility] OR    RHINOPLASTY      ROTATOR CUFF REPAIR Bilateral     TOTAL HIP ARTHROPLASTY Bilateral        Immunization History:   Immunization History   Administered Date(s) Administered    COVID-19, PFIZER PURPLE top, DILUTE for use, (age 12 y+), 30mcg/0.3mL 2021    Influenza Virus Vaccine 2010, 2012    Influenza Whole 2011    Pneumococcal, PCV-13, PREVNAR 13, (age 6w+), IM, 0.5mL 2018    Pneumococcal, PPSV23, PNEUMOVAX 23, (age 2y+),

## 2024-07-19 NOTE — PROGRESS NOTES
Department of Physical Medicine & Rehabilitation  Progress Note    Patient Identification:  Sudha Lopez  [id number]  : 1945  Admit date: 2024    Chief Complaint: Debility    Subjective:   No acute events overnight. Today Sudha is seen with nursing present. She reports feeling well. Wound care nursing reports that seal is good on her ostomy dressing. She reports that flexeril is helping her back pain. She reports getting ablations as an outpatient. Discussed that she would be unable to get this while in the hospital. Also discussed that these are not typically done while being treated for infection. She is interested in a Psych consult.     Reviewed labs.     ROS: No f/c, n/v, cp     Objective:  Patient Vitals for the past 24 hrs:   BP Temp Temp src Pulse Resp SpO2   24 0930 (!) 119/58 98.4 °F (36.9 °C) Oral 73 16 96 %   24 2100 136/76 98.4 °F (36.9 °C) Oral 79 16 100 %     Const: Alert. No distress, pleasant.   HEENT: Normocephalic, atraumatic. Normal sclera/conjunctiva. MMM.   CV: RRR  Resp: No respiratory distress. Lungs CTAB  Abd: nondistended  Ext: + edema.   Neuro: Alert, oriented, appropriately interactive. Speech fluent  Psych: Cooperative, appropriate mood and affect    Laboratory data: Available via EMR.   Last 24 hour lab  No results found for this or any previous visit (from the past 24 hour(s)).          Therapy progress:  PT  Required Braces or Orthoses  Left Lower Extremity Brace: Boot  LLE Brace Type: CAM boot  Position Activity Restriction  Other position/activity restrictions: IV, ostomy, wound vac, \"Touch down with minimal weight bearing on operative left leg just for balance.\" per orders from rajwinder  Objective     Sit to Stand: 2 Person Assistance, Maximum Assistance  Stand to Sit: 2 Person Assistance, Maximum Assistance  Bed to Chair: 2 Person Assistance, Maximum assistance  Assistance: Unable to assess  OT  PT Equipment Recommendations  Equipment Needed: No  Other:

## 2024-07-20 PROCEDURE — 2580000003 HC RX 258: Performed by: PHYSICAL MEDICINE & REHABILITATION

## 2024-07-20 PROCEDURE — 6370000000 HC RX 637 (ALT 250 FOR IP): Performed by: STUDENT IN AN ORGANIZED HEALTH CARE EDUCATION/TRAINING PROGRAM

## 2024-07-20 PROCEDURE — 99223 1ST HOSP IP/OBS HIGH 75: CPT | Performed by: FAMILY MEDICINE

## 2024-07-20 PROCEDURE — 2580000003 HC RX 258: Performed by: PHARMACIST

## 2024-07-20 PROCEDURE — 6360000002 HC RX W HCPCS: Performed by: PHARMACIST

## 2024-07-20 PROCEDURE — 1280000000 HC REHAB R&B

## 2024-07-20 PROCEDURE — 6370000000 HC RX 637 (ALT 250 FOR IP): Performed by: PHYSICAL MEDICINE & REHABILITATION

## 2024-07-20 RX ADMIN — Medication: at 08:53

## 2024-07-20 RX ADMIN — SODIUM CHLORIDE, PRESERVATIVE FREE 10 ML: 5 INJECTION INTRAVENOUS at 08:52

## 2024-07-20 RX ADMIN — FLUOXETINE HYDROCHLORIDE 40 MG: 20 CAPSULE ORAL at 08:49

## 2024-07-20 RX ADMIN — CYCLOBENZAPRINE 10 MG: 10 TABLET, FILM COATED ORAL at 08:49

## 2024-07-20 RX ADMIN — BISACODYL 5 MG: 5 TABLET, COATED ORAL at 08:49

## 2024-07-20 RX ADMIN — PIPERACILLIN AND TAZOBACTAM 3375 MG: 3; .375 INJECTION, POWDER, FOR SOLUTION INTRAVENOUS at 00:28

## 2024-07-20 RX ADMIN — CYCLOBENZAPRINE 10 MG: 10 TABLET, FILM COATED ORAL at 21:43

## 2024-07-20 RX ADMIN — PIPERACILLIN AND TAZOBACTAM 3375 MG: 3; .375 INJECTION, POWDER, FOR SOLUTION INTRAVENOUS at 16:15

## 2024-07-20 RX ADMIN — APIXABAN 5 MG: 5 TABLET, FILM COATED ORAL at 21:43

## 2024-07-20 RX ADMIN — BUPROPION HYDROCHLORIDE 300 MG: 150 TABLET, EXTENDED RELEASE ORAL at 08:49

## 2024-07-20 RX ADMIN — DICLOFENAC SODIUM 4 G: 10 GEL TOPICAL at 21:46

## 2024-07-20 RX ADMIN — TRAZODONE HYDROCHLORIDE 75 MG: 50 TABLET ORAL at 21:43

## 2024-07-20 RX ADMIN — ATORVASTATIN CALCIUM 10 MG: 10 TABLET, FILM COATED ORAL at 08:49

## 2024-07-20 RX ADMIN — LATANOPROST 1 DROP: 50 SOLUTION OPHTHALMIC at 21:46

## 2024-07-20 RX ADMIN — SODIUM CHLORIDE, PRESERVATIVE FREE 10 ML: 5 INJECTION INTRAVENOUS at 21:48

## 2024-07-20 RX ADMIN — PIPERACILLIN AND TAZOBACTAM 3375 MG: 3; .375 INJECTION, POWDER, FOR SOLUTION INTRAVENOUS at 08:48

## 2024-07-20 RX ADMIN — CYCLOBENZAPRINE 10 MG: 10 TABLET, FILM COATED ORAL at 16:11

## 2024-07-20 RX ADMIN — APIXABAN 5 MG: 5 TABLET, FILM COATED ORAL at 08:49

## 2024-07-20 RX ADMIN — PANTOPRAZOLE SODIUM 40 MG: 40 TABLET, DELAYED RELEASE ORAL at 06:20

## 2024-07-20 RX ADMIN — Medication 1 TABLET: at 08:49

## 2024-07-20 RX ADMIN — OXYCODONE AND ACETAMINOPHEN 1 TABLET: 5; 325 TABLET ORAL at 21:43

## 2024-07-20 ASSESSMENT — PAIN - FUNCTIONAL ASSESSMENT: PAIN_FUNCTIONAL_ASSESSMENT: PREVENTS OR INTERFERES SOME ACTIVE ACTIVITIES AND ADLS

## 2024-07-20 ASSESSMENT — PAIN SCALES - GENERAL
PAINLEVEL_OUTOF10: 5
PAINLEVEL_OUTOF10: 5
PAINLEVEL_OUTOF10: 0
PAINLEVEL_OUTOF10: 2
PAINLEVEL_OUTOF10: 7

## 2024-07-20 ASSESSMENT — PAIN DESCRIPTION - DESCRIPTORS
DESCRIPTORS: ACHING;DISCOMFORT;CRAMPING
DESCRIPTORS: ACHING
DESCRIPTORS: ACHING

## 2024-07-20 ASSESSMENT — PAIN DESCRIPTION - ORIENTATION
ORIENTATION: LOWER
ORIENTATION: LOWER;LEFT
ORIENTATION: LEFT;LOWER

## 2024-07-20 ASSESSMENT — PAIN DESCRIPTION - ONSET: ONSET: ON-GOING

## 2024-07-20 ASSESSMENT — PAIN DESCRIPTION - LOCATION
LOCATION: BACK
LOCATION: BACK
LOCATION: BACK;LEG

## 2024-07-20 ASSESSMENT — PAIN DESCRIPTION - PAIN TYPE: TYPE: ACUTE PAIN

## 2024-07-20 ASSESSMENT — PAIN DESCRIPTION - FREQUENCY: FREQUENCY: CONTINUOUS

## 2024-07-20 NOTE — CONSULTS
Content: Denies SI/HI, elias for safety and making appropriate future plans; no delusions expressed. No evidence of psychotic thoughts; no AH/VH and not RTIS  Orientation: Alert, oriented to person place and situation  Fund Of Knowledge: Average  Attention and Concentration: Normal  Recent and Remote Memory: Grossly intact    Insight/Judgment: Good/Good      Labs:   Lab Results   Component Value Date/Time     07/18/2024 05:22 AM    K 3.8 07/18/2024 05:22 AM     07/18/2024 05:22 AM    CO2 22 07/18/2024 05:22 AM    BUN 15 07/18/2024 05:22 AM    CREATININE 0.6 07/18/2024 05:22 AM    GLUCOSE 101 07/18/2024 05:22 AM    CALCIUM 9.0 07/18/2024 05:22 AM    LABGLOM >90 07/18/2024 05:22 AM        Imaging:   XR LUMBAR SPINE (2-3 VIEWS)   Final Result   1. No acute fracture.   2. Pronounced levo rotoscoliosis.         XR ANKLE LEFT (MIN 3 VIEWS)   Final Result   Redemonstration of nondisplaced fractures of the medial and lateral malleoli.      Slight prominence of the medial ankle clear space on the oblique view.         XR ANKLE LEFT (2 VIEWS)   Final Result   Unchanged alignment of the nondisplaced lateral and medial malleolus   fractures.         CT ABDOMEN PELVIS W IV CONTRAST Additional Contrast? None   Final Result   1. The abscess in the left mid abdomen is no longer seen. There is a small   amount of free fluid and phlegmon.   2. Gas along the anterior abdominal wall wound which is slightly dehiscent.   This has slightly worsened.             EKG: NSR, Qtc= 456     ASSESSMENT:   Adjustment Disorder with Mixed Anxiety and Depressed Mood  In setting of recent critical illness, loss of health status, and recovery process  Hx of MDD, recurrent    RECOMMENDATIONS:   1. Continue home medication regimen, pt reports this has worked well for her in the past and up until her recent hospitalizations, and ultimately denies need to make any changes at this time, as she is in agreement that recent episodes of  feeling overwhelmed are occurring as part of her adjustment and healing process after recent experiences of critical illness, grieving a loss of health status, and a difficult healing/recovery process. Appropriate to f/u with PCP for further med management:   -Continue Prozac 40mg daily   -Continue Wellbutrin XL 300mg daily  -Did discuss that Wellbutrin may increase Prozac levels, pt tolerating well for many years  -Also advised that Wellbutrin lowers the seizure threshold and can increase risk of seizures when pts use alcohol, pt states she was drinking 2 servings of alcohol per day prior to health issues, plans to cut back upon her recovery, reviewed seizure risk   -Continue Trazodone 75mg QHS  2. Discussed option to add an as needed medication for episodes of anxiety/overwhelm, however pt declines at this time as she feels like her coping skills are effective enough during these episodes. Reviewed coping skills including box breathing, mindfulness, distraction. Pt has read \"The Language of Letting Go\" multiple times and has found this kind of self-directed reading/learning to be helpful  -Recommendations for further self-directed reading on grieving (pt grieving loss of health status) added to pt's discharge instructions, asked pt's nurse to print and give to pt  -Discussed therapy, pt has completed [facility] in the past and found these to be helpful, initially stated she has not found weekly talk therapy to be helpful in the past, but discussed brief therapy may be helpful for coping with her recent illness, grieving a loss of health status, and her healing/recovery process. Pt states she will think about this, resources provided in discharge instructions  3. No acute safety concerns identified, pt denies SI/HI, denies thoughts of, plan or intent to harm/kill herself or others. Denies hx of suicide attempts. Denies access to guns/weapons. Is future oriented and elias for safety. Does not meet

## 2024-07-20 NOTE — DISCHARGE INSTRUCTIONS
Can look into the following books to help with grieving a loss of health, and healing:  -[place] [employer] the [employer]: Mapping Meaningful Connection and the Language of Human Experience   by Latasha Cano  -It’s OK That You’re Not OK   by Johnna Flores  -Conscious Grieving: A Transformative Approach to Healing from Loss  By Nathaly Doe    Therapy Resources:   [facility]      Location: [address], 4th floor  Phone: [phone]  In-person or virtual, sliding scale, $5-30/session to be discussed and agreed on with your therapist    [facility]  Location: Multiple offices in the [facility]  Phone: [phone]    [facility]  Location: Multiple offices in the [facility]  Phone: [phone]     [facility] at [place] (may have a long wait)                Location: [address]  Phone:  [phone]    [facility]: [email or web address]  Location: [address] [place] [address]  Phone: [phone]  -Takes insurance or self-pay sliding scale    [facility]: [email or web address]  Phone: [phone]    [facility]: [email or web address]  Location: [address] [place] [address] [address] [place] [address]  Phone: [phone]    [facility]  Location: [address] [place] [address]  Phone: [phone]    [facility]  Location: [address], [place] [address]  Phone: [phone]    [facility]  Location: [address] [facility] [address], [place] [address]  Phone: [phone]    [facility]: [email or web address]  Location: [address] [address]  Phone: [phone]  -Takes insurance or self/cash pay with income-based payment options

## 2024-07-20 NOTE — PLAN OF CARE
Problem: Discharge Planning  Goal: Discharge to home or other facility with appropriate resources  7/20/2024 1144 by Maia Sanchez RN  Outcome: Progressing     Problem: Safety - Adult  Goal: Free from fall injury  7/20/2024 1144 by Maia Sanchez RN  Outcome: Progressing     Problem: Pain  Goal: Verbalizes/displays adequate comfort level or baseline comfort level  7/20/2024 1144 by Maia Sanchez RN  Outcome: Progressing     Problem: ABCDS Injury Assessment  Goal: Absence of physical injury  7/20/2024 1144 by Maia Sanchez RN  Outcome: Progressing

## 2024-07-20 NOTE — PLAN OF CARE
Problem: Discharge Planning  Goal: Discharge to home or other facility with appropriate resources  7/19/2024 2240 by Junie Larkin RN  Outcome: Progressing  7/19/2024 1422 by Maia Sanchez RN  Outcome: Progressing     Problem: Safety - Adult  Goal: Free from fall injury  7/19/2024 2240 by Junie Larkin RN  Outcome: Progressing  7/19/2024 1422 by Maia Sanchez RN  Outcome: Progressing     Problem: Pain  Goal: Verbalizes/displays adequate comfort level or baseline comfort level  Outcome: Progressing     Problem: Skin/Tissue Integrity  Goal: Absence of new skin breakdown  Description: 1.  Monitor for areas of redness and/or skin breakdown  2.  Assess vascular access sites hourly  3.  Every 4-6 hours minimum:  Change oxygen saturation probe site  4.  Every 4-6 hours:  If on nasal continuous positive airway pressure, respiratory therapy assess nares and determine need for appliance change or resting period.  Outcome: Progressing     Problem: Nutrition Deficit:  Goal: Optimize nutritional status  Outcome: Progressing     Problem: ABCDS Injury Assessment  Goal: Absence of physical injury  7/19/2024 2240 by Junie Larkin RN  Outcome: Progressing  7/19/2024 1422 by Maia Sanchez RN  Outcome: Progressing     Problem: Neurosensory - Adult  Goal: Achieves stable or improved neurological status  Outcome: Progressing     Problem: Respiratory - Adult  Goal: Achieves optimal ventilation and oxygenation  Outcome: Progressing     Problem: Skin/Tissue Integrity - Adult  Goal: Skin integrity remains intact  Outcome: Progressing     Problem: Musculoskeletal - Adult  Goal: Return mobility to safest level of function  Outcome: Progressing     Problem: Gastrointestinal - Adult  Goal: Minimal or absence of nausea and vomiting  Outcome: Progressing     Problem: Genitourinary - Adult  Goal: Absence of urinary retention  Outcome: Progressing

## 2024-07-20 NOTE — PLAN OF CARE
Problem: Discharge Planning  Goal: Discharge to home or other facility with appropriate resources  7/19/2024 2241 by Junie Larkin RN  Outcome: Progressing  7/19/2024 2240 by Junie Larkin RN  Outcome: Progressing  7/19/2024 1422 by Maia Sanchez RN  Outcome: Progressing     Problem: Safety - Adult  Goal: Free from fall injury  7/19/2024 2241 by Junie Larkin RN  Outcome: Progressing  7/19/2024 2240 by Junie Larkin RN  Outcome: Progressing  7/19/2024 1422 by Maia Sanchez RN  Outcome: Progressing     Problem: Pain  Goal: Verbalizes/displays adequate comfort level or baseline comfort level  7/19/2024 2241 by Junie Larkin RN  Outcome: Progressing  7/19/2024 2240 by Junie Larkin RN  Outcome: Progressing     Problem: Skin/Tissue Integrity  Goal: Absence of new skin breakdown  Description: 1.  Monitor for areas of redness and/or skin breakdown  2.  Assess vascular access sites hourly  3.  Every 4-6 hours minimum:  Change oxygen saturation probe site  4.  Every 4-6 hours:  If on nasal continuous positive airway pressure, respiratory therapy assess nares and determine need for appliance change or resting period.  7/19/2024 2241 by Junie Larkin RN  Outcome: Progressing  7/19/2024 2240 by Junie Larkin RN  Outcome: Progressing     Problem: Nutrition Deficit:  Goal: Optimize nutritional status  7/19/2024 2241 by Junie Larkin RN  Outcome: Progressing  7/19/2024 2240 by Junie Larkin RN  Outcome: Progressing     Problem: ABCDS Injury Assessment  Goal: Absence of physical injury  7/19/2024 2241 by Junie Larkin RN  Outcome: Progressing  7/19/2024 2240 by Junie Larkin RN  Outcome: Progressing  7/19/2024 1422 by Maia Sanchez RN  Outcome: Progressing     Problem: Neurosensory - Adult  Goal: Achieves stable or improved neurological status  7/19/2024 2241 by Junie Larkin RN  Outcome: Progressing  7/19/2024 2240 by Junie Larkin RN  Outcome: Progressing      Problem: Respiratory - Adult  Goal: Achieves optimal ventilation and oxygenation  7/19/2024 2241 by Junie Larkin RN  Outcome: Progressing  7/19/2024 2240 by Junie Larkin RN  Outcome: Progressing     Problem: Skin/Tissue Integrity - Adult  Goal: Skin integrity remains intact  7/19/2024 2241 by Junie Larkin RN  Outcome: Progressing  7/19/2024 2240 by Junie Larkin RN  Outcome: Progressing     Problem: Musculoskeletal - Adult  Goal: Return mobility to safest level of function  7/19/2024 2241 by Junie Larkin RN  Outcome: Progressing  7/19/2024 2240 by Junie Larkin RN  Outcome: Progressing     Problem: Gastrointestinal - Adult  Goal: Minimal or absence of nausea and vomiting  7/19/2024 2241 by Junie Larkin RN  Outcome: Progressing  7/19/2024 2240 by Junie Larkin RN  Outcome: Progressing     Problem: Genitourinary - Adult  Goal: Absence of urinary retention  7/19/2024 2241 by Junie Larkin RN  Outcome: Progressing  7/19/2024 2240 by Junie Lakrin RN  Outcome: Progressing

## 2024-07-21 VITALS
HEART RATE: 79 BPM | TEMPERATURE: 98.9 F | WEIGHT: 161.16 LBS | HEIGHT: 63 IN | SYSTOLIC BLOOD PRESSURE: 115 MMHG | DIASTOLIC BLOOD PRESSURE: 66 MMHG | RESPIRATION RATE: 16 BRPM | BODY MASS INDEX: 28.55 KG/M2 | OXYGEN SATURATION: 94 %

## 2024-07-21 PROCEDURE — 1280000000 HC REHAB R&B

## 2024-07-21 PROCEDURE — 6370000000 HC RX 637 (ALT 250 FOR IP): Performed by: PHYSICAL MEDICINE & REHABILITATION

## 2024-07-21 PROCEDURE — 2580000003 HC RX 258: Performed by: PHYSICAL MEDICINE & REHABILITATION

## 2024-07-21 PROCEDURE — 6360000002 HC RX W HCPCS: Performed by: PHARMACIST

## 2024-07-21 PROCEDURE — 6370000000 HC RX 637 (ALT 250 FOR IP): Performed by: STUDENT IN AN ORGANIZED HEALTH CARE EDUCATION/TRAINING PROGRAM

## 2024-07-21 PROCEDURE — 2580000003 HC RX 258: Performed by: PHARMACIST

## 2024-07-21 RX ADMIN — TRAZODONE HYDROCHLORIDE 75 MG: 50 TABLET ORAL at 20:24

## 2024-07-21 RX ADMIN — PIPERACILLIN AND TAZOBACTAM 3375 MG: 3; .375 INJECTION, POWDER, FOR SOLUTION INTRAVENOUS at 01:05

## 2024-07-21 RX ADMIN — PIPERACILLIN AND TAZOBACTAM 3375 MG: 3; .375 INJECTION, POWDER, FOR SOLUTION INTRAVENOUS at 16:09

## 2024-07-21 RX ADMIN — SODIUM CHLORIDE, PRESERVATIVE FREE 10 ML: 5 INJECTION INTRAVENOUS at 11:13

## 2024-07-21 RX ADMIN — BUPROPION HYDROCHLORIDE 300 MG: 150 TABLET, EXTENDED RELEASE ORAL at 11:08

## 2024-07-21 RX ADMIN — PANTOPRAZOLE SODIUM 40 MG: 40 TABLET, DELAYED RELEASE ORAL at 05:28

## 2024-07-21 RX ADMIN — CYCLOBENZAPRINE 10 MG: 10 TABLET, FILM COATED ORAL at 11:08

## 2024-07-21 RX ADMIN — BISACODYL 5 MG: 5 TABLET, COATED ORAL at 11:08

## 2024-07-21 RX ADMIN — PIPERACILLIN AND TAZOBACTAM 3375 MG: 3; .375 INJECTION, POWDER, FOR SOLUTION INTRAVENOUS at 11:17

## 2024-07-21 RX ADMIN — ATORVASTATIN CALCIUM 10 MG: 10 TABLET, FILM COATED ORAL at 11:08

## 2024-07-21 RX ADMIN — FLUOXETINE HYDROCHLORIDE 40 MG: 20 CAPSULE ORAL at 11:07

## 2024-07-21 RX ADMIN — OXYCODONE AND ACETAMINOPHEN 1 TABLET: 5; 325 TABLET ORAL at 22:03

## 2024-07-21 RX ADMIN — SODIUM CHLORIDE, PRESERVATIVE FREE 10 ML: 5 INJECTION INTRAVENOUS at 20:25

## 2024-07-21 RX ADMIN — DICLOFENAC SODIUM 4 G: 10 GEL TOPICAL at 20:27

## 2024-07-21 RX ADMIN — CYCLOBENZAPRINE 10 MG: 10 TABLET, FILM COATED ORAL at 20:24

## 2024-07-21 RX ADMIN — APIXABAN 5 MG: 5 TABLET, FILM COATED ORAL at 11:09

## 2024-07-21 RX ADMIN — DICLOFENAC SODIUM 4 G: 10 GEL TOPICAL at 11:10

## 2024-07-21 RX ADMIN — Medication 1 TABLET: at 11:08

## 2024-07-21 RX ADMIN — LATANOPROST 1 DROP: 50 SOLUTION OPHTHALMIC at 20:28

## 2024-07-21 RX ADMIN — APIXABAN 5 MG: 5 TABLET, FILM COATED ORAL at 22:03

## 2024-07-21 RX ADMIN — Medication: at 11:09

## 2024-07-21 ASSESSMENT — PAIN SCALES - GENERAL
PAINLEVEL_OUTOF10: 7
PAINLEVEL_OUTOF10: 2

## 2024-07-21 ASSESSMENT — PAIN DESCRIPTION - LOCATION
LOCATION: BACK
LOCATION: BACK

## 2024-07-21 ASSESSMENT — PAIN DESCRIPTION - ORIENTATION
ORIENTATION: LEFT;LOWER
ORIENTATION: LEFT;LOWER

## 2024-07-21 ASSESSMENT — PAIN DESCRIPTION - DESCRIPTORS
DESCRIPTORS: ACHING
DESCRIPTORS: ACHING

## 2024-07-21 ASSESSMENT — PAIN DESCRIPTION - PAIN TYPE: TYPE: CHRONIC PAIN

## 2024-07-21 ASSESSMENT — PAIN SCALES - WONG BAKER: WONGBAKER_NUMERICALRESPONSE: NO HURT

## 2024-07-21 ASSESSMENT — PAIN DESCRIPTION - ONSET: ONSET: ON-GOING

## 2024-07-22 LAB
ANION GAP SERPL CALCULATED.3IONS-SCNC: 12 MMOL/L (ref 3–16)
BASOPHILS # BLD: 0.1 K/UL (ref 0–0.2)
BASOPHILS NFR BLD: 0.8 %
BUN SERPL-MCNC: 13 MG/DL (ref 7–20)
CALCIUM SERPL-MCNC: 9.8 MG/DL (ref 8.3–10.6)
CHLORIDE SERPL-SCNC: 103 MMOL/L (ref 99–110)
CO2 SERPL-SCNC: 24 MMOL/L (ref 21–32)
CREAT SERPL-MCNC: 0.7 MG/DL (ref 0.6–1.2)
DEPRECATED RDW RBC AUTO: 16.1 % (ref 12.4–15.4)
EOSINOPHIL # BLD: 0.5 K/UL (ref 0–0.6)
EOSINOPHIL NFR BLD: 5.6 %
GFR SERPLBLD CREATININE-BSD FMLA CKD-EPI: 88 ML/MIN/{1.73_M2}
GLUCOSE SERPL-MCNC: 91 MG/DL (ref 70–99)
HCT VFR BLD AUTO: 29 % (ref 36–48)
HGB BLD-MCNC: 9.6 G/DL (ref 12–16)
LYMPHOCYTES # BLD: 1.7 K/UL (ref 1–5.1)
LYMPHOCYTES NFR BLD: 21.2 %
MCH RBC QN AUTO: 30.4 PG (ref 26–34)
MCHC RBC AUTO-ENTMCNC: 33.2 G/DL (ref 31–36)
MCV RBC AUTO: 91.7 FL (ref 80–100)
MONOCYTES # BLD: 1.1 K/UL (ref 0–1.3)
MONOCYTES NFR BLD: 12.8 %
NEUTROPHILS # BLD: 4.9 K/UL (ref 1.7–7.7)
NEUTROPHILS NFR BLD: 59.6 %
PLATELET # BLD AUTO: 354 K/UL (ref 135–450)
PMV BLD AUTO: 6.6 FL (ref 5–10.5)
POTASSIUM SERPL-SCNC: 3.7 MMOL/L (ref 3.5–5.1)
RBC # BLD AUTO: 3.17 M/UL (ref 4–5.2)
SODIUM SERPL-SCNC: 139 MMOL/L (ref 136–145)
WBC # BLD AUTO: 8.2 K/UL (ref 4–11)

## 2024-07-22 PROCEDURE — 2580000003 HC RX 258: Performed by: PHARMACIST

## 2024-07-22 PROCEDURE — 6360000002 HC RX W HCPCS: Performed by: PHARMACIST

## 2024-07-22 PROCEDURE — 1280000000 HC REHAB R&B

## 2024-07-22 PROCEDURE — 97606 NEG PRS WND THER DME>50 SQCM: CPT

## 2024-07-22 PROCEDURE — 2580000003 HC RX 258: Performed by: PHYSICAL MEDICINE & REHABILITATION

## 2024-07-22 PROCEDURE — 36415 COLL VENOUS BLD VENIPUNCTURE: CPT

## 2024-07-22 PROCEDURE — 97530 THERAPEUTIC ACTIVITIES: CPT

## 2024-07-22 PROCEDURE — 97110 THERAPEUTIC EXERCISES: CPT

## 2024-07-22 PROCEDURE — 85025 COMPLETE CBC W/AUTO DIFF WBC: CPT

## 2024-07-22 PROCEDURE — 97116 GAIT TRAINING THERAPY: CPT

## 2024-07-22 PROCEDURE — 97535 SELF CARE MNGMENT TRAINING: CPT

## 2024-07-22 PROCEDURE — 6370000000 HC RX 637 (ALT 250 FOR IP): Performed by: PHYSICAL MEDICINE & REHABILITATION

## 2024-07-22 PROCEDURE — 6370000000 HC RX 637 (ALT 250 FOR IP): Performed by: STUDENT IN AN ORGANIZED HEALTH CARE EDUCATION/TRAINING PROGRAM

## 2024-07-22 PROCEDURE — 80048 BASIC METABOLIC PNL TOTAL CA: CPT

## 2024-07-22 PROCEDURE — 97542 WHEELCHAIR MNGMENT TRAINING: CPT

## 2024-07-22 RX ORDER — OXYCODONE HYDROCHLORIDE AND ACETAMINOPHEN 5; 325 MG/1; MG/1
1 TABLET ORAL EVERY 6 HOURS PRN
Qty: 28 TABLET | Refills: 0
Start: 2024-07-22 | End: 2024-07-29

## 2024-07-22 RX ORDER — CYCLOBENZAPRINE HCL 10 MG
10 TABLET ORAL 3 TIMES DAILY
Qty: 90 TABLET | Refills: 0 | DISCHARGE
Start: 2024-07-22 | End: 2024-08-21

## 2024-07-22 RX ORDER — BISACODYL 5 MG/1
5 TABLET, DELAYED RELEASE ORAL DAILY
Qty: 30 TABLET | Refills: 0 | DISCHARGE
Start: 2024-07-23

## 2024-07-22 RX ORDER — ACETAMINOPHEN 650 MG
TABLET, EXTENDED RELEASE ORAL
Qty: 118 ML | Refills: 0 | DISCHARGE
Start: 2024-07-23 | End: 2024-07-29

## 2024-07-22 RX ORDER — POLYETHYLENE GLYCOL 3350 17 G/17G
17 POWDER, FOR SOLUTION ORAL DAILY PRN
Qty: 527 G | Refills: 0 | DISCHARGE
Start: 2024-07-22 | End: 2024-08-21

## 2024-07-22 RX ADMIN — Medication 1 TABLET: at 09:58

## 2024-07-22 RX ADMIN — TRAZODONE HYDROCHLORIDE 75 MG: 50 TABLET ORAL at 20:01

## 2024-07-22 RX ADMIN — ATORVASTATIN CALCIUM 10 MG: 10 TABLET, FILM COATED ORAL at 09:58

## 2024-07-22 RX ADMIN — CYCLOBENZAPRINE 10 MG: 10 TABLET, FILM COATED ORAL at 20:01

## 2024-07-22 RX ADMIN — PIPERACILLIN AND TAZOBACTAM 3375 MG: 3; .375 INJECTION, POWDER, FOR SOLUTION INTRAVENOUS at 00:15

## 2024-07-22 RX ADMIN — APIXABAN 5 MG: 5 TABLET, FILM COATED ORAL at 20:01

## 2024-07-22 RX ADMIN — CYCLOBENZAPRINE 10 MG: 10 TABLET, FILM COATED ORAL at 09:58

## 2024-07-22 RX ADMIN — DICLOFENAC SODIUM 4 G: 10 GEL TOPICAL at 20:05

## 2024-07-22 RX ADMIN — PIPERACILLIN AND TAZOBACTAM 3375 MG: 3; .375 INJECTION, POWDER, FOR SOLUTION INTRAVENOUS at 16:25

## 2024-07-22 RX ADMIN — PANTOPRAZOLE SODIUM 40 MG: 40 TABLET, DELAYED RELEASE ORAL at 05:32

## 2024-07-22 RX ADMIN — LATANOPROST 1 DROP: 50 SOLUTION OPHTHALMIC at 20:03

## 2024-07-22 RX ADMIN — BISACODYL 5 MG: 5 TABLET, COATED ORAL at 09:59

## 2024-07-22 RX ADMIN — OXYCODONE AND ACETAMINOPHEN 1 TABLET: 5; 325 TABLET ORAL at 21:43

## 2024-07-22 RX ADMIN — APIXABAN 5 MG: 5 TABLET, FILM COATED ORAL at 09:59

## 2024-07-22 RX ADMIN — BUPROPION HYDROCHLORIDE 300 MG: 150 TABLET, EXTENDED RELEASE ORAL at 09:58

## 2024-07-22 RX ADMIN — CYCLOBENZAPRINE 10 MG: 10 TABLET, FILM COATED ORAL at 15:23

## 2024-07-22 RX ADMIN — PIPERACILLIN AND TAZOBACTAM 3375 MG: 3; .375 INJECTION, POWDER, FOR SOLUTION INTRAVENOUS at 08:11

## 2024-07-22 RX ADMIN — FLUOXETINE HYDROCHLORIDE 40 MG: 20 CAPSULE ORAL at 09:58

## 2024-07-22 RX ADMIN — SODIUM CHLORIDE, PRESERVATIVE FREE 10 ML: 5 INJECTION INTRAVENOUS at 10:02

## 2024-07-22 RX ADMIN — Medication: at 09:59

## 2024-07-22 NOTE — WOUND CARE
wound care.    Specialty Bed Required : Yes   [] Low Air Loss   [] Pressure Redistribution  [] Fluid Immersion  [] Bariatric  [] Total Pressure Relief  [] Other:     Current Diet: ADULT DIET; Regular  ADULT ORAL NUTRITION SUPPLEMENT; Lunch, Dinner; Frozen Oral Supplement  Dietician consult:  Yes    Discharge Plan:  Placement for patient upon discharge: intermediate care facility   Patient appropriate for Outpatient Wound Care Center: Yes  Supplies given Yes   Samples requested No  Referrals:  []   [] Home Health Care  [] Supplies  [] Other    Patient/Caregiver Teaching:  Patient assisted with ostomy pouch change.  She was able to follow the directions left with her and follow the steps.  Still needs practise.  Level of patient/caregiver understanding able to:   Teaching provided:  [] Reviewed GI and A&P        [x] Supplies  [] Pouch emptying      [x] Manipulate closure  [x] Routine Care         [] Comment  [x] Pouch maintenance             [] Indicates understanding       [x] Needs reinforcement  [] Unsuccessful      [x] Verbal Understanding  [x] Demonstrated understanding       [] No evidence of learning  [] Refused teaching         [] N/A       Electronically signed by Renae Carnes RN, MSN, CWOCN on 7/22/2024 at 11:47 AM

## 2024-07-22 NOTE — PROGRESS NOTES
Occupational Therapy  Facility/Department: [facility]  Rehabilitation Occupational Therapy Daily Treatment Note    Date: 24  Patient Name: Sudha Lopez       Room: 0164/0164-01  MRN: [id number]  Account: [id number]   : 1945  (78 y.o.) Gender: female                    Past Medical History:  has a past medical history of Anxiety and Hypertension.  Past Surgical History:   has a past surgical history that includes Hysterectomy; Breast enhancement surgery; rhinoplasty; Rotator cuff repair (Bilateral); Hand surgery (Right); Total hip arthroplasty (Bilateral); Intracapsular cataract extraction (Left, 2020); Colonoscopy; Intracapsular cataract extraction (Right, 2020); Colonoscopy (N/A, 2024); laparotomy (N/A, 2024); and CT PERITONEAL/RETROPERITONEAL PERC DRAIN (7/3/2024).    Restrictions  Restrictions/Precautions: Fall Risk, Up as Tolerated, Weight Bearing  Other position/activity restrictions: IV, ostomy, wound vac, \"Touch down with minimal weight bearing on operative left leg just for balance.\" per orders from [facility]  Right Lower Extremity Weight Bearing: Weight Bearing As Tolerated  Left Lower Extremity Weight Bearing: Toe Touch Weight Bearing  Required Braces or Orthoses  Left Lower Extremity Brace: Boot  LLE Brace Type: CAM boot  Required Braces or Orthoses?: Yes    Subjective  Subjective: Pt semi-supine in bed and agreeable to OT tx. /71, HR 75, SPO2 94%. 5/10 pain in back  Restrictions/Precautions: Fall Risk;Up as Tolerated;Weight Bearing             Objective     Cognition  Overall Cognitive Status: Exceptions  Arousal/Alertness: Appears intact  Following Commands: Follows one step commands consistently  Attention Span: Appears intact  Memory: Appears intact  Safety Judgement: Decreased awareness of need for safety;Decreased awareness of need for assistance  Problem Solving: Decreased awareness of errors;Assistance required to correct errors made;Assistance required to

## 2024-07-22 NOTE — PROGRESS NOTES
EOB  Stand to Sit  Assistance Level: Contact guard assist  Skilled Clinical Factors: with RW to w/c  Bed To/From Chair  Technique: Stand pivot  Assistance Level: Contact guard assist  Skilled Clinical Factors: with RW bed>w/c toward L      Environmental Mobility  Wheelchair  Surface: Level surface  Device: Standard wheelchair  Additional Factors: Increased time to complete  Assistance Required to Manage Parts: Left leg rest  Assistance Level for Propulsion: Modified independent  Propulsion Method: Bilateral upper extremities;Right lower extremity  Propulsion Quality: Slow velocity;Short strokes;Decreased fluidity  Propulsion Distance: 150 ft  Skilled Clinical Factors: Pt navigates multiple turns and manages own brakes, increased time to complete                    ASSESSMENT/PROGRESS TOWARDS GOALS  Assessment  Assessment: Patient seen for bed mobility, transfers, and w/c mobility.  Pt requires assistance to don boot at EOB. Patient completed bed mobility with min-mod A with bed flat and no bed rails. Pt completes STS and SPT with RW and CGA. Pt propels manual w/c 150 ft with mod I. Pt will continue to benefit from skilled PT in SNF setting at d/c to improve safety with mobility.  Activity Tolerance: Patient tolerated treatment well  Discharge Recommendations: Subacute/Skilled Nursing Facility  PT Equipment Recommendations  Equipment Needed: No  Other: defer to SNF    Addendum: 2nd session (Ceci Yadav PT, DPT)  Pt seen in pm for second session, immediately following first session. Pt reports no concerns with d/c to SNF for continued therapy. Pt with CAM boot in place throughout session, requires cues with difficulty maintaining WB px throughout session. Pt requires Ruslan for bed mobility, CGA to Ruslan for functional t/f and TD for trial of gait in // bars. Distances limited for safety d/t difficulty maintaining WB px despite cues.   Bed mobility:  Sit to supine, Ruslan for LE, increased time to complete, without

## 2024-07-22 NOTE — CARE COORDINATION
Case Management Discharge Summary  [facility] - Acute Rehab Unit    Patient:Sudha Lopez     Rehab Dx/Hx: Debility [R53.81]       Today's Date: 7/23/24    Discharge to:  [facility]   Pre-certification completed:  [] No       [x] Yes     [] N/A   Hospital Exemption Notification (HENS) completed:  [] No       [x] Yes     [] N/A  #[id number]   IMM given:  7/22/24       New Durable Medical Equipment ordered/agency:  Provided by the facility   Transportation:  Medical Transport explained to pt/family. Pt/family voice no agency preference.    Agency used: [facility]   time:1400  Ambulance form completed: [] No       [] Yes   [] N/A       Confirmed discharge plan with:  Patient: [] No       [x] Yes  Family:  [] No       [x] Yes      Name:Victorina   Contact number: [phone]  Facility/Agency, name:[facility] can pull from Epic  JONATHAN/AVS will accompany patient in her discharge packet no need to be faxed  Phone number for report to facility: [phone]  RN, name: Ry       Has lack of transportation kept you from medical appointments, meetings, work, or ADL's? [] Yes, it has kept me from medical appointments or from getting my meds  [] Yes, It has kept me from non-medical meetings, appointments, work, or getting things I need  [x] No  [] Pt unable to respond  [] Pt declines to respond     How often do you need to have someone help you when you read instructions, pamphlets, or other written material from your doctor or pharmacy? [] Never                             [] Always  [x] Rarely                            [] Patient declines to respond  [] Sometimes                    [] Patient unable to respond  [] Often       Note: Discharging nurse to complete JONATHAN, reconcile AVS, and place final copy with patient's discharge packet. RN to ensure that written prescriptions for  Level II medications are sent with patient to the facility as per protocol.          Signature: Kristen Hamilton RN

## 2024-07-22 NOTE — PROGRESS NOTES
Comprehensive Nutrition Assessment    Type and Reason for Visit:  Reassess    Nutrition Recommendations/Plan:   Continue general diet   Discontinue Magic Cups   Encourage PO intakes as tolerated   Monitor nutrition adequacy, pertinent labs, bowel habits, wt changes, and clinical progress     Malnutrition Assessment:  Malnutrition Status:  Moderate malnutrition (07/09/24 1259)    Context:  Acute Illness     Findings of the 6 clinical characteristics of malnutrition:  Energy Intake:  75% or less of estimated energy requirements for 7 or more days  Weight Loss:  Greater than 5% over 1 month     Muscle Mass Loss:  Mild muscle mass loss Temples (temporalis), Clavicles (pectoralis & deltoids)    Nutrition Assessment:    Follow up: Pt nutritionally improving AEB increased appetite and PO intakes. Pt reports appetite slowly improving. Encouraged PO intakes as tolerated. PO intakes of mainly % per EMR. Unfavorable to Magic Cups, will discontinue. Denied any further nutrition questions prior to d/c tomorrow. Will monitor.    Nutrition Related Findings:    Active BS. +Ostomy with output. Trace BUE edema. Labs reviewed. Wound Type: Pressure Injury, Unstageable, Wound Vac, Surgical Incision       Current Nutrition Intake & Therapies:    Average Meal Intake: 1-25%, 26-50%, %  Average Supplements Intake: 0%  ADULT DIET; Regular  ADULT ORAL NUTRITION SUPPLEMENT; Lunch, Dinner; Frozen Oral Supplement    Anthropometric Measures:  Height: 160 cm (5' 3\")  Ideal Body Weight (IBW): 115 lbs (52 kg)    Admission Body Weight: 71.7 kg (158 lb) (bed scale)  Current Body Weight: 73 kg (161 lb), 156.5 % IBW. Weight Source: Bed Scale  Current BMI (kg/m2): 28.5  Usual Body Weight: 89.8 kg (198 lb) (bed scale 6/3/24)  % Weight Change (Calculated): -9.1                    BMI Categories: Overweight (BMI 25.0-29.9)    Estimated Daily Nutrient Needs:  Energy Requirements Based On: Kcal/kg (25-30)  Weight Used for Energy Requirements:

## 2024-07-22 NOTE — CARE COORDINATION
CM update; Call placed to [facility] and they stated they are just like ARU and are unable to accept. Call placed to Kina at [facility] to check on bed availability tomorrow. Will follow.Kristen Hamilton RN      2581 Patient has been accepted at [facility]. [facility] will reach out to [facility] facility to have wound vac brought over. They Have asked we send a few ostomy supplies. I have notified the team,Patient and her daughter Victorina of POC. Patient has requested transport to be around 1400 to help with pickup of her belongings. Will follow.Kristen Hamilton RN

## 2024-07-22 NOTE — PROGRESS NOTES
Department of Physical Medicine & Rehabilitation  Progress Note    Patient Identification:  Sudha Lopez  [id number]  : 1945  Admit date: 2024    Chief Complaint: Debility    Subjective:   No acute events overnight. Today Sudha is seen in her room with therapy. She reports feeling well. She notes talking with Psychiatry. She denies acute complaints at this time. Plan for discharge to SNF tomorrow.     Reviewed labs.     ROS: No f/c, n/v, cp     Objective:  Patient Vitals for the past 24 hrs:   BP Temp Temp src Pulse Resp SpO2   24 0745 124/71 97.9 °F (36.6 °C) Oral 75 16 --   24 -- -- -- --  --   24 -- -- -- --  --   24 115/66 98.9 °F (37.2 °C) Oral 79 17 94 %     Const: Alert. No distress, pleasant.   HEENT: Normocephalic, atraumatic. Normal sclera/conjunctiva. MMM.   CV: extremities well perfused  Resp: No respiratory distress. On room air  Abd: nondistended  Neuro: Alert, oriented, appropriately interactive. Speech fluent  Psych: Cooperative, appropriate mood and affect    Laboratory data: Available via EMR.   Last 24 hour lab  Recent Results (from the past 24 hour(s))   Basic Metabolic Panel w/ Reflex to MG    Collection Time: 24  6:58 AM   Result Value Ref Range    Sodium 139 136 - 145 mmol/L    Potassium reflex Magnesium 3.7 3.5 - 5.1 mmol/L    Chloride 103 99 - 110 mmol/L    CO2 24 21 - 32 mmol/L    Anion Gap 12 3 - 16    Glucose 91 70 - 99 mg/dL    BUN 13 7 - 20 mg/dL    Creatinine 0.7 0.6 - 1.2 mg/dL    Est, Glom Filt Rate 88 >60    Calcium 9.8 8.3 - 10.6 mg/dL   CBC auto differential    Collection Time: 24  6:58 AM   Result Value Ref Range    WBC 8.2 4.0 - 11.0 K/uL    RBC 3.17 (L) 4.00 - 5.20 M/uL    Hemoglobin 9.6 (L) 12.0 - 16.0 g/dL    Hematocrit 29.0 (L) 36.0 - 48.0 %    MCV 91.7 80.0 - 100.0 fL    MCH 30.4 26.0 - 34.0 pg    MCHC 33.2 31.0 - 36.0 g/dL    RDW 16.1 (H) 12.4 - 15.4 %    Platelets 354 135 - 450 K/uL    MPV 6.6 5.0 - 10.5

## 2024-07-22 NOTE — PROGRESS NOTES
Physical Therapy  Discharge Summary    Name:Sudha Lopez  MRN:[id number]  :1945          Restrictions/Precautions  Restrictions/Precautions: Fall Risk, Up as Tolerated, Weight Bearing  Required Braces or Orthoses?: Yes   Lower Extremity Weight Bearing Restrictions  Right Lower Extremity Weight Bearing: Weight Bearing As Tolerated  Left Lower Extremity Weight Bearing: Toe Touch Weight Bearing       Position Activity Restriction  Other position/activity restrictions: IV, ostomy, wound vac, \"Touch down with minimal weight bearing on operative left leg just for balance.\" per orders from [facility]     Goals:                  Short Term Goals  Time Frame for Short Term Goals: 10 days ()  Short Term Goal 1: Patient will perform bed mobility with CGA - MET , SBA  Short Term Goal 2: Patient will perform sit to stand with Mod A; not met  - pt completes with Ax2  Short Term Goal 3: Patient will perform pivot transfer bed <> chair with Mod A x1 - MET  - Min A x1  Short Term Goal 4: Patient will ambulate 5ft in parallel bars while maintaining LLE NWB, with 2-person assist - MET , 5 ft in // bars, min A + CGA  Short Term Goal 5: Patient will self propel wheelchair 150ft with supervision - MET  supv  Additional Goals?: No            Long Term Goals  Time Frame for Long Term Goals : 20 days ()  Long Term Goal 1: Patient will be Mod I for bed mobility; not met  - pt requires min A for rolling and mod A for supine>sit  Long Term Goal 2: Patient will be Mod I for transfers; not met  - pt requires CGA with RW for STS and SPT  Long Term Goal 3: Patient will be independent with wheelchair mobility 150ft; goal met  - pt propels manual w/c 150 ft with BUE and RLE mod I  Long Term Goal 4: Continue to assess for ambulation LTG    Pt. Met 4/5 short term goals and 1/4 long term goals. Goals not met d/t continued WB restrictions with difficulty maintaining, decreased safety and IND with

## 2024-07-22 NOTE — PROGRESS NOTES
have altered level of consciousness as indicated by any of the following criteria?  Vigilant: startled easily to any sound or touch  Lethargic: repeatedly dozed off when being asked questions, but responded to voice or touch  Stuporous: very difficult to arouse and keep aroused for the interview  Comatose: could not be aroused  [x] 0. Behavior not present    [] 1. Behavior continuously present, does not fluctuate   [] 2. Behavior present, fluctuates (comes and goes, changes in severity)      Electronically signed by Marcela Shook OT on 7/22/2024 at 11:48 AM

## 2024-07-23 ENCOUNTER — TELEPHONE (OUTPATIENT)
Dept: INFECTIOUS DISEASES | Age: 79
End: 2024-07-23

## 2024-07-23 VITALS
HEART RATE: 78 BPM | OXYGEN SATURATION: 96 % | TEMPERATURE: 97.9 F | BODY MASS INDEX: 29.65 KG/M2 | DIASTOLIC BLOOD PRESSURE: 74 MMHG | WEIGHT: 167.33 LBS | RESPIRATION RATE: 16 BRPM | SYSTOLIC BLOOD PRESSURE: 120 MMHG | HEIGHT: 63 IN

## 2024-07-23 PROCEDURE — 2580000003 HC RX 258: Performed by: PHARMACIST

## 2024-07-23 PROCEDURE — 6360000002 HC RX W HCPCS: Performed by: PHARMACIST

## 2024-07-23 PROCEDURE — 6370000000 HC RX 637 (ALT 250 FOR IP): Performed by: STUDENT IN AN ORGANIZED HEALTH CARE EDUCATION/TRAINING PROGRAM

## 2024-07-23 PROCEDURE — 6370000000 HC RX 637 (ALT 250 FOR IP): Performed by: PHYSICAL MEDICINE & REHABILITATION

## 2024-07-23 RX ADMIN — FLUOXETINE HYDROCHLORIDE 40 MG: 20 CAPSULE ORAL at 09:59

## 2024-07-23 RX ADMIN — CYCLOBENZAPRINE 10 MG: 10 TABLET, FILM COATED ORAL at 10:00

## 2024-07-23 RX ADMIN — PIPERACILLIN AND TAZOBACTAM 3375 MG: 3; .375 INJECTION, POWDER, FOR SOLUTION INTRAVENOUS at 00:04

## 2024-07-23 RX ADMIN — BISACODYL 5 MG: 5 TABLET, COATED ORAL at 10:00

## 2024-07-23 RX ADMIN — BUPROPION HYDROCHLORIDE 300 MG: 150 TABLET, EXTENDED RELEASE ORAL at 10:00

## 2024-07-23 RX ADMIN — Medication 1 TABLET: at 09:59

## 2024-07-23 RX ADMIN — ATORVASTATIN CALCIUM 10 MG: 10 TABLET, FILM COATED ORAL at 09:59

## 2024-07-23 RX ADMIN — PIPERACILLIN AND TAZOBACTAM 3375 MG: 3; .375 INJECTION, POWDER, FOR SOLUTION INTRAVENOUS at 10:06

## 2024-07-23 RX ADMIN — APIXABAN 5 MG: 5 TABLET, FILM COATED ORAL at 09:59

## 2024-07-23 RX ADMIN — PANTOPRAZOLE SODIUM 40 MG: 40 TABLET, DELAYED RELEASE ORAL at 10:08

## 2024-07-23 NOTE — PROGRESS NOTES
D- Order for patient discharge.  A- Reviewed discharge summary (AVS) with patient including medications, physical instructions (safety) and diet. Patient is in stable condition. Prescription sent in packet for SNF.  R- Discharged via stretcher with transport company to go to [facility].

## 2024-07-23 NOTE — TELEPHONE ENCOUNTER
Received call from Johnna LOPEZ at [facility] clarifying IV abx orders.  She reports that patient does not have IV in place.  Upon chart review, patient had PIV that was removed prior to discharge.    RN states that they can place a PIV for IV abx.    OPAT orders verified:    IV piperacillin/tazobactam 13.5 g every 24 hour ATC as a continuous infusion daily  - Planned End date: 7/31/2024  CBC w diff, CMP, ESR, CRP    ECF prefers to switch to piperacillin/tazobactam 3.375g q 8hrs  over 4 hrs due to therapy.

## 2024-07-23 NOTE — PROGRESS NOTES
Continuous []   C3. Intermittent []   C4. High-concentration []   D1. Suctioning []   D2. Scheduled []   D3. As needed []   E1. Tracheostomy Care []   F1. Invasive Mechanical Ventilator (ventilator or respirator) []   G1. Non-invasive Mechanical Ventilator []   G2. BiPAP []   G3. CPAP []   Other    H1. IV Medications []   H2. Vasoactive medications []   H3. Antibiotics [x]   H4. Anticoagulation []   H10. Other []   I1. Transfusions []   J1. Dialysis []   J2. Hemodialysis []   J3. Peritoneal dialysis []   O1. IV access []   O2. Peripheral []   O3. Midline []   O4. Central (PICC, tunneled, port) []   None of the above []     Signature: Ry Laws RN

## 2024-07-23 NOTE — PROGRESS NOTES
Checked with patient to answer any last minute questions.  Encouraged patient to be in charge of her ostomy in the nursing home.  She knows the steps on how to change it.  Now just need practise. NPWT dressing will be removed prior to discharge and re applied a SNF.  JONATHAN is updated.  Ready for discharge per wound care.    Remove NPWT dressing, place moist to moist dressing in open incision site with tunneling between distal and medial openings.  Cover with dry dressing.  Place rental NPWT machine in dirty utility room for .

## 2024-07-23 NOTE — DISCHARGE SUMMARY
status post CT-guided left abdominal drain placement by IR on 7/3/2024 and antibiotic treatment with IV Zosyn through 7/31     Anemia  - Hb stable  - Monitor and transfuse for Hb<7     PAF  - on Eliquis     Rt total knee replacement  - Continue Rt TKR protocol with ROM excercises, WBAT     Lt ankle fracture dislocation  - boot   - Touch down with minimal weight bearing on operative left leg just for balance per Ortho     Low back pain  - XR showing arthritis  - started flexeril with improvement in pain     Mood Disorder   - Psychiatry consulted, appreciate assistance. Continue prozac, wellbutrin, trazodone    Discharge Exam:  Constitutional: Alert, WDWN, Pleasant, no distress  Head: Normocephalic, atruamatic, MMM  Eyes: Conjunctiva noninjected, no icterus, no drainage  Pulm: CTA bilat. Respirations non-labored.   CV: No murmurs noted. RRR.   Abd: Soft, nontender. NABS+  Ext: No edema, no varicosities  Neuro: Alert, fully oriented, appropriate   MSK: No joint abnormalities noted     Discharge Functional Status:    Physical therapy:  Bed Mobility:     Sit>supine:  Assistance Level: Supervision  Skilled Clinical Factors: increased time, HOB flat  Supine>sit:  Assistance Level: Moderate assistance  Skilled Clinical Factors: HOB flat, no rails  Transfers:  Surface: From bed, To chair with arms, Wheelchair  Additional Factors: Verbal cues, Hand placement cues, Increased time to complete  Device: Walker (RW)  Sit>stand:  Assistance Level: Contact guard assist  Skilled Clinical Factors: with RW from EOB  Stand>sit:  Assistance Level: Contact guard assist  Skilled Clinical Factors: with RW to w/c  Bed<>chair  Technique: Stand pivot  Assistance Level: Contact guard assist  Skilled Clinical Factors: with RW bed>w/c toward L  Stand Pivot:  Assistance Level: Minimal assistance, Moderate assistance, Requires x 2 assistance  Skilled Clinical Factors: see above  Lateral transfer:     Car transfer:     Ambulation:  Surface: Level

## 2024-07-23 NOTE — PROGRESS NOTES
ACUTE REHAB UNIT: DISCHARGE  [facility]    Patient:Sudha Lopez     Rehab Dx/Hx: Debility [R53.81]   :1945  MRN:[id number]  Date of Admit: 2024   Date of Discharge: 2024    Subjective:   Order for patient discharge.  Reviewed discharge summary (AVS) with patient including medications, physical instructions (safety) and diet. Pt in stable condition.     Reconciled Medication List - Patient:   Medications were reviewed by RN at time of discharge with patient and/or family:  []  Via EMR   []  Via health information exchange  [x]  Verbal (e.g. in person, telephone, video conferencing)  [x]  Paper-based (e.g. fax, copies, printouts)   []  Other Methods (e.g. texting, email, CDs)    Reconciled Medication List - Subsequent provider:   [] No, current reconciled medication list not provided to the subsequent provider.  [x] Yes, current reconciled medication list provided to the subsequent provider.   [x] Via EMR    [x] Via health information exchange  [] Verbal (e.g. in person, telephone, video conferencing)  [x] Paper-based (e.g. fax, copies, printouts)   [] Other Methods (e.g. texting, email, CDs)    Discharge disposition:  Pt was discharged via:  []  Wheelchair  [x]  Stretcher  []  Safe ambulation and use of assistive device  []  Other:     Pt was discharged to:  []  Private car  [x]  Transportation service to next destination  []  Other:     Discharge destination:  []  Home  [x]  Skilled Nursing Facility  []  Long Term Care  []  Other:        Discharge BIMS:  Number of word repeated after first attempt:  []  0. None []  1. One []  2. Two [x]  3. Three    Able to report correct year:  []  0. Missed by >5 years, or no answer  []  1. Missed by 2-5 years  []  2. Missed by 1 year  [x]  3. Correct    Able to report correct month:   []  0. Missed by >1 month, or no answer  []  1. Missed by 6 days to 1 month  [x]  2. Accurate within 5 days    Able to report correct day of the week:  []  0.

## 2024-07-25 NOTE — TELEPHONE ENCOUNTER
LVM with DON at [facility] and advised patient needs f/u appt with surg.  Dr. Jackson name and number provided.  Office contact information provided with any questions.

## 2024-07-26 DIAGNOSIS — A49.8 ENTEROCOCCUS FAECALIS INFECTION: Primary | ICD-10-CM

## 2024-07-29 ENCOUNTER — TELEPHONE (OUTPATIENT)
Dept: INFECTIOUS DISEASES | Age: 79
End: 2024-07-29

## 2024-07-29 LAB
ALBUMIN: 3.8 G/DL
ALP BLD-CCNC: 112 U/L
ALT SERPL-CCNC: 93 U/L
ANION GAP SERPL CALCULATED.3IONS-SCNC: 16 MMOL/L
AST SERPL-CCNC: 83 U/L
BASOPHILS ABSOLUTE: 0.07 /ΜL
BASOPHILS RELATIVE PERCENT: 0.7 %
BILIRUB SERPL-MCNC: 0.21 MG/DL (ref 0.1–1.4)
BUN BLDV-MCNC: 15 MG/DL
C-REACTIVE PROTEIN: 2.6
CALCIUM SERPL-MCNC: 10.8 MG/DL
CHLORIDE BLD-SCNC: 100 MMOL/L
CO2: 22 MMOL/L
CREAT SERPL-MCNC: 0.8 MG/DL
EOSINOPHILS ABSOLUTE: 0.34 /ΜL
EOSINOPHILS RELATIVE PERCENT: 3.3 %
GFR, ESTIMATED: 65
GLUCOSE BLD-MCNC: 107 MG/DL
HCT VFR BLD CALC: 32 % (ref 36–46)
HEMOGLOBIN: 10 G/DL (ref 12–16)
LYMPHOCYTES ABSOLUTE: 1.81 /ΜL
LYMPHOCYTES RELATIVE PERCENT: 17.8 %
MCH RBC QN AUTO: 29.3 PG
MCHC RBC AUTO-ENTMCNC: 31.3 G/DL
MCV RBC AUTO: 93.8 FL
MONOCYTES ABSOLUTE: 0.93 /ΜL
MONOCYTES RELATIVE PERCENT: 9.2 %
NEUTROPHILS ABSOLUTE: 6.95 /ΜL
NEUTROPHILS RELATIVE PERCENT: 68.5 %
PDW BLD-RTO: 15.2 %
PLATELET # BLD: 326 K/ΜL
PMV BLD AUTO: 9.2 FL
POTASSIUM SERPL-SCNC: 4.2 MMOL/L
RBC # BLD: 3.41 10^6/ΜL
SED RATE, AUTOMATED: 43
SODIUM BLD-SCNC: 133 MMOL/L
TOTAL PROTEIN: 6.6 G/DL (ref 6.4–8.2)
WBC # BLD: 10.15 10^3/ML

## 2024-07-29 NOTE — TELEPHONE ENCOUNTER
JESSICA Enamorado at [facility] requesting call from RN for weekly update.  Office contact information provided.

## 2024-07-29 NOTE — TELEPHONE ENCOUNTER
OPAT Nurse Coordinator Weekly Update Note    Current OPAT plan:  IV piperacillin/tazobactam 13.5 g every 24 hour ATC as a continuous infusion daily  - Planned End date: 7/31/2024    Diagnosis:  E. coli and Enterococcus faecalis intra-abdominal abscess    Assessment:  Spoke with Linda LOPEZ at [facility].  She reports patient is doing well.  Wound vac remains in place and is putting out ~50mls.  VSS, denies fevers. Labs will be drawn today.    Follow up appts:  Dr. Caldera 8/5

## 2024-07-30 NOTE — TELEPHONE ENCOUNTER
Spoke with Leelee LOPEZ at [facility] and advised of pharmacist note to extend IV abx until 8/9.  She reports patient has a midline now.

## 2024-07-30 NOTE — TELEPHONE ENCOUNTER
Abscess resolved but would prefer to keep abx coverage until vac is assessed and removed.     Could consider PO but with PsA, only PO option is FQ. Prefer to avoid in elderly.    Will extend IV zosyn through 8/9.    Should change PIV on regular basis.     Carmen Burciaga, PharmD, [facility]  Clinical Pharmacy Specialist- [facility] Infectious Disease  Phone: [phone] (also available on [employer])  Fax: [phone]    For Pharmacy Admin Tracking Only    Program: Medical Group  CPA in place: Yes  Intervention Detail: Refill(s) Provided  Time Spent (min): 10

## 2024-07-31 DIAGNOSIS — A49.8 ENTEROCOCCUS FAECALIS INFECTION: ICD-10-CM

## 2024-08-05 ENCOUNTER — TELEPHONE (OUTPATIENT)
Dept: INFECTIOUS DISEASES | Age: 79
End: 2024-08-05

## 2024-08-05 ENCOUNTER — OFFICE VISIT (OUTPATIENT)
Dept: SURGERY | Age: 79
End: 2024-08-05

## 2024-08-05 VITALS — BODY MASS INDEX: 29.64 KG/M2 | HEIGHT: 63 IN | SYSTOLIC BLOOD PRESSURE: 118 MMHG | DIASTOLIC BLOOD PRESSURE: 60 MMHG

## 2024-08-05 DIAGNOSIS — Z98.890 POST-OPERATIVE STATE: Primary | ICD-10-CM

## 2024-08-05 LAB
ALBUMIN: 3.8 G/DL
ALP BLD-CCNC: 120 U/L
ALT SERPL-CCNC: 128 U/L
ANION GAP SERPL CALCULATED.3IONS-SCNC: 16 MMOL/L
AST SERPL-CCNC: 95 U/L
BASOPHILS ABSOLUTE: 0.09 /ΜL
BASOPHILS RELATIVE PERCENT: 1 %
BILIRUB SERPL-MCNC: 0.16 MG/DL (ref 0.1–1.4)
BUN BLDV-MCNC: 18 MG/DL
C-REACTIVE PROTEIN: 4.5
CALCIUM SERPL-MCNC: 10.6 MG/DL
CHLORIDE BLD-SCNC: 102 MMOL/L
CO2: 21 MMOL/L
CREAT SERPL-MCNC: 0.9 MG/DL
EOSINOPHILS ABSOLUTE: 0.49 /ΜL
EOSINOPHILS RELATIVE PERCENT: 5.6 %
GFR, ESTIMATED: 59
GLUCOSE BLD-MCNC: 89 MG/DL
HCT VFR BLD CALC: 32.1 % (ref 36–46)
HEMOGLOBIN: 10.3 G/DL (ref 12–16)
LYMPHOCYTES ABSOLUTE: 2.1 /ΜL
LYMPHOCYTES RELATIVE PERCENT: 24 %
MCH RBC QN AUTO: 29.1 PG
MCHC RBC AUTO-ENTMCNC: 32.1 G/DL
MCV RBC AUTO: 90.7 FL
MONOCYTES ABSOLUTE: 1.2 /ΜL
MONOCYTES RELATIVE PERCENT: 13.7 %
NEUTROPHILS ABSOLUTE: 4.78 /ΜL
NEUTROPHILS RELATIVE PERCENT: 54.8 %
PDW BLD-RTO: 14.6 %
PLATELET # BLD: 320 K/ΜL
PMV BLD AUTO: 9.7 FL
POTASSIUM SERPL-SCNC: 3.9 MMOL/L
RBC # BLD: 3.54 10^6/ΜL
SED RATE, AUTOMATED: 43
SODIUM BLD-SCNC: 134 MMOL/L
TOTAL PROTEIN: 6.7 G/DL (ref 6.4–8.2)
WBC # BLD: 8.74 10^3/ML

## 2024-08-05 PROCEDURE — 99024 POSTOP FOLLOW-UP VISIT: CPT | Performed by: SURGERY

## 2024-08-05 RX ORDER — SPIRONOLACTONE 25 MG/1
25 TABLET ORAL DAILY
COMMUNITY
Start: 2024-06-03

## 2024-08-05 RX ORDER — OMEPRAZOLE 20 MG/1
1 TABLET, DELAYED RELEASE ORAL
COMMUNITY
Start: 2024-06-25

## 2024-08-05 RX ORDER — MAG HYDROX/ALUMINUM HYD/SIMETH 400-400-40
30 SUSPENSION, ORAL (FINAL DOSE FORM) ORAL
COMMUNITY
Start: 2024-06-30

## 2024-08-05 RX ORDER — NITROGLYCERIN 0.4 MG/1
1 TABLET SUBLINGUAL
COMMUNITY
Start: 2024-06-30

## 2024-08-05 NOTE — PROGRESS NOTES
[facility]      S:   Patient presents s/p subtotal colectomy with ileostomy.  She reports doing much better.  Her strength and stamina are returning.  She has been cleared to full weight-bear on her left leg.    O:   Comfortable         Incision site healing well.  VAC in place.  Ostomy pink and functional              A:   S/P subtotal colectomy with ileostomy    P:   Continue physical therapy and local wound care to midline wound with VAC dressing.  Follow-up with Dr. Jackson in 1 month

## 2024-08-05 NOTE — TELEPHONE ENCOUNTER
OPAT Nurse Coordinator Weekly Update Note    Current OPAT plan:  IV piperacillin/tazobactam 13.5 g every 24 hour ATC as a continuous infusion daily  - Planned End date: 8/9/24    Diagnosis:  E. coli and Enterococcus faecalis intra-abdominal abscess    Assessment:  Spoke with Carmen LOPEZ at [facility] who reports patient is doing well.  She denies fevers, states her midline incision is healing well.  She also reports patient still has wound vac and has little output from that.     Follow up appts:  Dr. Jackson 9/4

## 2024-08-06 NOTE — TELEPHONE ENCOUNTER
Patient has been maintained on abx since early July.   Post-op abscess has since resolved.  Wound vac is still on to midline incision but the wound itself is healing appropriately and is CDI with s/s infection.     Discussed case with Dr. Stallings yesterday who feels it is appropriate to stop abx coverage at this time.    In addition, patient may be developing transaminitis with pip/tazo.     We will stop OPAT as planned on 8/9.  We will assess liver enzymes and labs this week.     Dionna BirchD, [facility]  Clinical Pharmacy Specialist- [facility] Infectious Disease  Phone: [phone] (also available on [employer])  Fax: [phone]    For Pharmacy Admin Tracking Only    Program: Medical Group  CPA in place: Yes  Intervention Detail: Discontinued Rx: 1, reason: Therapy Complete  Time Spent (min): 15

## 2024-08-06 NOTE — TELEPHONE ENCOUNTER
OPAT End  Call made to pharmacy      Call made to [facility]  Spoke with Mala LOPEZ at [facility] and advised ok to end IV abx and pull midline on 8/9/24. She v/u    Call made to patient      Will f/u in 1-2 days to verify line removal

## 2024-08-08 ENCOUNTER — TELEPHONE (OUTPATIENT)
Dept: SURGERY | Age: 79
End: 2024-08-08

## 2024-08-08 ENCOUNTER — TELEPHONE (OUTPATIENT)
Dept: INFECTIOUS DISEASES | Age: 79
End: 2024-08-08

## 2024-08-08 DIAGNOSIS — A49.8 ENTEROCOCCUS FAECALIS INFECTION: ICD-10-CM

## 2024-08-08 NOTE — TELEPHONE ENCOUNTER
----- Message from Carmen Burciaga [facility] sent at 8/8/2024  9:37 AM EDT -----  LFTs still elevated.  Hoping they recover off of the zosyn.  Please have SNF recheck in one week. Patient can also have done in [facility] if she leaves SNF.

## 2024-08-08 NOTE — RESULT ENCOUNTER NOTE
LFTs still elevated.  Hoping they recover off of the zosyn.  Please have SNF recheck in one week. Patient can also have done in [facility] lab if she leaves SNF.

## 2024-08-08 NOTE — TELEPHONE ENCOUNTER
Shahana from [facility] called and asked for pts last OV notes - Faxed OV from Dr Stallings on 8/5/24 (covering for Dr Jackson) - see media, and provided Shahana w/ pts next scheduled appt date (9/4/24) @ 1pm vargas/ Renetta

## 2024-08-08 NOTE — TELEPHONE ENCOUNTER
Spoke with Cristiane LOPEZ [facility] and advised of pharmacist note to repeat labs in one week.  She v/u

## 2024-08-12 LAB
ALBUMIN: 3.8 G/DL
ALP BLD-CCNC: 135 U/L
ALT SERPL-CCNC: 110 U/L
ANION GAP SERPL CALCULATED.3IONS-SCNC: 17 MMOL/L
AST SERPL-CCNC: 75 U/L
BASOPHILS ABSOLUTE: 0.08 /ΜL
BASOPHILS RELATIVE PERCENT: 0.7 %
BILIRUB SERPL-MCNC: 0.2 MG/DL (ref 0.1–1.4)
BUN BLDV-MCNC: 29 MG/DL
C-REACTIVE PROTEIN: 5.2
CALCIUM SERPL-MCNC: 10.8 MG/DL
CHLORIDE BLD-SCNC: 100 MMOL/L
CO2: 19 MMOL/L
CREAT SERPL-MCNC: 1 MG/DL
EOSINOPHILS ABSOLUTE: 0.57 /ΜL
EOSINOPHILS RELATIVE PERCENT: 5.3 %
GFR, ESTIMATED: 55
GLUCOSE BLD-MCNC: 103 MG/DL
HCT VFR BLD CALC: 32.6 % (ref 36–46)
HEMOGLOBIN: 10.7 G/DL (ref 12–16)
LYMPHOCYTES ABSOLUTE: 2.98 /ΜL
LYMPHOCYTES RELATIVE PERCENT: 27.6 %
MCH RBC QN AUTO: 29.1 PG
MCHC RBC AUTO-ENTMCNC: 32.8 G/DL
MCV RBC AUTO: 88.6 FL
MONOCYTES ABSOLUTE: 1.17 /ΜL
MONOCYTES RELATIVE PERCENT: 10.8 %
NEUTROPHILS ABSOLUTE: 5.89 /ΜL
NEUTROPHILS RELATIVE PERCENT: 54.7 %
PDW BLD-RTO: 14 %
PLATELET # BLD: 353 K/ΜL
PMV BLD AUTO: 9.4 FL
POTASSIUM SERPL-SCNC: 4.3 MMOL/L
RBC # BLD: 3.68 10^6/ΜL
SODIUM BLD-SCNC: 131 MMOL/L
TOTAL PROTEIN: 6.8 G/DL (ref 6.4–8.2)
WBC # BLD: 10.79 10^3/ML

## 2024-08-14 DIAGNOSIS — K65.1 INTRA-ABDOMINAL ABSCESS (HCC): Primary | ICD-10-CM

## 2024-08-14 DIAGNOSIS — K65.1 INTRA-ABDOMINAL ABSCESS (HCC): ICD-10-CM

## 2024-08-22 ENCOUNTER — OFFICE VISIT (OUTPATIENT)
Dept: SURGERY | Age: 79
End: 2024-08-22

## 2024-08-22 VITALS — SYSTOLIC BLOOD PRESSURE: 105 MMHG | DIASTOLIC BLOOD PRESSURE: 64 MMHG

## 2024-08-22 DIAGNOSIS — Z98.890 POST-OPERATIVE STATE: Primary | ICD-10-CM

## 2024-08-22 PROCEDURE — 99024 POSTOP FOLLOW-UP VISIT: CPT | Performed by: SURGERY

## 2024-08-22 NOTE — PROGRESS NOTES
[facility]      S:   Patient presents s/p subtotal colectomy with ileostomy.  She reports difficulty getting the VAC to seal and concerning drainage from the midline wound.    O:   Comfortable         Incision site healing well.  The base is clean and granulating.  There is however stool from a loose ileostomy pouch that is leaking into her midline wound.  She has irritation of the skin surrounding the ostomy appliance consistent with stigmata of recent drainage              A:   S/P subtotal colectomy with ileostomy    P:   She needs better attention to her ileostomy and to be sure that it is not leaking.  I would recommend using Stomahesive powder and barrier spray to help protect her skin.  The hole for the stoma needs to be as small as possible to accommodate the stoma without exposing the surrounding skin.  The midline wound can just be changed daily with a normal saline moistened gauze packed into the wound and covered with a dry dressing.  Follow up as scheduled with Dr. Bojorquez hands or sooner as needed.

## 2024-09-04 ENCOUNTER — OFFICE VISIT (OUTPATIENT)
Dept: SURGERY | Age: 79
End: 2024-09-04
Payer: MEDICARE

## 2024-09-04 VITALS
DIASTOLIC BLOOD PRESSURE: 70 MMHG | BODY MASS INDEX: 29.48 KG/M2 | HEIGHT: 63 IN | SYSTOLIC BLOOD PRESSURE: 138 MMHG | WEIGHT: 166.4 LBS

## 2024-09-04 DIAGNOSIS — K57.92 DIVERTICULITIS: Primary | ICD-10-CM

## 2024-09-04 PROCEDURE — G8419 CALC BMI OUT NRM PARAM NOF/U: HCPCS | Performed by: SURGERY

## 2024-09-04 PROCEDURE — 1123F ACP DISCUSS/DSCN MKR DOCD: CPT | Performed by: SURGERY

## 2024-09-04 PROCEDURE — 99213 OFFICE O/P EST LOW 20 MIN: CPT | Performed by: SURGERY

## 2024-09-04 PROCEDURE — G8400 PT W/DXA NO RESULTS DOC: HCPCS | Performed by: SURGERY

## 2024-09-04 PROCEDURE — 1090F PRES/ABSN URINE INCON ASSESS: CPT | Performed by: SURGERY

## 2024-09-04 PROCEDURE — G8427 DOCREV CUR MEDS BY ELIG CLIN: HCPCS | Performed by: SURGERY

## 2024-09-04 PROCEDURE — 1036F TOBACCO NON-USER: CPT | Performed by: SURGERY

## 2024-09-04 NOTE — PROGRESS NOTES
[facility]    PATIENT NAME: Sudha Lopez     TODAY'S DATE: 9/4/2024    CHIEF COMPLAINT: none    INTERVAL HISTORY/HPI:    Pt without abd pain or nausea or emesis.  Some leakage from ostomy device.  No fevers or chills. Emptying ostomy three times per day..     REVIEW OF SYSTEMS:  Pertinent positives and negatives as per interval history section    OBJECTIVE:  VITALS:  /70 (Site: Left Upper Arm, Position: Sitting, Cuff Size: Medium Adult)   Ht 1.6 m (5' 2.99\")   Wt 75.5 kg (166 lb 6.4 oz)   BMI 29.48 kg/m²     INTAKE/OUTPUT:    [unfilled]  [unfilled]    CONSTITUTIONAL:  awake and alert  LUNGS:  Respirations easy and unlabored, no crackles or wheezing  CARD:  regular rate and rhythm  ABDOMEN:  normal bowel sounds, soft, non-distended, nontender, ileostomy functional  Incision - no drainage or erythema     Data:  CBC: No results for input(s): \"WBC\", \"HGB\", \"HCT\", \"PLT\" in the last 72 hours.  BMP:  No results for input(s): \"NA\", \"K\", \"CL\", \"CO2\", \"BUN\", \"CREATININE\", \"GLUCOSE\" in the last 72 hours.  Hepatic: No results for input(s): \"AST\", \"ALT\", \"BILITOT\", \"ALKPHOS\" in the last 72 hours.    Invalid input(s): \"ALB\"  Mag:    No results for input(s): \"MG\" in the last 72 hours.   Phos:   No results for input(s): \"PHOS\" in the last 72 hours.   INR: No results for input(s): \"INR\" in the last 72 hours.    Radiology Review:  *Imaging personally reviewed by me.   NA      ASSESSMENT AND PLAN:  77 yo s/p subtotal colectomy and end ileostomy  Wound healed and strength/nutrition improving  Will get follow up CT to assess feasibility of ileostomy reversal.  Discussed that with reversal will having looser and more frequent bms.  Need for hydration discussed.  Risks of operaiton along with possibility of minimally invasive vs open approach discussed.  Timing for potential reversal probably another 1-2 months to allow for further improvement in functional status  F/u in two weeks     Electronically signed

## 2024-09-12 ENCOUNTER — HOSPITAL ENCOUNTER (OUTPATIENT)
Dept: CT IMAGING | Age: 79
Discharge: HOME | End: 2024-09-12
Attending: SURGERY
Payer: MEDICARE

## 2024-09-12 DIAGNOSIS — K57.92 DIVERTICULITIS: ICD-10-CM

## 2024-09-12 PROCEDURE — 74176 CT ABD & PELVIS W/O CONTRAST: CPT

## 2024-09-12 PROCEDURE — 6360000004 HC RX CONTRAST MEDICATION: Performed by: SURGERY

## 2024-09-12 RX ADMIN — DIATRIZOATE MEGLUMINE AND DIATRIZOATE SODIUM 12 ML: 660; 100 LIQUID ORAL; RECTAL at 10:31

## 2024-09-18 ENCOUNTER — OFFICE VISIT (OUTPATIENT)
Dept: SURGERY | Age: 79
End: 2024-09-18

## 2024-09-18 VITALS — SYSTOLIC BLOOD PRESSURE: 148 MMHG | HEIGHT: 63 IN | BODY MASS INDEX: 29.48 KG/M2 | DIASTOLIC BLOOD PRESSURE: 84 MMHG

## 2024-09-18 DIAGNOSIS — K56.609 LARGE BOWEL OBSTRUCTION (HCC): Primary | ICD-10-CM

## 2024-09-19 ENCOUNTER — TELEPHONE (OUTPATIENT)
Dept: SURGERY | Age: 79
End: 2024-09-19

## 2024-09-23 ENCOUNTER — TELEPHONE (OUTPATIENT)
Dept: SURGERY | Age: 79
End: 2024-09-23

## 2024-09-24 ENCOUNTER — PREP FOR PROCEDURE (OUTPATIENT)
Dept: SURGERY | Age: 79
End: 2024-09-24

## 2024-09-24 ENCOUNTER — TELEPHONE (OUTPATIENT)
Dept: SURGERY | Age: 79
End: 2024-09-24

## 2024-09-24 PROBLEM — K56.609 OBSTRUCTION OF INTESTINE (HCC): Status: ACTIVE | Noted: 2024-09-24

## 2024-09-25 NOTE — PROGRESS NOTES
Sudha Lopez    Age 78 y.o.    female    1945    MRN [id number]    10/22/2024  Arrival Time_____________  OR Time____________219 Min     Procedure(s):  ROBOTIC ILEOSTOMY REVERSAL, POSSIBLE OPEN PROCEDURE                      General   Surgeon(s):  Bertin Jackson, MD      DAY ADMIT ___  SDS/OP ___  OUTPT IN BED ___        Phone [phone] (home)                  PCP _____________________ Phone_________________ Epic ( ) Epic CE ( ) Appt ________    NOTES: _________________________________________ Consult/Cardio _______________    ____________________________________________________________________________    ____________________________________________________________________________  PAT APPT DATE:________ TIME: ________  FAXED QAD: _______  (__) H&P w/ Hospitalist    (__) PAT orders in EPIC    (__) Meet with PAT nurse  __________________________________________________________________________  Preop Nurse phone screen complete: _____________  (__) CBC     (__) W/ DIFF ___________  (__) CT CHEST  __________   (__) Hgb A1C    ___________  (__) CHEST X RAY   __________  (__) LIPID PROFILE  ___________  (__) EKG   __________  (__) PT-INR / APTT  ___________  (__) PFT's   __________  (__) BMP   ___________  (__) CAROTIDS  __________  (__) CMP   ___________  (__) VEIN MAPPING  __________  (__) U/A   ___________  ( X ) HISTORY & PHYSICAL __________  (__) URINE C & S  ___________  (__) CARDIAC CLEARANCE __________  (__) U/A W/ FLEX  ___________  (__) PULM. CLEARANCE __________  (__) SERUM PREGNANCY ___________  (__) Preop Orders in EPIC __________  (__) TYPE & SCREEN __________repeat ( ) (__)  __________________ __________  (__) Albumin   ___________  (__)  __________________ __________  (__) TRANSFERRIN  ___________  (__)  __________________ __________  (__) LIVER PROFILE  ___________  (__) URINE PREG DOS __________  (__) MRSA NASAL SWAB ___________  (__) BLOOD SUGAR DOS __________  (__) SED

## 2024-09-27 ENCOUNTER — TELEPHONE (OUTPATIENT)
Dept: SURGERY | Age: 79
End: 2024-09-27

## 2024-10-09 ENCOUNTER — TELEPHONE (OUTPATIENT)
Dept: SURGERY | Age: 79
End: 2024-10-09

## 2024-10-09 NOTE — TELEPHONE ENCOUNTER
Vane at [facility] called and needs to discuss current compliance issues and the plan of care for this pt. Please call her and thank you! # [phone]

## 2024-10-14 ENCOUNTER — TELEPHONE (OUTPATIENT)
Dept: SURGERY | Age: 79
End: 2024-10-14

## 2024-10-14 NOTE — TELEPHONE ENCOUNTER
Rec'd faxed orders from [facility] for PT. Called Eli Estrella @ [phone] to inform her that Dr Jackson will sign for skilled nursing only, not OT or PT.

## 2024-10-16 NOTE — PROGRESS NOTES
Preoperative Screening for Elective Surgery/Invasive Procedures While COVID-19 present in the community    Have you had any of the following symptoms?  Fever, chills  Cough  Shortness of breath  Muscle aches/pain  Diarrhea  Abdominal pain, nausea, vomiting  Loss or decrease in taste and / or smell  Risk of Exposure  Have you recently been hospitalized for COVID-19 or flu-like illness, if so when?  Recently diagnosed with COVID-19, if so when?  Recently tested for COVID-19, if so when?  Have you been in close contact with a person or family member who currently has or recently had COVID-19? If yes, when and in what context?  Do you live with anybody who in the last 14 days has had fever, chills, shortness of breath, muscle aches, flu-like illness?  Do you have any close contacts or family members who are currently in the hospital for COVID-19 or flu-like illness? If yes, assess recent close contact with this person.    Indicate if the patient has a positive screen by answering yes to one or more of the above questions.  Patients who test positive or screen positive prior to surgery or on the day of surgery should be evaluated in conjunction with the surgeon/proceduralist/anesthesiologist to determine the urgency of the procedure.    Pt's daughter states pt is symptom free and denies covid exposure

## 2024-10-17 NOTE — PROGRESS NOTES
Surgery Date and Time: 10/22/24 11:00 am    Arrival Time: 0900 am        The instructions given when and if a patient needs to stop oral intake prior to surgery varies. Follow the instructions you were      given by your Surgeon or RN during the Pre-op call.      __X__Do not eat or drink anything after Midnight the night before the surgery. NO gum, mints, candy or ice chips the day of surgery.                 Only take the following medications with a small sip of water the morning of surgery: No medications morning of surgery        Hold the following medications (per anesthesia or surgeon request): benazepril     Last Dose: 10/21/24; medication needs to be held 24 hours prior to surgery         Aspirin, Ibuprofen, Advil, Naproxen, Vitamin E and other Anti-inflammatory products and supplements should be stopped        for 5 -7days before surgery or as directed by your physician.         Check with your Surgeon/PCP/Cardiologist regarding stopping Plavix, Coumadin, Eliquis, Lovenox, Effient, Pradaxa, Xarelto, Fragmin or        other blood thinners and follow their instructions.  Medication: Eliquis          Last dose: Okay to hold for 48 hours prior to surgery per cardiologist; recommend letting cardiologist know you stopped it 10/15/24 per surgeon                - If you take a long acting-insulin, take your normal dose the night before surgery & half the dose if taken in the morning.     - Do not smoke or vape, and do not drink any alcoholic beverages 24 hours prior to surgery, this includes NA Beer. Refrain from using     any recreational drugs, including non-prescribed prescription drugs.     -You may brush your teeth and gargle the morning of surgery.  DO NOT SWALLOW WATER.    -You MUST plan for a responsible adult to stay on site while you are here and take you home after your surgery. You will not be allowed                to leave alone or drive yourself home. It is requested someone stay with you the

## 2024-10-21 ENCOUNTER — ANESTHESIA EVENT (OUTPATIENT)
Dept: OPERATING ROOM | Age: 79
End: 2024-10-21
Payer: MEDICARE

## 2024-10-22 ENCOUNTER — ANESTHESIA (OUTPATIENT)
Dept: OPERATING ROOM | Age: 79
End: 2024-10-22
Payer: MEDICARE

## 2024-10-22 ENCOUNTER — HOSPITAL ENCOUNTER (INPATIENT)
Age: 79
LOS: 21 days | Discharge: HOME | DRG: 329 | End: 2024-11-12
Attending: SURGERY | Admitting: SURGERY
Payer: MEDICARE

## 2024-10-22 DIAGNOSIS — K56.609: ICD-10-CM

## 2024-10-22 DIAGNOSIS — K57.92 DIVERTICULITIS: ICD-10-CM

## 2024-10-22 DIAGNOSIS — G89.18 POSTOPERATIVE PAIN: ICD-10-CM

## 2024-10-22 DIAGNOSIS — R19.7 DIARRHEA, UNSPECIFIED TYPE: Primary | ICD-10-CM

## 2024-10-22 PROBLEM — Z98.890 HISTORY OF REVERSAL OF ILEOSTOMY: Status: ACTIVE | Noted: 2024-10-22

## 2024-10-22 LAB
ABO + RH BLD: NORMAL
ANION GAP SERPL CALCULATED.3IONS-SCNC: 14 MMOL/L (ref 3–16)
BLD GP AB SCN SERPL QL: NORMAL
BUN SERPL-MCNC: 21 MG/DL (ref 7–20)
CALCIUM SERPL-MCNC: 9.4 MG/DL (ref 8.3–10.6)
CHLORIDE SERPL-SCNC: 103 MMOL/L (ref 99–110)
CO2 SERPL-SCNC: 19 MMOL/L (ref 21–32)
CREAT SERPL-MCNC: 1.2 MG/DL (ref 0.6–1.2)
GFR SERPLBLD CREATININE-BSD FMLA CKD-EPI: 46 ML/MIN/{1.73_M2}
GLUCOSE BLD-MCNC: 128 MG/DL (ref 70–99)
GLUCOSE BLD-MCNC: 129 MG/DL (ref 70–99)
GLUCOSE BLD-MCNC: 134 MG/DL (ref 70–99)
GLUCOSE BLD-MCNC: 135 MG/DL (ref 70–99)
GLUCOSE BLD-MCNC: 143 MG/DL (ref 70–99)
GLUCOSE BLD-MCNC: 99 MG/DL (ref 70–99)
GLUCOSE SERPL-MCNC: 94 MG/DL (ref 70–99)
PERFORMED ON: ABNORMAL
PERFORMED ON: NORMAL
POTASSIUM SERPL-SCNC: 4.8 MMOL/L (ref 3.5–5.1)
SODIUM SERPL-SCNC: 136 MMOL/L (ref 136–145)

## 2024-10-22 PROCEDURE — 6360000002 HC RX W HCPCS

## 2024-10-22 PROCEDURE — 7100000000 HC PACU RECOVERY - FIRST 15 MIN: Performed by: SURGERY

## 2024-10-22 PROCEDURE — 86900 BLOOD TYPING SEROLOGIC ABO: CPT

## 2024-10-22 PROCEDURE — 3600000009 HC SURGERY ROBOT BASE: Performed by: SURGERY

## 2024-10-22 PROCEDURE — 6360000002 HC RX W HCPCS: Performed by: SURGERY

## 2024-10-22 PROCEDURE — 88305 TISSUE EXAM BY PATHOLOGIST: CPT

## 2024-10-22 PROCEDURE — 2580000003 HC RX 258

## 2024-10-22 PROCEDURE — 8E0W0CZ ROBOTIC ASSISTED PROCEDURE OF TRUNK REGION, OPEN APPROACH: ICD-10-PCS | Performed by: SURGERY

## 2024-10-22 PROCEDURE — 1200000000 HC SEMI PRIVATE

## 2024-10-22 PROCEDURE — 86901 BLOOD TYPING SEROLOGIC RH(D): CPT

## 2024-10-22 PROCEDURE — 44227 LAP CLOSE ENTEROSTOMY: CPT | Performed by: SURGERY

## 2024-10-22 PROCEDURE — 94761 N-INVAS EAR/PLS OXIMETRY MLT: CPT

## 2024-10-22 PROCEDURE — 3700000001 HC ADD 15 MINUTES (ANESTHESIA): Performed by: SURGERY

## 2024-10-22 PROCEDURE — 7100000001 HC PACU RECOVERY - ADDTL 15 MIN: Performed by: SURGERY

## 2024-10-22 PROCEDURE — 2720000010 HC SURG SUPPLY STERILE: Performed by: SURGERY

## 2024-10-22 PROCEDURE — 2709999900 HC NON-CHARGEABLE SUPPLY: Performed by: SURGERY

## 2024-10-22 PROCEDURE — S2900 ROBOTIC SURGICAL SYSTEM: HCPCS | Performed by: SURGERY

## 2024-10-22 PROCEDURE — 3700000000 HC ANESTHESIA ATTENDED CARE: Performed by: SURGERY

## 2024-10-22 PROCEDURE — 86850 RBC ANTIBODY SCREEN: CPT

## 2024-10-22 PROCEDURE — 6370000000 HC RX 637 (ALT 250 FOR IP): Performed by: SURGERY

## 2024-10-22 PROCEDURE — 2580000003 HC RX 258: Performed by: SURGERY

## 2024-10-22 PROCEDURE — 2500000003 HC RX 250 WO HCPCS

## 2024-10-22 PROCEDURE — 88307 TISSUE EXAM BY PATHOLOGIST: CPT

## 2024-10-22 PROCEDURE — 6360000002 HC RX W HCPCS: Performed by: NURSE ANESTHETIST, CERTIFIED REGISTERED

## 2024-10-22 PROCEDURE — 0DBB0ZZ EXCISION OF ILEUM, OPEN APPROACH: ICD-10-PCS | Performed by: SURGERY

## 2024-10-22 PROCEDURE — 6360000002 HC RX W HCPCS: Performed by: ANESTHESIOLOGY

## 2024-10-22 PROCEDURE — 2500000003 HC RX 250 WO HCPCS: Performed by: NURSE ANESTHETIST, CERTIFIED REGISTERED

## 2024-10-22 PROCEDURE — 80048 BASIC METABOLIC PNL TOTAL CA: CPT

## 2024-10-22 PROCEDURE — 2700000000 HC OXYGEN THERAPY PER DAY

## 2024-10-22 PROCEDURE — 3600000019 HC SURGERY ROBOT ADDTL 15MIN: Performed by: SURGERY

## 2024-10-22 RX ORDER — FAMOTIDINE 20 MG/1
20 TABLET, FILM COATED ORAL DAILY
Status: DISCONTINUED | OUTPATIENT
Start: 2024-10-22 | End: 2024-11-12 | Stop reason: HOSPADM

## 2024-10-22 RX ORDER — SODIUM CHLORIDE 0.9 % (FLUSH) 0.9 %
5-40 SYRINGE (ML) INJECTION PRN
Status: DISCONTINUED | OUTPATIENT
Start: 2024-10-22 | End: 2024-11-12 | Stop reason: HOSPADM

## 2024-10-22 RX ORDER — LIDOCAINE HYDROCHLORIDE 10 MG/ML
1 INJECTION, SOLUTION EPIDURAL; INFILTRATION; INTRACAUDAL; PERINEURAL
Status: DISCONTINUED | OUTPATIENT
Start: 2024-10-22 | End: 2024-10-22 | Stop reason: HOSPADM

## 2024-10-22 RX ORDER — SODIUM CHLORIDE 9 MG/ML
INJECTION, SOLUTION INTRAVENOUS PRN
Status: DISCONTINUED | OUTPATIENT
Start: 2024-10-22 | End: 2024-11-12 | Stop reason: HOSPADM

## 2024-10-22 RX ORDER — PROPOFOL 10 MG/ML
INJECTION, EMULSION INTRAVENOUS
Status: DISCONTINUED | OUTPATIENT
Start: 2024-10-22 | End: 2024-10-22 | Stop reason: SDUPTHER

## 2024-10-22 RX ORDER — SODIUM CHLORIDE 0.9 % (FLUSH) 0.9 %
5-40 SYRINGE (ML) INJECTION PRN
Status: DISCONTINUED | OUTPATIENT
Start: 2024-10-22 | End: 2024-10-22 | Stop reason: HOSPADM

## 2024-10-22 RX ORDER — DIPHENHYDRAMINE HYDROCHLORIDE 50 MG/ML
12.5 INJECTION INTRAMUSCULAR; INTRAVENOUS
Status: DISCONTINUED | OUTPATIENT
Start: 2024-10-22 | End: 2024-10-22 | Stop reason: HOSPADM

## 2024-10-22 RX ORDER — SODIUM CHLORIDE 9 MG/ML
INJECTION, SOLUTION INTRAVENOUS CONTINUOUS
Status: DISCONTINUED | OUTPATIENT
Start: 2024-10-22 | End: 2024-10-28

## 2024-10-22 RX ORDER — LATANOPROST 50 UG/ML
1 SOLUTION/ DROPS OPHTHALMIC NIGHTLY
Status: DISCONTINUED | OUTPATIENT
Start: 2024-10-22 | End: 2024-11-12 | Stop reason: HOSPADM

## 2024-10-22 RX ORDER — GLYCOPYRROLATE 0.2 MG/ML
INJECTION INTRAMUSCULAR; INTRAVENOUS
Status: DISCONTINUED | OUTPATIENT
Start: 2024-10-22 | End: 2024-10-22 | Stop reason: SDUPTHER

## 2024-10-22 RX ORDER — METHOCARBAMOL 500 MG/1
750 TABLET, FILM COATED ORAL EVERY 8 HOURS
Status: COMPLETED | OUTPATIENT
Start: 2024-10-22 | End: 2024-10-23

## 2024-10-22 RX ORDER — SODIUM CHLORIDE, SODIUM LACTATE, POTASSIUM CHLORIDE, CALCIUM CHLORIDE 600; 310; 30; 20 MG/100ML; MG/100ML; MG/100ML; MG/100ML
INJECTION, SOLUTION INTRAVENOUS CONTINUOUS
Status: DISCONTINUED | OUTPATIENT
Start: 2024-10-22 | End: 2024-10-22 | Stop reason: HOSPADM

## 2024-10-22 RX ORDER — OXYCODONE HYDROCHLORIDE 5 MG/1
10 TABLET ORAL EVERY 4 HOURS PRN
Status: DISCONTINUED | OUTPATIENT
Start: 2024-10-22 | End: 2024-11-12 | Stop reason: HOSPADM

## 2024-10-22 RX ORDER — LISINOPRIL 20 MG/1
40 TABLET ORAL DAILY
Status: DISCONTINUED | OUTPATIENT
Start: 2024-10-23 | End: 2024-11-12 | Stop reason: HOSPADM

## 2024-10-22 RX ORDER — BUPROPION HYDROCHLORIDE 150 MG/1
300 TABLET ORAL EVERY MORNING
Status: DISCONTINUED | OUTPATIENT
Start: 2024-10-23 | End: 2024-11-12 | Stop reason: HOSPADM

## 2024-10-22 RX ORDER — DEXTROSE MONOHYDRATE 100 MG/ML
INJECTION, SOLUTION INTRAVENOUS CONTINUOUS PRN
Status: CANCELLED | OUTPATIENT
Start: 2024-10-22

## 2024-10-22 RX ORDER — MIDAZOLAM HYDROCHLORIDE 1 MG/ML
2 INJECTION, SOLUTION INTRAMUSCULAR; INTRAVENOUS
Status: DISCONTINUED | OUTPATIENT
Start: 2024-10-22 | End: 2024-10-22 | Stop reason: HOSPADM

## 2024-10-22 RX ORDER — ONDANSETRON 2 MG/ML
4 INJECTION INTRAMUSCULAR; INTRAVENOUS EVERY 6 HOURS PRN
Status: DISCONTINUED | OUTPATIENT
Start: 2024-10-22 | End: 2024-11-12 | Stop reason: HOSPADM

## 2024-10-22 RX ORDER — PROMETHAZINE HYDROCHLORIDE 25 MG/1
12.5 TABLET ORAL EVERY 6 HOURS PRN
Status: DISCONTINUED | OUTPATIENT
Start: 2024-10-22 | End: 2024-11-12 | Stop reason: HOSPADM

## 2024-10-22 RX ORDER — OXYCODONE HYDROCHLORIDE 5 MG/1
5 TABLET ORAL PRN
Status: DISCONTINUED | OUTPATIENT
Start: 2024-10-22 | End: 2024-10-22 | Stop reason: HOSPADM

## 2024-10-22 RX ORDER — ONDANSETRON 2 MG/ML
4 INJECTION INTRAMUSCULAR; INTRAVENOUS
Status: COMPLETED | OUTPATIENT
Start: 2024-10-22 | End: 2024-10-22

## 2024-10-22 RX ORDER — ENOXAPARIN SODIUM 100 MG/ML
40 INJECTION SUBCUTANEOUS DAILY
Status: DISCONTINUED | OUTPATIENT
Start: 2024-10-22 | End: 2024-11-12

## 2024-10-22 RX ORDER — SODIUM CHLORIDE 9 MG/ML
INJECTION, SOLUTION INTRAVENOUS PRN
Status: DISCONTINUED | OUTPATIENT
Start: 2024-10-22 | End: 2024-10-22 | Stop reason: HOSPADM

## 2024-10-22 RX ORDER — OXYCODONE HYDROCHLORIDE 5 MG/1
10 TABLET ORAL PRN
Status: DISCONTINUED | OUTPATIENT
Start: 2024-10-22 | End: 2024-10-22 | Stop reason: HOSPADM

## 2024-10-22 RX ORDER — TRAZODONE HYDROCHLORIDE 50 MG/1
75 TABLET, FILM COATED ORAL NIGHTLY
Status: DISCONTINUED | OUTPATIENT
Start: 2024-10-22 | End: 2024-11-02

## 2024-10-22 RX ORDER — OXYCODONE HYDROCHLORIDE 5 MG/1
5 TABLET ORAL EVERY 4 HOURS PRN
Status: DISCONTINUED | OUTPATIENT
Start: 2024-10-22 | End: 2024-11-12 | Stop reason: HOSPADM

## 2024-10-22 RX ORDER — INSULIN LISPRO 100 [IU]/ML
0-8 INJECTION, SOLUTION INTRAVENOUS; SUBCUTANEOUS
Status: DISCONTINUED | OUTPATIENT
Start: 2024-10-22 | End: 2024-11-12 | Stop reason: HOSPADM

## 2024-10-22 RX ORDER — LABETALOL HYDROCHLORIDE 5 MG/ML
10 INJECTION, SOLUTION INTRAVENOUS
Status: DISCONTINUED | OUTPATIENT
Start: 2024-10-22 | End: 2024-10-22 | Stop reason: HOSPADM

## 2024-10-22 RX ORDER — ROCURONIUM BROMIDE 10 MG/ML
INJECTION, SOLUTION INTRAVENOUS
Status: DISCONTINUED | OUTPATIENT
Start: 2024-10-22 | End: 2024-10-22 | Stop reason: SDUPTHER

## 2024-10-22 RX ORDER — NALOXONE HYDROCHLORIDE 0.4 MG/ML
INJECTION, SOLUTION INTRAMUSCULAR; INTRAVENOUS; SUBCUTANEOUS PRN
Status: DISCONTINUED | OUTPATIENT
Start: 2024-10-22 | End: 2024-10-22 | Stop reason: HOSPADM

## 2024-10-22 RX ORDER — GLUCAGON 1 MG/ML
1 KIT INJECTION PRN
Status: CANCELLED | OUTPATIENT
Start: 2024-10-22

## 2024-10-22 RX ORDER — GLUCAGON 1 MG/ML
1 KIT INJECTION PRN
Status: DISCONTINUED | OUTPATIENT
Start: 2024-10-22 | End: 2024-11-12 | Stop reason: HOSPADM

## 2024-10-22 RX ORDER — ONDANSETRON 2 MG/ML
INJECTION INTRAMUSCULAR; INTRAVENOUS
Status: DISCONTINUED | OUTPATIENT
Start: 2024-10-22 | End: 2024-10-22 | Stop reason: SDUPTHER

## 2024-10-22 RX ORDER — METRONIDAZOLE 500 MG/100ML
500 INJECTION, SOLUTION INTRAVENOUS EVERY 8 HOURS
Status: COMPLETED | OUTPATIENT
Start: 2024-10-22 | End: 2024-10-23

## 2024-10-22 RX ORDER — LIDOCAINE HYDROCHLORIDE 20 MG/ML
INJECTION, SOLUTION INFILTRATION; PERINEURAL
Status: DISCONTINUED | OUTPATIENT
Start: 2024-10-22 | End: 2024-10-22 | Stop reason: SDUPTHER

## 2024-10-22 RX ORDER — SODIUM CHLORIDE 0.9 % (FLUSH) 0.9 %
5-40 SYRINGE (ML) INJECTION EVERY 12 HOURS SCHEDULED
Status: DISCONTINUED | OUTPATIENT
Start: 2024-10-22 | End: 2024-10-22 | Stop reason: HOSPADM

## 2024-10-22 RX ORDER — FLUTICASONE PROPIONATE 50 MCG
1 SPRAY, SUSPENSION (ML) NASAL DAILY
Status: DISCONTINUED | OUTPATIENT
Start: 2024-10-22 | End: 2024-11-12 | Stop reason: HOSPADM

## 2024-10-22 RX ORDER — HYDROMORPHONE HYDROCHLORIDE 2 MG/ML
INJECTION, SOLUTION INTRAMUSCULAR; INTRAVENOUS; SUBCUTANEOUS
Status: DISCONTINUED | OUTPATIENT
Start: 2024-10-22 | End: 2024-10-22 | Stop reason: SDUPTHER

## 2024-10-22 RX ORDER — SPIRONOLACTONE 25 MG/1
25 TABLET ORAL DAILY
Status: DISCONTINUED | OUTPATIENT
Start: 2024-10-23 | End: 2024-11-12 | Stop reason: HOSPADM

## 2024-10-22 RX ORDER — ACETAMINOPHEN 325 MG/1
650 TABLET ORAL EVERY 6 HOURS
Status: DISCONTINUED | OUTPATIENT
Start: 2024-10-22 | End: 2024-11-12 | Stop reason: HOSPADM

## 2024-10-22 RX ORDER — DEXAMETHASONE SODIUM PHOSPHATE 4 MG/ML
INJECTION, SOLUTION INTRA-ARTICULAR; INTRALESIONAL; INTRAMUSCULAR; INTRAVENOUS; SOFT TISSUE
Status: DISCONTINUED | OUTPATIENT
Start: 2024-10-22 | End: 2024-10-22 | Stop reason: SDUPTHER

## 2024-10-22 RX ORDER — METRONIDAZOLE 500 MG/100ML
500 INJECTION, SOLUTION INTRAVENOUS
Status: COMPLETED | OUTPATIENT
Start: 2024-10-22 | End: 2024-10-22

## 2024-10-22 RX ORDER — FAMOTIDINE 20 MG/1
20 TABLET, FILM COATED ORAL 2 TIMES DAILY
Status: DISCONTINUED | OUTPATIENT
Start: 2024-10-22 | End: 2024-10-22

## 2024-10-22 RX ORDER — DEXTROSE MONOHYDRATE 100 MG/ML
INJECTION, SOLUTION INTRAVENOUS CONTINUOUS PRN
Status: DISCONTINUED | OUTPATIENT
Start: 2024-10-22 | End: 2024-11-12 | Stop reason: HOSPADM

## 2024-10-22 RX ORDER — SODIUM CHLORIDE 0.9 % (FLUSH) 0.9 %
5-40 SYRINGE (ML) INJECTION EVERY 12 HOURS SCHEDULED
Status: DISCONTINUED | OUTPATIENT
Start: 2024-10-22 | End: 2024-11-12 | Stop reason: HOSPADM

## 2024-10-22 RX ORDER — ATORVASTATIN CALCIUM 10 MG/1
10 TABLET, FILM COATED ORAL DAILY
Status: DISCONTINUED | OUTPATIENT
Start: 2024-10-22 | End: 2024-11-12 | Stop reason: HOSPADM

## 2024-10-22 RX ADMIN — ACETAMINOPHEN 650 MG: 325 TABLET ORAL at 23:27

## 2024-10-22 RX ADMIN — GLYCOPYRROLATE 0.1 MG: 0.2 INJECTION, SOLUTION INTRAMUSCULAR; INTRAVENOUS at 11:41

## 2024-10-22 RX ADMIN — PROPOFOL 140 MG: 10 INJECTION, EMULSION INTRAVENOUS at 11:08

## 2024-10-22 RX ADMIN — CEFAZOLIN 2000 MG: 2 INJECTION, POWDER, FOR SOLUTION INTRAVENOUS at 20:50

## 2024-10-22 RX ADMIN — ROCURONIUM BROMIDE 20 MG: 50 INJECTION, SOLUTION INTRAVENOUS at 12:57

## 2024-10-22 RX ADMIN — ROCURONIUM BROMIDE 30 MG: 50 INJECTION, SOLUTION INTRAVENOUS at 12:03

## 2024-10-22 RX ADMIN — HYDROMORPHONE HYDROCHLORIDE 0.5 MG: 1 INJECTION, SOLUTION INTRAMUSCULAR; INTRAVENOUS; SUBCUTANEOUS at 17:09

## 2024-10-22 RX ADMIN — METRONIDAZOLE 500 MG: 500 INJECTION, SOLUTION INTRAVENOUS at 11:14

## 2024-10-22 RX ADMIN — TRAZODONE HYDROCHLORIDE 75 MG: 50 TABLET ORAL at 20:54

## 2024-10-22 RX ADMIN — SODIUM CHLORIDE: 9 INJECTION, SOLUTION INTRAVENOUS at 17:15

## 2024-10-22 RX ADMIN — METRONIDAZOLE 500 MG: 500 INJECTION, SOLUTION INTRAVENOUS at 17:58

## 2024-10-22 RX ADMIN — ROCURONIUM BROMIDE 10 MG: 50 INJECTION, SOLUTION INTRAVENOUS at 11:08

## 2024-10-22 RX ADMIN — ATORVASTATIN CALCIUM 10 MG: 10 TABLET, FILM COATED ORAL at 17:18

## 2024-10-22 RX ADMIN — HYDROMORPHONE HYDROCHLORIDE 0.5 MG: 1 INJECTION, SOLUTION INTRAMUSCULAR; INTRAVENOUS; SUBCUTANEOUS at 15:00

## 2024-10-22 RX ADMIN — ACETAMINOPHEN 650 MG: 325 TABLET ORAL at 17:18

## 2024-10-22 RX ADMIN — LIDOCAINE HYDROCHLORIDE 100 MG: 20 INJECTION, SOLUTION INFILTRATION; PERINEURAL at 11:08

## 2024-10-22 RX ADMIN — ENOXAPARIN SODIUM 40 MG: 100 INJECTION SUBCUTANEOUS at 17:16

## 2024-10-22 RX ADMIN — ONDANSETRON 4 MG: 2 INJECTION INTRAMUSCULAR; INTRAVENOUS at 15:55

## 2024-10-22 RX ADMIN — ROCURONIUM BROMIDE 40 MG: 50 INJECTION, SOLUTION INTRAVENOUS at 11:10

## 2024-10-22 RX ADMIN — HYDROMORPHONE HYDROCHLORIDE 0.5 MG: 1 INJECTION, SOLUTION INTRAMUSCULAR; INTRAVENOUS; SUBCUTANEOUS at 23:43

## 2024-10-22 RX ADMIN — DEXMEDETOMIDINE HYDROCHLORIDE 5 MCG: 100 INJECTION, SOLUTION INTRAVENOUS at 11:24

## 2024-10-22 RX ADMIN — HYDROMORPHONE HYDROCHLORIDE 0.5 MG: 1 INJECTION, SOLUTION INTRAMUSCULAR; INTRAVENOUS; SUBCUTANEOUS at 15:52

## 2024-10-22 RX ADMIN — FAMOTIDINE 20 MG: 20 TABLET, FILM COATED ORAL at 17:18

## 2024-10-22 RX ADMIN — CEFAZOLIN 2000 MG: 2 INJECTION, POWDER, FOR SOLUTION INTRAVENOUS at 11:13

## 2024-10-22 RX ADMIN — FLUOXETINE HYDROCHLORIDE 40 MG: 20 CAPSULE ORAL at 17:18

## 2024-10-22 RX ADMIN — METHOCARBAMOL 750 MG: 500 TABLET ORAL at 17:17

## 2024-10-22 RX ADMIN — HYDROMORPHONE HYDROCHLORIDE 0.5 MG: 1 INJECTION, SOLUTION INTRAMUSCULAR; INTRAVENOUS; SUBCUTANEOUS at 15:59

## 2024-10-22 RX ADMIN — SUGAMMADEX 200 MG: 100 INJECTION, SOLUTION INTRAVENOUS at 14:34

## 2024-10-22 RX ADMIN — PROMETHAZINE HYDROCHLORIDE 12.5 MG: 25 TABLET ORAL at 17:16

## 2024-10-22 RX ADMIN — DEXMEDETOMIDINE HYDROCHLORIDE 5 MCG: 100 INJECTION, SOLUTION INTRAVENOUS at 11:33

## 2024-10-22 RX ADMIN — HYDROMORPHONE HYDROCHLORIDE 0.5 MG: 2 INJECTION, SOLUTION INTRAMUSCULAR; INTRAVENOUS; SUBCUTANEOUS at 11:47

## 2024-10-22 RX ADMIN — GLYCOPYRROLATE 0.1 MG: 0.2 INJECTION, SOLUTION INTRAMUSCULAR; INTRAVENOUS at 11:34

## 2024-10-22 RX ADMIN — ONDANSETRON 4 MG: 2 INJECTION INTRAMUSCULAR; INTRAVENOUS at 11:14

## 2024-10-22 RX ADMIN — HYDROMORPHONE HYDROCHLORIDE 0.5 MG: 1 INJECTION, SOLUTION INTRAMUSCULAR; INTRAVENOUS; SUBCUTANEOUS at 15:05

## 2024-10-22 RX ADMIN — HYDROMORPHONE HYDROCHLORIDE 0.5 MG: 2 INJECTION, SOLUTION INTRAMUSCULAR; INTRAVENOUS; SUBCUTANEOUS at 11:02

## 2024-10-22 RX ADMIN — DEXAMETHASONE SODIUM PHOSPHATE 4 MG: 4 INJECTION, SOLUTION INTRAMUSCULAR; INTRAVENOUS at 14:35

## 2024-10-22 ASSESSMENT — PAIN SCALES - WONG BAKER
WONGBAKER_NUMERICALRESPONSE: HURTS WHOLE LOT
WONGBAKER_NUMERICALRESPONSE: NO HURT
WONGBAKER_NUMERICALRESPONSE: NO HURT
WONGBAKER_NUMERICALRESPONSE: HURTS WHOLE LOT

## 2024-10-22 ASSESSMENT — PAIN DESCRIPTION - LOCATION
LOCATION: ABDOMEN;INCISION
LOCATION: ABDOMEN
LOCATION: ABDOMEN;BACK
LOCATION: ABDOMEN

## 2024-10-22 ASSESSMENT — PAIN DESCRIPTION - DESCRIPTORS
DESCRIPTORS: SORE
DESCRIPTORS: ACHING

## 2024-10-22 ASSESSMENT — LIFESTYLE VARIABLES: SMOKING_STATUS: 0

## 2024-10-22 ASSESSMENT — PAIN - FUNCTIONAL ASSESSMENT: PAIN_FUNCTIONAL_ASSESSMENT: 0-10

## 2024-10-22 ASSESSMENT — PAIN SCALES - GENERAL
PAINLEVEL_OUTOF10: 7
PAINLEVEL_OUTOF10: 8
PAINLEVEL_OUTOF10: 10

## 2024-10-22 ASSESSMENT — PAIN DESCRIPTION - ORIENTATION: ORIENTATION: LOWER

## 2024-10-22 NOTE — PROGRESS NOTES
Patient admitted to [facility] in preparation for surgery, VSS. Patient alert and oriented x4. Belongings all labeled to go to pacu (purse, glasses, bag of clothes, and single crutch).    Colon bundle pre-op protocol completed.      Patient stated there was no bowel prep or antibiotic prescribed/ordered for her.   Incentive spirometry education completed, return demonstration completed.  Warm fluids infusing.   SCDs and warming blanket applied and turned on.   Mepilex applied to coccyx.  Blood glucose obtained, results in chart.  Prepped patient's abdomen per protocol. No hair to clip on abdomen; wiped the abdomen with a CHG wipe.    No family present. Patient's daughter Lazara's phone number in system for text updates, call light within reach.

## 2024-10-22 NOTE — H&P
I have reviewed the history and physical and examined the patient.  I find no relevant changes.  I have reviewed with the patient and/or family members, during the preoperative office visit the risks, benefits, and alternatives to the procedure.    Bertin Jackson MD

## 2024-10-22 NOTE — PROGRESS NOTES
Patient arrived to PACU bay 4, phase one initiated. Placed on bedside monitor, VSS. Report obtained from OR RN and anesthesia. Patient on room air. See flowsheets for assessment. Right hand IV infiltrated. Warm blankets applied. Side rails in place, will monitor patient closely.

## 2024-10-22 NOTE — PROGRESS NOTES
4 Eyes Skin Assessment     The patient is being assess for  Admission    I agree that 2 RN's have performed a thorough Head to Toe Skin Assessment on the patient. ALL assessment sites listed below have been assessed.       Areas assessed by both nurses: oriana perez rn  [x]   Head, Face, and Ears   [x]   Shoulders, Back, and Chest  [x]   Arms, Elbows, and Hands   [x]   Coccyx, Sacrum, and Ischum  [x]   Legs, Feet, and Heels    5 laps and HALLIE          Does the Patient have Skin Breakdown?  No         Kirill Prevention initiated:  No   Wound Care Orders initiated:  No      WOC nurse consulted for Pressure Injury (Stage 3,4, Unstageable, DTI, NWPT, and Complex wounds):  No      Nurse 1 eSignature: Electronically signed by Dot Oates RN on 10/22/24 at 7:09 PM EDT    **SHARE this note so that the co-signing nurse is able to place an eSignature**    Nurse 2 eSignature: Electronically signed by ORIANA YOUNG RN on 10/23/24 at 12:41 AM EDT

## 2024-10-22 NOTE — PROGRESS NOTES
HCG Wipes Prior to Prep YES    Hair Clipping in Preop YES    Bowel Bundle Checklist  Wound Protector Used?: N/A  Closing instruments isolated and used for closing fascia and skin? : N/A  Sterile glove change by all team members prior to close?: N/A  Gown changed by all team members prior to close?: N/A    Ioban Used N/A    2 Count Sheets N/A    SCD's Applied and On YES    Replace Drape, Light Handles, Suction, Bovie N/A    Temp Checks intra-op q 30 YES    Re-dose Antibiotics after 4 Hours NO    Hourly Glucose Control by CRNA/MDA YES

## 2024-10-22 NOTE — PROGRESS NOTES
Pharmacy Note - Renal dose adjustment made per P/T protocol    Original order:  Famotidine 20mg BID    Estimated Creatinine Clearance: 37 mL/min (based on SCr of 1.2 mg/dL).    Recent Labs     10/22/24  1020   BUN 21*   CREATININE 1.2       Renally adjusted order:  Famotidine 20mg Daily    Please call pharmacy with any questions.    Thank you,  Gabriele Palmer RPH  10/22/2024 5:05 PM

## 2024-10-22 NOTE — ANESTHESIA POSTPROCEDURE EVALUATION
Department of Anesthesiology  Postprocedure Note    Patient: Sudha Lopez  MRN: [id number]  YOB: 1945  Date of evaluation: 10/22/2024    Procedure Summary       Date: 10/22/24 Room / Location: [address]    Anesthesia Start: 1103 Anesthesia Stop: 1447    Procedure: ROBOTIC ILEOSTOMY REVERSAL (Abdomen) Diagnosis:       Obstruction of intestine (HCC)      (Obstruction of intestine (HCC) [K56.609])    Surgeons: Bertin Jackson MD Responsible Provider: Tonny Hernandes DO    Anesthesia Type: general ASA Status: 2            Anesthesia Type: No value filed.    Xin Phase I: Xin Score: 8    Xin Phase II:      Anesthesia Post Evaluation    Comments: Anes Post-op Note    Name:    Sudha Lopez  MRN:      [id number]    Patient Vitals in the past 12 hrs:  10/22/24 1630, Temp:97.2 °F (36.2 °C), Temp src:Temporal, Pulse:91, Resp:14, SpO2:96 %  10/22/24 1620, BP:136/60, Pulse:91, Resp:12, SpO2:97 %  10/22/24 1605, BP:134/61, Pulse:89, Resp:12, SpO2:96 %  10/22/24 1552, Pulse:89, Resp:30, SpO2:98 %  10/22/24 1550, BP:(!) 146/64, Pulse:86, Resp:11, SpO2:98 %  10/22/24 1535, BP:(!) 144/66, Pulse:87, Resp:12, SpO2:95 %  10/22/24 1520, BP:(!) 143/61, Pulse:84, Resp:(!) 9, SpO2:98 %  10/22/24 1515, BP:138/71, Temp:97.1 °F (36.2 °C), Temp src:Temporal, Pulse:84, Resp:10, SpO2:97 %  10/22/24 1510, BP:(!) 135/55, Pulse:81, Resp:10, SpO2:97 %  10/22/24 1505, BP:(!) 146/67, Pulse:83, Resp:11, SpO2:98 %  10/22/24 1500, BP:(!) 141/75, Pulse:85, Resp:14, SpO2:96 %  10/22/24 1455, BP:(!) 153/72, Pulse:88, Resp:27, SpO2:98 %  10/22/24 1450, BP:(!) 157/68, Pulse:90, Resp:20, SpO2:95 %  10/22/24 1445, BP:137/73, Temp:97.7 °F (36.5 °C), Temp src:Temporal, Pulse:91, Resp:19, SpO2:97 %  10/22/24 0940, BP:(!) 149/77, Temp:98.1 °F (36.7 °C), Temp src:Oral, Pulse:80, Resp:16, SpO2:99 %  10/22/24 0936, Weight:74.6 kg (164 lb 8 oz)     LABS:    CBC  Lab Results       Component                Value

## 2024-10-23 LAB
ANION GAP SERPL CALCULATED.3IONS-SCNC: 13 MMOL/L (ref 3–16)
BASOPHILS # BLD: 0 K/UL (ref 0–0.2)
BASOPHILS NFR BLD: 0.1 %
BUN SERPL-MCNC: 17 MG/DL (ref 7–20)
CALCIUM SERPL-MCNC: 8.2 MG/DL (ref 8.3–10.6)
CHLORIDE SERPL-SCNC: 103 MMOL/L (ref 99–110)
CO2 SERPL-SCNC: 20 MMOL/L (ref 21–32)
CREAT SERPL-MCNC: 1 MG/DL (ref 0.6–1.2)
DEPRECATED RDW RBC AUTO: 14.3 % (ref 12.4–15.4)
EOSINOPHIL # BLD: 0 K/UL (ref 0–0.6)
EOSINOPHIL NFR BLD: 0 %
GFR SERPLBLD CREATININE-BSD FMLA CKD-EPI: 57 ML/MIN/{1.73_M2}
GLUCOSE BLD-MCNC: 102 MG/DL (ref 70–99)
GLUCOSE BLD-MCNC: 112 MG/DL (ref 70–99)
GLUCOSE BLD-MCNC: 122 MG/DL (ref 70–99)
GLUCOSE BLD-MCNC: 132 MG/DL (ref 70–99)
GLUCOSE SERPL-MCNC: 132 MG/DL (ref 70–99)
HCT VFR BLD AUTO: 33 % (ref 36–48)
HGB BLD-MCNC: 10.6 G/DL (ref 12–16)
LYMPHOCYTES # BLD: 1.1 K/UL (ref 1–5.1)
LYMPHOCYTES NFR BLD: 7.4 %
MCH RBC QN AUTO: 29.3 PG (ref 26–34)
MCHC RBC AUTO-ENTMCNC: 32.1 G/DL (ref 31–36)
MCV RBC AUTO: 91.3 FL (ref 80–100)
MONOCYTES # BLD: 1.4 K/UL (ref 0–1.3)
MONOCYTES NFR BLD: 9.8 %
NEUTROPHILS # BLD: 12.1 K/UL (ref 1.7–7.7)
NEUTROPHILS NFR BLD: 82.7 %
PERFORMED ON: ABNORMAL
PLATELET # BLD AUTO: 347 K/UL (ref 135–450)
PMV BLD AUTO: 7.4 FL (ref 5–10.5)
POTASSIUM SERPL-SCNC: 4.4 MMOL/L (ref 3.5–5.1)
RBC # BLD AUTO: 3.61 M/UL (ref 4–5.2)
SODIUM SERPL-SCNC: 136 MMOL/L (ref 136–145)
WBC # BLD AUTO: 14.6 K/UL (ref 4–11)

## 2024-10-23 PROCEDURE — 85025 COMPLETE CBC W/AUTO DIFF WBC: CPT

## 2024-10-23 PROCEDURE — 2700000000 HC OXYGEN THERAPY PER DAY

## 2024-10-23 PROCEDURE — 1200000000 HC SEMI PRIVATE

## 2024-10-23 PROCEDURE — 36415 COLL VENOUS BLD VENIPUNCTURE: CPT

## 2024-10-23 PROCEDURE — 6370000000 HC RX 637 (ALT 250 FOR IP): Performed by: SURGERY

## 2024-10-23 PROCEDURE — 99024 POSTOP FOLLOW-UP VISIT: CPT | Performed by: SURGERY

## 2024-10-23 PROCEDURE — 80048 BASIC METABOLIC PNL TOTAL CA: CPT

## 2024-10-23 PROCEDURE — 6360000002 HC RX W HCPCS: Performed by: SURGERY

## 2024-10-23 PROCEDURE — 2580000003 HC RX 258: Performed by: SURGERY

## 2024-10-23 PROCEDURE — 94761 N-INVAS EAR/PLS OXIMETRY MLT: CPT

## 2024-10-23 RX ADMIN — ACETAMINOPHEN 650 MG: 325 TABLET ORAL at 12:14

## 2024-10-23 RX ADMIN — ATORVASTATIN CALCIUM 10 MG: 10 TABLET, FILM COATED ORAL at 08:31

## 2024-10-23 RX ADMIN — ACETAMINOPHEN 650 MG: 325 TABLET ORAL at 17:58

## 2024-10-23 RX ADMIN — OXYCODONE 10 MG: 5 TABLET ORAL at 17:58

## 2024-10-23 RX ADMIN — ONDANSETRON 4 MG: 2 INJECTION INTRAMUSCULAR; INTRAVENOUS at 18:41

## 2024-10-23 RX ADMIN — TRAZODONE HYDROCHLORIDE 75 MG: 50 TABLET ORAL at 20:34

## 2024-10-23 RX ADMIN — OXYCODONE 10 MG: 5 TABLET ORAL at 06:07

## 2024-10-23 RX ADMIN — HYDROMORPHONE HYDROCHLORIDE 0.5 MG: 1 INJECTION, SOLUTION INTRAMUSCULAR; INTRAVENOUS; SUBCUTANEOUS at 08:33

## 2024-10-23 RX ADMIN — CEFAZOLIN 2000 MG: 2 INJECTION, POWDER, FOR SOLUTION INTRAVENOUS at 02:45

## 2024-10-23 RX ADMIN — METRONIDAZOLE 500 MG: 500 INJECTION, SOLUTION INTRAVENOUS at 03:40

## 2024-10-23 RX ADMIN — SPIRONOLACTONE 25 MG: 25 TABLET, FILM COATED ORAL at 08:31

## 2024-10-23 RX ADMIN — SODIUM CHLORIDE, PRESERVATIVE FREE 10 ML: 5 INJECTION INTRAVENOUS at 20:26

## 2024-10-23 RX ADMIN — ENOXAPARIN SODIUM 40 MG: 100 INJECTION SUBCUTANEOUS at 08:32

## 2024-10-23 RX ADMIN — LISINOPRIL 40 MG: 20 TABLET ORAL at 08:31

## 2024-10-23 RX ADMIN — OXYCODONE 10 MG: 5 TABLET ORAL at 12:15

## 2024-10-23 RX ADMIN — ACETAMINOPHEN 650 MG: 325 TABLET ORAL at 23:00

## 2024-10-23 RX ADMIN — ACETAMINOPHEN 650 MG: 325 TABLET ORAL at 06:10

## 2024-10-23 RX ADMIN — METHOCARBAMOL 750 MG: 500 TABLET ORAL at 08:32

## 2024-10-23 RX ADMIN — HYDROMORPHONE HYDROCHLORIDE 0.5 MG: 1 INJECTION, SOLUTION INTRAMUSCULAR; INTRAVENOUS; SUBCUTANEOUS at 20:34

## 2024-10-23 RX ADMIN — LATANOPROST 1 DROP: 50 SOLUTION OPHTHALMIC at 20:29

## 2024-10-23 RX ADMIN — HYDROMORPHONE HYDROCHLORIDE 0.5 MG: 1 INJECTION, SOLUTION INTRAMUSCULAR; INTRAVENOUS; SUBCUTANEOUS at 13:51

## 2024-10-23 RX ADMIN — METHOCARBAMOL 750 MG: 500 TABLET ORAL at 02:42

## 2024-10-23 RX ADMIN — BUPROPION HYDROCHLORIDE 300 MG: 150 TABLET, EXTENDED RELEASE ORAL at 08:31

## 2024-10-23 RX ADMIN — FAMOTIDINE 20 MG: 20 TABLET, FILM COATED ORAL at 08:31

## 2024-10-23 RX ADMIN — FLUOXETINE HYDROCHLORIDE 40 MG: 20 CAPSULE ORAL at 08:32

## 2024-10-23 ASSESSMENT — PAIN SCALES - GENERAL
PAINLEVEL_OUTOF10: 8
PAINLEVEL_OUTOF10: 5
PAINLEVEL_OUTOF10: 5
PAINLEVEL_OUTOF10: 7
PAINLEVEL_OUTOF10: 8
PAINLEVEL_OUTOF10: 7
PAINLEVEL_OUTOF10: 8
PAINLEVEL_OUTOF10: 8
PAINLEVEL_OUTOF10: 6

## 2024-10-23 ASSESSMENT — PAIN DESCRIPTION - LOCATION
LOCATION: ABDOMEN;BACK;SHOULDER
LOCATION: ABDOMEN
LOCATION: ABDOMEN
LOCATION: ABDOMEN;BACK;SHOULDER
LOCATION: ABDOMEN;BACK
LOCATION: ABDOMEN

## 2024-10-23 ASSESSMENT — PAIN DESCRIPTION - DESCRIPTORS
DESCRIPTORS: ACHING;DISCOMFORT
DESCRIPTORS: ACHING
DESCRIPTORS: ACHING
DESCRIPTORS: ACHING;DISCOMFORT
DESCRIPTORS: ACHING

## 2024-10-23 ASSESSMENT — PAIN DESCRIPTION - ORIENTATION
ORIENTATION: MID
ORIENTATION: LEFT;MID
ORIENTATION: LEFT;MID

## 2024-10-23 ASSESSMENT — PAIN SCALES - WONG BAKER: WONGBAKER_NUMERICALRESPONSE: NO HURT

## 2024-10-23 ASSESSMENT — PAIN - FUNCTIONAL ASSESSMENT: PAIN_FUNCTIONAL_ASSESSMENT: ACTIVITIES ARE NOT PREVENTED

## 2024-10-23 NOTE — PLAN OF CARE
Problem: Discharge Planning  Goal: Discharge to home or other facility with appropriate resources  Outcome: Progressing  Flowsheets (Taken 10/23/2024 0148)  Discharge to home or other facility with appropriate resources:   Identify barriers to discharge with patient and caregiver   Arrange for needed discharge resources and transportation as appropriate   Identify discharge learning needs (meds, wound care, etc)     Problem: Pain  Goal: Verbalizes/displays adequate comfort level or baseline comfort level  Outcome: Progressing  Flowsheets (Taken 10/23/2024 0148)  Verbalizes/displays adequate comfort level or baseline comfort level:   Encourage patient to monitor pain and request assistance   Assess pain using appropriate pain scale   Administer analgesics based on type and severity of pain and evaluate response   Implement non-pharmacological measures as appropriate and evaluate response      DOS: 9/3/24 Procedure(s) (LRB):  LEFT VITRECTOMY, USING 25-GAUGE INSTRUMENTS, SUBRETINAL TISSUE PLASMINOGEN ACTIVATOR, POSSIBLE ENDOLASER, POSSIBLE GAS (Left) (MAC).     Patient to report to [facility], [facility]. Pre-procedure interview was completed with Rolando parag (approved by spouse and patient who both are hard of  hearing).      DOS: Patient instructed by MD office regarding medications on day of surgery.    Son advised that  parking is not available at this time.    Masking (optional) and visitor guidelines reviewed.    Rolando son will accompany patient dos and Rosalia, spouse will be at home overnight with patient after procedure.     Medication list in process.  Writer requested son to bring updated medication list on dos.    Sent patient information to [employer] ([employer], [employer]).

## 2024-10-23 NOTE — PROGRESS NOTES
Pt assessment completed and charted. VSS, 2L NC, baseline is RA. Pt a/ox4. Pt is post-op reversal of ileostomy. 5 lap sites PASQUALE and HALLIE drain to closed suction with red drainage. Monroe cath in place. Bed in lowest position and wheels locked. Call light within reach. Bedside table within reach. Non-skid socks in place. Pt denies any other needs at this time.  Pt calls out appropriately.

## 2024-10-23 NOTE — PROGRESS NOTES
Patient educated on use of IS and demonstrates use Yes    Patient and family educated on incisional care and s/s of infection Yes    Patient and family educated on hand hygiene Yes    Patient and family educated on post operative care Yes   - NG tube maintenance    - Pain control    - roach Removal    - Insulin Protocol     Day of surgery patient to side of bed or up to chair for minimum of 30 minutes Yes    POD 1 until discharge, patient up to chair by 9 am and TID. Goal to increase mobility  No, pt declined    Patient performs oral hygiene with CHG oral solution until NG tube discontinued. NA    If patient does not have NG tube in place, patient to brush teeth and use mouth wash Q shift NA    Patient receives daily bath and linen changes Yes    Patient receives CHG bath until roach discontinued Yes    SCDs in place Yes    Patient receiving chemical VTE Yes

## 2024-10-23 NOTE — CARE COORDINATION
Case Management Assessment  Initial Evaluation    Date/Time of Evaluation: 10/23/2024 1:16 PM  Assessment Completed by: ADILSON Bartholomew    If patient is discharged prior to next notation, then this note serves as note for discharge by case management.    Patient Name: Sudha Lopez                   YOB: 1945  Diagnosis: Obstruction of intestine (HCC) [K56.609]  History of reversal of ileostomy [Z98.890]                   Date / Time: 10/22/2024  9:16 AM    Patient Admission Status: Inpatient   Readmission Risk (Low < 19, Mod (19-27), High > 27): Readmission Risk Score: 15.5    Current PCP: Perla Leyva MD  PCP verified by CM? Yes    Chart Reviewed: Yes      History Provided by: Patient  Patient Orientation: Alert and Oriented, Person, Place, Situation, Self    Patient Cognition: Alert    Hospitalization in the last 30 days (Readmission):  No      Advance Directives:      Code Status: Full Code   Patient's Primary Decision Maker is: Legal Next of Kin    Primary Decision Maker: Victorina Lopez - Child - [phone]    Secondary Decision Maker: solis Lopez - Child - [phone]    Supplemental (Other) Decision Maker: Daryl Briones - Grandchild - [phone]    Discharge Planning:    Patient lives with: Alone Type of Home: House  Primary Care Giver: Self  Patient Support Systems include: Children, Family Members   Current Financial resources: Medicare  Current community resources: None  Current services prior to admission: Durable Medical Equipment            Current DME: Shower Chair, Walker            Type of Home Care services:  None    ADLS  Prior functional level: Independent in ADLs/IADLs  Current functional level: Independent in ADLs/IADLs    Family can provide assistance at DC: Yes (dtr coming up from [place])  Would you like Case Management to discuss the discharge plan with any other family members/significant others, and if so, who? No  Plans to Return to Present Housing:

## 2024-10-23 NOTE — PROGRESS NOTES
Patient educated on use of IS and demonstrates use Yes    Patient and family educated on incisional care and s/s of infection Yes    Patient and family educated on hand hygiene Yes    Patient and family educated on post operative care Yes   - Pain control    - roach Removal      Day of surgery patient to side of bed or up to chair for minimum of 30 minutes No    POD 1 until discharge, patient up to chair by 9 am and TID. Goal to increase mobility  N/A    Patient performs oral hygiene with CHG oral solution until NG tube discontinued. NA    If patient does not have NG tube in place, patient to brush teeth and use mouth wash Q shift Yes    Patient receives daily bath and linen changes Yes    Patient receives CHG bath until roach discontinued Yes    Patient receiving chemical VTE Yes

## 2024-10-23 NOTE — PLAN OF CARE
Problem: Discharge Planning  Goal: Discharge to home or other facility with appropriate resources  Outcome: Progressing  Flowsheets (Taken 10/23/2024 0148 by Ching Sanchez, RN)  Discharge to home or other facility with appropriate resources:   Identify barriers to discharge with patient and caregiver   Arrange for needed discharge resources and transportation as appropriate   Identify discharge learning needs (meds, wound care, etc)     Problem: Pain  Goal: Verbalizes/displays adequate comfort level or baseline comfort level  Outcome: Progressing  Flowsheets (Taken 10/23/2024 0148 by Ching Sanchez, RN)  Verbalizes/displays adequate comfort level or baseline comfort level:   Encourage patient to monitor pain and request assistance   Assess pain using appropriate pain scale   Administer analgesics based on type and severity of pain and evaluate response   Implement non-pharmacological measures as appropriate and evaluate response     Problem: Safety - Adult  Goal: Free from fall injury  Outcome: Progressing  Flowsheets (Taken 10/23/2024 1847)  Free From Fall Injury:   Instruct family/caregiver on patient safety   Based on caregiver fall risk screen, instruct family/caregiver to ask for assistance with transferring infant if caregiver noted to have fall risk factors      No

## 2024-10-23 NOTE — PROGRESS NOTES
Occupational / Physical Therapy  Pts orders received and chart reviewed however per discussion with RN, pt not medically ready for therapy. RN reports pt is having unmanaged pain and was just given dose of pain medication. Will re-attempt at a later date as pt is appropriate and agreeable.    Thank you    Ceci Peña, OTR/L  JASON OlveraT

## 2024-10-23 NOTE — PROGRESS NOTES
[facility] GENERAL SURGERY    Surgery Progress Note           POD # 1    PATIENT NAME: Sudha Lopez     TODAY'S DATE: 10/23/2024    INTERVAL HISTORY:    Pt with mild pain in abdomen and shoulder, no nausea.     OBJECTIVE:   VITALS:  BP (!) 150/74   Pulse 94   Temp 99.3 °F (37.4 °C) (Oral)   Resp 18   Ht 1.6 m (5' 3\")   Wt 74.4 kg (164 lb)   SpO2 98%   BMI 29.05 kg/m²     INTAKE/OUTPUT:    I/O last 3 completed shifts:  In: 50 [IV Piggyback:50]  Out: 585 [Urine:400; Drains:185]  No intake/output data recorded.              CONSTITUTIONAL:  awake and alert  ABDOMEN:   hypoactive bowel sounds, soft, non-distended, tender, sachin serosanguinous   INCISION: clean    Data:  CBC:   Recent Labs     10/23/24  0449   WBC 14.6*   HGB 10.6*   HCT 33.0*        BMP:    Recent Labs     10/22/24  1020 10/23/24  0449    136   K 4.8 4.4    103   CO2 19* 20*   BUN 21* 17   CREATININE 1.2 1.0   GLUCOSE 94 132*     Hepatic: No results for input(s): \"AST\", \"ALT\", \"BILITOT\", \"ALKPHOS\" in the last 72 hours.    Invalid input(s): \"ALB\"  Mag:    No results for input(s): \"MG\" in the last 72 hours.   Phos:   No results for input(s): \"PHOS\" in the last 72 hours.   INR: No results for input(s): \"INR\" in the last 72 hours.      Radiology Review:  NA    ASSESSMENT AND PLAN:  78 y.o. female status post robotic ileostomy reversal  Continue clear liquids today  D/c roach  Decrease IVF rate  PT/OT         Electronically signed by Bertin Jackson MD

## 2024-10-24 ENCOUNTER — APPOINTMENT (OUTPATIENT)
Dept: GENERAL RADIOLOGY | Age: 79
DRG: 329 | End: 2024-10-24
Attending: SURGERY
Payer: MEDICARE

## 2024-10-24 LAB
GLUCOSE BLD-MCNC: 102 MG/DL (ref 70–99)
GLUCOSE BLD-MCNC: 116 MG/DL (ref 70–99)
GLUCOSE BLD-MCNC: 130 MG/DL (ref 70–99)
GLUCOSE BLD-MCNC: 96 MG/DL (ref 70–99)
PERFORMED ON: ABNORMAL
PERFORMED ON: NORMAL

## 2024-10-24 PROCEDURE — 6370000000 HC RX 637 (ALT 250 FOR IP): Performed by: CLINICAL NURSE SPECIALIST

## 2024-10-24 PROCEDURE — 97162 PT EVAL MOD COMPLEX 30 MIN: CPT

## 2024-10-24 PROCEDURE — 74019 RADEX ABDOMEN 2 VIEWS: CPT

## 2024-10-24 PROCEDURE — 1200000000 HC SEMI PRIVATE

## 2024-10-24 PROCEDURE — 97110 THERAPEUTIC EXERCISES: CPT

## 2024-10-24 PROCEDURE — 99024 POSTOP FOLLOW-UP VISIT: CPT | Performed by: SURGERY

## 2024-10-24 PROCEDURE — 2580000003 HC RX 258: Performed by: SURGERY

## 2024-10-24 PROCEDURE — 6360000002 HC RX W HCPCS: Performed by: SURGERY

## 2024-10-24 PROCEDURE — 74018 RADEX ABDOMEN 1 VIEW: CPT

## 2024-10-24 PROCEDURE — 97530 THERAPEUTIC ACTIVITIES: CPT

## 2024-10-24 PROCEDURE — 97166 OT EVAL MOD COMPLEX 45 MIN: CPT

## 2024-10-24 PROCEDURE — 6370000000 HC RX 637 (ALT 250 FOR IP): Performed by: SURGERY

## 2024-10-24 RX ORDER — OXYMETAZOLINE HYDROCHLORIDE 0.05 G/100ML
2 SPRAY NASAL ONCE
Status: COMPLETED | OUTPATIENT
Start: 2024-10-24 | End: 2024-10-24

## 2024-10-24 RX ORDER — LIDOCAINE HYDROCHLORIDE 20 MG/ML
JELLY TOPICAL ONCE
Status: COMPLETED | OUTPATIENT
Start: 2024-10-24 | End: 2024-10-24

## 2024-10-24 RX ADMIN — ENOXAPARIN SODIUM 40 MG: 100 INJECTION SUBCUTANEOUS at 08:32

## 2024-10-24 RX ADMIN — ATORVASTATIN CALCIUM 10 MG: 10 TABLET, FILM COATED ORAL at 08:32

## 2024-10-24 RX ADMIN — SPIRONOLACTONE 25 MG: 25 TABLET, FILM COATED ORAL at 08:32

## 2024-10-24 RX ADMIN — HYDROMORPHONE HYDROCHLORIDE 0.5 MG: 1 INJECTION, SOLUTION INTRAMUSCULAR; INTRAVENOUS; SUBCUTANEOUS at 23:58

## 2024-10-24 RX ADMIN — LISINOPRIL 40 MG: 20 TABLET ORAL at 08:32

## 2024-10-24 RX ADMIN — FLUOXETINE HYDROCHLORIDE 40 MG: 20 CAPSULE ORAL at 08:32

## 2024-10-24 RX ADMIN — OXYMETAZOLINE HYDROCHLORIDE 2 SPRAY: 0.5 SPRAY NASAL at 14:59

## 2024-10-24 RX ADMIN — HYDROMORPHONE HYDROCHLORIDE 0.5 MG: 1 INJECTION, SOLUTION INTRAMUSCULAR; INTRAVENOUS; SUBCUTANEOUS at 07:17

## 2024-10-24 RX ADMIN — HYDROMORPHONE HYDROCHLORIDE 0.5 MG: 1 INJECTION, SOLUTION INTRAMUSCULAR; INTRAVENOUS; SUBCUTANEOUS at 21:01

## 2024-10-24 RX ADMIN — LATANOPROST 1 DROP: 50 SOLUTION OPHTHALMIC at 21:07

## 2024-10-24 RX ADMIN — BUPROPION HYDROCHLORIDE 300 MG: 150 TABLET, EXTENDED RELEASE ORAL at 08:32

## 2024-10-24 RX ADMIN — SODIUM CHLORIDE, PRESERVATIVE FREE 10 ML: 5 INJECTION INTRAVENOUS at 21:08

## 2024-10-24 RX ADMIN — OXYCODONE 10 MG: 5 TABLET ORAL at 06:00

## 2024-10-24 RX ADMIN — HYDROMORPHONE HYDROCHLORIDE 0.5 MG: 1 INJECTION, SOLUTION INTRAMUSCULAR; INTRAVENOUS; SUBCUTANEOUS at 02:36

## 2024-10-24 RX ADMIN — FAMOTIDINE 20 MG: 20 TABLET, FILM COATED ORAL at 08:32

## 2024-10-24 RX ADMIN — FLUTICASONE PROPIONATE 1 SPRAY: 50 SPRAY, METERED NASAL at 08:32

## 2024-10-24 RX ADMIN — OXYCODONE 10 MG: 5 TABLET ORAL at 01:30

## 2024-10-24 RX ADMIN — ACETAMINOPHEN 650 MG: 325 TABLET ORAL at 05:13

## 2024-10-24 RX ADMIN — ONDANSETRON 4 MG: 2 INJECTION INTRAMUSCULAR; INTRAVENOUS at 07:16

## 2024-10-24 RX ADMIN — LIDOCAINE HYDROCHLORIDE: 20 JELLY TOPICAL at 14:59

## 2024-10-24 RX ADMIN — HYDROMORPHONE HYDROCHLORIDE 0.5 MG: 1 INJECTION, SOLUTION INTRAMUSCULAR; INTRAVENOUS; SUBCUTANEOUS at 17:49

## 2024-10-24 ASSESSMENT — PAIN DESCRIPTION - LOCATION
LOCATION: ABDOMEN
LOCATION: ABDOMEN
LOCATION: ABDOMEN;THROAT
LOCATION: ABDOMEN

## 2024-10-24 ASSESSMENT — PAIN DESCRIPTION - ORIENTATION
ORIENTATION: LOWER
ORIENTATION: MID
ORIENTATION: LOWER

## 2024-10-24 ASSESSMENT — PAIN - FUNCTIONAL ASSESSMENT
PAIN_FUNCTIONAL_ASSESSMENT: PREVENTS OR INTERFERES SOME ACTIVE ACTIVITIES AND ADLS
PAIN_FUNCTIONAL_ASSESSMENT: ACTIVITIES ARE NOT PREVENTED
PAIN_FUNCTIONAL_ASSESSMENT: PREVENTS OR INTERFERES SOME ACTIVE ACTIVITIES AND ADLS

## 2024-10-24 ASSESSMENT — PAIN SCALES - GENERAL
PAINLEVEL_OUTOF10: 9
PAINLEVEL_OUTOF10: 5
PAINLEVEL_OUTOF10: 8
PAINLEVEL_OUTOF10: 7
PAINLEVEL_OUTOF10: 8

## 2024-10-24 ASSESSMENT — PAIN DESCRIPTION - DESCRIPTORS
DESCRIPTORS: CRAMPING;SORE
DESCRIPTORS: ACHING
DESCRIPTORS: CRAMPING;SORE
DESCRIPTORS: ACHING
DESCRIPTORS: ACHING

## 2024-10-24 NOTE — CARE COORDINATION
Hospital day/POD 2: Patient on C3 care managed by General Surgery. Patient from home alone, IPTA. Patient reports dtr coming in from [place] and will assist at home. Patient seen by therapy recs home with 24 hr support. AXR, new orders for NGT placement. Awaiting return of bowel. SW following.ADILSON Bartholomew

## 2024-10-24 NOTE — PROGRESS NOTES
Occupational Therapy  Facility/Department: [facility] C3 TELE/MED SURG/ONC  Occupational Therapy Initial Assessment    Name: Sudha Lopez  : 1945  MRN: [id number]  Date of Service: 10/24/2024    Discharge Recommendations:  24 hour supervision or assist          Patient Diagnosis(es): The encounter diagnosis was Obstruction of intestine (HCC).  Past Medical History:  has a past medical history of Anxiety, History of blood transfusion, Hyperlipidemia, Hypertension, OA (osteoarthritis) of knee, and PAF (paroxysmal atrial fibrillation) (HCC).  Past Surgical History:  has a past surgical history that includes Hysterectomy; Breast enhancement surgery; rhinoplasty; Rotator cuff repair (Bilateral); Hand surgery (Right); Total hip arthroplasty (Bilateral); Intracapsular cataract extraction (Left, 2020); Colonoscopy; Intracapsular cataract extraction (Right, 2020); Colonoscopy (N/A, 2024); laparotomy (N/A, 2024); CT PERITONEAL/RETROPERITONEAL PERC DRAIN (7/3/2024); and Small intestine surgery (N/A, 10/22/2024).           Assessment  Performance deficits / Impairments: Decreased functional mobility ;Decreased ADL status;Decreased high-level IADLs;Decreased balance  Assessment: pt from home alone, reports normally independent with IADL's & functional mobility with RW; readmitted for ROBOTIC ILEOSTOMY REVERSAL 10-22-24; now with increased abdominal pain requiring mod assist of 2 bed mobility , min assist with sit<-->stand & stand-pivot transfers, dependent with LE self care; pt to benefit from skilled OT services;  REQUIRES OT FOLLOW-UP: Yes  Activity Tolerance  Activity Tolerance: Patient limited by pain  Activity Tolerance Comments: vitals stable on RA     Plan  Occupational Therapy Plan  Times Per Week: 3-5x/ week  Current Treatment Recommendations: Functional mobility training, Balance training, Endurance training, Positioning, Self-Care / ADL, Safety education & training, Pain

## 2024-10-24 NOTE — PROGRESS NOTES
Physical Therapy  Facility/Department: [facility] C3 TELE/MED SURG/ONC  Physical Therapy Initial Assessment    Name: Sudha Lopez  : 1945  MRN: [id number]  Date of Service: 10/24/2024    Discharge Recommendations:  24 hour supervision or assist   PT Equipment Recommendations  Equipment Needed: No      Patient Diagnosis(es): The encounter diagnosis was Obstruction of intestine (HCC).  Past Medical History:  has a past medical history of Anxiety, History of blood transfusion, Hyperlipidemia, Hypertension, OA (osteoarthritis) of knee, and PAF (paroxysmal atrial fibrillation) (HCC).  Past Surgical History:  has a past surgical history that includes Hysterectomy; Breast enhancement surgery; rhinoplasty; Rotator cuff repair (Bilateral); Hand surgery (Right); Total hip arthroplasty (Bilateral); Intracapsular cataract extraction (Left, 2020); Colonoscopy; Intracapsular cataract extraction (Right, 2020); Colonoscopy (N/A, 2024); laparotomy (N/A, 2024); CT PERITONEAL/RETROPERITONEAL PERC DRAIN (7/3/2024); and Small intestine surgery (N/A, 10/22/2024).    Assessment  Body Structures, Functions, Activity Limitations Requiring Skilled Therapeutic Intervention: Decreased functional mobility ;Decreased endurance;Decreased balance;Increased pain;Decreased tolerance to work activity  Assessment: Pt is a 77 y/o female who presents s/p robotic ileostomy reversal. Pt was independent prior to admit living alone in 2 story condo with bed/bath upstairs and level entry. Pt currently requires mod A for bed mobility and min A for transfers and short distance ambulation with RW. Pt would benefit from continued skilled PT to address current limitations. Recomend 24hr supervision at discharge.  Treatment Diagnosis: impaired functional mobility  Therapy Prognosis: Good  Decision Making: Medium Complexity  Requires PT Follow-Up: Yes  Activity Tolerance  Activity Tolerance: Patient tolerated evaluation without incident;Patient

## 2024-10-24 NOTE — PROGRESS NOTES
[facility] GENERAL SURGERY    Surgery Progress Note           POD # 2    PATIENT NAME: Sudha Lopez     TODAY'S DATE: 10/24/2024    INTERVAL HISTORY:    Pt  c/o frequent belching and hiccups, denies flatus/BM.  Mildly distended.  Adequate pain control.     OBJECTIVE:   VITALS:  /70   Pulse (!) 105   Temp 98.4 °F (36.9 °C) (Oral)   Resp 18   Ht 1.6 m (5' 3\")   Wt 74.4 kg (164 lb)   SpO2 97%   BMI 29.05 kg/m²     INTAKE/OUTPUT:    I/O last 3 completed shifts:  In: 1740 [P.O.:1740]  Out: 1590 [Urine:1225; Emesis/NG output:200; Drains:165]  No intake/output data recorded.              CONSTITUTIONAL:  awake and alert  LUNGS:  clear to auscultation  ABDOMEN:   absent bowel sounds, soft, non-distended,    INCISION: clean, dry, no drainage, staples intact at ostomy site    Data:  CBC:   Recent Labs     10/23/24  0449   WBC 14.6*   HGB 10.6*   HCT 33.0*        BMP:    Recent Labs     10/22/24  1020 10/23/24  0449    136   K 4.8 4.4    103   CO2 19* 20*   BUN 21* 17   CREATININE 1.2 1.0   GLUCOSE 94 132*     Hepatic: No results for input(s): \"AST\", \"ALT\", \"BILITOT\", \"ALKPHOS\" in the last 72 hours.    Invalid input(s): \"ALB\"  Mag:    No results for input(s): \"MG\" in the last 72 hours.   Phos:   No results for input(s): \"PHOS\" in the last 72 hours.   INR: No results for input(s): \"INR\" in the last 72 hours.      Radiology Review:       ASSESSMENT AND PLAN:  78 y.o. female status post robotic ileostomy takedown w/ creation of ileosigmoid anastomosis.  Current symptoms (belching/hiccups) would suggest postop adynamic ileus.   - will check AXR to look for ileus; if present, may need NGT decompression   - cont up OOB   - cont IVF until return of bowel function   - cont Peerwick catheter for now   - PT/OT   - Prophy - cont Pepcid, Lovenox    Dispo - 2-3 days pending return of bowel function         Electronically signed by FOREST REBOLLEDO MD

## 2024-10-24 NOTE — PLAN OF CARE
Problem: Discharge Planning  Goal: Discharge to home or other facility with appropriate resources  10/23/2024 2236 by Junie Larkin, RN  Outcome: Progressing  10/23/2024 1847 by Brooks San, RN  Outcome: Progressing  Flowsheets (Taken 10/23/2024 0148 by Ching Sanchez, RN)  Discharge to home or other facility with appropriate resources:   Identify barriers to discharge with patient and caregiver   Arrange for needed discharge resources and transportation as appropriate   Identify discharge learning needs (meds, wound care, etc)     Problem: Pain  Goal: Verbalizes/displays adequate comfort level or baseline comfort level  10/23/2024 2236 by Junie Larkin RN  Outcome: Progressing  10/23/2024 1847 by Brooks San, RN  Outcome: Progressing  Flowsheets (Taken 10/23/2024 0148 by Ching Sanchez, RN)  Verbalizes/displays adequate comfort level or baseline comfort level:   Encourage patient to monitor pain and request assistance   Assess pain using appropriate pain scale   Administer analgesics based on type and severity of pain and evaluate response   Implement non-pharmacological measures as appropriate and evaluate response     Problem: Safety - Adult  Goal: Free from fall injury  10/23/2024 2236 by Junie Larkin, RN  Outcome: Progressing  10/23/2024 1847 by Brooks San, RN  Outcome: Progressing  Flowsheets (Taken 10/23/2024 1847)  Free From Fall Injury:   Instruct family/caregiver on patient safety   Based on caregiver fall risk screen, instruct family/caregiver to ask for assistance with transferring infant if caregiver noted to have fall risk factors     Problem: ABCDS Injury Assessment  Goal: Absence of physical injury  Outcome: Progressing

## 2024-10-24 NOTE — PROGRESS NOTES
Courtesy visit Post op robotic ileostomy reversal.  Patient requested to see CWOCN as a lot of time was spent with her on previous admissions.  Patient awake in bed.  Reports she had to have the NG tube placed today.  Informed her she has and ileus and needs her bowel to rest.  Patient reports she was told she will have to stay 3-5 days.  Encouraged her to get out of bed and up in chair 2-3 times a day as that shall improve her recovery and assist in her bowels to function again.

## 2024-10-24 NOTE — PROGRESS NOTES
At bedside report with on-coming RN. Pt asked this RN to clear off her table and \"get rid of this too\" pointing at her instinctive spirometer. Asked patient if she had been using the IS, pt stated she had not. Reminded her that the device could help prevent respiratory complications and left device at bedside.

## 2024-10-25 ENCOUNTER — APPOINTMENT (OUTPATIENT)
Dept: GENERAL RADIOLOGY | Age: 79
DRG: 329 | End: 2024-10-25
Attending: SURGERY
Payer: MEDICARE

## 2024-10-25 LAB
ANION GAP SERPL CALCULATED.3IONS-SCNC: 13 MMOL/L (ref 3–16)
BASOPHILS # BLD: 0 K/UL (ref 0–0.2)
BASOPHILS NFR BLD: 0.3 %
BUN SERPL-MCNC: 9 MG/DL (ref 7–20)
CALCIUM SERPL-MCNC: 8.1 MG/DL (ref 8.3–10.6)
CHLORIDE SERPL-SCNC: 100 MMOL/L (ref 99–110)
CO2 SERPL-SCNC: 19 MMOL/L (ref 21–32)
CREAT SERPL-MCNC: 0.7 MG/DL (ref 0.6–1.2)
DEPRECATED RDW RBC AUTO: 13.5 % (ref 12.4–15.4)
EOSINOPHIL # BLD: 0.2 K/UL (ref 0–0.6)
EOSINOPHIL NFR BLD: 1.3 %
GFR SERPLBLD CREATININE-BSD FMLA CKD-EPI: 88 ML/MIN/{1.73_M2}
GLUCOSE BLD-MCNC: 100 MG/DL (ref 70–99)
GLUCOSE BLD-MCNC: 81 MG/DL (ref 70–99)
GLUCOSE BLD-MCNC: 88 MG/DL (ref 70–99)
GLUCOSE BLD-MCNC: 92 MG/DL (ref 70–99)
GLUCOSE BLD-MCNC: 93 MG/DL (ref 70–99)
GLUCOSE SERPL-MCNC: 92 MG/DL (ref 70–99)
HCT VFR BLD AUTO: 29.1 % (ref 36–48)
HGB BLD-MCNC: 9.6 G/DL (ref 12–16)
LYMPHOCYTES # BLD: 1.2 K/UL (ref 1–5.1)
LYMPHOCYTES NFR BLD: 8.5 %
MAGNESIUM SERPL-MCNC: 1.86 MG/DL (ref 1.8–2.4)
MCH RBC QN AUTO: 29.8 PG (ref 26–34)
MCHC RBC AUTO-ENTMCNC: 33.1 G/DL (ref 31–36)
MCV RBC AUTO: 90 FL (ref 80–100)
MONOCYTES # BLD: 1.2 K/UL (ref 0–1.3)
MONOCYTES NFR BLD: 8.4 %
NEUTROPHILS # BLD: 11.7 K/UL (ref 1.7–7.7)
NEUTROPHILS NFR BLD: 81.5 %
PERFORMED ON: ABNORMAL
PERFORMED ON: NORMAL
PLATELET # BLD AUTO: 288 K/UL (ref 135–450)
PMV BLD AUTO: 7.3 FL (ref 5–10.5)
POTASSIUM SERPL-SCNC: 3.4 MMOL/L (ref 3.5–5.1)
RBC # BLD AUTO: 3.23 M/UL (ref 4–5.2)
SODIUM SERPL-SCNC: 132 MMOL/L (ref 136–145)
WBC # BLD AUTO: 14.4 K/UL (ref 4–11)

## 2024-10-25 PROCEDURE — 97110 THERAPEUTIC EXERCISES: CPT

## 2024-10-25 PROCEDURE — 85025 COMPLETE CBC W/AUTO DIFF WBC: CPT

## 2024-10-25 PROCEDURE — 80048 BASIC METABOLIC PNL TOTAL CA: CPT

## 2024-10-25 PROCEDURE — 74019 RADEX ABDOMEN 2 VIEWS: CPT

## 2024-10-25 PROCEDURE — 1200000000 HC SEMI PRIVATE

## 2024-10-25 PROCEDURE — 6360000002 HC RX W HCPCS: Performed by: CLINICAL NURSE SPECIALIST

## 2024-10-25 PROCEDURE — 36415 COLL VENOUS BLD VENIPUNCTURE: CPT

## 2024-10-25 PROCEDURE — 2580000003 HC RX 258: Performed by: SURGERY

## 2024-10-25 PROCEDURE — APPNB45 APP NON BILLABLE 31-45 MINUTES: Performed by: CLINICAL NURSE SPECIALIST

## 2024-10-25 PROCEDURE — 83735 ASSAY OF MAGNESIUM: CPT

## 2024-10-25 PROCEDURE — 2500000003 HC RX 250 WO HCPCS: Performed by: SURGERY

## 2024-10-25 PROCEDURE — 6370000000 HC RX 637 (ALT 250 FOR IP): Performed by: SURGERY

## 2024-10-25 PROCEDURE — 99024 POSTOP FOLLOW-UP VISIT: CPT | Performed by: SURGERY

## 2024-10-25 PROCEDURE — 6360000002 HC RX W HCPCS: Performed by: SURGERY

## 2024-10-25 RX ORDER — POTASSIUM CHLORIDE 7.45 MG/ML
10 INJECTION INTRAVENOUS
Status: COMPLETED | OUTPATIENT
Start: 2024-10-25 | End: 2024-10-25

## 2024-10-25 RX ORDER — ACETAMINOPHEN 650 MG
TABLET, EXTENDED RELEASE ORAL PRN
Status: DISCONTINUED | OUTPATIENT
Start: 2024-10-26 | End: 2024-11-12 | Stop reason: HOSPADM

## 2024-10-25 RX ADMIN — OXYCODONE 5 MG: 5 TABLET ORAL at 20:45

## 2024-10-25 RX ADMIN — ACETAMINOPHEN 650 MG: 325 TABLET ORAL at 11:22

## 2024-10-25 RX ADMIN — SODIUM CHLORIDE 25 ML: 9 INJECTION, SOLUTION INTRAVENOUS at 11:30

## 2024-10-25 RX ADMIN — SODIUM CHLORIDE: 9 INJECTION, SOLUTION INTRAVENOUS at 01:42

## 2024-10-25 RX ADMIN — HYDROMORPHONE HYDROCHLORIDE 0.5 MG: 1 INJECTION, SOLUTION INTRAMUSCULAR; INTRAVENOUS; SUBCUTANEOUS at 03:07

## 2024-10-25 RX ADMIN — FLUOXETINE HYDROCHLORIDE 40 MG: 20 CAPSULE ORAL at 08:43

## 2024-10-25 RX ADMIN — ENOXAPARIN SODIUM 40 MG: 100 INJECTION SUBCUTANEOUS at 08:41

## 2024-10-25 RX ADMIN — SODIUM CHLORIDE: 9 INJECTION, SOLUTION INTRAVENOUS at 11:25

## 2024-10-25 RX ADMIN — POTASSIUM CHLORIDE 10 MEQ: 7.46 INJECTION, SOLUTION INTRAVENOUS at 11:32

## 2024-10-25 RX ADMIN — BUPROPION HYDROCHLORIDE 300 MG: 150 TABLET, EXTENDED RELEASE ORAL at 08:42

## 2024-10-25 RX ADMIN — SPIRONOLACTONE 25 MG: 25 TABLET, FILM COATED ORAL at 08:42

## 2024-10-25 RX ADMIN — OXYCODONE 10 MG: 5 TABLET ORAL at 15:35

## 2024-10-25 RX ADMIN — TRAZODONE HYDROCHLORIDE 75 MG: 50 TABLET ORAL at 20:33

## 2024-10-25 RX ADMIN — POTASSIUM CHLORIDE 10 MEQ: 7.46 INJECTION, SOLUTION INTRAVENOUS at 14:11

## 2024-10-25 RX ADMIN — LATANOPROST 1 DROP: 50 SOLUTION OPHTHALMIC at 20:33

## 2024-10-25 RX ADMIN — ACETAMINOPHEN 650 MG: 325 TABLET ORAL at 16:20

## 2024-10-25 RX ADMIN — HYDROMORPHONE HYDROCHLORIDE 0.5 MG: 1 INJECTION, SOLUTION INTRAMUSCULAR; INTRAVENOUS; SUBCUTANEOUS at 06:20

## 2024-10-25 RX ADMIN — HYDROMORPHONE HYDROCHLORIDE 0.25 MG: 1 INJECTION, SOLUTION INTRAMUSCULAR; INTRAVENOUS; SUBCUTANEOUS at 18:56

## 2024-10-25 RX ADMIN — Medication 20 MG: at 08:41

## 2024-10-25 RX ADMIN — LISINOPRIL 40 MG: 20 TABLET ORAL at 08:42

## 2024-10-25 RX ADMIN — SODIUM CHLORIDE, PRESERVATIVE FREE 10 ML: 5 INJECTION INTRAVENOUS at 08:43

## 2024-10-25 RX ADMIN — ACETAMINOPHEN 650 MG: 325 TABLET ORAL at 22:13

## 2024-10-25 RX ADMIN — HYDROMORPHONE HYDROCHLORIDE 0.25 MG: 1 INJECTION, SOLUTION INTRAMUSCULAR; INTRAVENOUS; SUBCUTANEOUS at 22:13

## 2024-10-25 ASSESSMENT — PAIN DESCRIPTION - LOCATION
LOCATION: ABDOMEN
LOCATION: ABDOMEN
LOCATION: ABDOMEN;THROAT
LOCATION: ABDOMEN
LOCATION: ABDOMEN
LOCATION: ABDOMEN;THROAT
LOCATION: ABDOMEN;THROAT

## 2024-10-25 ASSESSMENT — PAIN SCALES - GENERAL
PAINLEVEL_OUTOF10: 3
PAINLEVEL_OUTOF10: 8
PAINLEVEL_OUTOF10: 4
PAINLEVEL_OUTOF10: 5
PAINLEVEL_OUTOF10: 8
PAINLEVEL_OUTOF10: 8
PAINLEVEL_OUTOF10: 9
PAINLEVEL_OUTOF10: 10

## 2024-10-25 ASSESSMENT — PAIN - FUNCTIONAL ASSESSMENT
PAIN_FUNCTIONAL_ASSESSMENT: PREVENTS OR INTERFERES SOME ACTIVE ACTIVITIES AND ADLS
PAIN_FUNCTIONAL_ASSESSMENT: PREVENTS OR INTERFERES SOME ACTIVE ACTIVITIES AND ADLS
PAIN_FUNCTIONAL_ASSESSMENT: ACTIVITIES ARE NOT PREVENTED

## 2024-10-25 ASSESSMENT — PAIN DESCRIPTION - DESCRIPTORS
DESCRIPTORS: CRAMPING;SORE
DESCRIPTORS: ACHING;SORE
DESCRIPTORS: ACHING
DESCRIPTORS: ACHING
DESCRIPTORS: CRAMPING;SORE

## 2024-10-25 ASSESSMENT — PAIN DESCRIPTION - ORIENTATION
ORIENTATION: LEFT
ORIENTATION: LEFT
ORIENTATION: LEFT;MID

## 2024-10-25 NOTE — PROGRESS NOTES
4 Eyes Skin Assessment     NAME:  Sudha Lopez  YOB: 1945  MEDICAL RECORD NUMBER:  [id number]    The patient is being assessed for  Other low yeni    I agree that at least one RN has performed a thorough Head to Toe Skin Assessment on the patient. ALL assessment sites listed below have been assessed.      Areas assessed by both nurses:    Head, Face, Ears, Shoulders, Back, Chest, Arms, Elbows, Hands, Sacrum. Buttock, Coccyx, Ischium, Legs. Feet and Heels, and Under Medical Devices         Does the Patient have a Wound? No noted wound(s)       Yeni Prevention initiated by RN: Yes  Wound Care Orders initiated by RN: Yes    Pressure Injury (Stage 3,4, Unstageable, DTI, NWPT, and Complex wounds) if present, place Wound referral order by RN under : No    New Ostomies, if present place, Ostomy referral order under : No     Nurse 1 eSignature: Electronically signed by Karolina Hsu RN on 10/25/24 at 12:16 AM EDT    **SHARE this note so that the co-signing nurse can place an eSignature**    Nurse 2 eSignature: Electronically signed by Barbara Gardner RN on 10/25/24 at 4:06 AM EDT

## 2024-10-25 NOTE — PROGRESS NOTES
Occupational Therapy  Facility/Department: [facility] C3 TELE/MED SURG/ONC  Treatment Note    Name: Sudha Lopez  : 1945  MRN: [id number]  Date of Service: 10/25/2024    Discharge Recommendations:  24 hour supervision or assist          Patient Diagnosis(es): The encounter diagnosis was Obstruction of intestine (HCC).  Past Medical History:  has a past medical history of Anxiety, History of blood transfusion, Hyperlipidemia, Hypertension, OA (osteoarthritis) of knee, and PAF (paroxysmal atrial fibrillation) (HCC).  Past Surgical History:  has a past surgical history that includes Hysterectomy; Breast enhancement surgery; rhinoplasty; Rotator cuff repair (Bilateral); Hand surgery (Right); Total hip arthroplasty (Bilateral); Intracapsular cataract extraction (Left, 2020); Colonoscopy; Intracapsular cataract extraction (Right, 2020); Colonoscopy (N/A, 2024); laparotomy (N/A, 2024); CT PERITONEAL/RETROPERITONEAL PERC DRAIN (7/3/2024); and Small intestine surgery (N/A, 10/22/2024).           Assessment  Performance deficits / Impairments: Decreased functional mobility ;Decreased ADL status;Decreased high-level IADLs;Decreased balance  Assessment: pt from home alone, reports normally independent with IADL's & functional mobility with RW; readmitted for ROBOTIC ILEOSTOMY REVERSAL 10-22-24; now with increased abdominal pain requiring mod assist of 2 bed mobility , min assist with sit<-->stand & stand-pivot transfers, dependent with LE self care;today pt limited by NG suction for  for post-op ileus; pt agreeable only to chair exercises;  pt to benefit from skilled OT services;  REQUIRES OT FOLLOW-UP: Yes  Activity Tolerance  Activity Tolerance: Patient Tolerated treatment well     Plan  Occupational Therapy Plan  Times Per Week: 3-5x/ week  Current Treatment Recommendations: Functional mobility training, Balance training, Endurance training, Positioning, Self-Care / ADL, Safety education & training, Pain

## 2024-10-25 NOTE — PLAN OF CARE
Problem: Discharge Planning  Goal: Discharge to home or other facility with appropriate resources  Outcome: Progressing  Flowsheets (Taken 10/23/2024 0148 by Ching Sanchez, RN)  Discharge to home or other facility with appropriate resources:   Identify barriers to discharge with patient and caregiver   Arrange for needed discharge resources and transportation as appropriate   Identify discharge learning needs (meds, wound care, etc)     Problem: Pain  Goal: Verbalizes/displays adequate comfort level or baseline comfort level  Outcome: Progressing  Flowsheets (Taken 10/23/2024 0148 by Ching Sanchez, RN)  Verbalizes/displays adequate comfort level or baseline comfort level:   Encourage patient to monitor pain and request assistance   Assess pain using appropriate pain scale   Administer analgesics based on type and severity of pain and evaluate response   Implement non-pharmacological measures as appropriate and evaluate response     Problem: Safety - Adult  Goal: Free from fall injury  Outcome: Progressing  Flowsheets (Taken 10/25/2024 1704)  Free From Fall Injury: Instruct family/caregiver on patient safety     Problem: ABCDS Injury Assessment  Goal: Absence of physical injury  Outcome: Progressing  Flowsheets (Taken 10/25/2024 1704)  Absence of Physical Injury: Implement safety measures based on patient assessment     Problem: Skin/Tissue Integrity  Goal: Absence of new skin breakdown  Description: 1.  Monitor for areas of redness and/or skin breakdown  2.  Assess vascular access sites hourly  3.  Every 4-6 hours minimum:  Change oxygen saturation probe site  4.  Every 4-6 hours:  If on nasal continuous positive airway pressure, respiratory therapy assess nares and determine need for appliance change or resting period.  Outcome: Progressing     Problem: Nutrition Deficit:  Goal: Optimize nutritional status  Outcome: Progressing  Flowsheets (Taken 10/25/2024 1704)  Nutrient intake appropriate for improving,

## 2024-10-25 NOTE — PROGRESS NOTES
[facility] GENERAL SURGERY    Surgery Progress Note           POD # 3    PATIENT NAME: Sudha Lopez     TODAY'S DATE: 10/25/2024    INTERVAL HISTORY:    Pt  doing okay, no flatus yet and still with occasional hiccups.   Pain controlled.      OBJECTIVE:   VITALS:  /74   Pulse 96   Temp 99.8 °F (37.7 °C) (Oral)   Resp 16   Ht 1.6 m (5' 3\")   Wt 74.4 kg (164 lb)   SpO2 93%   BMI 29.05 kg/m²     INTAKE/OUTPUT:    I/O last 3 completed shifts:  In: 3048.5 [P.O.:120; I.V.:2898.5; NG/GT:30]  Out: 1500 [Urine:750; Emesis/NG output:700; Drains:50]  I/O this shift:  In: 10 [I.V.:10]  Out: -               CONSTITUTIONAL:  awake and alert  LUNGS:  no crackles or wheezes  ABDOMEN:   soft, non-distended, tender as appropriate   INCISION: clean, dry    Data:  CBC:   Recent Labs     10/23/24  0449 10/25/24  0434   WBC 14.6* 14.4*   HGB 10.6* 9.6*   HCT 33.0* 29.1*    288     BMP:    Recent Labs     10/23/24  0449 10/25/24  0434    132*   K 4.4 3.4*    100   CO2 20* 19*   BUN 17 9   CREATININE 1.0 0.7   GLUCOSE 132* 92     Hepatic: No results for input(s): \"AST\", \"ALT\", \"BILITOT\", \"ALKPHOS\" in the last 72 hours.    Invalid input(s): \"ALB\"  Mag:      Recent Labs     10/25/24  0434   MG 1.86      Phos:   No results for input(s): \"PHOS\" in the last 72 hours.   INR: No results for input(s): \"INR\" in the last 72 hours.      Radiology Review:     EXAMINATION:  TWO XRAY VIEWS OF THE ABDOMEN    10/25/2024 6:32 am    COMPARISON:  10/24/2024    HISTORY:  ORDERING SYSTEM PROVIDED HISTORY: PO ileus  TECHNOLOGIST PROVIDED HISTORY:  Reason for exam:->PO ileus  Reason for Exam: PO ileus    FINDINGS:  Tip of NG tube is coiled in the region of the stomach, similar to prior    Scattered gaseous distention is seen throughout the abdomen, similar to prior.    Drainage catheter projects over the abdomen.  Skin staples project over the  abdomen    Bilateral hip arthroplasties are seen    Degenerative change and scoliosis

## 2024-10-25 NOTE — PROGRESS NOTES
A/O x4. Up to chair x2 this shift. Tolerated well. NGT in place with CLWS. Purewick in place. Patient refused to have it removed and to walk to BR to void. HALLIE drain intact with serosangenous drainage.     No flatus or BM at this time.

## 2024-10-25 NOTE — PROGRESS NOTES
4 Eyes Skin Assessment and Patient belongings     The patient is being assess for  Wound    I agree that 2 Nurses have performed a thorough Head to Toe Skin Assessment on the patient. ALL assessment sites listed below have been assessed.       Areas assessed by both nurses: Miley LOPEZ, Artie RN  [x]   Head, Face, and Ears   [x]   Shoulders, Back, and Chest  [x]   Arms, Elbows, and Hands   [x]   Coccyx, Sacrum, and IschIum  [x]   Legs, Feet, and Heels        Does the Patient have Skin Breakdown?  Yes LDA WOUND CARE was Initiated documentation include the Darlyn-wound, Wound Assessment, Measurements, Dressing Treatment, Drainage, and Color\",         Kirill Prevention initiated:  Yes   Wound Care Orders initiated:  Yes      WOC nurse consulted for Pressure Injury (Stage 3,4, Unstageable, DTI, NWPT, and Complex wounds), New and Established Ostomies:  Yes      I agree that 2 Nurses have reviewed patient belongings with the patient/family and documented in the flowsheet upon admission or transfer to the unit.     Belongings  Dental Appliances: None  Vision - Corrective Lenses: None  Hearing Aid: None  Clothing: Footwear, Pants, Shirt, Socks, Undergarments  Jewelry: None  Body Piercings Removed: N/A  Electronic Devices: Cell Phone  Weapons (Notify Protective Services/Security): None  Other Valuables: Crutches, Purse  Home Medications: None  Valuables Given To: Other (Comment)  Provide Name(s) of Who Valuable(s) Were Given To: all belongings labeled and taken to pacu  Responsible person(s) in the waiting room: Lazara (daughter  Patient approves for provider to speak to responsible person post operatively: Yes       Nurse 1 eSignature: Electronically signed by Miley Smith RN on 10/25/24 at 5:07 PM EDT    **SHARE this note so that the co-signing nurse is able to place an eSignature**    Nurse 2 eSignature: Electronically signed by ARTIE PIERRE RN on 10/25/24 at 11:50 AM EDT

## 2024-10-25 NOTE — CARE COORDINATION
Hospital day/POD 3: Patient on C3 care managed by General surgery. Patient from home. Reviewed therapy recs reports family will be coming in town. Patient with NGT awaiting return of bowel fx. Repeat AXR. SW following do not anticipate DCP needs.ADILSON Bartholomew

## 2024-10-25 NOTE — PROGRESS NOTES
Patient educated on use of IS and demonstrates use Yes    Patient educated on incisional care and s/s of infection Yes    Patient educated on hand hygiene Yes    Patient  educated on post operative care Yes   - NG tube maintenance    - Pain control    - Insulin Protocol     Day of surgery patient to side of bed or up to chair for minimum of 30 minutes NA    POD 1 until discharge, patient up to chair by 9 am and TID. Goal to increase mobility  Yes    Patient performs oral hygiene with CHG oral solution until NG tube discontinued. NA    If patient does not have NG tube in place, patient to brush teeth and use mouth wash Q shift Yes    Patient receives daily bath and linen changes Yes    Patient receives CHG bath until roach discontinued NA    SCDs in place NA    Patient receiving chemical VTE Yes

## 2024-10-26 ENCOUNTER — APPOINTMENT (OUTPATIENT)
Dept: GENERAL RADIOLOGY | Age: 79
DRG: 329 | End: 2024-10-26
Attending: SURGERY
Payer: MEDICARE

## 2024-10-26 LAB
ANION GAP SERPL CALCULATED.3IONS-SCNC: 13 MMOL/L (ref 3–16)
BUN SERPL-MCNC: 10 MG/DL (ref 7–20)
CALCIUM SERPL-MCNC: 8.4 MG/DL (ref 8.3–10.6)
CHLORIDE SERPL-SCNC: 102 MMOL/L (ref 99–110)
CO2 SERPL-SCNC: 22 MMOL/L (ref 21–32)
CREAT SERPL-MCNC: 0.6 MG/DL (ref 0.6–1.2)
DEPRECATED RDW RBC AUTO: 13.4 % (ref 12.4–15.4)
GFR SERPLBLD CREATININE-BSD FMLA CKD-EPI: >90 ML/MIN/{1.73_M2}
GLUCOSE BLD-MCNC: 79 MG/DL (ref 70–99)
GLUCOSE BLD-MCNC: 81 MG/DL (ref 70–99)
GLUCOSE BLD-MCNC: 82 MG/DL (ref 70–99)
GLUCOSE BLD-MCNC: 87 MG/DL (ref 70–99)
GLUCOSE BLD-MCNC: 94 MG/DL (ref 70–99)
GLUCOSE SERPL-MCNC: 79 MG/DL (ref 70–99)
HCT VFR BLD AUTO: 27.1 % (ref 36–48)
HGB BLD-MCNC: 9 G/DL (ref 12–16)
MCH RBC QN AUTO: 29.7 PG (ref 26–34)
MCHC RBC AUTO-ENTMCNC: 33.1 G/DL (ref 31–36)
MCV RBC AUTO: 89.7 FL (ref 80–100)
PERFORMED ON: NORMAL
PLATELET # BLD AUTO: 330 K/UL (ref 135–450)
PMV BLD AUTO: 7 FL (ref 5–10.5)
POTASSIUM SERPL-SCNC: 3.5 MMOL/L (ref 3.5–5.1)
RBC # BLD AUTO: 3.02 M/UL (ref 4–5.2)
SODIUM SERPL-SCNC: 137 MMOL/L (ref 136–145)
WBC # BLD AUTO: 11.9 K/UL (ref 4–11)

## 2024-10-26 PROCEDURE — 6370000000 HC RX 637 (ALT 250 FOR IP): Performed by: SURGERY

## 2024-10-26 PROCEDURE — 6360000002 HC RX W HCPCS: Performed by: SURGERY

## 2024-10-26 PROCEDURE — 85027 COMPLETE CBC AUTOMATED: CPT

## 2024-10-26 PROCEDURE — 1200000000 HC SEMI PRIVATE

## 2024-10-26 PROCEDURE — 2580000003 HC RX 258: Performed by: SURGERY

## 2024-10-26 PROCEDURE — 74019 RADEX ABDOMEN 2 VIEWS: CPT

## 2024-10-26 PROCEDURE — 80048 BASIC METABOLIC PNL TOTAL CA: CPT

## 2024-10-26 PROCEDURE — 36415 COLL VENOUS BLD VENIPUNCTURE: CPT

## 2024-10-26 PROCEDURE — 2500000003 HC RX 250 WO HCPCS: Performed by: SURGERY

## 2024-10-26 PROCEDURE — 99024 POSTOP FOLLOW-UP VISIT: CPT | Performed by: SURGERY

## 2024-10-26 RX ADMIN — Medication 20 MG: at 08:44

## 2024-10-26 RX ADMIN — OXYCODONE 10 MG: 5 TABLET ORAL at 16:17

## 2024-10-26 RX ADMIN — OXYCODONE 10 MG: 5 TABLET ORAL at 12:14

## 2024-10-26 RX ADMIN — SPIRONOLACTONE 25 MG: 25 TABLET, FILM COATED ORAL at 08:51

## 2024-10-26 RX ADMIN — FLUOXETINE HYDROCHLORIDE 40 MG: 20 CAPSULE ORAL at 08:51

## 2024-10-26 RX ADMIN — TRAZODONE HYDROCHLORIDE 75 MG: 50 TABLET ORAL at 20:38

## 2024-10-26 RX ADMIN — ACETAMINOPHEN 650 MG: 325 TABLET ORAL at 12:14

## 2024-10-26 RX ADMIN — ACETAMINOPHEN 650 MG: 325 TABLET ORAL at 06:31

## 2024-10-26 RX ADMIN — ENOXAPARIN SODIUM 40 MG: 100 INJECTION SUBCUTANEOUS at 08:47

## 2024-10-26 RX ADMIN — ACETAMINOPHEN 650 MG: 325 TABLET ORAL at 16:16

## 2024-10-26 RX ADMIN — OXYCODONE 10 MG: 5 TABLET ORAL at 20:38

## 2024-10-26 RX ADMIN — SODIUM CHLORIDE: 9 INJECTION, SOLUTION INTRAVENOUS at 01:59

## 2024-10-26 RX ADMIN — LISINOPRIL 40 MG: 20 TABLET ORAL at 08:51

## 2024-10-26 RX ADMIN — BUPROPION HYDROCHLORIDE 300 MG: 150 TABLET, EXTENDED RELEASE ORAL at 08:51

## 2024-10-26 RX ADMIN — LATANOPROST 1 DROP: 50 SOLUTION OPHTHALMIC at 20:39

## 2024-10-26 RX ADMIN — SODIUM CHLORIDE, PRESERVATIVE FREE 10 ML: 5 INJECTION INTRAVENOUS at 08:45

## 2024-10-26 RX ADMIN — ATORVASTATIN CALCIUM 10 MG: 10 TABLET, FILM COATED ORAL at 08:51

## 2024-10-26 ASSESSMENT — PAIN SCALES - GENERAL
PAINLEVEL_OUTOF10: 7
PAINLEVEL_OUTOF10: 8
PAINLEVEL_OUTOF10: 7

## 2024-10-26 ASSESSMENT — PAIN DESCRIPTION - DESCRIPTORS: DESCRIPTORS: ACHING;DULL

## 2024-10-26 ASSESSMENT — PAIN DESCRIPTION - LOCATION: LOCATION: ABDOMEN

## 2024-10-26 NOTE — PLAN OF CARE
Problem: Safety - Adult  Goal: Free from fall injury  10/26/2024 0908 by Tina Gallardo, RN  Outcome: Progressing  Flowsheets (Taken 10/25/2024 1704 by Miley Smith, RN)  Free From Fall Injury: Instruct family/caregiver on patient safety

## 2024-10-26 NOTE — PLAN OF CARE
Problem: Discharge Planning  Goal: Discharge to home or other facility with appropriate resources  Outcome: Progressing     Problem: Pain  Goal: Verbalizes/displays adequate comfort level or baseline comfort level  Outcome: Progressing     Problem: Safety - Adult  Goal: Free from fall injury  10/26/2024 1955 by Syd Steele, RN  Outcome: Progressing  10/26/2024 0908 by Tina Gallardo, RN  Outcome: Progressing  Flowsheets (Taken 10/25/2024 1704 by Miley Smith, RN)  Free From Fall Injury: Instruct family/caregiver on patient safety     Problem: ABCDS Injury Assessment  Goal: Absence of physical injury  Outcome: Progressing     Problem: Skin/Tissue Integrity  Goal: Absence of new skin breakdown  Description: 1.  Monitor for areas of redness and/or skin breakdown  2.  Assess vascular access sites hourly  3.  Every 4-6 hours minimum:  Change oxygen saturation probe site  4.  Every 4-6 hours:  If on nasal continuous positive airway pressure, respiratory therapy assess nares and determine need for appliance change or resting period.  Outcome: Progressing     Problem: Nutrition Deficit:  Goal: Optimize nutritional status  Outcome: Progressing

## 2024-10-26 NOTE — PLAN OF CARE
appropriate pain scale   Administer analgesics based on type and severity of pain and evaluate response   Implement non-pharmacological measures as appropriate and evaluate response     Problem: Safety - Adult  Goal: Free from fall injury  10/25/2024 2001 by Syd Steele RN  Outcome: Progressing  10/25/2024 1704 by Miley Smith RN  Outcome: Progressing  Flowsheets (Taken 10/25/2024 1704)  Free From Fall Injury: Instruct family/caregiver on patient safety     Problem: ABCDS Injury Assessment  Goal: Absence of physical injury  10/25/2024 2001 by Syd Steele RN  Outcome: Progressing  10/25/2024 1704 by Miley Smith RN  Outcome: Progressing  Flowsheets (Taken 10/25/2024 1704)  Absence of Physical Injury: Implement safety measures based on patient assessment     Problem: Skin/Tissue Integrity  Goal: Absence of new skin breakdown  Description: 1.  Monitor for areas of redness and/or skin breakdown  2.  Assess vascular access sites hourly  3.  Every 4-6 hours minimum:  Change oxygen saturation probe site  4.  Every 4-6 hours:  If on nasal continuous positive airway pressure, respiratory therapy assess nares and determine need for appliance change or resting period.  10/25/2024 2001 by Syd Steele RN  Outcome: Progressing  10/25/2024 1704 by Miley Simth RN  Outcome: Progressing     Problem: Nutrition Deficit:  Goal: Optimize nutritional status  10/25/2024 2001 by Syd Steele RN  Outcome: Progressing  10/25/2024 1704 by Miley Smith RN  Outcome: Progressing  Flowsheets (Taken 10/25/2024 1704)  Nutrient intake appropriate for improving, restoring, or maintaining nutritional needs:   Assess nutritional status and recommend course of action   Monitor oral intake, labs, and treatment plans   Recommend appropriate diets, oral nutritional supplements, and vitamin/mineral supplements   Order, calculate, and assess calorie counts as needed   Provide specific nutrition education to patient or family as

## 2024-10-26 NOTE — PROGRESS NOTES
[facility] GENERAL SURGERY    Surgery Progress Note           POD # 4    PATIENT NAME: Sudha Lopez     TODAY'S DATE: 10/26/2024    INTERVAL HISTORY:    Pt complains of nasogastric tube discomfort.  She is starting to have some loose bowel movements.     OBJECTIVE:   VITALS:  BP (!) 163/82   Pulse 97   Temp 98 °F (36.7 °C) (Oral)   Resp 16   Ht 1.6 m (5' 2.99\")   Wt 74.4 kg (164 lb)   SpO2 94%   BMI 29.06 kg/m²     INTAKE/OUTPUT:    I/O last 3 completed shifts:  In: 3058.5 [P.O.:120; I.V.:2908.5; NG/GT:30]  Out: 2062.5 [Urine:850; Emesis/NG output:1175; Drains:27.5; Other:10]  I/O this shift:  In: -   Out: 1000 [Urine:1000]              CONSTITUTIONAL:  awake and alert  LUNGS:  clear to auscultation  ABDOMEN:   hypoactive bowel sounds, soft, mildly distended   INCISION: clean, dry    Data:  CBC:   Recent Labs     10/25/24  0434 10/26/24  0511   WBC 14.4* 11.9*   HGB 9.6* 9.0*   HCT 29.1* 27.1*    330     BMP:    Recent Labs     10/25/24  0434 10/26/24  0511   * 137   K 3.4* 3.5    102   CO2 19* 22   BUN 9 10   CREATININE 0.7 0.6   GLUCOSE 92 79     Hepatic: No results for input(s): \"AST\", \"ALT\", \"BILITOT\", \"ALKPHOS\" in the last 72 hours.    Invalid input(s): \"ALB\"  Mag:      Recent Labs     10/25/24  0434   MG 1.86      Phos:   No results for input(s): \"PHOS\" in the last 72 hours.   INR: No results for input(s): \"INR\" in the last 72 hours.      Radiology Review: Abdominal x-ray images show some mild small bowel dilation    ASSESSMENT AND PLAN:  78 y.o. female status post ileostomy reversal with ileosigmoid anastomosis.  She is starting to have some bowel function despite the fact that her x-rays still show some dilated loops of bowel.  Try nasogastric tube clamping and clears.  If she tolerates this we will plan to remove the nasogastric tube tomorrow and advance her diet        Electronically signed by ANDREA SOTO MD

## 2024-10-26 NOTE — PLAN OF CARE
Problem: Discharge Planning  Goal: Discharge to home or other facility with appropriate resources  10/25/2024 2003 by Syd Steele RN  Outcome: Progressing  10/25/2024 2001 by Syd Steele RN  Outcome: Progressing  10/25/2024 1704 by Miley Smith RN  Outcome: Progressing  Flowsheets (Taken 10/23/2024 0148 by Ching Sanchez, RN)  Discharge to home or other facility with appropriate resources:   Identify barriers to discharge with patient and caregiver   Arrange for needed discharge resources and transportation as appropriate   Identify discharge learning needs (meds, wound care, etc)     Problem: Pain  Goal: Verbalizes/displays adequate comfort level or baseline comfort level  10/25/2024 2003 by Syd Steele RN  Outcome: Progressing  10/25/2024 2001 by Syd Steele RN  Outcome: Progressing  10/25/2024 1704 by Miley Smith RN  Outcome: Progressing  Flowsheets (Taken 10/23/2024 0148 by Ching Sanchez RN)  Verbalizes/displays adequate comfort level or baseline comfort level:   Encourage patient to monitor pain and request assistance   Assess pain using appropriate pain scale   Administer analgesics based on type and severity of pain and evaluate response   Implement non-pharmacological measures as appropriate and evaluate response     Problem: Safety - Adult  Goal: Free from fall injury  10/25/2024 2003 by Syd Steele RN  Outcome: Progressing  10/25/2024 2001 by Syd Steele RN  Outcome: Progressing  10/25/2024 1704 by Miley Smith RN  Outcome: Progressing  Flowsheets (Taken 10/25/2024 1704)  Free From Fall Injury: Instruct family/caregiver on patient safety     Problem: ABCDS Injury Assessment  Goal: Absence of physical injury  10/25/2024 2003 by Syd Steele RN  Outcome: Progressing  10/25/2024 2001 by Syd Steele RN  Outcome: Progressing  10/25/2024 1704 by Miley Smith RN  Outcome: Progressing  Flowsheets (Taken 10/25/2024 1704)  Absence of Physical Injury:

## 2024-10-27 LAB
GLUCOSE BLD-MCNC: 113 MG/DL (ref 70–99)
GLUCOSE BLD-MCNC: 118 MG/DL (ref 70–99)
GLUCOSE BLD-MCNC: 96 MG/DL (ref 70–99)
GLUCOSE BLD-MCNC: 99 MG/DL (ref 70–99)
PERFORMED ON: ABNORMAL
PERFORMED ON: ABNORMAL
PERFORMED ON: NORMAL
PERFORMED ON: NORMAL

## 2024-10-27 PROCEDURE — 1200000000 HC SEMI PRIVATE

## 2024-10-27 PROCEDURE — 99024 POSTOP FOLLOW-UP VISIT: CPT | Performed by: SURGERY

## 2024-10-27 PROCEDURE — 6360000002 HC RX W HCPCS: Performed by: SURGERY

## 2024-10-27 PROCEDURE — 2580000003 HC RX 258: Performed by: SURGERY

## 2024-10-27 PROCEDURE — 6370000000 HC RX 637 (ALT 250 FOR IP): Performed by: SURGERY

## 2024-10-27 RX ADMIN — ACETAMINOPHEN 650 MG: 325 TABLET ORAL at 16:29

## 2024-10-27 RX ADMIN — FAMOTIDINE 20 MG: 20 TABLET, FILM COATED ORAL at 09:01

## 2024-10-27 RX ADMIN — OXYCODONE 10 MG: 5 TABLET ORAL at 12:23

## 2024-10-27 RX ADMIN — HYDROMORPHONE HYDROCHLORIDE 0.5 MG: 1 INJECTION, SOLUTION INTRAMUSCULAR; INTRAVENOUS; SUBCUTANEOUS at 06:42

## 2024-10-27 RX ADMIN — OXYCODONE 5 MG: 5 TABLET ORAL at 20:34

## 2024-10-27 RX ADMIN — BUPROPION HYDROCHLORIDE 300 MG: 150 TABLET, EXTENDED RELEASE ORAL at 08:58

## 2024-10-27 RX ADMIN — OXYCODONE 5 MG: 5 TABLET ORAL at 16:33

## 2024-10-27 RX ADMIN — ONDANSETRON 4 MG: 2 INJECTION INTRAMUSCULAR; INTRAVENOUS at 05:16

## 2024-10-27 RX ADMIN — LISINOPRIL 40 MG: 20 TABLET ORAL at 09:01

## 2024-10-27 RX ADMIN — ACETAMINOPHEN 650 MG: 325 TABLET ORAL at 23:05

## 2024-10-27 RX ADMIN — ACETAMINOPHEN 650 MG: 325 TABLET ORAL at 00:10

## 2024-10-27 RX ADMIN — OXYCODONE 10 MG: 5 TABLET ORAL at 00:45

## 2024-10-27 RX ADMIN — ACETAMINOPHEN 650 MG: 325 TABLET ORAL at 12:23

## 2024-10-27 RX ADMIN — SPIRONOLACTONE 25 MG: 25 TABLET, FILM COATED ORAL at 09:01

## 2024-10-27 RX ADMIN — ATORVASTATIN CALCIUM 10 MG: 10 TABLET, FILM COATED ORAL at 09:01

## 2024-10-27 RX ADMIN — SODIUM CHLORIDE: 9 INJECTION, SOLUTION INTRAVENOUS at 18:36

## 2024-10-27 RX ADMIN — ONDANSETRON 4 MG: 2 INJECTION INTRAMUSCULAR; INTRAVENOUS at 21:12

## 2024-10-27 RX ADMIN — SODIUM CHLORIDE, PRESERVATIVE FREE 10 ML: 5 INJECTION INTRAVENOUS at 08:57

## 2024-10-27 RX ADMIN — FLUOXETINE HYDROCHLORIDE 40 MG: 20 CAPSULE ORAL at 09:01

## 2024-10-27 RX ADMIN — TRAZODONE HYDROCHLORIDE 75 MG: 50 TABLET ORAL at 20:34

## 2024-10-27 RX ADMIN — LATANOPROST 1 DROP: 50 SOLUTION OPHTHALMIC at 20:30

## 2024-10-27 RX ADMIN — ENOXAPARIN SODIUM 40 MG: 100 INJECTION SUBCUTANEOUS at 08:56

## 2024-10-27 RX ADMIN — OXYCODONE 10 MG: 5 TABLET ORAL at 04:26

## 2024-10-27 RX ADMIN — ACETAMINOPHEN 650 MG: 325 TABLET ORAL at 05:20

## 2024-10-27 ASSESSMENT — PAIN DESCRIPTION - LOCATION
LOCATION: ABDOMEN;THROAT
LOCATION: ABDOMEN
LOCATION: ABDOMEN
LOCATION: ABDOMEN;THROAT
LOCATION: ABDOMEN

## 2024-10-27 ASSESSMENT — PAIN DESCRIPTION - ONSET: ONSET: PROGRESSIVE

## 2024-10-27 ASSESSMENT — PAIN DESCRIPTION - DESCRIPTORS
DESCRIPTORS: ACHING;DISCOMFORT
DESCRIPTORS: ACHING
DESCRIPTORS: ACHING;DISCOMFORT
DESCRIPTORS: DULL
DESCRIPTORS: ACHING;DISCOMFORT

## 2024-10-27 ASSESSMENT — PAIN SCALES - GENERAL
PAINLEVEL_OUTOF10: 5
PAINLEVEL_OUTOF10: 8
PAINLEVEL_OUTOF10: 5
PAINLEVEL_OUTOF10: 7

## 2024-10-27 ASSESSMENT — PAIN DESCRIPTION - ORIENTATION
ORIENTATION: MID
ORIENTATION: MID;LEFT;UPPER

## 2024-10-27 NOTE — PLAN OF CARE
Problem: Safety - Adult  Goal: Free from fall injury  10/27/2024 0844 by Tina Gallardo, RN  Outcome: Progressing  Flowsheets (Taken 10/25/2024 1704 by Miley Smith, RN)  Free From Fall Injury: Instruct family/caregiver on patient safety

## 2024-10-27 NOTE — PROGRESS NOTES
Patient educated on use of IS and demonstrates use Yes    Patient and family educated on incisional care and s/s of infection Yes    Patient and family educated on hand hygiene Yes    Patient and family educated on post operative care Yes   - NG tube maintenance    - Pain control        Day of surgery patient to side of bed or up to chair for minimum of 30 minutes NA    POD 1 until discharge, patient up to chair by 9 am and TID. Goal to increase mobility  NA    Patient performs oral hygiene with CHG oral solution until NG tube discontinued. Yes    If patient does not have NG tube in place, patient to brush teeth and use mouth wash Q shift NA    Patient receives daily bath and linen changes Yes    Patient receives CHG bath until roach discontinued NA    SCDs in place Yes    Patient receiving chemical VTE  yes

## 2024-10-27 NOTE — PROGRESS NOTES
Patient educated on use of IS and demonstrates use Yes    Patient and family educated on incisional care and s/s of infection Yes    Patient and family educated on hand hygiene Yes    Patient and family educated on post operative care Yes   - NG tube maintenance    - Pain control        Day of surgery patient to side of bed or up to chair for minimum of 30 minutes NA    POD 1 until discharge, patient up to chair by 9 am and TID. Goal to increase mobility  Yes    Patient performs oral hygiene with CHG oral solution until NG tube discontinued. Yes    If patient does not have NG tube in place, patient to brush teeth and use mouth wash Q shift NA    Patient receives daily bath and linen changes Yes    Patient receives CHG bath until roach discontinued NA    SCDs in place Yes    Patient receiving chemical VTE Yes

## 2024-10-27 NOTE — PROGRESS NOTES
Pt a/o. VSS. Shift assessment updated and documented. Tolerated clamping trials during the night. C/o of nausea this am and Zofran given . NGT in place. Scanty output from HALLIE drain. Incision s dry with no sign of infection.

## 2024-10-27 NOTE — PROGRESS NOTES
[facility] GENERAL SURGERY    Surgery Progress Note           POD # 5    PATIENT NAME: Sudha Lopez     TODAY'S DATE: 10/27/2024    INTERVAL HISTORY:    Pt feels much better today.  She had a bowel movement and is passing some gas.  She tolerated nasogastric tube clamping all morning.     OBJECTIVE:   VITALS:  BP (!) 165/78   Pulse 92   Temp 98 °F (36.7 °C) (Oral)   Resp 16   Ht 1.6 m (5' 2.99\")   Wt 74.4 kg (164 lb)   SpO2 97%   BMI 29.06 kg/m²     INTAKE/OUTPUT:    I/O last 3 completed shifts:  In: 240 [P.O.:240]  Out: 2610 [Urine:1500; Emesis/NG output:1100; Other:10]  No intake/output data recorded.              CONSTITUTIONAL:  awake and alert  LUNGS:  clear to auscultation  ABDOMEN:   hypoactive bowel sounds, soft, non-distended   INCISION: Clean and dry    Data:  CBC:   Recent Labs     10/25/24  0434 10/26/24  0511   WBC 14.4* 11.9*   HGB 9.6* 9.0*   HCT 29.1* 27.1*    330     BMP:    Recent Labs     10/25/24  0434 10/26/24  0511   * 137   K 3.4* 3.5    102   CO2 19* 22   BUN 9 10   CREATININE 0.7 0.6   GLUCOSE 92 79     Hepatic: No results for input(s): \"AST\", \"ALT\", \"BILITOT\", \"ALKPHOS\" in the last 72 hours.    Invalid input(s): \"ALB\"  Mag:      Recent Labs     10/25/24  0434   MG 1.86      Phos:   No results for input(s): \"PHOS\" in the last 72 hours.   INR: No results for input(s): \"INR\" in the last 72 hours.      Radiology Review: N/A    ASSESSMENT AND PLAN:  78 y.o. female status post ileostomy reversal.  She is having more bowel function today.  Her abdomen is soft and flat.  We will remove the nasogastric tube and try full liquids.        Electronically signed by ANDREA SOTO MD

## 2024-10-27 NOTE — PROGRESS NOTES
Tube unclamped after an hour of clamping this morning  , nausea episode  not better after a dose of Zofran.

## 2024-10-28 LAB
GLUCOSE BLD-MCNC: 105 MG/DL (ref 70–99)
GLUCOSE BLD-MCNC: 113 MG/DL (ref 70–99)
GLUCOSE BLD-MCNC: 122 MG/DL (ref 70–99)
GLUCOSE BLD-MCNC: 130 MG/DL (ref 70–99)
PERFORMED ON: ABNORMAL

## 2024-10-28 PROCEDURE — 6360000002 HC RX W HCPCS: Performed by: SURGERY

## 2024-10-28 PROCEDURE — 6370000000 HC RX 637 (ALT 250 FOR IP): Performed by: SURGERY

## 2024-10-28 PROCEDURE — 97535 SELF CARE MNGMENT TRAINING: CPT

## 2024-10-28 PROCEDURE — APPNB45 APP NON BILLABLE 31-45 MINUTES: Performed by: CLINICAL NURSE SPECIALIST

## 2024-10-28 PROCEDURE — 2580000003 HC RX 258: Performed by: SURGERY

## 2024-10-28 PROCEDURE — 97530 THERAPEUTIC ACTIVITIES: CPT

## 2024-10-28 PROCEDURE — 1200000000 HC SEMI PRIVATE

## 2024-10-28 PROCEDURE — 99024 POSTOP FOLLOW-UP VISIT: CPT | Performed by: SURGERY

## 2024-10-28 RX ORDER — PROCHLORPERAZINE EDISYLATE 5 MG/ML
10 INJECTION INTRAMUSCULAR; INTRAVENOUS EVERY 6 HOURS PRN
Status: DISCONTINUED | OUTPATIENT
Start: 2024-10-28 | End: 2024-11-12 | Stop reason: HOSPADM

## 2024-10-28 RX ADMIN — BUPROPION HYDROCHLORIDE 300 MG: 150 TABLET, EXTENDED RELEASE ORAL at 10:40

## 2024-10-28 RX ADMIN — ACETAMINOPHEN 650 MG: 325 TABLET ORAL at 10:39

## 2024-10-28 RX ADMIN — FLUTICASONE PROPIONATE 1 SPRAY: 50 SPRAY, METERED NASAL at 10:43

## 2024-10-28 RX ADMIN — SODIUM CHLORIDE, PRESERVATIVE FREE 10 ML: 5 INJECTION INTRAVENOUS at 20:20

## 2024-10-28 RX ADMIN — FLUOXETINE HYDROCHLORIDE 40 MG: 20 CAPSULE ORAL at 10:39

## 2024-10-28 RX ADMIN — HYDROMORPHONE HYDROCHLORIDE 0.5 MG: 1 INJECTION, SOLUTION INTRAMUSCULAR; INTRAVENOUS; SUBCUTANEOUS at 14:38

## 2024-10-28 RX ADMIN — HYDROMORPHONE HYDROCHLORIDE 0.5 MG: 1 INJECTION, SOLUTION INTRAMUSCULAR; INTRAVENOUS; SUBCUTANEOUS at 05:33

## 2024-10-28 RX ADMIN — OXYCODONE 5 MG: 5 TABLET ORAL at 03:43

## 2024-10-28 RX ADMIN — LISINOPRIL 40 MG: 20 TABLET ORAL at 10:40

## 2024-10-28 RX ADMIN — FAMOTIDINE 20 MG: 20 TABLET, FILM COATED ORAL at 10:40

## 2024-10-28 RX ADMIN — ATORVASTATIN CALCIUM 10 MG: 10 TABLET, FILM COATED ORAL at 10:40

## 2024-10-28 RX ADMIN — PROCHLORPERAZINE EDISYLATE 10 MG: 5 INJECTION INTRAMUSCULAR; INTRAVENOUS at 20:17

## 2024-10-28 RX ADMIN — LATANOPROST 1 DROP: 50 SOLUTION OPHTHALMIC at 20:21

## 2024-10-28 RX ADMIN — ONDANSETRON 4 MG: 2 INJECTION INTRAMUSCULAR; INTRAVENOUS at 14:39

## 2024-10-28 RX ADMIN — ACETAMINOPHEN 650 MG: 325 TABLET ORAL at 17:21

## 2024-10-28 RX ADMIN — ACETAMINOPHEN 650 MG: 325 TABLET ORAL at 03:44

## 2024-10-28 RX ADMIN — ENOXAPARIN SODIUM 40 MG: 100 INJECTION SUBCUTANEOUS at 10:40

## 2024-10-28 RX ADMIN — TRAZODONE HYDROCHLORIDE 75 MG: 50 TABLET ORAL at 20:21

## 2024-10-28 RX ADMIN — SPIRONOLACTONE 25 MG: 25 TABLET, FILM COATED ORAL at 10:40

## 2024-10-28 RX ADMIN — SODIUM CHLORIDE, PRESERVATIVE FREE 10 ML: 5 INJECTION INTRAVENOUS at 10:42

## 2024-10-28 ASSESSMENT — PAIN DESCRIPTION - DESCRIPTORS
DESCRIPTORS: ACHING;STABBING;SHARP
DESCRIPTORS: ACHING
DESCRIPTORS: ACHING;DISCOMFORT
DESCRIPTORS: SHARP;STABBING;ACHING

## 2024-10-28 ASSESSMENT — PAIN SCALES - GENERAL
PAINLEVEL_OUTOF10: 7
PAINLEVEL_OUTOF10: 8
PAINLEVEL_OUTOF10: 5
PAINLEVEL_OUTOF10: 7
PAINLEVEL_OUTOF10: 6
PAINLEVEL_OUTOF10: 7

## 2024-10-28 ASSESSMENT — PAIN - FUNCTIONAL ASSESSMENT
PAIN_FUNCTIONAL_ASSESSMENT: ACTIVITIES ARE NOT PREVENTED
PAIN_FUNCTIONAL_ASSESSMENT: PREVENTS OR INTERFERES SOME ACTIVE ACTIVITIES AND ADLS
PAIN_FUNCTIONAL_ASSESSMENT: ACTIVITIES ARE NOT PREVENTED
PAIN_FUNCTIONAL_ASSESSMENT: PREVENTS OR INTERFERES SOME ACTIVE ACTIVITIES AND ADLS
PAIN_FUNCTIONAL_ASSESSMENT: PREVENTS OR INTERFERES SOME ACTIVE ACTIVITIES AND ADLS

## 2024-10-28 ASSESSMENT — PAIN DESCRIPTION - ORIENTATION
ORIENTATION: RIGHT
ORIENTATION: RIGHT;LEFT
ORIENTATION: RIGHT;LEFT

## 2024-10-28 ASSESSMENT — PAIN DESCRIPTION - LOCATION
LOCATION: ABDOMEN

## 2024-10-28 NOTE — PROGRESS NOTES
[facility] GENERAL SURGERY    Surgery Progress Note           POD # 6    PATIENT NAME: Sudha Lopez     TODAY'S DATE: 10/28/2024    INTERVAL HISTORY:    Pt sitting up in chair, reports she is feeling better this AM. Passing flatus, tolerating full liquids.      OBJECTIVE:   VITALS:  BP (!) 151/76   Pulse 73   Temp 98.7 °F (37.1 °C) (Oral)   Resp 15   Ht 1.6 m (5' 2.99\")   Wt 74.4 kg (164 lb)   SpO2 98%   BMI 29.06 kg/m²     INTAKE/OUTPUT:    I/O last 3 completed shifts:  In: 3957.1 [P.O.:480; I.V.:3477.1]  Out: 2650 [Urine:1750; Emesis/NG output:900]  No intake/output data recorded.              CONSTITUTIONAL:  awake and alert  LUNGS:  no crackles or wheezes  ABDOMEN:   soft, non-distended, mildly tender larger incision  INCISION: Clean and dry    Data:  CBC:   Recent Labs     10/26/24  0511   WBC 11.9*   HGB 9.0*   HCT 27.1*        BMP:    Recent Labs     10/26/24  0511      K 3.5      CO2 22   BUN 10   CREATININE 0.6   GLUCOSE 79         ASSESSMENT AND PLAN:  78 y.o. female status post ileostomy reversal.      GI: continue with full liquids for now, low fiber diet soon  Activity: PT/OT  Pain: continue with current orders.    Dipo: pending diet advancement 1-3 days.     Electronically signed by CASPER Marquis CNP     Patient seen and agree with above and more than half of the total time was spent by me on the encounter.     Addi Jackson MD

## 2024-10-28 NOTE — PROGRESS NOTES
Occupational Therapy  Facility/Department: [facility] C3 TELE/MED SURG/ONC  Daily Treatment Note  NAME: Sudha Lopez  : 1945  MRN: [id number]    Date of Service: 10/28/2024    Discharge Recommendations:  Home with assist PRN  OT Equipment Recommendations  Equipment Needed: No      If pt is unable to be seen after this session, please let this note serve as discharge summary.  Please see case management note for discharge disposition.  Thank you.      Patient Diagnosis(es): The encounter diagnosis was Obstruction of intestine (HCC).     Assessment   Assessment: Pt tolerated therapy well, progressing to mod I for fxl mobility and BR amb. Pt completed toileting mod I. Pt with no LOB during session, reports amb at baseline with RW. Recommend home with PRN assist, no further OT needs at this time.  Activity Tolerance: Patient tolerated treatment well  Discharge Recommendations: Home with assist PRN  Equipment Needed: No     Plan  Occupational Therapy Plan  Times Per Week: 3-5x/ week  Current Treatment Recommendations: Functional mobility training;Balance training;Endurance training;Positioning;Self-Care / ADL;Safety education & training;Pain management;ROM    Restrictions  Restrictions/Precautions  Restrictions/Precautions: General Precautions;Fall Risk;NPO  Required Braces or Orthoses?: No  Position Activity Restriction  Other position/activity restrictions: HALLIE drain, purewick, IV    Subjective  Subjective  Subjective: Pt seated in recliner, RN present. Agreeable to OT tx.  Orientation  Overall Orientation Status: Within Functional Limits  Orientation Level: Oriented X4  Cognition  Overall Cognitive Status: WFL       Objective  Vitals  Vitals  Pulse: 66  Heart Rate Source: Monitor  BP: (!) 151/76  MAP (Calculated): 101  BP Location: Left upper arm  BP Method: Automatic  Patient Position: Up in chair  Bed Mobility Training  Bed Mobility Training: No (started/ended session in chair)  Transfer Training  Transfer

## 2024-10-28 NOTE — CARE COORDINATION
Hospital day 6: Patient on C3 re Obstruction of intestine care managed by general surgery. Patient from home alone. Plans to dc home and dtr will be up from [place] to support her. Patient on full liquid diet. SW following. For DCP needs.ADILSON Bartholomew

## 2024-10-28 NOTE — PROGRESS NOTES
Physical Therapy  Pt declined PT session.  Pt educated in the purpose of PT intervention.  RN made aware.  Continue to follow.  Torsten Ricci PTA

## 2024-10-28 NOTE — PLAN OF CARE
Problem: Pain  Goal: Verbalizes/displays adequate comfort level or baseline comfort level  Outcome: Progressing     Problem: Safety - Adult  Goal: Free from fall injury  Outcome: Progressing     Problem: Skin/Tissue Integrity  Goal: Absence of new skin breakdown  Description: 1.  Monitor for areas of redness and/or skin breakdown  2.  Assess vascular access sites hourly  3.  Every 4-6 hours minimum:  Change oxygen saturation probe site  4.  Every 4-6 hours:  If on nasal continuous positive airway pressure, respiratory therapy assess nares and determine need for appliance change or resting period.  Outcome: Progressing     Problem: Nutrition Deficit:  Goal: Optimize nutritional status  Outcome: Progressing

## 2024-10-28 NOTE — PROGRESS NOTES
Pt alert and orientated. Call light within reach. Pt sitting up in chair. No complaints of increased pain or discomfort.Shante Sanchez RN

## 2024-10-28 NOTE — PROGRESS NOTES
If patient does not have NG tube in place, patient to brush teeth and use mouth wash Q shift Yes    Patient receives daily bath and linen changes Yes

## 2024-10-28 NOTE — PROGRESS NOTES
.  4 Eyes Skin Assessment and Patient belongings     The patient is being assess for  Shift Handoff    I agree that 2 Nurses have performed a thorough Head to Toe Skin Assessment on the patient. ALL assessment sites listed below have been assessed.       Areas assessed by both nurses: Shante RN and Maria Esther Rn  [x]   Head, Face, and Ears   [x]   Shoulders, Back, and Chest  [x]   Arms, Elbows, and Hands   [x]   Coccyx, Sacrum, and IschIum  [x]   Legs, Feet, and Heels        Does the Patient have Skin Breakdown?  Yes LDA WOUND CARE was Initiated documentation include the Adrlyn-wound, Wound Assessment, Measurements, Dressing Treatment, Drainage, and Color\",         Kirill Prevention initiated:  Yes   Wound Care Orders initiated:  Yes      WOC nurse consulted for Pressure Injury (Stage 3,4, Unstageable, DTI, NWPT, and Complex wounds), New and Established Ostomies:  Yes      I agree that 2 Nurses have reviewed patient belongings with the patient/family and documented in the flowsheet upon admission or transfer to the unit.     Belongings  Dental Appliances: None  Vision - Corrective Lenses: None  Hearing Aid: None  Clothing: Footwear, Pants, Shirt, Socks, Undergarments  Jewelry: None  Body Piercings Removed: N/A  Electronic Devices: Cell Phone  Weapons (Notify Protective Services/Security): None  Other Valuables: Crutches, Purse  Home Medications: None  Valuables Given To: Other (Comment)  Provide Name(s) of Who Valuable(s) Were Given To: all belongings labeled and taken to pacu  Responsible person(s) in the waiting room: Lazara (daughter  Patient approves for provider to speak to responsible person post operatively: Yes       Nurse 1 eSignature: Electronically signed by Shante Sanchez RN on 10/28/24 at 4:58 PM EDT    **SHARE this note so that the co-signing nurse is able to place an eSignature**    Nurse 2 eSignature: {Esignature:[id number]}

## 2024-10-29 ENCOUNTER — APPOINTMENT (OUTPATIENT)
Dept: CT IMAGING | Age: 79
DRG: 329 | End: 2024-10-29
Attending: SURGERY
Payer: MEDICARE

## 2024-10-29 LAB
ANION GAP SERPL CALCULATED.3IONS-SCNC: 16 MMOL/L (ref 3–16)
BASOPHILS # BLD: 0.1 K/UL (ref 0–0.2)
BASOPHILS NFR BLD: 0.4 %
BUN SERPL-MCNC: 6 MG/DL (ref 7–20)
CALCIUM SERPL-MCNC: 8.7 MG/DL (ref 8.3–10.6)
CHLORIDE SERPL-SCNC: 95 MMOL/L (ref 99–110)
CO2 SERPL-SCNC: 23 MMOL/L (ref 21–32)
CREAT SERPL-MCNC: 0.7 MG/DL (ref 0.6–1.2)
DEPRECATED RDW RBC AUTO: 13.1 % (ref 12.4–15.4)
EOSINOPHIL # BLD: 0.1 K/UL (ref 0–0.6)
EOSINOPHIL NFR BLD: 0.8 %
GFR SERPLBLD CREATININE-BSD FMLA CKD-EPI: 88 ML/MIN/{1.73_M2}
GLUCOSE BLD-MCNC: 101 MG/DL (ref 70–99)
GLUCOSE BLD-MCNC: 102 MG/DL (ref 70–99)
GLUCOSE BLD-MCNC: 102 MG/DL (ref 70–99)
GLUCOSE BLD-MCNC: 155 MG/DL (ref 70–99)
GLUCOSE SERPL-MCNC: 101 MG/DL (ref 70–99)
HCT VFR BLD AUTO: 30.5 % (ref 36–48)
HGB BLD-MCNC: 9.8 G/DL (ref 12–16)
LYMPHOCYTES # BLD: 1.1 K/UL (ref 1–5.1)
LYMPHOCYTES NFR BLD: 7.8 %
MCH RBC QN AUTO: 28.9 PG (ref 26–34)
MCHC RBC AUTO-ENTMCNC: 32.2 G/DL (ref 31–36)
MCV RBC AUTO: 90 FL (ref 80–100)
MONOCYTES # BLD: 1.3 K/UL (ref 0–1.3)
MONOCYTES NFR BLD: 8.9 %
NEUTROPHILS # BLD: 11.6 K/UL (ref 1.7–7.7)
NEUTROPHILS NFR BLD: 82.1 %
PERFORMED ON: ABNORMAL
PLATELET # BLD AUTO: 415 K/UL (ref 135–450)
PMV BLD AUTO: 6.9 FL (ref 5–10.5)
POTASSIUM SERPL-SCNC: 3.5 MMOL/L (ref 3.5–5.1)
RBC # BLD AUTO: 3.39 M/UL (ref 4–5.2)
SODIUM SERPL-SCNC: 134 MMOL/L (ref 136–145)
WBC # BLD AUTO: 14.1 K/UL (ref 4–11)

## 2024-10-29 PROCEDURE — 97116 GAIT TRAINING THERAPY: CPT

## 2024-10-29 PROCEDURE — 6370000000 HC RX 637 (ALT 250 FOR IP): Performed by: SURGERY

## 2024-10-29 PROCEDURE — 36415 COLL VENOUS BLD VENIPUNCTURE: CPT

## 2024-10-29 PROCEDURE — 74177 CT ABD & PELVIS W/CONTRAST: CPT

## 2024-10-29 PROCEDURE — 6360000004 HC RX CONTRAST MEDICATION: Performed by: SURGERY

## 2024-10-29 PROCEDURE — 97110 THERAPEUTIC EXERCISES: CPT

## 2024-10-29 PROCEDURE — 1200000000 HC SEMI PRIVATE

## 2024-10-29 PROCEDURE — 2580000003 HC RX 258: Performed by: SURGERY

## 2024-10-29 PROCEDURE — 99024 POSTOP FOLLOW-UP VISIT: CPT | Performed by: SURGERY

## 2024-10-29 PROCEDURE — 6360000002 HC RX W HCPCS: Performed by: SURGERY

## 2024-10-29 PROCEDURE — 85025 COMPLETE CBC W/AUTO DIFF WBC: CPT

## 2024-10-29 PROCEDURE — 80048 BASIC METABOLIC PNL TOTAL CA: CPT

## 2024-10-29 RX ORDER — DIATRIZOATE MEGLUMINE AND DIATRIZOATE SODIUM 660; 100 MG/ML; MG/ML
12 SOLUTION ORAL; RECTAL
Status: DISCONTINUED | OUTPATIENT
Start: 2024-10-29 | End: 2024-11-12 | Stop reason: HOSPADM

## 2024-10-29 RX ORDER — IOPAMIDOL 755 MG/ML
75 INJECTION, SOLUTION INTRAVASCULAR
Status: COMPLETED | OUTPATIENT
Start: 2024-10-29 | End: 2024-10-29

## 2024-10-29 RX ADMIN — HYDROMORPHONE HYDROCHLORIDE 0.5 MG: 1 INJECTION, SOLUTION INTRAMUSCULAR; INTRAVENOUS; SUBCUTANEOUS at 02:59

## 2024-10-29 RX ADMIN — ACETAMINOPHEN 650 MG: 325 TABLET ORAL at 18:13

## 2024-10-29 RX ADMIN — PROCHLORPERAZINE EDISYLATE 10 MG: 5 INJECTION INTRAMUSCULAR; INTRAVENOUS at 08:17

## 2024-10-29 RX ADMIN — HYDROMORPHONE HYDROCHLORIDE 0.5 MG: 1 INJECTION, SOLUTION INTRAMUSCULAR; INTRAVENOUS; SUBCUTANEOUS at 11:36

## 2024-10-29 RX ADMIN — SODIUM CHLORIDE, PRESERVATIVE FREE 10 ML: 5 INJECTION INTRAVENOUS at 08:25

## 2024-10-29 RX ADMIN — SPIRONOLACTONE 25 MG: 25 TABLET, FILM COATED ORAL at 08:25

## 2024-10-29 RX ADMIN — PROCHLORPERAZINE EDISYLATE 10 MG: 5 INJECTION INTRAMUSCULAR; INTRAVENOUS at 14:14

## 2024-10-29 RX ADMIN — TRAZODONE HYDROCHLORIDE 75 MG: 50 TABLET ORAL at 20:15

## 2024-10-29 RX ADMIN — FLUOXETINE HYDROCHLORIDE 40 MG: 20 CAPSULE ORAL at 08:24

## 2024-10-29 RX ADMIN — HYDROMORPHONE HYDROCHLORIDE 0.5 MG: 1 INJECTION, SOLUTION INTRAMUSCULAR; INTRAVENOUS; SUBCUTANEOUS at 08:16

## 2024-10-29 RX ADMIN — ATORVASTATIN CALCIUM 10 MG: 10 TABLET, FILM COATED ORAL at 08:25

## 2024-10-29 RX ADMIN — ENOXAPARIN SODIUM 40 MG: 100 INJECTION SUBCUTANEOUS at 08:24

## 2024-10-29 RX ADMIN — BUPROPION HYDROCHLORIDE 300 MG: 150 TABLET, EXTENDED RELEASE ORAL at 08:25

## 2024-10-29 RX ADMIN — LISINOPRIL 40 MG: 20 TABLET ORAL at 08:25

## 2024-10-29 RX ADMIN — ACETAMINOPHEN 650 MG: 325 TABLET ORAL at 10:42

## 2024-10-29 RX ADMIN — IOPAMIDOL 75 ML: 755 INJECTION, SOLUTION INTRAVENOUS at 12:40

## 2024-10-29 RX ADMIN — HYDROMORPHONE HYDROCHLORIDE 0.5 MG: 1 INJECTION, SOLUTION INTRAMUSCULAR; INTRAVENOUS; SUBCUTANEOUS at 16:10

## 2024-10-29 RX ADMIN — SODIUM CHLORIDE, PRESERVATIVE FREE 10 ML: 5 INJECTION INTRAVENOUS at 20:12

## 2024-10-29 RX ADMIN — HYDROMORPHONE HYDROCHLORIDE 0.5 MG: 1 INJECTION, SOLUTION INTRAMUSCULAR; INTRAVENOUS; SUBCUTANEOUS at 20:12

## 2024-10-29 RX ADMIN — LATANOPROST 1 DROP: 50 SOLUTION OPHTHALMIC at 20:15

## 2024-10-29 RX ADMIN — PROMETHAZINE HYDROCHLORIDE 12.5 MG: 25 TABLET ORAL at 11:36

## 2024-10-29 RX ADMIN — PROCHLORPERAZINE EDISYLATE 10 MG: 5 INJECTION INTRAMUSCULAR; INTRAVENOUS at 20:12

## 2024-10-29 RX ADMIN — DIATRIZOATE MEGLUMINE AND DIATRIZOATE SODIUM 12 ML: 660; 100 LIQUID ORAL; RECTAL at 11:35

## 2024-10-29 RX ADMIN — FAMOTIDINE 20 MG: 20 TABLET, FILM COATED ORAL at 08:25

## 2024-10-29 RX ADMIN — ONDANSETRON 4 MG: 2 INJECTION INTRAMUSCULAR; INTRAVENOUS at 02:07

## 2024-10-29 ASSESSMENT — PAIN DESCRIPTION - DESCRIPTORS
DESCRIPTORS: ACHING;DISCOMFORT;CRAMPING
DESCRIPTORS: ACHING;CRAMPING
DESCRIPTORS: CRAMPING;SHARP
DESCRIPTORS: ACHING;CRAMPING
DESCRIPTORS: DISCOMFORT;CRAMPING
DESCRIPTORS: CRAMPING
DESCRIPTORS: ACHING;CRAMPING

## 2024-10-29 ASSESSMENT — PAIN SCALES - GENERAL
PAINLEVEL_OUTOF10: 8
PAINLEVEL_OUTOF10: 8
PAINLEVEL_OUTOF10: 7
PAINLEVEL_OUTOF10: 8
PAINLEVEL_OUTOF10: 7
PAINLEVEL_OUTOF10: 7
PAINLEVEL_OUTOF10: 10

## 2024-10-29 ASSESSMENT — PAIN DESCRIPTION - ORIENTATION
ORIENTATION: MID
ORIENTATION: MID;LEFT
ORIENTATION: MID

## 2024-10-29 ASSESSMENT — PAIN DESCRIPTION - LOCATION
LOCATION: ABDOMEN

## 2024-10-29 ASSESSMENT — PAIN - FUNCTIONAL ASSESSMENT: PAIN_FUNCTIONAL_ASSESSMENT: ACTIVITIES ARE NOT PREVENTED

## 2024-10-29 NOTE — PROGRESS NOTES
Pt assessment completed and charted. VSS. Pt a/ox4. Pt reports abd pain 7-8/10 today. Pt educated on prn pain meds. Pt educated on need to try oral pain meds as well, pt reports wants to stick w/ IV today and will attempt oral tomorrow. Pt has been nauseated today, prn meds, given per mar. Pt has been able to tolerate clears, but has not attempted fulls today. Pt bathed this afternoon. Pt was up to the chair this afternoon as well. Purewick removed this morning and opt educated the benefit for ambulating, pt has been ambulating to the BR. Pt has L sachin with minimal output today. Pt has x4 lap incisions, sx incisions w/ staples where ileostomy reversal was.   Pt denies any other needs at this time.  Pt calls out appropriately.       Pt is a fall risk;  -Bed in lowest position and wheels locked.  -Call light within reach.   -Bedside table within reach.   -Non-skid footwear in place.  -bed check in place.

## 2024-10-29 NOTE — PLAN OF CARE
Problem: Pain  Goal: Verbalizes/displays adequate comfort level or baseline comfort level  10/29/2024 0018 by Araceli Mireles RN  Outcome: Progressing  10/28/2024 1816 by Shante Sanchez RN  Outcome: Progressing     Problem: Safety - Adult  Goal: Free from fall injury  10/29/2024 0018 by Araceli Mireles RN  Outcome: Progressing  10/28/2024 1816 by Shante Sanchez, RN  Outcome: Progressing     Problem: Skin/Tissue Integrity - Adult  Goal: Incisions, wounds, or drain sites healing without S/S of infection  Outcome: Progressing     Problem: Gastrointestinal - Adult  Goal: Minimal or absence of nausea and vomiting  Outcome: Not Progressing

## 2024-10-29 NOTE — PROGRESS NOTES
Physical Therapy  Facility/Department: [facility] C3 TELE/MED SURG/ONC  Daily Treatment Note  NAME: Sudha Lopez  : 1945  MRN: [id number]    Date of Service: 10/29/2024    Discharge Recommendations:  24 hour supervision or assist   PT Equipment Recommendations  Equipment Needed: No    Patient Diagnosis(es): The encounter diagnosis was Obstruction of intestine (HCC).    Assessment  Assessment: Pt tolerated treatment session fairly well this date, but required mod encouragement to participate. Pt ambulates up to 60 ft with HHA and 10 ft with RW in room and participates in standing therex with occasional rest breaks due to pt request to improve strengthening exercises. Pt educated on progressions in therex and mobility and use of AD's. Pt would continue to benefit from skilled therapy during LOS to progress mobility and endurance as tolerated. Continue to recommend home with 24/ A at d/c.  Activity Tolerance: Patient tolerated treatment well  Equipment Needed: No    Plan  Physical Therapy Plan  General Plan: 3-5 times per week  Current Treatment Recommendations: Strengthening;Balance training;Functional mobility training;Transfer training;Endurance training;Gait training;Stair training;Home exercise program;Therapeutic activities;Safety education & training;Patient/Caregiver education & training;Equipment evaluation, education, & procurement    Restrictions  Restrictions/Precautions  Restrictions/Precautions: General Precautions, Fall Risk, Modified Diet  Required Braces or Orthoses?: No  Position Activity Restriction  Other position/activity restrictions: HALLIE drain, liquid diet     Subjective   Subjective  Subjective: pt found in bathroom with student nurse, agreeable to therapy with mod encouragement  Pain: reported no pain at rest  Orientation  Overall Orientation Status: Within Functional Limits  Orientation Level: Oriented X4  Cognition  Overall Cognitive Status: WFL    Objective  Vitals  164/79, 115 bpm, 98%

## 2024-10-29 NOTE — PLAN OF CARE
10/29/2024 0822)  Nutrient intake appropriate for improving, restoring, or maintaining nutritional needs:   Assess nutritional status and recommend course of action   Monitor oral intake, labs, and treatment plans

## 2024-10-29 NOTE — PROGRESS NOTES
[facility] GENERAL SURGERY    Surgery Progress Note           POD # 7    PATIENT NAME: Sudha Lopez     TODAY'S DATE: 10/29/2024    INTERVAL HISTORY:    Ptwith emesis last night, having bms, no fevers, pain controlled     OBJECTIVE:   VITALS:  BP (!) 169/82   Pulse 97   Temp 98.6 °F (37 °C) (Oral)   Resp 16   Ht 1.6 m (5' 2.99\")   Wt 74.4 kg (164 lb)   SpO2 94%   BMI 29.06 kg/m²     INTAKE/OUTPUT:    I/O last 3 completed shifts:  In: 925.9 [P.O.:120; I.V.:805.9]  Out: 2312 [Urine:1600; Emesis/NG output:700; Drains:12]  No intake/output data recorded.              CONSTITUTIONAL:  awake and alert  LUNGS:  no crackles or wheezes  ABDOMEN:   soft, non-distended, mildly tender larger incision,  sachin purulent  INCISION: Clean and dry    Data:  CBC:   No results for input(s): \"WBC\", \"HGB\", \"HCT\", \"PLT\" in the last 72 hours.    BMP:    Recent Labs     10/29/24  0506   *   K 3.5   CL 95*   CO2 23   BUN 6*   CREATININE 0.7   GLUCOSE 101*         ASSESSMENT AND PLAN:  78 y.o. female status post ileostomy reversal.      Given emesis and purulent output in drain (though minimal), will get CT today.  Await CT and nausea resolution before further diet advancement    Electronically signed by Bertin Jackson MD

## 2024-10-29 NOTE — CARE COORDINATION
Hospital day 7: Patient on C3 care managed by General Surgery. Patient from home alone, IPTA. Patient reports dtr will be staying with at discharge. Patient on Full liquid diet. Plans for CT this date. SW following.ADILSON Bartholomew

## 2024-10-29 NOTE — PROGRESS NOTES
.Patient educated on use of IS and demonstrates use No    Patient and family educated on incisional care and s/s of infection Yes    Patient and family educated on hand hygiene Yes    Patient and family educated on post operative care Yes     - Pain control    -- Insulin Protocol     Day of surgery patient to side of bed or up to chair for minimum of 30 minutes Yes    POD 1 until discharge, patient up to chair by 9 am and TID. Goal to increase mobility  yes    Patient performs oral hygiene with CHG oral solution until NG tube discontinued. NA    If patient does not have NG tube in place, patient to brush teeth and use mouth wash Q shift Yes    Patient receives daily bath and linen changes Yes    Patient receives CHG bath until roach discontinued No    SCDs in place Yes    Patient receiving chemical VTE Yes

## 2024-10-29 NOTE — PROGRESS NOTES
Comprehensive Nutrition Assessment    Type and Reason for Visit:  Reassess    Nutrition Recommendations/Plan:   Advance diet to low fiber as able/tolerated.   Start Ensure Plus HP once/day - patient prefers vanilla.   Encourage PO intake, small frequent meals as diet allows.   Monitor diet advancement, labs, bowel habits, nausea/emesis.     Malnutrition Assessment:  Malnutrition Status:  Moderate malnutrition (10/29/24 1229)    Context:  Acute Illness     Findings of the 6 clinical characteristics of malnutrition:  Energy Intake:  50% or less of estimated energy requirements for 5 or more days  Weight Loss:  No significant weight loss     Body Fat Loss:  Mild body fat loss Triceps   Muscle Mass Loss:  Mild muscle mass loss Temples (temporalis), Clavicles (pectoralis & deltoids)  Fluid Accumulation:  No significant fluid accumulation     Strength:  Not Performed    Nutrition Assessment:    Followup: Diet advanced to full liquid 10/27. Await CT and nausea resolution before further diet advancement per MD. Patient reports emesis last night and x1 in the middle of the night. No emesis since, states she is feeling less nauseous now. States she is just \"drinking what I can.\" Would like to drink Ensure supplements once/day. Reports she is feeling defeated due to multiple hospitalizations this year. Patient able to tolerate NG clamping. PO intakes of 1-25%. Will conitnue to monitor.    Nutrition Related Findings:    Na 134, Cl 95, BUN 6, glucose 102-122. Watery BM 10/29. Wound Type: Surgical Incision       Current Nutrition Intake & Therapies:    Average Meal Intake: 1-25%  Average Supplements Intake: Unable to assess  ADULT DIET; Full Liquid    Anthropometric Measures:  Height: 160 cm (5' 2.99\")  Ideal Body Weight (IBW): 115 lbs (52 kg)       Current Body Weight: 74.4 kg (164 lb 0.4 oz), 142.6 % IBW. Weight Source: Standing Scale  Current BMI (kg/m2): 29.1  Usual Body Weight: 83.9 kg (184 lb 15.5 oz) (wt ~5 months

## 2024-10-30 LAB
ANION GAP SERPL CALCULATED.3IONS-SCNC: 13 MMOL/L (ref 3–16)
BASOPHILS # BLD: 0 K/UL (ref 0–0.2)
BASOPHILS NFR BLD: 0.4 %
BUN SERPL-MCNC: 11 MG/DL (ref 7–20)
CALCIUM SERPL-MCNC: 8.6 MG/DL (ref 8.3–10.6)
CHLORIDE SERPL-SCNC: 92 MMOL/L (ref 99–110)
CO2 SERPL-SCNC: 29 MMOL/L (ref 21–32)
CREAT SERPL-MCNC: 0.9 MG/DL (ref 0.6–1.2)
DEPRECATED RDW RBC AUTO: 13.6 % (ref 12.4–15.4)
EOSINOPHIL # BLD: 0.4 K/UL (ref 0–0.6)
EOSINOPHIL NFR BLD: 2.9 %
GFR SERPLBLD CREATININE-BSD FMLA CKD-EPI: 65 ML/MIN/{1.73_M2}
GLUCOSE BLD-MCNC: 105 MG/DL (ref 70–99)
GLUCOSE BLD-MCNC: 108 MG/DL (ref 70–99)
GLUCOSE BLD-MCNC: 120 MG/DL (ref 70–99)
GLUCOSE BLD-MCNC: 98 MG/DL (ref 70–99)
GLUCOSE SERPL-MCNC: 93 MG/DL (ref 70–99)
HCT VFR BLD AUTO: 27.7 % (ref 36–48)
HGB BLD-MCNC: 9.3 G/DL (ref 12–16)
LYMPHOCYTES # BLD: 1.5 K/UL (ref 1–5.1)
LYMPHOCYTES NFR BLD: 12.5 %
MCH RBC QN AUTO: 29.4 PG (ref 26–34)
MCHC RBC AUTO-ENTMCNC: 33.6 G/DL (ref 31–36)
MCV RBC AUTO: 87.6 FL (ref 80–100)
MONOCYTES # BLD: 1.4 K/UL (ref 0–1.3)
MONOCYTES NFR BLD: 11.6 %
NEUTROPHILS # BLD: 9 K/UL (ref 1.7–7.7)
NEUTROPHILS NFR BLD: 72.6 %
PERFORMED ON: ABNORMAL
PERFORMED ON: NORMAL
PLATELET # BLD AUTO: 446 K/UL (ref 135–450)
PMV BLD AUTO: 6.4 FL (ref 5–10.5)
POTASSIUM SERPL-SCNC: 3 MMOL/L (ref 3.5–5.1)
RBC # BLD AUTO: 3.16 M/UL (ref 4–5.2)
SODIUM SERPL-SCNC: 134 MMOL/L (ref 136–145)
WBC # BLD AUTO: 12.3 K/UL (ref 4–11)

## 2024-10-30 PROCEDURE — 6360000002 HC RX W HCPCS: Performed by: SURGERY

## 2024-10-30 PROCEDURE — 99024 POSTOP FOLLOW-UP VISIT: CPT | Performed by: SURGERY

## 2024-10-30 PROCEDURE — 6370000000 HC RX 637 (ALT 250 FOR IP): Performed by: SURGERY

## 2024-10-30 PROCEDURE — 85025 COMPLETE CBC W/AUTO DIFF WBC: CPT

## 2024-10-30 PROCEDURE — 36415 COLL VENOUS BLD VENIPUNCTURE: CPT

## 2024-10-30 PROCEDURE — 2580000003 HC RX 258: Performed by: SURGERY

## 2024-10-30 PROCEDURE — 80048 BASIC METABOLIC PNL TOTAL CA: CPT

## 2024-10-30 PROCEDURE — 1200000000 HC SEMI PRIVATE

## 2024-10-30 RX ADMIN — ACETAMINOPHEN 650 MG: 325 TABLET ORAL at 11:07

## 2024-10-30 RX ADMIN — ACETAMINOPHEN 650 MG: 325 TABLET ORAL at 16:16

## 2024-10-30 RX ADMIN — LATANOPROST 1 DROP: 50 SOLUTION OPHTHALMIC at 21:02

## 2024-10-30 RX ADMIN — POTASSIUM BICARBONATE 40 MEQ: 782 TABLET, EFFERVESCENT ORAL at 11:07

## 2024-10-30 RX ADMIN — HYDROMORPHONE HYDROCHLORIDE 0.5 MG: 1 INJECTION, SOLUTION INTRAMUSCULAR; INTRAVENOUS; SUBCUTANEOUS at 23:22

## 2024-10-30 RX ADMIN — PROMETHAZINE HYDROCHLORIDE 12.5 MG: 25 TABLET ORAL at 00:16

## 2024-10-30 RX ADMIN — HYDROMORPHONE HYDROCHLORIDE 0.5 MG: 1 INJECTION, SOLUTION INTRAMUSCULAR; INTRAVENOUS; SUBCUTANEOUS at 12:29

## 2024-10-30 RX ADMIN — HYDROMORPHONE HYDROCHLORIDE 0.5 MG: 1 INJECTION, SOLUTION INTRAMUSCULAR; INTRAVENOUS; SUBCUTANEOUS at 17:13

## 2024-10-30 RX ADMIN — ATORVASTATIN CALCIUM 10 MG: 10 TABLET, FILM COATED ORAL at 08:07

## 2024-10-30 RX ADMIN — PROCHLORPERAZINE EDISYLATE 10 MG: 5 INJECTION INTRAMUSCULAR; INTRAVENOUS at 23:22

## 2024-10-30 RX ADMIN — ACETAMINOPHEN 650 MG: 325 TABLET ORAL at 06:21

## 2024-10-30 RX ADMIN — HYDROMORPHONE HYDROCHLORIDE 0.5 MG: 1 INJECTION, SOLUTION INTRAMUSCULAR; INTRAVENOUS; SUBCUTANEOUS at 00:15

## 2024-10-30 RX ADMIN — PROCHLORPERAZINE EDISYLATE 10 MG: 5 INJECTION INTRAMUSCULAR; INTRAVENOUS at 17:18

## 2024-10-30 RX ADMIN — SPIRONOLACTONE 25 MG: 25 TABLET, FILM COATED ORAL at 08:07

## 2024-10-30 RX ADMIN — ENOXAPARIN SODIUM 40 MG: 100 INJECTION SUBCUTANEOUS at 08:07

## 2024-10-30 RX ADMIN — LISINOPRIL 40 MG: 20 TABLET ORAL at 08:07

## 2024-10-30 RX ADMIN — FLUOXETINE HYDROCHLORIDE 40 MG: 20 CAPSULE ORAL at 08:07

## 2024-10-30 RX ADMIN — PIPERACILLIN AND TAZOBACTAM 3375 MG: 3; .375 INJECTION, POWDER, LYOPHILIZED, FOR SOLUTION INTRAVENOUS at 11:19

## 2024-10-30 RX ADMIN — HYDROMORPHONE HYDROCHLORIDE 0.5 MG: 1 INJECTION, SOLUTION INTRAMUSCULAR; INTRAVENOUS; SUBCUTANEOUS at 06:23

## 2024-10-30 RX ADMIN — PROMETHAZINE HYDROCHLORIDE 12.5 MG: 25 TABLET ORAL at 11:07

## 2024-10-30 RX ADMIN — OXYCODONE 10 MG: 5 TABLET ORAL at 11:07

## 2024-10-30 RX ADMIN — SODIUM CHLORIDE, PRESERVATIVE FREE 10 ML: 5 INJECTION INTRAVENOUS at 08:08

## 2024-10-30 RX ADMIN — OXYCODONE 10 MG: 5 TABLET ORAL at 21:00

## 2024-10-30 RX ADMIN — TRAZODONE HYDROCHLORIDE 75 MG: 50 TABLET ORAL at 21:00

## 2024-10-30 RX ADMIN — PIPERACILLIN AND TAZOBACTAM 3375 MG: 3; .375 INJECTION, POWDER, LYOPHILIZED, FOR SOLUTION INTRAVENOUS at 18:48

## 2024-10-30 RX ADMIN — SODIUM CHLORIDE, PRESERVATIVE FREE 10 ML: 5 INJECTION INTRAVENOUS at 21:02

## 2024-10-30 RX ADMIN — SODIUM CHLORIDE: 9 INJECTION, SOLUTION INTRAVENOUS at 18:48

## 2024-10-30 RX ADMIN — BUPROPION HYDROCHLORIDE 300 MG: 150 TABLET, EXTENDED RELEASE ORAL at 08:07

## 2024-10-30 RX ADMIN — PROCHLORPERAZINE EDISYLATE 10 MG: 5 INJECTION INTRAMUSCULAR; INTRAVENOUS at 06:21

## 2024-10-30 RX ADMIN — ACETAMINOPHEN 650 MG: 325 TABLET ORAL at 23:23

## 2024-10-30 RX ADMIN — FAMOTIDINE 20 MG: 20 TABLET, FILM COATED ORAL at 08:07

## 2024-10-30 ASSESSMENT — PAIN DESCRIPTION - ORIENTATION
ORIENTATION: LEFT;MID
ORIENTATION: MID
ORIENTATION: MID;LEFT
ORIENTATION: MID
ORIENTATION: MID
ORIENTATION: LEFT;MID

## 2024-10-30 ASSESSMENT — PAIN DESCRIPTION - DESCRIPTORS
DESCRIPTORS: DISCOMFORT;CRAMPING
DESCRIPTORS: ACHING;CRAMPING;DISCOMFORT
DESCRIPTORS: CRAMPING;DISCOMFORT
DESCRIPTORS: ACHING;CRAMPING
DESCRIPTORS: ACHING;CRAMPING
DESCRIPTORS: CRAMPING;DISCOMFORT
DESCRIPTORS: DISCOMFORT;CRAMPING
DESCRIPTORS: ACHING;CRAMPING

## 2024-10-30 ASSESSMENT — PAIN SCALES - GENERAL
PAINLEVEL_OUTOF10: 9
PAINLEVEL_OUTOF10: 10
PAINLEVEL_OUTOF10: 7
PAINLEVEL_OUTOF10: 7
PAINLEVEL_OUTOF10: 8
PAINLEVEL_OUTOF10: 7
PAINLEVEL_OUTOF10: 6
PAINLEVEL_OUTOF10: 8
PAINLEVEL_OUTOF10: 7

## 2024-10-30 ASSESSMENT — PAIN - FUNCTIONAL ASSESSMENT
PAIN_FUNCTIONAL_ASSESSMENT: ACTIVITIES ARE NOT PREVENTED

## 2024-10-30 ASSESSMENT — PAIN DESCRIPTION - PAIN TYPE: TYPE: SURGICAL PAIN

## 2024-10-30 ASSESSMENT — PAIN DESCRIPTION - LOCATION
LOCATION: ABDOMEN

## 2024-10-30 NOTE — PROGRESS NOTES
[facility] GENERAL SURGERY    Surgery Progress Note           POD # 8    PATIENT NAME: Sudha Lopez     TODAY'S DATE: 10/30/2024    INTERVAL HISTORY:    Pt without emesis, having bms, no fevers     OBJECTIVE:   VITALS:  /64   Pulse 84   Temp 98.5 °F (36.9 °C) (Oral)   Resp 16   Ht 1.6 m (5' 2.99\")   Wt 74.4 kg (164 lb)   SpO2 90%   BMI 29.06 kg/m²     INTAKE/OUTPUT:    I/O last 3 completed shifts:  In: 500 [P.O.:500]  Out: 1615 [Urine:800; Emesis/NG output:700; Drains:115]  No intake/output data recorded.              CONSTITUTIONAL:  awake and alert  LUNGS:  no crackles or wheezes  ABDOMEN:   soft, non-distended, mildly tender,  sachin purulent but minimal  INCISION: Clean and dry    Data:  CBC:   Recent Labs     10/29/24  0506 10/30/24  0501   WBC 14.1* 12.3*   HGB 9.8* 9.3*   HCT 30.5* 27.7*    446       BMP:    Recent Labs     10/29/24  0506 10/30/24  0501   * 134*   K 3.5 3.0*   CL 95* 92*   CO2 23 29   BUN 6* 11   CREATININE 0.7 0.9   GLUCOSE 101* 93     CT - scattered areas of fluid without air fluid levels, small amount of air near anastomosis    ASSESSMENT AND PLAN:  78 y.o. female status post ileostomy reversal.    Wbc improved but still elevated.  Sachin drain purulent but minimal output.  No fevers.  Will start abx for possible intraoperative infection. Repeat labs tomorrow  Having bms and nausea improved.  Start low fiber diet.  OOB      Electronically signed by Bertin Jackson MD

## 2024-10-30 NOTE — PROGRESS NOTES
Patient attempted to eat a regular diet at lunch, was unable to tolerate d/t nausea and ABD Pain.

## 2024-10-30 NOTE — PROGRESS NOTES
Pt assessment completed and charted. VSS. Pt a/ox4. Pt denied nausea and pain this morning during assessment. Pt has L sachin with minimal output today. Pt has x4 lap incisions, sx incisions w/ staples where ileostomy reversal was.  Pt denies any other needs at this time.  Pt calls out appropriately.       Pt is a fall risk;  -Bed in lowest position and wheels locked.  -Call light within reach.   -Bedside table within reach.   -Non-skid footwear in place.  -bed check in place.       Report given to chelsie LOPEZ.  Chelsie LOPEZ now has taken over care of pt.

## 2024-10-30 NOTE — PROGRESS NOTES
Patient refusing bed alarm. Patient is alert and oriented. This RN educated patient on fall risks and safety measures. This RN explained the importance of the bed alarm to help prevent falls and notify other staff quickly to their needs. Patient verbalized understanding and continues to refuse bed alarm at this time.

## 2024-10-30 NOTE — PROGRESS NOTES
Patient educated on use of IS and demonstrates use Yes    Patient and family educated on incisional care and s/s of infection Yes    Patient and family educated on hand hygiene Yes    Patient and family educated on post operative care Yes      - Pain control    - Insulin Protocol       POD 1 until discharge, patient up to chair by 9 am and TID. Goal to increase mobility  Yes    Patient performs oral hygiene with CHG oral solution until NG tube discontinued. NA    If patient does not have NG tube in place, patient to brush teeth and use mouth wash Q shift Yes    Patient receives daily bath and linen changes Yes    Patient receives CHG bath until roach discontinued NA    SCDs in place No    Patient receiving chemical VTE Yes

## 2024-10-30 NOTE — CARE COORDINATION
Hospital day 8: Patient on C3 re Obstruction of intestine care managed by General surgery. Patient from home alone, reports will have support at discharge. Patient started  on IV ATB. Patient with min output on HALLIE. Diet low fiber. SW following but does not anticipate DCP needs.ADILSON Bartholomew

## 2024-10-30 NOTE — PLAN OF CARE
Problem: Discharge Planning  Goal: Discharge to home or other facility with appropriate resources  Outcome: Progressing  Flowsheets (Taken 10/30/2024 0805)  Discharge to home or other facility with appropriate resources:   Identify barriers to discharge with patient and caregiver   Arrange for needed discharge resources and transportation as appropriate     Problem: Pain  Goal: Verbalizes/displays adequate comfort level or baseline comfort level  10/30/2024 0805 by Naomi Perez RN  Outcome: Progressing  Flowsheets (Taken 10/30/2024 0805)  Verbalizes/displays adequate comfort level or baseline comfort level:   Encourage patient to monitor pain and request assistance   Assess pain using appropriate pain scale   Administer analgesics based on type and severity of pain and evaluate response   Implement non-pharmacological measures as appropriate and evaluate response     Problem: Safety - Adult  Goal: Free from fall injury  Outcome: Progressing  Flowsheets (Taken 10/30/2024 0805)  Free From Fall Injury:   Instruct family/caregiver on patient safety   Based on caregiver fall risk screen, instruct family/caregiver to ask for assistance with transferring infant if caregiver noted to have fall risk factors     Problem: ABCDS Injury Assessment  Goal: Absence of physical injury  Outcome: Progressing  Flowsheets (Taken 10/30/2024 0805)  Absence of Physical Injury: Implement safety measures based on patient assessment     Problem: Skin/Tissue Integrity  Goal: Absence of new skin breakdown  Description: 1.  Monitor for areas of redness and/or skin breakdown  2.  Assess vascular access sites hourly  3.  Every 4-6 hours minimum:  Change oxygen saturation probe site  4.  Every 4-6 hours:  If on nasal continuous positive airway pressure, respiratory therapy assess nares and determine need for appliance change or resting period.  Outcome: Progressing     Problem: Nutrition Deficit:  Goal: Optimize nutritional status  Outcome:

## 2024-10-31 ENCOUNTER — APPOINTMENT (OUTPATIENT)
Dept: CT IMAGING | Age: 79
DRG: 329 | End: 2024-10-31
Attending: SURGERY
Payer: MEDICARE

## 2024-10-31 ENCOUNTER — APPOINTMENT (OUTPATIENT)
Dept: GENERAL RADIOLOGY | Age: 79
DRG: 329 | End: 2024-10-31
Attending: SURGERY
Payer: MEDICARE

## 2024-10-31 PROBLEM — K57.92 DIVERTICULITIS: Status: ACTIVE | Noted: 2024-09-24

## 2024-10-31 LAB
ABO + RH BLD: NORMAL
ANION GAP SERPL CALCULATED.3IONS-SCNC: 14 MMOL/L (ref 3–16)
BASOPHILS # BLD: 0.1 K/UL (ref 0–0.2)
BASOPHILS NFR BLD: 0.4 %
BLD GP AB SCN SERPL QL: NORMAL
BUN SERPL-MCNC: 19 MG/DL (ref 7–20)
CALCIUM SERPL-MCNC: 8.6 MG/DL (ref 8.3–10.6)
CHLORIDE SERPL-SCNC: 92 MMOL/L (ref 99–110)
CO2 SERPL-SCNC: 26 MMOL/L (ref 21–32)
CREAT SERPL-MCNC: 1.3 MG/DL (ref 0.6–1.2)
DEPRECATED RDW RBC AUTO: 13.4 % (ref 12.4–15.4)
EOSINOPHIL # BLD: 0.3 K/UL (ref 0–0.6)
EOSINOPHIL NFR BLD: 1.9 %
GFR SERPLBLD CREATININE-BSD FMLA CKD-EPI: 42 ML/MIN/{1.73_M2}
GLUCOSE BLD-MCNC: 104 MG/DL (ref 70–99)
GLUCOSE BLD-MCNC: 107 MG/DL (ref 70–99)
GLUCOSE BLD-MCNC: 125 MG/DL (ref 70–99)
GLUCOSE BLD-MCNC: 92 MG/DL (ref 70–99)
GLUCOSE SERPL-MCNC: 103 MG/DL (ref 70–99)
HCT VFR BLD AUTO: 27 % (ref 36–48)
HGB BLD-MCNC: 9.1 G/DL (ref 12–16)
LYMPHOCYTES # BLD: 0.8 K/UL (ref 1–5.1)
LYMPHOCYTES NFR BLD: 5.7 %
MAGNESIUM SERPL-MCNC: 1.98 MG/DL (ref 1.8–2.4)
MCH RBC QN AUTO: 29.4 PG (ref 26–34)
MCHC RBC AUTO-ENTMCNC: 33.6 G/DL (ref 31–36)
MCV RBC AUTO: 87.5 FL (ref 80–100)
MONOCYTES # BLD: 1.5 K/UL (ref 0–1.3)
MONOCYTES NFR BLD: 11 %
NEUTROPHILS # BLD: 10.7 K/UL (ref 1.7–7.7)
NEUTROPHILS NFR BLD: 81 %
PERFORMED ON: ABNORMAL
PERFORMED ON: NORMAL
PLATELET # BLD AUTO: 517 K/UL (ref 135–450)
PMV BLD AUTO: 6.4 FL (ref 5–10.5)
POTASSIUM SERPL-SCNC: 3.3 MMOL/L (ref 3.5–5.1)
RBC # BLD AUTO: 3.09 M/UL (ref 4–5.2)
SODIUM SERPL-SCNC: 132 MMOL/L (ref 136–145)
WBC # BLD AUTO: 13.2 K/UL (ref 4–11)

## 2024-10-31 PROCEDURE — APPNB60 APP NON BILLABLE TIME 46-60 MINS: Performed by: CLINICAL NURSE SPECIALIST

## 2024-10-31 PROCEDURE — 36415 COLL VENOUS BLD VENIPUNCTURE: CPT

## 2024-10-31 PROCEDURE — 80048 BASIC METABOLIC PNL TOTAL CA: CPT

## 2024-10-31 PROCEDURE — 2580000003 HC RX 258: Performed by: SURGERY

## 2024-10-31 PROCEDURE — 1200000000 HC SEMI PRIVATE

## 2024-10-31 PROCEDURE — 6360000002 HC RX W HCPCS: Performed by: SURGERY

## 2024-10-31 PROCEDURE — 86901 BLOOD TYPING SEROLOGIC RH(D): CPT

## 2024-10-31 PROCEDURE — 86850 RBC ANTIBODY SCREEN: CPT

## 2024-10-31 PROCEDURE — 87641 MR-STAPH DNA AMP PROBE: CPT

## 2024-10-31 PROCEDURE — 74176 CT ABD & PELVIS W/O CONTRAST: CPT

## 2024-10-31 PROCEDURE — 6360000002 HC RX W HCPCS: Performed by: STUDENT IN AN ORGANIZED HEALTH CARE EDUCATION/TRAINING PROGRAM

## 2024-10-31 PROCEDURE — 99024 POSTOP FOLLOW-UP VISIT: CPT | Performed by: SURGERY

## 2024-10-31 PROCEDURE — 74280 X-RAY XM COLON 2CNTRST STD: CPT

## 2024-10-31 PROCEDURE — 2500000003 HC RX 250 WO HCPCS: Performed by: SURGERY

## 2024-10-31 PROCEDURE — 6370000000 HC RX 637 (ALT 250 FOR IP): Performed by: SURGERY

## 2024-10-31 PROCEDURE — 86900 BLOOD TYPING SEROLOGIC ABO: CPT

## 2024-10-31 PROCEDURE — 87040 BLOOD CULTURE FOR BACTERIA: CPT

## 2024-10-31 PROCEDURE — 85025 COMPLETE CBC W/AUTO DIFF WBC: CPT

## 2024-10-31 PROCEDURE — 83735 ASSAY OF MAGNESIUM: CPT

## 2024-10-31 RX ORDER — SODIUM CHLORIDE 9 MG/ML
INJECTION, SOLUTION INTRAVENOUS CONTINUOUS
Status: DISCONTINUED | OUTPATIENT
Start: 2024-10-31 | End: 2024-11-02

## 2024-10-31 RX ORDER — LINEZOLID 2 MG/ML
600 INJECTION, SOLUTION INTRAVENOUS EVERY 12 HOURS
Status: DISCONTINUED | OUTPATIENT
Start: 2024-10-31 | End: 2024-11-06

## 2024-10-31 RX ORDER — 0.9 % SODIUM CHLORIDE 0.9 %
500 INTRAVENOUS SOLUTION INTRAVENOUS ONCE
Status: COMPLETED | OUTPATIENT
Start: 2024-10-31 | End: 2024-10-31

## 2024-10-31 RX ADMIN — PROCHLORPERAZINE EDISYLATE 10 MG: 5 INJECTION INTRAMUSCULAR; INTRAVENOUS at 06:05

## 2024-10-31 RX ADMIN — ACETAMINOPHEN 650 MG: 325 TABLET ORAL at 12:03

## 2024-10-31 RX ADMIN — FLUOXETINE HYDROCHLORIDE 40 MG: 20 CAPSULE ORAL at 08:54

## 2024-10-31 RX ADMIN — HYDROMORPHONE HYDROCHLORIDE 0.5 MG: 1 INJECTION, SOLUTION INTRAMUSCULAR; INTRAVENOUS; SUBCUTANEOUS at 06:05

## 2024-10-31 RX ADMIN — Medication 20 MG: at 07:53

## 2024-10-31 RX ADMIN — PIPERACILLIN AND TAZOBACTAM 3375 MG: 3; .375 INJECTION, POWDER, LYOPHILIZED, FOR SOLUTION INTRAVENOUS at 12:05

## 2024-10-31 RX ADMIN — LATANOPROST 1 DROP: 50 SOLUTION OPHTHALMIC at 20:40

## 2024-10-31 RX ADMIN — LINEZOLID 600 MG: 600 INJECTION, SOLUTION INTRAVENOUS at 17:01

## 2024-10-31 RX ADMIN — PIPERACILLIN AND TAZOBACTAM 3375 MG: 3; .375 INJECTION, POWDER, LYOPHILIZED, FOR SOLUTION INTRAVENOUS at 20:39

## 2024-10-31 RX ADMIN — TRAZODONE HYDROCHLORIDE 75 MG: 50 TABLET ORAL at 20:40

## 2024-10-31 RX ADMIN — PIPERACILLIN AND TAZOBACTAM 3375 MG: 3; .375 INJECTION, POWDER, LYOPHILIZED, FOR SOLUTION INTRAVENOUS at 02:59

## 2024-10-31 RX ADMIN — SODIUM CHLORIDE: 9 INJECTION, SOLUTION INTRAVENOUS at 20:30

## 2024-10-31 RX ADMIN — ONDANSETRON 4 MG: 2 INJECTION INTRAMUSCULAR; INTRAVENOUS at 07:52

## 2024-10-31 RX ADMIN — ACETAMINOPHEN 650 MG: 325 TABLET ORAL at 16:55

## 2024-10-31 RX ADMIN — SODIUM CHLORIDE 500 ML: 9 INJECTION, SOLUTION INTRAVENOUS at 06:59

## 2024-10-31 RX ADMIN — SODIUM CHLORIDE, PRESERVATIVE FREE 10 ML: 5 INJECTION INTRAVENOUS at 08:54

## 2024-10-31 RX ADMIN — ENOXAPARIN SODIUM 40 MG: 100 INJECTION SUBCUTANEOUS at 08:54

## 2024-10-31 RX ADMIN — BUPROPION HYDROCHLORIDE 300 MG: 150 TABLET, EXTENDED RELEASE ORAL at 08:54

## 2024-10-31 RX ADMIN — SODIUM CHLORIDE: 9 INJECTION, SOLUTION INTRAVENOUS at 04:15

## 2024-10-31 ASSESSMENT — PAIN SCALES - GENERAL
PAINLEVEL_OUTOF10: 0
PAINLEVEL_OUTOF10: 5
PAINLEVEL_OUTOF10: 2
PAINLEVEL_OUTOF10: 10

## 2024-10-31 ASSESSMENT — PAIN DESCRIPTION - ORIENTATION: ORIENTATION: LEFT;MID

## 2024-10-31 ASSESSMENT — PAIN DESCRIPTION - DESCRIPTORS: DESCRIPTORS: ACHING;SHARP

## 2024-10-31 ASSESSMENT — PAIN DESCRIPTION - LOCATION: LOCATION: ABDOMEN

## 2024-10-31 NOTE — CARE COORDINATION
Hospital day 9: Patient on C3 re Obstruction of intestine care managed by General Surgery and pending consult to IM. Patient from home reports dtr will be coming in from out of town to stay with. Likely return to OR this date. Patient on IV ATB NPO. SW will follow for post op needs. .ADILSON Bartholomew  [id number]: Write received message from dtr Lazara [phone]. Return call placed. She is concerned inquiring need for coli rectal specialist. Spoke with RN she reports will talk with family.ADILSON Bartholomew

## 2024-10-31 NOTE — PROGRESS NOTES
[facility] GENERAL SURGERY    Surgery Progress Note           POD # 9    PATIENT NAME: Sudha Lopez     TODAY'S DATE: 10/31/2024    INTERVAL HISTORY:    Events of overnight noted, with feculent drainage from sachin.   Pt with hypotension and tachycardia this AM. No fevers.   Overall ill appearing and feeling.      OBJECTIVE:   VITALS:  BP (!) 90/56   Pulse 86   Temp 98.4 °F (36.9 °C) (Oral)   Resp 16   Ht 1.6 m (5' 2.99\")   Wt 74.4 kg (164 lb)   SpO2 94%   BMI 29.06 kg/m²     INTAKE/OUTPUT:    I/O last 3 completed shifts:  In: 458.3 [P.O.:360; IV Piggyback:98.3]  Out: 393 [Drains:393]  No intake/output data recorded.              CONSTITUTIONAL:  fatigued   LUNGS:  no crackles or wheezes  ABDOMEN:   soft, non-distended, tender   sachin with minimal brown thin liquid  INCISION: staples intact, no drainage    Data:  CBC:   Recent Labs     10/29/24  0506 10/30/24  0501 10/31/24  0540   WBC 14.1* 12.3* 13.2*   HGB 9.8* 9.3* 9.1*   HCT 30.5* 27.7* 27.0*    446 517*       BMP:    Recent Labs     10/29/24  0506 10/30/24  0501 10/31/24  0540   * 134* 132*   K 3.5 3.0* 3.3*   CL 95* 92* 92*   CO2 23 29 26   BUN 6* 11 19   CREATININE 0.7 0.9 1.3*   GLUCOSE 101* 93 103*     EXAMINATION:  CT OF THE ABDOMEN AND PELVIS WITHOUT CONTRAST; water-soluble enema  10/31/2024 10:04 am    TECHNIQUE:  CT of the abdomen and pelvis was performed without the administration of  intravenous contrast. Multiplanar reformatted images are provided for review.  Automated exposure control, iterative reconstruction, and/or weight based  adjustment of the mA/kV was utilized to reduce the radiation dose to as low  as reasonably achievable.    COMPARISON:  CT abdomen pelvis dated 10/31/2024    HISTORY:  ORDERING SYSTEM PROVIDED HISTORY: follow barium enema study to look for leak  TECHNOLOGIST PROVIDED HISTORY:  Discussed with dr Nguyen  Reason for exam:->follow barium enema study to look for leak  Additional Contrast?->None  Reason for Exam:

## 2024-10-31 NOTE — PROGRESS NOTES
Pt. Resting in bed. Alert/oriented.  Vitals and assessment stable as charted.  Rechecked BP 91/53 HR now 108; bolus currently infusing.  Denies any pain/nausea or any other discomfort at present time. But has had some intermittent nausea.  Minimal output in HALLIE at present.  Updated surgical NP re status.  Will add hospitalist consult.  Call light in reach.  Bed alarm active.  Will continue to monitor.

## 2024-10-31 NOTE — PROGRESS NOTES
Physical Therapy    Attempted to see patient this afternoon for follow up therapy session. Pt has message on door that reads \"please do not disturb, see RN\" and upon speaking with RN, therapy was advised to hold for today and reported pt may be returning to OR tomorrow. Will continue to follow and treat as able.       Mikal Perez, PT, DPT

## 2024-10-31 NOTE — PROGRESS NOTES
Patient educated on use of IS and demonstrates use Yes    Patient and family educated on incisional care and s/s of infection Yes    Patient and family educated on hand hygiene Yes    Patient and family educated on post operative care Yes   - NG tube maintenance    - Pain control    - roach Removal    - Insulin Protocol     Day of surgery patient to side of bed or up to chair for minimum of 30 minutes NA    POD 1 until discharge, patient up to chair by 9 am and TID. Goal to increase mobility  Yes    Patient performs oral hygiene with CHG oral solution until NG tube discontinued. NA    If patient does not have NG tube in place, patient to brush teeth and use mouth wash Q shift Yes    Patient receives daily bath and linen changes Yes    Patient receives CHG bath until roach discontinued Yes    SCDs in place Yes    Patient receiving chemical VTE Yes; lovenox

## 2024-10-31 NOTE — PROGRESS NOTES
PS sent to KATEY Jackson at 0338:   \"Pt has had a change in her HALLIE output appearance/amount. HALLIE has put out 145ml of thin, brown foul smelling fluid since 4pm yesterday.\"    New orders placed. Pt now NPO. NS @ 75ml/hr started.     Pt's VSS.  Pt in bed with eyes closed.    Pt identified as a high fall risk:  -Bed in lowest position and wheels locked.  -Call light within reach.   -Bedside table within reach.   -Non-skid footwear in place.  -bed alarm on.

## 2024-11-01 LAB
ANION GAP SERPL CALCULATED.3IONS-SCNC: 12 MMOL/L (ref 3–16)
BASOPHILS # BLD: 0 K/UL (ref 0–0.2)
BASOPHILS NFR BLD: 0 %
BUN SERPL-MCNC: 17 MG/DL (ref 7–20)
CALCIUM SERPL-MCNC: 7.7 MG/DL (ref 8.3–10.6)
CHLORIDE SERPL-SCNC: 100 MMOL/L (ref 99–110)
CO2 SERPL-SCNC: 24 MMOL/L (ref 21–32)
CREAT SERPL-MCNC: 1.1 MG/DL (ref 0.6–1.2)
DEPRECATED RDW RBC AUTO: 13.6 % (ref 12.4–15.4)
EOSINOPHIL # BLD: 0.3 K/UL (ref 0–0.6)
EOSINOPHIL NFR BLD: 2 %
GFR SERPLBLD CREATININE-BSD FMLA CKD-EPI: 51 ML/MIN/{1.73_M2}
GLUCOSE BLD-MCNC: 117 MG/DL (ref 70–99)
GLUCOSE BLD-MCNC: 79 MG/DL (ref 70–99)
GLUCOSE BLD-MCNC: 93 MG/DL (ref 70–99)
GLUCOSE BLD-MCNC: 96 MG/DL (ref 70–99)
GLUCOSE SERPL-MCNC: 95 MG/DL (ref 70–99)
HCT VFR BLD AUTO: 24.9 % (ref 36–48)
HGB BLD-MCNC: 8.1 G/DL (ref 12–16)
HYPOCHROMIA BLD QL SMEAR: ABNORMAL
LYMPHOCYTES # BLD: 0.8 K/UL (ref 1–5.1)
LYMPHOCYTES NFR BLD: 6 %
MAGNESIUM SERPL-MCNC: 2.01 MG/DL (ref 1.8–2.4)
MCH RBC QN AUTO: 28.8 PG (ref 26–34)
MCHC RBC AUTO-ENTMCNC: 32.7 G/DL (ref 31–36)
MCV RBC AUTO: 88.2 FL (ref 80–100)
MONOCYTES # BLD: 1.1 K/UL (ref 0–1.3)
MONOCYTES NFR BLD: 8 %
MRSA DNA SPEC QL NAA+PROBE: ABNORMAL
NEUTROPHILS # BLD: 11.6 K/UL (ref 1.7–7.7)
NEUTROPHILS NFR BLD: 79 %
NEUTS BAND NFR BLD MANUAL: 5 % (ref 0–7)
ORGANISM: ABNORMAL
PERFORMED ON: ABNORMAL
PERFORMED ON: NORMAL
PLATELET # BLD AUTO: 433 K/UL (ref 135–450)
PLATELET BLD QL SMEAR: ADEQUATE
PMV BLD AUTO: 6.7 FL (ref 5–10.5)
POLYCHROMASIA BLD QL SMEAR: ABNORMAL
POTASSIUM SERPL-SCNC: 2.8 MMOL/L (ref 3.5–5.1)
RBC # BLD AUTO: 2.82 M/UL (ref 4–5.2)
SCHISTOCYTES BLD QL SMEAR: ABNORMAL
SLIDE REVIEW: ABNORMAL
SODIUM SERPL-SCNC: 136 MMOL/L (ref 136–145)
WBC # BLD AUTO: 13.8 K/UL (ref 4–11)

## 2024-11-01 PROCEDURE — 80048 BASIC METABOLIC PNL TOTAL CA: CPT

## 2024-11-01 PROCEDURE — 2500000003 HC RX 250 WO HCPCS: Performed by: SURGERY

## 2024-11-01 PROCEDURE — 6360000002 HC RX W HCPCS: Performed by: SURGERY

## 2024-11-01 PROCEDURE — 99024 POSTOP FOLLOW-UP VISIT: CPT | Performed by: SURGERY

## 2024-11-01 PROCEDURE — 83735 ASSAY OF MAGNESIUM: CPT

## 2024-11-01 PROCEDURE — 05HY33Z INSERTION OF INFUSION DEVICE INTO UPPER VEIN, PERCUTANEOUS APPROACH: ICD-10-PCS | Performed by: SURGERY

## 2024-11-01 PROCEDURE — 1200000000 HC SEMI PRIVATE

## 2024-11-01 PROCEDURE — 36569 INSJ PICC 5 YR+ W/O IMAGING: CPT

## 2024-11-01 PROCEDURE — 36415 COLL VENOUS BLD VENIPUNCTURE: CPT

## 2024-11-01 PROCEDURE — 85025 COMPLETE CBC W/AUTO DIFF WBC: CPT

## 2024-11-01 PROCEDURE — 6370000000 HC RX 637 (ALT 250 FOR IP): Performed by: SURGERY

## 2024-11-01 PROCEDURE — 6360000002 HC RX W HCPCS: Performed by: STUDENT IN AN ORGANIZED HEALTH CARE EDUCATION/TRAINING PROGRAM

## 2024-11-01 PROCEDURE — 76937 US GUIDE VASCULAR ACCESS: CPT

## 2024-11-01 PROCEDURE — 2580000003 HC RX 258: Performed by: SURGERY

## 2024-11-01 PROCEDURE — C1751 CATH, INF, PER/CENT/MIDLINE: HCPCS

## 2024-11-01 RX ORDER — SODIUM CHLORIDE 0.9 % (FLUSH) 0.9 %
5-40 SYRINGE (ML) INJECTION PRN
Status: DISCONTINUED | OUTPATIENT
Start: 2024-11-01 | End: 2024-11-01 | Stop reason: SDUPTHER

## 2024-11-01 RX ORDER — SODIUM CHLORIDE 0.9 % (FLUSH) 0.9 %
5-40 SYRINGE (ML) INJECTION EVERY 12 HOURS SCHEDULED
Status: DISCONTINUED | OUTPATIENT
Start: 2024-11-01 | End: 2024-11-01 | Stop reason: SDUPTHER

## 2024-11-01 RX ORDER — SODIUM CHLORIDE 9 MG/ML
25 INJECTION, SOLUTION INTRAVENOUS PRN
Status: DISCONTINUED | OUTPATIENT
Start: 2024-11-01 | End: 2024-11-12 | Stop reason: HOSPADM

## 2024-11-01 RX ORDER — LIDOCAINE HYDROCHLORIDE 10 MG/ML
5 INJECTION, SOLUTION INFILTRATION; PERINEURAL ONCE
Status: COMPLETED | OUTPATIENT
Start: 2024-11-01 | End: 2024-11-01

## 2024-11-01 RX ORDER — POTASSIUM CHLORIDE 7.45 MG/ML
10 INJECTION INTRAVENOUS
Status: COMPLETED | OUTPATIENT
Start: 2024-11-01 | End: 2024-11-01

## 2024-11-01 RX ADMIN — ACETAMINOPHEN 650 MG: 325 TABLET ORAL at 17:36

## 2024-11-01 RX ADMIN — LINEZOLID 600 MG: 600 INJECTION, SOLUTION INTRAVENOUS at 02:47

## 2024-11-01 RX ADMIN — ENOXAPARIN SODIUM 40 MG: 100 INJECTION SUBCUTANEOUS at 08:35

## 2024-11-01 RX ADMIN — ACETAMINOPHEN 650 MG: 325 TABLET ORAL at 04:55

## 2024-11-01 RX ADMIN — PIPERACILLIN AND TAZOBACTAM 3375 MG: 3; .375 INJECTION, POWDER, LYOPHILIZED, FOR SOLUTION INTRAVENOUS at 04:55

## 2024-11-01 RX ADMIN — POTASSIUM CHLORIDE 10 MEQ: 7.46 INJECTION, SOLUTION INTRAVENOUS at 10:45

## 2024-11-01 RX ADMIN — SODIUM CHLORIDE: 9 INJECTION, SOLUTION INTRAVENOUS at 06:46

## 2024-11-01 RX ADMIN — FLUOXETINE HYDROCHLORIDE 40 MG: 20 CAPSULE ORAL at 08:34

## 2024-11-01 RX ADMIN — TRAZODONE HYDROCHLORIDE 75 MG: 50 TABLET ORAL at 20:20

## 2024-11-01 RX ADMIN — PIPERACILLIN AND TAZOBACTAM 3375 MG: 3; .375 INJECTION, POWDER, LYOPHILIZED, FOR SOLUTION INTRAVENOUS at 18:31

## 2024-11-01 RX ADMIN — LINEZOLID 600 MG: 600 INJECTION, SOLUTION INTRAVENOUS at 13:35

## 2024-11-01 RX ADMIN — ACETAMINOPHEN 650 MG: 325 TABLET ORAL at 11:44

## 2024-11-01 RX ADMIN — POTASSIUM CHLORIDE 10 MEQ: 7.46 INJECTION, SOLUTION INTRAVENOUS at 12:51

## 2024-11-01 RX ADMIN — ATORVASTATIN CALCIUM 10 MG: 10 TABLET, FILM COATED ORAL at 08:34

## 2024-11-01 RX ADMIN — LATANOPROST 1 DROP: 50 SOLUTION OPHTHALMIC at 21:31

## 2024-11-01 RX ADMIN — POTASSIUM CHLORIDE 10 MEQ: 7.46 INJECTION, SOLUTION INTRAVENOUS at 09:01

## 2024-11-01 RX ADMIN — Medication 20 MG: at 08:33

## 2024-11-01 RX ADMIN — POTASSIUM CHLORIDE 10 MEQ: 7.46 INJECTION, SOLUTION INTRAVENOUS at 11:44

## 2024-11-01 RX ADMIN — LIDOCAINE HYDROCHLORIDE 5 ML: 10 INJECTION, SOLUTION INFILTRATION; PERINEURAL at 10:20

## 2024-11-01 RX ADMIN — BUPROPION HYDROCHLORIDE 300 MG: 150 TABLET, EXTENDED RELEASE ORAL at 08:34

## 2024-11-01 RX ADMIN — PIPERACILLIN AND TAZOBACTAM 3375 MG: 3; .375 INJECTION, POWDER, LYOPHILIZED, FOR SOLUTION INTRAVENOUS at 11:43

## 2024-11-01 ASSESSMENT — PAIN SCALES - WONG BAKER: WONGBAKER_NUMERICALRESPONSE: NO HURT

## 2024-11-01 ASSESSMENT — PAIN SCALES - GENERAL
PAINLEVEL_OUTOF10: 1
PAINLEVEL_OUTOF10: 2
PAINLEVEL_OUTOF10: 0
PAINLEVEL_OUTOF10: 0

## 2024-11-01 ASSESSMENT — PAIN DESCRIPTION - LOCATION
LOCATION: ABDOMEN
LOCATION: ABDOMEN

## 2024-11-01 NOTE — PLAN OF CARE
Progressing  Flowsheets (Taken 10/31/2024 2219)  Maintains or returns to baseline bowel function:   Assess bowel function   Administer IV fluids as ordered to ensure adequate hydration   Encourage mobilization and activity   Encourage oral fluids to ensure adequate hydration   Administer ordered medications as needed   Nutrition consult to assist patient with appropriate food choices

## 2024-11-01 NOTE — PROGRESS NOTES
Physician Progress Note      PATIENT:               ARTIE MEIER  CSN #:                  [id number]  :                       1945  ADMIT DATE:       10/22/2024 9:16 AM  DISCH DATE:  RESPONDING  PROVIDER #:        Bertin Jackson MD          QUERY TEXT:    Patient admitted with colon obstruction. Noted CT 10/31 with RUQ fluid   collection suspicious for abscess following elective surgery 10/22. If   possible, please document in progress notes and discharge summary further   specificity regarding the location of the abscess:  The medical record reflects the following:  Risk Factors: colectomy 10/22, developed anastomotic leak with feculent   drainage in HALLIE on 10/31    Clinical Indicators: per CT 10/29-\"No discrete drainable abscess noted\"  Per CT 10/31-4.5 cm fluid collection within the right upper quadrant,   suspicious for abscess.    Treatment: colectomy, CT imaging, serial labs, IVF's, General surgery   following for abdominal infection and possible anastomotic takedown procedure,   ABX,  Options provided:  -- Postoperative peritoneal abscess  -- Post operative focal abscess only  -- Other - I will add my own diagnosis  -- Disagree - Not applicable / Not valid  -- Disagree - Clinically unable to determine / Unknown  -- Refer to Clinical Documentation Reviewer    PROVIDER RESPONSE TEXT:    Post operative focal abscess only    Query created by: Victorina Mello on 2024 11:05 AM      QUERY TEXT:    Patient admitted with bowel obstruction for colectomy. Noted to have moderate   malnutrition per RD note .  If possible, please document in progress notes   and discharge summary if you are evaluating and /or treating any of the   following:    The medical record reflects the following:  Risk Factors: 78 yr old, recent surgery, NPO status and need for PN    Clinical Indicators: RD -\" Moderate malnutrition (10/29/24 1229)  Context:  Acute Illness  Findings of the 6 clinical characteristics of

## 2024-11-01 NOTE — CONSULTS
Comprehensive Nutrition Assessment    Type and Reason for Visit:  Reassess, Consult, Nutrition support (PN)    Nutrition Recommendations/Plan:   Recommend check TG, Mg, Phos, CMP now if not done in last 24 hours.  Bag 1: Clinimix 5/20 starting at 42 mL/hr  Physician/LIP to monitor closely and correct lytes (Phos,Mg,K+) d/t risk of refeeding syndrome  Bag 2: As long as electrolytes WNL, advance Clinimix 5/20 to goal rate of 83.33 mL/hr  Recommend 250 mL 20% lipids 2 times per week  Recommend FSBS, monitor glucose, need for insulin  Pharmacy to adjust MVI and Trace Elements as needed   When PN to be discontinued, cut rate by 50% and let current bag run out.      Malnutrition Assessment:  Malnutrition Status:  Moderate malnutrition (10/29/24 1229)    Context:  Acute Illness     Findings of the 6 clinical characteristics of malnutrition:  Energy Intake:  50% or less of estimated energy requirements for 5 or more days  Weight Loss:  No significant weight loss     Body Fat Loss:  Mild body fat loss Triceps   Muscle Mass Loss:  Mild muscle mass loss Temples (temporalis), Clavicles (pectoralis & deltoids)  Fluid Accumulation:  No significant fluid accumulation     Strength:  Not Performed    Nutrition Assessment:    Followup: Consult for PN recommendations. Patient sleeping soundly at time of visit. \"CT scan was obtained that did show a fluid collection with concern for anastomotic leak\" per MD. Noted pts diet advanced to low fiber on 10/30, patient had attempted to eat a regular diet at lunch, was unable to tolerate d/t nausea and ABD pain. Patient now NPO with need for PN. Last bowel movement 10/31, on bowel regimen.    Nutrition Related Findings:    K 2.8, GFR 51, Ca 7.7, Mg 2.01, glucose 104-107. LBM 10/31. Trace nonpitting edema. Wound Type: Surgical Incision       Current Nutrition Intake & Therapies:    Average Meal Intake: NPO  Average Supplements Intake: NPO  Current Parenteral Nutrition Orders:  Type and 
Consult Call     Medical management, hypotensive, tachycardic, recent abd surgery, possible return to OR 10/31    Who:GONSALO Hospitalist  Date:10/31/2024,  Time:9:11 AM    Electronically signed by Linda Soto on 10/31/24 at 9:11 AM EDT    
Volume (cm^3) 0 cm^3 10/25/24 1223   Closure Surgical glue 10/25/24 1223   Margins Approximated 10/25/24 1223   Incision Assessment Dry 10/25/24 1223   Drainage Amount None (dry) 10/25/24 1223   Odor None 10/25/24 1223   Darlyn-incision Assessment Dry/flaky;Intact 10/25/24 1223   Number of days: 3      Abd incision medial upper:       Incision 10/22/24 Abdomen Right;Upper (Active)   Wound Image   10/25/24 1223   Dressing Status Other (Comment) 10/25/24 1223   Incision Cleansed Not Cleansed 10/25/24 1223   Dressing/Treatment Open to air;Surgical glue 10/25/24 1223   Incision Length (cm) 0 10/25/24 1223   Incision Width (cm) 5 cm 10/25/24 1223   Incision Depth (cm) 0 cm 10/25/24 1223   Incision Volume (cm^3) 0 cm^3 10/25/24 1223   Closure Malena 10/25/24 1223   Margins Approximated 10/25/24 1223   Incision Assessment Dry 10/25/24 1223   Drainage Amount None (dry) 10/25/24 1223   Drainage Description Sanguinous 10/22/24 1515   Odor None 10/25/24 1223   Darlyn-incision Assessment Dry/flaky;Intact 10/25/24 1223   Number of days: 3     Right upper quadrant ileostomy closure site:        Response to treatment:  Well tolerated by patient.     Pain Assessment:  Severity:  0 / 10  Quality of pain: N/A  Wound Pain Timing/Severity: none  Premedicated: No    Plan   Plan of Care: Wound 07/08/24 Heel Left;Posterior-Dressing/Treatment: Betadine swabs/povidone iodine    Text to Dr Jackson: Wound evaluation.  FYI left heel still with dry scab from pressure injury that was acquired here in [place].  Treat with betadine now.  C/O occasional twinges with walking.    Recommend: treatment done today.  Clean left heel with soap and water.  Paint with betadine daily.  Off load with pillows when in bed or up in chair.  Monitor for drainage, order, edema or signs and symptoms of infection.  Call wound care for deterioration [phone] or call [phone] and leave message.    .  Specialty Bed Required : Yes   [] Low Air Loss   [x] Pressure 
ileus or obstruction. Persistent soft tissue emphysema at the right flank.     XR ABDOMEN (2 VIEWS)    Result Date: 10/25/2024  EXAMINATION: TWO XRAY VIEWS OF THE ABDOMEN 10/25/2024 6:32 am COMPARISON: 10/24/2024 HISTORY: ORDERING SYSTEM PROVIDED HISTORY: PO ileus TECHNOLOGIST PROVIDED HISTORY: Reason for exam:->PO ileus Reason for Exam: PO ileus FINDINGS: Tip of NG tube is coiled in the region of the stomach, similar to prior Scattered gaseous distention is seen throughout the abdomen, similar to prior. Drainage catheter projects over the abdomen.  Skin staples project over the abdomen Bilateral hip arthroplasties are seen Degenerative change and scoliosis seen within the spine. Soft tissue gas is seen in the anterior abdominal wall, similar to prior.     Scattered gaseous distention throughout the abdomen, similar to prior.     XR ABDOMEN (KUB) (SINGLE AP VIEW)    Result Date: 10/24/2024  EXAMINATION: ONE SUPINE XRAY VIEW(S) OF THE ABDOMEN 10/24/2024 3:36 pm COMPARISON: 10/24/2024 radiograph at 10:48 a.m. HISTORY: ORDERING SYSTEM PROVIDED HISTORY: NGT placement TECHNOLOGIST PROVIDED HISTORY: Reason for exam:->NGT placement Reason for Exam: ng placement FINDINGS: Interval placement of enteric tube appropriately coiled in the lumen of the stomach.  Limited visualization of the lower abdomen.  There does appear to be decompression of small bowel from prior imaging with air-fluid levels not as well appreciated on the current single view.  Lungs appear clear. Subcutaneous emphysema is observed in the abdominal wall bilaterally.  This likely correlates to recent surgical history.  No skeletal abnormality.     1. Appropriate placement of enteric tube. 2. Decompression of small bowel with air-fluid levels not as well appreciated on the current study.       CBC:   Recent Labs     10/29/24  0506 10/30/24  0501 10/31/24  0540   WBC 14.1* 12.3* 13.2*   HGB 9.8* 9.3* 9.1*    446 517*     BMP:    Recent Labs

## 2024-11-01 NOTE — PROGRESS NOTES
Arrived to place PICC line with bedside RN Marina. Pre-procedure and timeout done with RN, discussed limitations of placement and allergies. Consent confirmed. Vital signs stable. Labs, allergies, medications, and code status reviewed. No contraindications noted.    Procedure explained to pt, including the risk and benefits of the procedure. All questions answered. Pt verbalizes understanding of the procedure and states no more questions.     Pt's basilic, brachial, cephalic are all easily collapsible with no indication for a clot. Vein selected is large enough for catheter. Pt tolerated sterile procedure well, with no difficulty accessing basilic vein, when accessed - blood was free flowing and non-pulsatile. Guidewire, introducer, and catheter went in smoothly.     PICC line verified with ECG:  Please replace all existing IV tubing with new IV tubing prior to using the PICC for current IV infusions.  Please remove any PIVs from PICC arm.  All of the above may be sources of infection or an increase chance of a clot.      Post procedure - reorganized pt table, placed pt in lowest position, with call light and educated on line care. Instructed pt/RN not to use arm for at least 30min to avoid bleeding. Reported off to bedside RN.        [phone]

## 2024-11-01 NOTE — PROGRESS NOTES
Stopped in to check on patient.  No new issues for wound care.  Reports was told she has a leak / infection and is waiting for resolution with bowel rest and antibiotics.  Informed here as needed.

## 2024-11-01 NOTE — PROGRESS NOTES
Physical Therapy    Attempted to see patient this am for follow up therapy session. Pt declined her participation.  Educated pt on purpose of PT. Will continue to follow and treat as able.   Torsten Ricci PTA

## 2024-11-01 NOTE — PLAN OF CARE
Problem: Pain  Goal: Verbalizes/displays adequate comfort level or baseline comfort level  Outcome: Progressing  Flowsheets (Taken 10/30/2024 0805 by Naomi Perez, RN)  Verbalizes/displays adequate comfort level or baseline comfort level:   Encourage patient to monitor pain and request assistance   Assess pain using appropriate pain scale   Administer analgesics based on type and severity of pain and evaluate response   Implement non-pharmacological measures as appropriate and evaluate response     Problem: Safety - Adult  Goal: Free from fall injury  Outcome: Progressing     Problem: Gastrointestinal - Adult  Goal: Maintains or returns to baseline bowel function  Outcome: Progressing  Flowsheets (Taken 10/31/2024 2219)  Maintains or returns to baseline bowel function:   Assess bowel function   Administer IV fluids as ordered to ensure adequate hydration   Encourage mobilization and activity   Encourage oral fluids to ensure adequate hydration   Administer ordered medications as needed   Nutrition consult to assist patient with appropriate food choices

## 2024-11-01 NOTE — CARE COORDINATION
Hospital day 10: Patient on C3 care managed by IM and General Surgery. Patient from home dtr Lazara supportive . Patient NPO, starting TPN, on IV ATB. Patient hopeful to no have surgery. SW will follow.ADILSON Bartholoemw

## 2024-11-01 NOTE — PROGRESS NOTES
Hospital Medicine Progress Note  V10/16      Date of Admission: 10/22/2024  Hospital Day: 11    Chief Admission Complaint:  obstruction    Subjective: Patient plan at this time is to have patient n.p.o. initiate TPN.  Patient with no specific complaints for me this morning aside from being hungry.    Presenting Admission History:       78 y.o. female who is seen today by the hospitalist team at the request of DR. Jackson, with a Chief Complaint of hypotension and fever post op.  Patient originally presented for ileostomy reversal.  Patient on day 9 of hospitalization and began to spike a low-grade fever as well as was tachycardic with soft blood pressure in the 90s.  CT scan was obtained that did show a fluid collection with concern for anastomotic leak.  Hospitalist service was consulted at this point.  Patient did have a white count as well as a monitor creatinine consistent with AIRAM.  Patient had already been given a bolus of fluid with stabilization of her blood pressure and initiated on IV hydration as well as IV antibiotics.     Assessment/Plan:      Current Principal Problem:  Obstruction of intestine (HCC)    ntraabdominal infection  CT shows \"Findings are compatible with anastomotic leak within the pelvis, with  contrast collection measuring approximately 2.8 x 1.6 cm near the  anastomosis, \" HALLIE drain does have purulent fluid, but no minimal.  IV antibiotics: Zoysn, and add Zyvox  F/U Blood cultures ordered  F/U cultures if return to OR.  NPO and TPN per gen surgery     AIRAM  Cr. Bump to 1.3  Hold Lisinopril and Aldactone  Continue IV fluids for at least 2-3 L  Monitor daily BMP     Hypotension  Improved with fluids  Hold Lisinopril and Aldactone  Monitor BP     Hyperlipidemia  Continue statin      Consults:      IP CONSULT TO HOSPITALIST  IP CONSULT TO DIETITIAN        --------------------------------------------------      Medications:         Infusion Medications    sodium chloride      PN-Adult

## 2024-11-01 NOTE — PROGRESS NOTES
Patient educated on use of IS and demonstrates use Yes    Patient and family educated on incisional care and s/s of infection Yes    Patient and family educated on hand hygiene Yes    Patient and family educated on post operative care Yes   NA  Day of surgery patient to side of bed or up to chair for minimum of 30 minutes Yes    POD 1 until discharge, patient up to chair by 9 am and TID. Goal to increase mobility  Yes    Patient performs oral hygiene with CHG oral solution until NG tube discontinued. NA    If patient does not have NG tube in place, patient to brush teeth and use mouth wash Q shift NA    Patient receives daily bath and linen changes Yes    Patient receives CHG bath until roach discontinued Yes    SCDs in place Yes    Patient receiving chemical VTE Yes

## 2024-11-01 NOTE — PROGRESS NOTES
[facility] GENERAL SURGERY    Surgery Progress Note           POD # 10    PATIENT NAME: Sudha Lopez     TODAY'S DATE: 11/1/2024    INTERVAL HISTORY:    More stable overnight, reports pain improved, no fevers, vitals stable, having bms, denies nausea    OBJECTIVE:   VITALS:  BP (!) 112/51   Pulse 77   Temp 98.3 °F (36.8 °C) (Axillary)   Resp 16   Ht 1.6 m (5' 2.99\")   Wt 74.4 kg (164 lb)   SpO2 93%   BMI 29.06 kg/m²     INTAKE/OUTPUT:    I/O last 3 completed shifts:  In: 3141.7 [P.O.:300; I.V.:2166.6; IV Piggyback:675]  Out: 360 [Drains:360]  No intake/output data recorded.              CONSTITUTIONAL:  fatigued   ABDOMEN:   soft, non-distended, tender sachin purulent  INCISION: staples intact, no drainage    Data:  CBC:   Recent Labs     10/30/24  0501 10/31/24  0540 11/01/24  0447   WBC 12.3* 13.2* 13.8*   HGB 9.3* 9.1* 8.1*   HCT 27.7* 27.0* 24.9*    517* 433       BMP:    Recent Labs     10/30/24  0501 10/31/24  0540 11/01/24  0447   * 132* 136   K 3.0* 3.3* 2.8*   CL 92* 92* 100   CO2 29 26 24   BUN 11 19 17   CREATININE 0.9 1.3* 1.1   GLUCOSE 93 103* 95            ASSESSMENT AND PLAN:  78 y.o. female status post ileostomy reversal now with anastomotic leak     More stable overnight.  Leukocytosis improved and pain improved.  Sachin output not high and having bms.  I had a long discussion with patient as she very much wishes to avoid another operation.  With current improvement there is a low possibility that the leak may heal/become controlled by the drain.  If so, then continue with NPO/IV abx and add TPN.  Repeat imaging in 3-5 days to see if improved.  However, if signs of infection were to worsen during this time period then would proceed to an operation.  She asked appropriate questions and risks of waiting as well as reoperation were reviewed.    Bertin Jackson MD    Surgery Staff

## 2024-11-01 NOTE — PROGRESS NOTES
MD PAGE via [employer]:     Potassium of 2.8 this AM. Would you like K+ protocol? Please advise. Thank you!

## 2024-11-02 LAB
ALBUMIN SERPL-MCNC: 2.8 G/DL (ref 3.4–5)
ALP SERPL-CCNC: 107 U/L (ref 40–129)
ALT SERPL-CCNC: 23 U/L (ref 10–40)
ANION GAP SERPL CALCULATED.3IONS-SCNC: 11 MMOL/L (ref 3–16)
AST SERPL-CCNC: 29 U/L (ref 15–37)
BASOPHILS # BLD: 0 K/UL (ref 0–0.2)
BASOPHILS NFR BLD: 0.3 %
BILIRUB DIRECT SERPL-MCNC: <0.1 MG/DL (ref 0–0.3)
BILIRUB INDIRECT SERPL-MCNC: ABNORMAL MG/DL (ref 0–1)
BILIRUB SERPL-MCNC: <0.2 MG/DL (ref 0–1)
BUN SERPL-MCNC: 10 MG/DL (ref 7–20)
CALCIUM SERPL-MCNC: 7.8 MG/DL (ref 8.3–10.6)
CHLORIDE SERPL-SCNC: 104 MMOL/L (ref 99–110)
CO2 SERPL-SCNC: 23 MMOL/L (ref 21–32)
CREAT SERPL-MCNC: 0.8 MG/DL (ref 0.6–1.2)
DEPRECATED RDW RBC AUTO: 13.5 % (ref 12.4–15.4)
EOSINOPHIL # BLD: 0.2 K/UL (ref 0–0.6)
EOSINOPHIL NFR BLD: 2.2 %
GFR SERPLBLD CREATININE-BSD FMLA CKD-EPI: 75 ML/MIN/{1.73_M2}
GLUCOSE BLD-MCNC: 106 MG/DL (ref 70–99)
GLUCOSE BLD-MCNC: 110 MG/DL (ref 70–99)
GLUCOSE BLD-MCNC: 125 MG/DL (ref 70–99)
GLUCOSE BLD-MCNC: 134 MG/DL (ref 70–99)
GLUCOSE BLD-MCNC: 139 MG/DL (ref 70–99)
GLUCOSE SERPL-MCNC: 127 MG/DL (ref 70–99)
HCT VFR BLD AUTO: 24.5 % (ref 36–48)
HGB BLD-MCNC: 8.2 G/DL (ref 12–16)
LYMPHOCYTES # BLD: 1.1 K/UL (ref 1–5.1)
LYMPHOCYTES NFR BLD: 10 %
MAGNESIUM SERPL-MCNC: 1.97 MG/DL (ref 1.8–2.4)
MCH RBC QN AUTO: 29.7 PG (ref 26–34)
MCHC RBC AUTO-ENTMCNC: 33.6 G/DL (ref 31–36)
MCV RBC AUTO: 88.5 FL (ref 80–100)
MONOCYTES # BLD: 1.2 K/UL (ref 0–1.3)
MONOCYTES NFR BLD: 10.7 %
NEUTROPHILS # BLD: 8.8 K/UL (ref 1.7–7.7)
NEUTROPHILS NFR BLD: 76.8 %
PERFORMED ON: ABNORMAL
PHOSPHATE SERPL-MCNC: 1.7 MG/DL (ref 2.5–4.9)
PLATELET # BLD AUTO: 446 K/UL (ref 135–450)
PMV BLD AUTO: 6.6 FL (ref 5–10.5)
POTASSIUM SERPL-SCNC: 3.1 MMOL/L (ref 3.5–5.1)
POTASSIUM SERPL-SCNC: 3.1 MMOL/L (ref 3.5–5.1)
PROT SERPL-MCNC: 5.9 G/DL (ref 6.4–8.2)
RBC # BLD AUTO: 2.77 M/UL (ref 4–5.2)
SODIUM SERPL-SCNC: 138 MMOL/L (ref 136–145)
WBC # BLD AUTO: 11.4 K/UL (ref 4–11)

## 2024-11-02 PROCEDURE — 6370000000 HC RX 637 (ALT 250 FOR IP): Performed by: NURSE PRACTITIONER

## 2024-11-02 PROCEDURE — 80048 BASIC METABOLIC PNL TOTAL CA: CPT

## 2024-11-02 PROCEDURE — 6360000002 HC RX W HCPCS: Performed by: SURGERY

## 2024-11-02 PROCEDURE — 6370000000 HC RX 637 (ALT 250 FOR IP): Performed by: STUDENT IN AN ORGANIZED HEALTH CARE EDUCATION/TRAINING PROGRAM

## 2024-11-02 PROCEDURE — 84100 ASSAY OF PHOSPHORUS: CPT

## 2024-11-02 PROCEDURE — 83735 ASSAY OF MAGNESIUM: CPT

## 2024-11-02 PROCEDURE — 1200000000 HC SEMI PRIVATE

## 2024-11-02 PROCEDURE — 2580000003 HC RX 258: Performed by: SURGERY

## 2024-11-02 PROCEDURE — 80076 HEPATIC FUNCTION PANEL: CPT

## 2024-11-02 PROCEDURE — 99024 POSTOP FOLLOW-UP VISIT: CPT | Performed by: SURGERY

## 2024-11-02 PROCEDURE — 6370000000 HC RX 637 (ALT 250 FOR IP): Performed by: SURGERY

## 2024-11-02 PROCEDURE — 85025 COMPLETE CBC W/AUTO DIFF WBC: CPT

## 2024-11-02 PROCEDURE — 2500000003 HC RX 250 WO HCPCS: Performed by: SURGERY

## 2024-11-02 PROCEDURE — 6360000002 HC RX W HCPCS: Performed by: STUDENT IN AN ORGANIZED HEALTH CARE EDUCATION/TRAINING PROGRAM

## 2024-11-02 RX ORDER — DIPHENHYDRAMINE HYDROCHLORIDE 50 MG/ML
12.5 INJECTION INTRAMUSCULAR; INTRAVENOUS EVERY 6 HOURS PRN
Status: DISCONTINUED | OUTPATIENT
Start: 2024-11-02 | End: 2024-11-12 | Stop reason: HOSPADM

## 2024-11-02 RX ORDER — TRAZODONE HYDROCHLORIDE 50 MG/1
100 TABLET, FILM COATED ORAL NIGHTLY
Status: DISCONTINUED | OUTPATIENT
Start: 2024-11-02 | End: 2024-11-12 | Stop reason: HOSPADM

## 2024-11-02 RX ORDER — POTASSIUM CHLORIDE 7.45 MG/ML
10 INJECTION INTRAVENOUS
Status: COMPLETED | OUTPATIENT
Start: 2024-11-02 | End: 2024-11-02

## 2024-11-02 RX ORDER — DIPHENHYDRAMINE HCL 25 MG
25 TABLET ORAL EVERY 8 HOURS PRN
Status: DISCONTINUED | OUTPATIENT
Start: 2024-11-02 | End: 2024-11-02 | Stop reason: ALTCHOICE

## 2024-11-02 RX ADMIN — PIPERACILLIN AND TAZOBACTAM 3375 MG: 3; .375 INJECTION, POWDER, LYOPHILIZED, FOR SOLUTION INTRAVENOUS at 04:02

## 2024-11-02 RX ADMIN — DIPHENHYDRAMINE HYDROCHLORIDE 25 MG: 25 TABLET ORAL at 00:20

## 2024-11-02 RX ADMIN — ENOXAPARIN SODIUM 40 MG: 100 INJECTION SUBCUTANEOUS at 08:41

## 2024-11-02 RX ADMIN — POTASSIUM CHLORIDE 10 MEQ: 7.46 INJECTION, SOLUTION INTRAVENOUS at 09:54

## 2024-11-02 RX ADMIN — PIPERACILLIN AND TAZOBACTAM 3375 MG: 3; .375 INJECTION, POWDER, LYOPHILIZED, FOR SOLUTION INTRAVENOUS at 10:59

## 2024-11-02 RX ADMIN — POTASSIUM CHLORIDE 10 MEQ: 7.46 INJECTION, SOLUTION INTRAVENOUS at 08:40

## 2024-11-02 RX ADMIN — ATORVASTATIN CALCIUM 10 MG: 10 TABLET, FILM COATED ORAL at 08:41

## 2024-11-02 RX ADMIN — ACETAMINOPHEN 650 MG: 325 TABLET ORAL at 11:25

## 2024-11-02 RX ADMIN — FLUOXETINE HYDROCHLORIDE 40 MG: 20 CAPSULE ORAL at 08:41

## 2024-11-02 RX ADMIN — FAMOTIDINE 20 MG: 20 TABLET, FILM COATED ORAL at 08:41

## 2024-11-02 RX ADMIN — SODIUM CHLORIDE, PRESERVATIVE FREE 10 ML: 5 INJECTION INTRAVENOUS at 10:44

## 2024-11-02 RX ADMIN — PIPERACILLIN AND TAZOBACTAM 3375 MG: 3; .375 INJECTION, POWDER, LYOPHILIZED, FOR SOLUTION INTRAVENOUS at 18:49

## 2024-11-02 RX ADMIN — LINEZOLID 600 MG: 600 INJECTION, SOLUTION INTRAVENOUS at 00:30

## 2024-11-02 RX ADMIN — ACETAMINOPHEN 650 MG: 325 TABLET ORAL at 17:37

## 2024-11-02 RX ADMIN — ACETAMINOPHEN 650 MG: 325 TABLET ORAL at 23:28

## 2024-11-02 RX ADMIN — LATANOPROST 1 DROP: 50 SOLUTION OPHTHALMIC at 20:19

## 2024-11-02 RX ADMIN — BUPROPION HYDROCHLORIDE 300 MG: 150 TABLET, EXTENDED RELEASE ORAL at 08:41

## 2024-11-02 RX ADMIN — FLUTICASONE PROPIONATE 1 SPRAY: 50 SPRAY, METERED NASAL at 11:35

## 2024-11-02 RX ADMIN — ASCORBIC ACID, VITAMIN A PALMITATE, CHOLECALCIFEROL, THIAMINE HYDROCHLORIDE, RIBOFLAVIN-5 PHOSPHATE SODIUM, PYRIDOXINE HYDROCHLORIDE, NIACINAMIDE, DEXPANTHENOL, ALPHA-TOCOPHEROL ACETATE, VITAMIN K1, FOLIC ACID, BIOTIN, CYANOCOBALAMIN: 200; 3300; 200; 6; 3.6; 6; 40; 15; 10; 150; 600; 60; 5 INJECTION, SOLUTION INTRAVENOUS at 17:41

## 2024-11-02 RX ADMIN — TRAZODONE HYDROCHLORIDE 100 MG: 50 TABLET ORAL at 20:18

## 2024-11-02 RX ADMIN — POTASSIUM CHLORIDE 10 MEQ: 7.46 INJECTION, SOLUTION INTRAVENOUS at 13:32

## 2024-11-02 RX ADMIN — POTASSIUM CHLORIDE 10 MEQ: 7.46 INJECTION, SOLUTION INTRAVENOUS at 11:28

## 2024-11-02 RX ADMIN — DIPHENHYDRAMINE HYDROCHLORIDE 12.5 MG: 50 INJECTION INTRAMUSCULAR; INTRAVENOUS at 23:29

## 2024-11-02 RX ADMIN — LINEZOLID 600 MG: 600 INJECTION, SOLUTION INTRAVENOUS at 15:37

## 2024-11-02 RX ADMIN — POTASSIUM CHLORIDE 10 MEQ: 7.46 INJECTION, SOLUTION INTRAVENOUS at 16:00

## 2024-11-02 ASSESSMENT — PAIN SCALES - GENERAL
PAINLEVEL_OUTOF10: 0
PAINLEVEL_OUTOF10: 4
PAINLEVEL_OUTOF10: 0
PAINLEVEL_OUTOF10: 0
PAINLEVEL_OUTOF10: 3
PAINLEVEL_OUTOF10: 3
PAINLEVEL_OUTOF10: 0
PAINLEVEL_OUTOF10: 3
PAINLEVEL_OUTOF10: 2

## 2024-11-02 ASSESSMENT — PAIN DESCRIPTION - ORIENTATION
ORIENTATION: MID
ORIENTATION: RIGHT;MID
ORIENTATION: LEFT
ORIENTATION: RIGHT;MID
ORIENTATION: MID

## 2024-11-02 ASSESSMENT — PAIN DESCRIPTION - LOCATION
LOCATION: ABDOMEN

## 2024-11-02 ASSESSMENT — PAIN DESCRIPTION - DESCRIPTORS
DESCRIPTORS: CRAMPING
DESCRIPTORS: ACHING
DESCRIPTORS: SHARP
DESCRIPTORS: DISCOMFORT

## 2024-11-02 ASSESSMENT — PAIN - FUNCTIONAL ASSESSMENT
PAIN_FUNCTIONAL_ASSESSMENT: PREVENTS OR INTERFERES SOME ACTIVE ACTIVITIES AND ADLS

## 2024-11-02 ASSESSMENT — PAIN SCALES - WONG BAKER
WONGBAKER_NUMERICALRESPONSE: NO HURT

## 2024-11-02 ASSESSMENT — PAIN DESCRIPTION - PAIN TYPE: TYPE: SURGICAL PAIN

## 2024-11-02 NOTE — PROGRESS NOTES
Pt alert and orientated. Call light within reach. Pt assisted to bathroom. No complaints of extreme pain at this time. Bed alarm on.Shante Sanchez RN

## 2024-11-02 NOTE — PROGRESS NOTES
.  4 Eyes Skin Assessment and Patient belongings     The patient is being assess for  Shift Handoff    I agree that 2 Nurses have performed a thorough Head to Toe Skin Assessment on the patient. ALL assessment sites listed below have been assessed.       Areas assessed by both nurses: Shante Rn and Miley RN  [x]   Head, Face, and Ears   [x]   Shoulders, Back, and Chest  [x]   Arms, Elbows, and Hands   [x]   Coccyx, Sacrum, and IschIum  [x]   Legs, Feet, and Heels        Does the Patient have Skin Breakdown?  Yes LDA WOUND CARE was Initiated documentation include the Darlyn-wound, Wound Assessment, Measurements, Dressing Treatment, Drainage, and Color\",         Kirill Prevention initiated:  Yes   Wound Care Orders initiated:  Yes      WOC nurse consulted for Pressure Injury (Stage 3,4, Unstageable, DTI, NWPT, and Complex wounds), New and Established Ostomies:  No      I agree that 2 Nurses have reviewed patient belongings with the patient/family and documented in the flowsheet upon admission or transfer to the unit.     Belongings  Dental Appliances: None  Vision - Corrective Lenses: None  Hearing Aid: None  Clothing: Footwear, Pants, Shirt, Socks, Undergarments  Jewelry: None  Body Piercings Removed: N/A  Electronic Devices: Cell Phone  Weapons (Notify Protective Services/Security): None  Other Valuables: Crutches, Purse  Home Medications: None  Valuables Given To: Other (Comment)  Provide Name(s) of Who Valuable(s) Were Given To: all belongings labeled and taken to pacu  Responsible person(s) in the waiting room: Lazara (daughter  Patient approves for provider to speak to responsible person post operatively: Yes       Nurse 1 eSignature: Electronically signed by Shante Sanchez RN on 11/2/24 at 4:40 PM EDT    **SHARE this note so that the co-signing nurse is able to place an eSignature**    Nurse 2 eSignature: Electronically signed by Miley Smith RN on 11/3/24 at 6:58 PM EST

## 2024-11-02 NOTE — PLAN OF CARE
Problem: Pain  Goal: Verbalizes/displays adequate comfort level or baseline comfort level  11/1/2024 2059 by Cristiane Estes RN  Outcome: Progressing  11/1/2024 0926 by Marina Ortez RN  Outcome: Progressing     Problem: Safety - Adult  Goal: Free from fall injury  11/1/2024 2059 by Cristiane Estes RN  Outcome: Progressing  Flowsheets (Taken 11/1/2024 2059)  Free From Fall Injury: Instruct family/caregiver on patient safety  11/1/2024 0926 by Marina Ortez RN  Outcome: Progressing     Problem: ABCDS Injury Assessment  Goal: Absence of physical injury  11/1/2024 0926 by Marina Ortez RN  Outcome: Progressing     Problem: Skin/Tissue Integrity  Goal: Absence of new skin breakdown  Description: 1.  Monitor for areas of redness and/or skin breakdown  2.  Assess vascular access sites hourly  3.  Every 4-6 hours minimum:  Change oxygen saturation probe site  4.  Every 4-6 hours:  If on nasal continuous positive airway pressure, respiratory therapy assess nares and determine need for appliance change or resting period.  11/1/2024 2059 by Cristiane Estes RN  Outcome: Progressing  11/1/2024 0926 by Marina Ortez RN  Outcome: Progressing     Problem: Nutrition Deficit:  Goal: Optimize nutritional status  Recent Flowsheet Documentation  Taken 11/1/2024 1021 by Radha Johnson RD  Nutrient intake appropriate for improving, restoring, or maintaining nutritional needs:   Assess nutritional status and recommend course of action   Recommend, monitor, and adjust tube feedings and TPN/PPN based on assessed needs   Order, calculate, and assess calorie counts as needed   Monitor oral intake, labs, and treatment plans  11/1/2024 0926 by Marina Ortez RN  Outcome: Progressing     Problem: Skin/Tissue Integrity - Adult  Goal: Incisions, wounds, or drain sites healing without S/S of infection  11/1/2024 2059 by Cristiane Estes RN  Outcome: Progressing  11/1/2024 0926 by Marina Ortez RN  Outcome:

## 2024-11-02 NOTE — PROGRESS NOTES
.Patient educated on use of IS and demonstrates use No    Patient and family educated on incisional care and s/s of infection Yes    Patient and family educated on hand hygiene Yes    Patient and family educated on post operative care Yes      - Pain control       - Insulin Protocol     Day of surgery patient to side of bed or up to chair for minimum of 30 minutes Yes    POD 1 until discharge, patient up to chair by 9 am and TID. Goal to increase mobility  Yes    Patient performs oral hygiene with CHG oral solution until NG tube discontinued. No    If patient does not have NG tube in place, patient to brush teeth and use mouth wash Q shift Yes    Patient receives daily bath and linen changes pt refused    Patient receives CHG bath until roach discontinued NA    SCDs in place Yes    Patient receiving chemical VTE Yes

## 2024-11-02 NOTE — PROGRESS NOTES
[facility] GENERAL SURGERY    Surgery Progress Note           POD # 11    PATIENT NAME: Sudha Lopez     TODAY'S DATE: 11/2/2024    INTERVAL HISTORY:    No increase in pain, no nausea, having bms    OBJECTIVE:   VITALS:  /67   Pulse 75   Temp 98.4 °F (36.9 °C) (Oral)   Resp 16   Ht 1.6 m (5' 2.99\")   Wt 80.7 kg (177 lb 14.6 oz)   SpO2 99%   BMI 31.52 kg/m²     INTAKE/OUTPUT:    I/O last 3 completed shifts:  In: 1335.7 [P.O.:60; I.V.:1275.7]  Out: 120 [Drains:120]  I/O this shift:  In: 2612.5 [I.V.:2003.3; IV Piggyback:6.8]  Out: -               CONSTITUTIONAL:  fatigued   ABDOMEN:   soft, non-distended, tender sachin purulent  INCISION: staples intact, no drainage    Data:  CBC:   Recent Labs     10/31/24  0540 11/01/24 0447 11/02/24  0516   WBC 13.2* 13.8* 11.4*   HGB 9.1* 8.1* 8.2*   HCT 27.0* 24.9* 24.5*   * 433 446       BMP:    Recent Labs     10/31/24  0540 11/01/24 0447 11/02/24  0516   * 136 138   K 3.3* 2.8* 3.1*  3.1*   CL 92* 100 104   CO2 26 24 23   BUN 19 17 10   CREATININE 1.3* 1.1 0.8   GLUCOSE 103* 95 127*            ASSESSMENT AND PLAN:  78 y.o. female status post ileostomy reversal now with anastomotic leak     Remaining stable  Continue IV abx, TPN, NPO  Repeat contrast imaging early this week    Bertin Jackson MD    Surgery Staff

## 2024-11-02 NOTE — PROGRESS NOTES
Hospital Medicine Progress Note  V10/16      Date of Admission: 10/22/2024  Hospital Day: 12    Chief Admission Complaint:  obstruction    Subjective: Patient up in the chair today. Overall she states she feels better but is not sleeping. Is toelrating TPN.     Presenting Admission History:       78 y.o. female who is seen today by the hospitalist team at the request of DR. Jackson, with a Chief Complaint of hypotension and fever post op.  Patient originally presented for ileostomy reversal.  Patient on day 9 of hospitalization and began to spike a low-grade fever as well as was tachycardic with soft blood pressure in the 90s.  CT scan was obtained that did show a fluid collection with concern for anastomotic leak.  Hospitalist service was consulted at this point.  Patient did have a white count as well as a monitor creatinine consistent with AIRAM.  Patient had already been given a bolus of fluid with stabilization of her blood pressure and initiated on IV hydration as well as IV antibiotics.     Assessment/Plan:      Current Principal Problem:  Obstruction of intestine (HCC)    ntraabdominal infection  CT shows \"Findings are compatible with anastomotic leak within the pelvis, with  contrast collection measuring approximately 2.8 x 1.6 cm near the  anastomosis, \" HALLIE drain does have purulent fluid, but no minimal.  IV antibiotics: Zoysn, and add Zyvox  F/U Blood cultures ordered  F/U cultures if return to OR.  NPO and TPN per gen surgery     AIRAM  Cr. Bump to 1.3  Hold Lisinopril and Aldactone  Continue IV fluids for at least 2-3 L  Monitor daily BMP     Hypotension  Improved with fluids  Hold Lisinopril and Aldactone  Monitor BP     Hyperlipidemia  Continue statin      Consults:      IP CONSULT TO HOSPITALIST  IP CONSULT TO DIETITIAN        --------------------------------------------------      Medications:         Infusion Medications    PN-Adult Premix 5/20 - Standard Electrolytes - Central Line      sodium

## 2024-11-03 LAB
ALBUMIN SERPL-MCNC: 3.1 G/DL (ref 3.4–5)
ALP SERPL-CCNC: 129 U/L (ref 40–129)
ALT SERPL-CCNC: 24 U/L (ref 10–40)
ANION GAP SERPL CALCULATED.3IONS-SCNC: 13 MMOL/L (ref 3–16)
AST SERPL-CCNC: 33 U/L (ref 15–37)
BASOPHILS # BLD: 0.1 K/UL (ref 0–0.2)
BASOPHILS NFR BLD: 1 %
BILIRUB DIRECT SERPL-MCNC: 0.2 MG/DL (ref 0–0.3)
BILIRUB INDIRECT SERPL-MCNC: ABNORMAL MG/DL (ref 0–1)
BILIRUB SERPL-MCNC: <0.2 MG/DL (ref 0–1)
BILIRUB UR QL STRIP.AUTO: NEGATIVE
BUN SERPL-MCNC: 13 MG/DL (ref 7–20)
CALCIUM SERPL-MCNC: 8.5 MG/DL (ref 8.3–10.6)
CHLORIDE SERPL-SCNC: 105 MMOL/L (ref 99–110)
CLARITY UR: CLEAR
CO2 SERPL-SCNC: 23 MMOL/L (ref 21–32)
COLOR UR: YELLOW
CREAT SERPL-MCNC: 0.6 MG/DL (ref 0.6–1.2)
DEPRECATED RDW RBC AUTO: 13.3 % (ref 12.4–15.4)
EOSINOPHIL # BLD: 0.4 K/UL (ref 0–0.6)
EOSINOPHIL NFR BLD: 3 %
GFR SERPLBLD CREATININE-BSD FMLA CKD-EPI: >90 ML/MIN/{1.73_M2}
GLUCOSE BLD-MCNC: 107 MG/DL (ref 70–99)
GLUCOSE BLD-MCNC: 112 MG/DL (ref 70–99)
GLUCOSE BLD-MCNC: 114 MG/DL (ref 70–99)
GLUCOSE BLD-MCNC: 125 MG/DL (ref 70–99)
GLUCOSE SERPL-MCNC: 92 MG/DL (ref 70–99)
GLUCOSE UR STRIP.AUTO-MCNC: NEGATIVE MG/DL
HCT VFR BLD AUTO: 25.3 % (ref 36–48)
HGB BLD-MCNC: 8.5 G/DL (ref 12–16)
HGB UR QL STRIP.AUTO: NEGATIVE
KETONES UR STRIP.AUTO-MCNC: NEGATIVE MG/DL
LEUKOCYTE ESTERASE UR QL STRIP.AUTO: NEGATIVE
LYMPHOCYTES # BLD: 1.8 K/UL (ref 1–5.1)
LYMPHOCYTES NFR BLD: 15 %
MAGNESIUM SERPL-MCNC: 2.3 MG/DL (ref 1.8–2.4)
MCH RBC QN AUTO: 29.5 PG (ref 26–34)
MCHC RBC AUTO-ENTMCNC: 33.4 G/DL (ref 31–36)
MCV RBC AUTO: 88.3 FL (ref 80–100)
MONOCYTES # BLD: 0.7 K/UL (ref 0–1.3)
MONOCYTES NFR BLD: 6 %
NEUTROPHILS # BLD: 9.2 K/UL (ref 1.7–7.7)
NEUTROPHILS NFR BLD: 75 %
NITRITE UR QL STRIP.AUTO: NEGATIVE
PERFORMED ON: ABNORMAL
PH UR STRIP.AUTO: 7 [PH] (ref 5–8)
PHOSPHATE SERPL-MCNC: 2.5 MG/DL (ref 2.5–4.9)
PLATELET # BLD AUTO: 529 K/UL (ref 135–450)
PLATELET BLD QL SMEAR: ABNORMAL
PMV BLD AUTO: 6.7 FL (ref 5–10.5)
POIKILOCYTOSIS BLD QL SMEAR: ABNORMAL
POLYCHROMASIA BLD QL SMEAR: ABNORMAL
POTASSIUM SERPL-SCNC: 3.8 MMOL/L (ref 3.5–5.1)
POTASSIUM SERPL-SCNC: 3.8 MMOL/L (ref 3.5–5.1)
PROT SERPL-MCNC: 6.4 G/DL (ref 6.4–8.2)
PROT UR STRIP.AUTO-MCNC: NEGATIVE MG/DL
RBC # BLD AUTO: 2.87 M/UL (ref 4–5.2)
SLIDE REVIEW: ABNORMAL
SODIUM SERPL-SCNC: 141 MMOL/L (ref 136–145)
SP GR UR STRIP.AUTO: 1.02 (ref 1–1.03)
UA COMPLETE W REFLEX CULTURE PNL UR: NORMAL
UA DIPSTICK W REFLEX MICRO PNL UR: NORMAL
URN SPEC COLLECT METH UR: NORMAL
UROBILINOGEN UR STRIP-ACNC: 0.2 E.U./DL
WBC # BLD AUTO: 12.3 K/UL (ref 4–11)

## 2024-11-03 PROCEDURE — 6370000000 HC RX 637 (ALT 250 FOR IP): Performed by: SURGERY

## 2024-11-03 PROCEDURE — 80076 HEPATIC FUNCTION PANEL: CPT

## 2024-11-03 PROCEDURE — 6360000002 HC RX W HCPCS: Performed by: STUDENT IN AN ORGANIZED HEALTH CARE EDUCATION/TRAINING PROGRAM

## 2024-11-03 PROCEDURE — 83735 ASSAY OF MAGNESIUM: CPT

## 2024-11-03 PROCEDURE — 6370000000 HC RX 637 (ALT 250 FOR IP): Performed by: STUDENT IN AN ORGANIZED HEALTH CARE EDUCATION/TRAINING PROGRAM

## 2024-11-03 PROCEDURE — 85025 COMPLETE CBC W/AUTO DIFF WBC: CPT

## 2024-11-03 PROCEDURE — 84100 ASSAY OF PHOSPHORUS: CPT

## 2024-11-03 PROCEDURE — 1200000000 HC SEMI PRIVATE

## 2024-11-03 PROCEDURE — 99024 POSTOP FOLLOW-UP VISIT: CPT | Performed by: SURGERY

## 2024-11-03 PROCEDURE — 81003 URINALYSIS AUTO W/O SCOPE: CPT

## 2024-11-03 PROCEDURE — 2500000003 HC RX 250 WO HCPCS: Performed by: SURGERY

## 2024-11-03 PROCEDURE — 80048 BASIC METABOLIC PNL TOTAL CA: CPT

## 2024-11-03 PROCEDURE — 2580000003 HC RX 258: Performed by: SURGERY

## 2024-11-03 PROCEDURE — 6360000002 HC RX W HCPCS: Performed by: SURGERY

## 2024-11-03 RX ORDER — IBUPROFEN 400 MG/1
400 TABLET, FILM COATED ORAL EVERY 6 HOURS PRN
Status: DISCONTINUED | OUTPATIENT
Start: 2024-11-03 | End: 2024-11-12 | Stop reason: HOSPADM

## 2024-11-03 RX ADMIN — ATORVASTATIN CALCIUM 10 MG: 10 TABLET, FILM COATED ORAL at 09:55

## 2024-11-03 RX ADMIN — LINEZOLID 600 MG: 600 INJECTION, SOLUTION INTRAVENOUS at 13:41

## 2024-11-03 RX ADMIN — BUPROPION HYDROCHLORIDE 300 MG: 150 TABLET, EXTENDED RELEASE ORAL at 09:55

## 2024-11-03 RX ADMIN — PIPERACILLIN AND TAZOBACTAM 3375 MG: 3; .375 INJECTION, POWDER, LYOPHILIZED, FOR SOLUTION INTRAVENOUS at 11:52

## 2024-11-03 RX ADMIN — PIPERACILLIN AND TAZOBACTAM 3375 MG: 3; .375 INJECTION, POWDER, LYOPHILIZED, FOR SOLUTION INTRAVENOUS at 19:12

## 2024-11-03 RX ADMIN — ACETAMINOPHEN 650 MG: 325 TABLET ORAL at 18:01

## 2024-11-03 RX ADMIN — LINEZOLID 600 MG: 600 INJECTION, SOLUTION INTRAVENOUS at 00:50

## 2024-11-03 RX ADMIN — FAMOTIDINE 20 MG: 20 TABLET, FILM COATED ORAL at 09:55

## 2024-11-03 RX ADMIN — ACETAMINOPHEN 650 MG: 325 TABLET ORAL at 22:28

## 2024-11-03 RX ADMIN — FLUOXETINE HYDROCHLORIDE 40 MG: 20 CAPSULE ORAL at 09:55

## 2024-11-03 RX ADMIN — PIPERACILLIN AND TAZOBACTAM 3375 MG: 3; .375 INJECTION, POWDER, LYOPHILIZED, FOR SOLUTION INTRAVENOUS at 03:01

## 2024-11-03 RX ADMIN — SODIUM CHLORIDE: 9 INJECTION, SOLUTION INTRAVENOUS at 00:48

## 2024-11-03 RX ADMIN — TRAZODONE HYDROCHLORIDE 100 MG: 50 TABLET ORAL at 20:45

## 2024-11-03 RX ADMIN — FLUTICASONE PROPIONATE 1 SPRAY: 50 SPRAY, METERED NASAL at 10:00

## 2024-11-03 RX ADMIN — ACETAMINOPHEN 650 MG: 325 TABLET ORAL at 11:53

## 2024-11-03 RX ADMIN — ENOXAPARIN SODIUM 40 MG: 100 INJECTION SUBCUTANEOUS at 09:55

## 2024-11-03 RX ADMIN — SODIUM CHLORIDE: 9 INJECTION, SOLUTION INTRAVENOUS at 02:59

## 2024-11-03 RX ADMIN — LATANOPROST 1 DROP: 50 SOLUTION OPHTHALMIC at 20:46

## 2024-11-03 RX ADMIN — SODIUM CHLORIDE, PRESERVATIVE FREE 10 ML: 5 INJECTION INTRAVENOUS at 20:45

## 2024-11-03 RX ADMIN — ASCORBIC ACID, VITAMIN A PALMITATE, CHOLECALCIFEROL, THIAMINE HYDROCHLORIDE, RIBOFLAVIN-5 PHOSPHATE SODIUM, PYRIDOXINE HYDROCHLORIDE, NIACINAMIDE, DEXPANTHENOL, ALPHA-TOCOPHEROL ACETATE, VITAMIN K1, FOLIC ACID, BIOTIN, CYANOCOBALAMIN: 200; 3300; 200; 6; 3.6; 6; 40; 15; 10; 150; 600; 60; 5 INJECTION, SOLUTION INTRAVENOUS at 18:00

## 2024-11-03 RX ADMIN — IBUPROFEN 400 MG: 400 TABLET, FILM COATED ORAL at 09:57

## 2024-11-03 RX ADMIN — SODIUM CHLORIDE, PRESERVATIVE FREE 10 ML: 5 INJECTION INTRAVENOUS at 09:55

## 2024-11-03 ASSESSMENT — PAIN SCALES - WONG BAKER
WONGBAKER_NUMERICALRESPONSE: NO HURT

## 2024-11-03 ASSESSMENT — PAIN DESCRIPTION - DESCRIPTORS
DESCRIPTORS: ACHING
DESCRIPTORS: SORE

## 2024-11-03 ASSESSMENT — PAIN DESCRIPTION - LOCATION
LOCATION: ABDOMEN
LOCATION: BUTTOCKS

## 2024-11-03 ASSESSMENT — PAIN DESCRIPTION - ORIENTATION: ORIENTATION: MID

## 2024-11-03 ASSESSMENT — PAIN SCALES - GENERAL
PAINLEVEL_OUTOF10: 0
PAINLEVEL_OUTOF10: 2
PAINLEVEL_OUTOF10: 0
PAINLEVEL_OUTOF10: 5

## 2024-11-03 ASSESSMENT — PAIN - FUNCTIONAL ASSESSMENT: PAIN_FUNCTIONAL_ASSESSMENT: PREVENTS OR INTERFERES SOME ACTIVE ACTIVITIES AND ADLS

## 2024-11-03 NOTE — PROGRESS NOTES
Central Line  PN-Adult Premix 5/20 - Standard Electrolytes - Central Line    DVT Prophylaxis: [x]PPx LMWH  []SQ Heparin  []IPC/SCDs  []Eliquis  []Xarelto  []Coumadin  []Other -      Code status: Full Code    PT/OT Eval Status:   []NOT yet ordered  []Ordered and Pending   []Seen with Recommendations for:   []Home independently  []Home w/ assist  []HHC  []SNF  []Acute Rehab    Multi-Disciplinary Rounds with Case Management completed on 11/3/2024 with the following recommendations:  Anticipated Discharge Location: [x]Home  []HHC  []SNF  []Acute Rehab  []LTAC  []Hospice  []Other -    Anticipated Discharge Day/Date:  3-5 days  Barriers to Discharge: TPN  --------------------------------------------------    [facility]  (this section is meant as an aid to accurate billing and as a supplement to ongoing patient care which is may be documented elsewhere in this note and the medical record)    [] High (any 2)    A. Problems (any 1)  [] Acute/Chronic Illness/injury posing ongoing threat to life and/or bodily function w/out ongoing treatment due to:    [] Severe exacerbation of chronic illness:    --------------------------------------------------  B. Risk of Treatment (any 1)   [] Drugs/treatments that require intensive monitoring for toxicity    [] IV ABX (Vancomycin, Aminoglycosides)     [] Post-Cath/Contrast study requiring serial monitoring  [] IV Narcotic analgesia   [] Aggressive IV diuresis  [] Hypertonic Saline   [] Critical electrolyte abnormalities requiring IV replacement  [] Insulin - Scheduled/SSI or Insulin gtt  [] Anticoagulation (Heparin gtt or Coumadin - other anticoagulants in special circumstances)   [] Cardiac Medications (IV Amiodarone, Tikosyn)  [] Hemodialysis  [] Other -  [] Change in code status:    [] Decision to escalate care:    [] Major surgery/procedure with associated risk factors:    --------------------------------------------------  C. Data (any 2)  [] Data Review (any 3)  [] Consultant notes from

## 2024-11-03 NOTE — PROGRESS NOTES
[facility] GENERAL SURGERY    Surgery Progress Note           POD # 12    PATIENT NAME: Sudha Lopez     TODAY'S DATE: 11/3/2024    INTERVAL HISTORY:    Pain improved, no nausea, having bms    OBJECTIVE:   VITALS:  /62   Pulse 73   Temp 98.8 °F (37.1 °C) (Axillary)   Resp 17   Ht 1.6 m (5' 2.99\")   Wt 82 kg (180 lb 12.4 oz)   SpO2 96%   BMI 32.03 kg/m²     INTAKE/OUTPUT:    I/O last 3 completed shifts:  In: 5872.8 [I.V.:2046; IV Piggyback:2405.1]  Out: 39 [Drains:39]  No intake/output data recorded.              CONSTITUTIONAL:  fatigued   ABDOMEN:   soft, non-distended, tender sachin minimal and less purulent  INCISION: staples intact, no drainage    Data:  CBC:   Recent Labs     11/01/24  0447 11/02/24  0516 11/03/24  0515   WBC 13.8* 11.4* 12.3*   HGB 8.1* 8.2* 8.5*   HCT 24.9* 24.5* 25.3*    446 529*       BMP:    Recent Labs     11/01/24  0447 11/02/24  0516 11/03/24  0515    138 141   K 2.8* 3.1*  3.1* 3.8  3.8    104 105   CO2 24 23 23   BUN 17 10 13   CREATININE 1.1 0.8 0.6   GLUCOSE 95 127* 92            ASSESSMENT AND PLAN:  78 y.o. female status post ileostomy reversal now with anastomotic leak     Remaining stable  Continue IV abx, TPN, NPO  Repeat contrast imaging Tuesday    Bertin Jackson MD    Surgery Staff

## 2024-11-03 NOTE — PROGRESS NOTES
4 Eyes Skin Assessment and Patient belongings     The patient is being assess for  Low Kirill    I agree that 2 Nurses have performed a thorough Head to Toe Skin Assessment on the patient. ALL assessment sites listed below have been assessed.       Areas assessed by both nurses:   [x]   Head, Face, and Ears   [x]   Shoulders, Back, and Chest  [x]   Arms, Elbows, and Hands   [x]   Coccyx, Sacrum, and IschIum  [x]   Legs, Feet, and Heels  Bilateral abrasions, dry and flaky on buttocks. Buttocks with redness, blanches. Old healing wound on left heel. Left anterior lower leg old healing wound. Surgical scars on ABD.        Does the Patient have Skin Breakdown?  Yes LDA WOUND CARE was Initiated documentation include the Darlyn-wound, Wound Assessment, Measurements, Dressing Treatment, Drainage, and Color\",         Kirill Prevention initiated:  Yes   Wound Care Orders initiated:  Yes (Previously)      WO nurse consulted for Pressure Injury (Stage 3,4, Unstageable, DTI, NWPT, and Complex wounds), New and Established Ostomies:  Yes      I agree that 2 Nurses have reviewed patient belongings with the patient/family and documented in the flowsheet upon admission or transfer to the unit.     Belongings  Dental Appliances: None  Vision - Corrective Lenses: None  Hearing Aid: None  Clothing: Footwear, Pants, Shirt, Socks, Undergarments  Jewelry: None  Body Piercings Removed: N/A  Electronic Devices: Cell Phone  Weapons (Notify Protective Services/Security): None  Other Valuables: Crutches, Purse  Home Medications: None  Valuables Given To: Other (Comment)  Provide Name(s) of Who Valuable(s) Were Given To: all belongings labeled and taken to pacu  Responsible person(s) in the waiting room: Lazara (daughter  Patient approves for provider to speak to responsible person post operatively: Yes       Nurse 1 eSignature: Electronically signed by OWEN YOUNG RN on 11/2/24 at 10:13 PM EDT    **SHARE this note so that the co-signing

## 2024-11-03 NOTE — PLAN OF CARE
Problem: Discharge Planning  Goal: Discharge to home or other facility with appropriate resources  11/2/2024 2202 by Ching Sanchez, RN  Outcome: Progressing  Flowsheets (Taken 11/2/2024 2202)  Discharge to home or other facility with appropriate resources:   Identify barriers to discharge with patient and caregiver   Arrange for needed discharge resources and transportation as appropriate   Identify discharge learning needs (meds, wound care, etc)  11/2/2024 1831 by Shante Sanchez, RN  Outcome: Progressing     Problem: Pain  Goal: Verbalizes/displays adequate comfort level or baseline comfort level  11/2/2024 2202 by Ching Sanchez RN  Outcome: Progressing  Flowsheets (Taken 10/30/2024 0805 by Naomi Perez RN)  Verbalizes/displays adequate comfort level or baseline comfort level:   Encourage patient to monitor pain and request assistance   Assess pain using appropriate pain scale   Administer analgesics based on type and severity of pain and evaluate response   Implement non-pharmacological measures as appropriate and evaluate response  11/2/2024 1831 by Shante Sanchez, RN  Outcome: Progressing

## 2024-11-03 NOTE — PROGRESS NOTES
Pt assessment completed and charted. VSS. Pt a/ox4. Pt brief changed, noted green urine in brief, Zinc pasted applied to buttocks. Bilateral dry, flaky skin tears noted on buttocks. Pt is a standby assist with walker. HALLIE drain with no drainage. Bed in lowest position and wheels locked. Call light within reach. Bedside table within reach. Non-skid socks in place. Pt denies any other needs at this time.  Pt calls out appropriately. Pt CHG bath was documented at 1539 during day shift.

## 2024-11-03 NOTE — PROGRESS NOTES
Patient educated on use of IS and demonstrates use Yes    Patient and family educated on incisional care and s/s of infection Yes    Patient and family educated on hand hygiene Yes    Patient and family educated on post operative care Yes   - Pain control    - Insulin Protocol     Day of surgery patient to side of bed or up to chair for minimum of 30 minutes Yes    POD 1 until discharge, patient up to chair by 9 am and TID. Goal to increase mobility  Yes    Patient performs oral hygiene with CHG oral solution until NG tube discontinued. NA    If patient does not have NG tube in place, patient to brush teeth and use mouth wash Q shift Yes    Patient receives daily bath and linen changes Refused    Patient receives CHG bath until roach discontinued NA    SCDs in place Pt declined    Patient receiving chemical VTE Yes

## 2024-11-04 LAB
ABO + RH BLD: NORMAL
ALBUMIN SERPL-MCNC: 3 G/DL (ref 3.4–5)
ALP SERPL-CCNC: 94 U/L (ref 40–129)
ALT SERPL-CCNC: 22 U/L (ref 10–40)
ANION GAP SERPL CALCULATED.3IONS-SCNC: 13 MMOL/L (ref 3–16)
AST SERPL-CCNC: 31 U/L (ref 15–37)
BACTERIA BLD CULT ORG #2: NORMAL
BACTERIA BLD CULT: NORMAL
BASOPHILS # BLD: 0.1 K/UL (ref 0–0.2)
BASOPHILS NFR BLD: 0.6 %
BILIRUB DIRECT SERPL-MCNC: <0.1 MG/DL (ref 0–0.3)
BILIRUB INDIRECT SERPL-MCNC: ABNORMAL MG/DL (ref 0–1)
BILIRUB SERPL-MCNC: <0.2 MG/DL (ref 0–1)
BLD GP AB SCN SERPL QL: NORMAL
BUN SERPL-MCNC: 19 MG/DL (ref 7–20)
CALCIUM SERPL-MCNC: 8.4 MG/DL (ref 8.3–10.6)
CHLORIDE SERPL-SCNC: 103 MMOL/L (ref 99–110)
CO2 SERPL-SCNC: 20 MMOL/L (ref 21–32)
CREAT SERPL-MCNC: 0.7 MG/DL (ref 0.6–1.2)
DEPRECATED RDW RBC AUTO: 13.9 % (ref 12.4–15.4)
EOSINOPHIL # BLD: 0.4 K/UL (ref 0–0.6)
EOSINOPHIL NFR BLD: 3 %
GFR SERPLBLD CREATININE-BSD FMLA CKD-EPI: 88 ML/MIN/{1.73_M2}
GLUCOSE BLD-MCNC: 101 MG/DL (ref 70–99)
GLUCOSE BLD-MCNC: 104 MG/DL (ref 70–99)
GLUCOSE BLD-MCNC: 115 MG/DL (ref 70–99)
GLUCOSE BLD-MCNC: 121 MG/DL (ref 70–99)
GLUCOSE BLD-MCNC: 126 MG/DL (ref 70–99)
GLUCOSE SERPL-MCNC: 97 MG/DL (ref 70–99)
HCT VFR BLD AUTO: 26.2 % (ref 36–48)
HGB BLD-MCNC: 8.7 G/DL (ref 12–16)
INR PPP: 1.06 (ref 0.85–1.15)
LYMPHOCYTES # BLD: 1.7 K/UL (ref 1–5.1)
LYMPHOCYTES NFR BLD: 13.8 %
MAGNESIUM SERPL-MCNC: 2.36 MG/DL (ref 1.8–2.4)
MCH RBC QN AUTO: 29.2 PG (ref 26–34)
MCHC RBC AUTO-ENTMCNC: 33.3 G/DL (ref 31–36)
MCV RBC AUTO: 87.8 FL (ref 80–100)
MONOCYTES # BLD: 1.4 K/UL (ref 0–1.3)
MONOCYTES NFR BLD: 11.6 %
NEUTROPHILS # BLD: 8.6 K/UL (ref 1.7–7.7)
NEUTROPHILS NFR BLD: 71 %
PERFORMED ON: ABNORMAL
PHOSPHATE SERPL-MCNC: 3.4 MG/DL (ref 2.5–4.9)
PLATELET # BLD AUTO: 541 K/UL (ref 135–450)
PMV BLD AUTO: 6.3 FL (ref 5–10.5)
POTASSIUM SERPL-SCNC: 3.6 MMOL/L (ref 3.5–5.1)
POTASSIUM SERPL-SCNC: 3.6 MMOL/L (ref 3.5–5.1)
PROT SERPL-MCNC: 6.3 G/DL (ref 6.4–8.2)
PROTHROMBIN TIME: 14 SEC (ref 11.9–14.9)
RBC # BLD AUTO: 2.98 M/UL (ref 4–5.2)
SODIUM SERPL-SCNC: 136 MMOL/L (ref 136–145)
WBC # BLD AUTO: 12.1 K/UL (ref 4–11)

## 2024-11-04 PROCEDURE — 84100 ASSAY OF PHOSPHORUS: CPT

## 2024-11-04 PROCEDURE — 80048 BASIC METABOLIC PNL TOTAL CA: CPT

## 2024-11-04 PROCEDURE — 85610 PROTHROMBIN TIME: CPT

## 2024-11-04 PROCEDURE — 6370000000 HC RX 637 (ALT 250 FOR IP): Performed by: SURGERY

## 2024-11-04 PROCEDURE — 86901 BLOOD TYPING SEROLOGIC RH(D): CPT

## 2024-11-04 PROCEDURE — 85025 COMPLETE CBC W/AUTO DIFF WBC: CPT

## 2024-11-04 PROCEDURE — 2580000003 HC RX 258: Performed by: SURGERY

## 2024-11-04 PROCEDURE — APPNB45 APP NON BILLABLE 31-45 MINUTES: Performed by: CLINICAL NURSE SPECIALIST

## 2024-11-04 PROCEDURE — 6360000002 HC RX W HCPCS: Performed by: SURGERY

## 2024-11-04 PROCEDURE — 6360000002 HC RX W HCPCS: Performed by: STUDENT IN AN ORGANIZED HEALTH CARE EDUCATION/TRAINING PROGRAM

## 2024-11-04 PROCEDURE — 80076 HEPATIC FUNCTION PANEL: CPT

## 2024-11-04 PROCEDURE — 2500000003 HC RX 250 WO HCPCS: Performed by: SURGERY

## 2024-11-04 PROCEDURE — 86900 BLOOD TYPING SEROLOGIC ABO: CPT

## 2024-11-04 PROCEDURE — 83735 ASSAY OF MAGNESIUM: CPT

## 2024-11-04 PROCEDURE — 86850 RBC ANTIBODY SCREEN: CPT

## 2024-11-04 PROCEDURE — 6370000000 HC RX 637 (ALT 250 FOR IP): Performed by: STUDENT IN AN ORGANIZED HEALTH CARE EDUCATION/TRAINING PROGRAM

## 2024-11-04 PROCEDURE — 1200000000 HC SEMI PRIVATE

## 2024-11-04 PROCEDURE — 99024 POSTOP FOLLOW-UP VISIT: CPT | Performed by: SURGERY

## 2024-11-04 RX ADMIN — FAMOTIDINE 20 MG: 20 TABLET, FILM COATED ORAL at 09:05

## 2024-11-04 RX ADMIN — I.V. FAT EMULSION 250 ML: 20 EMULSION INTRAVENOUS at 16:53

## 2024-11-04 RX ADMIN — SODIUM CHLORIDE, PRESERVATIVE FREE 10 ML: 5 INJECTION INTRAVENOUS at 09:05

## 2024-11-04 RX ADMIN — FLUTICASONE PROPIONATE 1 SPRAY: 50 SPRAY, METERED NASAL at 09:06

## 2024-11-04 RX ADMIN — LINEZOLID 600 MG: 600 INJECTION, SOLUTION INTRAVENOUS at 01:46

## 2024-11-04 RX ADMIN — BUPROPION HYDROCHLORIDE 300 MG: 150 TABLET, EXTENDED RELEASE ORAL at 09:04

## 2024-11-04 RX ADMIN — IBUPROFEN 400 MG: 400 TABLET, FILM COATED ORAL at 11:52

## 2024-11-04 RX ADMIN — LATANOPROST 1 DROP: 50 SOLUTION OPHTHALMIC at 20:32

## 2024-11-04 RX ADMIN — PIPERACILLIN AND TAZOBACTAM 3375 MG: 3; .375 INJECTION, POWDER, LYOPHILIZED, FOR SOLUTION INTRAVENOUS at 10:41

## 2024-11-04 RX ADMIN — ENOXAPARIN SODIUM 40 MG: 100 INJECTION SUBCUTANEOUS at 09:05

## 2024-11-04 RX ADMIN — LINEZOLID 600 MG: 600 INJECTION, SOLUTION INTRAVENOUS at 11:51

## 2024-11-04 RX ADMIN — FLUOXETINE HYDROCHLORIDE 40 MG: 20 CAPSULE ORAL at 09:04

## 2024-11-04 RX ADMIN — ATORVASTATIN CALCIUM 10 MG: 10 TABLET, FILM COATED ORAL at 09:05

## 2024-11-04 RX ADMIN — TRAZODONE HYDROCHLORIDE 100 MG: 50 TABLET ORAL at 20:29

## 2024-11-04 RX ADMIN — PIPERACILLIN AND TAZOBACTAM 3375 MG: 3; .375 INJECTION, POWDER, LYOPHILIZED, FOR SOLUTION INTRAVENOUS at 03:53

## 2024-11-04 RX ADMIN — PIPERACILLIN AND TAZOBACTAM 3375 MG: 3; .375 INJECTION, POWDER, LYOPHILIZED, FOR SOLUTION INTRAVENOUS at 18:02

## 2024-11-04 RX ADMIN — SODIUM CHLORIDE, PRESERVATIVE FREE 10 ML: 5 INJECTION INTRAVENOUS at 20:30

## 2024-11-04 ASSESSMENT — PAIN DESCRIPTION - LOCATION
LOCATION: ABDOMEN

## 2024-11-04 ASSESSMENT — PAIN DESCRIPTION - ORIENTATION: ORIENTATION: MID

## 2024-11-04 ASSESSMENT — PAIN SCALES - GENERAL
PAINLEVEL_OUTOF10: 3
PAINLEVEL_OUTOF10: 2
PAINLEVEL_OUTOF10: 2
PAINLEVEL_OUTOF10: 1
PAINLEVEL_OUTOF10: 4
PAINLEVEL_OUTOF10: 2

## 2024-11-04 ASSESSMENT — PAIN DESCRIPTION - DESCRIPTORS
DESCRIPTORS: ACHING
DESCRIPTORS: ACHING

## 2024-11-04 NOTE — CARE COORDINATION
Hospital day 13: Patient on C3 care managed by General Surgery and IM. Patient from home, reports will have family stay with at dc. Plans for repeat imagining in am. Currently NPO/TPN. SW following.ADILSON Bartholomew

## 2024-11-04 NOTE — PROGRESS NOTES
[facility] GENERAL SURGERY    Surgery Progress Note           POD # 13    PATIENT NAME: Sudha Lopez     TODAY'S DATE: 11/4/2024    INTERVAL HISTORY:    Sitting side of bed, no acute events. Hungry. Having bowel movements.     OBJECTIVE:   VITALS:  /71   Pulse 81   Temp 98.9 °F (37.2 °C) (Oral)   Resp 17   Ht 1.6 m (5' 2.99\")   Wt 73.7 kg (162 lb 6.4 oz)   SpO2 97%   BMI 28.78 kg/m²     INTAKE/OUTPUT:    I/O last 3 completed shifts:  In: 2452.1 [I.V.:61.9; IV Piggyback:401.7]  Out: 89 [Urine:75; Drains:14]  No intake/output data recorded.              CONSTITUTIONAL:  awake and alert  ABDOMEN:   soft, non-distended, tender sachin minimal and less purulent  INCISION: staples intact, no drainage    Data:  CBC:   Recent Labs     11/02/24  0516 11/03/24  0515 11/04/24  0420   WBC 11.4* 12.3* 12.1*   HGB 8.2* 8.5* 8.7*   HCT 24.5* 25.3* 26.2*    529* 541*       BMP:    Recent Labs     11/02/24  0516 11/03/24  0515 11/04/24  0420    141 136   K 3.1*  3.1* 3.8  3.8 3.6  3.6    105 103   CO2 23 23 20*   BUN 10 13 19   CREATININE 0.8 0.6 0.7   GLUCOSE 127* 92 97            ASSESSMENT AND PLAN:  78 y.o. female status post ileostomy reversal now with anastomotic leak     Remaining stable  Continue IV abx, TPN, NPO  Plan for repeat contrast study tomorrow.     Kelsea Vizcarra, APRN - CNP          Patient seen and agree with above and more than half of the total time was spent by me on the encounter.   Pain minimal and no fevers.  Repeat imaging tomorrow  Continue abx and TPN  Addi Jackson MD

## 2024-11-04 NOTE — PROGRESS NOTES
.  4 Eyes Skin Assessment and Patient belongings     The patient is being assess for  Shift Handoff    I agree that 2 Nurses have performed a thorough Head to Toe Skin Assessment on the patient. ALL assessment sites listed below have been assessed.       Areas assessed by both nurses: Shante RN and Miley RN  [x]   Head, Face, and Ears   [x]   Shoulders, Back, and Chest  [x]   Arms, Elbows, and Hands   [x]   Coccyx, Sacrum, and IschIum  [x]   Legs, Feet, and Heels        Does the Patient have Skin Breakdown?  Yes LDA WOUND CARE was Initiated documentation include the Darlyn-wound, Wound Assessment, Measurements, Dressing Treatment, Drainage, and Color\",         Kirill Prevention initiated:  Yes   Wound Care Orders initiated:  Yes      WOC nurse consulted for Pressure Injury (Stage 3,4, Unstageable, DTI, NWPT, and Complex wounds), New and Established Ostomies:  No      I agree that 2 Nurses have reviewed patient belongings with the patient/family and documented in the flowsheet upon admission or transfer to the unit.     Belongings  Dental Appliances: None  Vision - Corrective Lenses: None  Hearing Aid: None  Clothing: Footwear, Pants, Shirt, Socks, Undergarments  Jewelry: None  Body Piercings Removed: N/A  Electronic Devices: Cell Phone  Weapons (Notify Protective Services/Security): None  Other Valuables: Crutches, Purse  Home Medications: None  Valuables Given To: Other (Comment)  Provide Name(s) of Who Valuable(s) Were Given To: all belongings labeled and taken to pacu  Responsible person(s) in the waiting room: Lazara (daughter  Patient approves for provider to speak to responsible person post operatively: Yes       Nurse 1 eSignature: Electronically signed by Shante Sanchez RN on 11/3/24 at 7:00 PM EST    **SHARE this note so that the co-signing nurse is able to place an eSignature**    Nurse 2 eSignature: Electronically signed by Miley Smith RN on 11/3/24 at 7:31 PM EST

## 2024-11-04 NOTE — PROGRESS NOTES
Pt alert and orientated. Call light within reach. No complaints at this time. Bed alarm on and working.Shante Sanchez RN

## 2024-11-05 ENCOUNTER — APPOINTMENT (OUTPATIENT)
Dept: CT IMAGING | Age: 79
DRG: 329 | End: 2024-11-05
Attending: SURGERY
Payer: MEDICARE

## 2024-11-05 ENCOUNTER — APPOINTMENT (OUTPATIENT)
Dept: GENERAL RADIOLOGY | Age: 79
DRG: 329 | End: 2024-11-05
Attending: SURGERY
Payer: MEDICARE

## 2024-11-05 LAB
ANION GAP SERPL CALCULATED.3IONS-SCNC: 11 MMOL/L (ref 3–16)
BASOPHILS # BLD: 0.1 K/UL (ref 0–0.2)
BASOPHILS NFR BLD: 0.8 %
BUN SERPL-MCNC: 23 MG/DL (ref 7–20)
CALCIUM SERPL-MCNC: 8.4 MG/DL (ref 8.3–10.6)
CHLORIDE SERPL-SCNC: 104 MMOL/L (ref 99–110)
CO2 SERPL-SCNC: 22 MMOL/L (ref 21–32)
CREAT SERPL-MCNC: 0.7 MG/DL (ref 0.6–1.2)
DEPRECATED RDW RBC AUTO: 15 % (ref 12.4–15.4)
EOSINOPHIL # BLD: 0.4 K/UL (ref 0–0.6)
EOSINOPHIL NFR BLD: 3.5 %
GFR SERPLBLD CREATININE-BSD FMLA CKD-EPI: 88 ML/MIN/{1.73_M2}
GLUCOSE BLD-MCNC: 111 MG/DL (ref 70–99)
GLUCOSE BLD-MCNC: 112 MG/DL (ref 70–99)
GLUCOSE BLD-MCNC: 120 MG/DL (ref 70–99)
GLUCOSE BLD-MCNC: 99 MG/DL (ref 70–99)
GLUCOSE SERPL-MCNC: 103 MG/DL (ref 70–99)
HCT VFR BLD AUTO: 27.1 % (ref 36–48)
HGB BLD-MCNC: 8 G/DL (ref 12–16)
LYMPHOCYTES # BLD: 1.6 K/UL (ref 1–5.1)
LYMPHOCYTES NFR BLD: 15.2 %
MAGNESIUM SERPL-MCNC: 2.36 MG/DL (ref 1.8–2.4)
MCH RBC QN AUTO: 29.3 PG (ref 26–34)
MCHC RBC AUTO-ENTMCNC: 29.6 G/DL (ref 31–36)
MCV RBC AUTO: 99.2 FL (ref 80–100)
MONOCYTES # BLD: 1.4 K/UL (ref 0–1.3)
MONOCYTES NFR BLD: 13.1 %
NEUTROPHILS # BLD: 7.1 K/UL (ref 1.7–7.7)
NEUTROPHILS NFR BLD: 67.4 %
PERFORMED ON: ABNORMAL
PERFORMED ON: NORMAL
PHOSPHATE SERPL-MCNC: 3.4 MG/DL (ref 2.5–4.9)
PLATELET # BLD AUTO: 505 K/UL (ref 135–450)
PMV BLD AUTO: 6.5 FL (ref 5–10.5)
POTASSIUM SERPL-SCNC: 3.9 MMOL/L (ref 3.5–5.1)
RBC # BLD AUTO: 2.73 M/UL (ref 4–5.2)
REASON FOR REJECTION: NORMAL
REJECTED TEST: NORMAL
SODIUM SERPL-SCNC: 137 MMOL/L (ref 136–145)
WBC # BLD AUTO: 10.5 K/UL (ref 4–11)

## 2024-11-05 PROCEDURE — 2580000003 HC RX 258: Performed by: INTERNAL MEDICINE

## 2024-11-05 PROCEDURE — 83735 ASSAY OF MAGNESIUM: CPT

## 2024-11-05 PROCEDURE — 74176 CT ABD & PELVIS W/O CONTRAST: CPT

## 2024-11-05 PROCEDURE — 80048 BASIC METABOLIC PNL TOTAL CA: CPT

## 2024-11-05 PROCEDURE — 1200000000 HC SEMI PRIVATE

## 2024-11-05 PROCEDURE — 6370000000 HC RX 637 (ALT 250 FOR IP): Performed by: STUDENT IN AN ORGANIZED HEALTH CARE EDUCATION/TRAINING PROGRAM

## 2024-11-05 PROCEDURE — 97116 GAIT TRAINING THERAPY: CPT

## 2024-11-05 PROCEDURE — 99024 POSTOP FOLLOW-UP VISIT: CPT | Performed by: SURGERY

## 2024-11-05 PROCEDURE — 6360000002 HC RX W HCPCS: Performed by: SURGERY

## 2024-11-05 PROCEDURE — 97530 THERAPEUTIC ACTIVITIES: CPT

## 2024-11-05 PROCEDURE — 2500000003 HC RX 250 WO HCPCS: Performed by: SURGERY

## 2024-11-05 PROCEDURE — 6360000002 HC RX W HCPCS: Performed by: STUDENT IN AN ORGANIZED HEALTH CARE EDUCATION/TRAINING PROGRAM

## 2024-11-05 PROCEDURE — 2709999900 FL BARIUM ENEMA W AIR CONTRAST

## 2024-11-05 PROCEDURE — 84100 ASSAY OF PHOSPHORUS: CPT

## 2024-11-05 PROCEDURE — 2580000003 HC RX 258: Performed by: SURGERY

## 2024-11-05 PROCEDURE — 6370000000 HC RX 637 (ALT 250 FOR IP): Performed by: SURGERY

## 2024-11-05 PROCEDURE — 85025 COMPLETE CBC W/AUTO DIFF WBC: CPT

## 2024-11-05 RX ORDER — LOPERAMIDE HCL 1 MG/7.5ML
2 SOLUTION ORAL 4 TIMES DAILY PRN
Status: DISCONTINUED | OUTPATIENT
Start: 2024-11-05 | End: 2024-11-12 | Stop reason: HOSPADM

## 2024-11-05 RX ORDER — LORAZEPAM 2 MG/ML
0.5 INJECTION INTRAMUSCULAR ONCE
Status: COMPLETED | OUTPATIENT
Start: 2024-11-05 | End: 2024-11-05

## 2024-11-05 RX ORDER — 0.9 % SODIUM CHLORIDE 0.9 %
500 INTRAVENOUS SOLUTION INTRAVENOUS ONCE
Status: COMPLETED | OUTPATIENT
Start: 2024-11-05 | End: 2024-11-05

## 2024-11-05 RX ADMIN — ACETAMINOPHEN 650 MG: 325 TABLET ORAL at 12:35

## 2024-11-05 RX ADMIN — SODIUM CHLORIDE, PRESERVATIVE FREE 10 ML: 5 INJECTION INTRAVENOUS at 10:30

## 2024-11-05 RX ADMIN — ENOXAPARIN SODIUM 40 MG: 100 INJECTION SUBCUTANEOUS at 12:35

## 2024-11-05 RX ADMIN — LOPERAMIDE HYDROCHLORIDE 2 MG: 1 SOLUTION ORAL at 23:13

## 2024-11-05 RX ADMIN — LORAZEPAM 0.5 MG: 2 INJECTION INTRAMUSCULAR; INTRAVENOUS at 10:02

## 2024-11-05 RX ADMIN — PIPERACILLIN AND TAZOBACTAM 3375 MG: 3; .375 INJECTION, POWDER, LYOPHILIZED, FOR SOLUTION INTRAVENOUS at 04:06

## 2024-11-05 RX ADMIN — PIPERACILLIN AND TAZOBACTAM 3375 MG: 3; .375 INJECTION, POWDER, LYOPHILIZED, FOR SOLUTION INTRAVENOUS at 18:46

## 2024-11-05 RX ADMIN — ATORVASTATIN CALCIUM 10 MG: 10 TABLET, FILM COATED ORAL at 12:36

## 2024-11-05 RX ADMIN — ACETAMINOPHEN 650 MG: 325 TABLET ORAL at 18:20

## 2024-11-05 RX ADMIN — BUPROPION HYDROCHLORIDE 300 MG: 150 TABLET, EXTENDED RELEASE ORAL at 12:36

## 2024-11-05 RX ADMIN — SODIUM CHLORIDE, PRESERVATIVE FREE 10 ML: 5 INJECTION INTRAVENOUS at 21:49

## 2024-11-05 RX ADMIN — LINEZOLID 600 MG: 600 INJECTION, SOLUTION INTRAVENOUS at 13:01

## 2024-11-05 RX ADMIN — LOPERAMIDE HYDROCHLORIDE 2 MG: 1 SOLUTION ORAL at 18:58

## 2024-11-05 RX ADMIN — SODIUM CHLORIDE 500 ML: 9 INJECTION, SOLUTION INTRAVENOUS at 16:02

## 2024-11-05 RX ADMIN — ASCORBIC ACID, VITAMIN A PALMITATE, CHOLECALCIFEROL, THIAMINE HYDROCHLORIDE, RIBOFLAVIN-5 PHOSPHATE SODIUM, PYRIDOXINE HYDROCHLORIDE, NIACINAMIDE, DEXPANTHENOL, ALPHA-TOCOPHEROL ACETATE, VITAMIN K1, FOLIC ACID, BIOTIN, CYANOCOBALAMIN: 200; 3300; 200; 6; 3.6; 6; 40; 15; 10; 150; 600; 60; 5 INJECTION, SOLUTION INTRAVENOUS at 16:52

## 2024-11-05 RX ADMIN — FLUOXETINE HYDROCHLORIDE 40 MG: 20 CAPSULE ORAL at 12:57

## 2024-11-05 RX ADMIN — ACETAMINOPHEN 650 MG: 325 TABLET ORAL at 23:13

## 2024-11-05 RX ADMIN — HYDROMORPHONE HYDROCHLORIDE 0.5 MG: 1 INJECTION, SOLUTION INTRAMUSCULAR; INTRAVENOUS; SUBCUTANEOUS at 10:00

## 2024-11-05 RX ADMIN — PIPERACILLIN AND TAZOBACTAM 3375 MG: 3; .375 INJECTION, POWDER, LYOPHILIZED, FOR SOLUTION INTRAVENOUS at 12:35

## 2024-11-05 RX ADMIN — LINEZOLID 600 MG: 600 INJECTION, SOLUTION INTRAVENOUS at 00:55

## 2024-11-05 RX ADMIN — FAMOTIDINE 20 MG: 20 TABLET, FILM COATED ORAL at 12:36

## 2024-11-05 RX ADMIN — FLUTICASONE PROPIONATE 1 SPRAY: 50 SPRAY, METERED NASAL at 12:42

## 2024-11-05 RX ADMIN — IBUPROFEN 400 MG: 400 TABLET, FILM COATED ORAL at 18:20

## 2024-11-05 RX ADMIN — LATANOPROST 1 DROP: 50 SOLUTION OPHTHALMIC at 21:48

## 2024-11-05 RX ADMIN — TRAZODONE HYDROCHLORIDE 100 MG: 50 TABLET ORAL at 21:46

## 2024-11-05 ASSESSMENT — PAIN SCALES - GENERAL
PAINLEVEL_OUTOF10: 3
PAINLEVEL_OUTOF10: 0
PAINLEVEL_OUTOF10: 3
PAINLEVEL_OUTOF10: 4

## 2024-11-05 ASSESSMENT — PAIN DESCRIPTION - LOCATION
LOCATION: ABDOMEN

## 2024-11-05 ASSESSMENT — PAIN DESCRIPTION - ORIENTATION: ORIENTATION: MID

## 2024-11-05 ASSESSMENT — PAIN DESCRIPTION - DESCRIPTORS: DESCRIPTORS: ACHING

## 2024-11-05 ASSESSMENT — PAIN - FUNCTIONAL ASSESSMENT: PAIN_FUNCTIONAL_ASSESSMENT: PREVENTS OR INTERFERES SOME ACTIVE ACTIVITIES AND ADLS

## 2024-11-05 NOTE — PROGRESS NOTES
[facility] GENERAL SURGERY    Surgery Progress Note           POD # 14    PATIENT NAME: Sudha Lopez     TODAY'S DATE: 11/5/2024    INTERVAL HISTORY:    Minimal discomfort,     OBJECTIVE:   VITALS:  /74   Pulse 75   Temp 98.1 °F (36.7 °C) (Oral)   Resp 20   Ht 1.6 m (5' 2.99\")   Wt 73.7 kg (162 lb 6.4 oz)   SpO2 99%   BMI 28.78 kg/m²     INTAKE/OUTPUT:    I/O last 3 completed shifts:  In: 381   Out: 0   No intake/output data recorded.              CONSTITUTIONAL:  awake and alert  ABDOMEN:   soft, non-distended, tender sachin minimal and less purulent  INCISION: staples intact, no drainage    Data:  CBC:   Recent Labs     11/03/24  0515 11/04/24 0420 11/05/24  0650   WBC 12.3* 12.1* 10.5   HGB 8.5* 8.7* 8.0*   HCT 25.3* 26.2* 27.1*   * 541* 505*       BMP:    Recent Labs     11/03/24 0515 11/04/24 0420 11/05/24  0806    136 137   K 3.8  3.8 3.6  3.6 3.9    103 104   CO2 23 20* 22   BUN 13 19 23*   CREATININE 0.6 0.7 0.7   GLUCOSE 92 97 103*            ASSESSMENT AND PLAN:  78 y.o. female status post ileostomy reversal now with anastomotic leak     Remaining stable  CT reviewed with Radiology and discussed results with patient.  Improving with only \"punctate\" contrast leaking and appears controlled by drain.  Will continue with current management (ie as controlled fistula).  IV abx, TPN, start clearl iquids.  Discussed home TPN at discharge for a period of time followed by repeat studies to assess for full healing.    Bertin Jackson MD

## 2024-11-05 NOTE — PROGRESS NOTES
to provide 2000 ml TV, 1760 kcal, 100 gm AA, 400 gm dextrose + 250 ml 20% lipids biweekly to provide an additional 143 kcal per day. GIR = 3.73mg/kg/min.  Goal PN Orders Provides: Clinimix 5/20 at goal rate of 83.33 ml/hr to provide 2000 ml TV, 1760 kcal, 100 gm AA, 400 gm dextrose + 250 ml 20% lipids biweekly to provide an additional 143 kcal per day. GIR = 3.73mg/kg/min.    Anthropometric Measures:  Height: 160 cm (5' 2.99\")  Ideal Body Weight (IBW): 115 lbs (52 kg)    Admission Body Weight: 74.4 kg (164 lb)  Current Body Weight: 73.7 kg (162 lb 7.7 oz) (11/4/24), 141.3 % IBW. Weight Source: Standing scale  Current BMI (kg/m2): 28.8  Usual Body Weight: 83.9 kg (184 lb 15.5 oz) (wt ~5 months ago)     % Weight Change (Calculated): -11.3  Weight Adjustment For: No Adjustment        BMI Categories: Overweight (BMI 25.0-29.9)    Estimated Daily Nutrient Needs:  Energy Requirements Based On: Formula  Weight Used for Energy Requirements: Current  Energy (kcal/day): 1790 kcals (MSJ x1.3AF, x1.2 SF for post-surgical)  Weight Used for Protein Requirements: Current  Protein (g/day): 112g (1.5g/kg, post-surgical; 25% of kcals from pro)  Method Used for Fluid Requirements: 1 ml/kcal  Fluid (ml/day): 1790ml    Nutrition Diagnosis:   Moderate malnutrition, in context of acute illness or injury related to altered GI function, Altered GI structure, inadequate protein-energy intake, nausea/vomiting/diarrhea as evidenced by nutrition support - parenteral nutrition, nausea, GI abnormality, NPO or clear liquid status due to medical condition    Nutrition Interventions:   Food and/or Nutrient Delivery: Continue Current Diet, Continue Current Parenteral Nutrition  Nutrition Education/Counseling: No recommendation at this time  Coordination of Nutrition Care: Continue to monitor while inpatient  Plan of Care discussed with: patient    Goals:  Goals: PO intake 50% or greater, Tolerate nutrition support at goal rate, by next RD

## 2024-11-05 NOTE — PROGRESS NOTES
4 Eyes Skin Assessment and Patient belongings     The patient is being assess for  Shift Handoff    I agree that 2 Nurses have performed a thorough Head to Toe Skin Assessment on the patient. ALL assessment sites listed below have been assessed.       Areas assessed by both nurses: Shante RN and Miley Rn  [x]   Head, Face, and Ears   [x]   Shoulders, Back, and Chest  [x]   Arms, Elbows, and Hands   [x]   Coccyx, Sacrum, and IschIum  [x]   Legs, Feet, and Heels        Does the Patient have Skin Breakdown?  Yes LDA WOUND CARE was Initiated documentation include the Darlyn-wound, Wound Assessment, Measurements, Dressing Treatment, Drainage, and Color\",         Kirill Prevention initiated:  Yes   Wound Care Orders initiated:  Yes      WOC nurse consulted for Pressure Injury (Stage 3,4, Unstageable, DTI, NWPT, and Complex wounds), New and Established Ostomies:  Yes      I agree that 2 Nurses have reviewed patient belongings with the patient/family and documented in the flowsheet upon admission or transfer to the unit.     Belongings  Dental Appliances: None  Vision - Corrective Lenses: None  Hearing Aid: None  Clothing: Footwear, Pants, Shirt, Socks, Undergarments  Jewelry: None  Body Piercings Removed: N/A  Electronic Devices: Cell Phone  Weapons (Notify Protective Services/Security): None  Other Valuables: Crutches, Purse  Home Medications: None  Valuables Given To: Other (Comment)  Provide Name(s) of Who Valuable(s) Were Given To: all belongings labeled and taken to pacu  Responsible person(s) in the waiting room: Lazara (daughter  Patient approves for provider to speak to responsible person post operatively: Yes       Nurse 1 eSignature: Electronically signed by Shante Sanchez RN on 11/5/24 at 8:20 PM EST    **SHARE this note so that the co-signing nurse is able to place an eSignature**    Nurse 2 eSignature: Electronically signed by Miley Smith RN on 11/5/24 at 4:56 PM EST

## 2024-11-05 NOTE — PLAN OF CARE
Problem: Pain  Goal: Verbalizes/displays adequate comfort level or baseline comfort level  11/5/2024 1441 by Shante Sanchez RN  Outcome: Progressing  11/5/2024 0113 by Flavio Serrano Jr, RN  Outcome: Progressing  11/5/2024 0103 by Flavio Serrano Jr, RN  Outcome: Progressing  Flowsheets (Taken 10/30/2024 0805 by Naomi Perez RN)  Verbalizes/displays adequate comfort level or baseline comfort level:   Encourage patient to monitor pain and request assistance   Assess pain using appropriate pain scale   Administer analgesics based on type and severity of pain and evaluate response   Implement non-pharmacological measures as appropriate and evaluate response     Problem: Safety - Adult  Goal: Free from fall injury  11/5/2024 1441 by Shante Sanchez, RN  Outcome: Progressing  11/5/2024 0113 by Flavio Serrano Jr, RN  Outcome: Progressing  11/5/2024 0103 by Flavio Serrano Jr, RN  Outcome: Progressing  Flowsheets (Taken 11/1/2024 2059 by Cristiane Estes, RN)  Free From Fall Injury: Instruct family/caregiver on patient safety     Problem: Nutrition Deficit:  Goal: Optimize nutritional status  11/5/2024 1441 by Shante Sanchez RN  Outcome: Progressing  11/5/2024 0113 by Flavio Serrano Jr, RN  Outcome: Progressing  11/5/2024 0103 by Flavio Serrano Jr, RN  Outcome: Progressing

## 2024-11-05 NOTE — CARE COORDINATION
Hospital day 14: Patient on C3  care managed by General Surgery and IM. Patient from home. Reviewed MD recs for home TPN. Patient verbalized understanding and agreeable. Patient request referral to [facility] as used in the past referral sent to [facility] for home infusion. SW following.ADILSON Bartholomew

## 2024-11-05 NOTE — DISCHARGE INSTR - COC
Dressing/Treatment Zinc paste 11/06/24 0909   Number of days: 3     Incision 10/22/24 Abdomen Medial (Active)   Wound Image   11/06/24 1121   Dressing Status Dry 11/06/24 0909   Incision Cleansed Not Cleansed 11/06/24 1121   Dressing/Treatment Open to air 11/06/24 1121   Incision Length (cm) 1.5 11/06/24 1121   Incision Width (cm) 0 cm 11/06/24 1121   Incision Depth (cm) 0 cm 11/06/24 1121   Incision Volume (cm^3) 0 cm^3 11/06/24 1121   Closure Surgical glue 11/06/24 1121   Margins Approximated 11/06/24 1121   Incision Assessment Dry 11/06/24 1121   Drainage Amount None (dry) 11/06/24 1121   Odor None 11/06/24 1121   Darlyn-incision Assessment Dry/flaky;Intact 11/06/24 1121   Number of days: 15      Mid abd incision:       Incision 10/22/24 Abdomen Right;Upper (Active)   Wound Image   11/06/24 1121   Dressing Status Intact 11/06/24 1121   Incision Cleansed Not Cleansed 11/06/24 1121   Dressing/Treatment Open to air 11/06/24 1121   Incision Length (cm) 0 11/06/24 1121   Incision Width (cm) 5 cm 11/06/24 1121   Incision Depth (cm) 0 cm 11/06/24 1121   Incision Volume (cm^3) 0 cm^3 11/06/24 1121   Closure Surgical glue 11/06/24 1121   Margins Approximated 11/06/24 1121   Incision Assessment Dry 11/06/24 1121   Drainage Amount None (dry) 11/06/24 1121   Drainage Description Sanguinous 11/06/24 0909   Odor None 11/06/24 1121   Darlyn-incision Assessment Intact 11/06/24 1121   Number of days: 15     RUQ incision:         Elimination:  Continence:   Bowel: Yes  Bladder: Yes  Urinary Catheter: None   Colostomy/Ileostomy/Ileal Conduit: No       Date of Last BM: 11/12/2024    Intake/Output Summary (Last 24 hours) at 11/5/2024 1507  Last data filed at 11/5/2024 1030  Gross per 24 hour   Intake 20 ml   Output --   Net 20 ml     I/O last 3 completed shifts:  In: 381   Out: 0     Safety Concerns:     At Risk for Falls    Impairments/Disabilities:      None    Nutrition Therapy:  Current Nutrition Therapy:   - Oral Diet:  Clear

## 2024-11-05 NOTE — PLAN OF CARE
Problem: Pain  Goal: Verbalizes/displays adequate comfort level or baseline comfort level  11/5/2024 0113 by Flavio Serrano Jr, RN  Outcome: Progressing  11/5/2024 0103 by Flavio Serrano Jr, RN  Outcome: Progressing  Flowsheets (Taken 10/30/2024 0805 by Naomi Perez, RN)  Verbalizes/displays adequate comfort level or baseline comfort level:   Encourage patient to monitor pain and request assistance   Assess pain using appropriate pain scale   Administer analgesics based on type and severity of pain and evaluate response   Implement non-pharmacological measures as appropriate and evaluate response     Problem: Safety - Adult  Goal: Free from fall injury  11/5/2024 0113 by Flavio Serrano Jr, RN  Outcome: Progressing  11/5/2024 0103 by Flavio Serrano Jr, RN  Outcome: Progressing  Flowsheets (Taken 11/1/2024 2059 by Cristiane Estes, RN)  Free From Fall Injury: Instruct family/caregiver on patient safety     Problem: ABCDS Injury Assessment  Goal: Absence of physical injury  11/5/2024 0113 by Flavio Serrano Jr, RN  Outcome: Progressing  11/5/2024 0103 by Flavio Serrano Jr, RN  Outcome: Progressing     Problem: Skin/Tissue Integrity  Goal: Absence of new skin breakdown  Description: 1.  Monitor for areas of redness and/or skin breakdown  2.  Assess vascular access sites hourly  3.  Every 4-6 hours minimum:  Change oxygen saturation probe site  4.  Every 4-6 hours:  If on nasal continuous positive airway pressure, respiratory therapy assess nares and determine need for appliance change or resting period.  11/5/2024 0113 by Flavio Serrano Jr, RN  Outcome: Progressing  11/5/2024 0103 by Flavio eSrrano Jr, RN  Outcome: Progressing     Problem: Nutrition Deficit:  Goal: Optimize nutritional status  11/5/2024 0113 by Flavio Serrano Jr, RN  Outcome: Progressing  11/5/2024 0103 by Flavio Serrano Jr

## 2024-11-05 NOTE — PROGRESS NOTES
[facility] Medicine Progress Note  V 10.25      Date of Admission: 10/22/2024    Hospital Day: 15      Chief Admission Complaint:  obstruction     Subjective:  no new c/o    Presenting Admission History:         78 y.o. female who is seen today by the hospitalist team at the request of DR. Jackson, with a Chief Complaint of hypotension and fever post op.  Patient originally presented for ileostomy reversal.  Patient on day 9 of hospitalization and began to spike a low-grade fever as well as was tachycardic with soft blood pressure in the 90s.  CT scan was obtained that did show a fluid collection with concern for anastomotic leak.  Hospitalist service was consulted at this point.  Patient did have a white count as well as a monitor creatinine consistent with AIRAM.  Patient had already been given a bolus of fluid with stabilization of her blood pressure and initiated on IV hydration as well as IV antibiotics.     Assessment/Plan:      Current Principal Problem:  Obstruction of intestine (HCC)      Intraabdominal infection  CT shows \"Findings are compatible with anastomotic leak within the pelvis, with  contrast collection measuring approximately 2.8 x 1.6 cm near the  anastomosis, \" HALLIE drain does have purulent fluid, but no minimal.  IV antibiotics: Zoysn, and add Zyvox  F/U Blood cultures ordered  F/U cultures if return to OR.  NPO and TPN per gen surgery  Plan for repeat CT with contrast on 11/5/2024     ARF - w/ elevated BUN/Cr ratio likely secondary to pre-renal azotemia.  Followed serial BUN/Creatinine now off IVF hydration personally reviewed and documented in this note.  Resolved    HTN - w/out known CAD and no evidence of active signs and/or symptoms of ischemia and/or failure. Normally controlled on home meds - held w/ relative hypotension,  w/ vitals documented and reviewed.       HyperLipidemia - normally controlled on home Statin. Continued.  Follow up / PCP outpatient for medication initiation and/or

## 2024-11-05 NOTE — PROGRESS NOTES
A&Ox4. Denied chest pain/heaviness, /SOB. Denied pain of discomfort. With intact surgical sites on the abdomen, no bleeding or discharges,  SR x 2.   Call light within reach.   Bed on lowest position. Bed alarm on.

## 2024-11-05 NOTE — PROGRESS NOTES
Braces or Orthoses?: No  Position Activity Restriction  Other position/activity restrictions: HALLIE drain, clear liquid diet, recent abdominal incision, IV lines     Subjective   Subjective: Pt agreeable to work with PT this session, pleasant and cooperative.  States she's been taking walks daily around the C3 unit and has even by trying to work on stair amb.  \"I've been able to climb up/down 12 steps so far.\"  Pain: Pt c/o 5/10 generalized pain \"all over\" and specifically located in mid-back.  \"I think I pulled something.\"  Overall Orientation Status: Within Functional Limits    Objective  Vitals  Pulse: 94  Heart Rate Source: Monitor  BP: (!) 82/49  BP Location: Right upper arm  BP Method: Automatic  Patient Position: Sitting (at EOB)  MAP (Calculated): 60  SpO2: 100 %  O2 Device: None (Room air)  Comment: BP 82/49 after using bathroom with pt remarking she feels increasingly dizzy, BP 92/54 while up in chair after ~4-5 minutes rest.  RN aware.    Bed Mobility Training  Interventions: Verbal cues  Supine to Sit: Supervision  Scooting: Supervision    Balance  Sitting: Intact  Standing - Static: Good;Constant support (using RW)  Standing - Dynamic: Good;Constant support (using RW)    Transfer Training  Sit to Stand: Supervision  Stand to Sit: Supervision  Bed to Chair: Stand-by assistance;Supervision (using RW, moving from bed>toilet>bed>chair)  Toilet Transfer: Stand-by assistance;Supervision (cues for safe hand placement/technique)    Gait Training  Overall Level of Assistance: Contact-guard assistance;Stand-by assistance (SBA for majority of amb, CGA as pt had c/o increased dizziness)  Distance (ft): 40 Feet (2 x 15 feet (to/from bathroom), x 40 feet)  Assistive Device: Gait belt;Walker, rolling  Interventions: Verbal cues;Safety awareness training  Speed/Tracey: Delayed;Pace decreased (< 100 feet/min);Slow  Step Length: Left shortened;Right shortened  Gait Abnormalities: Trunk sway increased;Decreased step

## 2024-11-06 LAB
ALBUMIN SERPL-MCNC: 3.1 G/DL (ref 3.4–5)
ALP SERPL-CCNC: 82 U/L (ref 40–129)
ALT SERPL-CCNC: 27 U/L (ref 10–40)
ANION GAP SERPL CALCULATED.3IONS-SCNC: 11 MMOL/L (ref 3–16)
AST SERPL-CCNC: 32 U/L (ref 15–37)
BASOPHILS # BLD: 0.3 K/UL (ref 0–0.2)
BASOPHILS NFR BLD: 3 %
BILIRUB DIRECT SERPL-MCNC: <0.1 MG/DL (ref 0–0.3)
BILIRUB INDIRECT SERPL-MCNC: ABNORMAL MG/DL (ref 0–1)
BILIRUB SERPL-MCNC: <0.2 MG/DL (ref 0–1)
BUN SERPL-MCNC: 30 MG/DL (ref 7–20)
CALCIUM SERPL-MCNC: 8.2 MG/DL (ref 8.3–10.6)
CHLORIDE SERPL-SCNC: 102 MMOL/L (ref 99–110)
CO2 SERPL-SCNC: 20 MMOL/L (ref 21–32)
CREAT SERPL-MCNC: 0.9 MG/DL (ref 0.6–1.2)
DEPRECATED RDW RBC AUTO: 14.2 % (ref 12.4–15.4)
EOSINOPHIL # BLD: 0.5 K/UL (ref 0–0.6)
EOSINOPHIL NFR BLD: 5 %
GFR SERPLBLD CREATININE-BSD FMLA CKD-EPI: 65 ML/MIN/{1.73_M2}
GLUCOSE BLD-MCNC: 110 MG/DL (ref 70–99)
GLUCOSE BLD-MCNC: 120 MG/DL (ref 70–99)
GLUCOSE BLD-MCNC: 128 MG/DL (ref 70–99)
GLUCOSE BLD-MCNC: 133 MG/DL (ref 70–99)
GLUCOSE SERPL-MCNC: 119 MG/DL (ref 70–99)
HCT VFR BLD AUTO: 24 % (ref 36–48)
HGB BLD-MCNC: 8 G/DL (ref 12–16)
HYPOCHROMIA BLD QL SMEAR: ABNORMAL
LYMPHOCYTES # BLD: 2.5 K/UL (ref 1–5.1)
LYMPHOCYTES NFR BLD: 23 %
MAGNESIUM SERPL-MCNC: 2.2 MG/DL (ref 1.8–2.4)
MCH RBC QN AUTO: 29.7 PG (ref 26–34)
MCHC RBC AUTO-ENTMCNC: 33.5 G/DL (ref 31–36)
MCV RBC AUTO: 88.6 FL (ref 80–100)
METAMYELOCYTES NFR BLD MANUAL: 2 %
MICROCYTES BLD QL SMEAR: ABNORMAL
MONOCYTES # BLD: 0.9 K/UL (ref 0–1.3)
MONOCYTES NFR BLD: 8 %
NEUTROPHILS # BLD: 6.5 K/UL (ref 1.7–7.7)
NEUTROPHILS NFR BLD: 59 %
PERFORMED ON: ABNORMAL
PHOSPHATE SERPL-MCNC: 3.3 MG/DL (ref 2.5–4.9)
PLATELET # BLD AUTO: 492 K/UL (ref 135–450)
PLATELET BLD QL SMEAR: ABNORMAL
PMV BLD AUTO: 6.4 FL (ref 5–10.5)
POLYCHROMASIA BLD QL SMEAR: ABNORMAL
POTASSIUM SERPL-SCNC: 3.4 MMOL/L (ref 3.5–5.1)
PROT SERPL-MCNC: 6.1 G/DL (ref 6.4–8.2)
RBC # BLD AUTO: 2.71 M/UL (ref 4–5.2)
REASON FOR REJECTION: NORMAL
REASON FOR REJECTION: NORMAL
REJECTED TEST: NORMAL
REJECTED TEST: NORMAL
SLIDE REVIEW: ABNORMAL
SODIUM SERPL-SCNC: 133 MMOL/L (ref 136–145)
WBC # BLD AUTO: 10.7 K/UL (ref 4–11)

## 2024-11-06 PROCEDURE — 83735 ASSAY OF MAGNESIUM: CPT

## 2024-11-06 PROCEDURE — 2500000003 HC RX 250 WO HCPCS: Performed by: SURGERY

## 2024-11-06 PROCEDURE — 84478 ASSAY OF TRIGLYCERIDES: CPT

## 2024-11-06 PROCEDURE — 99024 POSTOP FOLLOW-UP VISIT: CPT | Performed by: SURGERY

## 2024-11-06 PROCEDURE — 6370000000 HC RX 637 (ALT 250 FOR IP): Performed by: SURGERY

## 2024-11-06 PROCEDURE — 80076 HEPATIC FUNCTION PANEL: CPT

## 2024-11-06 PROCEDURE — 85025 COMPLETE CBC W/AUTO DIFF WBC: CPT

## 2024-11-06 PROCEDURE — 6370000000 HC RX 637 (ALT 250 FOR IP): Performed by: INTERNAL MEDICINE

## 2024-11-06 PROCEDURE — 2580000003 HC RX 258: Performed by: SURGERY

## 2024-11-06 PROCEDURE — 1200000000 HC SEMI PRIVATE

## 2024-11-06 PROCEDURE — 6360000002 HC RX W HCPCS: Performed by: STUDENT IN AN ORGANIZED HEALTH CARE EDUCATION/TRAINING PROGRAM

## 2024-11-06 PROCEDURE — 6370000000 HC RX 637 (ALT 250 FOR IP): Performed by: STUDENT IN AN ORGANIZED HEALTH CARE EDUCATION/TRAINING PROGRAM

## 2024-11-06 PROCEDURE — 80048 BASIC METABOLIC PNL TOTAL CA: CPT

## 2024-11-06 PROCEDURE — 6360000002 HC RX W HCPCS: Performed by: SURGERY

## 2024-11-06 PROCEDURE — 84100 ASSAY OF PHOSPHORUS: CPT

## 2024-11-06 PROCEDURE — 36415 COLL VENOUS BLD VENIPUNCTURE: CPT

## 2024-11-06 PROCEDURE — 36592 COLLECT BLOOD FROM PICC: CPT

## 2024-11-06 RX ORDER — CALCIUM CARBONATE 500 MG/1
500 TABLET, CHEWABLE ORAL 3 TIMES DAILY PRN
Status: DISCONTINUED | OUTPATIENT
Start: 2024-11-06 | End: 2024-11-12 | Stop reason: HOSPADM

## 2024-11-06 RX ADMIN — CALCIUM CARBONATE 500 MG: 500 TABLET, CHEWABLE ORAL at 19:48

## 2024-11-06 RX ADMIN — ACETAMINOPHEN 650 MG: 325 TABLET ORAL at 12:31

## 2024-11-06 RX ADMIN — SODIUM CHLORIDE, PRESERVATIVE FREE 10 ML: 5 INJECTION INTRAVENOUS at 09:07

## 2024-11-06 RX ADMIN — ASCORBIC ACID, VITAMIN A PALMITATE, CHOLECALCIFEROL, THIAMINE HYDROCHLORIDE, RIBOFLAVIN-5 PHOSPHATE SODIUM, PYRIDOXINE HYDROCHLORIDE, NIACINAMIDE, DEXPANTHENOL, ALPHA-TOCOPHEROL ACETATE, VITAMIN K1, FOLIC ACID, BIOTIN, CYANOCOBALAMIN: 200; 3300; 200; 6; 3.6; 6; 40; 15; 10; 150; 600; 60; 5 INJECTION, SOLUTION INTRAVENOUS at 16:46

## 2024-11-06 RX ADMIN — BUPROPION HYDROCHLORIDE 300 MG: 150 TABLET, EXTENDED RELEASE ORAL at 09:00

## 2024-11-06 RX ADMIN — SODIUM CHLORIDE, PRESERVATIVE FREE 10 ML: 5 INJECTION INTRAVENOUS at 19:49

## 2024-11-06 RX ADMIN — TRAZODONE HYDROCHLORIDE 100 MG: 50 TABLET ORAL at 19:48

## 2024-11-06 RX ADMIN — LATANOPROST 1 DROP: 50 SOLUTION OPHTHALMIC at 19:50

## 2024-11-06 RX ADMIN — ACETAMINOPHEN 650 MG: 325 TABLET ORAL at 16:47

## 2024-11-06 RX ADMIN — ACETAMINOPHEN 650 MG: 325 TABLET ORAL at 22:18

## 2024-11-06 RX ADMIN — ATORVASTATIN CALCIUM 10 MG: 10 TABLET, FILM COATED ORAL at 09:06

## 2024-11-06 RX ADMIN — ENOXAPARIN SODIUM 40 MG: 100 INJECTION SUBCUTANEOUS at 09:07

## 2024-11-06 RX ADMIN — LOPERAMIDE HYDROCHLORIDE 2 MG: 1 SOLUTION ORAL at 21:14

## 2024-11-06 RX ADMIN — FLUTICASONE PROPIONATE 1 SPRAY: 50 SPRAY, METERED NASAL at 09:07

## 2024-11-06 RX ADMIN — LINEZOLID 600 MG: 600 INJECTION, SOLUTION INTRAVENOUS at 12:26

## 2024-11-06 RX ADMIN — ACETAMINOPHEN 650 MG: 325 TABLET ORAL at 05:38

## 2024-11-06 RX ADMIN — FLUOXETINE HYDROCHLORIDE 40 MG: 20 CAPSULE ORAL at 09:00

## 2024-11-06 RX ADMIN — FAMOTIDINE 20 MG: 20 TABLET, FILM COATED ORAL at 09:00

## 2024-11-06 RX ADMIN — LOPERAMIDE HYDROCHLORIDE 2 MG: 1 SOLUTION ORAL at 05:39

## 2024-11-06 RX ADMIN — LINEZOLID 600 MG: 600 INJECTION, SOLUTION INTRAVENOUS at 00:41

## 2024-11-06 RX ADMIN — PIPERACILLIN AND TAZOBACTAM 3375 MG: 3; .375 INJECTION, POWDER, LYOPHILIZED, FOR SOLUTION INTRAVENOUS at 12:26

## 2024-11-06 RX ADMIN — PIPERACILLIN AND TAZOBACTAM 3375 MG: 3; .375 INJECTION, POWDER, LYOPHILIZED, FOR SOLUTION INTRAVENOUS at 02:57

## 2024-11-06 RX ADMIN — PIPERACILLIN AND TAZOBACTAM 3375 MG: 3; .375 INJECTION, POWDER, LYOPHILIZED, FOR SOLUTION INTRAVENOUS at 18:40

## 2024-11-06 ASSESSMENT — PAIN SCALES - GENERAL
PAINLEVEL_OUTOF10: 0

## 2024-11-06 NOTE — CARE COORDINATION
Hospital day 15: Patient on C3 care managed by IM and General Surgery. Patient seen bedside this afternoon in good spirits re several birthday wishes and flowers. Patient updated [facility] is unable to accept back re TPN need. [facility] has accepted for [facility] need. SW working with [facility] re TPN orders and coverage. Rec changes re national shortage pending MD approval. Page out to Gen Surgery re rec changes and TPN cost/possible barrier awaiting response. BENI left with dtr Diana [phone] Following

## 2024-11-06 NOTE — PROGRESS NOTES
Pt was upset that I was not able to assist her right away to the bathroom when she called but my hands are full that time and all of my pts needs assistance to the bathroom. I called the PCA first but she was sitting with another patient. I walkie twice hoping someone will provide her assistance but no one was responding to my walkie. KUMAR Turcios assisted the pt to the bathroom and back into the bed.

## 2024-11-06 NOTE — PLAN OF CARE
Problem: Pain  Goal: Verbalizes/displays adequate comfort level or baseline comfort level  11/6/2024 1034 by Marcela Devries, RN  Outcome: Progressing     Problem: Safety - Adult  Goal: Free from fall injury  11/6/2024 1034 by Marcela Devries, RN  Outcome: Progressing

## 2024-11-06 NOTE — PROGRESS NOTES
4 Eyes Skin Assessment     NAME:  Sudha Lopez  YOB: 1945  MEDICAL RECORD NUMBER:  [id number]    The patient is being assessed for  Other Low yeni score    I agree that at least one RN has performed a thorough Head to Toe Skin Assessment on the patient. ALL assessment sites listed below have been assessed.      Areas assessed by both nurses: JESSICA Roe/JESSICA Sanchez    Head, Face, Ears, Shoulders, Back, Chest, Arms, Elbows, Hands, Sacrum. Buttock, Coccyx, Ischium, Legs. Feet and Heels, and Under Medical Devices         Does the Patient have a Wound? Excoriation on buttocks and in between thighs. Unstageable wound on left heel.       Yeni Prevention initiated by RN: Yes  Wound Care Orders initiated by RN: No    Pressure Injury (Stage 3,4, Unstageable, DTI, NWPT, and Complex wounds) if present, place Wound referral order by RN under : No    New Ostomies, if present place, Ostomy referral order under : No     Nurse 1 eSignature: Electronically signed by Jyothi Garza RN on 11/5/24 at 10:35 PM EST    **SHARE this note so that the co-signing nurse can place an eSignature**    Nurse 2 eSignature: {Esignature:[id number]}

## 2024-11-06 NOTE — PROGRESS NOTES
.Patient educated on use of IS and demonstrates use Yes    Patient and family educated on incisional care and s/s of infection Yes    Patient and family educated on hand hygiene Yes    Patient and family educated on post operative care Yes     - Pain control      - Insulin Protocol     Day of surgery patient to side of bed or up to chair for minimum of 30 minutes Yes    POD 1 until discharge, patient up to chair by 9 am and TID. Goal to increase mobility  Yes    Patient performs oral hygiene with CHG oral solution until NG tube discontinued. NA    If patient does not have NG tube in place, patient to brush teeth and use mouth wash Q shift Yes    Patient receives daily bath and linen changes Yes    Patient receives CHG bath until roach discontinued No    SCDs in place Yes    Patient receiving chemical VTE Yes

## 2024-11-06 NOTE — PROGRESS NOTES
Brief Nutrition Assessment    Nutrition Assessment:    Pt requiring TPN d/t ileostomy reversal with anastomotic leak. Leak not yet healed, pt would benefit from continued bowel rest to allow leak to fully heal prior to using gut for nutrition. Pt will require TPN at this time until leak is resolved. Plans for d/c soon. Updated recommendations for cyclic TPN provided. Recommend BMP with Mg and Phos, at least weekly to monitor for electrolyte disturbances. Recommend at least 2x daily blood sugar monitoring. No TG ordered this admission, RD ordered TG today. Will continue to monitor.     Nutrition Recommendations/Plan:   Home/cyclic TPN recommendations   Initiate Clinimix 5/20 starting at 80 mL/hr x 1 hr, ramp up to goal rate of 150 ml/hr x 12 hrs, then ramp down to 80 ml/hr for 1 hr.   Recommend 250 mL 20% lipids 2 times per week  Recommend FSBS, monitor glucose, need for insulin  Pharmacy to adjust MVI and Trace Elements as needed   When PN to be discontinued, cut rate by 50% and let current bag run out.   Monitor nutrition adequacy, pertinent labs, bowel habits, wt changes, and clinical progress    Nutrition Related Findings:    No new labs today. Yesterday: Na 137. K 3.9. Mg 2.36. Phos 3.4.     Current Nutrition Intake & Therapies:    Average Meal Intake: Unable to assess  Average Supplements Intake: Unable to assess  PN-Adult Premix 5/20 - Standard Electrolytes - Central Line  ADULT DIET; Clear Liquid  Current Parenteral Nutrition Orders:  Type and Formula: Premix Central   Lipids: Two times weekly, 250ml  Duration: Continuous  Rate/Volume: 42ml/hr, goal = 83.33ml/hr  Current PN Order Provides: Clinimix 5/20 at goal rate of 83.33 ml/hr to provide 2000 ml TV, 1760 kcal, 100 gm AA, 400 gm dextrose + 250 ml 20% lipids biweekly to provide an additional 143 kcal per day. GIR = 3.73mg/kg/min.  Goal PN Orders Provides: Home/Cyclic TPN recommendations: Initiate Clinimix 5/20 starting at 80 mL/hr x 1 hr, ramp up to goal

## 2024-11-06 NOTE — PROGRESS NOTES
Hospital Medicine Progress Note  V 10.25      Date of Admission: 10/22/2024    Hospital Day: 16      Chief Admission Complaint:  obstruction     Subjective:  no new c/o    Presenting Admission History:         78 y.o. female who is seen today by the hospitalist team at the request of DR. Jackson, with a Chief Complaint of hypotension and fever post op.  Patient originally presented for ileostomy reversal.  Patient on day 9 of hospitalization and began to spike a low-grade fever as well as was tachycardic with soft blood pressure in the 90s.  CT scan was obtained that did show a fluid collection with concern for anastomotic leak.  Hospitalist service was consulted at this point.  Patient did have a white count as well as a monitor creatinine consistent with AIRAM.  Patient had already been given a bolus of fluid with stabilization of her blood pressure and initiated on IV hydration as well as IV antibiotics.     Assessment/Plan:      Current Principal Problem:  Obstruction of intestine (HCC)      Intraabdominal infection  CT shows \"Findings are compatible with anastomotic leak within the pelvis, with  contrast collection measuring approximately 2.8 x 1.6 cm near the  anastomosis, \" HALLIE drain does have purulent fluid, but no minimal.  IV antibiotics: Zoysn, and add Zyvox  TPN per gen surgery - to continue.   Repeat CT with contrast on 11/5/2024 w/out progression - started on Clears     ARF - w/ elevated BUN/Cr ratio likely secondary to pre-renal azotemia.  Followed serial BUN/Creatinine now off IVF hydration personally reviewed and documented in this note.  Resolved    HTN - w/out known CAD and no evidence of active signs and/or symptoms of ischemia and/or failure. Normally controlled on home meds - held w/ relative hypotension,  w/ vitals documented and reviewed.       HyperLipidemia - normally controlled on home Statin. Continued.  Follow up / PCP outpatient for medication initiation and/or adjustment as needed.

## 2024-11-06 NOTE — PROGRESS NOTES
Received patient on bed, alert and oriented x4. Vitals are stable. Denies pain nor dizziness at the time of assessment. Presence of PICC on the left arm, TPN ongoing.    Call bell within reach. Bed alarm on. Walker x1 to the bathroom. Maintained a calm and comfortable environment. Shift assessment updated and documented.

## 2024-11-06 NOTE — PROGRESS NOTES
[facility] GENERAL SURGERY    Surgery Progress Note           POD # 15    PATIENT NAME: Sudha Lopez     TODAY'S DATE: 11/6/2024    INTERVAL HISTORY:    Minimal discomfort, tolerating liquid diet    OBJECTIVE:   VITALS:  /65   Pulse 78   Temp 97.7 °F (36.5 °C) (Oral)   Resp 18   Ht 1.6 m (5' 2.99\")   Wt 73.7 kg (162 lb 6.4 oz)   SpO2 95%   BMI 28.78 kg/m²     INTAKE/OUTPUT:    I/O last 3 completed shifts:  In: 5105.1 [P.O.:240; I.V.:38.8; IV Piggyback:1528.6]  Out: 10 [Drains:10]  I/O this shift:  In: 400 [P.O.:400]  Out: -               CONSTITUTIONAL:  awake and alert  ABDOMEN:   soft, non-distended, tender sachin minimal   INCISION: staples intact, no drainage    Data:  CBC:   Recent Labs     11/04/24  0420 11/05/24  0650 11/06/24  0640   WBC 12.1* 10.5 10.7   HGB 8.7* 8.0* 8.0*   HCT 26.2* 27.1* 24.0*   * 505* 492*       BMP:    Recent Labs     11/04/24  0420 11/05/24  0806    137   K 3.6  3.6 3.9    104   CO2 20* 22   BUN 19 23*   CREATININE 0.7 0.7   GLUCOSE 97 103*            ASSESSMENT AND PLAN:  79 y.o. 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  Expect TPN for two weeks to start off and then will reassess for healing of above.  Likely home on TPN - start nocturnal  Will send home on oral abx with sachin in place    Bertin Jackson MD

## 2024-11-06 NOTE — PLAN OF CARE
Problem: Discharge Planning  Goal: Discharge to home or other facility with appropriate resources  11/5/2024 2151 by Jyothi Garza RN  Outcome: Progressing  Flowsheets (Taken 11/2/2024 2202 by Ching Sanchez, RN)  Discharge to home or other facility with appropriate resources:   Identify barriers to discharge with patient and caregiver   Arrange for needed discharge resources and transportation as appropriate   Identify discharge learning needs (meds, wound care, etc)     Problem: Pain  Goal: Verbalizes/displays adequate comfort level or baseline comfort level  11/5/2024 2151 by Jyothi Garza RN  Outcome: Progressing  Flowsheets (Taken 10/30/2024 0805 by Namoi Perez, RN)  Verbalizes/displays adequate comfort level or baseline comfort level:   Encourage patient to monitor pain and request assistance   Assess pain using appropriate pain scale   Administer analgesics based on type and severity of pain and evaluate response   Implement non-pharmacological measures as appropriate and evaluate response     Problem: Safety - Adult  Goal: Free from fall injury  11/5/2024 2151 by Jyothi Garza RN  Outcome: Progressing  Flowsheets (Taken 11/1/2024 2059 by Cristiane Estes, RN)  Free From Fall Injury: Instruct family/caregiver on patient safety     Problem: ABCDS Injury Assessment  Goal: Absence of physical injury  11/5/2024 2151 by Jyothi Garza RN  Outcome: Progressing  Flowsheets (Taken 10/30/2024 0805 by Naomi Perez, RN)  Absence of Physical Injury: Implement safety measures based on patient assessment     Problem: Skin/Tissue Integrity  Goal: Absence of new skin breakdown  Description: 1.  Monitor for areas of redness and/or skin breakdown  2.  Assess vascular access sites hourly  3.  Every 4-6 hours minimum:  Change oxygen saturation probe site  4.  Every 4-6 hours:  If on nasal continuous positive airway pressure, respiratory therapy assess nares and determine need for appliance change or resting

## 2024-11-06 NOTE — PROGRESS NOTES
Patient educated on use of IS and demonstrates use Yes    Patient and family educated on incisional care and s/s of infection Yes    Patient and family educated on hand hygiene Yes    Patient and family educated on post operative care Yes   - Pain control    - Insulin Protocol     Day of surgery patient to side of bed or up to chair for minimum of 30 minutes Yes    POD 1 until discharge, patient up to chair by 9 am and TID. Goal to increase mobility  Yes    Patient performs oral hygiene with CHG oral solution until NG tube discontinued. NA    If patient does not have NG tube in place, patient to brush teeth and use mouth wash Q shift Yes    Patient receives daily bath and linen changes Yes    Patient receives CHG bath until roach discontinued NA    SCDs in place No    Patient receiving chemical VTE Yes

## 2024-11-06 NOTE — PROGRESS NOTES
Physical Therapy    PT attempted to see pt for follow up PT session this morning however pt declines to participate at this time, stating \"maybe later, I'm getting a lot of birthday wishes right now.\" Educated pt on the importance of mobility however pt continues to decline and states \"come back later.\"   Re-attempted to see pt at 1115, however wound care in working with pt at this time.  Will follow up with pt as schedule allows and per POC.  Thank you,  Jeanne Flores, PT, DPT

## 2024-11-06 NOTE — PROGRESS NOTES
[facility] Nurse  Follow-up Progress Note       NAME:  Sudha Lopez  MEDICAL RECORD NUMBER:  [id number]  AGE:  79 y.o.   GENDER:  female  :  1945  TODAY'S DATE:  2024    Subjective: my heel has not changed, still dry. No issues. Dr Jackson said I can go home on Friday with the IV nutrition.   Wound Identification:  Wound Type: Pre-existing unstageable pressure injury right heel remains dry and stable.  New incision mid upper abdomen and staple line right upper quadrant (site of reversal of ileostomy)  Contributing Factors: decreased mobility and obesity        Patient Goal of Care:  [] Wound Healing  [] Odor Control   [] Palliative Care  [] Pain Control   [x] Other: incisions essentially healed. Dry and non draining.    Objective: lying in bed.    /65   Pulse 78   Temp 97.7 °F (36.5 °C) (Oral)   Resp 18   Ht 1.6 m (5' 2.99\")   Wt 73.7 kg (162 lb 6.4 oz)   SpO2 95%   BMI 28.78 kg/m²   Kirill Risk Score: Kirill Scale Score: 19  Assessment: Dry eschar on heel.  Starting to loosen from edges, trimmed a bit.  Abd incisions healing no issues, staples out.   Measurements:  Wound 24 Heel Left;Posterior (Active)   Wound Image   24 1121   Wound Etiology Pressure Unstageable 24 1121   Dressing Status Other (Comment) 24 1121   Wound Cleansed Not Cleansed 24 1121   Dressing/Treatment Open to air;Betadine swabs/povidone iodine 24 1121   Offloading for Diabetic Foot Ulcers Offloading ordered 24 1121   Dressing Change Due 24 0909   Wound Length (cm) 0.8 cm 24 1121   Wound Width (cm) 1.5 cm 24 1121   Wound Depth (cm) 0 cm 24 1121   Wound Surface Area (cm^2) 1.2 cm^2 24 1121   Change in Wound Size % (l*w) 86.67 24 1121   Wound Volume (cm^3) 0 cm^3 24 1121   Distance Tunneling (cm) 0 cm 24 1121   Tunneling Position ___ O'Clock 0 24 1121   Undermining

## 2024-11-07 LAB
ANION GAP SERPL CALCULATED.3IONS-SCNC: 11 MMOL/L (ref 3–16)
BASOPHILS # BLD: 0 K/UL (ref 0–0.2)
BASOPHILS NFR BLD: 0 %
BUN SERPL-MCNC: 24 MG/DL (ref 7–20)
CALCIUM SERPL-MCNC: 8.2 MG/DL (ref 8.3–10.6)
CHLORIDE SERPL-SCNC: 104 MMOL/L (ref 99–110)
CO2 SERPL-SCNC: 21 MMOL/L (ref 21–32)
CREAT SERPL-MCNC: 0.8 MG/DL (ref 0.6–1.2)
DEPRECATED RDW RBC AUTO: 13.9 % (ref 12.4–15.4)
EOSINOPHIL # BLD: 0.6 K/UL (ref 0–0.6)
EOSINOPHIL NFR BLD: 5 %
GFR SERPLBLD CREATININE-BSD FMLA CKD-EPI: 75 ML/MIN/{1.73_M2}
GLUCOSE BLD-MCNC: 116 MG/DL (ref 70–99)
GLUCOSE BLD-MCNC: 127 MG/DL (ref 70–99)
GLUCOSE BLD-MCNC: 93 MG/DL (ref 70–99)
GLUCOSE BLD-MCNC: 97 MG/DL (ref 70–99)
GLUCOSE SERPL-MCNC: 114 MG/DL (ref 70–99)
HCT VFR BLD AUTO: 23.1 % (ref 36–48)
HGB BLD-MCNC: 7.8 G/DL (ref 12–16)
LYMPHOCYTES # BLD: 0.8 K/UL (ref 1–5.1)
LYMPHOCYTES NFR BLD: 7 %
MACROCYTES BLD QL SMEAR: ABNORMAL
MAGNESIUM SERPL-MCNC: 2.43 MG/DL (ref 1.8–2.4)
MCH RBC QN AUTO: 30 PG (ref 26–34)
MCHC RBC AUTO-ENTMCNC: 34 G/DL (ref 31–36)
MCV RBC AUTO: 88.5 FL (ref 80–100)
MICROCYTES BLD QL SMEAR: ABNORMAL
MONOCYTES # BLD: 0.8 K/UL (ref 0–1.3)
MONOCYTES NFR BLD: 7 %
NEUTROPHILS # BLD: 9.7 K/UL (ref 1.7–7.7)
NEUTROPHILS NFR BLD: 80 %
NEUTS BAND NFR BLD MANUAL: 1 % (ref 0–7)
PERFORMED ON: ABNORMAL
PERFORMED ON: ABNORMAL
PERFORMED ON: NORMAL
PERFORMED ON: NORMAL
PHOSPHATE SERPL-MCNC: 3 MG/DL (ref 2.5–4.9)
PLATELET # BLD AUTO: 496 K/UL (ref 135–450)
PLATELET BLD QL SMEAR: ABNORMAL
PMV BLD AUTO: 6.3 FL (ref 5–10.5)
POTASSIUM SERPL-SCNC: 3.8 MMOL/L (ref 3.5–5.1)
POTASSIUM SERPL-SCNC: 3.8 MMOL/L (ref 3.5–5.1)
RBC # BLD AUTO: 2.61 M/UL (ref 4–5.2)
SLIDE REVIEW: ABNORMAL
SODIUM SERPL-SCNC: 136 MMOL/L (ref 136–145)
TRIGL SERPL-MCNC: 89 MG/DL (ref 0–150)
WBC # BLD AUTO: 12 K/UL (ref 4–11)

## 2024-11-07 PROCEDURE — 6370000000 HC RX 637 (ALT 250 FOR IP): Performed by: SURGERY

## 2024-11-07 PROCEDURE — 1200000000 HC SEMI PRIVATE

## 2024-11-07 PROCEDURE — 83735 ASSAY OF MAGNESIUM: CPT

## 2024-11-07 PROCEDURE — 6360000002 HC RX W HCPCS: Performed by: SURGERY

## 2024-11-07 PROCEDURE — 99024 POSTOP FOLLOW-UP VISIT: CPT | Performed by: SURGERY

## 2024-11-07 PROCEDURE — 2580000003 HC RX 258: Performed by: SURGERY

## 2024-11-07 PROCEDURE — 84100 ASSAY OF PHOSPHORUS: CPT

## 2024-11-07 PROCEDURE — 6370000000 HC RX 637 (ALT 250 FOR IP): Performed by: INTERNAL MEDICINE

## 2024-11-07 PROCEDURE — 2500000003 HC RX 250 WO HCPCS: Performed by: SURGERY

## 2024-11-07 PROCEDURE — 6370000000 HC RX 637 (ALT 250 FOR IP): Performed by: STUDENT IN AN ORGANIZED HEALTH CARE EDUCATION/TRAINING PROGRAM

## 2024-11-07 PROCEDURE — 36592 COLLECT BLOOD FROM PICC: CPT

## 2024-11-07 PROCEDURE — 85025 COMPLETE CBC W/AUTO DIFF WBC: CPT

## 2024-11-07 PROCEDURE — 80048 BASIC METABOLIC PNL TOTAL CA: CPT

## 2024-11-07 RX ORDER — DIPHENOXYLATE HYDROCHLORIDE AND ATROPINE SULFATE 2.5; .025 MG/1; MG/1
1 TABLET ORAL 4 TIMES DAILY PRN
Status: DISCONTINUED | OUTPATIENT
Start: 2024-11-07 | End: 2024-11-11

## 2024-11-07 RX ORDER — PANTOPRAZOLE SODIUM 40 MG/1
40 TABLET, DELAYED RELEASE ORAL
Status: DISCONTINUED | OUTPATIENT
Start: 2024-11-07 | End: 2024-11-12 | Stop reason: HOSPADM

## 2024-11-07 RX ORDER — CALCIUM CARBONATE 500 MG/1
1000 TABLET, CHEWABLE ORAL 3 TIMES DAILY PRN
Status: DISCONTINUED | OUTPATIENT
Start: 2024-11-07 | End: 2024-11-12 | Stop reason: HOSPADM

## 2024-11-07 RX ADMIN — FLUTICASONE PROPIONATE 1 SPRAY: 50 SPRAY, METERED NASAL at 08:43

## 2024-11-07 RX ADMIN — ACETAMINOPHEN 650 MG: 325 TABLET ORAL at 17:39

## 2024-11-07 RX ADMIN — SODIUM CHLORIDE: 9 INJECTION, SOLUTION INTRAVENOUS at 18:31

## 2024-11-07 RX ADMIN — PIPERACILLIN AND TAZOBACTAM 3375 MG: 3; .375 INJECTION, POWDER, LYOPHILIZED, FOR SOLUTION INTRAVENOUS at 11:46

## 2024-11-07 RX ADMIN — DIPHENOXYLATE HYDROCHLORIDE AND ATROPINE SULFATE 1 TABLET: 2.5; .025 TABLET ORAL at 22:56

## 2024-11-07 RX ADMIN — PIPERACILLIN AND TAZOBACTAM 3375 MG: 3; .375 INJECTION, POWDER, LYOPHILIZED, FOR SOLUTION INTRAVENOUS at 18:33

## 2024-11-07 RX ADMIN — ACETAMINOPHEN 650 MG: 325 TABLET ORAL at 22:56

## 2024-11-07 RX ADMIN — BUPROPION HYDROCHLORIDE 300 MG: 150 TABLET, EXTENDED RELEASE ORAL at 08:38

## 2024-11-07 RX ADMIN — I.V. FAT EMULSION 250 ML: 20 EMULSION INTRAVENOUS at 17:38

## 2024-11-07 RX ADMIN — ASCORBIC ACID, VITAMIN A PALMITATE, CHOLECALCIFEROL, THIAMINE HYDROCHLORIDE, RIBOFLAVIN-5 PHOSPHATE SODIUM, PYRIDOXINE HYDROCHLORIDE, NIACINAMIDE, DEXPANTHENOL, ALPHA-TOCOPHEROL ACETATE, VITAMIN K1, FOLIC ACID, BIOTIN, CYANOCOBALAMIN: 200; 3300; 200; 6; 3.6; 6; 40; 15; 10; 150; 600; 60; 5 INJECTION, SOLUTION INTRAVENOUS at 17:37

## 2024-11-07 RX ADMIN — DIPHENOXYLATE HYDROCHLORIDE AND ATROPINE SULFATE 1 TABLET: 2.5; .025 TABLET ORAL at 12:47

## 2024-11-07 RX ADMIN — ACETAMINOPHEN 650 MG: 325 TABLET ORAL at 04:45

## 2024-11-07 RX ADMIN — ENOXAPARIN SODIUM 40 MG: 100 INJECTION SUBCUTANEOUS at 08:38

## 2024-11-07 RX ADMIN — LATANOPROST 1 DROP: 50 SOLUTION OPHTHALMIC at 20:46

## 2024-11-07 RX ADMIN — LOPERAMIDE HYDROCHLORIDE 2 MG: 1 SOLUTION ORAL at 11:43

## 2024-11-07 RX ADMIN — DIPHENOXYLATE HYDROCHLORIDE AND ATROPINE SULFATE 1 TABLET: 2.5; .025 TABLET ORAL at 17:39

## 2024-11-07 RX ADMIN — SODIUM CHLORIDE, PRESERVATIVE FREE 10 ML: 5 INJECTION INTRAVENOUS at 08:38

## 2024-11-07 RX ADMIN — TRAZODONE HYDROCHLORIDE 100 MG: 50 TABLET ORAL at 20:44

## 2024-11-07 RX ADMIN — ACETAMINOPHEN 650 MG: 325 TABLET ORAL at 11:42

## 2024-11-07 RX ADMIN — SODIUM CHLORIDE: 9 INJECTION, SOLUTION INTRAVENOUS at 11:42

## 2024-11-07 RX ADMIN — CALCIUM CARBONATE 500 MG: 500 TABLET, CHEWABLE ORAL at 12:47

## 2024-11-07 RX ADMIN — CALCIUM CARBONATE 1000 MG: 500 TABLET, CHEWABLE ORAL at 17:39

## 2024-11-07 RX ADMIN — PANTOPRAZOLE SODIUM 40 MG: 40 TABLET, DELAYED RELEASE ORAL at 17:39

## 2024-11-07 RX ADMIN — FAMOTIDINE 20 MG: 20 TABLET, FILM COATED ORAL at 08:38

## 2024-11-07 RX ADMIN — ATORVASTATIN CALCIUM 10 MG: 10 TABLET, FILM COATED ORAL at 08:38

## 2024-11-07 RX ADMIN — PIPERACILLIN AND TAZOBACTAM 3375 MG: 3; .375 INJECTION, POWDER, LYOPHILIZED, FOR SOLUTION INTRAVENOUS at 03:14

## 2024-11-07 RX ADMIN — FLUOXETINE HYDROCHLORIDE 40 MG: 20 CAPSULE ORAL at 08:38

## 2024-11-07 ASSESSMENT — PAIN SCALES - GENERAL
PAINLEVEL_OUTOF10: 4
PAINLEVEL_OUTOF10: 5
PAINLEVEL_OUTOF10: 3

## 2024-11-07 ASSESSMENT — PAIN DESCRIPTION - LOCATION
LOCATION: ABDOMEN
LOCATION: ABDOMEN

## 2024-11-07 ASSESSMENT — PAIN DESCRIPTION - PAIN TYPE: TYPE: ACUTE PAIN;SURGICAL PAIN

## 2024-11-07 ASSESSMENT — PAIN DESCRIPTION - DESCRIPTORS: DESCRIPTORS: DISCOMFORT

## 2024-11-07 NOTE — CARE COORDINATION
Hospital day 16: Patient on C3 care managed by General Surgery and IM. Patient from home. Goal is to dc Friday with home TPN. [facility] as [place] can not manage.  Barriers  to this occurring 1. Cost $1100 per week as medicare will not cover re less than 90 day rec need- Patient reports she can not afford this.  Spoke with [facility] Patient medically could be accepted, but does not have to rev codes for INS to cover. Patient family inquiring about SNF stay [facility]- no open bed and can not accept TPN need 2. Family with concerns with Patient ability to managed TPN need at home- reports can be supportive but limited on availability. Family concerns with many unknowns length of need for TPN, how would advance diet when appropriate in community. Family also verbalized concerns if Patient even tolerating ie ct loose stools.   Following.ADILSON Bartholomew

## 2024-11-07 NOTE — PLAN OF CARE
Musculoskeletal - Adult  Goal: Return ADL status to a safe level of function  Outcome: Progressing  Flowsheets (Taken 11/7/2024 1313)  Return ADL Status to a Safe Level of Function:   Administer medication as ordered   Assess activities of daily living deficits and provide assistive devices as needed     Problem: Gastrointestinal - Adult  Goal: Minimal or absence of nausea and vomiting  Outcome: Progressing  Flowsheets (Taken 11/7/2024 1313)  Minimal or absence of nausea and vomiting: Administer ordered antiemetic medications as needed     Problem: Gastrointestinal - Adult  Goal: Maintains adequate nutritional intake  Outcome: Progressing  Flowsheets (Taken 11/7/2024 1313)  Maintains adequate nutritional intake: Identify factors contributing to decreased intake, treat as appropriate     Problem: Gastrointestinal - Adult  Goal: Maintains or returns to baseline bowel function  Outcome: Progressing  Flowsheets (Taken 11/7/2024 1313)  Maintains or returns to baseline bowel function:   Encourage oral fluids to ensure adequate hydration   Assess bowel function

## 2024-11-07 NOTE — PROGRESS NOTES
Shift assessment completed. Pt A&O x4.  VSS.  Denies Pain. Picc site patent/ flushed, and infusing. Patient on RA.  Incisions c/d/i. 5 Lap sites. HALLIE drain in place. No drainage currently. Patient admits to passing gas. Patient ambulates with SBA to bathroom.  Medication given per MAR. Denies any needs at this time. Bed alarm on for safety. Bed locked and in lowest position.  Call light within reach. Refused Gripper socks. Patient also refusing CHG wipes. Patient in stable condition when RN leaving room.   Electronically signed by Nicky Garduno RN on 11/7/2024 at 2:33 PM

## 2024-11-07 NOTE — PLAN OF CARE
Problem: Discharge Planning  Goal: Discharge to home or other facility with appropriate resources  Outcome: Progressing  Flowsheets (Taken 11/2/2024 2202 by Ching Sanchez, RN)  Discharge to home or other facility with appropriate resources:   Identify barriers to discharge with patient and caregiver   Arrange for needed discharge resources and transportation as appropriate   Identify discharge learning needs (meds, wound care, etc)     Problem: Pain  Goal: Verbalizes/displays adequate comfort level or baseline comfort level  11/6/2024 1957 by Jyothi Garza RN  Outcome: Progressing  Flowsheets (Taken 10/30/2024 0805 by Naomi Perez, RN)  Verbalizes/displays adequate comfort level or baseline comfort level:   Encourage patient to monitor pain and request assistance   Assess pain using appropriate pain scale   Administer analgesics based on type and severity of pain and evaluate response   Implement non-pharmacological measures as appropriate and evaluate response     Problem: Safety - Adult  Goal: Free from fall injury  11/6/2024 1957 by Jyothi Garza, RN  Outcome: Progressing  Flowsheets (Taken 11/1/2024 2059 by Cristiane Estes, RN)  Free From Fall Injury: Instruct family/caregiver on patient safety     Problem: ABCDS Injury Assessment  Goal: Absence of physical injury  Outcome: Progressing  Flowsheets (Taken 10/30/2024 0805 by Naomi Perez, RN)  Absence of Physical Injury: Implement safety measures based on patient assessment     Problem: Skin/Tissue Integrity  Goal: Absence of new skin breakdown  Description: 1.  Monitor for areas of redness and/or skin breakdown  2.  Assess vascular access sites hourly  3.  Every 4-6 hours minimum:  Change oxygen saturation probe site  4.  Every 4-6 hours:  If on nasal continuous positive airway pressure, respiratory therapy assess nares and determine need for appliance change or resting period.  Outcome: Progressing     Problem: Nutrition Deficit:  Goal: Optimize

## 2024-11-07 NOTE — PROGRESS NOTES
[facility] GENERAL SURGERY    Surgery Progress Note           POD #  16    PATIENT NAME: Sudha Lopez     TODAY'S DATE: 11/7/2024    INTERVAL HISTORY:    Pt  resting comfortably; still w/ c/o frequent loose stools, poorly controlled with Imodium.  On nocturnal TPN.                                                                                                                                    .     OBJECTIVE:   VITALS:  BP (!) 120/57   Pulse 79   Temp 97.8 °F (36.6 °C) (Oral)   Resp 18   Ht 1.6 m (5' 2.99\")   Wt 75.1 kg (165 lb 8 oz)   SpO2 99%   BMI 29.32 kg/m²     INTAKE/OUTPUT:    I/O last 3 completed shifts:  In: 5590.8 [P.O.:1380; IV Piggyback:1041.2]  Out: 10 [Drains:10]  I/O this shift:  In: 290 [P.O.:240; IV Piggyback:50]  Out: 0               CONSTITUTIONAL:  awake and alert  LUNGS:     ABDOMEN:    , soft, non-distended,     INCISION: clean, dry, healed    Data:  CBC:   Recent Labs     11/05/24  0650 11/06/24  0640 11/07/24  0545   WBC 10.5 10.7 12.0*   HGB 8.0* 8.0* 7.8*   HCT 27.1* 24.0* 23.1*   * 492* 496*     BMP:    Recent Labs     11/05/24  0806 11/06/24  1525 11/07/24  0545    133* 136   K 3.9 3.4* 3.8  3.8    102 104   CO2 22 20* 21   BUN 23* 30* 24*   CREATININE 0.7 0.9 0.8   GLUCOSE 103* 119* 114*     Hepatic:   Recent Labs     11/06/24  1525   AST 32   ALT 27   BILITOT <0.2   ALKPHOS 82     Mag:      Recent Labs     11/05/24  0806 11/06/24  1525 11/07/24  0545   MG 2.36 2.20 2.43*      Phos:     Recent Labs     11/05/24  0806 11/06/24  1525 11/07/24  0545   PHOS 3.4 3.3 3.0      INR: No results for input(s): \"INR\" in the last 72 hours.      Radiology Review:       ASSESSMENT AND PLAN:  79 y.o. female status post ileostomy reversal now with anastomotic leak, trying to manage conservatively with bowel rest/TPN to see if the leak can heal.  Repeat imaging 2 days ago did not show overt, actual leak.  Diarrhea likely due to slow adaptation of the remnant rectum/sigmoid

## 2024-11-07 NOTE — PROGRESS NOTES
Patient educated on use of IS and demonstrates use Yes    Patient and family educated on incisional care and s/s of infection Yes    Patient and family educated on hand hygiene Yes    Patient and family educated on post operative care NA   - NG tube maintenance    - Pain control    - roach Removal    - Insulin Protocol     Day of surgery patient to side of bed or up to chair for minimum of 30 minutes Yes    POD 1 until discharge, patient up to chair by 9 am and TID. Goal to increase mobility  Yes    Patient performs oral hygiene with CHG oral solution until NG tube discontinued. NA    If patient does not have NG tube in place, patient to brush teeth and use mouth wash Q shift NA    Patient receives daily bath and linen changes Yes    Patient receives CHG bath until roach discontinued NA    SCDs in place refused     Patient receiving chemical VTE Yes

## 2024-11-07 NOTE — PROGRESS NOTES
[facility] Medicine Progress Note  V 10.25      Date of Admission: 10/22/2024    Hospital Day: 17      Chief Admission Complaint:  obstruction     Subjective:  no new c/o    Presenting Admission History:         78 y.o. female who is seen today by the hospitalist team at the request of DR. Jackson, with a Chief Complaint of hypotension and fever post op.  Patient originally presented for ileostomy reversal.  Patient on day 9 of hospitalization and began to spike a low-grade fever as well as was tachycardic with soft blood pressure in the 90s.  CT scan was obtained that did show a fluid collection with concern for anastomotic leak.  Hospitalist service was consulted at this point.  Patient did have a white count as well as a monitor creatinine consistent with AIRAM.  Patient had already been given a bolus of fluid with stabilization of her blood pressure and initiated on IV hydration as well as IV antibiotics.     Assessment/Plan:      Current Principal Problem:  Obstruction of intestine (HCC)      Intraabdominal infection  CT shows \"Findings are compatible with anastomotic leak within the pelvis, with  contrast collection measuring approximately 2.8 x 1.6 cm near the  anastomosis, \" HALLIE drain does have purulent fluid, but no minimal.  IV antibiotics: Zosyn - started on/before 30 October.   TPN per General Surgery - to continue.   Repeat CT with contrast on 11/5/2024 w/out progression - started on Clears and now advanced to full liquids.      ARF - w/ elevated BUN/Cr ratio likely secondary to pre-renal azotemia.  Followed serial BUN/Creatinine now off IVF hydration personally reviewed and documented in this note.  Resolved    HTN - w/out known CAD and no evidence of active signs and/or symptoms of ischemia and/or failure. Normally controlled on home meds - held w/ relative hypotension,  w/ vitals documented and reviewed.       HyperLipidemia - normally controlled on home Statin. Continued.  Follow up / PCP outpatient

## 2024-11-07 NOTE — PROGRESS NOTES
Received patient on bed, alert and oriented x4. Vitals are stable. Denies pain nor dizziness at the time of assessment. Presence of PICC on the left arm, TPN ongoing. Tolerating fulls, take pills whole.     Call bell within reach. Bed alarm on. Walker x1 to the bathroom. Maintained a calm and comfortable. Shift assessment updated and documented.

## 2024-11-08 LAB
ALBUMIN SERPL-MCNC: 3.2 G/DL (ref 3.4–5)
ALP SERPL-CCNC: 98 U/L (ref 40–129)
ALT SERPL-CCNC: 26 U/L (ref 10–40)
ANION GAP SERPL CALCULATED.3IONS-SCNC: 10 MMOL/L (ref 3–16)
AST SERPL-CCNC: 32 U/L (ref 15–37)
BILIRUB DIRECT SERPL-MCNC: <0.1 MG/DL (ref 0–0.3)
BILIRUB INDIRECT SERPL-MCNC: NORMAL MG/DL (ref 0–1)
BILIRUB SERPL-MCNC: <0.2 MG/DL (ref 0–1)
BUN SERPL-MCNC: 18 MG/DL (ref 7–20)
CALCIUM SERPL-MCNC: 8.6 MG/DL (ref 8.3–10.6)
CHLORIDE SERPL-SCNC: 102 MMOL/L (ref 99–110)
CO2 SERPL-SCNC: 23 MMOL/L (ref 21–32)
CREAT SERPL-MCNC: 0.7 MG/DL (ref 0.6–1.2)
DEPRECATED RDW RBC AUTO: 14.1 % (ref 12.4–15.4)
GFR SERPLBLD CREATININE-BSD FMLA CKD-EPI: 88 ML/MIN/{1.73_M2}
GLUCOSE BLD-MCNC: 100 MG/DL (ref 70–99)
GLUCOSE BLD-MCNC: 128 MG/DL (ref 70–99)
GLUCOSE BLD-MCNC: 87 MG/DL (ref 70–99)
GLUCOSE BLD-MCNC: 91 MG/DL (ref 70–99)
GLUCOSE SERPL-MCNC: 90 MG/DL (ref 70–99)
HCT VFR BLD AUTO: 24.7 % (ref 36–48)
HGB BLD-MCNC: 8.1 G/DL (ref 12–16)
MAGNESIUM SERPL-MCNC: 2.37 MG/DL (ref 1.8–2.4)
MCH RBC QN AUTO: 29 PG (ref 26–34)
MCHC RBC AUTO-ENTMCNC: 32.7 G/DL (ref 31–36)
MCV RBC AUTO: 88.7 FL (ref 80–100)
PERFORMED ON: ABNORMAL
PERFORMED ON: ABNORMAL
PERFORMED ON: NORMAL
PERFORMED ON: NORMAL
PHOSPHATE SERPL-MCNC: 3.4 MG/DL (ref 2.5–4.9)
PLATELET # BLD AUTO: 461 K/UL (ref 135–450)
PMV BLD AUTO: 6.2 FL (ref 5–10.5)
POTASSIUM SERPL-SCNC: 4.4 MMOL/L (ref 3.5–5.1)
PROT SERPL-MCNC: 6.5 G/DL (ref 6.4–8.2)
RBC # BLD AUTO: 2.78 M/UL (ref 4–5.2)
SODIUM SERPL-SCNC: 135 MMOL/L (ref 136–145)
WBC # BLD AUTO: 13.8 K/UL (ref 4–11)

## 2024-11-08 PROCEDURE — 85027 COMPLETE CBC AUTOMATED: CPT

## 2024-11-08 PROCEDURE — 2500000003 HC RX 250 WO HCPCS: Performed by: SURGERY

## 2024-11-08 PROCEDURE — 80069 RENAL FUNCTION PANEL: CPT

## 2024-11-08 PROCEDURE — 6370000000 HC RX 637 (ALT 250 FOR IP): Performed by: NURSE PRACTITIONER

## 2024-11-08 PROCEDURE — 99024 POSTOP FOLLOW-UP VISIT: CPT | Performed by: SURGERY

## 2024-11-08 PROCEDURE — 80076 HEPATIC FUNCTION PANEL: CPT

## 2024-11-08 PROCEDURE — 6370000000 HC RX 637 (ALT 250 FOR IP): Performed by: STUDENT IN AN ORGANIZED HEALTH CARE EDUCATION/TRAINING PROGRAM

## 2024-11-08 PROCEDURE — 6370000000 HC RX 637 (ALT 250 FOR IP): Performed by: SURGERY

## 2024-11-08 PROCEDURE — 2580000003 HC RX 258: Performed by: SURGERY

## 2024-11-08 PROCEDURE — 6360000002 HC RX W HCPCS: Performed by: SURGERY

## 2024-11-08 PROCEDURE — 1200000000 HC SEMI PRIVATE

## 2024-11-08 PROCEDURE — 6370000000 HC RX 637 (ALT 250 FOR IP): Performed by: INTERNAL MEDICINE

## 2024-11-08 PROCEDURE — 83735 ASSAY OF MAGNESIUM: CPT

## 2024-11-08 RX ORDER — LIDOCAINE 4 G/G
1 PATCH TOPICAL DAILY
Status: DISCONTINUED | OUTPATIENT
Start: 2024-11-09 | End: 2024-11-08

## 2024-11-08 RX ORDER — LIDOCAINE 4 G/G
1 PATCH TOPICAL DAILY
Status: DISCONTINUED | OUTPATIENT
Start: 2024-11-08 | End: 2024-11-12 | Stop reason: HOSPADM

## 2024-11-08 RX ADMIN — ACETAMINOPHEN 650 MG: 325 TABLET ORAL at 05:37

## 2024-11-08 RX ADMIN — PANTOPRAZOLE SODIUM 40 MG: 40 TABLET, DELAYED RELEASE ORAL at 05:37

## 2024-11-08 RX ADMIN — ACETAMINOPHEN 650 MG: 325 TABLET ORAL at 10:06

## 2024-11-08 RX ADMIN — BUPROPION HYDROCHLORIDE 300 MG: 150 TABLET, EXTENDED RELEASE ORAL at 10:00

## 2024-11-08 RX ADMIN — ATORVASTATIN CALCIUM 10 MG: 10 TABLET, FILM COATED ORAL at 10:00

## 2024-11-08 RX ADMIN — CALCIUM CARBONATE 1000 MG: 500 TABLET, CHEWABLE ORAL at 10:01

## 2024-11-08 RX ADMIN — DIPHENOXYLATE HYDROCHLORIDE AND ATROPINE SULFATE 1 TABLET: 2.5; .025 TABLET ORAL at 18:17

## 2024-11-08 RX ADMIN — FLUOXETINE HYDROCHLORIDE 40 MG: 20 CAPSULE ORAL at 10:00

## 2024-11-08 RX ADMIN — SODIUM CHLORIDE, PRESERVATIVE FREE 10 ML: 5 INJECTION INTRAVENOUS at 20:38

## 2024-11-08 RX ADMIN — CALCIUM CARBONATE 1000 MG: 500 TABLET, CHEWABLE ORAL at 18:17

## 2024-11-08 RX ADMIN — TRAZODONE HYDROCHLORIDE 100 MG: 50 TABLET ORAL at 20:40

## 2024-11-08 RX ADMIN — PIPERACILLIN AND TAZOBACTAM 3375 MG: 3; .375 INJECTION, POWDER, LYOPHILIZED, FOR SOLUTION INTRAVENOUS at 03:22

## 2024-11-08 RX ADMIN — PIPERACILLIN AND TAZOBACTAM 3375 MG: 3; .375 INJECTION, POWDER, LYOPHILIZED, FOR SOLUTION INTRAVENOUS at 10:11

## 2024-11-08 RX ADMIN — DIPHENOXYLATE HYDROCHLORIDE AND ATROPINE SULFATE 1 TABLET: 2.5; .025 TABLET ORAL at 10:01

## 2024-11-08 RX ADMIN — PIPERACILLIN AND TAZOBACTAM 3375 MG: 3; .375 INJECTION, POWDER, LYOPHILIZED, FOR SOLUTION INTRAVENOUS at 18:50

## 2024-11-08 RX ADMIN — ACETAMINOPHEN 650 MG: 325 TABLET ORAL at 22:16

## 2024-11-08 RX ADMIN — DIPHENOXYLATE HYDROCHLORIDE AND ATROPINE SULFATE 1 TABLET: 2.5; .025 TABLET ORAL at 22:16

## 2024-11-08 RX ADMIN — FLUTICASONE PROPIONATE 1 SPRAY: 50 SPRAY, METERED NASAL at 10:15

## 2024-11-08 RX ADMIN — ASCORBIC ACID, VITAMIN A PALMITATE, CHOLECALCIFEROL, THIAMINE HYDROCHLORIDE, RIBOFLAVIN-5 PHOSPHATE SODIUM, PYRIDOXINE HYDROCHLORIDE, NIACINAMIDE, DEXPANTHENOL, ALPHA-TOCOPHEROL ACETATE, VITAMIN K1, FOLIC ACID, BIOTIN, CYANOCOBALAMIN: 200; 3300; 200; 6; 3.6; 6; 40; 15; 10; 150; 600; 60; 5 INJECTION, SOLUTION INTRAVENOUS at 18:16

## 2024-11-08 RX ADMIN — ENOXAPARIN SODIUM 40 MG: 100 INJECTION SUBCUTANEOUS at 10:00

## 2024-11-08 RX ADMIN — SODIUM CHLORIDE, PRESERVATIVE FREE 10 ML: 5 INJECTION INTRAVENOUS at 09:59

## 2024-11-08 RX ADMIN — FAMOTIDINE 20 MG: 20 TABLET, FILM COATED ORAL at 10:00

## 2024-11-08 RX ADMIN — ACETAMINOPHEN 650 MG: 325 TABLET ORAL at 18:16

## 2024-11-08 RX ADMIN — LATANOPROST 1 DROP: 50 SOLUTION OPHTHALMIC at 22:16

## 2024-11-08 ASSESSMENT — PAIN DESCRIPTION - LOCATION
LOCATION: ABDOMEN
LOCATION: ABDOMEN

## 2024-11-08 ASSESSMENT — PAIN SCALES - GENERAL
PAINLEVEL_OUTOF10: 3
PAINLEVEL_OUTOF10: 3
PAINLEVEL_OUTOF10: 2

## 2024-11-08 ASSESSMENT — PAIN DESCRIPTION - PAIN TYPE: TYPE: ACUTE PAIN;SURGICAL PAIN

## 2024-11-08 ASSESSMENT — PAIN DESCRIPTION - ORIENTATION: ORIENTATION: MID

## 2024-11-08 ASSESSMENT — PAIN DESCRIPTION - DESCRIPTORS: DESCRIPTORS: DISCOMFORT

## 2024-11-08 NOTE — PROGRESS NOTES
Comprehensive Nutrition Assessment    Type and Reason for Visit:  Reassess    Nutrition Recommendations/Plan:   Home/cyclic TPN recommendations   Initiate Clinimix 5/20 starting at 80 mL/hr x 1 hr, ramp up to goal rate of 150 ml/hr x 12 hrs, then ramp down to 80 ml/hr for 1 hr.   Recommend 250 mL 20% lipids 2 times per week.  Recommend FSBS, monitor glucose, need for insulin.  Pharmacy to adjust MVI and Trace Elements as needed.  When PN to be discontinued, cut rate by 50% and let current bag run out.   Trial Ensure Plus HP BID.   Monitor nutrition adequacy, pertinent labs, bowel habits, wt changes, and clinical progress.     Malnutrition Assessment:  Malnutrition Status:  Moderate malnutrition (10/29/24 1229)    Context:  Acute Illness     Findings of the 6 clinical characteristics of malnutrition:  Energy Intake:  50% or less of estimated energy requirements for 5 or more days  Weight Loss:  No significant weight loss     Body Fat Loss:  Mild body fat loss Triceps   Muscle Mass Loss:  Mild muscle mass loss Temples (temporalis), Clavicles (pectoralis & deltoids)  Fluid Accumulation:  No significant fluid accumulation     Strength:  Not Performed    Nutrition Assessment:    Followup: Pt requiring TPN d/t ileostomy reversal with anastomotic leak. Due to leak not yet healed, pt would benefit from continued bowel rest to allow leak to fully heal prior to using gut for nutrition. Pt will require TPN at this time until leak is resolved. Continue cyclic TPN. Recommend BMP with Mg and Phos, at least weekly to monitor for electrolyte disturbances. Recommend at least 2x daily blood sugar monitoring. Pt denies any issues tolerating TPN aside from loose bowel movements. Will continue to monitor.    Nutrition Related Findings:    Na 135, elects WDL. LBM 11/8. Wound Type: Surgical Incision       Current Nutrition Intake & Therapies:    Average Meal Intake: Unable to assess  Average Supplements Intake: Unable to

## 2024-11-08 NOTE — CARE COORDINATION
Rodrigo/[facility] updated writer, he is working on financial piece, as of now pt will be responsible for 20% of TPN cost which comes out to $700/week, but he is trying to verify as this is not much less than self pay would have been.  Rodrigo to email daughter/Diana the financial assistance form; Diana states pt only has social security as income, no assets.  Chart reviewed, Dr Davila/LINK put in discharge order; Dr Jackson/Jayant is attending and per his note if diarrhea begins to slow, should be ok for d/c home w/ TPN/fluids soon, as well as PO antibiotics and HALLIE drain.  Writer updated Diana, she appreciates not rushing this discharge plan, agrees with pt coming home Monday with TPN once payment confirmed and teaching done, she will be available to be taught.  Writer updated primary RN/Nicky, asked her to call Diana with medical update (labs, dehydration?, diarrhea).  Writer updated pt.  Writer updated Ken/[facility], he will follow up Monday.  JABARI Torres-RN

## 2024-11-08 NOTE — PROGRESS NOTES
Shift assessment completed. Pt A&O x4.  VSS.  Denies Pain. PICC site patent/ flushed, and infusing.  Patient on RA..Abdominal  Incisions c/d/i. 5 Lap sites. HALLIE Drain in place. Patient admits to passing gas. Patient ambulates with SBA to bathroom.  Medication given per MAR. Denies any needs at this time. Bed alarm on for safety. Bed locked and in lowest position.  Call light within reach. Gripper socks applied. Patient in stable condition when RN leaving room. PICC dressing changed today.   Electronically signed by Nicky Garduno RN on 11/8/2024 at 6:36 PM

## 2024-11-08 NOTE — PLAN OF CARE
Problem: Discharge Planning  Goal: Discharge to home or other facility with appropriate resources  Outcome: Progressing  Flowsheets (Taken 11/8/2024 0733)  Discharge to home or other facility with appropriate resources:   Identify barriers to discharge with patient and caregiver   Arrange for needed discharge resources and transportation as appropriate   Identify discharge learning needs (meds, wound care, etc)     Problem: Pain  Goal: Verbalizes/displays adequate comfort level or baseline comfort level  11/8/2024 0733 by Nicky Garduno RN  Outcome: Progressing  Flowsheets (Taken 11/8/2024 0733)  Verbalizes/displays adequate comfort level or baseline comfort level:   Encourage patient to monitor pain and request assistance   Assess pain using appropriate pain scale     Problem: Safety - Adult  Goal: Free from fall injury  11/8/2024 0733 by Nicky Garduno RN  Outcome: Progressing  Flowsheets (Taken 11/8/2024 0733)  Free From Fall Injury: Instruct family/caregiver on patient safety     Problem: ABCDS Injury Assessment  Goal: Absence of physical injury  11/8/2024 0733 by Nicky Garduno RN  Outcome: Progressing  Flowsheets (Taken 11/8/2024 0733)  Absence of Physical Injury: Implement safety measures based on patient assessment     Problem: Skin/Tissue Integrity  Goal: Absence of new skin breakdown  Description: 1.  Monitor for areas of redness and/or skin breakdown  2.  Assess vascular access sites hourly  3.  Every 4-6 hours minimum:  Change oxygen saturation probe site  4.  Every 4-6 hours:  If on nasal continuous positive airway pressure, respiratory therapy assess nares and determine need for appliance change or resting period.  Outcome: Progressing     Problem: Nutrition Deficit:  Goal: Optimize nutritional status  Outcome: Progressing  Flowsheets (Taken 11/8/2024 0733)  Nutrient intake appropriate for improving, restoring, or maintaining nutritional needs: Assess nutritional status and recommend course of

## 2024-11-08 NOTE — PLAN OF CARE
Problem: Pain  Goal: Verbalizes/displays adequate comfort level or baseline comfort level  11/7/2024 2124 by Antonio Bernal RN  Outcome: Progressing  Flowsheets (Taken 11/7/2024 2124)  Verbalizes/displays adequate comfort level or baseline comfort level:   Encourage patient to monitor pain and request assistance   Assess pain using appropriate pain scale   Administer analgesics based on type and severity of pain and evaluate response   Implement non-pharmacological measures as appropriate and evaluate response     Problem: Safety - Adult  Goal: Free from fall injury  11/7/2024 2124 by Antonio Bernal RN  Outcome: Progressing     Problem: ABCDS Injury Assessment  Goal: Absence of physical injury  11/7/2024 2124 by Antonio Bernal RN  Outcome: Progressing  Flowsheets (Taken 11/7/2024 2124)  Absence of Physical Injury: Implement safety measures based on patient assessment     Problem: Gastrointestinal - Adult  Goal: Maintains or returns to baseline bowel function  11/7/2024 2124 by Antonio Bernal RN  Outcome: Progressing  Flowsheets  Taken 11/7/2024 2124  Maintains or returns to baseline bowel function:   Assess bowel function   Administer IV fluids as ordered to ensure adequate hydration   Encourage oral fluids to ensure adequate hydration   Administer ordered medications as needed   Encourage mobilization and activity

## 2024-11-08 NOTE — CARE COORDINATION
Chart reviewed, LOS 17days.  Writer spoke with Rossana Gonzalez, Medicare has denied home TPN because it is not documented pt will need for over 90days.  [facility] cannot make TPN on the weekend, and will need Medicare approval by noon today.  CM will continue to follow pt's progress and coordinate discharge arrangements as appropriate.  SALIMA Torres     2318 Addendum:  Dr Jackson spoke with writer, he understands Medicare requires documentation in his note stating pt needs TPN for \"greater than 90days\".  Writer updated Rodrigo/Andreia, they have order and other documentation except for the note, once completed he will send to insurance dept for approval.  SALIMA Torres     8692 Addendum:  Latoya and [facility] are actively involved for discharge planning to home today with new nocturnal TPN; they will talk to pt/family and each other, and then update writer.  SALIMA Torres

## 2024-11-08 NOTE — DISCHARGE INSTRUCTIONS
office.         [facility] GEOVANNIFADY Mejia M.D.   [facility] Office      [facility] Office          [facility]               [address]                [address]  Addi Jackson M.D.              [address]           [address]            [place] [address]         [place] [address]  Dejan Stallings M.D                         [phone]          [facility]                   Austin Marques M.D.            [facility]        HOW TO EMPTY YOUR DRAIN    Pull the stopper out of the top of the drain.   Pour the fluid into the container you were provided.   After the drain is empty, squeeze the drain flat and push the stopper back into the top of the drain.   Remember to record the date, time, amount and color of fluid collected.     IF you see a change in color of your drain, please let the office know.                                        RECORD YOUR DRAIN OUTPUT(S) HERE:   DRAIN #1   r R  r L DRAIN # 2  r R  r L    Date/Time Amount  Color Date/Time Amount  Color

## 2024-11-08 NOTE — CARE COORDINATION
Discharge ordered.  Pt discussed during huddle with primary RN/Diana, family has concerns about pt discharging home, pt wanting CM to talk to her family, as she is emotionally exhausted at this point.  Writer spoke with pt's daughter/Diana, family's goal is to provide 24hr assistance for pt and will stagger flying in, Diana arriving Sunday.  Diana is aware of how much care pt will need while on TPN, this will not be easy for pt to do.  At this point, family needs to know copay amount, and then feel fully comfortable with TPN including who to call with any issues.  Rodrigo/Andreia to update writer on approval, will need to do teaching still today.  Ken/[facility] will also come through and visit pt/dtr Lazara later today.  Diana would like to remain primary contact, and then she will text sister Lazara who lives locally and can visit pt/get teaching today pending work schedule.  JABARI Torres-RN

## 2024-11-08 NOTE — PROGRESS NOTES
[facility] GENERAL SURGERY    Surgery Progress Note           POD # 17    PATIENT NAME: Sudha Lopez     TODAY'S DATE: 11/8/2024    INTERVAL HISTORY:    Pt with improvign diarrhea                                                                                                                                .     OBJECTIVE:   VITALS:  /77   Pulse 79   Temp 98.3 °F (36.8 °C) (Oral)   Resp 18   Ht 1.6 m (5' 2.99\")   Wt 75.1 kg (165 lb 8 oz)   SpO2 98%   BMI 29.32 kg/m²     INTAKE/OUTPUT:    I/O last 3 completed shifts:  In: 2882.8 [P.O.:480; IV Piggyback:405.6]  Out: 25 [Drains:25]  I/O this shift:  In: 240 [P.O.:240]  Out: -               CONSTITUTIONAL:  awake and alert  LUNGS:     ABDOMEN:    , soft, non-distended, sachin purulent and minimal  INCISION: clean, dry, healed    Data:  CBC:   Recent Labs     11/06/24  0640 11/07/24  0545 11/08/24  0540   WBC 10.7 12.0* 13.8*   HGB 8.0* 7.8* 8.1*   HCT 24.0* 23.1* 24.7*   * 496* 461*     BMP:    Recent Labs     11/06/24  1525 11/07/24  0545 11/08/24  0540   * 136 135*   K 3.4* 3.8  3.8 4.4    104 102   CO2 20* 21 23   BUN 30* 24* 18   CREATININE 0.9 0.8 0.7   GLUCOSE 119* 114* 90     Hepatic:   Recent Labs     11/06/24  1525 11/08/24  0540   AST 32 32   ALT 27 26   BILITOT <0.2 <0.2   ALKPHOS 82 98     Mag:      Recent Labs     11/06/24  1525 11/07/24  0545 11/08/24  0540   MG 2.20 2.43* 2.37      Phos:     Recent Labs     11/06/24  1525 11/07/24  0545 11/08/24  0540   PHOS 3.3 3.0 3.4      INR: No results for input(s): \"INR\" in the last 72 hours.      Radiology Review:       ASSESSMENT AND PLAN:  79 y.o. female status post ileostomy reversal now with anastomotic leak, trying to manage conservatively with bowel rest/TPN to see if the leak can heal.  Repeat imaging 2 days ago did not show overt, actual leak.  Diarrhea likely due to slow adaptation of the remnant rectum/sigmoid colon to be able to handle the bowel fluid.  Typically the

## 2024-11-08 NOTE — PROGRESS NOTES
Chart review today. Still waiting for precert for home TPN.  No plans for discharge now today.  Patient sleeping did not awake to check on her.  Spoke to bed side RN.  No new issues with wounds.

## 2024-11-08 NOTE — PROGRESS NOTES
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  Expect TPN for two weeks to start off and then will reassess for healing of above.\"    With persistent fistulization on imaging after one week of the above treatment expect that this will take longer than two weeks and would be over 90 days with persistent fistulization.      Addi Jackson MD

## 2024-11-08 NOTE — PROGRESS NOTES
Shift assessment done as charted. Pt A&O x4, VSS.     -Ambulates with SB  -Abdominal incision C/D/I.  -LLQ HALLIE drain to bulb suction with tan colored drainage.    -up to bathroom with greenish watery BM, passing gas this morning.   -Denies further needs at this time.     Bed alarm on. Refused Gripper socks.  Bed in low position, wheels locked, SR x2, call light and bedside table within reach.

## 2024-11-08 NOTE — PROGRESS NOTES
Patient educated on use of IS and demonstrates use Yes    Patient and family educated on incisional care and s/s of infection Yes    Patient and family educated on hand hygiene Yes    Patient and family educated on post operative care Yes   - Pain control    - Insulin Protocol     Day of surgery patient to side of bed or up to chair for minimum of 30 minutes Yes    POD 1 until discharge, patient up to chair by 9 am and TID. Goal to increase mobility  Yes    Patient performs oral hygiene with CHG oral solution until NG tube discontinued. NA    If patient does not have NG tube in place, patient to brush teeth and use mouth wash Q shift Yes    Patient receives daily bath and linen changes Yes    Patient receives CHG bath until roach discontinued NA    SCDs in place refused    Patient receiving chemical VTE Yes

## 2024-11-08 NOTE — PROGRESS NOTES
Q8H    atorvastatin  10 mg Oral Daily    [Held by provider] lisinopril  40 mg Oral Daily    buPROPion  300 mg Oral QAM    FLUoxetine  40 mg Oral Daily    fluticasone  1 spray Nasal Daily    latanoprost  1 drop Both Eyes Nightly    [Held by provider] spironolactone  25 mg Oral Daily    sodium chloride flush  5-40 mL IntraVENous 2 times per day    acetaminophen  650 mg Oral Q6H    enoxaparin  40 mg SubCUTAneous Daily    insulin lispro  0-8 Units SubCUTAneous 4x Daily AC & HS    famotidine  20 mg Oral Daily    Or    famotidine (PEPCID) injection  20 mg IntraVENous Daily     PRN Meds: diphenoxylate-atropine, calcium carbonate, calcium carbonate, Loperamide HCl, ibuprofen, diphenhydrAMINE, sodium chloride, diatrizoate meglumine-sodium, prochlorperazine, povidone-iodine, phenol, benzocaine-menthol, glucose, dextrose bolus **OR** dextrose bolus, glucagon (rDNA), dextrose, sodium chloride flush, sodium chloride, promethazine **OR** ondansetron, oxyCODONE **OR** oxyCODONE, HYDROmorphone **OR** HYDROmorphone      Physical Exam Performed:      General appearance:  No apparent distress  Respiratory:  Normal respiratory effort.   Cardiovascular:  Regular rate and rhythm.  Abdomen:  Soft, non-tender, non-distended.  Musculoskeletal:  No edema  Neurologic:  Non-focal  Psychiatric:  Alert and oriented    /77   Pulse 79   Temp 98.3 °F (36.8 °C) (Oral)   Resp 18   Ht 1.6 m (5' 2.99\")   Wt 75.1 kg (165 lb 8 oz)   SpO2 98%   BMI 29.32 kg/m²     Telemetry:        Diet: ADULT DIET; Full Liquid  PN-Adult Premix 5/20 - Standard Electrolytes - Central Line    DVT Prophylaxis: [x]PPx LMWH  []SQ Heparin  []IPC/SCDs  []Eliquis  []Xarelto  []Coumadin  [] Heparin Drip  []Other -      Code status: Full Code    PT/OT Eval Status:   []NOT yet ordered  []Ordered and Pending   [x]Seen with Recommendations for:   []Home independently  [x]Home w/ assist  []HHC  []SNF  []Acute Rehab    Multi-Disciplinary Rounds with Case Management

## 2024-11-09 LAB
ALBUMIN SERPL-MCNC: 3.1 G/DL (ref 3.4–5)
ANION GAP SERPL CALCULATED.3IONS-SCNC: 10 MMOL/L (ref 3–16)
BUN SERPL-MCNC: 17 MG/DL (ref 7–20)
CALCIUM SERPL-MCNC: 8.6 MG/DL (ref 8.3–10.6)
CHLORIDE SERPL-SCNC: 101 MMOL/L (ref 99–110)
CO2 SERPL-SCNC: 24 MMOL/L (ref 21–32)
CREAT SERPL-MCNC: 0.8 MG/DL (ref 0.6–1.2)
DEPRECATED RDW RBC AUTO: 14.4 % (ref 12.4–15.4)
GFR SERPLBLD CREATININE-BSD FMLA CKD-EPI: 75 ML/MIN/{1.73_M2}
GLUCOSE BLD-MCNC: 100 MG/DL (ref 70–99)
GLUCOSE BLD-MCNC: 128 MG/DL (ref 70–99)
GLUCOSE BLD-MCNC: 134 MG/DL (ref 70–99)
GLUCOSE BLD-MCNC: 78 MG/DL (ref 70–99)
GLUCOSE SERPL-MCNC: 93 MG/DL (ref 70–99)
HCT VFR BLD AUTO: 23 % (ref 36–48)
HGB BLD-MCNC: 7.7 G/DL (ref 12–16)
MAGNESIUM SERPL-MCNC: 2.35 MG/DL (ref 1.8–2.4)
MCH RBC QN AUTO: 29.6 PG (ref 26–34)
MCHC RBC AUTO-ENTMCNC: 33.5 G/DL (ref 31–36)
MCV RBC AUTO: 88.4 FL (ref 80–100)
PERFORMED ON: ABNORMAL
PERFORMED ON: NORMAL
PHOSPHATE SERPL-MCNC: 3.9 MG/DL (ref 2.5–4.9)
PLATELET # BLD AUTO: 382 K/UL (ref 135–450)
PMV BLD AUTO: 6 FL (ref 5–10.5)
POTASSIUM SERPL-SCNC: 4.6 MMOL/L (ref 3.5–5.1)
RBC # BLD AUTO: 2.61 M/UL (ref 4–5.2)
SODIUM SERPL-SCNC: 135 MMOL/L (ref 136–145)
WBC # BLD AUTO: 11.2 K/UL (ref 4–11)

## 2024-11-09 PROCEDURE — 6370000000 HC RX 637 (ALT 250 FOR IP): Performed by: STUDENT IN AN ORGANIZED HEALTH CARE EDUCATION/TRAINING PROGRAM

## 2024-11-09 PROCEDURE — 99024 POSTOP FOLLOW-UP VISIT: CPT | Performed by: SURGERY

## 2024-11-09 PROCEDURE — 6370000000 HC RX 637 (ALT 250 FOR IP): Performed by: NURSE PRACTITIONER

## 2024-11-09 PROCEDURE — 6370000000 HC RX 637 (ALT 250 FOR IP): Performed by: SURGERY

## 2024-11-09 PROCEDURE — 80069 RENAL FUNCTION PANEL: CPT

## 2024-11-09 PROCEDURE — 2580000003 HC RX 258: Performed by: SURGERY

## 2024-11-09 PROCEDURE — 85027 COMPLETE CBC AUTOMATED: CPT

## 2024-11-09 PROCEDURE — 6370000000 HC RX 637 (ALT 250 FOR IP): Performed by: INTERNAL MEDICINE

## 2024-11-09 PROCEDURE — 83735 ASSAY OF MAGNESIUM: CPT

## 2024-11-09 PROCEDURE — 6360000002 HC RX W HCPCS: Performed by: SURGERY

## 2024-11-09 PROCEDURE — 2500000003 HC RX 250 WO HCPCS: Performed by: SURGERY

## 2024-11-09 PROCEDURE — 1200000000 HC SEMI PRIVATE

## 2024-11-09 RX ADMIN — FAMOTIDINE 20 MG: 20 TABLET, FILM COATED ORAL at 08:43

## 2024-11-09 RX ADMIN — CALCIUM CARBONATE 1000 MG: 500 TABLET, CHEWABLE ORAL at 19:33

## 2024-11-09 RX ADMIN — DIPHENOXYLATE HYDROCHLORIDE AND ATROPINE SULFATE 1 TABLET: 2.5; .025 TABLET ORAL at 19:33

## 2024-11-09 RX ADMIN — ACETAMINOPHEN 650 MG: 325 TABLET ORAL at 11:51

## 2024-11-09 RX ADMIN — ACETAMINOPHEN 650 MG: 325 TABLET ORAL at 23:06

## 2024-11-09 RX ADMIN — SODIUM CHLORIDE, PRESERVATIVE FREE 10 ML: 5 INJECTION INTRAVENOUS at 08:42

## 2024-11-09 RX ADMIN — SODIUM CHLORIDE: 9 INJECTION, SOLUTION INTRAVENOUS at 11:48

## 2024-11-09 RX ADMIN — PANTOPRAZOLE SODIUM 40 MG: 40 TABLET, DELAYED RELEASE ORAL at 05:15

## 2024-11-09 RX ADMIN — FLUTICASONE PROPIONATE 1 SPRAY: 50 SPRAY, METERED NASAL at 08:42

## 2024-11-09 RX ADMIN — PIPERACILLIN AND TAZOBACTAM 3375 MG: 3; .375 INJECTION, POWDER, LYOPHILIZED, FOR SOLUTION INTRAVENOUS at 11:50

## 2024-11-09 RX ADMIN — ACETAMINOPHEN 650 MG: 325 TABLET ORAL at 05:14

## 2024-11-09 RX ADMIN — LATANOPROST 1 DROP: 50 SOLUTION OPHTHALMIC at 19:35

## 2024-11-09 RX ADMIN — TRAZODONE HYDROCHLORIDE 100 MG: 50 TABLET ORAL at 21:05

## 2024-11-09 RX ADMIN — ACETAMINOPHEN 650 MG: 325 TABLET ORAL at 16:48

## 2024-11-09 RX ADMIN — DIPHENOXYLATE HYDROCHLORIDE AND ATROPINE SULFATE 1 TABLET: 2.5; .025 TABLET ORAL at 11:51

## 2024-11-09 RX ADMIN — ASCORBIC ACID, VITAMIN A PALMITATE, CHOLECALCIFEROL, THIAMINE HYDROCHLORIDE, RIBOFLAVIN-5 PHOSPHATE SODIUM, PYRIDOXINE HYDROCHLORIDE, NIACINAMIDE, DEXPANTHENOL, ALPHA-TOCOPHEROL ACETATE, VITAMIN K1, FOLIC ACID, BIOTIN, CYANOCOBALAMIN: 200; 3300; 200; 6; 3.6; 6; 40; 15; 10; 150; 600; 60; 5 INJECTION, SOLUTION INTRAVENOUS at 17:59

## 2024-11-09 RX ADMIN — ENOXAPARIN SODIUM 40 MG: 100 INJECTION SUBCUTANEOUS at 08:43

## 2024-11-09 RX ADMIN — SODIUM CHLORIDE, PRESERVATIVE FREE 10 ML: 5 INJECTION INTRAVENOUS at 19:35

## 2024-11-09 RX ADMIN — PIPERACILLIN AND TAZOBACTAM 3375 MG: 3; .375 INJECTION, POWDER, LYOPHILIZED, FOR SOLUTION INTRAVENOUS at 19:30

## 2024-11-09 RX ADMIN — BUPROPION HYDROCHLORIDE 300 MG: 150 TABLET, EXTENDED RELEASE ORAL at 08:43

## 2024-11-09 RX ADMIN — FLUOXETINE HYDROCHLORIDE 40 MG: 20 CAPSULE ORAL at 08:43

## 2024-11-09 RX ADMIN — PIPERACILLIN AND TAZOBACTAM 3375 MG: 3; .375 INJECTION, POWDER, LYOPHILIZED, FOR SOLUTION INTRAVENOUS at 03:14

## 2024-11-09 RX ADMIN — CALCIUM CARBONATE 1000 MG: 500 TABLET, CHEWABLE ORAL at 11:51

## 2024-11-09 RX ADMIN — ATORVASTATIN CALCIUM 10 MG: 10 TABLET, FILM COATED ORAL at 08:43

## 2024-11-09 ASSESSMENT — PAIN DESCRIPTION - LOCATION
LOCATION: ABDOMEN
LOCATION: ABDOMEN

## 2024-11-09 ASSESSMENT — PAIN SCALES - GENERAL
PAINLEVEL_OUTOF10: 4
PAINLEVEL_OUTOF10: 5
PAINLEVEL_OUTOF10: 0
PAINLEVEL_OUTOF10: 0
PAINLEVEL_OUTOF10: 2

## 2024-11-09 ASSESSMENT — PAIN DESCRIPTION - ORIENTATION: ORIENTATION: LEFT

## 2024-11-09 NOTE — PROGRESS NOTES
[facility] GENERAL SURGERY    Surgery Progress Note           POD #  18    PATIENT NAME: Sudha Lopez     TODAY'S DATE: 11/9/2024    INTERVAL HISTORY:    Pt  feeling much better.  Diarrhea greatly reduced with addition of Lomotil yesterday.  Eating full liquids.  HALLIE with scant drainage output.     OBJECTIVE:   VITALS:  /62   Pulse 77   Temp 98.4 °F (36.9 °C) (Oral)   Resp 17   Ht 1.6 m (5' 2.99\")   Wt 75.1 kg (165 lb 8 oz)   SpO2 95%   BMI 29.32 kg/m²     INTAKE/OUTPUT:    I/O last 3 completed shifts:  In: 3177.7 [P.O.:720; I.V.:100.6; IV Piggyback:245.7]  Out: 25 [Drains:25]  No intake/output data recorded.              CONSTITUTIONAL:  awake and alert  LUNGS:    ABDOMEN:  , soft, non-distended, non-tender   INCISION: clean, dry    Data:  CBC:   Recent Labs     11/07/24  0545 11/08/24  0540 11/09/24  0500   WBC 12.0* 13.8* 11.2*   HGB 7.8* 8.1* 7.7*   HCT 23.1* 24.7* 23.0*   * 461* 382     BMP:    Recent Labs     11/07/24  0545 11/08/24  0540 11/09/24  0500    135* 135*   K 3.8  3.8 4.4 4.6    102 101   CO2 21 23 24   BUN 24* 18 17   CREATININE 0.8 0.7 0.8   GLUCOSE 114* 90 93     Hepatic:   Recent Labs     11/08/24  0540   AST 32   ALT 26   BILITOT <0.2   ALKPHOS 98     Mag:      Recent Labs     11/07/24  0545 11/08/24  0540 11/09/24  0500   MG 2.43* 2.37 2.35      Phos:     Recent Labs     11/07/24  0545 11/08/24  0540 11/09/24  0500   PHOS 3.0 3.4 3.9      INR: No results for input(s): \"INR\" in the last 72 hours.      Radiology Review:       ASSESSMENT AND PLAN:  79 y.o. female status post ileostomy reversal now with anastomotic leak, trying to manage conservatively with bowel rest/TPN to see if the leak can heal. Currently seems to be successful   - cont full liquids and TPN support   - anticipate d/c home w/ TPN on Monday   -         Electronically signed by FOREST REBOLLEDO MD

## 2024-11-09 NOTE — PROGRESS NOTES
4 Eyes Skin Assessment and Patient belongings     The patient is being assess for  Shift Handoff    I agree that 2 Nurses have performed a thorough Head to Toe Skin Assessment on the patient. ALL assessment sites listed below have been assessed.       Areas assessed by both nurses:   [x]   Head, Face, and Ears   [x]   Shoulders, Back, and Chest  [x]   Arms, Elbows, and Hands   [x]   Coccyx, Sacrum, and IschIum  []   Legs, Feet, and Heels        Does the Patient have Skin Breakdown?  Yes LDA WOUND CARE was Initiated documentation include the Darlyn-wound, Wound Assessment, Measurements, Dressing Treatment, Drainage, and Color\",         Kirill Prevention initiated:  Yes   Wound Care Orders initiated:  Yes      WO nurse consulted for Pressure Injury (Stage 3,4, Unstageable, DTI, NWPT, and Complex wounds), New and Established Ostomies:  Yes      I agree that 2 Nurses have reviewed patient belongings with the patient/family and documented in the flowsheet upon admission or transfer to the unit.     Belongings  Dental Appliances: None  Vision - Corrective Lenses: None  Hearing Aid: None  Clothing: Footwear, Pants, Shirt, Socks, Undergarments  Jewelry: None  Body Piercings Removed: N/A  Electronic Devices: Cell Phone  Weapons (Notify Protective Services/Security): None  Other Valuables: Crutches, Purse  Home Medications: None  Valuables Given To: Other (Comment)  Provide Name(s) of Who Valuable(s) Were Given To: all belongings labeled and taken to pacu  Responsible person(s) in the waiting room: Lazara (daughter  Patient approves for provider to speak to responsible person post operatively: Yes       Nurse 1 eSignature: Electronically signed by Nicky Garduno RN on 11/9/24 at 1:08 PM EST    **SHARE this note so that the co-signing nurse is able to place an eSignature**    Nurse 2 eSignature: Electronically signed by Elysia Horta RN on 11/10/24 at 3:52 PM EST

## 2024-11-09 NOTE — PROGRESS NOTES
Shift assessment completed. Pt A&O x4.  VSS.  Denies Pain. PICC site patent/ flushed, and saline locked. Patient on RA.  Incisions c/d/i. 5 Lap sites. HALLIE drain in place. No output. Patient admits to passing gas. Patient still has diarrhea. Still green stool. Patient ambulates with walker as a SBA to bathroom.  Medication given per MAR. Denies any needs at this time. Bed/chair alarm on for safety. Patient refused to wear gripper socks. Education was given and verbalized understanding. Patient also refusing CHG wipes. Education also given and verbalized understanding. Bed locked and in lowest position.  Call light within reach. Patient in stable condition when RN leaving room.   Electronically signed by Nicky Garduno RN on 11/9/2024 at 9:46 AM

## 2024-11-09 NOTE — PLAN OF CARE
Problem: Discharge Planning  Goal: Discharge to home or other facility with appropriate resources  Outcome: Progressing     Problem: Pain  Goal: Verbalizes/displays adequate comfort level or baseline comfort level  Outcome: Progressing     Problem: Safety - Adult  Goal: Free from fall injury  Outcome: Progressing     Problem: ABCDS Injury Assessment  Goal: Absence of physical injury  Outcome: Progressing     Problem: Skin/Tissue Integrity - Adult  Goal: Incisions, wounds, or drain sites healing without S/S of infection  Outcome: Progressing     Problem: Gastrointestinal - Adult  Goal: Minimal or absence of nausea and vomiting  Outcome: Progressing     Problem: Gastrointestinal - Adult  Goal: Maintains adequate nutritional intake  Outcome: Progressing

## 2024-11-09 NOTE — PLAN OF CARE
Problem: Discharge Planning  Goal: Discharge to home or other facility with appropriate resources  11/9/2024 0942 by Nicky Garduno RN  Outcome: Progressing  Flowsheets (Taken 11/9/2024 0942)  Discharge to home or other facility with appropriate resources:   Identify barriers to discharge with patient and caregiver   Arrange for needed discharge resources and transportation as appropriate   Identify discharge learning needs (meds, wound care, etc)     Problem: Pain  Goal: Verbalizes/displays adequate comfort level or baseline comfort level  11/9/2024 0942 by Nicky Garduno RN  Outcome: Progressing  Flowsheets (Taken 11/9/2024 0942)  Verbalizes/displays adequate comfort level or baseline comfort level:   Encourage patient to monitor pain and request assistance   Assess pain using appropriate pain scale     Problem: Safety - Adult  Goal: Free from fall injury  11/9/2024 0942 by Nicky Garduno RN  Outcome: Progressing     Problem: ABCDS Injury Assessment  Goal: Absence of physical injury  11/9/2024 0942 by Nicky Garduno RN  Outcome: Progressing  Flowsheets (Taken 11/9/2024 0942)  Absence of Physical Injury: Implement safety measures based on patient assessment     Problem: Skin/Tissue Integrity  Goal: Absence of new skin breakdown  Description: 1.  Monitor for areas of redness and/or skin breakdown  2.  Assess vascular access sites hourly  3.  Every 4-6 hours minimum:  Change oxygen saturation probe site  4.  Every 4-6 hours:  If on nasal continuous positive airway pressure, respiratory therapy assess nares and determine need for appliance change or resting period.  11/9/2024 0942 by Nicky Garduno RN  Outcome: Progressing     Problem: Nutrition Deficit:  Goal: Optimize nutritional status  Outcome: Progressing  Flowsheets (Taken 11/9/2024 0942)  Nutrient intake appropriate for improving, restoring, or maintaining nutritional needs:   Assess nutritional status and recommend course of action   Monitor oral intake,

## 2024-11-09 NOTE — PROGRESS NOTES
Assessment completed and documented. VSS. A/ox4. Denies pain. Bed locked and in lowest position. Bedside table and call light within reach. Denies further needs at this time.

## 2024-11-09 NOTE — PROGRESS NOTES
Hospital Medicine Progress Note  V 10.25      Date of Admission: 10/22/2024    Hospital Day: 19      Chief Admission Complaint:  obstruction    Subjective: Patient is in hospital chair awake and alert.  No new issues or complaints today.  Patient states she feels \"good \".  Denies any pain shortness of breath at this time.    Presenting Admission History:       78 y.o. female who is seen today by the hospitalist team at the request of DR. Jackson, with a Chief Complaint of hypotension and fever post op.  Patient originally presented for ileostomy reversal.  Patient on day 9 of hospitalization and began to spike a low-grade fever as well as was tachycardic with soft blood pressure in the 90s.  CT scan was obtained that did show a fluid collection with concern for anastomotic leak.  Hospitalist service was consulted at this point.  Patient did have a white count as well as a monitor creatinine consistent with AIRAM.  Patient had already been given a bolus of fluid with stabilization of her blood pressure and initiated on IV hydration as well as IV antibiotics.         Assessment/Plan:      Current Principal Problem:  Obstruction of intestine (HCC)    Intraabdominal infection  CT shows \"Findings are compatible with anastomotic leak within the pelvis, with  contrast collection measuring approximately 2.8 x 1.6 cm near the  anastomosis, \" HALLIE drain does have purulent fluid, but no minimal.  IV antibiotics: Zosyn - started on/before 30 October.   TPN per General Surgery - to continue. Started on Lomotil w/ improvement in diarrhea.   Repeat CT with contrast on 11/5/2024 w/out progression - started on Clears and now advanced to full liquids and tolerating.   -General surgery consulted, note reviewed on 11/9/2024 with recommendations to continue liquids and TPN, and if diarrhea begins to slow should be okay to DC home with TPN/fluids soon     ARF - w/ elevated BUN/Cr ratio likely secondary to pre-renal azotemia.  Followed

## 2024-11-10 LAB
DEPRECATED RDW RBC AUTO: 14.1 % (ref 12.4–15.4)
GLUCOSE BLD-MCNC: 113 MG/DL (ref 70–99)
GLUCOSE BLD-MCNC: 117 MG/DL (ref 70–99)
GLUCOSE BLD-MCNC: 122 MG/DL (ref 70–99)
GLUCOSE BLD-MCNC: 125 MG/DL (ref 70–99)
HCT VFR BLD AUTO: 24.8 % (ref 36–48)
HGB BLD-MCNC: 8.2 G/DL (ref 12–16)
MCH RBC QN AUTO: 29.3 PG (ref 26–34)
MCHC RBC AUTO-ENTMCNC: 33 G/DL (ref 31–36)
MCV RBC AUTO: 88.8 FL (ref 80–100)
PERFORMED ON: ABNORMAL
PLATELET # BLD AUTO: 429 K/UL (ref 135–450)
PMV BLD AUTO: 6.4 FL (ref 5–10.5)
RBC # BLD AUTO: 2.79 M/UL (ref 4–5.2)
WBC # BLD AUTO: 16.5 K/UL (ref 4–11)

## 2024-11-10 PROCEDURE — 6370000000 HC RX 637 (ALT 250 FOR IP): Performed by: INTERNAL MEDICINE

## 2024-11-10 PROCEDURE — 2500000003 HC RX 250 WO HCPCS: Performed by: SURGERY

## 2024-11-10 PROCEDURE — 6370000000 HC RX 637 (ALT 250 FOR IP): Performed by: STUDENT IN AN ORGANIZED HEALTH CARE EDUCATION/TRAINING PROGRAM

## 2024-11-10 PROCEDURE — 6360000002 HC RX W HCPCS: Performed by: SURGERY

## 2024-11-10 PROCEDURE — 85027 COMPLETE CBC AUTOMATED: CPT

## 2024-11-10 PROCEDURE — 2580000003 HC RX 258: Performed by: SURGERY

## 2024-11-10 PROCEDURE — 99024 POSTOP FOLLOW-UP VISIT: CPT | Performed by: SURGERY

## 2024-11-10 PROCEDURE — 6370000000 HC RX 637 (ALT 250 FOR IP): Performed by: NURSE PRACTITIONER

## 2024-11-10 PROCEDURE — 6370000000 HC RX 637 (ALT 250 FOR IP): Performed by: SURGERY

## 2024-11-10 PROCEDURE — 1200000000 HC SEMI PRIVATE

## 2024-11-10 RX ADMIN — SODIUM CHLORIDE: 9 INJECTION, SOLUTION INTRAVENOUS at 18:56

## 2024-11-10 RX ADMIN — TRAZODONE HYDROCHLORIDE 100 MG: 50 TABLET ORAL at 20:28

## 2024-11-10 RX ADMIN — SODIUM CHLORIDE, PRESERVATIVE FREE 10 ML: 5 INJECTION INTRAVENOUS at 20:29

## 2024-11-10 RX ADMIN — ACETAMINOPHEN 650 MG: 325 TABLET ORAL at 17:57

## 2024-11-10 RX ADMIN — ACETAMINOPHEN 650 MG: 325 TABLET ORAL at 05:57

## 2024-11-10 RX ADMIN — SODIUM CHLORIDE, PRESERVATIVE FREE 10 ML: 5 INJECTION INTRAVENOUS at 08:51

## 2024-11-10 RX ADMIN — CALCIUM CARBONATE 1000 MG: 500 TABLET, CHEWABLE ORAL at 17:57

## 2024-11-10 RX ADMIN — ACETAMINOPHEN 650 MG: 325 TABLET ORAL at 22:43

## 2024-11-10 RX ADMIN — PIPERACILLIN AND TAZOBACTAM 3375 MG: 3; .375 INJECTION, POWDER, LYOPHILIZED, FOR SOLUTION INTRAVENOUS at 02:54

## 2024-11-10 RX ADMIN — DIPHENOXYLATE HYDROCHLORIDE AND ATROPINE SULFATE 1 TABLET: 2.5; .025 TABLET ORAL at 08:52

## 2024-11-10 RX ADMIN — PIPERACILLIN AND TAZOBACTAM 3375 MG: 3; .375 INJECTION, POWDER, LYOPHILIZED, FOR SOLUTION INTRAVENOUS at 11:53

## 2024-11-10 RX ADMIN — PIPERACILLIN AND TAZOBACTAM 3375 MG: 3; .375 INJECTION, POWDER, LYOPHILIZED, FOR SOLUTION INTRAVENOUS at 18:57

## 2024-11-10 RX ADMIN — ACETAMINOPHEN 650 MG: 325 TABLET ORAL at 11:49

## 2024-11-10 RX ADMIN — PANTOPRAZOLE SODIUM 40 MG: 40 TABLET, DELAYED RELEASE ORAL at 05:57

## 2024-11-10 RX ADMIN — DIPHENOXYLATE HYDROCHLORIDE AND ATROPINE SULFATE 1 TABLET: 2.5; .025 TABLET ORAL at 20:28

## 2024-11-10 RX ADMIN — ENOXAPARIN SODIUM 40 MG: 100 INJECTION SUBCUTANEOUS at 08:52

## 2024-11-10 RX ADMIN — FAMOTIDINE 20 MG: 20 TABLET, FILM COATED ORAL at 08:52

## 2024-11-10 RX ADMIN — FLUTICASONE PROPIONATE 1 SPRAY: 50 SPRAY, METERED NASAL at 08:58

## 2024-11-10 RX ADMIN — BUPROPION HYDROCHLORIDE 300 MG: 150 TABLET, EXTENDED RELEASE ORAL at 08:52

## 2024-11-10 RX ADMIN — CALCIUM CARBONATE 1000 MG: 500 TABLET, CHEWABLE ORAL at 08:52

## 2024-11-10 RX ADMIN — FLUOXETINE HYDROCHLORIDE 40 MG: 20 CAPSULE ORAL at 08:52

## 2024-11-10 RX ADMIN — SODIUM CHLORIDE: 9 INJECTION, SOLUTION INTRAVENOUS at 11:51

## 2024-11-10 RX ADMIN — ASCORBIC ACID, VITAMIN A PALMITATE, CHOLECALCIFEROL, THIAMINE HYDROCHLORIDE, RIBOFLAVIN-5 PHOSPHATE SODIUM, PYRIDOXINE HYDROCHLORIDE, NIACINAMIDE, DEXPANTHENOL, ALPHA-TOCOPHEROL ACETATE, VITAMIN K1, FOLIC ACID, BIOTIN, CYANOCOBALAMIN: 200; 3300; 200; 6; 3.6; 6; 40; 15; 10; 150; 600; 60; 5 INJECTION, SOLUTION INTRAVENOUS at 17:57

## 2024-11-10 RX ADMIN — LATANOPROST 1 DROP: 50 SOLUTION OPHTHALMIC at 20:29

## 2024-11-10 RX ADMIN — ATORVASTATIN CALCIUM 10 MG: 10 TABLET, FILM COATED ORAL at 08:53

## 2024-11-10 ASSESSMENT — PAIN DESCRIPTION - LOCATION: LOCATION: ABDOMEN

## 2024-11-10 ASSESSMENT — PAIN SCALES - GENERAL
PAINLEVEL_OUTOF10: 0
PAINLEVEL_OUTOF10: 3

## 2024-11-10 NOTE — PROGRESS NOTES
\"PROBNP\", \"TROPHS\" in the last 72 hours.  No results for input(s): \"LABA1C\" in the last 72 hours.  Recent Labs     11/08/24  0540   AST 32   ALT 26   BILIDIR <0.1   BILITOT <0.2   ALKPHOS 98     No results for input(s): \"INR\", \"LACTA\", \"TSH\" in the last 72 hours.    Urine Cultures: No results found for: \"LABURIN\"  Blood Cultures:   Lab Results   Component Value Date/Time    BC No Growth after 4 days of incubation. 10/31/2024 03:18 PM     Lab Results   Component Value Date/Time    BLOODCULT2 No Growth after 4 days of incubation. 10/31/2024 03:19 PM     Organism:   Lab Results   Component Value Date/Time    ORG Staph aureus MRSA 10/31/2024 03:35 PM         Rex Persaud DO

## 2024-11-10 NOTE — PLAN OF CARE
Problem: Discharge Planning  Goal: Discharge to home or other facility with appropriate resources  11/9/2024 2249 by Lydia Love RN  Outcome: Progressing     Problem: Pain  Goal: Verbalizes/displays adequate comfort level or baseline comfort level  11/9/2024 2249 by Lydia Love RN  Outcome: Progressing     Problem: Safety - Adult  Goal: Free from fall injury  11/9/2024 2249 by Lydia Love RN  Outcome: Progressing     Problem: ABCDS Injury Assessment  Goal: Absence of physical injury  11/9/2024 2249 by Lydia Love RN  Outcome: Progressing     Problem: Skin/Tissue Integrity  Goal: Absence of new skin breakdown  Description: 1.  Monitor for areas of redness and/or skin breakdown  2.  Assess vascular access sites hourly  3.  Every 4-6 hours minimum:  Change oxygen saturation probe site  4.  Every 4-6 hours:  If on nasal continuous positive airway pressure, respiratory therapy assess nares and determine need for appliance change or resting period.  11/9/2024 2249 by Lydia Love RN  Outcome: Progressing     Problem: Nutrition Deficit:  Goal: Optimize nutritional status  11/9/2024 2249 by Lydia Love RN  Outcome: Progressing

## 2024-11-10 NOTE — PROGRESS NOTES
[facility] GENERAL SURGERY    Surgery Progress Note           POD #  19    PATIENT NAME: Sudha Lopez     TODAY'S DATE: 11/10/2024    INTERVAL HISTORY:    Pt  not feeling as good today.  TPN at night.  Still w/o much diarrhea for two days now.     OBJECTIVE:   VITALS:  /68   Pulse 89   Temp 99 °F (37.2 °C) (Axillary)   Resp 17   Ht 1.6 m (5' 2.99\")   Wt 75.1 kg (165 lb 8 oz)   SpO2 97%   BMI 29.32 kg/m²     INTAKE/OUTPUT:    I/O last 3 completed shifts:  In: 4034.3 [P.O.:240; I.V.:114.5; IV Piggyback:413.6]  Out: 50 [Drains:50]  I/O this shift:  In: 240 [P.O.:240]  Out: -               CONSTITUTIONAL:  fatigued and alert  LUNGS:    ABDOMEN:   soft, non-distended   INCISION: clean, dry, healed, HALLIE drain - darker, more bilious-appearing fluid in bulb but still low volume overall    Data:  CBC:   Recent Labs     11/08/24  0540 11/09/24  0500 11/10/24  1030   WBC 13.8* 11.2* 16.5*   HGB 8.1* 7.7* 8.2*   HCT 24.7* 23.0* 24.8*   * 382 429     BMP:    Recent Labs     11/08/24  0540 11/09/24  0500   * 135*   K 4.4 4.6    101   CO2 23 24   BUN 18 17   CREATININE 0.7 0.8   GLUCOSE 90 93     Hepatic:   Recent Labs     11/08/24  0540   AST 32   ALT 26   BILITOT <0.2   ALKPHOS 98     Mag:      Recent Labs     11/08/24  0540 11/09/24  0500   MG 2.37 2.35      Phos:     Recent Labs     11/08/24  0540 11/09/24  0500   PHOS 3.4 3.9      INR: No results for input(s): \"INR\" in the last 72 hours.      Radiology Review:       ASSESSMENT AND PLAN:  79 y.o. female status post ileostomy reversal now with anastomotic leak, trying to manage conservatively with bowel rest/TPN to see if the leak can heal. Currently seems to be successful    - cont PO diet, TPN   - if pt remains stable, goal remains to d/c home (hopefuly tomorrow) on TPN and minimal PO diet (ie full liquids), and then re-assess anastomosis in a few weeks time         Electronically signed by FOERST REBOLLEDO MD

## 2024-11-10 NOTE — PROGRESS NOTES
Ps Sent to Dr Mejia. Patient HALLIE drain was leaking around incision site. Area is red, not warm to touch. Patient abdomen is distended. Complaining of LUQ pain. Drainage is a greenish/brown color with mucus yellow. Has odor. HALLIE drain was emptied with 20 ml. Area cleansed and redressed.     Dr Mejia returned call. Ask to keep patient NPO with sips of clears, ice chips. Keep area around HALLIE drain dressed and clean.       Electronically signed by Nicky Garduno RN on 11/10/2024 at 5:49 PM

## 2024-11-10 NOTE — PROGRESS NOTES
Shift assessment completed. Pt A&O x4.  VSS.  Denies Pain. PICC site patent/ flushed, and saline locked. Patient on RA.  Incisions c/d/i. 5 Lap sites. HALLIE drain in place. Milky green out put noted. Patient admits to passing gas. Patient still has diarrhea. Still green stool. Patient ambulates with walker as a x 1 SBA to bathroom.  Medication given per MAR. Denies any needs at this time. Bed/chair alarm on for safety. Patient refused to wear gripper socks. Education was given and verbalized understanding. Patient also refusing CHG wipes to certain areas of body. Education also given and verbalized understanding. Bed locked and in lowest position.  Call light within reach. Patient in stable condition when RN leaving room.   Electronically signed by Nicky Garduno RN on 11/10/2024 at 10:50 AM

## 2024-11-10 NOTE — PLAN OF CARE
Problem: Discharge Planning  Goal: Discharge to home or other facility with appropriate resources  11/10/2024 0741 by Nicky Garduno RN  Outcome: Progressing  Flowsheets (Taken 11/10/2024 0741)  Discharge to home or other facility with appropriate resources:   Identify barriers to discharge with patient and caregiver   Arrange for needed discharge resources and transportation as appropriate   Identify discharge learning needs (meds, wound care, etc)     Problem: Pain  Goal: Verbalizes/displays adequate comfort level or baseline comfort level  11/10/2024 0741 by Nicky Garduno RN  Outcome: Progressing  Flowsheets (Taken 11/10/2024 0741)  Verbalizes/displays adequate comfort level or baseline comfort level:   Encourage patient to monitor pain and request assistance   Assess pain using appropriate pain scale     Problem: Safety - Adult  Goal: Free from fall injury  11/10/2024 0741 by Nicky Garduno RN  Outcome: Progressing  Flowsheets (Taken 11/10/2024 0741)  Free From Fall Injury: Instruct family/caregiver on patient safety     Problem: ABCDS Injury Assessment  Goal: Absence of physical injury  11/10/2024 0741 by Nicky Garduno RN  Outcome: Progressing  Flowsheets (Taken 11/10/2024 0741)  Absence of Physical Injury: Implement safety measures based on patient assessment     Problem: Nutrition Deficit:  Goal: Optimize nutritional status  11/10/2024 0741 by Nicky Garduno RN  Outcome: Progressing  Flowsheets (Taken 11/10/2024 0741)  Nutrient intake appropriate for improving, restoring, or maintaining nutritional needs: Monitor oral intake, labs, and treatment plans     Problem: Skin/Tissue Integrity - Adult  Goal: Incisions, wounds, or drain sites healing without S/S of infection  Outcome: Progressing  Flowsheets (Taken 11/10/2024 0741)  Incisions, Wounds, or Drain Sites Healing Without Sign and Symptoms of Infection: Implement wound care per orders     Problem: Musculoskeletal - Adult  Goal: Return ADL status to a  safe level of function  Outcome: Progressing  Flowsheets (Taken 11/10/2024 0741)  Return ADL Status to a Safe Level of Function: Administer medication as ordered     Problem: Gastrointestinal - Adult  Goal: Minimal or absence of nausea and vomiting  Outcome: Progressing  Flowsheets (Taken 11/10/2024 0741)  Minimal or absence of nausea and vomiting: Administer ordered antiemetic medications as needed     Problem: Gastrointestinal - Adult  Goal: Maintains adequate nutritional intake  Outcome: Progressing  Flowsheets (Taken 11/10/2024 0741)  Maintains adequate nutritional intake:   Identify factors contributing to decreased intake, treat as appropriate   Monitor percentage of each meal consumed     Problem: Gastrointestinal - Adult  Goal: Maintains or returns to baseline bowel function  Outcome: Progressing  Flowsheets (Taken 11/10/2024 0741)  Maintains or returns to baseline bowel function: Assess bowel function

## 2024-11-10 NOTE — PROGRESS NOTES
Patient took a lap around the whole unit. Walked with front wheel walker. Shoes in place. Gait belt used. Nurse assisted around unit. Tolerated walk great.   Electronically signed by Nicky Garduno RN on 11/9/2024 at 7:43 PM

## 2024-11-10 NOTE — PROGRESS NOTES
Patient educated on use of IS and demonstrates use Yes    Patient and family educated on incisional care and s/s of infection Yes    Patient and family educated on hand hygiene Yes    Patient and family educated on post operative care Yes      - Pain control       Day of surgery patient to side of bed or up to chair for minimum of 30 minutes Yes    POD 1 until discharge, patient up to chair by 9 am and TID. Goal to increase mobility  Yes    If patient does not have NG tube in place, patient to brush teeth and use mouth wash Q shift Yes    Patient receives daily bath and linen changes Yes      SCDs in place No    Patient receiving chemical VTE Yes

## 2024-11-11 ENCOUNTER — APPOINTMENT (OUTPATIENT)
Dept: CT IMAGING | Age: 79
DRG: 329 | End: 2024-11-11
Attending: SURGERY
Payer: MEDICARE

## 2024-11-11 LAB
ANION GAP SERPL CALCULATED.3IONS-SCNC: 10 MMOL/L (ref 3–16)
ANISOCYTOSIS BLD QL SMEAR: ABNORMAL
BASOPHILS # BLD: 0 K/UL (ref 0–0.2)
BASOPHILS NFR BLD: 0 %
BUN SERPL-MCNC: 19 MG/DL (ref 7–20)
CALCIUM SERPL-MCNC: 8.5 MG/DL (ref 8.3–10.6)
CHLORIDE SERPL-SCNC: 100 MMOL/L (ref 99–110)
CO2 SERPL-SCNC: 22 MMOL/L (ref 21–32)
CREAT SERPL-MCNC: 0.8 MG/DL (ref 0.6–1.2)
DEPRECATED RDW RBC AUTO: 14.2 % (ref 12.4–15.4)
EOSINOPHIL # BLD: 0.3 K/UL (ref 0–0.6)
EOSINOPHIL NFR BLD: 2 %
GFR SERPLBLD CREATININE-BSD FMLA CKD-EPI: 75 ML/MIN/{1.73_M2}
GLUCOSE BLD-MCNC: 134 MG/DL (ref 70–99)
GLUCOSE BLD-MCNC: 143 MG/DL (ref 70–99)
GLUCOSE BLD-MCNC: 87 MG/DL (ref 70–99)
GLUCOSE BLD-MCNC: 98 MG/DL (ref 70–99)
GLUCOSE SERPL-MCNC: 130 MG/DL (ref 70–99)
HCT VFR BLD AUTO: 24 % (ref 36–48)
HGB BLD-MCNC: 7.8 G/DL (ref 12–16)
LYMPHOCYTES # BLD: 0.5 K/UL (ref 1–5.1)
LYMPHOCYTES NFR BLD: 3 %
MACROCYTES BLD QL SMEAR: ABNORMAL
MCH RBC QN AUTO: 29.1 PG (ref 26–34)
MCHC RBC AUTO-ENTMCNC: 32.7 G/DL (ref 31–36)
MCV RBC AUTO: 89.1 FL (ref 80–100)
MICROCYTES BLD QL SMEAR: ABNORMAL
MONOCYTES # BLD: 1.9 K/UL (ref 0–1.3)
MONOCYTES NFR BLD: 12 %
NEUTROPHILS # BLD: 13.4 K/UL (ref 1.7–7.7)
NEUTROPHILS NFR BLD: 82 %
NEUTS BAND NFR BLD MANUAL: 1 % (ref 0–7)
PATH INTERP BLD-IMP: NORMAL
PATH INTERP BLD-IMP: YES
PERFORMED ON: ABNORMAL
PERFORMED ON: ABNORMAL
PERFORMED ON: NORMAL
PERFORMED ON: NORMAL
PLATELET # BLD AUTO: 357 K/UL (ref 135–450)
PLATELET BLD QL SMEAR: ADEQUATE
PMV BLD AUTO: 6.3 FL (ref 5–10.5)
POIKILOCYTOSIS BLD QL SMEAR: ABNORMAL
POLYCHROMASIA BLD QL SMEAR: ABNORMAL
POTASSIUM SERPL-SCNC: 4.4 MMOL/L (ref 3.5–5.1)
RBC # BLD AUTO: 2.69 M/UL (ref 4–5.2)
SLIDE REVIEW: ABNORMAL
SODIUM SERPL-SCNC: 132 MMOL/L (ref 136–145)
WBC # BLD AUTO: 16.1 K/UL (ref 4–11)

## 2024-11-11 PROCEDURE — 6370000000 HC RX 637 (ALT 250 FOR IP): Performed by: STUDENT IN AN ORGANIZED HEALTH CARE EDUCATION/TRAINING PROGRAM

## 2024-11-11 PROCEDURE — 6370000000 HC RX 637 (ALT 250 FOR IP): Performed by: INTERNAL MEDICINE

## 2024-11-11 PROCEDURE — 85025 COMPLETE CBC W/AUTO DIFF WBC: CPT

## 2024-11-11 PROCEDURE — 80048 BASIC METABOLIC PNL TOTAL CA: CPT

## 2024-11-11 PROCEDURE — 99024 POSTOP FOLLOW-UP VISIT: CPT | Performed by: SURGERY

## 2024-11-11 PROCEDURE — 6370000000 HC RX 637 (ALT 250 FOR IP): Performed by: SURGERY

## 2024-11-11 PROCEDURE — 6360000004 HC RX CONTRAST MEDICATION: Performed by: SURGERY

## 2024-11-11 PROCEDURE — 6370000000 HC RX 637 (ALT 250 FOR IP): Performed by: NURSE PRACTITIONER

## 2024-11-11 PROCEDURE — 6360000002 HC RX W HCPCS: Performed by: SURGERY

## 2024-11-11 PROCEDURE — 2500000003 HC RX 250 WO HCPCS: Performed by: SURGERY

## 2024-11-11 PROCEDURE — 74177 CT ABD & PELVIS W/CONTRAST: CPT

## 2024-11-11 PROCEDURE — 2580000003 HC RX 258: Performed by: SURGERY

## 2024-11-11 PROCEDURE — 1200000000 HC SEMI PRIVATE

## 2024-11-11 RX ORDER — DIATRIZOATE MEGLUMINE AND DIATRIZOATE SODIUM 660; 100 MG/ML; MG/ML
12 SOLUTION ORAL; RECTAL
Status: DISCONTINUED | OUTPATIENT
Start: 2024-11-11 | End: 2024-11-12 | Stop reason: HOSPADM

## 2024-11-11 RX ORDER — DIPHENOXYLATE HYDROCHLORIDE AND ATROPINE SULFATE 2.5; .025 MG/1; MG/1
1 TABLET ORAL 2 TIMES DAILY PRN
Status: DISCONTINUED | OUTPATIENT
Start: 2024-11-11 | End: 2024-11-12 | Stop reason: HOSPADM

## 2024-11-11 RX ORDER — IOPAMIDOL 755 MG/ML
75 INJECTION, SOLUTION INTRAVASCULAR
Status: COMPLETED | OUTPATIENT
Start: 2024-11-11 | End: 2024-11-11

## 2024-11-11 RX ADMIN — BUPROPION HYDROCHLORIDE 300 MG: 150 TABLET, EXTENDED RELEASE ORAL at 09:26

## 2024-11-11 RX ADMIN — ACETAMINOPHEN 650 MG: 325 TABLET ORAL at 11:27

## 2024-11-11 RX ADMIN — SODIUM CHLORIDE, PRESERVATIVE FREE 10 ML: 5 INJECTION INTRAVENOUS at 09:26

## 2024-11-11 RX ADMIN — LATANOPROST 1 DROP: 50 SOLUTION OPHTHALMIC at 20:40

## 2024-11-11 RX ADMIN — PIPERACILLIN AND TAZOBACTAM 3375 MG: 3; .375 INJECTION, POWDER, LYOPHILIZED, FOR SOLUTION INTRAVENOUS at 18:32

## 2024-11-11 RX ADMIN — ASCORBIC ACID, VITAMIN A PALMITATE, CHOLECALCIFEROL, THIAMINE HYDROCHLORIDE, RIBOFLAVIN-5 PHOSPHATE SODIUM, PYRIDOXINE HYDROCHLORIDE, NIACINAMIDE, DEXPANTHENOL, ALPHA-TOCOPHEROL ACETATE, VITAMIN K1, FOLIC ACID, BIOTIN, CYANOCOBALAMIN: 200; 3300; 200; 6; 3.6; 6; 40; 15; 10; 150; 600; 60; 5 INJECTION, SOLUTION INTRAVENOUS at 18:28

## 2024-11-11 RX ADMIN — PIPERACILLIN AND TAZOBACTAM 3375 MG: 3; .375 INJECTION, POWDER, LYOPHILIZED, FOR SOLUTION INTRAVENOUS at 03:05

## 2024-11-11 RX ADMIN — ACETAMINOPHEN 650 MG: 325 TABLET ORAL at 05:01

## 2024-11-11 RX ADMIN — TRAZODONE HYDROCHLORIDE 100 MG: 50 TABLET ORAL at 20:40

## 2024-11-11 RX ADMIN — FLUTICASONE PROPIONATE 1 SPRAY: 50 SPRAY, METERED NASAL at 09:28

## 2024-11-11 RX ADMIN — PANTOPRAZOLE SODIUM 40 MG: 40 TABLET, DELAYED RELEASE ORAL at 05:01

## 2024-11-11 RX ADMIN — PIPERACILLIN AND TAZOBACTAM 3375 MG: 3; .375 INJECTION, POWDER, LYOPHILIZED, FOR SOLUTION INTRAVENOUS at 11:34

## 2024-11-11 RX ADMIN — I.V. FAT EMULSION 250 ML: 20 EMULSION INTRAVENOUS at 18:29

## 2024-11-11 RX ADMIN — FAMOTIDINE 20 MG: 20 TABLET, FILM COATED ORAL at 09:26

## 2024-11-11 RX ADMIN — DIATRIZOATE MEGLUMINE AND DIATRIZOATE SODIUM 12 ML: 660; 100 LIQUID ORAL; RECTAL at 11:26

## 2024-11-11 RX ADMIN — IOPAMIDOL 75 ML: 755 INJECTION, SOLUTION INTRAVENOUS at 13:30

## 2024-11-11 RX ADMIN — ATORVASTATIN CALCIUM 10 MG: 10 TABLET, FILM COATED ORAL at 09:26

## 2024-11-11 RX ADMIN — ACETAMINOPHEN 650 MG: 325 TABLET ORAL at 18:34

## 2024-11-11 RX ADMIN — FLUOXETINE HYDROCHLORIDE 40 MG: 20 CAPSULE ORAL at 09:26

## 2024-11-11 ASSESSMENT — PAIN SCALES - GENERAL
PAINLEVEL_OUTOF10: 0

## 2024-11-11 NOTE — CARE COORDINATION
Hospital day 20: Patient on C3 care managed by IM and General Surgery. Patient from home, ambulated the halls this weekend. Patient clearly frustrated with hospitalization on ct set backs reports \" I am having an emotional bad day.\" Patient noted with leaking at HALLIE cite per Nursing. Awaiting surg recs. NPO with ice and sips of clears. Writer received call from [employer] [facility] this am reports he is working on scheduling teaching and edu with family as would near dc. Will update [facility] with DC timing. Following.ADILSON Bartholomew

## 2024-11-11 NOTE — PROGRESS NOTES
Patient educated on use of IS and demonstrates use Yes    Patient and family educated on incisional care and s/s of infection Yes    Patient and family educated on hand hygiene Yes    Patient and family educated on post operative care Yes   -insulin  protocol   -pain control    Day of surgery patient to side of bed or up to chair for minimum of 30 minutes NA    POD 1 until discharge, patient up to chair by 9 am and TID. Goal to increase mobility  Yes    Patient performs oral hygiene with CHG oral solution until NG tube discontinued. NA    If patient does not have NG tube in place, patient to brush teeth and use mouth wash Q shift Yes    Patient receives daily bath and linen changes Yes    Patient receives CHG bath until roach discontinued NA    SCDs in place No on lovenox    Patient receiving chemical VTE Yes lovenox

## 2024-11-11 NOTE — PROGRESS NOTES
Physical Therapy    PT attempted to see patient this morning, pt emotional about health course and possible prognosis, declining physical therapy session at this time. Pt edu on importance of PT and mobility. Patient reported understanding but continued to decline at this time, asking to come back later. PT will continue to follow up as patient is compliant and schedule allows. Thank you.     Luis Nix, PT, DPT.

## 2024-11-11 NOTE — PROGRESS NOTES
Shift assessment completed. Pt A&O x4.  VSS. Denies Pain. IV site patent, flushed, and infusing. Patient on RA.  Patient admits to passing gas. Patient ambulates with SBA to bathroom. Medication given per MAR. Pt has went for her CT waiting to see results from scan.                     Standard precautions   Denies any needs at this time.   Bed alarm on for safety.   Bed locked and in lowest position.    Call light within reach.   Gripper socks applied.   Patient in stable condition when RN leaving room.

## 2024-11-11 NOTE — PROGRESS NOTES
Hospital Medicine Progress Note  V 10.25      Date of Admission: 10/22/2024    Hospital Day: 21      Chief Admission Complaint:  abdominal discomfort    Subjective:  She says that she has had some drainage outside of the HALLIE which soaked the sheets this AM.  99 temp.  Pain controlled.  Watery stools, non-bloody.    Presenting Admission History:       78 y.o. female who is seen today by the hospitalist team at the request of DR. Jackson, with a Chief Complaint of hypotension and fever post op.  Patient originally presented for ileostomy reversal.  On day 9 of hospitalization she began to spike a low-grade fever as well as was tachycardic with soft blood pressure in the 90s.  CT scan was obtained that did show a fluid collection with concern for anastomotic leak.  Hospitalist service was consulted at this point.  Patient did have a white count as well as a monitor creatinine consistent with AIRAM.          Assessment/Plan:      Current Principal Problem:  Obstruction of intestine (HCC)    Intraabdominal infection  CT shows \"Findings are compatible with anastomotic leak within the pelvis, with  contrast collection measuring approximately 2.8 x 1.6 cm near the  Anastomosis\"  IV antibiotics: Zosyn - started on/before 30 October.   TPN per General Surgery, NPO per surgery  Repeat CT with contrast on 11/5/2024 w/out progression.      AIRAM resolved with IVFs.     HTN - w/out known CAD and no evidence of active signs and/or symptoms of ischemia and/or failure. Normally controlled on home meds - held w/ relative hypotension,  w/ vitals documented and reviewed.       HyperLipidemia - normally controlled on home Statin. Continued.  Follow up / PCP outpatient for medication initiation and/or adjustment as needed.      Discharge Planning per surgery      Consults:      IP CONSULT TO HOSPITALIST  IP CONSULT TO DIETITIAN  IP CONSULT TO HOME CARE NEEDS        --------------------------------------------------      Medications:     Diet NPO Exceptions are: Ice Chips, Sips of Water with Meds, Sips of Clear Liquids, Popsicles    DVT Prophylaxis: [x]PPx LMWH  []SQ Heparin  []IPC/SCDs  []Eliquis  []Xarelto  []Coumadin  [] Heparin Drip  []Other -      Code status: Full Code    PT/OT Eval Status:   [x]NOT yet ordered  []Ordered and Pending   []Seen with Recommendations for:   []Home independently  []Home w/ assist  []HHC  []SNF  []Acute Rehab    Multi-Disciplinary Rounds with Case Management completed on 11/11/2024 with the following recommendations:  Anticipated Discharge Location: [x]Home w/ [x]HHC vs []SNF  []Acute Rehab  []LTAC  []Hospice  []Other -    Anticipated Discharge Day/Date:  11/11/24  Barriers to Discharge: placement issues  --------------------------------------------------    MDM (any 2 required for High level billing)    A. Problems (any 1)  [x] Acute/Chronic Illness/injury posing ongoing threat to life and/or bodily function without ongoing treatment    [] Severe exacerbation of chronic illness    --------------------------------------------------  B. Risk of Treatment (any 1)    [x] Drugs/treatments that require intensive monitoring for toxicity    [] IV ABX (Vancomycin, Aminoglycosides, etc)     [] Post-Cath/Contrast study requiring serial monitoring    [] IV Narcotic analgesia    [] Aggressive IV diuresis    [] Hypertonic Saline    [] Critical electrolyte abnormalities requiring IV replacement    [x] Insulin - Scheduled/SSI or Insulin gtt    [] Anticoagulation (Heparin gtt or Coumadin - other anticoagulants in special circumstances)    [] Cardiac Medications (IV Amiodarone/Diltiazem, Tikosyn, etc)    [] Hemodialysis    [] Other -    [] Change in code status    [] Decision to escalate care    [] Major surgery/procedure with associated risk factors    --------------------------------------------------  C. Data (any 2)    [x] Data Review (any 3)    [x] Consultant notes from yesterday/today    [x] All available current labs reviewed

## 2024-11-11 NOTE — PLAN OF CARE
Problem: Discharge Planning  Goal: Discharge to home or other facility with appropriate resources  Outcome: Progressing  Flowsheets (Taken 11/10/2024 2335)  Discharge to home or other facility with appropriate resources:   Identify barriers to discharge with patient and caregiver   Arrange for needed discharge resources and transportation as appropriate     Problem: Pain  Goal: Verbalizes/displays adequate comfort level or baseline comfort level  Outcome: Progressing  Flowsheets (Taken 11/10/2024 2335)  Verbalizes/displays adequate comfort level or baseline comfort level:   Encourage patient to monitor pain and request assistance   Assess pain using appropriate pain scale   Administer analgesics based on type and severity of pain and evaluate response   Implement non-pharmacological measures as appropriate and evaluate response     Problem: Safety - Adult  Goal: Free from fall injury  Outcome: Progressing  Flowsheets (Taken 11/10/2024 2335)  Free From Fall Injury: Instruct family/caregiver on patient safety     Problem: Skin/Tissue Integrity  Goal: Absence of new skin breakdown  Description: 1.  Monitor for areas of redness and/or skin breakdown  2.  Assess vascular access sites hourly  3.  Every 4-6 hours minimum:  Change oxygen saturation probe site  4.  Every 4-6 hours:  If on nasal continuous positive airway pressure, respiratory therapy assess nares and determine need for appliance change or resting period.  Outcome: Progressing     Problem: Nutrition Deficit:  Goal: Optimize nutritional status  Outcome: Progressing     Problem: Skin/Tissue Integrity - Adult  Goal: Incisions, wounds, or drain sites healing without S/S of infection  Outcome: Progressing     Problem: Musculoskeletal - Adult  Goal: Return ADL status to a safe level of function  Outcome: Progressing     Problem: Gastrointestinal - Adult  Goal: Minimal or absence of nausea and vomiting  Outcome: Progressing

## 2024-11-11 NOTE — PROGRESS NOTES
[facility] GENERAL SURGERY    Surgery Progress Note           POD # 20    PATIENT NAME: Sudha Lopez     TODAY'S DATE: 11/11/2024    INTERVAL HISTORY:    Pt had drainage around HALLIE, reports pain is minimal today,. No nausea, having bms,      OBJECTIVE:   VITALS:  BP (!) 143/79   Pulse 82   Temp 97.9 °F (36.6 °C) (Oral)   Resp 16   Ht 1.6 m (5' 2.99\")   Wt 75.1 kg (165 lb 8 oz)   SpO2 98%   BMI 29.32 kg/m²     INTAKE/OUTPUT:    I/O last 3 completed shifts:  In: 1576.7 [P.O.:240; I.V.:13.9; IV Piggyback:167.8]  Out: 110 [Drains:110]  I/O this shift:  In: 10 [I.V.:10]  Out: -               CONSTITUTIONAL:  fatigued and alert  LUNGS:    ABDOMEN:   soft, non-distended   INCISION: clean, dry, healed, HALLIE drain - thin and light    Data:  CBC:   Recent Labs     11/09/24  0500 11/10/24  1030 11/11/24  0830   WBC 11.2* 16.5* 16.1*   HGB 7.7* 8.2* 7.8*   HCT 23.0* 24.8* 24.0*    429 357     BMP:    Recent Labs     11/09/24  0500 11/11/24  0830   * 132*   K 4.6 4.4    100   CO2 24 22   BUN 17 19   CREATININE 0.8 0.8   GLUCOSE 93 130*     Hepatic:   No results for input(s): \"AST\", \"ALT\", \"BILITOT\", \"ALKPHOS\" in the last 72 hours.    Invalid input(s): \"ALB\"    Mag:      Recent Labs     11/09/24  0500   MG 2.35      Phos:     Recent Labs     11/09/24  0500   PHOS 3.9      INR: No results for input(s): \"INR\" in the last 72 hours.      Radiology Review:       ASSESSMENT AND PLAN:  79 y.o. female status post ileostomy reversal now with anastomotic leak, trying to manage conservatively with bowel rest/TPN to see if the leak can heal.    - TPN at night   - CT today to see if drain is still controlling anastomotic leak.  If so then likely discharge tomorrow.        Electronically signed by Bertin Jackson MD

## 2024-11-12 VITALS
BODY MASS INDEX: 29.25 KG/M2 | RESPIRATION RATE: 16 BRPM | SYSTOLIC BLOOD PRESSURE: 116 MMHG | WEIGHT: 165.1 LBS | TEMPERATURE: 98.5 F | HEIGHT: 63 IN | HEART RATE: 82 BPM | DIASTOLIC BLOOD PRESSURE: 59 MMHG | OXYGEN SATURATION: 98 %

## 2024-11-12 LAB
ANION GAP SERPL CALCULATED.3IONS-SCNC: 11 MMOL/L (ref 3–16)
BASOPHILS # BLD: 0 K/UL (ref 0–0.2)
BASOPHILS NFR BLD: 0 %
BUN SERPL-MCNC: 20 MG/DL (ref 7–20)
CALCIUM SERPL-MCNC: 8.5 MG/DL (ref 8.3–10.6)
CHLORIDE SERPL-SCNC: 101 MMOL/L (ref 99–110)
CO2 SERPL-SCNC: 23 MMOL/L (ref 21–32)
CREAT SERPL-MCNC: 0.8 MG/DL (ref 0.6–1.2)
DEPRECATED RDW RBC AUTO: 14.3 % (ref 12.4–15.4)
EOSINOPHIL # BLD: 0.1 K/UL (ref 0–0.6)
EOSINOPHIL NFR BLD: 1 %
GFR SERPLBLD CREATININE-BSD FMLA CKD-EPI: 75 ML/MIN/{1.73_M2}
GLUCOSE BLD-MCNC: 124 MG/DL (ref 70–99)
GLUCOSE BLD-MCNC: 130 MG/DL (ref 70–99)
GLUCOSE BLD-MCNC: 161 MG/DL (ref 70–99)
GLUCOSE SERPL-MCNC: 100 MG/DL (ref 70–99)
HCT VFR BLD AUTO: 23.2 % (ref 36–48)
HGB BLD-MCNC: 7.8 G/DL (ref 12–16)
LYMPHOCYTES # BLD: 1.5 K/UL (ref 1–5.1)
LYMPHOCYTES NFR BLD: 7 %
MACROCYTES BLD QL SMEAR: ABNORMAL
MCH RBC QN AUTO: 29.6 PG (ref 26–34)
MCHC RBC AUTO-ENTMCNC: 33.5 G/DL (ref 31–36)
MCV RBC AUTO: 88.5 FL (ref 80–100)
MICROCYTES BLD QL SMEAR: ABNORMAL
MONOCYTES # BLD: 1.6 K/UL (ref 0–1.3)
MONOCYTES NFR BLD: 12 %
MYELOCYTES NFR BLD MANUAL: 2 %
NEUTROPHILS # BLD: 10 K/UL (ref 1.7–7.7)
NEUTROPHILS NFR BLD: 72 %
NEUTS BAND NFR BLD MANUAL: 2 % (ref 0–7)
PATH INTERP BLD-IMP: NO
PERFORMED ON: ABNORMAL
PLATELET # BLD AUTO: 363 K/UL (ref 135–450)
PLATELET BLD QL SMEAR: ADEQUATE
PMV BLD AUTO: 6.4 FL (ref 5–10.5)
POLYCHROMASIA BLD QL SMEAR: ABNORMAL
POTASSIUM SERPL-SCNC: 4.1 MMOL/L (ref 3.5–5.1)
RBC # BLD AUTO: 2.62 M/UL (ref 4–5.2)
SLIDE REVIEW: ABNORMAL
SODIUM SERPL-SCNC: 135 MMOL/L (ref 136–145)
SPHEROCYTES BLD QL SMEAR: ABNORMAL
VARIANT LYMPHS NFR BLD MANUAL: 4 % (ref 0–6)
WBC # BLD AUTO: 13.2 K/UL (ref 4–11)

## 2024-11-12 PROCEDURE — 36592 COLLECT BLOOD FROM PICC: CPT

## 2024-11-12 PROCEDURE — 6370000000 HC RX 637 (ALT 250 FOR IP): Performed by: INTERNAL MEDICINE

## 2024-11-12 PROCEDURE — 80048 BASIC METABOLIC PNL TOTAL CA: CPT

## 2024-11-12 PROCEDURE — 6370000000 HC RX 637 (ALT 250 FOR IP): Performed by: SURGERY

## 2024-11-12 PROCEDURE — 85025 COMPLETE CBC W/AUTO DIFF WBC: CPT

## 2024-11-12 PROCEDURE — 6360000002 HC RX W HCPCS: Performed by: SURGERY

## 2024-11-12 PROCEDURE — 99024 POSTOP FOLLOW-UP VISIT: CPT | Performed by: SURGERY

## 2024-11-12 PROCEDURE — 97530 THERAPEUTIC ACTIVITIES: CPT

## 2024-11-12 PROCEDURE — 2580000003 HC RX 258: Performed by: SURGERY

## 2024-11-12 PROCEDURE — 97116 GAIT TRAINING THERAPY: CPT

## 2024-11-12 RX ORDER — ONDANSETRON 4 MG/1
4 TABLET, FILM COATED ORAL 3 TIMES DAILY PRN
Qty: 30 TABLET | Refills: 1 | Status: SHIPPED | OUTPATIENT
Start: 2024-11-12

## 2024-11-12 RX ORDER — LOPERAMIDE HCL 1 MG/7.5ML
2 SOLUTION ORAL 4 TIMES DAILY PRN
Qty: 60 ML | Refills: 0 | Status: SHIPPED | OUTPATIENT
Start: 2024-11-12 | End: 2024-12-12

## 2024-11-12 RX ORDER — OXYCODONE HYDROCHLORIDE 5 MG/1
5 TABLET ORAL EVERY 6 HOURS PRN
Qty: 12 TABLET | Refills: 0 | Status: SHIPPED | OUTPATIENT
Start: 2024-11-12 | End: 2024-11-15

## 2024-11-12 RX ORDER — DIPHENOXYLATE HYDROCHLORIDE AND ATROPINE SULFATE 2.5; .025 MG/1; MG/1
1 TABLET ORAL 2 TIMES DAILY PRN
Qty: 60 TABLET | Refills: 0 | Status: SHIPPED | OUTPATIENT
Start: 2024-11-12 | End: 2024-12-12

## 2024-11-12 RX ADMIN — FLUTICASONE PROPIONATE 1 SPRAY: 50 SPRAY, METERED NASAL at 09:10

## 2024-11-12 RX ADMIN — FAMOTIDINE 20 MG: 20 TABLET, FILM COATED ORAL at 09:01

## 2024-11-12 RX ADMIN — ATORVASTATIN CALCIUM 10 MG: 10 TABLET, FILM COATED ORAL at 09:00

## 2024-11-12 RX ADMIN — ACETAMINOPHEN 650 MG: 325 TABLET ORAL at 11:28

## 2024-11-12 RX ADMIN — PANTOPRAZOLE SODIUM 40 MG: 40 TABLET, DELAYED RELEASE ORAL at 07:18

## 2024-11-12 RX ADMIN — PIPERACILLIN AND TAZOBACTAM 3375 MG: 3; .375 INJECTION, POWDER, LYOPHILIZED, FOR SOLUTION INTRAVENOUS at 11:36

## 2024-11-12 RX ADMIN — APIXABAN 5 MG: 5 TABLET, FILM COATED ORAL at 11:29

## 2024-11-12 RX ADMIN — BUPROPION HYDROCHLORIDE 300 MG: 150 TABLET, EXTENDED RELEASE ORAL at 09:00

## 2024-11-12 RX ADMIN — PIPERACILLIN AND TAZOBACTAM 3375 MG: 3; .375 INJECTION, POWDER, LYOPHILIZED, FOR SOLUTION INTRAVENOUS at 03:22

## 2024-11-12 RX ADMIN — SODIUM CHLORIDE 30 ML: 9 INJECTION, SOLUTION INTRAVENOUS at 11:34

## 2024-11-12 RX ADMIN — SODIUM CHLORIDE: 9 INJECTION, SOLUTION INTRAVENOUS at 03:21

## 2024-11-12 RX ADMIN — FLUOXETINE HYDROCHLORIDE 40 MG: 20 CAPSULE ORAL at 09:00

## 2024-11-12 ASSESSMENT — PAIN DESCRIPTION - DESCRIPTORS: DESCRIPTORS: CRAMPING;PRESSURE

## 2024-11-12 ASSESSMENT — PAIN SCALES - GENERAL: PAINLEVEL_OUTOF10: 4

## 2024-11-12 ASSESSMENT — PAIN DESCRIPTION - FREQUENCY: FREQUENCY: INTERMITTENT

## 2024-11-12 ASSESSMENT — PAIN DESCRIPTION - ORIENTATION: ORIENTATION: MID;LOWER

## 2024-11-12 ASSESSMENT — PAIN DESCRIPTION - LOCATION: LOCATION: ABDOMEN

## 2024-11-12 NOTE — PROGRESS NOTES
Physical Therapy  Facility/Department: [facility] C3 TELE/MED SURG/ONC  Daily Treatment Note  NAME: Sudha Lopez  : 1945  MRN: [id number]    Date of Service: 2024    Discharge Recommendations:  24 hour supervision or assist   PT Equipment Recommendations  Equipment Needed: No  Other: Pt states she has RW for home use    Patient Diagnosis(es): The primary encounter diagnosis was Diarrhea, unspecified type. Diagnoses of Obstruction of intestine (HCC), Diverticulitis, and Postoperative pain were also pertinent to this visit.    Assessment  Assessment: Pt tolerated treatment session well this date, able to progress to mod-ind for bed mobility and SBA-supv for transfers using RW. Pt ambulates up to 200 ft at a time with RW and SBA/supv as well as performs 4 stairs with BHR and SBA. Pt would be safe to return home at current funcitonal level, but would continue to benefit from skilled therapy during LOS due to fatigue and endurance deficits. Continue to recommend home with / A at d/c.  Activity Tolerance: Patient tolerated treatment well;Patient limited by endurance  Equipment Needed: No  Other: Pt states she has RW for home use    Plan  Physical Therapy Plan  General Plan: 3-5 times per week  Specific Instructions for Next Treatment: Progress ther ex and mobility as tolerated  Current Treatment Recommendations: Strengthening;ROM;Balance training;Functional mobility training;Transfer training;Endurance training;Gait training;Stair training;Home exercise program;Safety education & training;Patient/Caregiver education & training;Equipment evaluation, education, & procurement;Therapeutic activities    Restrictions  Restrictions/Precautions  Restrictions/Precautions: General Precautions, Fall Risk, Modified Diet  Required Braces or Orthoses?: No  Position Activity Restriction  Other position/activity restrictions: HALLIE drain, NPO, recent abdominal incision, IV lines     Subjective   Subjective  Subjective: pt found

## 2024-11-12 NOTE — PROGRESS NOTES
Hospital Medicine Progress Note  V 10.25      Date of Admission: 10/22/2024    Hospital Day: 22      Chief Admission Complaint:  abdominal discomfort    Subjective:  She is feeling well in general.  Eager to discharge.  Discussed discharge meds with Dr. Jackson.     Presenting Admission History:       78 y.o. female who is seen by the hospitalist team at the request of DR. Jackson, with a Chief Complaint of hypotension and fever post op.  Patient originally presented for ileostomy reversal.  On day 9 of hospitalization she began to spike a low-grade fever as well as was tachycardic with soft blood pressure in the 90s.  CT scan was obtained that did show a fluid collection with concern for anastomotic leak.  Hospitalist service was consulted at this point.  Patient did have a white count as well as a monitor creatinine consistent with AIRAM.          Assessment/Plan:      Current Principal Problem:  Obstruction of intestine (HCC)    Intraabdominal infection  CT shows \"Findings are compatible with anastomotic leak within the pelvis, with  contrast collection measuring approximately 2.8 x 1.6 cm near the  Anastomosis\".  Repeat CT no worse on 11/11.  IV antibiotics: Zosyn - started on/before 30 October.  Abx to continue at discharge per general surgery, either augmentin or cipro+metronidazole would be fine with me.   TPN per General Surgery to continue as outpatient, NPO per surgery     AIRAM resolved with IVFs.     HTN - w/out known CAD and no evidence of active signs and/or symptoms of ischemia and/or failure. Normally controlled on home meds - held w/ relative hypotension, did not need to resume BP meds upon discharge, reassess with PCP.     HyperLipidemia - normally controlled on home Statin. Continued.  Follow up / PCP outpatient for medication initiation and/or adjustment as needed.      OK to resume apixaban    Discharge Planning per surgery      Consults:      IP CONSULT TO HOSPITALIST  IP CONSULT TO      Lab Results   Component Value Date/Time    BLOODCULT2 No Growth after 4 days of incubation. 10/31/2024 03:19 PM     Organism:   Lab Results   Component Value Date/Time    ORG Staph aureus MRSA 10/31/2024 03:35 PM         LESLEY PERKINS MD

## 2024-11-12 NOTE — PROGRESS NOTES
11/12/24 1004   Encounter Summary   Encounter Overview/Reason Spiritual/Emotional Needs   Service Provided For Patient and family together   Referral/Consult From Rounding   Last Encounter  11/12/24  (Going home today.  No spiritual needs)   Complexity of Encounter Low   Begin Time 0955   End Time  1005   Total Time Calculated 10 min   Assessment/Intervention/Outcome   Assessment Calm;Coping   Intervention Active listening;Nurtured Hope   Outcome Receptive   Plan and Referrals   Plan/Referrals No future visits requested

## 2024-11-12 NOTE — CARE COORDINATION
CASE MANAGEMENT DISCHARGE SUMMARY    Discharge to: Home with family support, [facility]- SOC this evening  Nursing, PT, OT and [facility] home infusions ( edu with pt and Lazara at 10am )    IMM given: (date) 11/11/2024    New Durable Medical Equipment ordered/agency: TPN set up thru [facility] per [facility] delivery by 4pm     Transportation: via private car    Confirmed discharge plan with: Patient bedside, VM left with [facility]    Facility/Agency, name:  [facility] JONATHAN/AVS pulled by Ken LOPEZ, name: JESSICA Enamorado    Note: Discharging nurse to complete JONATHAN, reconcile AVS, and place final copy with patient's discharge packet. ADILSON Bartholomew

## 2024-11-12 NOTE — PROGRESS NOTES
[facility] GENERAL SURGERY    Surgery Progress Note           POD # 21    PATIENT NAME: Sudha Lopez     TODAY'S DATE: 11/12/2024    INTERVAL HISTORY:    Pt with minimal drainage around sachin, less abd pain, having bms, no nausea     OBJECTIVE:   VITALS:  /64   Pulse 93   Temp 98.1 °F (36.7 °C)   Resp 16   Ht 1.6 m (5' 2.99\")   Wt 74.9 kg (165 lb 1.6 oz)   SpO2 99%   BMI 29.25 kg/m²     INTAKE/OUTPUT:    I/O last 3 completed shifts:  In: 130 [P.O.:120; I.V.:10]  Out: 50 [Drains:50]  No intake/output data recorded.              CONSTITUTIONAL:  fatigued and alert  LUNGS:    ABDOMEN:   soft, non-distended   INCISION: clean, dry, healed, SACHIN drain - thin and light    Data:  CBC:   Recent Labs     11/10/24  1030 11/11/24  0830 11/12/24  0544   WBC 16.5* 16.1* 13.2*   HGB 8.2* 7.8* 7.8*   HCT 24.8* 24.0* 23.2*    357 363     BMP:    Recent Labs     11/11/24  0830 11/12/24  0544   * 135*   K 4.4 4.1    101   CO2 22 23   BUN 19 20   CREATININE 0.8 0.8   GLUCOSE 130* 100*     Hepatic:   No results for input(s): \"AST\", \"ALT\", \"BILITOT\", \"ALKPHOS\" in the last 72 hours.    Invalid input(s): \"ALB\"    Mag:      No results for input(s): \"MG\" in the last 72 hours.     Phos:     No results for input(s): \"PHOS\" in the last 72 hours.     INR: No results for input(s): \"INR\" in the last 72 hours.      Radiology Review:     CT - stable small amount of fluid around sachin    ASSESSMENT AND PLAN:  79 y.o. female status post ileostomy reversal now with anastomotic leak, trying to manage conservatively with bowel rest/TPN to see if the leak can heal.    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

## 2024-11-12 NOTE — PLAN OF CARE
Problem: Discharge Planning  Goal: Discharge to home or other facility with appropriate resources  11/11/2024 2237 by Ching Sanchez RN  Outcome: Progressing  Flowsheets (Taken 11/10/2024 2335 by Clover Carbajal, RN)  Discharge to home or other facility with appropriate resources:   Identify barriers to discharge with patient and caregiver   Arrange for needed discharge resources and transportation as appropriate  11/11/2024 1344 by Kelsea Lazo RN  Outcome: Progressing     Problem: Pain  Goal: Verbalizes/displays adequate comfort level or baseline comfort level  11/11/2024 2237 by Ching Sanchez RN  Outcome: Progressing  Flowsheets (Taken 11/10/2024 2335 by Clover Carbajal, RN)  Verbalizes/displays adequate comfort level or baseline comfort level:   Encourage patient to monitor pain and request assistance   Assess pain using appropriate pain scale   Administer analgesics based on type and severity of pain and evaluate response   Implement non-pharmacological measures as appropriate and evaluate response  11/11/2024 1344 by Kelsea Lazo RN  Outcome: Progressing

## 2024-11-12 NOTE — PLAN OF CARE
Problem: Discharge Planning  Goal: Discharge to home or other facility with appropriate resources  11/12/2024 1140 by Kelsea Lazo RN  Outcome: Adequate for Discharge     Problem: Pain  Goal: Verbalizes/displays adequate comfort level or baseline comfort level  11/12/2024 1140 by Kelsea Lazo RN  Outcome: Adequate for Discharge     Problem: Safety - Adult  Goal: Free from fall injury  Outcome: Adequate for Discharge     Problem: ABCDS Injury Assessment  Goal: Absence of physical injury  Outcome: Adequate for Discharge     Problem: Skin/Tissue Integrity  Goal: Absence of new skin breakdown  Description: 1.  Monitor for areas of redness and/or skin breakdown  2.  Assess vascular access sites hourly  3.  Every 4-6 hours minimum:  Change oxygen saturation probe site  4.  Every 4-6 hours:  If on nasal continuous positive airway pressure, respiratory therapy assess nares and determine need for appliance change or resting period.  Outcome: Adequate for Discharge     Problem: Nutrition Deficit:  Goal: Optimize nutritional status  Outcome: Adequate for Discharge     Problem: Skin/Tissue Integrity - Adult  Goal: Incisions, wounds, or drain sites healing without S/S of infection  Outcome: Adequate for Discharge     Problem: Musculoskeletal - Adult  Goal: Return ADL status to a safe level of function  Outcome: Adequate for Discharge     Problem: Gastrointestinal - Adult  Goal: Minimal or absence of nausea and vomiting  Outcome: Adequate for Discharge     Problem: Gastrointestinal - Adult  Goal: Maintains adequate nutritional intake  Outcome: Adequate for Discharge     Problem: Gastrointestinal - Adult  Goal: Maintains or returns to baseline bowel function  Outcome: Adequate for Discharge

## 2024-11-12 NOTE — PROGRESS NOTES
Pt assessment completed and charted. VSS. Pt a/ox4. PICC site patent/infusing with TPN and intralipid infusing, and Zosyn. Pt states she is passing gas. SBA to bathroon. Pt shown how to strip HALLIE drain line, with milky green output. Pt refused to wear gripper socks and SCD's. Education given. Bed in lowest position and wheels locked. Call light within reach. Bedside table within reach. Non-skid socks in place. Pt denies any other needs at this time.  Pt calls out appropriately. CHG wipes completed on day shift at 10:28.

## 2024-11-12 NOTE — PROGRESS NOTES
Patient educated on use of IS and demonstrates use Yes    Patient and family educated on incisional care and s/s of infection Yes    Patient and family educated on hand hygiene Yes    Patient and family educated on post operative care Yes    - Pain control        Day of surgery patient to side of bed or up to chair for minimum of 30 minutes NA    POD 1 until discharge, patient up to chair by 9 am and TID. Goal to increase mobility  Yes    Patient performs oral hygiene with CHG oral solution until NG tube discontinued. NA    If patient does not have NG tube in place, patient to brush teeth and use mouth wash Q shift Yes    Patient receives daily bath and linen changes Yes    Patient receives CHG bath until roach discontinued NA    SCDs in place No on Lovenox    Patient receiving chemical VTE Yes Lovenox

## 2024-11-12 NOTE — PROGRESS NOTES
Shift assessment completed. Pt A&O x4.  VSS.  Denies Pain. IV site patent, flushed, and infusing. Patient on RA.  N Patient admits to passing gas. Patient ambulates by self/ with SBA to bathroom.  Medication given per MAR. Denies any needs at this time. Bed alarm on for safety. Bed locked and in lowest position.  Call light within reach. Gripper socks applied. Patient in stable condition when RN leaving room.

## 2024-11-13 NOTE — DISCHARGE SUMMARY
Surgery Discharge Summary    Patient Identification  Sudha Lopez is a 79 y.o. female.  :  1945  Admit Date:  10/22/2024    Discharge date:   2024  1:14 PM                                   Disposition: home    Discharge Diagnoses:   Principal Problem:    Obstruction of intestine (HCC)  Active Problems:    Moderate protein-calorie malnutrition (HCC)    History of reversal of ileostomy    Diverticulitis  Resolved Problems:    * No resolved hospital problems. *      Discharge condition: good    Discharge Medications:     Discharge Medication List as of 2024 11:53 AM        CONTINUE these medications which have NOT CHANGED    Details   atorvastatin (LIPITOR) 10 MG tablet Take 1 tablet by mouth dailyHistorical Med      traZODone (DESYREL) 50 MG tablet Take 1.5 tablets by mouth nightlyHistorical Med      latanoprost (XALATAN) 0.005 % ophthalmic solution Place 1 drop into both eyes nightlyHistorical Med      FLUoxetine (PROZAC) 40 MG capsule Take 1 capsule by mouth dailyHistorical Med      buPROPion (WELLBUTRIN XL) 300 MG extended release tablet Take 1 tablet by mouth every morningHistorical Med      fluticasone (FLONASE) 50 MCG/ACT nasal spray 1 spray by Nasal route dailyHistorical Med      nitroGLYCERIN (NITROSTAT) 0.4 MG SL tablet Place 1 tablet under the tongueHistorical Med      omeprazole (PRILOSEC OTC) 20 MG tablet Take 1 tablet by mouthHistorical Med      diclofenac sodium (VOLTAREN) 1 % GEL Apply 4 g topically 2 times daily, Topical, 2 TIMES DAILY Starting 2024, Disp-300 g, R-0, DC to SNF      omeprazole (PRILOSEC) 20 MG delayed release capsule Take 1 capsule by mouth dailyHistorical Med      lidocaine 4 % external patch Place 1 patch onto the skin daily, TransDERmal, DAILY Starting 2024, Disp-30 each, R-0, Normal      apixaban (ELIQUIS) 5 MG TABS tablet Take 1 tablet by mouth 2 times daily, Disp-60 tablet, R-0DC to SNF      Omega-3 Fatty Acids (OMEGA-3 FISH OIL) 1200 MG  follow-up. Details of information given to the patient may be found in the discharge instructions located in the EMR.       HPI and Hospital Course:   Patient admitted after above operation.  Postop diet advanced as GI function returned.  Change in rain output along with abdominal pain prompted workup that showed leak from anastomosis.  Symptoms improved and subsequent imaging showed control with operative drain.  Started on TPN, abx and bowel rest.  Follow up imaging confirmed control but not healed yet.  Discharged on clear liquids, TPN, oral abx and with drain in place.      Bertin Jackson MD    [facility]

## 2024-11-18 ENCOUNTER — HOSPITAL ENCOUNTER (OUTPATIENT)
Age: 79
Setting detail: SPECIMEN
Discharge: HOME | End: 2024-11-18
Payer: MEDICARE

## 2024-11-18 ENCOUNTER — TELEPHONE (OUTPATIENT)
Dept: SURGERY | Age: 79
End: 2024-11-18

## 2024-11-18 ENCOUNTER — OFFICE VISIT (OUTPATIENT)
Dept: SURGERY | Age: 79
End: 2024-11-18

## 2024-11-18 VITALS
HEIGHT: 63 IN | BODY MASS INDEX: 29.38 KG/M2 | DIASTOLIC BLOOD PRESSURE: 64 MMHG | WEIGHT: 165.8 LBS | SYSTOLIC BLOOD PRESSURE: 118 MMHG

## 2024-11-18 DIAGNOSIS — K57.92 DIVERTICULITIS: Primary | ICD-10-CM

## 2024-11-18 LAB
BASOPHILS # BLD: 0.1 K/UL (ref 0–0.2)
BASOPHILS NFR BLD: 0.4 %
DEPRECATED RDW RBC AUTO: 15.1 % (ref 12.4–15.4)
EOSINOPHIL # BLD: 0.2 K/UL (ref 0–0.6)
EOSINOPHIL NFR BLD: 1.5 %
HCT VFR BLD AUTO: 23.4 % (ref 36–48)
HGB BLD-MCNC: 7.9 G/DL (ref 12–16)
LYMPHOCYTES # BLD: 1.8 K/UL (ref 1–5.1)
LYMPHOCYTES NFR BLD: 12.1 %
MAGNESIUM SERPL-MCNC: 1.91 MG/DL (ref 1.8–2.4)
MCH RBC QN AUTO: 29.7 PG (ref 26–34)
MCHC RBC AUTO-ENTMCNC: 33.8 G/DL (ref 31–36)
MCV RBC AUTO: 87.8 FL (ref 80–100)
MONOCYTES # BLD: 1.3 K/UL (ref 0–1.3)
MONOCYTES NFR BLD: 8.5 %
NEUTROPHILS # BLD: 11.7 K/UL (ref 1.7–7.7)
NEUTROPHILS NFR BLD: 77.5 %
PHOSPHATE SERPL-MCNC: 3.5 MG/DL (ref 2.5–4.9)
PLATELET # BLD AUTO: 414 K/UL (ref 135–450)
PMV BLD AUTO: 7.5 FL (ref 5–10.5)
RBC # BLD AUTO: 2.66 M/UL (ref 4–5.2)
TRIGL SERPL-MCNC: 82 MG/DL (ref 0–150)
WBC # BLD AUTO: 15.1 K/UL (ref 4–11)

## 2024-11-18 PROCEDURE — 99024 POSTOP FOLLOW-UP VISIT: CPT | Performed by: SURGERY

## 2024-11-18 PROCEDURE — 84478 ASSAY OF TRIGLYCERIDES: CPT

## 2024-11-18 PROCEDURE — 80053 COMPREHEN METABOLIC PANEL: CPT

## 2024-11-18 PROCEDURE — 84100 ASSAY OF PHOSPHORUS: CPT

## 2024-11-18 PROCEDURE — 83735 ASSAY OF MAGNESIUM: CPT

## 2024-11-18 PROCEDURE — 85025 COMPLETE CBC W/AUTO DIFF WBC: CPT

## 2024-11-18 PROCEDURE — 36415 COLL VENOUS BLD VENIPUNCTURE: CPT

## 2024-11-18 NOTE — PROGRESS NOTES
HPI: Nursing notes reviewed.  Patient reports some cramping abd pain today but not that bad. No fevers or chills. Having bms 1-2 times per day.  Taking clear liquids and doing nocturnal tpn.  Output from drain is 5-20ml per day.    ROS:  10 point review of systems performed with pertinent positives in HPI    Phys:       Abd - soft, nontender, nondistended, sachin purulent and thin   Incision - healing well, no erythema    Assesment: 78 yo s/p robotic ileostomy reversal, postop anastomotic leak    Plan: 1.  Continue clear liquids, TPN, abx, sachin drain control   2.  Repeat hypaque enema followed by CT in about two weeks to see if leak is healed.

## 2024-11-18 NOTE — TELEPHONE ENCOUNTER
Pts daughter Diana called stating pt currently has a temp of 99.0, /57, with some abdominal cramping, and just overall not feeling well - Pt had a recent hosp admission for intestinal obstruction, with a d/c date of 11/12/24 - Daughter called to see time of pts appt this Wed but pt does not have an appt scheduled - Daughter was informed pt needs a f/u CT Scan prior to OV - Home health nurse is visiting pt today to draw labs - Daughter needs clarification on CT order and f/u visit for her mom as she want to avoid taking pt back to ED - Please advise

## 2024-11-19 LAB
ALBUMIN SERPL-MCNC: 3.3 G/DL (ref 3.4–5)
ALBUMIN/GLOB SERPL: 1.2 {RATIO} (ref 1.1–2.2)
ALP SERPL-CCNC: 100 U/L (ref 40–129)
ALT SERPL-CCNC: 50 U/L (ref 10–40)
ANION GAP SERPL CALCULATED.3IONS-SCNC: 12 MMOL/L (ref 3–16)
AST SERPL-CCNC: 40 U/L (ref 15–37)
BILIRUB SERPL-MCNC: <0.2 MG/DL (ref 0–1)
BUN SERPL-MCNC: 33 MG/DL (ref 7–20)
CALCIUM SERPL-MCNC: 8.2 MG/DL (ref 8.3–10.6)
CHLORIDE SERPL-SCNC: 95 MMOL/L (ref 99–110)
CO2 SERPL-SCNC: 21 MMOL/L (ref 21–32)
CREAT SERPL-MCNC: 0.7 MG/DL (ref 0.6–1.2)
GFR SERPLBLD CREATININE-BSD FMLA CKD-EPI: 88 ML/MIN/{1.73_M2}
GLUCOSE SERPL-MCNC: 91 MG/DL (ref 70–99)
POTASSIUM SERPL-SCNC: 4.5 MMOL/L (ref 3.5–5.1)
PROT SERPL-MCNC: 6.1 G/DL (ref 6.4–8.2)
SODIUM SERPL-SCNC: 128 MMOL/L (ref 136–145)

## 2024-11-20 ENCOUNTER — OFFICE VISIT (OUTPATIENT)
Dept: SURGERY | Age: 79
End: 2024-11-20

## 2024-11-20 ENCOUNTER — TELEPHONE (OUTPATIENT)
Dept: SURGERY | Age: 79
End: 2024-11-20

## 2024-11-20 DIAGNOSIS — K57.92 DIVERTICULITIS: Primary | ICD-10-CM

## 2024-11-20 PROCEDURE — 99024 POSTOP FOLLOW-UP VISIT: CPT | Performed by: SURGERY

## 2024-11-20 NOTE — TELEPHONE ENCOUNTER
Pt's daughter Dylan' called and said that the bulb that connects the tube broke off and it is not draining at all. She said it needs to be replaced. She wants to know what or where they need to go to get it fixed today. Please call Lazara # [phone]. Thank you!

## 2024-11-20 NOTE — PROGRESS NOTES
Sergio drain bulb broke this morning. Replaced in office.  Continue to monitor output.  Continue abx, liquid diet, TPN.  F/u imaging first week of December.

## 2024-11-21 RX ORDER — LOPERAMIDE HYDROCHLORIDE 1 MG/7.5ML
SOLUTION ORAL
Qty: 120 ML | Refills: 1 | Status: SHIPPED | OUTPATIENT
Start: 2024-11-21

## 2024-11-25 ENCOUNTER — HOSPITAL ENCOUNTER (OUTPATIENT)
Age: 79
Setting detail: SPECIMEN
Discharge: HOME | End: 2024-11-25
Payer: MEDICARE

## 2024-11-25 LAB
ALBUMIN SERPL-MCNC: 3.2 G/DL (ref 3.4–5)
ALBUMIN/GLOB SERPL: 1.1 {RATIO} (ref 1.1–2.2)
ALP SERPL-CCNC: 96 U/L (ref 40–129)
ALT SERPL-CCNC: 38 U/L (ref 10–40)
ANION GAP SERPL CALCULATED.3IONS-SCNC: 10 MMOL/L (ref 3–16)
AST SERPL-CCNC: 39 U/L (ref 15–37)
BASOPHILS # BLD: 0.1 K/UL (ref 0–0.2)
BASOPHILS NFR BLD: 0.8 %
BILIRUB SERPL-MCNC: <0.2 MG/DL (ref 0–1)
BUN SERPL-MCNC: 34 MG/DL (ref 7–20)
CALCIUM SERPL-MCNC: 8.3 MG/DL (ref 8.3–10.6)
CHLORIDE SERPL-SCNC: 100 MMOL/L (ref 99–110)
CO2 SERPL-SCNC: 25 MMOL/L (ref 21–32)
CREAT SERPL-MCNC: 0.7 MG/DL (ref 0.6–1.2)
DEPRECATED RDW RBC AUTO: 15.3 % (ref 12.4–15.4)
EOSINOPHIL # BLD: 0.4 K/UL (ref 0–0.6)
EOSINOPHIL NFR BLD: 4.7 %
GFR SERPLBLD CREATININE-BSD FMLA CKD-EPI: 88 ML/MIN/{1.73_M2}
GLUCOSE SERPL-MCNC: 93 MG/DL (ref 70–99)
HCT VFR BLD AUTO: 26 % (ref 36–48)
HGB BLD-MCNC: 8.8 G/DL (ref 12–16)
LYMPHOCYTES # BLD: 1.5 K/UL (ref 1–5.1)
LYMPHOCYTES NFR BLD: 16.9 %
MAGNESIUM SERPL-MCNC: 2.03 MG/DL (ref 1.8–2.4)
MCH RBC QN AUTO: 30 PG (ref 26–34)
MCHC RBC AUTO-ENTMCNC: 33.8 G/DL (ref 31–36)
MCV RBC AUTO: 89 FL (ref 80–100)
MONOCYTES # BLD: 1 K/UL (ref 0–1.3)
MONOCYTES NFR BLD: 11.1 %
NEUTROPHILS # BLD: 5.8 K/UL (ref 1.7–7.7)
NEUTROPHILS NFR BLD: 66.5 %
PHOSPHATE SERPL-MCNC: 3.9 MG/DL (ref 2.5–4.9)
PLATELET # BLD AUTO: 334 K/UL (ref 135–450)
PMV BLD AUTO: 8 FL (ref 5–10.5)
POTASSIUM SERPL-SCNC: 4.5 MMOL/L (ref 3.5–5.1)
PROT SERPL-MCNC: 6.1 G/DL (ref 6.4–8.2)
RBC # BLD AUTO: 2.93 M/UL (ref 4–5.2)
SODIUM SERPL-SCNC: 135 MMOL/L (ref 136–145)
TRIGL SERPL-MCNC: 112 MG/DL (ref 0–150)
WBC # BLD AUTO: 8.8 K/UL (ref 4–11)

## 2024-11-25 PROCEDURE — 85025 COMPLETE CBC W/AUTO DIFF WBC: CPT

## 2024-11-25 PROCEDURE — 84478 ASSAY OF TRIGLYCERIDES: CPT

## 2024-11-25 PROCEDURE — 80053 COMPREHEN METABOLIC PANEL: CPT

## 2024-11-25 PROCEDURE — 84100 ASSAY OF PHOSPHORUS: CPT

## 2024-11-25 PROCEDURE — 83735 ASSAY OF MAGNESIUM: CPT

## 2024-11-25 PROCEDURE — 36415 COLL VENOUS BLD VENIPUNCTURE: CPT

## 2024-11-26 ENCOUNTER — TELEPHONE (OUTPATIENT)
Dept: SURGERY | Age: 79
End: 2024-11-26

## 2024-11-26 NOTE — TELEPHONE ENCOUNTER
FYI:  April from [facility] called with an update on pts TPN - Pt does TPN nightly but missed her dose last night due to a tubing issue - Nurse stated they have new tubing and will be going to pts home today to give pt her missed dose from yesterday

## 2024-12-03 ENCOUNTER — TELEPHONE (OUTPATIENT)
Dept: SURGERY | Age: 79
End: 2024-12-03

## 2024-12-03 LAB
ALBUMIN: 3.1 GM/DL (ref 3.2–4.6)
ALP BLD-CCNC: 85 U/L (ref 36–123)
ALT SERPL-CCNC: 18 U/L
ANION GAP SERPL CALCULATED.3IONS-SCNC: 8 MMOL/L (ref 7–16)
AST SERPL-CCNC: 28 U/L
BASOPHILS ABSOLUTE: 0.1 X10(3)/MCL (ref 0–0.1)
BASOPHILS RELATIVE PERCENT: 0.5 %
BILIRUB SERPL-MCNC: 0.2 MG/DL (ref 0.2–1.3)
BUN BLDV-MCNC: 27 MG/DL (ref 8–23)
CALCIUM SERPL-MCNC: 8.4 MG/DL (ref 8.8–10.4)
CHLORIDE BLD-SCNC: 103 MMOL/L (ref 98–107)
CO2: 24 MMOL/L (ref 22–29)
CREAT SERPL-MCNC: 0.67 MG/DL (ref 0.51–1.3)
EGFR (CKD-EPI): 88 ML/MIN/1.73 M2
EOSINOPHIL # BLD: 1.5 %
EOSINOPHILS ABSOLUTE: 0.2 X10(3)/MCL (ref 0–0.5)
GLUCOSE BLD-MCNC: 99 MG/DL (ref 70–99)
HCT VFR BLD CALC: 26.5 % (ref 34–45)
HEMOGLOBIN: 8 G/DL (ref 11.2–15.7)
IMMATURE CELLS ABSOLUTE COUNT: 0 X10(3)/MCL (ref 0–0.1)
IMMATURE GRANULOCYTES %: 0.4 %
LYMPHOCYTES # BLD: 16.3 %
LYMPHOCYTES ABSOLUTE: 1.8 X10(3)/MCL (ref 1.2–3.9)
MAGNESIUM: 2.2 MG/DL (ref 1.6–2.4)
MCH RBC QN AUTO: 28.9 PG (ref 26–34)
MCHC RBC AUTO-ENTMCNC: 30.2 G/DL (ref 30.7–35.5)
MCV RBC AUTO: 95.7 FL (ref 80–100)
MONOCYTES ABSOLUTE: 0.8 X10(3)/MCL (ref 0.3–0.9)
MONOCYTES RELATIVE PERCENT: 7.4 %
NEUTROPHILS ABSOLUTE: 8.2 X10(3)/MCL (ref 1.6–6.1)
NEUTROPHILS: 73.9 %
PDW BLD-RTO: 15 %
PHOSPHORUS: 3.8 MG/DL (ref 2.5–4.5)
PLATELET # BLD: 287 X10(3)/MCL (ref 155–369)
PMV BLD AUTO: 10.1 FL (ref 8.8–12.5)
POTASSIUM SERPL-SCNC: 4.5 MMOL/L (ref 3.5–5)
RBC # BLD: 2.77 X10(6)/MCL (ref 3.9–5.2)
SODIUM BLD-SCNC: 135 MMOL/L (ref 136–145)
TOTAL PROTEIN: 6.3 GM/DL (ref 6.4–8.3)
TRIGL SERPL-MCNC: 94 MG/DL
WBC # BLD: 11.1 X10(3)/MCL (ref 3.7–10.3)

## 2024-12-03 NOTE — TELEPHONE ENCOUNTER
Pt's daughter 'Lazara' called and said that pt would like an anxiety medication to take before her sx this Friday 12/6/24 by Dr. Jackson. She said he mentioned 'Xanax'? Please send in to [facility] and please call Lazara to let them know. Thank you! # [phone]

## 2024-12-03 NOTE — TELEPHONE ENCOUNTER
Pt's daughter 'Diana' called and said that her Home health care is going to want her to do physical therapy. So does Dr. Jackson need to send in an order for this? Please call her and thank you! # [phone]

## 2024-12-04 DIAGNOSIS — F41.9 ANXIETY: Primary | ICD-10-CM

## 2024-12-04 RX ORDER — ALPRAZOLAM 1 MG/1
1 TABLET ORAL ONCE
Qty: 1 TABLET | Refills: 0 | Status: SHIPPED | OUTPATIENT
Start: 2024-12-04 | End: 2024-12-04

## 2024-12-06 ENCOUNTER — HOSPITAL ENCOUNTER (OUTPATIENT)
Dept: GENERAL RADIOLOGY | Age: 79
Discharge: HOME | End: 2024-12-06
Attending: SURGERY
Payer: MEDICARE

## 2024-12-06 ENCOUNTER — HOSPITAL ENCOUNTER (OUTPATIENT)
Dept: CT IMAGING | Age: 79
Discharge: HOME | End: 2024-12-06
Attending: SURGERY
Payer: MEDICARE

## 2024-12-06 DIAGNOSIS — K57.92 DIVERTICULITIS: ICD-10-CM

## 2024-12-06 PROCEDURE — 74176 CT ABD & PELVIS W/O CONTRAST: CPT

## 2024-12-06 PROCEDURE — 74270 X-RAY XM COLON 1CNTRST STD: CPT

## 2024-12-09 ENCOUNTER — SCHEDULED TELEPHONE ENCOUNTER (OUTPATIENT)
Dept: SURGERY | Age: 79
End: 2024-12-09

## 2024-12-09 ENCOUNTER — HOSPITAL ENCOUNTER (OUTPATIENT)
Age: 79
Setting detail: SPECIMEN
Discharge: HOME | End: 2024-12-09
Payer: MEDICARE

## 2024-12-09 ENCOUNTER — TELEPHONE (OUTPATIENT)
Dept: SURGERY | Age: 79
End: 2024-12-09

## 2024-12-09 DIAGNOSIS — Z98.890 POST-OPERATIVE STATE: Primary | ICD-10-CM

## 2024-12-09 LAB
ALBUMIN SERPL-MCNC: 3.4 G/DL (ref 3.4–5)
ALBUMIN/GLOB SERPL: 1.1 {RATIO} (ref 1.1–2.2)
ALP SERPL-CCNC: 89 U/L (ref 40–129)
ALT SERPL-CCNC: 22 U/L (ref 10–40)
ANION GAP SERPL CALCULATED.3IONS-SCNC: 10 MMOL/L (ref 3–16)
AST SERPL-CCNC: 33 U/L (ref 15–37)
BASOPHILS # BLD: 0.1 K/UL (ref 0–0.2)
BASOPHILS NFR BLD: 0.8 %
BILIRUB SERPL-MCNC: <0.2 MG/DL (ref 0–1)
BUN SERPL-MCNC: 29 MG/DL (ref 7–20)
CALCIUM SERPL-MCNC: 8.9 MG/DL (ref 8.3–10.6)
CHLORIDE SERPL-SCNC: 102 MMOL/L (ref 99–110)
CO2 SERPL-SCNC: 24 MMOL/L (ref 21–32)
CREAT SERPL-MCNC: 0.7 MG/DL (ref 0.6–1.2)
DEPRECATED RDW RBC AUTO: 16.7 % (ref 12.4–15.4)
EOSINOPHIL # BLD: 0.1 K/UL (ref 0–0.6)
EOSINOPHIL NFR BLD: 1.7 %
GFR SERPLBLD CREATININE-BSD FMLA CKD-EPI: 88 ML/MIN/{1.73_M2}
GLUCOSE SERPL-MCNC: 112 MG/DL (ref 70–99)
HCT VFR BLD AUTO: 26.2 % (ref 36–48)
HGB BLD-MCNC: 8.7 G/DL (ref 12–16)
LYMPHOCYTES # BLD: 1.3 K/UL (ref 1–5.1)
LYMPHOCYTES NFR BLD: 16 %
MAGNESIUM SERPL-MCNC: 2.06 MG/DL (ref 1.8–2.4)
MCH RBC QN AUTO: 30.2 PG (ref 26–34)
MCHC RBC AUTO-ENTMCNC: 33.4 G/DL (ref 31–36)
MCV RBC AUTO: 90.5 FL (ref 80–100)
MONOCYTES # BLD: 0.8 K/UL (ref 0–1.3)
MONOCYTES NFR BLD: 10.1 %
NEUTROPHILS # BLD: 5.8 K/UL (ref 1.7–7.7)
NEUTROPHILS NFR BLD: 71.4 %
PHOSPHATE SERPL-MCNC: 3.9 MG/DL (ref 2.5–4.9)
PLATELET # BLD AUTO: 273 K/UL (ref 135–450)
PMV BLD AUTO: 8.5 FL (ref 5–10.5)
POTASSIUM SERPL-SCNC: 4.5 MMOL/L (ref 3.5–5.1)
PROT SERPL-MCNC: 6.5 G/DL (ref 6.4–8.2)
RBC # BLD AUTO: 2.89 M/UL (ref 4–5.2)
SODIUM SERPL-SCNC: 136 MMOL/L (ref 136–145)
TRIGL SERPL-MCNC: 72 MG/DL (ref 0–150)
WBC # BLD AUTO: 8.1 K/UL (ref 4–11)

## 2024-12-09 PROCEDURE — 83735 ASSAY OF MAGNESIUM: CPT

## 2024-12-09 PROCEDURE — 84478 ASSAY OF TRIGLYCERIDES: CPT

## 2024-12-09 PROCEDURE — 99024 POSTOP FOLLOW-UP VISIT: CPT | Performed by: SURGERY

## 2024-12-09 PROCEDURE — 84100 ASSAY OF PHOSPHORUS: CPT

## 2024-12-09 PROCEDURE — 80053 COMPREHEN METABOLIC PANEL: CPT

## 2024-12-09 PROCEDURE — 85025 COMPLETE CBC W/AUTO DIFF WBC: CPT

## 2024-12-09 NOTE — PROGRESS NOTES
HPI: Nursing notes reviewed.  Patient reports no pain or nausea.  No fevers.  Having bms.   HALLIE drain fell out ysterday but 12ml every two days or so    ROS:  10 point review of systems performed with pertinent positives in HPI    Phys: Telephone visit    Assesment: 80 yo s/p colostomy reversal    Plan: 1.  Recent imaging without evidence of anastomotic leak   2.  Advance diet as tolerated and stop TPN and remove PICC   3.  Drain is out - ok to leave out   4.  F/u next week

## 2024-12-09 NOTE — TELEPHONE ENCOUNTER
Patient has tele health visit at 11:45    Home Health nurse came to draw labs this morning and the JPEG tube is out. The whole thing fell out sometimeduring the night. Patient woke up and it was laying in her bed.    Jeanne kaye/[facility]   [phone]

## 2024-12-10 ENCOUNTER — TELEPHONE (OUTPATIENT)
Dept: SURGERY | Age: 79
End: 2024-12-10

## 2024-12-10 NOTE — TELEPHONE ENCOUNTER
Bernarda (Pharmacist at [facility]) called and said that she needs an Official order from Dr. Jackson (or a Verbal is fine) of the removal of TPN and also the IV line (Unless the IV line is to be used for some other reason). And if pt does not need the IV line the will remove PICC. Please call her # [phone]. Thank you!

## 2024-12-16 ENCOUNTER — OFFICE VISIT (OUTPATIENT)
Dept: SURGERY | Age: 79
End: 2024-12-16

## 2024-12-16 VITALS
BODY MASS INDEX: 30.23 KG/M2 | WEIGHT: 170.6 LBS | HEIGHT: 63 IN | DIASTOLIC BLOOD PRESSURE: 78 MMHG | SYSTOLIC BLOOD PRESSURE: 150 MMHG

## 2024-12-16 DIAGNOSIS — R19.7 DIARRHEA, UNSPECIFIED TYPE: ICD-10-CM

## 2024-12-16 PROCEDURE — 99024 POSTOP FOLLOW-UP VISIT: CPT | Performed by: SURGERY

## 2024-12-16 RX ORDER — DIPHENOXYLATE HYDROCHLORIDE AND ATROPINE SULFATE 2.5; .025 MG/1; MG/1
1 TABLET ORAL 2 TIMES DAILY PRN
Qty: 60 TABLET | Refills: 0 | Status: SHIPPED | OUTPATIENT
Start: 2024-12-16 | End: 2025-01-15

## 2024-12-17 NOTE — PROGRESS NOTES
HPI: Nursing notes reviewed.  Patient with small amount drainage from drain site.  No abd pain. No fevers. Having several bms a day with lomotil.    ROS:  10 point review of systems performed with pertinent positives in HPI    Phys:    Abd - soft, nontender, nondistended, drainage at prior drain site, no erythema   Incision - no erythema    Assesment: 78 yo s/p robotic ileostomy reversal with perc drain of anastomotic leak    Plan: 1.  Diet as tolerated   2.  Continue to change dressing at prior drain site until stops draining - could be long fistula tract that is yet to close.   3.  F/u in several weeks

## 2025-01-08 DIAGNOSIS — R19.7 DIARRHEA, UNSPECIFIED TYPE: ICD-10-CM

## 2025-01-09 RX ORDER — DIPHENOXYLATE HYDROCHLORIDE AND ATROPINE SULFATE 2.5; .025 MG/1; MG/1
TABLET ORAL
Qty: 60 TABLET | Refills: 0 | Status: SHIPPED | OUTPATIENT
Start: 2025-01-09 | End: 2025-02-08

## 2025-01-11 ENCOUNTER — TELEPHONE (OUTPATIENT)
Dept: SURGERY | Age: 80
End: 2025-01-11

## 2025-01-11 DIAGNOSIS — Z09 POSTOP CHECK: Primary | ICD-10-CM

## 2025-01-11 NOTE — TELEPHONE ENCOUNTER
Patient called with concerns with about increasing drainage at the site of her prior percutaneous drain.  Denies fever, chills, foul odor.  Is still eating and having bowel movements.  She is scheduled to follow up with Dr Jackson in 2 days.    I encouraged her to keep monitoring the output from the drain site.  If she begins to feel any worse (more pain, fever, chills, etc) then she should come to the ED this weekend for further evaluation.  If not, she should plan to keep her visit on office on Monday.      Patient states she agrees with the plan.

## 2025-01-13 ENCOUNTER — OFFICE VISIT (OUTPATIENT)
Dept: SURGERY | Age: 80
End: 2025-01-13

## 2025-01-13 VITALS
HEIGHT: 63 IN | DIASTOLIC BLOOD PRESSURE: 76 MMHG | SYSTOLIC BLOOD PRESSURE: 134 MMHG | WEIGHT: 169.2 LBS | BODY MASS INDEX: 29.98 KG/M2

## 2025-01-13 DIAGNOSIS — Z98.890 POST-OPERATIVE STATE: Primary | ICD-10-CM

## 2025-01-13 PROCEDURE — 99024 POSTOP FOLLOW-UP VISIT: CPT | Performed by: SURGERY

## 2025-01-13 NOTE — PROGRESS NOTES
[facility]    Surgery Progress Note           PATIENT NAME: Sudha Lopez     TODAY'S DATE: 1/13/2025    SUBJECTIVE:    Pt has noticed worsening discharge from previous drain site that she has been saturating gauze to the point where she was using a menstrual pad in her pants to catch any overflowing discharge.  Patient states that it is caramel colored, thick and odorous.  Patient also states that about a week and a half ago she developed a new site of drainage inferior to her bellybutton at the edge of another incision site.  This is the same drainage that is coming from her previous drain site.  Patient denies any fever, chills, increased abdominal pain, or tenderness to palpation.  Patient does not continue to endorse diarrhea but has been following a strict diet with small amounts of food more frequently and watching what she eats closely.  Patient has also continued to take her Lomotil and Imodium as prescribed but continues to have diarrhea.  Patient also endorses hemorrhoids that are not completely relieved with topical creams     OBJECTIVE:   VITALS:  /76 (Site: Right Upper Arm, Position: Sitting, Cuff Size: Medium Adult)   Ht 1.6 m (5' 2.99\")   Wt 76.7 kg (169 lb 3.2 oz)   BMI 29.98 kg/m²                 CONSTITUTIONAL:  awake and alert  LUNGS:  clear to auscultation, no crackles or wheezing  ABDOMEN:   normal bowel sounds, soft, non-distended, non-tender   INCISION: Hypergranulation from drain site.  Area is moist but no surrounding erythema or purulent discharge noted.  There is another circular lesion inferior to the bellybutton that is moist with no signs of purulent discharge or surrounding erythema      ASSESSMENT AND PLAN:  79 y.o. female status post ileostomy reversal October 22, 2024.  Due to persistent discharge from drainage site as well as new drainage from an alternative site is time to get imaging to assess for potential abscess or continued leakage of

## 2025-01-14 ENCOUNTER — TELEPHONE (OUTPATIENT)
Dept: SURGERY | Age: 80
End: 2025-01-14

## 2025-01-14 DIAGNOSIS — F41.9 ANXIETY: Primary | ICD-10-CM

## 2025-01-14 RX ORDER — HYDROCORTISONE 25 MG/G
CREAM TOPICAL
Qty: 1 EACH | Refills: 0 | Status: SHIPPED | OUTPATIENT
Start: 2025-01-14

## 2025-01-14 RX ORDER — DIAZEPAM 5 MG/1
5 TABLET ORAL ONCE
Qty: 1 TABLET | Refills: 0 | Status: SHIPPED | OUTPATIENT
Start: 2025-01-14 | End: 2025-01-14

## 2025-01-14 NOTE — TELEPHONE ENCOUNTER
Pt daughter called (is on HIPAA) stating that the pt had reversal surgery.  She was apparently told that it may not take and may need the ileostomy bag back.  She is so upset that she will not speak with her children, will not answer the phone and states she is not doing all that again.  Pt came in for a PO appt yesterday alone so no one was able to hear what she was told.  She does have an appt for CT on Thursday which she is planning to go to but is dwelling on the worse case and states she will not go thru that surgery again.  Daughter would just like to discuss and get some in-site as to what exactly is going on so they are able to approach the pt. She states they are usually called with results of CT and would like a heads up if the results are bad so that they may be able to arrange for one of her local children to be at her house when those results are given.

## 2025-01-14 NOTE — TELEPHONE ENCOUNTER
Pt calling to ask about rx for hemorrhoids still hasn't been received by [facility]. Does she need to have barium cleanse before her CT scan? Will she receive an enema before CT scan?

## 2025-01-16 ENCOUNTER — HOSPITAL ENCOUNTER (OUTPATIENT)
Dept: CT IMAGING | Age: 80
Discharge: HOME | End: 2025-01-16
Attending: SURGERY
Payer: MEDICARE

## 2025-01-16 DIAGNOSIS — Z98.890 POST-OPERATIVE STATE: ICD-10-CM

## 2025-01-16 PROCEDURE — 74177 CT ABD & PELVIS W/CONTRAST: CPT

## 2025-01-16 PROCEDURE — 6360000004 HC RX CONTRAST MEDICATION: Performed by: SURGERY

## 2025-01-16 RX ORDER — DIATRIZOATE MEGLUMINE AND DIATRIZOATE SODIUM 660; 100 MG/ML; MG/ML
30 SOLUTION ORAL; RECTAL
Status: DISCONTINUED | OUTPATIENT
Start: 2025-01-16 | End: 2025-01-17 | Stop reason: HOSPADM

## 2025-01-16 RX ORDER — IOPAMIDOL 755 MG/ML
75 INJECTION, SOLUTION INTRAVASCULAR
Status: COMPLETED | OUTPATIENT
Start: 2025-01-16 | End: 2025-01-16

## 2025-01-16 RX ADMIN — IOPAMIDOL 75 ML: 755 INJECTION, SOLUTION INTRAVENOUS at 15:22

## 2025-01-16 RX ADMIN — DIATRIZOATE MEGLUMINE AND DIATRIZOATE SODIUM 30 ML: 660; 100 LIQUID ORAL; RECTAL at 15:22

## 2025-01-17 ENCOUNTER — TELEPHONE (OUTPATIENT)
Dept: SURGERY | Age: 80
End: 2025-01-17

## 2025-01-17 NOTE — TELEPHONE ENCOUNTER
Pt called stating she is aware that her CT results are complete.  Pt would like these results.  She states that she does not want to go the whole weekend without knowing what these results are.  Pt would like these results today.

## 2025-01-17 NOTE — TELEPHONE ENCOUNTER
Discussed that no leakage of contrasts from anastomosis was seen and no abscess.  She is seeing less drainage and clearer in color.  At this  point continue to monitor for resolution.  Follow up in 2-3 weeks.

## 2025-01-28 RX ORDER — HYDROCORTISONE 25 MG/G
CREAM TOPICAL
Qty: 1 EACH | Refills: 2 | Status: SHIPPED | OUTPATIENT
Start: 2025-01-28

## 2025-02-06 ENCOUNTER — OFFICE VISIT (OUTPATIENT)
Dept: SURGERY | Age: 80
End: 2025-02-06

## 2025-02-06 VITALS
BODY MASS INDEX: 30.05 KG/M2 | SYSTOLIC BLOOD PRESSURE: 148 MMHG | WEIGHT: 169.6 LBS | DIASTOLIC BLOOD PRESSURE: 68 MMHG | HEIGHT: 63 IN

## 2025-02-06 DIAGNOSIS — R19.7 DIARRHEA, UNSPECIFIED TYPE: ICD-10-CM

## 2025-02-06 DIAGNOSIS — Z98.890 POST-OPERATIVE STATE: Primary | ICD-10-CM

## 2025-02-06 PROCEDURE — 99024 POSTOP FOLLOW-UP VISIT: CPT | Performed by: SURGERY

## 2025-02-06 RX ORDER — DIPHENOXYLATE HYDROCHLORIDE AND ATROPINE SULFATE 2.5; .025 MG/1; MG/1
TABLET ORAL
Qty: 60 TABLET | Refills: 1 | Status: SHIPPED | OUTPATIENT
Start: 2025-02-06 | End: 2025-03-08

## 2025-02-06 NOTE — PROGRESS NOTES
HPI: Nursing notes reviewed.  Patient with minimal drainage from open area.  No pain.  5 bms per day.    ROS:  10 point review of systems performed with pertinent positives in HPI    Phys:    Abd - soft, nontender, nondistended   Small opening inferior midline and LUQ at prior drain site, no active drainage or signs of infection    Assesment: 80 yo s/p ileostomy reversal    Plan: 1.  More recent imaging without fistula or abscess   2.  Continue dressing changes until drainage stops   3.  Silver nitrate applied to hypergranulation tissue at luq site

## 2025-02-12 RX ORDER — LIDOCAINE 4 G/G
1 PATCH TOPICAL DAILY
Qty: 30 EACH | Refills: 3 | Status: SHIPPED | OUTPATIENT
Start: 2025-02-12

## 2025-02-26 ENCOUNTER — TELEPHONE (OUTPATIENT)
Dept: SURGERY | Age: 80
End: 2025-02-26

## 2025-02-26 NOTE — TELEPHONE ENCOUNTER
Sudha Lopez returned your call but you were in a room with a patient - Please call her back when you are able

## 2025-03-03 ENCOUNTER — OFFICE VISIT (OUTPATIENT)
Dept: SURGERY | Age: 80
End: 2025-03-03
Payer: MEDICARE

## 2025-03-03 VITALS
SYSTOLIC BLOOD PRESSURE: 146 MMHG | HEIGHT: 63 IN | WEIGHT: 162.4 LBS | DIASTOLIC BLOOD PRESSURE: 78 MMHG | BODY MASS INDEX: 28.77 KG/M2

## 2025-03-03 DIAGNOSIS — K56.609 LARGE BOWEL OBSTRUCTION (HCC): ICD-10-CM

## 2025-03-03 DIAGNOSIS — K57.92 DIVERTICULITIS: Primary | ICD-10-CM

## 2025-03-03 PROCEDURE — 99212 OFFICE O/P EST SF 10 MIN: CPT | Performed by: SURGERY

## 2025-03-03 PROCEDURE — 1159F MED LIST DOCD IN RCRD: CPT | Performed by: SURGERY

## 2025-03-03 PROCEDURE — 1123F ACP DISCUSS/DSCN MKR DOCD: CPT | Performed by: SURGERY

## 2025-03-03 PROCEDURE — 1126F AMNT PAIN NOTED NONE PRSNT: CPT | Performed by: SURGERY

## 2025-03-04 NOTE — PROGRESS NOTES
HPI: Nursing notes reviewed.  Patient now reports minimal drainage from two open areas (lower midline incision and prior drain site).  Changes dressing 1-2 times per day and not soaking through.  Bms more regulated.  No nausea.  Energy normal. No fevers.    ROS:  10 point review of systems performed with pertinent positives in HPI    Phys:    Abd - soft, nontender, nondistended   Incisions - 0.5cm areas of drainage lower midline and LUQ.  No erythema    Assesment: 78 yo s/p ileostomy reversal    Plan: 1.  Bms more regulated with changing med dosages.  Continue current regimen.   2.  No activity limitations   3.  No dietary restrictions   4.  Discussed possible low output fistulas given persistent drainage though not seen on most recent imaging. Silver nitrate applied.  Continue gauze as needed for drainage.

## 2025-03-10 ENCOUNTER — OFFICE VISIT (OUTPATIENT)
Dept: SURGERY | Age: 80
End: 2025-03-10
Payer: MEDICARE

## 2025-03-10 VITALS
WEIGHT: 162.4 LBS | HEIGHT: 63 IN | SYSTOLIC BLOOD PRESSURE: 138 MMHG | BODY MASS INDEX: 28.77 KG/M2 | DIASTOLIC BLOOD PRESSURE: 78 MMHG

## 2025-03-10 DIAGNOSIS — K57.92 DIVERTICULITIS: Primary | ICD-10-CM

## 2025-03-10 PROCEDURE — 1123F ACP DISCUSS/DSCN MKR DOCD: CPT | Performed by: SURGERY

## 2025-03-10 PROCEDURE — 99212 OFFICE O/P EST SF 10 MIN: CPT | Performed by: SURGERY

## 2025-03-10 PROCEDURE — 1159F MED LIST DOCD IN RCRD: CPT | Performed by: SURGERY

## 2025-03-10 PROCEDURE — 1126F AMNT PAIN NOTED NONE PRSNT: CPT | Performed by: SURGERY

## 2025-03-10 NOTE — PROGRESS NOTES
[facility] SURGERY    PATIENT NAME: Sudha Lopez     TODAY'S DATE: 3/10/2025    CHIEF COMPLAINT: Leaking from two previous incisions.    INTERVAL HISTORY/HPI:    Pt being seen for drainage from two open areas, pt states that drainage was improved after the silver nitrate was used at previous visits however it only lasted for 2-3 days post silver nitrate. Stating that has had to change out midline incision and prior drain site 4-5 times a day some days. Leaking some blood in lower incision and yellowish brownish fluid from prior drain site. Also complains of increased abdominal discomfort with fiber supplement usage, having about 3 BM's a day which are loose in nature. Denies Constipation, blood in stool or urine, fever, myalgias, SOB, CP, Palpitations.    ROS: Pertinent positives in HPI     OBJECTIVE:  VITALS:  /78 (BP Site: Right Upper Arm, Patient Position: Sitting, BP Cuff Size: Medium Adult)   Ht 1.6 m (5' 2.99\")   Wt 73.7 kg (162 lb 6.4 oz)   BMI 28.78 kg/m²     CONSTITUTIONAL:  awake and alert  LUNGS:  clear to auscultation  ABDOMEN:  normal bowel sounds, soft, non-distended, non-tender, incision sites (lower midline and LUQ) are non-tender, non erythematous, with some small amount of drainage noted at each.     Data:  CBC: No results for input(s): \"WBC\", \"HGB\", \"HCT\", \"PLT\" in the last 72 hours.  BMP:  No results for input(s): \"NA\", \"K\", \"CL\", \"CO2\", \"BUN\", \"CREATININE\", \"GLUCOSE\" in the last 72 hours.  Hepatic: No results for input(s): \"AST\", \"ALT\", \"BILITOT\", \"ALKPHOS\" in the last 72 hours.    Invalid input(s): \"ALB\"  Mag:    No results for input(s): \"MG\" in the last 72 hours.   Phos:   No results for input(s): \"PHOS\" in the last 72 hours.   INR: No results for input(s): \"INR\" in the last 72 hours.    Radiology Review:  None for this visit.       ASSESSMENT AND PLAN:  Applied silver nitrate to two open wounds to help cauterize drainage. Discussed possible options moving forward if wounds

## 2025-04-01 RX ORDER — HYDROCORTISONE 25 MG/G
CREAM TOPICAL
Qty: 30 G | Refills: 4 | Status: SHIPPED | OUTPATIENT
Start: 2025-04-01

## 2025-04-14 RX ORDER — HYDROCORTISONE 25 MG/G
CREAM TOPICAL
Qty: 30 G | Refills: 4 | Status: SHIPPED | OUTPATIENT
Start: 2025-04-14

## 2025-05-19 ENCOUNTER — OFFICE VISIT (OUTPATIENT)
Dept: SURGERY | Age: 80
End: 2025-05-19
Payer: MEDICARE

## 2025-05-19 VITALS
HEIGHT: 63 IN | BODY MASS INDEX: 28.84 KG/M2 | WEIGHT: 162.8 LBS | DIASTOLIC BLOOD PRESSURE: 64 MMHG | SYSTOLIC BLOOD PRESSURE: 118 MMHG

## 2025-05-19 DIAGNOSIS — K57.92 DIVERTICULITIS: Primary | ICD-10-CM

## 2025-05-19 PROCEDURE — 1126F AMNT PAIN NOTED NONE PRSNT: CPT | Performed by: SURGERY

## 2025-05-19 PROCEDURE — 1123F ACP DISCUSS/DSCN MKR DOCD: CPT | Performed by: SURGERY

## 2025-05-19 PROCEDURE — 1159F MED LIST DOCD IN RCRD: CPT | Performed by: SURGERY

## 2025-05-19 PROCEDURE — 99212 OFFICE O/P EST SF 10 MIN: CPT | Performed by: SURGERY

## 2025-05-19 RX ORDER — TERBINAFINE HYDROCHLORIDE 250 MG/1
TABLET ORAL
COMMUNITY
Start: 2025-04-24

## 2025-05-19 RX ORDER — KETOCONAZOLE 20 MG/G
CREAM TOPICAL
COMMUNITY
Start: 2025-04-28

## 2025-05-21 NOTE — PROGRESS NOTES
HPI: Nursing notes reviewed.  Patient with intermittent drainage from small skin opening. Moreso in upper abdomen.  No pain or nausa.  2-4 bms per day.    ROS:  10 point review of systems performed with pertinent positives in HPI    Phys:    Abd - soft, nontender, nondistended, two openings - one in upper abdomen and one in lower midlien each 2mm withotu erythema   NA    Assesment: 80 yo s/p ileostomy reversal    Plan: 1.  Discussed likely presence of fistula, at least from upper abdomen opening.  This is site of prior drain that was managing a controlled leak from her anastomosis postop.  Very low output so still a chance for spontaneous closure.  If it doesn't close then discussed options of operative closure vs dressign changes if remains low output as she wishes to avoid an operation.  Follow up in several months.  Sooner if increasing drainage or symptoms.

## 2025-06-18 ENCOUNTER — OFFICE VISIT (OUTPATIENT)
Dept: SURGERY | Age: 80
End: 2025-06-18
Payer: MEDICARE

## 2025-06-18 VITALS
DIASTOLIC BLOOD PRESSURE: 84 MMHG | BODY MASS INDEX: 29.3 KG/M2 | WEIGHT: 165.4 LBS | HEIGHT: 63 IN | SYSTOLIC BLOOD PRESSURE: 144 MMHG

## 2025-06-18 DIAGNOSIS — K57.92 DIVERTICULITIS: Primary | ICD-10-CM

## 2025-06-18 PROCEDURE — 99212 OFFICE O/P EST SF 10 MIN: CPT | Performed by: SURGERY

## 2025-06-18 PROCEDURE — 1159F MED LIST DOCD IN RCRD: CPT | Performed by: SURGERY

## 2025-06-18 PROCEDURE — 1123F ACP DISCUSS/DSCN MKR DOCD: CPT | Performed by: SURGERY

## 2025-06-18 PROCEDURE — 1126F AMNT PAIN NOTED NONE PRSNT: CPT | Performed by: SURGERY

## 2025-06-18 RX ORDER — AMOXICILLIN 500 MG/1
500 CAPSULE ORAL 2 TIMES DAILY
Qty: 14 CAPSULE | Refills: 0 | Status: SHIPPED | OUTPATIENT
Start: 2025-06-18 | End: 2025-06-25

## 2025-06-18 NOTE — PROGRESS NOTES
[facility]    PATIENT NAME: Sudha Lopez     TODAY'S DATE: 6/18/2025    CHIEF COMPLAINT: drainage    INTERVAL HISTORY/HPI:    Pt had increased drainage from abdominal wound but imrpoved when on abx recently.  No fevers or chills. No nausea. No change in bowels..     REVIEW OF SYSTEMS:  Pertinent positives and negatives as per interval history section    OBJECTIVE:  VITALS:  BP (!) 144/84 (BP Site: Right Upper Arm, Patient Position: Sitting, BP Cuff Size: Large Adult)   Ht 1.6 m (5' 2.99\")   Wt 75 kg (165 lb 6.4 oz)   BMI 29.31 kg/m²     INTAKE/OUTPUT:    [unfilled]  @[email or web address]@    CONSTITUTIONAL:  awake and alert  LUNGS:  Respirations easy and unlabored, clear to auscultation  CARD:  regular rate and rhythm  ABDOMEN:  normal bowel sounds, soft, non-distended, non-tender, LUE wound with some hypergranulation and purulent drainage    Data:  CBC: No results for input(s): \"WBC\", \"HGB\", \"HCT\", \"PLT\" in the last 72 hours.  BMP:  No results for input(s): \"NA\", \"K\", \"CL\", \"CO2\", \"BUN\", \"CREATININE\", \"GLUCOSE\" in the last 72 hours.  Hepatic: No results for input(s): \"AST\", \"ALT\", \"BILITOT\", \"ALKPHOS\" in the last 72 hours.    Invalid input(s): \"ALB\"  Mag:    No results for input(s): \"MG\" in the last 72 hours.   Phos:   No results for input(s): \"PHOS\" in the last 72 hours.   INR: No results for input(s): \"INR\" in the last 72 hours.    Radiology Review:  *Imaging personally reviewed by me.   NA      ASSESSMENT AND PLAN:  80 yo with history of subtotal colectomy  Behaving like a fistula though still low output.  Improved drainage since on abx so will extend another week.  Silver nitrate applied to hypergranulation     Electronically signed by Bertin Jackson MD     [id number]

## 2025-06-27 ENCOUNTER — TELEPHONE (OUTPATIENT)
Dept: SURGERY | Age: 80
End: 2025-06-27

## 2025-06-27 NOTE — TELEPHONE ENCOUNTER
Patient states Dr Jackson wanted to stop amoxicillin,   states Dr. Jackson wanted her to start a new antibiotic. Pt can't remember the name but she only has 2 amoxicillin left and is calling for the new antibiotic to be called in.    Has questions about \"nitrate treatment\"?? Should she schedule appt for that or wait??       Sudha   [phone]    [facility] -- [facility]

## 2025-07-09 ENCOUNTER — OFFICE VISIT (OUTPATIENT)
Dept: SURGERY | Age: 80
End: 2025-07-09
Payer: MEDICARE

## 2025-07-09 VITALS
HEIGHT: 63 IN | SYSTOLIC BLOOD PRESSURE: 138 MMHG | DIASTOLIC BLOOD PRESSURE: 64 MMHG | BODY MASS INDEX: 29.23 KG/M2 | WEIGHT: 165 LBS

## 2025-07-09 DIAGNOSIS — K57.92 DIVERTICULITIS: Primary | ICD-10-CM

## 2025-07-09 PROCEDURE — 1123F ACP DISCUSS/DSCN MKR DOCD: CPT | Performed by: SURGERY

## 2025-07-09 PROCEDURE — 1159F MED LIST DOCD IN RCRD: CPT | Performed by: SURGERY

## 2025-07-09 PROCEDURE — 1126F AMNT PAIN NOTED NONE PRSNT: CPT | Performed by: SURGERY

## 2025-07-09 PROCEDURE — 99212 OFFICE O/P EST SF 10 MIN: CPT | Performed by: SURGERY

## 2025-07-10 NOTE — PROGRESS NOTES
[facility]    PATIENT NAME: Sudha Lopez     TODAY'S DATE: 7/9/2025    CHIEF COMPLAINT: drainage    INTERVAL HISTORY/HPI:    Pt with much less drainage on abx, finished a day or so ago, no fevers, no pain, no change in bms     REVIEW OF SYSTEMS:  Pertinent positives and negatives as per interval history section    OBJECTIVE:  VITALS:  /64 (BP Site: Left Upper Arm, Patient Position: Sitting, BP Cuff Size: Large Adult)   Ht 1.6 m (5' 2.99\")   Wt 74.8 kg (165 lb)   BMI 29.24 kg/m²     INTAKE/OUTPUT:    [unfilled]  [unfilled]    CONSTITUTIONAL:  awake and alert  LUNGS:  Respirations easy and unlabored,   CARD:  regular rate and rhythm  ABDOMEN:  normal bowel sounds, soft, non-distended, non-tender, two skin openings with minimal drainage, minimal hypergranulation, no erythema    Data:  CBC: No results for input(s): \"WBC\", \"HGB\", \"HCT\", \"PLT\" in the last 72 hours.  BMP:  No results for input(s): \"NA\", \"K\", \"CL\", \"CO2\", \"BUN\", \"CREATININE\", \"GLUCOSE\" in the last 72 hours.  Hepatic: No results for input(s): \"AST\", \"ALT\", \"BILITOT\", \"ALKPHOS\" in the last 72 hours.    Invalid input(s): \"ALB\"  Mag:    No results for input(s): \"MG\" in the last 72 hours.   Phos:   No results for input(s): \"PHOS\" in the last 72 hours.   INR: No results for input(s): \"INR\" in the last 72 hours.    Radiology Review:  *Imaging personally reviewed by me.   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

## 2025-08-13 ENCOUNTER — OFFICE VISIT (OUTPATIENT)
Dept: SURGERY | Age: 80
End: 2025-08-13
Payer: MEDICARE

## 2025-08-13 VITALS
BODY MASS INDEX: 29.7 KG/M2 | WEIGHT: 167.6 LBS | HEIGHT: 63 IN | SYSTOLIC BLOOD PRESSURE: 138 MMHG | DIASTOLIC BLOOD PRESSURE: 70 MMHG

## 2025-08-13 DIAGNOSIS — K57.92 DIVERTICULITIS: Primary | ICD-10-CM

## 2025-08-13 PROCEDURE — 99212 OFFICE O/P EST SF 10 MIN: CPT | Performed by: SURGERY

## 2025-08-13 PROCEDURE — 1123F ACP DISCUSS/DSCN MKR DOCD: CPT | Performed by: SURGERY

## 2025-08-13 PROCEDURE — 1126F AMNT PAIN NOTED NONE PRSNT: CPT | Performed by: SURGERY

## 2025-08-13 PROCEDURE — 1159F MED LIST DOCD IN RCRD: CPT | Performed by: SURGERY

## 2025-08-13 RX ORDER — BENAZEPRIL HYDROCHLORIDE 40 MG/1
TABLET ORAL
COMMUNITY
Start: 2025-06-26

## 2025-08-13 RX ORDER — AMLODIPINE BESYLATE 5 MG/1
TABLET ORAL
COMMUNITY
Start: 2025-07-03

## (undated) DEVICE — SET ADMIN PRIMING 7ML L30IN 7.35LB 20 GTT 2ND RLER CLMP

## (undated) DEVICE — ELECTRODE ECG MONITR FOAM TEAR DROP ADLT RED

## (undated) DEVICE — AIRLIFE™ NASAL OXYGEN CANNULA CURVED, FLARED TIP, WITH 7 FEET (2.1 M) CRUSH RESISTANT TUBING, OVER-THE-EAR STYLE: Brand: AIRLIFE™

## (undated) DEVICE — DRESSING ALG W4XL8IN AG FOAM SUPERABSORBENT SIL ANTIMIC

## (undated) DEVICE — SUTURE MONOCRYL + SZ 4-0 L18IN ABSRB UD L19MM PS-2 3/8 CIR MCP496G

## (undated) DEVICE — NEEDLE HYPO 25GA L1.5IN BLU POLYPR HUB S STL REG BVL STR

## (undated) DEVICE — GLOVE ORANGE PI 8   MSG9080

## (undated) DEVICE — CANNULA NSL AD TBNG L7FT PVC STR NONFLARED PRNG O2 DEL W STD

## (undated) DEVICE — NEEDLE FLTR 19GA L1.5IN WALL THK5UM BRN POLYPR HUB S STL

## (undated) DEVICE — STAPLER INT RELD REG 60 MM VERY THCK TISS 2 ROW 4FIRING PROX

## (undated) DEVICE — SUTURE PERMAHAND SZ 3-0 L30IN NONABSORBABLE BLK SH L26MM K832H

## (undated) DEVICE — GLOVE SURG SZ 65 L12IN FNGR THK83MIL CRM POLYISOPRENE

## (undated) DEVICE — TISSUE RETRIEVAL SYSTEM: Brand: INZII RETRIEVAL SYSTEM

## (undated) DEVICE — TOWEL,OR,DSP,ST,BLUE,STD,4/PK,20PK/CS: Brand: MEDLINE

## (undated) DEVICE — Device

## (undated) DEVICE — 3M™ TEGADERM™ TRANSPARENT FILM DRESSING FRAME STYLE, 1624W, 2-3/8 IN X 2-3/4 IN (6 CM X 7 CM), 100/CT 4CT/CASE: Brand: 3M™ TEGADERM™

## (undated) DEVICE — SILICONE I/A TIP STRAIGHT: Brand: ALCON

## (undated) DEVICE — SOLUTION IRRIG 1000ML 0.9% SOD CHL USP POUR PLAS BTL

## (undated) DEVICE — STAPLER 60 RELOAD BLUE: Brand: SUREFORM

## (undated) DEVICE — 3M(TM) TRANSPORE SURGICAL TAPE 1527-1: Brand: 3M™ TRANSPORE™

## (undated) DEVICE — GLOVE,SURG,SENSICARE SLT,LF,PF,7: Brand: MEDLINE

## (undated) DEVICE — AIRSEAL BIFURCATED SMOKE EVAC FILTERED TUBE SET: Brand: AIRSEAL

## (undated) DEVICE — DRESSING FOAM W4XL10IN AG SIL ADH ANTIMIC POSTOP OPTIFOAM

## (undated) DEVICE — SUTURE VICRYL + SZ 3-0 L18IN ABSRB UD SH 1/2 CIR TAPERCUT NDL VCP864D

## (undated) DEVICE — ELECTRODE,ECG,STRESS,FOAM,3PK: Brand: MEDLINE

## (undated) DEVICE — SET GRAV VENT NVENT CK VLV 3 NDL FREE PRT 10 GTT

## (undated) DEVICE — DRAPE,UNDERBUTTOCKS,PCH,STERILE: Brand: MEDLINE

## (undated) DEVICE — GAUZE,SPONGE,2"X2",8PLY,STERILE,LF,2'S: Brand: MEDLINE

## (undated) DEVICE — SUTURE PROL SZ 3-0 L48IN NONABSORBABLE BLU SH L26MM 1/2 CIR 8534H

## (undated) DEVICE — SOLUTION IV 1000ML LAC RINGERS PH 6.5 INJ USP VIAFLX PLAS

## (undated) DEVICE — 1LYRTR 16FR10ML 100%SILI SNAP: Brand: MEDLINE INDUSTRIES, INC.

## (undated) DEVICE — GOWN SIRUS NONREIN XL W/TWL: Brand: MEDLINE INDUSTRIES, INC.

## (undated) DEVICE — 1960 FOAM BLOCK NEEDLE COUNTER: Brand: DEVON

## (undated) DEVICE — INVIEW CLEAR LEGGINGS: Brand: CONVERTORS

## (undated) DEVICE — CONMED GOLDLINE ELECTROSURGICAL HANDPIECE, HAND CONTROLLED WITH ULTRACLEAN BLADE ELECTRODE, BUTTON SWITCH, SAFETY HOLSTER AND 15' (4.6 M) CABLE: Brand: CONMED GOLDLINE

## (undated) DEVICE — GLOVE SURG SZ 8 L12IN FNGR THK83MIL CRM POLYISOPRENE

## (undated) DEVICE — BLADELESS OBTURATOR: Brand: WECK VISTA

## (undated) DEVICE — RESERVOIR,SUCTION,100CC,SILICONE: Brand: MEDLINE

## (undated) DEVICE — RELOAD STPL L75MM OPN H3.8MM CLS 1.5MM WIRE DIA0.2MM REG

## (undated) DEVICE — SURGICAL PROCEDURE PACK EYE ANDRSN

## (undated) DEVICE — TIP COVER ACCESSORY

## (undated) DEVICE — TIBURON LAPAROSCOPIC CHOLECYSTECTOMY DRAPE: Brand: CONVERTORS

## (undated) DEVICE — STAPLER 60: Brand: SUREFORM

## (undated) DEVICE — CATHETER IV 20GA L1.25IN PNK FEP SFTY STR HUB RADPQ DISP

## (undated) DEVICE — BLADE ES L6IN ELASTOMERIC COAT EXT DURABLE BEND UPTO 90DEG

## (undated) DEVICE — DRAIN SURG 15FR SIL RND CHN W/ TRCR FULL FLUT DBL WRP TRAD

## (undated) DEVICE — SOLUTION IRRIG 2000ML STRL H2O UROMATIC PLAS CONT USP

## (undated) DEVICE — SUTURE ABSORBABLE MONOFILAMENT 0 CTX 60 IN VIO PDS + PDP990G

## (undated) DEVICE — GLOVE SURG SZ 7 L12IN FNGR THK83MIL CRM POLYISOPRENE

## (undated) DEVICE — COVER,TABLE,44X90,STERILE: Brand: MEDLINE

## (undated) DEVICE — VESSEL SEALER EXTEND: Brand: ENDOWRIST

## (undated) DEVICE — COLUMN DRAPE

## (undated) DEVICE — SOLUTION IRRIG 250ML STRL H2O PLAS POUR BTL USP

## (undated) DEVICE — TUBING, SUCTION, 3/16" X 12', STRAIGHT: Brand: MEDLINE

## (undated) DEVICE — SPONGE LAP W18XL18IN WHT COT 4 PLY FLD STRUNG RADPQ DISP ST 2 PER PACK

## (undated) DEVICE — YANKAUER,OPEN TIP,W/O VENT,STERILE: Brand: MEDLINE INDUSTRIES, INC.

## (undated) DEVICE — 3M™ IOBAN™ 2 ANTIMICROBIAL INCISE DRAPE 6650EZ: Brand: IOBAN™ 2

## (undated) DEVICE — ENDOSCOPIC KIT 2 12 FT OP4 DE2 GWN SYR

## (undated) DEVICE — NEPTUNE E-SEP SMOKE EVACUATION PENCIL, COATED, 70MM BLADE, PUSH BUTTON SWITCH: Brand: NEPTUNE E-SEP

## (undated) DEVICE — SUTURE PERMAHAND SZ 3-0 L18IN NONABSORBABLE BLK L26MM SH C013D

## (undated) DEVICE — COVER LT HNDL PLAS RIG 2 PER PK

## (undated) DEVICE — SEAL

## (undated) DEVICE — SEALER ENDOSCP NANO COAT OPN DIV CRV L JAW LIGASURE IMPACT

## (undated) DEVICE — TROCAR: Brand: KII FIOS FIRST ENTRY

## (undated) DEVICE — PENCIL ES CRD L10FT HND SWCHING ROCK SWCH W/ EDGE COAT BLDE

## (undated) DEVICE — STAPLER EXT SKIN 35 WIDE S STL STPL SQUEEZE HNDL VISISTAT

## (undated) DEVICE — REDUCER: Brand: ENDOWRIST

## (undated) DEVICE — ARM DRAPE

## (undated) DEVICE — STAPLER INT L75MM CUT LN L73MM STPL LN L77MM BLU B FRM 8

## (undated) DEVICE — LIQUIBAND RAPID ADHESIVE 36/CS 0.8ML: Brand: MEDLINE